# Patient Record
Sex: MALE | Race: WHITE | Employment: OTHER | URBAN - METROPOLITAN AREA
[De-identification: names, ages, dates, MRNs, and addresses within clinical notes are randomized per-mention and may not be internally consistent; named-entity substitution may affect disease eponyms.]

---

## 2018-12-07 RX ORDER — DOCUSATE SODIUM 100 MG/1
100 CAPSULE, LIQUID FILLED ORAL 2 TIMES DAILY
COMMUNITY

## 2018-12-07 RX ORDER — CELECOXIB 200 MG/1
200 CAPSULE ORAL 2 TIMES DAILY
COMMUNITY

## 2018-12-07 RX ORDER — TRAMADOL HYDROCHLORIDE 50 MG/1
50 TABLET ORAL
COMMUNITY

## 2018-12-14 ENCOUNTER — ANESTHESIA EVENT (OUTPATIENT)
Dept: SURGERY | Age: 78
End: 2018-12-14
Payer: MEDICARE

## 2018-12-16 ENCOUNTER — PREP FOR PROCEDURE (OUTPATIENT)
Dept: OPHTHALMOLOGY | Age: 78
End: 2018-12-16

## 2018-12-16 RX ORDER — CIPROFLOXACIN HYDROCHLORIDE 3.5 MG/ML
1 SOLUTION/ DROPS TOPICAL SEE ADMIN INSTRUCTIONS
Status: CANCELLED | OUTPATIENT
Start: 2018-12-17

## 2018-12-16 RX ORDER — SODIUM CHLORIDE 0.9 % (FLUSH) 0.9 %
10 SYRINGE (ML) INJECTION PRN
Status: CANCELLED | OUTPATIENT
Start: 2018-12-16

## 2018-12-16 RX ORDER — SODIUM CHLORIDE 0.9 % (FLUSH) 0.9 %
10 SYRINGE (ML) INJECTION EVERY 12 HOURS SCHEDULED
Status: CANCELLED | OUTPATIENT
Start: 2018-12-16

## 2018-12-17 ENCOUNTER — ANESTHESIA (OUTPATIENT)
Dept: SURGERY | Age: 78
End: 2018-12-17
Payer: MEDICARE

## 2018-12-17 ENCOUNTER — HOSPITAL ENCOUNTER (OUTPATIENT)
Age: 78
Setting detail: OUTPATIENT SURGERY
Discharge: HOME | End: 2018-12-17
Attending: OPHTHALMOLOGY | Admitting: OPHTHALMOLOGY
Payer: MEDICARE

## 2018-12-17 VITALS
OXYGEN SATURATION: 100 % | DIASTOLIC BLOOD PRESSURE: 86 MMHG | RESPIRATION RATE: 12 BRPM | SYSTOLIC BLOOD PRESSURE: 145 MMHG

## 2018-12-17 VITALS
OXYGEN SATURATION: 97 % | RESPIRATION RATE: 15 BRPM | DIASTOLIC BLOOD PRESSURE: 80 MMHG | SYSTOLIC BLOOD PRESSURE: 143 MMHG | BODY MASS INDEX: 28.57 KG/M2 | WEIGHT: 242 LBS | HEART RATE: 69 BPM | HEIGHT: 77 IN | TEMPERATURE: 97.2 F

## 2018-12-17 PROBLEM — Z98.41 CATARACT EXTRACTION STATUS OF RIGHT EYE: Status: ACTIVE | Noted: 2018-12-17

## 2018-12-17 LAB
GLUCOSE BLD-MCNC: 121 MG/DL (ref 70–99)
GLUCOSE BLD-MCNC: 124 MG/DL (ref 70–99)
PERFORMED ON: ABNORMAL
PERFORMED ON: ABNORMAL

## 2018-12-17 PROCEDURE — 2500000003 HC RX 250 WO HCPCS: Performed by: OPHTHALMOLOGY

## 2018-12-17 PROCEDURE — 7100000010 HC PHASE II RECOVERY - FIRST 15 MIN: Performed by: OPHTHALMOLOGY

## 2018-12-17 PROCEDURE — 2709999900 HC NON-CHARGEABLE SUPPLY: Performed by: OPHTHALMOLOGY

## 2018-12-17 PROCEDURE — 3700000001 HC ADD 15 MINUTES (ANESTHESIA): Performed by: OPHTHALMOLOGY

## 2018-12-17 PROCEDURE — 2580000003 HC RX 258: Performed by: ANESTHESIOLOGY

## 2018-12-17 PROCEDURE — 6370000000 HC RX 637 (ALT 250 FOR IP): Performed by: OPHTHALMOLOGY

## 2018-12-17 PROCEDURE — 3700000000 HC ANESTHESIA ATTENDED CARE: Performed by: OPHTHALMOLOGY

## 2018-12-17 PROCEDURE — 3600000004 HC SURGERY LEVEL 4 BASE: Performed by: OPHTHALMOLOGY

## 2018-12-17 PROCEDURE — 6360000002 HC RX W HCPCS: Performed by: NURSE ANESTHETIST, CERTIFIED REGISTERED

## 2018-12-17 PROCEDURE — V2632 POST CHMBR INTRAOCULAR LENS: HCPCS | Performed by: OPHTHALMOLOGY

## 2018-12-17 PROCEDURE — 3600000014 HC SURGERY LEVEL 4 ADDTL 15MIN: Performed by: OPHTHALMOLOGY

## 2018-12-17 DEVICE — ACRYSOF(R) IQ ASPHERIC NATURAL IOL, SINGLE-PIECE ACRYLIC FOLDABLE PCL, UV WITH BLUE LIGHTFILTER, 13.0MM LENGTH, 6.0MM ANTERIORASYMMETRIC BICONVEX OPTIC, PLANAR HAPTICS.
Type: IMPLANTABLE DEVICE | Status: FUNCTIONAL
Brand: ACRYSOF®

## 2018-12-17 RX ORDER — CIPROFLOXACIN HYDROCHLORIDE 3.5 MG/ML
1 SOLUTION/ DROPS TOPICAL
Status: COMPLETED | OUTPATIENT
Start: 2018-12-17 | End: 2018-12-17

## 2018-12-17 RX ORDER — SODIUM CHLORIDE 0.9 % (FLUSH) 0.9 %
10 SYRINGE (ML) INJECTION EVERY 12 HOURS SCHEDULED
Status: DISCONTINUED | OUTPATIENT
Start: 2018-12-17 | End: 2018-12-17 | Stop reason: SDUPTHER

## 2018-12-17 RX ORDER — LIDOCAINE HYDROCHLORIDE 10 MG/ML
INJECTION, SOLUTION EPIDURAL; INFILTRATION; INTRACAUDAL; PERINEURAL
Status: COMPLETED | OUTPATIENT
Start: 2018-12-17 | End: 2018-12-17

## 2018-12-17 RX ORDER — NEOMYCIN SULFATE, POLYMYXIN B SULFATE, AND DEXAMETHASONE 3.5; 10000; 1 MG/G; [USP'U]/G; MG/G
OINTMENT OPHTHALMIC
Status: COMPLETED | OUTPATIENT
Start: 2018-12-17 | End: 2018-12-17

## 2018-12-17 RX ORDER — CIPROFLOXACIN HYDROCHLORIDE 3.5 MG/ML
SOLUTION/ DROPS TOPICAL
Status: COMPLETED | OUTPATIENT
Start: 2018-12-17 | End: 2018-12-17

## 2018-12-17 RX ORDER — SODIUM CHLORIDE 0.9 % (FLUSH) 0.9 %
10 SYRINGE (ML) INJECTION EVERY 12 HOURS SCHEDULED
Status: DISCONTINUED | OUTPATIENT
Start: 2018-12-17 | End: 2018-12-17 | Stop reason: HOSPADM

## 2018-12-17 RX ORDER — TETRACAINE HYDROCHLORIDE 5 MG/ML
SOLUTION OPHTHALMIC
Status: COMPLETED | OUTPATIENT
Start: 2018-12-17 | End: 2018-12-17

## 2018-12-17 RX ORDER — MIDAZOLAM HYDROCHLORIDE 1 MG/ML
INJECTION INTRAMUSCULAR; INTRAVENOUS PRN
Status: DISCONTINUED | OUTPATIENT
Start: 2018-12-17 | End: 2018-12-17 | Stop reason: SDUPTHER

## 2018-12-17 RX ORDER — SODIUM CHLORIDE 0.9 % (FLUSH) 0.9 %
10 SYRINGE (ML) INJECTION PRN
Status: DISCONTINUED | OUTPATIENT
Start: 2018-12-17 | End: 2018-12-17 | Stop reason: SDUPTHER

## 2018-12-17 RX ORDER — SODIUM CHLORIDE 0.9 % (FLUSH) 0.9 %
10 SYRINGE (ML) INJECTION PRN
Status: DISCONTINUED | OUTPATIENT
Start: 2018-12-17 | End: 2018-12-17 | Stop reason: HOSPADM

## 2018-12-17 RX ORDER — BRIMONIDINE TARTRATE 2 MG/ML
SOLUTION/ DROPS OPHTHALMIC
Status: COMPLETED | OUTPATIENT
Start: 2018-12-17 | End: 2018-12-17

## 2018-12-17 RX ORDER — SODIUM CHLORIDE 9 MG/ML
INJECTION, SOLUTION INTRAVENOUS CONTINUOUS
Status: DISCONTINUED | OUTPATIENT
Start: 2018-12-17 | End: 2018-12-17 | Stop reason: HOSPADM

## 2018-12-17 RX ORDER — BALANCED SALT SOLUTION 6.4; .75; .48; .3; 3.9; 1.7 MG/ML; MG/ML; MG/ML; MG/ML; MG/ML; MG/ML
SOLUTION OPHTHALMIC
Status: COMPLETED | OUTPATIENT
Start: 2018-12-17 | End: 2018-12-17

## 2018-12-17 RX ADMIN — POVIDONE-IODINE 0.1 ML: 5 SOLUTION OPHTHALMIC at 06:48

## 2018-12-17 RX ADMIN — CIPROFLOXACIN HYDROCHLORIDE 1 DROP: 3 SOLUTION/ DROPS OPHTHALMIC at 06:47

## 2018-12-17 RX ADMIN — LIDOCAINE HYDROCHLORIDE 1 DROP: 35 GEL OPHTHALMIC at 06:53

## 2018-12-17 RX ADMIN — Medication 0.5 ML: at 06:47

## 2018-12-17 RX ADMIN — LIDOCAINE HYDROCHLORIDE 1 DROP: 35 GEL OPHTHALMIC at 06:48

## 2018-12-17 RX ADMIN — SODIUM CHLORIDE: 0.9 INJECTION, SOLUTION INTRAVENOUS at 06:55

## 2018-12-17 RX ADMIN — MIDAZOLAM 2 MG: 1 INJECTION INTRAMUSCULAR; INTRAVENOUS at 07:48

## 2018-12-17 RX ADMIN — Medication 0.5 ML: at 06:53

## 2018-12-17 RX ADMIN — Medication 0.5 ML: at 07:00

## 2018-12-17 RX ADMIN — CIPROFLOXACIN HYDROCHLORIDE 1 DROP: 3 SOLUTION/ DROPS OPHTHALMIC at 06:53

## 2018-12-17 ASSESSMENT — PAIN - FUNCTIONAL ASSESSMENT: PAIN_FUNCTIONAL_ASSESSMENT: 0-10

## 2018-12-17 ASSESSMENT — PAIN SCALES - GENERAL
PAINLEVEL_OUTOF10: 0
PAINLEVEL_OUTOF10: 0

## 2018-12-17 NOTE — ANESTHESIA PRE PROCEDURE
Department of Anesthesiology  Preprocedure Note       Name:  Morley Mcburney   Age:  69 y.o.  :  1940                                          MRN:  [id number]         Date:  2018      Surgeon: Brayan Fagan):  Dian Lopez MD    Procedure: PHACOEMULSIFICATION WITH INTRAOCULAR LENS IMPLANT (Right )    Medications prior to admission:   Prior to Admission medications    Medication Sig Start Date End Date Taking? Authorizing Provider   traMADol (ULTRAM) 50 MG tablet Take 50 mg by mouth Twice a Week. .   Yes Historical Provider, MD   metFORMIN (GLUCOPHAGE) 500 MG tablet Take 500 mg by mouth 2 times daily (with meals)   Yes Historical Provider, MD   celecoxib (CELEBREX) 200 MG capsule Take 200 mg by mouth 2 times daily   Yes Historical Provider, MD   docusate sodium (COLACE) 100 MG capsule Take 100 mg by mouth 2 times daily   Yes Historical Provider, MD   zolpidem (AMBIEN) 10 MG tablet Take 12.5 mg by mouth nightly as needed for Sleep. .   Yes Historical Provider, MD   gabapentin (NEURONTIN) 300 MG capsule Take 300 mg by mouth 6 times daily. .   Yes Historical Provider, MD   omeprazole (PRILOSEC) 40 MG capsule Take 40 mg by mouth daily. Yes Historical Provider, MD   alfuzosin (UROXATRAL) 10 MG SR tablet Take 10 mg by mouth daily. Yes Historical Provider, MD   fenofibrate (TRICOR) 48 MG tablet Take 48 mg by mouth every evening. Yes Historical Provider, MD   Psyllium (METAMUCIL PO) Take  by mouth. Yes Historical Provider, MD   Multiple Vitamins-Minerals (OCUVITE PRESERVISION PO) Take  by mouth.      Yes Historical Provider, MD       Current medications:    Current Facility-Administered Medications   Medication Dose Route Frequency Provider Last Rate Last Dose    0.9 % sodium chloride infusion   Intravenous Continuous Arcadio Baires MD        sodium chloride flush 0.9 % injection 10 mL  10 mL Intravenous 2 times per day Arcadio Baires MD        sodium chloride flush 0.9 % injection 10 mL  10 10/26/2013    MCV 91.7 10/26/2013    RDW 13.5 10/26/2013     10/26/2013       CMP:   Lab Results   Component Value Date     10/24/2013    K 4.5 10/24/2013     10/24/2013    CO2 22 10/24/2013    BUN 14 10/24/2013    CREATININE 1.1 10/24/2013    GFRAA >60 10/24/2013    AGRATIO 1.2 10/24/2013    LABGLOM >60 10/24/2013    GLUCOSE 191 10/24/2013    PROT 7.2 10/24/2013    CALCIUM 9.2 10/24/2013    BILITOT 0.30 10/24/2013    ALKPHOS 80 10/24/2013    AST 24 10/24/2013    ALT 20 10/24/2013       POC Tests: No results for input(s): POCGLU, POCNA, POCK, POCCL, POCBUN, POCHEMO, POCHCT in the last 72 hours. Coags:   Lab Results   Component Value Date    PROTIME 11.0 10/24/2013    INR 0.98 10/24/2013    APTT 32.0 10/24/2013       HCG (If Applicable): No results found for: PREGTESTUR, PREGSERUM, HCG, HCGQUANT     ABGs: No results found for: PHART, PO2ART, DWG3OOG, UWE5WIT, BEART, N4QNCPSI     Type & Screen (If Applicable):  No results found for: LABABO, [address]    Anesthesia Evaluation  Patient summary reviewed no history of anesthetic complications:   Airway: Mallampati: II  TM distance: >3 FB   Neck ROM: full  Mouth opening: > = 3 FB Dental:          Pulmonary:Negative Pulmonary ROS breath sounds clear to auscultation                             Cardiovascular:  Exercise tolerance: good (>4 METS),   (+) past MI: > 6 months, CAD:, hyperlipidemia    (-) hypertension      Rhythm: regular  Rate: normal                    Neuro/Psych:      (-) TIA, CVA and depression/anxiety            GI/Hepatic/Renal:   (+) morbid obesity     (-) GERD, hepatitis, liver disease and no renal disease       Endo/Other:    (+) Diabetes, . Abdominal:           Vascular:     - DVT and PE. Anesthesia Plan      MAC     ASA 3       Induction: intravenous. Anesthetic plan and risks discussed with patient. Plan discussed with CRNA.             DOS STAFF ADDENDUM:    Pt seen and

## 2018-12-17 NOTE — OP NOTE
[address]  [address] [facility]. [address] [address]  [phone]      12/17/2018    Patient name: Dorothy Eaton  YOB: 1940  MRN: [id number]    [place]: Allergies   Allergen Reactions    Erythromycin        Pre-operative diagnosis:  Cataract Right Eye    Post-operative diagnosis:  Same    Procedure Performed:  Phacoemulsification with insertion of a foldable posterior chamber intraocular lens implant to the right eye. Anesthesia:  Topical Anesthesia with MAC. Estimated Blood Loss: None    Specimens removed: None    Limbal Relaxing Incisions:  Axis:N/A    Arc Length: N/A    Complications:  None    Description of Procedure: In the same day surgery center, the patient was given the usual pre-operative regimen. In the operating room suite, the right eye was prepped and draped in the usual sterile fashion. A paracentesis tract was fashioned, after which 0.5 MI of 1% preservative free Lidocaine was injected into the anterior chamber followed by Viscoat for a complete chamber fill. A clear cornea temporal tunnel was created using a keratome. Under optimal magnification and visualization, a continuous curvilinear capsulorrhexis was performed. Hydro-dissection of the lens was carried out using balanced salt solution. The lens was then phacoemulsified using the divide and conquer technique with a total CDE of 7.5. Residual cortex was removed using the I/A handpiece followed by thorough posterior capsule polishing. The capsular bag was then filled with Provisc and the lens was gently delivered into the bag, achieving excellent centration. Provisc was removed using the I/A handpiece and the globe was pressurized using balanced salt solution. The paracentesis tract and the temporal wound were both checked and found to be watertight. The speculum was removed and Betadine 5% was instilled.  The patient tolerated the procedure well and was taken to the same day

## 2024-02-19 ENCOUNTER — APPOINTMENT (OUTPATIENT)
Dept: CT IMAGING | Age: 84
End: 2024-02-19
Payer: MEDICARE

## 2024-02-19 ENCOUNTER — APPOINTMENT (OUTPATIENT)
Dept: GENERAL RADIOLOGY | Age: 84
End: 2024-02-19
Payer: MEDICARE

## 2024-02-19 ENCOUNTER — HOSPITAL ENCOUNTER (EMERGENCY)
Age: 84
Discharge: ANOTHER HOSPITAL | End: 2024-02-19
Attending: EMERGENCY MEDICINE
Payer: MEDICARE

## 2024-02-19 VITALS
DIASTOLIC BLOOD PRESSURE: 59 MMHG | SYSTOLIC BLOOD PRESSURE: 88 MMHG | BODY MASS INDEX: 28.46 KG/M2 | HEART RATE: 99 BPM | RESPIRATION RATE: 15 BRPM | OXYGEN SATURATION: 98 % | WEIGHT: 233.69 LBS | TEMPERATURE: 98.2 F | HEIGHT: 76 IN

## 2024-02-19 DIAGNOSIS — S22.080A CLOSED WEDGE COMPRESSION FRACTURE OF T11 VERTEBRA, INITIAL ENCOUNTER (HCC): ICD-10-CM

## 2024-02-19 DIAGNOSIS — S42.001A CLOSED DISPLACED FRACTURE OF RIGHT CLAVICLE, UNSPECIFIED PART OF CLAVICLE, INITIAL ENCOUNTER: ICD-10-CM

## 2024-02-19 DIAGNOSIS — R79.89 ELEVATED TROPONIN: ICD-10-CM

## 2024-02-19 DIAGNOSIS — S32.019A CLOSED FRACTURE OF FIRST LUMBAR VERTEBRA, UNSPECIFIED FRACTURE MORPHOLOGY, INITIAL ENCOUNTER (HCC): ICD-10-CM

## 2024-02-19 DIAGNOSIS — S82.001A CLOSED DISPLACED FRACTURE OF RIGHT PATELLA, UNSPECIFIED FRACTURE MORPHOLOGY, INITIAL ENCOUNTER: ICD-10-CM
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
## 2024-02-19 DIAGNOSIS — S22.41XA CLOSED FRACTURE OF MULTIPLE RIBS OF RIGHT SIDE, INITIAL ENCOUNTER: ICD-10-CM

## 2024-02-19 DIAGNOSIS — J94.2 HEMOTHORAX ON RIGHT: ICD-10-CM

## 2024-02-19 DIAGNOSIS — R09.02 HYPOXIA: ICD-10-CM

## 2024-02-19 LAB
ABO + RH BLD: NORMAL
ANION GAP SERPL CALCULATED.3IONS-SCNC: 11 MMOL/L (ref 3–16)
BASOPHILS # BLD: 0 K/UL (ref 0–0.2)
BASOPHILS NFR BLD: 0.3 %
BLD GP AB SCN SERPL QL: NORMAL
BUN SERPL-MCNC: 13 MG/DL (ref 7–20)
CALCIUM SERPL-MCNC: 9 MG/DL (ref 8.3–10.6)
CHLORIDE SERPL-SCNC: 92 MMOL/L (ref 99–110)
CO2 SERPL-SCNC: 24 MMOL/L (ref 21–32)
CREAT SERPL-MCNC: 0.8 MG/DL (ref 0.8–1.3)
DEPRECATED RDW RBC AUTO: 14.3 % (ref 12.4–15.4)
EOSINOPHIL # BLD: 0.1 K/UL (ref 0–0.6)
EOSINOPHIL NFR BLD: 1 %
GFR SERPLBLD CREATININE-BSD FMLA CKD-EPI: >60 ML/MIN/{1.73_M2}
GLUCOSE SERPL-MCNC: 118 MG/DL (ref 70–99)
HCT VFR BLD AUTO: 41.9 % (ref 40.5–52.5)
HGB BLD-MCNC: 14.3 G/DL (ref 13.5–17.5)
LYMPHOCYTES # BLD: 1 K/UL (ref 1–5.1)
LYMPHOCYTES NFR BLD: 16.1 %
MCH RBC QN AUTO: 31.3 PG (ref 26–34)
MCHC RBC AUTO-ENTMCNC: 34 G/DL (ref 31–36)
MCV RBC AUTO: 92 FL (ref 80–100)
MONOCYTES # BLD: 0.6 K/UL (ref 0–1.3)
MONOCYTES NFR BLD: 9.2 %
NEUTROPHILS # BLD: 4.7 K/UL (ref 1.7–7.7)
NEUTROPHILS NFR BLD: 73.4 %
PLATELET # BLD AUTO: 177 K/UL (ref 135–450)
PMV BLD AUTO: 9.7 FL (ref 5–10.5)
POTASSIUM SERPL-SCNC: 4.6 MMOL/L (ref 3.5–5.1)
RBC # BLD AUTO: 4.56 M/UL (ref 4.2–5.9)
SODIUM SERPL-SCNC: 127 MMOL/L (ref 136–145)
TROPONIN, HIGH SENSITIVITY: 36 NG/L (ref 0–22)
WBC # BLD AUTO: 6.4 K/UL (ref 4–11)

## 2024-02-19 PROCEDURE — 72170 X-RAY EXAM OF PELVIS: CPT

## 2024-02-19 PROCEDURE — 73070 X-RAY EXAM OF ELBOW: CPT

## 2024-02-19 PROCEDURE — 86900 BLOOD TYPING SEROLOGIC ABO: CPT

## 2024-02-19 PROCEDURE — 36415 COLL VENOUS BLD VENIPUNCTURE: CPT

## 2024-02-19 PROCEDURE — 71260 CT THORAX DX C+: CPT

## 2024-02-19 PROCEDURE — 72125 CT NECK SPINE W/O DYE: CPT

## 2024-02-19 PROCEDURE — 84484 ASSAY OF TROPONIN QUANT: CPT

## 2024-02-19 PROCEDURE — 2580000003 HC RX 258: Performed by: PHYSICIAN ASSISTANT

## 2024-02-19 PROCEDURE — 73560 X-RAY EXAM OF KNEE 1 OR 2: CPT

## 2024-02-19 PROCEDURE — 73030 X-RAY EXAM OF SHOULDER: CPT

## 2024-02-19 PROCEDURE — 96375 TX/PRO/DX INJ NEW DRUG ADDON: CPT

## 2024-02-19 PROCEDURE — 99285 EMERGENCY DEPT VISIT HI MDM: CPT

## 2024-02-19 PROCEDURE — 96376 TX/PRO/DX INJ SAME DRUG ADON: CPT

## 2024-02-19 PROCEDURE — 6370000000 HC RX 637 (ALT 250 FOR IP): Performed by: EMERGENCY MEDICINE

## 2024-02-19 PROCEDURE — 70450 CT HEAD/BRAIN W/O DYE: CPT

## 2024-02-19 PROCEDURE — 73130 X-RAY EXAM OF HAND: CPT

## 2024-02-19 PROCEDURE — 86850 RBC ANTIBODY SCREEN: CPT

## 2024-02-19 PROCEDURE — 86901 BLOOD TYPING SEROLOGIC RH(D): CPT

## 2024-02-19 PROCEDURE — 85025 COMPLETE CBC W/AUTO DIFF WBC: CPT

## 2024-02-19 PROCEDURE — 93005 ELECTROCARDIOGRAM TRACING: CPT | Performed by: PHYSICIAN ASSISTANT

## 2024-02-19 PROCEDURE — 96374 THER/PROPH/DIAG INJ IV PUSH: CPT

## 2024-02-19 PROCEDURE — 6360000002 HC RX W HCPCS: Performed by: PHYSICIAN ASSISTANT

## 2024-02-19 PROCEDURE — 6360000004 HC RX CONTRAST MEDICATION: Performed by: PHYSICIAN ASSISTANT

## 2024-02-19 PROCEDURE — 80048 BASIC METABOLIC PNL TOTAL CA: CPT

## 2024-02-19 RX ORDER — ONDANSETRON 2 MG/ML
4 INJECTION INTRAMUSCULAR; INTRAVENOUS ONCE
Status: COMPLETED | OUTPATIENT
Start: 2024-02-19 | End: 2024-02-19

## 2024-02-19 RX ORDER — SODIUM CHLORIDE 9 MG/ML
INJECTION, SOLUTION INTRAVENOUS CONTINUOUS
Status: DISCONTINUED | OUTPATIENT
Start: 2024-02-19 | End: 2024-02-20 | Stop reason: HOSPADM

## 2024-02-19 RX ORDER — 0.9 % SODIUM CHLORIDE 0.9 %
500 INTRAVENOUS SOLUTION INTRAVENOUS ONCE
Status: COMPLETED | OUTPATIENT
Start: 2024-02-19 | End: 2024-02-19

## 2024-02-19 RX ORDER — MORPHINE SULFATE 2 MG/ML
2 INJECTION, SOLUTION INTRAMUSCULAR; INTRAVENOUS ONCE
Status: COMPLETED | OUTPATIENT
Start: 2024-02-19 | End: 2024-02-19

## 2024-02-19 RX ORDER — LIDOCAINE 4 G/G
1 PATCH TOPICAL DAILY
Status: DISCONTINUED | OUTPATIENT
Start: 2024-02-19 | End: 2024-02-20 | Stop reason: HOSPADM

## 2024-02-19 RX ORDER — IBUPROFEN 200 MG
TABLET ORAL ONCE
Status: DISCONTINUED | OUTPATIENT
Start: 2024-02-19 | End: 2024-02-20 | Stop reason: HOSPADM

## 2024-02-19 RX ADMIN — ONDANSETRON 4 MG: 2 INJECTION INTRAMUSCULAR; INTRAVENOUS at 18:18

## 2024-02-19 RX ADMIN — SODIUM CHLORIDE 500 ML: 9 INJECTION, SOLUTION INTRAVENOUS at 20:30

## 2024-02-19 RX ADMIN — MORPHINE SULFATE 2 MG: 2 INJECTION, SOLUTION INTRAMUSCULAR; INTRAVENOUS at 19:51

## 2024-02-19 RX ADMIN — IOPAMIDOL 75 ML: 755 INJECTION, SOLUTION INTRAVENOUS at 18:52

## 2024-02-19 RX ADMIN — MORPHINE SULFATE 2 MG: 2 INJECTION, SOLUTION INTRAMUSCULAR; INTRAVENOUS at 18:18

## 2024-02-19 RX ADMIN — SODIUM CHLORIDE: 9 INJECTION, SOLUTION INTRAVENOUS at 21:04

## 2024-02-19 ASSESSMENT — PAIN - FUNCTIONAL ASSESSMENT: PAIN_FUNCTIONAL_ASSESSMENT: PREVENTS OR INTERFERES SOME ACTIVE ACTIVITIES AND ADLS

## 2024-02-19 ASSESSMENT — PAIN SCALES - GENERAL
PAINLEVEL_OUTOF10: 10
PAINLEVEL_OUTOF10: 10

## 2024-02-19 ASSESSMENT — PAIN DESCRIPTION - ORIENTATION: ORIENTATION: RIGHT

## 2024-02-19 ASSESSMENT — PAIN DESCRIPTION - DESCRIPTORS: DESCRIPTORS: SHARP

## 2024-02-19 ASSESSMENT — LIFESTYLE VARIABLES
HOW OFTEN DO YOU HAVE A DRINK CONTAINING ALCOHOL: MONTHLY OR LESS
HOW MANY STANDARD DRINKS CONTAINING ALCOHOL DO YOU HAVE ON A TYPICAL DAY: 1 OR 2

## 2024-02-19 ASSESSMENT — PAIN DESCRIPTION - LOCATION: LOCATION: BACK;SHOULDER

## 2024-02-19 ASSESSMENT — PAIN DESCRIPTION - ONSET: ONSET: ON-GOING

## 2024-02-19 ASSESSMENT — ENCOUNTER SYMPTOMS
VOMITING: 0
SHORTNESS OF BREATH: 0
BACK PAIN: 1
NAUSEA: 0

## 2024-02-19 ASSESSMENT — PAIN DESCRIPTION - PAIN TYPE: TYPE: ACUTE PAIN

## 2024-02-19 ASSESSMENT — PAIN DESCRIPTION - FREQUENCY: FREQUENCY: CONTINUOUS

## 2024-02-19 NOTE — ED NOTES
Pt arrived to dept via [facility] from home.  Pt c/o fell backwards down stairs 5 metal steps. Left and right elbow skin tears, pinky laceration, left side of head laceration. Pain to right shoulder and right back pain. Pt is not on any blood thinners per self. No LOC reported.  Pt awake, alert and oriented x 3.  Skin warm and dry- mult skin laceration noted.  Resp easy and unlabored.  Pt placed in gown and on cardiac monitor.  Call light in reach. Side rails x 2. Call light within reach.

## 2024-02-20 LAB
EKG ATRIAL RATE: 104 BPM
EKG DIAGNOSIS: NORMAL
EKG P AXIS: 34 DEGREES
EKG P-R INTERVAL: 198 MS
EKG Q-T INTERVAL: 340 MS
EKG QRS DURATION: 114 MS
EKG QTC CALCULATION (BAZETT): 447 MS
EKG R AXIS: -35 DEGREES
EKG T AXIS: 91 DEGREES
EKG VENTRICULAR RATE: 104 BPM

## 2024-02-20 PROCEDURE — 93010 ELECTROCARDIOGRAM REPORT: CPT | Performed by: INTERNAL MEDICINE

## 2024-02-20 NOTE — ED PROVIDER NOTES
Department Physician in the absence of a cardiologist.  Please see their note for interpretation of EKG.    RADIOLOGY:   Non-plain film images such as CT, Ultrasound and MRI are read by the radiologist. Plain radiographic images are visualized and preliminarily interpreted by the ED Provider with the below findings:    Interpretation per the Radiologist below, if available at the time of this note:    CT HEAD WO CONTRAST   Final Result   No acute abnormality of the head and cervical spine.  Comminuted fracture of   the medial right clavicle; refer to chest CT report for further details.         CT CERVICAL SPINE WO CONTRAST   Final Result   No acute abnormality of the head and cervical spine.  Comminuted fracture of   the medial right clavicle; refer to chest CT report for further details.         CT LUMBAR SPINE BONY RECONSTRUCTION   Final Result      CT THORACIC SPINE BONY RECONSTRUCTION   Final Result      CT CHEST ABDOMEN PELVIS W CONTRAST Additional Contrast? None   Final Result      XR ELBOW LEFT (2 VIEWS)   Final Result   1. No displaced acute fractures or dislocations of the visualized bones of   the left elbow.   2. Small elbow joint effusion.   3. If there remains clinical concern recommend repeat radiographs in 7-10   days to evaluate for occult fractures.   1.      XR ELBOW RIGHT (2 VIEWS)   Final Result   No acute fracture or dislocation.         XR HAND RIGHT (MIN 3 VIEWS)   Final Result   No acute fracture         XR KNEE RIGHT (1-2 VIEWS)   Final Result   Acute patellar fracture with proximal retraction of the largest superior   fragment.  CT follow-up recommended.         XR PELVIS (1-2 VIEWS)   Final Result   No visualized fracture         XR SHOULDER RIGHT (MIN 2 VIEWS)   Final Result   Acute mildly displaced fracture of the right distal clavicle.           No results found.    No results found.    PROCEDURES   Unless otherwise noted below, none     Procedures    CRITICAL CARE TIME (.cctime)   I 
provider for clarification.     LARRY HAN MD   Acute [facility]        Nnamdi, Larry Benton MD  02/19/24 2031

## 2024-02-23 ENCOUNTER — APPOINTMENT (OUTPATIENT)
Dept: CT IMAGING | Age: 84
DRG: 392 | End: 2024-02-23
Payer: MEDICARE

## 2024-02-23 ENCOUNTER — HOSPITAL ENCOUNTER (INPATIENT)
Age: 84
LOS: 2 days | Discharge: NURSING, REHAB OR LONG-TERM CARE | DRG: 392 | End: 2024-02-27
Attending: HOSPITALIST | Admitting: INTERNAL MEDICINE
Payer: MEDICARE

## 2024-02-23 DIAGNOSIS — K59.00 CONSTIPATION, UNSPECIFIED CONSTIPATION TYPE: ICD-10-CM

## 2024-02-23 DIAGNOSIS — R10.84 GENERALIZED ABDOMINAL PAIN: Primary | ICD-10-CM

## 2024-02-23 DIAGNOSIS — E87.1 HYPONATREMIA: ICD-10-CM

## 2024-02-23 DIAGNOSIS — F51.01 PRIMARY INSOMNIA: ICD-10-CM

## 2024-02-23 LAB
ANION GAP SERPL CALCULATED.3IONS-SCNC: 10 MMOL/L (ref 3–16)
BACTERIA URNS QL MICRO: NORMAL /HPF
BASOPHILS # BLD: 0 K/UL (ref 0–0.2)
BASOPHILS NFR BLD: 0.2 %
BILIRUB UR QL STRIP.AUTO: NEGATIVE
BUN SERPL-MCNC: 18 MG/DL (ref 7–20)
CALCIUM SERPL-MCNC: 8.9 MG/DL (ref 8.3–10.6)
CHLORIDE SERPL-SCNC: 88 MMOL/L (ref 99–110)
CLARITY UR: CLEAR
CO2 SERPL-SCNC: 25 MMOL/L (ref 21–32)
COLOR UR: YELLOW
CREAT SERPL-MCNC: 0.7 MG/DL (ref 0.8–1.3)
DEPRECATED RDW RBC AUTO: 13.8 % (ref 12.4–15.4)
EOSINOPHIL # BLD: 0 K/UL (ref 0–0.6)
EOSINOPHIL NFR BLD: 0.2 %
EPI CELLS #/AREA URNS AUTO: 1 /HPF (ref 0–5)
GFR SERPLBLD CREATININE-BSD FMLA CKD-EPI: >60 ML/MIN/{1.73_M2}
GLUCOSE SERPL-MCNC: 141 MG/DL (ref 70–99)
GLUCOSE UR STRIP.AUTO-MCNC: NEGATIVE MG/DL
HCT VFR BLD AUTO: 34.9 % (ref 40.5–52.5)
HGB BLD-MCNC: 12.1 G/DL (ref 13.5–17.5)
HGB UR QL STRIP.AUTO: NEGATIVE
HYALINE CASTS #/AREA URNS AUTO: 0 /LPF (ref 0–8)
KETONES UR STRIP.AUTO-MCNC: ABNORMAL MG/DL
LACTATE BLDV-SCNC: 1.7 MMOL/L (ref 0.4–2)
LEUKOCYTE ESTERASE UR QL STRIP.AUTO: NEGATIVE
LYMPHOCYTES # BLD: 0.5 K/UL (ref 1–5.1)
LYMPHOCYTES NFR BLD: 5.3 %
MCH RBC QN AUTO: 31.6 PG (ref 26–34)
MCHC RBC AUTO-ENTMCNC: 34.7 G/DL (ref 31–36)
MCV RBC AUTO: 91.1 FL (ref 80–100)
MONOCYTES # BLD: 1 K/UL (ref 0–1.3)
MONOCYTES NFR BLD: 9.8 %
NEUTROPHILS # BLD: 8.6 K/UL (ref 1.7–7.7)
NEUTROPHILS NFR BLD: 84.5 %
NITRITE UR QL STRIP.AUTO: NEGATIVE
PH UR STRIP.AUTO: 5.5 [PH] (ref 5–8)
PLATELET # BLD AUTO: 234 K/UL (ref 135–450)
PMV BLD AUTO: 8.6 FL (ref 5–10.5)
POTASSIUM SERPL-SCNC: 4.3 MMOL/L (ref 3.5–5.1)
PROT UR STRIP.AUTO-MCNC: ABNORMAL MG/DL
RBC # BLD AUTO: 3.83 M/UL (ref 4.2–5.9)
RBC CLUMPS #/AREA URNS AUTO: 2 /HPF (ref 0–4)
SODIUM SERPL-SCNC: 123 MMOL/L (ref 136–145)
SP GR UR STRIP.AUTO: 1.03 (ref 1–1.03)
UA COMPLETE W REFLEX CULTURE PNL UR: ABNORMAL
UA DIPSTICK W REFLEX MICRO PNL UR: YES
URN SPEC COLLECT METH UR: ABNORMAL
UROBILINOGEN UR STRIP-ACNC: 1 E.U./DL
WBC # BLD AUTO: 10.1 K/UL (ref 4–11)
WBC #/AREA URNS AUTO: 1 /HPF (ref 0–5)

## 2024-02-23 PROCEDURE — 83605 ASSAY OF LACTIC ACID: CPT

## 2024-02-23 PROCEDURE — 81001 URINALYSIS AUTO W/SCOPE: CPT

## 2024-02-23 PROCEDURE — 6370000000 HC RX 637 (ALT 250 FOR IP): Performed by: NURSE PRACTITIONER

## 2024-02-23 PROCEDURE — 2580000003 HC RX 258: Performed by: NURSE PRACTITIONER

## 2024-02-23 PROCEDURE — 6360000004 HC RX CONTRAST MEDICATION: Performed by: NURSE PRACTITIONER

## 2024-02-23 PROCEDURE — 99285 EMERGENCY DEPT VISIT HI MDM: CPT

## 2024-02-23 PROCEDURE — 74177 CT ABD & PELVIS W/CONTRAST: CPT

## 2024-02-23 PROCEDURE — 80048 BASIC METABOLIC PNL TOTAL CA: CPT

## 2024-02-23 PROCEDURE — 6360000002 HC RX W HCPCS: Performed by: NURSE PRACTITIONER

## 2024-02-23 PROCEDURE — 96374 THER/PROPH/DIAG INJ IV PUSH: CPT

## 2024-02-23 PROCEDURE — 96361 HYDRATE IV INFUSION ADD-ON: CPT

## 2024-02-23 PROCEDURE — 85025 COMPLETE CBC W/AUTO DIFF WBC: CPT

## 2024-02-23 RX ORDER — MORPHINE SULFATE 4 MG/ML
4 INJECTION, SOLUTION INTRAMUSCULAR; INTRAVENOUS ONCE
Status: COMPLETED | OUTPATIENT
Start: 2024-02-24 | End: 2024-02-24

## 2024-02-23 RX ORDER — LACTULOSE 10 G/15ML
30 SOLUTION ORAL ONCE
Status: COMPLETED | OUTPATIENT
Start: 2024-02-23 | End: 2024-02-23

## 2024-02-23 RX ORDER — 0.9 % SODIUM CHLORIDE 0.9 %
500 INTRAVENOUS SOLUTION INTRAVENOUS ONCE
Status: COMPLETED | OUTPATIENT
Start: 2024-02-23 | End: 2024-02-24

## 2024-02-23 RX ADMIN — SODIUM CHLORIDE 500 ML: 9 INJECTION, SOLUTION INTRAVENOUS at 23:13

## 2024-02-23 RX ADMIN — HYDROMORPHONE HYDROCHLORIDE 0.5 MG: 1 INJECTION, SOLUTION INTRAMUSCULAR; INTRAVENOUS; SUBCUTANEOUS at 21:02

## 2024-02-23 RX ADMIN — IOPAMIDOL 75 ML: 755 INJECTION, SOLUTION INTRAVENOUS at 21:36

## 2024-02-23 RX ADMIN — LACTULOSE 30 G: 20 SOLUTION ORAL at 22:59

## 2024-02-23 ASSESSMENT — LIFESTYLE VARIABLES
HOW MANY STANDARD DRINKS CONTAINING ALCOHOL DO YOU HAVE ON A TYPICAL DAY: 1 OR 2
HOW OFTEN DO YOU HAVE A DRINK CONTAINING ALCOHOL: MONTHLY OR LESS

## 2024-02-23 ASSESSMENT — PAIN SCALES - GENERAL
PAINLEVEL_OUTOF10: 10
PAINLEVEL_OUTOF10: 9
PAINLEVEL_OUTOF10: 3

## 2024-02-24 PROBLEM — K59.00 CONSTIPATION: Status: ACTIVE | Noted: 2024-02-24

## 2024-02-24 PROCEDURE — 6370000000 HC RX 637 (ALT 250 FOR IP): Performed by: HOSPITALIST

## 2024-02-24 PROCEDURE — 96361 HYDRATE IV INFUSION ADD-ON: CPT

## 2024-02-24 PROCEDURE — 96375 TX/PRO/DX INJ NEW DRUG ADDON: CPT

## 2024-02-24 PROCEDURE — 6360000002 HC RX W HCPCS: Performed by: INTERNAL MEDICINE

## 2024-02-24 PROCEDURE — 6360000002 HC RX W HCPCS: Performed by: HOSPITALIST

## 2024-02-24 PROCEDURE — 2580000003 HC RX 258: Performed by: INTERNAL MEDICINE

## 2024-02-24 PROCEDURE — 94760 N-INVAS EAR/PLS OXIMETRY 1: CPT

## 2024-02-24 PROCEDURE — 96372 THER/PROPH/DIAG INJ SC/IM: CPT

## 2024-02-24 PROCEDURE — 2500000003 HC RX 250 WO HCPCS: Performed by: INTERNAL MEDICINE

## 2024-02-24 PROCEDURE — G0378 HOSPITAL OBSERVATION PER HR: HCPCS

## 2024-02-24 PROCEDURE — 6360000002 HC RX W HCPCS: Performed by: NURSE PRACTITIONER

## 2024-02-24 PROCEDURE — 96376 TX/PRO/DX INJ SAME DRUG ADON: CPT

## 2024-02-24 PROCEDURE — 2580000003 HC RX 258: Performed by: HOSPITALIST

## 2024-02-24 RX ORDER — ACETAMINOPHEN 325 MG/1
650 TABLET ORAL EVERY 6 HOURS PRN
Status: DISCONTINUED | OUTPATIENT
Start: 2024-02-24 | End: 2024-02-27 | Stop reason: HOSPADM

## 2024-02-24 RX ORDER — TAMSULOSIN HYDROCHLORIDE 0.4 MG/1
0.4 CAPSULE ORAL DAILY
Status: ON HOLD | COMMUNITY

## 2024-02-24 RX ORDER — POTASSIUM CHLORIDE 20 MEQ/1
40 TABLET, EXTENDED RELEASE ORAL PRN
Status: DISCONTINUED | OUTPATIENT
Start: 2024-02-24 | End: 2024-02-27 | Stop reason: HOSPADM

## 2024-02-24 RX ORDER — LIDOCAINE 50 MG/G
1 PATCH TOPICAL DAILY
Status: ON HOLD | COMMUNITY

## 2024-02-24 RX ORDER — ERGOCALCIFEROL 1.25 MG/1
50000 CAPSULE ORAL WEEKLY
Status: ON HOLD | COMMUNITY

## 2024-02-24 RX ORDER — SODIUM CHLORIDE 0.9 % (FLUSH) 0.9 %
5-40 SYRINGE (ML) INJECTION PRN
Status: DISCONTINUED | OUTPATIENT
Start: 2024-02-24 | End: 2024-02-27 | Stop reason: HOSPADM

## 2024-02-24 RX ORDER — ENOXAPARIN SODIUM 100 MG/ML
30 INJECTION SUBCUTANEOUS 2 TIMES DAILY
Status: DISCONTINUED | OUTPATIENT
Start: 2024-02-24 | End: 2024-02-27 | Stop reason: HOSPADM

## 2024-02-24 RX ORDER — FAMOTIDINE 20 MG/1
20 TABLET, FILM COATED ORAL 2 TIMES DAILY
Status: ON HOLD | COMMUNITY

## 2024-02-24 RX ORDER — OXYCODONE HYDROCHLORIDE 5 MG/1
5 TABLET ORAL EVERY 6 HOURS PRN
Status: DISCONTINUED | OUTPATIENT
Start: 2024-02-24 | End: 2024-02-24 | Stop reason: SDUPTHER

## 2024-02-24 RX ORDER — KETOROLAC TROMETHAMINE 15 MG/ML
15 INJECTION, SOLUTION INTRAMUSCULAR; INTRAVENOUS ONCE
Status: COMPLETED | OUTPATIENT
Start: 2024-02-24 | End: 2024-02-24

## 2024-02-24 RX ORDER — OXYCODONE HYDROCHLORIDE 5 MG/1
5 TABLET ORAL EVERY 6 HOURS PRN
Status: ON HOLD | COMMUNITY
End: 2024-02-27

## 2024-02-24 RX ORDER — SODIUM CHLORIDE 0.9 % (FLUSH) 0.9 %
5-40 SYRINGE (ML) INJECTION EVERY 12 HOURS SCHEDULED
Status: DISCONTINUED | OUTPATIENT
Start: 2024-02-24 | End: 2024-02-27 | Stop reason: HOSPADM

## 2024-02-24 RX ORDER — GABAPENTIN 300 MG/1
600 CAPSULE ORAL 3 TIMES DAILY
Status: DISCONTINUED | OUTPATIENT
Start: 2024-02-24 | End: 2024-02-27 | Stop reason: HOSPADM

## 2024-02-24 RX ORDER — ONDANSETRON 4 MG/1
4 TABLET, ORALLY DISINTEGRATING ORAL EVERY 8 HOURS PRN
Status: DISCONTINUED | OUTPATIENT
Start: 2024-02-24 | End: 2024-02-27 | Stop reason: HOSPADM

## 2024-02-24 RX ORDER — OXYCODONE HYDROCHLORIDE 5 MG/1
5 TABLET ORAL EVERY 4 HOURS PRN
Status: DISCONTINUED | OUTPATIENT
Start: 2024-02-24 | End: 2024-02-27 | Stop reason: HOSPADM

## 2024-02-24 RX ORDER — ACETAMINOPHEN 325 MG/1
975 TABLET ORAL 3 TIMES DAILY
Status: ON HOLD | COMMUNITY

## 2024-02-24 RX ORDER — ACETAMINOPHEN 650 MG/1
650 SUPPOSITORY RECTAL EVERY 6 HOURS PRN
Status: DISCONTINUED | OUTPATIENT
Start: 2024-02-24 | End: 2024-02-27 | Stop reason: HOSPADM

## 2024-02-24 RX ORDER — SODIUM CHLORIDE, SODIUM LACTATE, POTASSIUM CHLORIDE, CALCIUM CHLORIDE 600; 310; 30; 20 MG/100ML; MG/100ML; MG/100ML; MG/100ML
INJECTION, SOLUTION INTRAVENOUS CONTINUOUS
Status: DISCONTINUED | OUTPATIENT
Start: 2024-02-24 | End: 2024-02-27

## 2024-02-24 RX ORDER — LINACLOTIDE 290 UG/1
290 CAPSULE, GELATIN COATED ORAL
Status: ON HOLD | COMMUNITY

## 2024-02-24 RX ORDER — POLYETHYLENE GLYCOL 3350 17 G/17G
17 POWDER, FOR SOLUTION ORAL 2 TIMES DAILY
Status: DISCONTINUED | OUTPATIENT
Start: 2024-02-24 | End: 2024-02-27 | Stop reason: HOSPADM

## 2024-02-24 RX ORDER — ERGOCALCIFEROL 1.25 MG/1
50000 CAPSULE ORAL WEEKLY
Status: DISCONTINUED | OUTPATIENT
Start: 2024-03-01 | End: 2024-02-27 | Stop reason: HOSPADM

## 2024-02-24 RX ORDER — TAMSULOSIN HYDROCHLORIDE 0.4 MG/1
0.4 CAPSULE ORAL DAILY
Status: DISCONTINUED | OUTPATIENT
Start: 2024-02-24 | End: 2024-02-27 | Stop reason: HOSPADM

## 2024-02-24 RX ORDER — ONDANSETRON 2 MG/ML
4 INJECTION INTRAMUSCULAR; INTRAVENOUS ONCE
Status: COMPLETED | OUTPATIENT
Start: 2024-02-24 | End: 2024-02-24

## 2024-02-24 RX ORDER — METHOCARBAMOL 1000 MG/1
1000 TABLET, COATED ORAL 3 TIMES DAILY
Status: ON HOLD | COMMUNITY
End: 2024-02-26

## 2024-02-24 RX ORDER — SIMETHICONE 80 MG
80 TABLET,CHEWABLE ORAL EVERY 6 HOURS PRN
Status: ON HOLD | COMMUNITY

## 2024-02-24 RX ORDER — METHOCARBAMOL 500 MG/1
1000 TABLET, FILM COATED ORAL 3 TIMES DAILY
Status: DISCONTINUED | OUTPATIENT
Start: 2024-02-24 | End: 2024-02-27 | Stop reason: HOSPADM

## 2024-02-24 RX ORDER — POLYETHYLENE GLYCOL 3350 17 G/17G
17 POWDER, FOR SOLUTION ORAL 2 TIMES DAILY
Status: ON HOLD | COMMUNITY

## 2024-02-24 RX ORDER — SENNA AND DOCUSATE SODIUM 50; 8.6 MG/1; MG/1
1 TABLET, FILM COATED ORAL 2 TIMES DAILY
Status: DISCONTINUED | OUTPATIENT
Start: 2024-02-24 | End: 2024-02-27 | Stop reason: HOSPADM

## 2024-02-24 RX ORDER — POLYETHYLENE GLYCOL 3350 17 G/17G
17 POWDER, FOR SOLUTION ORAL DAILY PRN
Status: DISCONTINUED | OUTPATIENT
Start: 2024-02-24 | End: 2024-02-27 | Stop reason: HOSPADM

## 2024-02-24 RX ORDER — OXYCODONE HYDROCHLORIDE 10 MG/1
10 TABLET ORAL EVERY 4 HOURS PRN
Status: DISCONTINUED | OUTPATIENT
Start: 2024-02-24 | End: 2024-02-27 | Stop reason: HOSPADM

## 2024-02-24 RX ORDER — FAMOTIDINE 20 MG/1
20 TABLET, FILM COATED ORAL 2 TIMES DAILY
Status: DISCONTINUED | OUTPATIENT
Start: 2024-02-24 | End: 2024-02-27 | Stop reason: HOSPADM

## 2024-02-24 RX ORDER — DESMOPRESSIN ACETATE 0.2 MG/1
100 TABLET ORAL NIGHTLY
Status: DISCONTINUED | OUTPATIENT
Start: 2024-02-24 | End: 2024-02-27 | Stop reason: HOSPADM

## 2024-02-24 RX ORDER — SIMETHICONE 80 MG
80 TABLET,CHEWABLE ORAL EVERY 6 HOURS PRN
Status: DISCONTINUED | OUTPATIENT
Start: 2024-02-24 | End: 2024-02-27 | Stop reason: HOSPADM

## 2024-02-24 RX ORDER — DEXTRAN 70, GLYCERIN, HYPROMELLOSE 1; 2; 3 MG/ML; MG/ML; MG/ML
1 SOLUTION/ DROPS OPHTHALMIC 2 TIMES DAILY
Status: ON HOLD | COMMUNITY

## 2024-02-24 RX ORDER — DESMOPRESSIN ACETATE 0.2 MG/1
100 TABLET ORAL DAILY
Status: DISCONTINUED | OUTPATIENT
Start: 2024-02-24 | End: 2024-02-24

## 2024-02-24 RX ORDER — LANOLIN ALCOHOL/MO/W.PET/CERES
3 CREAM (GRAM) TOPICAL NIGHTLY
Status: DISCONTINUED | OUTPATIENT
Start: 2024-02-24 | End: 2024-02-27 | Stop reason: HOSPADM

## 2024-02-24 RX ORDER — MAGNESIUM SULFATE IN WATER 40 MG/ML
2000 INJECTION, SOLUTION INTRAVENOUS PRN
Status: DISCONTINUED | OUTPATIENT
Start: 2024-02-24 | End: 2024-02-27 | Stop reason: HOSPADM

## 2024-02-24 RX ORDER — LIDOCAINE 4 G/G
1 PATCH TOPICAL DAILY
Status: DISCONTINUED | OUTPATIENT
Start: 2024-02-24 | End: 2024-02-27 | Stop reason: HOSPADM

## 2024-02-24 RX ORDER — POTASSIUM CHLORIDE 7.45 MG/ML
10 INJECTION INTRAVENOUS PRN
Status: DISCONTINUED | OUTPATIENT
Start: 2024-02-24 | End: 2024-02-27 | Stop reason: HOSPADM

## 2024-02-24 RX ORDER — SENNA AND DOCUSATE SODIUM 50; 8.6 MG/1; MG/1
1 TABLET, FILM COATED ORAL 2 TIMES DAILY
Status: ON HOLD | COMMUNITY

## 2024-02-24 RX ORDER — LANOLIN ALCOHOL/MO/W.PET/CERES
3 CREAM (GRAM) TOPICAL NIGHTLY
Status: ON HOLD | COMMUNITY

## 2024-02-24 RX ORDER — ACETAMINOPHEN 325 MG/1
975 TABLET ORAL 3 TIMES DAILY
Status: DISCONTINUED | OUTPATIENT
Start: 2024-02-24 | End: 2024-02-27 | Stop reason: HOSPADM

## 2024-02-24 RX ORDER — ONDANSETRON 2 MG/ML
4 INJECTION INTRAMUSCULAR; INTRAVENOUS EVERY 6 HOURS PRN
Status: DISCONTINUED | OUTPATIENT
Start: 2024-02-24 | End: 2024-02-27 | Stop reason: HOSPADM

## 2024-02-24 RX ORDER — SODIUM CHLORIDE 9 MG/ML
INJECTION, SOLUTION INTRAVENOUS PRN
Status: DISCONTINUED | OUTPATIENT
Start: 2024-02-24 | End: 2024-02-27 | Stop reason: HOSPADM

## 2024-02-24 RX ORDER — DESMOPRESSIN ACETATE 0.1 MG/1
0.1 TABLET ORAL DAILY
Status: ON HOLD | COMMUNITY

## 2024-02-24 RX ORDER — FENOFIBRATE 160 MG/1
160 TABLET ORAL DAILY
Status: DISCONTINUED | OUTPATIENT
Start: 2024-02-24 | End: 2024-02-27 | Stop reason: HOSPADM

## 2024-02-24 RX ADMIN — ONDANSETRON 4 MG: 2 INJECTION INTRAMUSCULAR; INTRAVENOUS at 19:31

## 2024-02-24 RX ADMIN — POLYETHYLENE GLYCOL 3350 17 G: 17 POWDER, FOR SOLUTION ORAL at 08:39

## 2024-02-24 RX ADMIN — Medication 3 MG: at 20:42

## 2024-02-24 RX ADMIN — OXYCODONE HYDROCHLORIDE 10 MG: 10 TABLET ORAL at 08:39

## 2024-02-24 RX ADMIN — SODIUM CHLORIDE, POTASSIUM CHLORIDE, SODIUM LACTATE AND CALCIUM CHLORIDE: 600; 310; 30; 20 INJECTION, SOLUTION INTRAVENOUS at 10:20

## 2024-02-24 RX ADMIN — METHYLNALTREXONE BROMIDE 12 MG: 12 INJECTION, SOLUTION SUBCUTANEOUS at 18:40

## 2024-02-24 RX ADMIN — Medication 330 ML: at 14:02

## 2024-02-24 RX ADMIN — FAMOTIDINE 20 MG: 20 TABLET, FILM COATED ORAL at 20:43

## 2024-02-24 RX ADMIN — ONDANSETRON 4 MG: 2 INJECTION INTRAMUSCULAR; INTRAVENOUS at 02:39

## 2024-02-24 RX ADMIN — ENOXAPARIN SODIUM 30 MG: 100 INJECTION SUBCUTANEOUS at 20:48

## 2024-02-24 RX ADMIN — ENOXAPARIN SODIUM 30 MG: 100 INJECTION SUBCUTANEOUS at 08:40

## 2024-02-24 RX ADMIN — ACETAMINOPHEN 975 MG: 325 TABLET ORAL at 20:42

## 2024-02-24 RX ADMIN — HYDROMORPHONE HYDROCHLORIDE 0.5 MG: 1 INJECTION, SOLUTION INTRAMUSCULAR; INTRAVENOUS; SUBCUTANEOUS at 08:57

## 2024-02-24 RX ADMIN — ONDANSETRON 4 MG: 2 INJECTION INTRAMUSCULAR; INTRAVENOUS at 08:41

## 2024-02-24 RX ADMIN — MORPHINE SULFATE 4 MG: 4 INJECTION, SOLUTION INTRAMUSCULAR; INTRAVENOUS at 00:34

## 2024-02-24 RX ADMIN — ONDANSETRON 4 MG: 2 INJECTION INTRAMUSCULAR; INTRAVENOUS at 04:45

## 2024-02-24 RX ADMIN — SODIUM CHLORIDE, PRESERVATIVE FREE 10 ML: 5 INJECTION INTRAVENOUS at 08:40

## 2024-02-24 RX ADMIN — KETOROLAC TROMETHAMINE 15 MG: 15 INJECTION, SOLUTION INTRAMUSCULAR; INTRAVENOUS at 04:58

## 2024-02-24 RX ADMIN — OXYCODONE HYDROCHLORIDE 10 MG: 10 TABLET ORAL at 03:05

## 2024-02-24 RX ADMIN — GABAPENTIN 600 MG: 300 CAPSULE ORAL at 20:43

## 2024-02-24 RX ADMIN — METHOCARBAMOL 1000 MG: 500 TABLET ORAL at 20:43

## 2024-02-24 ASSESSMENT — PAIN - FUNCTIONAL ASSESSMENT: PAIN_FUNCTIONAL_ASSESSMENT: PREVENTS OR INTERFERES WITH MANY ACTIVE NOT PASSIVE ACTIVITIES

## 2024-02-24 ASSESSMENT — PAIN SCALES - GENERAL
PAINLEVEL_OUTOF10: 4
PAINLEVEL_OUTOF10: 6
PAINLEVEL_OUTOF10: 10
PAINLEVEL_OUTOF10: 10
PAINLEVEL_OUTOF10: 8
PAINLEVEL_OUTOF10: 10

## 2024-02-24 ASSESSMENT — PAIN DESCRIPTION - FREQUENCY: FREQUENCY: CONTINUOUS

## 2024-02-24 ASSESSMENT — PAIN SCALES - WONG BAKER: WONGBAKER_NUMERICALRESPONSE: 2

## 2024-02-24 ASSESSMENT — PAIN DESCRIPTION - DESCRIPTORS: DESCRIPTORS: SHARP

## 2024-02-24 ASSESSMENT — PAIN DESCRIPTION - LOCATION: LOCATION: ABDOMEN

## 2024-02-24 ASSESSMENT — PAIN DESCRIPTION - PAIN TYPE: TYPE: ACUTE PAIN

## 2024-02-24 ASSESSMENT — PAIN DESCRIPTION - ORIENTATION: ORIENTATION: LOWER

## 2024-02-24 ASSESSMENT — PAIN DESCRIPTION - ONSET: ONSET: ON-GOING
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
## 2024-02-24 NOTE — ED PROVIDER NOTES
pole of the left kidney no hydronephrosis or hydroureter.  Right renal cystic mass 3 cm unchanged from prior study.  Stomach and small bowel unremarkable.  Moderate loops of dilated colon throughout the upper abdomen with moderate air-fluid levels extending inferiorly with moderate thesis.  Pelvis and retroperitoneum unremarkable.  Postop changes with L4-S1 compression deformities L1-L3 unchanged.  Lab results: WBC 10.1 with a left shift of 8.6-> I would expect this given post trauma  H&H is very stable at 12.1 and 34.  Lactic acid 1.7.  Metabolic panel sodium 123-> mild, glucose 141 no evidence of BOSTON or electrolyte derangement otherwise.    Patient will be given fluids as well and his pain medication and lactulose.    Patient was given the lactulose, up to the bedside commode.  He states that he is in so much pain with the rib fractures and chest wall and that he cannot have a bowel movement.  He demanded to be placed back in bed and to get more medication for pain control.  He was of very difficult to assist with one-person back to bed and to reposition.    2355: Consultation to the hospitalist attending for admission.  Patient will not be able to manage it daily place and definitely cannot manage at home.  He needs better pain management and address meant of the bowel related constipation.  I ordered morphine and 2000 mL of the GoLytely solution to promote bowel movement.    CONSULTS:  IP CONSULT TO HOSPITALIST    PROCEDURES:  Procedures    FINAL IMPRESSION      1. Generalized abdominal pain    2. Constipation, unspecified constipation type    3. Hyponatremia          DISPOSITION/PLAN   [unfilled]    PATIENT REFERRED TO:  No follow-up provider specified.    DISCHARGE MEDICATIONS:  New Prescriptions    No medications on file       (Please note that portions of this note were completed with a voice recognition program.  Efforts were made to edit the dictations but occasionally words are mis-transcribed.)    Masha

## 2024-02-24 NOTE — H&P
tablets by mouth in the morning, at noon, and at bedtime   Yes Rylan Grider MD   desmopressin (DDAVP) 0.1 MG tablet Take 1 tablet by mouth daily   Yes Rylan Grider MD   ergocalciferol (ERGOCALCIFEROL) 1.25 MG (71053 UT) capsule Take 1 capsule by mouth once a week On fridays   Yes Rylan Grider MD   famotidine (PEPCID) 20 MG tablet Take 1 tablet by mouth 2 times daily   Yes Rylan Grider MD   Artificial Tear Solution (GENTEAL TEARS) 0.1-0.2-0.3 % SOLN Apply 1 drop to eye in the morning and at bedtime   Yes Rylan Grider MD   lidocaine (LIDODERM) 5 % Place 1 patch onto the skin daily Right ribs 12 hours on, 12 hours off.   Yes Rylan Grider MD   linaclotide (LINZESS) 290 MCG CAPS capsule Take 1 capsule by mouth every morning (before breakfast)   Yes Rylan Grider MD   melatonin 3 MG TABS tablet Take 1 tablet by mouth at bedtime   Yes Rylan Grider MD   Methocarbamol 1000 MG TABS Take 1,000 mg by mouth 3 times daily   Yes Rylan Grider MD   polyethylene glycol (GLYCOLAX) 17 g packet Take 1 packet by mouth 2 times daily   Yes Rylan Grider MD   oxyCODONE (ROXICODONE) 5 MG immediate release tablet Take 1 tablet by mouth every 6 hours as needed for Pain.   Yes Rylan Grider MD   sennosides-docusate sodium (SENOKOT-S) 8.6-50 MG tablet Take 1 tablet by mouth in the morning and at bedtime   Yes Rylan Grider MD   simethicone (MYLICON) 80 MG chewable tablet Take 1 tablet by mouth every 6 hours as needed for Flatulence   Yes Ryaln Grider MD   sodium chloride (OCEAN) 0.65 % nasal spray 1 spray by Nasal route 2 times daily as needed for Congestion   Yes Rylan Grider MD   tamsulosin (FLOMAX) 0.4 MG capsule Take 1 capsule by mouth daily   Yes Rylan Grider MD   metFORMIN (GLUCOPHAGE) 500 MG tablet Take 1 tablet by mouth 2 times daily (with meals)    Rylan Grider MD   gabapentin (NEURONTIN) 300 MG

## 2024-02-25 PROBLEM — R10.9 ABDOMINAL PAIN: Status: ACTIVE | Noted: 2024-02-25

## 2024-02-25 LAB
ANION GAP SERPL CALCULATED.3IONS-SCNC: 13 MMOL/L (ref 3–16)
ANISOCYTOSIS BLD QL SMEAR: ABNORMAL
BASOPHILS # BLD: 0.1 K/UL (ref 0–0.2)
BASOPHILS NFR BLD: 1 %
BUN SERPL-MCNC: 19 MG/DL (ref 7–20)
CALCIUM SERPL-MCNC: 8.6 MG/DL (ref 8.3–10.6)
CHLORIDE SERPL-SCNC: 94 MMOL/L (ref 99–110)
CO2 SERPL-SCNC: 21 MMOL/L (ref 21–32)
CREAT SERPL-MCNC: 0.6 MG/DL (ref 0.8–1.3)
DEPRECATED RDW RBC AUTO: 14.1 % (ref 12.4–15.4)
EOSINOPHIL # BLD: 0 K/UL (ref 0–0.6)
EOSINOPHIL NFR BLD: 0 %
GFR SERPLBLD CREATININE-BSD FMLA CKD-EPI: >60 ML/MIN/{1.73_M2}
GLUCOSE SERPL-MCNC: 109 MG/DL (ref 70–99)
HCT VFR BLD AUTO: 33.9 % (ref 40.5–52.5)
HGB BLD-MCNC: 11.7 G/DL (ref 13.5–17.5)
LYMPHOCYTES # BLD: 1.4 K/UL (ref 1–5.1)
LYMPHOCYTES NFR BLD: 9 %
MCH RBC QN AUTO: 32.1 PG (ref 26–34)
MCHC RBC AUTO-ENTMCNC: 34.6 G/DL (ref 31–36)
MCV RBC AUTO: 92.8 FL (ref 80–100)
MONOCYTES # BLD: 1.3 K/UL (ref 0–1.3)
MONOCYTES NFR BLD: 10 %
NEUTROPHILS # BLD: 10.2 K/UL (ref 1.7–7.7)
NEUTROPHILS NFR BLD: 78 %
NEUTS VAC BLD QL SMEAR: PRESENT
PLATELET # BLD AUTO: 234 K/UL (ref 135–450)
PLATELET BLD QL SMEAR: ADEQUATE
PMV BLD AUTO: 7.9 FL (ref 5–10.5)
POLYCHROMASIA BLD QL SMEAR: ABNORMAL
POTASSIUM SERPL-SCNC: 4.1 MMOL/L (ref 3.5–5.1)
RBC # BLD AUTO: 3.65 M/UL (ref 4.2–5.9)
SLIDE REVIEW: ABNORMAL
SODIUM SERPL-SCNC: 128 MMOL/L (ref 136–145)
TOXIC GRANULES BLD QL SMEAR: PRESENT
VARIANT LYMPHS NFR BLD MANUAL: 2 % (ref 0–6)
WBC # BLD AUTO: 13.1 K/UL (ref 4–11)

## 2024-02-25 PROCEDURE — 2580000003 HC RX 258: Performed by: HOSPITALIST

## 2024-02-25 PROCEDURE — 6370000000 HC RX 637 (ALT 250 FOR IP): Performed by: INTERNAL MEDICINE

## 2024-02-25 PROCEDURE — 94760 N-INVAS EAR/PLS OXIMETRY 1: CPT

## 2024-02-25 PROCEDURE — 2580000003 HC RX 258: Performed by: INTERNAL MEDICINE

## 2024-02-25 PROCEDURE — 96375 TX/PRO/DX INJ NEW DRUG ADDON: CPT

## 2024-02-25 PROCEDURE — 1200000000 HC SEMI PRIVATE

## 2024-02-25 PROCEDURE — G0378 HOSPITAL OBSERVATION PER HR: HCPCS

## 2024-02-25 PROCEDURE — 80048 BASIC METABOLIC PNL TOTAL CA: CPT

## 2024-02-25 PROCEDURE — 6360000002 HC RX W HCPCS: Performed by: HOSPITALIST

## 2024-02-25 PROCEDURE — 96376 TX/PRO/DX INJ SAME DRUG ADON: CPT

## 2024-02-25 PROCEDURE — 36415 COLL VENOUS BLD VENIPUNCTURE: CPT

## 2024-02-25 PROCEDURE — 83550 IRON BINDING TEST: CPT

## 2024-02-25 PROCEDURE — 6370000000 HC RX 637 (ALT 250 FOR IP): Performed by: REGISTERED NURSE

## 2024-02-25 PROCEDURE — 85025 COMPLETE CBC W/AUTO DIFF WBC: CPT

## 2024-02-25 PROCEDURE — 83540 ASSAY OF IRON: CPT

## 2024-02-25 PROCEDURE — 96361 HYDRATE IV INFUSION ADD-ON: CPT

## 2024-02-25 PROCEDURE — 6370000000 HC RX 637 (ALT 250 FOR IP): Performed by: HOSPITALIST

## 2024-02-25 RX ORDER — ZOLPIDEM TARTRATE 5 MG/1
10 TABLET ORAL NIGHTLY PRN
Status: DISCONTINUED | OUTPATIENT
Start: 2024-02-25 | End: 2024-02-27 | Stop reason: HOSPADM

## 2024-02-25 RX ORDER — PROCHLORPERAZINE EDISYLATE 5 MG/ML
5 INJECTION INTRAMUSCULAR; INTRAVENOUS EVERY 4 HOURS PRN
Status: DISCONTINUED | OUTPATIENT
Start: 2024-02-25 | End: 2024-02-27 | Stop reason: HOSPADM

## 2024-02-25 RX ADMIN — DESMOPRESSIN ACETATE 100 MCG: 0.2 TABLET ORAL at 21:43

## 2024-02-25 RX ADMIN — ONDANSETRON 4 MG: 2 INJECTION INTRAMUSCULAR; INTRAVENOUS at 03:56

## 2024-02-25 RX ADMIN — SENNOSIDES AND DOCUSATE SODIUM 1 TABLET: 8.6; 5 TABLET ORAL at 10:23

## 2024-02-25 RX ADMIN — ACETAMINOPHEN 975 MG: 325 TABLET ORAL at 21:42

## 2024-02-25 RX ADMIN — METHOCARBAMOL 1000 MG: 500 TABLET ORAL at 14:44

## 2024-02-25 RX ADMIN — SODIUM CHLORIDE, PRESERVATIVE FREE 10 ML: 5 INJECTION INTRAVENOUS at 21:45

## 2024-02-25 RX ADMIN — ENOXAPARIN SODIUM 30 MG: 100 INJECTION SUBCUTANEOUS at 10:24

## 2024-02-25 RX ADMIN — FAMOTIDINE 20 MG: 20 TABLET, FILM COATED ORAL at 10:23

## 2024-02-25 RX ADMIN — FAMOTIDINE 20 MG: 20 TABLET, FILM COATED ORAL at 21:42

## 2024-02-25 RX ADMIN — METHOCARBAMOL 1000 MG: 500 TABLET ORAL at 21:42

## 2024-02-25 RX ADMIN — ENOXAPARIN SODIUM 30 MG: 100 INJECTION SUBCUTANEOUS at 21:46

## 2024-02-25 RX ADMIN — ACETAMINOPHEN 975 MG: 325 TABLET ORAL at 10:23

## 2024-02-25 RX ADMIN — SENNOSIDES AND DOCUSATE SODIUM 1 TABLET: 8.6; 5 TABLET ORAL at 21:46

## 2024-02-25 RX ADMIN — SIMETHICONE 80 MG: 80 TABLET, CHEWABLE ORAL at 10:31

## 2024-02-25 RX ADMIN — SODIUM CHLORIDE, POTASSIUM CHLORIDE, SODIUM LACTATE AND CALCIUM CHLORIDE: 600; 310; 30; 20 INJECTION, SOLUTION INTRAVENOUS at 17:32

## 2024-02-25 RX ADMIN — POLYETHYLENE GLYCOL 3350 17 G: 17 POWDER, FOR SOLUTION ORAL at 21:45

## 2024-02-25 RX ADMIN — PROCHLORPERAZINE EDISYLATE 5 MG: 5 INJECTION INTRAMUSCULAR; INTRAVENOUS at 10:32

## 2024-02-25 RX ADMIN — ZOLPIDEM TARTRATE 10 MG: 5 TABLET ORAL at 21:42

## 2024-02-25 RX ADMIN — GABAPENTIN 600 MG: 300 CAPSULE ORAL at 14:44

## 2024-02-25 RX ADMIN — PROPYLENE GLYCOL 1 DROP: 0.06 SOLUTION/ DROPS OPHTHALMIC at 10:23

## 2024-02-25 RX ADMIN — PROCHLORPERAZINE EDISYLATE 5 MG: 5 INJECTION INTRAMUSCULAR; INTRAVENOUS at 05:27

## 2024-02-25 RX ADMIN — GABAPENTIN 600 MG: 300 CAPSULE ORAL at 10:23

## 2024-02-25 RX ADMIN — FENOFIBRATE 160 MG: 160 TABLET ORAL at 21:42

## 2024-02-25 RX ADMIN — OXYCODONE HYDROCHLORIDE 10 MG: 10 TABLET ORAL at 14:45

## 2024-02-25 RX ADMIN — ACETAMINOPHEN 975 MG: 325 TABLET ORAL at 14:44

## 2024-02-25 RX ADMIN — OXYCODONE HYDROCHLORIDE 10 MG: 10 TABLET ORAL at 21:40

## 2024-02-25 RX ADMIN — GABAPENTIN 600 MG: 300 CAPSULE ORAL at 21:45

## 2024-02-25 RX ADMIN — OXYCODONE HYDROCHLORIDE 10 MG: 10 TABLET ORAL at 04:00

## 2024-02-25 RX ADMIN — POLYETHYLENE GLYCOL 3350 17 G: 17 POWDER, FOR SOLUTION ORAL at 10:24

## 2024-02-25 RX ADMIN — TAMSULOSIN HYDROCHLORIDE 0.4 MG: 0.4 CAPSULE ORAL at 10:23

## 2024-02-25 RX ADMIN — METHOCARBAMOL 1000 MG: 500 TABLET ORAL at 10:23

## 2024-02-25 ASSESSMENT — PAIN DESCRIPTION - PAIN TYPE
TYPE: ACUTE PAIN
TYPE: ACUTE PAIN

## 2024-02-25 ASSESSMENT — PAIN SCALES - GENERAL
PAINLEVEL_OUTOF10: 7
PAINLEVEL_OUTOF10: 8
PAINLEVEL_OUTOF10: 8
PAINLEVEL_OUTOF10: 0
PAINLEVEL_OUTOF10: 7
PAINLEVEL_OUTOF10: 8
PAINLEVEL_OUTOF10: 2

## 2024-02-25 ASSESSMENT — PAIN DESCRIPTION - ONSET
ONSET: GRADUAL
ONSET: PROGRESSIVE

## 2024-02-25 ASSESSMENT — PAIN SCALES - WONG BAKER
WONGBAKER_NUMERICALRESPONSE: 0
WONGBAKER_NUMERICALRESPONSE: 2
WONGBAKER_NUMERICALRESPONSE: 8

## 2024-02-25 ASSESSMENT — PAIN DESCRIPTION - LOCATION: LOCATION: GENERALIZED

## 2024-02-25 ASSESSMENT — PAIN DESCRIPTION - DESCRIPTORS
DESCRIPTORS: ACHING
DESCRIPTORS: ACHING

## 2024-02-25 ASSESSMENT — PAIN DESCRIPTION - FREQUENCY
FREQUENCY: INTERMITTENT
FREQUENCY: INTERMITTENT

## 2024-02-25 ASSESSMENT — PAIN - FUNCTIONAL ASSESSMENT
PAIN_FUNCTIONAL_ASSESSMENT: ACTIVITIES ARE NOT PREVENTED
PAIN_FUNCTIONAL_ASSESSMENT: PREVENTS OR INTERFERES SOME ACTIVE ACTIVITIES AND ADLS

## 2024-02-25 ASSESSMENT — PAIN DESCRIPTION - ORIENTATION: ORIENTATION: RIGHT

## 2024-02-25 NOTE — CARE COORDINATION
Case Management Assessment  Initial Evaluation    Date/Time of Evaluation: 2/25/2024 8:52 AM  Assessment Completed by: SHARLA Palacios    If patient is discharged prior to next notation, then this note serves as note for discharge by case management.    Patient Name: Juan Mohamud                   YOB: 1940  Diagnosis: Hyponatremia [E87.1]  Generalized abdominal pain [R10.84]  Constipation [K59.00]  Constipation, unspecified constipation type [K59.00]                   Date / Time: 2/23/2024  8:13 PM    Patient Admission Status: Observation   Readmission Risk (Low < 19, Mod (19-27), High > 27): No data recorded  Current PCP: Janet Garcia MD  PCP verified by CM? Yes    Chart Reviewed: Yes      History Provided by: Patient  Patient Orientation: Alert and Oriented, Person, Place, Situation, Self    Patient Cognition: Alert    Hospitalization in the last 30 days (Readmission):  No    If yes, Readmission Assessment in  Navigator will be completed.    Advance Directives:      Code Status: Full Code   Patient's Primary Decision Maker is: Legal Next of Kin (Spouse)    Primary Decision Maker: Lizzie Mohamud - Spouse - [phone]    Discharge Planning:    Patient lives with: Family Members Type of Home: Skilled Nursing Facility  Primary Care Giver: Self  Patient Support Systems include: Spouse/Significant Other, Children   Current Financial resources: Medicare  Current community resources: ECF/Home Care (Was at [facility])  Current services prior to admission: Skilled Nursing Facility            Current DME:              Type of Home Care services:  Nursing Services, Safety Alert    ADLS  Prior functional level: Other (see comment) (Was at [facility])  Current functional level: Other (see comment) (Was at [facility])    PT AM-PAC:   /24  OT AM-PAC:   /24    Family can provide assistance at DC: No  Would you like Case Management to discuss the

## 2024-02-26 LAB
ALBUMIN SERPL-MCNC: 3.4 G/DL (ref 3.4–5)
ANION GAP SERPL CALCULATED.3IONS-SCNC: 13 MMOL/L (ref 3–16)
BASOPHILS # BLD: 0 K/UL (ref 0–0.2)
BASOPHILS NFR BLD: 0.4 %
BUN SERPL-MCNC: 17 MG/DL (ref 7–20)
CALCIUM SERPL-MCNC: 8.3 MG/DL (ref 8.3–10.6)
CHLORIDE SERPL-SCNC: 97 MMOL/L (ref 99–110)
CO2 SERPL-SCNC: 23 MMOL/L (ref 21–32)
CREAT SERPL-MCNC: 0.7 MG/DL (ref 0.8–1.3)
DEPRECATED RDW RBC AUTO: 14.6 % (ref 12.4–15.4)
EOSINOPHIL # BLD: 0.1 K/UL (ref 0–0.6)
EOSINOPHIL NFR BLD: 0.9 %
GFR SERPLBLD CREATININE-BSD FMLA CKD-EPI: >60 ML/MIN/{1.73_M2}
GLUCOSE SERPL-MCNC: 113 MG/DL (ref 70–99)
HCT VFR BLD AUTO: 32.2 % (ref 40.5–52.5)
HGB BLD-MCNC: 11.2 G/DL (ref 13.5–17.5)
LYMPHOCYTES # BLD: 0.8 K/UL (ref 1–5.1)
LYMPHOCYTES NFR BLD: 7.2 %
MAGNESIUM SERPL-MCNC: 2 MG/DL (ref 1.8–2.4)
MCH RBC QN AUTO: 31.9 PG (ref 26–34)
MCHC RBC AUTO-ENTMCNC: 34.8 G/DL (ref 31–36)
MCV RBC AUTO: 91.6 FL (ref 80–100)
MONOCYTES # BLD: 1.1 K/UL (ref 0–1.3)
MONOCYTES NFR BLD: 9.9 %
NEUTROPHILS # BLD: 9.2 K/UL (ref 1.7–7.7)
NEUTROPHILS NFR BLD: 81.6 %
PHOSPHATE SERPL-MCNC: 2.7 MG/DL (ref 2.5–4.9)
PLATELET # BLD AUTO: 247 K/UL (ref 135–450)
PMV BLD AUTO: 8.1 FL (ref 5–10.5)
POTASSIUM SERPL-SCNC: 3.4 MMOL/L (ref 3.5–5.1)
RBC # BLD AUTO: 3.51 M/UL (ref 4.2–5.9)
SODIUM SERPL-SCNC: 133 MMOL/L (ref 136–145)
WBC # BLD AUTO: 11.2 K/UL (ref 4–11)

## 2024-02-26 PROCEDURE — 6360000002 HC RX W HCPCS: Performed by: HOSPITALIST

## 2024-02-26 PROCEDURE — 2580000003 HC RX 258: Performed by: INTERNAL MEDICINE

## 2024-02-26 PROCEDURE — 97530 THERAPEUTIC ACTIVITIES: CPT

## 2024-02-26 PROCEDURE — 6370000000 HC RX 637 (ALT 250 FOR IP): Performed by: REGISTERED NURSE

## 2024-02-26 PROCEDURE — 80069 RENAL FUNCTION PANEL: CPT

## 2024-02-26 PROCEDURE — 85025 COMPLETE CBC W/AUTO DIFF WBC: CPT

## 2024-02-26 PROCEDURE — 2580000003 HC RX 258: Performed by: HOSPITALIST

## 2024-02-26 PROCEDURE — 1200000000 HC SEMI PRIVATE

## 2024-02-26 PROCEDURE — 6370000000 HC RX 637 (ALT 250 FOR IP): Performed by: HOSPITALIST

## 2024-02-26 PROCEDURE — 97162 PT EVAL MOD COMPLEX 30 MIN: CPT

## 2024-02-26 PROCEDURE — 97166 OT EVAL MOD COMPLEX 45 MIN: CPT

## 2024-02-26 PROCEDURE — 6370000000 HC RX 637 (ALT 250 FOR IP): Performed by: INTERNAL MEDICINE

## 2024-02-26 PROCEDURE — 83735 ASSAY OF MAGNESIUM: CPT

## 2024-02-26 PROCEDURE — 36415 COLL VENOUS BLD VENIPUNCTURE: CPT

## 2024-02-26 PROCEDURE — 97116 GAIT TRAINING THERAPY: CPT

## 2024-02-26 RX ORDER — DOCUSATE SODIUM 100 MG/1
100 CAPSULE, LIQUID FILLED ORAL 2 TIMES DAILY
Qty: 60 CAPSULE | Refills: 0 | Status: ON HOLD | OUTPATIENT
Start: 2024-02-26

## 2024-02-26 RX ORDER — METHOCARBAMOL 1000 MG/1
1000 TABLET, COATED ORAL 3 TIMES DAILY PRN
Qty: 30 TABLET | Refills: 0 | Status: ON HOLD | OUTPATIENT
Start: 2024-02-26

## 2024-02-26 RX ADMIN — OXYCODONE HYDROCHLORIDE 10 MG: 10 TABLET ORAL at 22:42

## 2024-02-26 RX ADMIN — ACETAMINOPHEN 975 MG: 325 TABLET ORAL at 09:32

## 2024-02-26 RX ADMIN — POLYETHYLENE GLYCOL 3350 17 G: 17 POWDER, FOR SOLUTION ORAL at 22:17

## 2024-02-26 RX ADMIN — PROPYLENE GLYCOL 1 DROP: 0.06 SOLUTION/ DROPS OPHTHALMIC at 09:34

## 2024-02-26 RX ADMIN — TAMSULOSIN HYDROCHLORIDE 0.4 MG: 0.4 CAPSULE ORAL at 09:32

## 2024-02-26 RX ADMIN — ACETAMINOPHEN 975 MG: 325 TABLET ORAL at 22:16

## 2024-02-26 RX ADMIN — OXYCODONE HYDROCHLORIDE 10 MG: 10 TABLET ORAL at 10:02

## 2024-02-26 RX ADMIN — FAMOTIDINE 20 MG: 20 TABLET, FILM COATED ORAL at 22:16

## 2024-02-26 RX ADMIN — GABAPENTIN 600 MG: 300 CAPSULE ORAL at 09:32

## 2024-02-26 RX ADMIN — SODIUM CHLORIDE, PRESERVATIVE FREE 10 ML: 5 INJECTION INTRAVENOUS at 22:30

## 2024-02-26 RX ADMIN — FENOFIBRATE 160 MG: 160 TABLET ORAL at 22:14

## 2024-02-26 RX ADMIN — POLYETHYLENE GLYCOL 3350 17 G: 17 POWDER, FOR SOLUTION ORAL at 09:32

## 2024-02-26 RX ADMIN — METHOCARBAMOL 1000 MG: 500 TABLET ORAL at 22:14

## 2024-02-26 RX ADMIN — Medication 3 MG: at 22:14

## 2024-02-26 RX ADMIN — METHOCARBAMOL 1000 MG: 500 TABLET ORAL at 09:33

## 2024-02-26 RX ADMIN — ENOXAPARIN SODIUM 30 MG: 100 INJECTION SUBCUTANEOUS at 09:33

## 2024-02-26 RX ADMIN — GABAPENTIN 600 MG: 300 CAPSULE ORAL at 14:40

## 2024-02-26 RX ADMIN — SENNOSIDES AND DOCUSATE SODIUM 1 TABLET: 8.6; 5 TABLET ORAL at 22:14

## 2024-02-26 RX ADMIN — GABAPENTIN 600 MG: 300 CAPSULE ORAL at 22:15

## 2024-02-26 RX ADMIN — OXYCODONE HYDROCHLORIDE 10 MG: 10 TABLET ORAL at 17:07

## 2024-02-26 RX ADMIN — PROPYLENE GLYCOL 1 DROP: 0.06 SOLUTION/ DROPS OPHTHALMIC at 22:34

## 2024-02-26 RX ADMIN — ZOLPIDEM TARTRATE 10 MG: 5 TABLET ORAL at 22:43

## 2024-02-26 RX ADMIN — SENNOSIDES AND DOCUSATE SODIUM 1 TABLET: 8.6; 5 TABLET ORAL at 09:33

## 2024-02-26 RX ADMIN — SIMETHICONE 80 MG: 80 TABLET, CHEWABLE ORAL at 09:33

## 2024-02-26 RX ADMIN — ACETAMINOPHEN 975 MG: 325 TABLET ORAL at 14:40

## 2024-02-26 RX ADMIN — SODIUM CHLORIDE, POTASSIUM CHLORIDE, SODIUM LACTATE AND CALCIUM CHLORIDE: 600; 310; 30; 20 INJECTION, SOLUTION INTRAVENOUS at 05:39

## 2024-02-26 RX ADMIN — POTASSIUM CHLORIDE 40 MEQ: 1500 TABLET, EXTENDED RELEASE ORAL at 14:40

## 2024-02-26 RX ADMIN — DESMOPRESSIN ACETATE 100 MCG: 0.2 TABLET ORAL at 22:17

## 2024-02-26 RX ADMIN — METHOCARBAMOL 1000 MG: 500 TABLET ORAL at 14:40

## 2024-02-26 RX ADMIN — ENOXAPARIN SODIUM 30 MG: 100 INJECTION SUBCUTANEOUS at 22:17

## 2024-02-26 RX ADMIN — FAMOTIDINE 20 MG: 20 TABLET, FILM COATED ORAL at 09:32

## 2024-02-26 RX ADMIN — PROCHLORPERAZINE EDISYLATE 5 MG: 5 INJECTION INTRAMUSCULAR; INTRAVENOUS at 14:40

## 2024-02-26 ASSESSMENT — PAIN DESCRIPTION - ONSET
ONSET: GRADUAL

## 2024-02-26 ASSESSMENT — PAIN DESCRIPTION - LOCATION
LOCATION: ABDOMEN
LOCATION: BACK
LOCATION: ABDOMEN
LOCATION: BACK

## 2024-02-26 ASSESSMENT — PAIN SCALES - GENERAL
PAINLEVEL_OUTOF10: 4
PAINLEVEL_OUTOF10: 6
PAINLEVEL_OUTOF10: 4
PAINLEVEL_OUTOF10: 10
PAINLEVEL_OUTOF10: 5
PAINLEVEL_OUTOF10: 7

## 2024-02-26 ASSESSMENT — PAIN - FUNCTIONAL ASSESSMENT
PAIN_FUNCTIONAL_ASSESSMENT: PREVENTS OR INTERFERES SOME ACTIVE ACTIVITIES AND ADLS

## 2024-02-26 ASSESSMENT — PAIN DESCRIPTION - ORIENTATION
ORIENTATION: RIGHT;LOWER;LEFT;MID
ORIENTATION: RIGHT
ORIENTATION: MID;RIGHT
ORIENTATION: RIGHT
ORIENTATION: RIGHT;LEFT;LOWER;MID
ORIENTATION: RIGHT;LEFT;UPPER;LOWER
ORIENTATION: RIGHT

## 2024-02-26 ASSESSMENT — PAIN DESCRIPTION - DESCRIPTORS
DESCRIPTORS: ACHING;DISCOMFORT
DESCRIPTORS: ACHING;CRAMPING
DESCRIPTORS: ACHING
DESCRIPTORS: ACHING
DESCRIPTORS: ACHING;DISCOMFORT
DESCRIPTORS: ACHING;DISCOMFORT
DESCRIPTORS: ACHING

## 2024-02-26 ASSESSMENT — PAIN DESCRIPTION - PAIN TYPE
TYPE: ACUTE PAIN

## 2024-02-26 ASSESSMENT — PAIN DESCRIPTION - FREQUENCY
FREQUENCY: INTERMITTENT
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
## 2024-02-26 NOTE — CARE COORDINATION
[facility]. Spoke with patient and spouse regarding above and that patient will be discharged to facility once precert completed.     Electronically signed by SHARLA Riley LISW, Case Management on 2/26/2024 at 2:09 PM  [place] [phone]    2:37 PM  Requested [employer] precert for snf level of care.     Electronically signed by SHARLA Riley LISW, Case Management on 2/26/2024 at 2:37 PM  [place] [phone]    2:58 PM  Patient was at [facility] prior to admit and patient/spouse requested [facility] at discharge.     Electronically signed by SHARLA Riley LISW, Case Management on 2/26/2024 at 2:58 PM  [place] [phone]    5:00 PM  Precert still pending.     Electronically signed by SHARLA Riley LISW, Case Management on 2/26/2024 at 5:00 PM  [place] [phone]

## 2024-02-26 NOTE — CARE COORDINATION
Leonora Osborn PRE-CERT REQUEST SUBMITTED VIA [employer] PORTAL    REQUESTED SETTING:  [facility]    ANTICIPATED ADMIT DATE TO SNF:  2/26/24    Leonora #:  [id number]     Juliane Robins Sr. Administrative Assist, Case Management  [phone]  Electronically signed by Juliane Robins on 2/26/2024 at 3:07 PM

## 2024-02-27 ENCOUNTER — APPOINTMENT (OUTPATIENT)
Dept: GENERAL RADIOLOGY | Age: 84
DRG: 392 | End: 2024-02-27
Payer: MEDICARE

## 2024-02-27 VITALS
DIASTOLIC BLOOD PRESSURE: 79 MMHG | BODY MASS INDEX: 28.83 KG/M2 | SYSTOLIC BLOOD PRESSURE: 116 MMHG | OXYGEN SATURATION: 92 % | WEIGHT: 236.77 LBS | HEIGHT: 76 IN | HEART RATE: 105 BPM | RESPIRATION RATE: 18 BRPM | TEMPERATURE: 98.3 F

## 2024-02-27 LAB
IRON SATN MFR SERPL: 24 % (ref 20–50)
IRON SERPL-MCNC: 70 UG/DL (ref 59–158)
TIBC SERPL-MCNC: 291 UG/DL (ref 260–445)

## 2024-02-27 PROCEDURE — 6370000000 HC RX 637 (ALT 250 FOR IP): Performed by: HOSPITALIST

## 2024-02-27 PROCEDURE — 2580000003 HC RX 258: Performed by: INTERNAL MEDICINE

## 2024-02-27 PROCEDURE — 97530 THERAPEUTIC ACTIVITIES: CPT

## 2024-02-27 PROCEDURE — 74018 RADEX ABDOMEN 1 VIEW: CPT

## 2024-02-27 PROCEDURE — 94760 N-INVAS EAR/PLS OXIMETRY 1: CPT

## 2024-02-27 PROCEDURE — 97116 GAIT TRAINING THERAPY: CPT

## 2024-02-27 PROCEDURE — 6360000002 HC RX W HCPCS: Performed by: PHYSICIAN ASSISTANT

## 2024-02-27 PROCEDURE — 2500000003 HC RX 250 WO HCPCS: Performed by: STUDENT IN AN ORGANIZED HEALTH CARE EDUCATION/TRAINING PROGRAM

## 2024-02-27 PROCEDURE — 6360000002 HC RX W HCPCS: Performed by: HOSPITALIST

## 2024-02-27 RX ORDER — GABAPENTIN 300 MG/1
600 CAPSULE ORAL 3 TIMES DAILY
Qty: 18 CAPSULE | Refills: 0 | Status: ON HOLD | OUTPATIENT
Start: 2024-02-27 | End: 2024-03-01

## 2024-02-27 RX ORDER — ZOLPIDEM TARTRATE 10 MG/1
10 TABLET ORAL NIGHTLY PRN
Qty: 3 TABLET | Refills: 0 | Status: ON HOLD | OUTPATIENT
Start: 2024-02-27 | End: 2024-03-01

## 2024-02-27 RX ORDER — GABAPENTIN 300 MG/1
600 CAPSULE ORAL 3 TIMES DAILY
Qty: 18 CAPSULE | Refills: 0
Start: 2024-02-27 | End: 2024-02-27

## 2024-02-27 RX ORDER — OXYCODONE HYDROCHLORIDE 5 MG/1
5 TABLET ORAL EVERY 8 HOURS PRN
Qty: 9 TABLET | Refills: 0 | Status: SHIPPED | OUTPATIENT
Start: 2024-02-27 | End: 2024-02-28

## 2024-02-27 RX ADMIN — OXYCODONE HYDROCHLORIDE 10 MG: 10 TABLET ORAL at 17:17

## 2024-02-27 RX ADMIN — METHOCARBAMOL 1000 MG: 500 TABLET ORAL at 14:06

## 2024-02-27 RX ADMIN — METHYLNALTREXONE BROMIDE 12 MG: 12 INJECTION, SOLUTION SUBCUTANEOUS at 09:23

## 2024-02-27 RX ADMIN — FAMOTIDINE 20 MG: 20 TABLET, FILM COATED ORAL at 09:22

## 2024-02-27 RX ADMIN — SENNOSIDES AND DOCUSATE SODIUM 1 TABLET: 8.6; 5 TABLET ORAL at 09:23

## 2024-02-27 RX ADMIN — ACETAMINOPHEN 975 MG: 325 TABLET ORAL at 12:23

## 2024-02-27 RX ADMIN — Medication 330 ML: at 14:59

## 2024-02-27 RX ADMIN — OXYCODONE HYDROCHLORIDE 10 MG: 10 TABLET ORAL at 05:33

## 2024-02-27 RX ADMIN — METHOCARBAMOL 1000 MG: 500 TABLET ORAL at 09:22

## 2024-02-27 RX ADMIN — GABAPENTIN 600 MG: 300 CAPSULE ORAL at 09:22

## 2024-02-27 RX ADMIN — GABAPENTIN 600 MG: 300 CAPSULE ORAL at 14:06

## 2024-02-27 RX ADMIN — ENOXAPARIN SODIUM 30 MG: 100 INJECTION SUBCUTANEOUS at 09:24

## 2024-02-27 RX ADMIN — TAMSULOSIN HYDROCHLORIDE 0.4 MG: 0.4 CAPSULE ORAL at 09:24

## 2024-02-27 RX ADMIN — OXYCODONE HYDROCHLORIDE 10 MG: 10 TABLET ORAL at 10:00

## 2024-02-27 RX ADMIN — POLYETHYLENE GLYCOL 3350 17 G: 17 POWDER, FOR SOLUTION ORAL at 09:23

## 2024-02-27 RX ADMIN — ACETAMINOPHEN 650 MG: 325 TABLET ORAL at 05:54

## 2024-02-27 RX ADMIN — PROPYLENE GLYCOL 1 DROP: 0.06 SOLUTION/ DROPS OPHTHALMIC at 09:24

## 2024-02-27 RX ADMIN — SODIUM CHLORIDE, POTASSIUM CHLORIDE, SODIUM LACTATE AND CALCIUM CHLORIDE: 600; 310; 30; 20 INJECTION, SOLUTION INTRAVENOUS at 03:38

## 2024-02-27 ASSESSMENT — PAIN DESCRIPTION - FREQUENCY
FREQUENCY: CONTINUOUS

## 2024-02-27 ASSESSMENT — PAIN SCALES - WONG BAKER
WONGBAKER_NUMERICALRESPONSE: 4
WONGBAKER_NUMERICALRESPONSE: 6
WONGBAKER_NUMERICALRESPONSE: 2
WONGBAKER_NUMERICALRESPONSE: 0
WONGBAKER_NUMERICALRESPONSE: 0
WONGBAKER_NUMERICALRESPONSE: 6
WONGBAKER_NUMERICALRESPONSE: 0
WONGBAKER_NUMERICALRESPONSE: 0
WONGBAKER_NUMERICALRESPONSE: 2
WONGBAKER_NUMERICALRESPONSE: 0
WONGBAKER_NUMERICALRESPONSE: 2
WONGBAKER_NUMERICALRESPONSE: 6

## 2024-02-27 ASSESSMENT — PAIN DESCRIPTION - DESCRIPTORS
DESCRIPTORS: ACHING
DESCRIPTORS: ACHING;DISCOMFORT
DESCRIPTORS: SHARP
DESCRIPTORS: ACHING;DISCOMFORT;SHARP
DESCRIPTORS: ACHING;DISCOMFORT;SHARP

## 2024-02-27 ASSESSMENT — PAIN DESCRIPTION - ORIENTATION
ORIENTATION: RIGHT
ORIENTATION: LEFT
ORIENTATION: RIGHT

## 2024-02-27 ASSESSMENT — PAIN SCALES - GENERAL
PAINLEVEL_OUTOF10: 9
PAINLEVEL_OUTOF10: 7
PAINLEVEL_OUTOF10: 3
PAINLEVEL_OUTOF10: 10
PAINLEVEL_OUTOF10: 3
PAINLEVEL_OUTOF10: 3
PAINLEVEL_OUTOF10: 8

## 2024-02-27 ASSESSMENT — PAIN DESCRIPTION - ONSET
ONSET: ON-GOING

## 2024-02-27 ASSESSMENT — PAIN DESCRIPTION - LOCATION
LOCATION: BACK;RIB CAGE
LOCATION: BACK;RIB CAGE
LOCATION: BACK
LOCATION: BACK;RIB CAGE
LOCATION: BACK

## 2024-02-27 ASSESSMENT — PAIN DESCRIPTION - PAIN TYPE
TYPE: ACUTE PAIN

## 2024-02-27 NOTE — PROGRESS NOTES
V2.0  Discharge Summary    Name:  Juan Mohamud /Age/Sex: 1940 (83 y.o. male)   Admit Date: 2024  Discharge Date: 24    MRN & CSN:  [id number] & [id number] Encounter Date and Time 24 1:50 PM EST    Attending:  Paxton Tapia MD Discharging Provider: Paxton Tapia MD       Discharge Diagnosess:     Constipation  IBS  Fall   Rib fractures  Hyponatremia      Admission HPI, hospital course, and consultants:     Admission HPI:  \"Admitted with severe abdominal pain with associated constipation.  History of constipation predominant irritable bowel syndrome and has not had Linzess since Monday.  Recent fall down 5-7 steps resulting in multiple rib fractures, clavicular fracture, head contusion, evaluated I the ED (), transferred to  and sent to sent to \[facility]\"" for rehab.  History of right patellar fracture in 2017 but not repaired due to age per patient.  He apparently had a difficulty time at \[facility]\"" and is not sure that they will allow him to return to the facility because of the way he treated the staff.   \"    Hospital course: Patient having bowel movements. Discharge to SNF  Constipation with severe abdominal pain.  Likely opioid related. Volume expansion with IV crystalloid. Gastroenterology evaluation.  Continues on sennosides-docusate 8.5-50 mg BID and polyethylene glycol (Glycolax) 17 gram daily.  Restarted on linaclotide (Linzess) 290 mg daily (patient supplied).   Fall down 6-7 steps with multiple rib fractures, clavicular fracture, head contusion, and soft tissue contusions along the right lateral chest and lower abdominal wall extending inferiorly around the pelvis.    Hyponatremia associated with SIADH.  Continues on desmopressin (DDAVP) 100 mcg daily.   Normocytic anemia:  Check iron studies.   Gastric reflux.  Continues on famotidine 20 mg daily.  Hypertriglyceridemia on fenofibrate 160 mg daily.   Chronic neuropathic pain on gabapentin 600 mg TID.   Insomnia on 
  Physical Therapy    Juan Mohamud  2/27/2024    -to room as patient needing to get OOB to get to stretcher for transport to [facility] but first wanted to get to toilet   -sit to standing with min/mod assist from bed - verbal cues to not use left UE to push up   -ambulates using straight cane on left and min assist to the bathroom........ and then to stretcher   -sit to supine on stretcher at mod assist   -plans discharge to Skilled setting when medically cleared    Electronically signed by KOLTON WILLSON, PT on 2/27/2024 at 4:08 PM    
4 Eyes Skin Assessment     NAME:  Juan Mohamud  YOB: 1940  MEDICAL RECORD NUMBER:  [id number]    The patient is being assessed for  Admission    I agree that at least one RN has performed a thorough Head to Toe Skin Assessment on the patient. ALL assessment sites listed below have been assessed.      Areas assessed by both nurses:    Head, Face, Ears, Shoulders, Back, Chest, Arms, Elbows, Hands, Sacrum. Buttock, Coccyx, Ischium, Legs. Feet and Heels, and Under Medical Devices         Does the Patient have a Wound? Yes wound(s) were present on assessment. LDA wound assessment was Initiated and completed by RN       Piero Prevention initiated by RN: Yes  Wound Care Orders initiated by RN: No    Pressure Injury (Stage 3,4, Unstageable, DTI, NWPT, and Complex wounds) if present, place Wound referral order by RN under : No    New Ostomies, if present place, Ostomy referral order under : No     Nurse 1 eSignature: Electronically signed by Alina Kwon RN on 2/24/24 at 6:02 AM EST    **SHARE this note so that the co-signing nurse can place an eSignature**    Nurse 2 eSignature: Electronically signed by SERENA UP RN on 2/25/24 at 2:20 AM EST    
Assist x2 patient up to chair. PT c/o of pain medicated as ordered.  
Assumed care of pt. Received report from Ciara. Pt had large bowel movement  after enema. Notified MD and diet advanced.   
Hospitalist Progress Note  2/25/2024 6:45 AM  Subjective:   Admit Date: 2/23/2024  PCP: Janet Garcia MD Status: Observation   Interval History: Hospital Day: 3, some indigestion this morning. Bowel movement at 0400.      History of present illness:  Admitted with severe abdominal pain with associated constipation.  History of constipation predominant irritable bowel syndrome and has not had Linzess since Monday.  Recent fall down 5-7 steps resulting in multiple rib fractures, clavicular fracture, head contusion, evaluated I the ED (2/19), transferred to  and sent to sent to [facility] for rehab.  History of right patellar fracture in 2017 but not repaired due to age per patient.  He apparently had a difficulty time at [facility] and is not sure that they will allow him to return to the facility because of the way he treated the staff.      Diet: regular  Left forearm peripheral IV (2/23, day #3)    Medications:   Lactated ringers at 100 ml/hr  enoxaparin  30 mg SubCUTAneous BID   acetaminophen  975 mg Oral TID   Propylene Glycol  1 drop Ophthalmic BID   vitamin D  50,000 Units Oral Weekly   famotidine  20 mg Oral BID   fenofibrate  160 mg Oral Daily   gabapentin  600 mg Oral TID   lidocaine  4% patch Topical Daily   linaclotide  290 mcg Oral QAM AC   melatonin  3 mg Oral Nightly   methocarbamol  1,000 mg Oral TID   polyethylene glycol  17 g Oral BID   sennosides-docusate   1 tablet Oral BID   tamsulosin  0.4 mg Oral Daily   desmopressin  100 mcg Oral Nightly     Recent Labs     02/23/24 2059   WBC 10.1   HGB 12.1*      MCV 91.1     Recent Labs     02/23/24 2059   *   K 4.3   CL 88*   CO2 25   BUN 18   CREATININE 0.7*   GLUCOSE 141*     Lactate (2/23) 1.7 mmol/L  hsTnT (2/19) 36     Urinalysis (2/23) trace ketones / trace protein / neg nitrite / neg leuk est      CT abd / pelvis w/ IV contrast (2/23) Motion and overlying artifact obscuring detail throughout the abdomen which limits 
INPATIENT CONSULTATION:    IDENTIFYING DATA/REASON FOR CONSULTATION   PATIENT:  Juan Mohamud  MRN:  [id number]  ADMIT DATE: 2/23/2024  TIME OF EVALUATION: 2/25/2024 6:20 AM  HOSPITAL STAY:   LOS: 0 days   CONSULTING PHYSICIAN:  REASON FOR CONSULTATION:    Subjective:    -Patient reports he is ding better today. He had a BM with enema yesterday.     MEDICATIONS   SCHEDULED:  sodium chloride flush, 5-40 mL, 2 times per day  enoxaparin, 30 mg, BID  acetaminophen, 975 mg, TID  Propylene Glycol, 1 drop, BID  [START ON 3/1/2024] vitamin D, 50,000 Units, Weekly  famotidine, 20 mg, BID  fenofibrate, 160 mg, Daily  gabapentin, 600 mg, TID  lidocaine, 1 patch, Daily  linaclotide, 290 mcg, QAM AC  melatonin, 3 mg, Nightly  methocarbamol, 1,000 mg, TID  polyethylene glycol, 17 g, BID  sennosides-docusate sodium, 1 tablet, BID  tamsulosin, 0.4 mg, Daily  desmopressin, 100 mcg, Nightly      FLUIDS/DRIPS:     sodium chloride      lactated ringers IV soln 100 mL/hr at 02/24/24 1020     PRNs: prochlorperazine, 5 mg, Q4H PRN  sodium chloride flush, 5-40 mL, PRN  sodium chloride, , PRN  potassium chloride, 40 mEq, PRN   Or  potassium alternative oral replacement, 40 mEq, PRN   Or  potassium chloride, 10 mEq, PRN  magnesium sulfate, 2,000 mg, PRN  ondansetron, 4 mg, Q8H PRN   Or  ondansetron, 4 mg, Q6H PRN  polyethylene glycol, 17 g, Daily PRN  acetaminophen, 650 mg, Q6H PRN   Or  acetaminophen, 650 mg, Q6H PRN  oxyCODONE, 5 mg, Q4H PRN   Or  oxyCODONE, 10 mg, Q4H PRN  simethicone, 80 mg, Q6H PRN  sodium chloride, 1 spray, BID PRN      ALLERGIES:  He [unfilled]      PHYSICAL EXAM   [unfilled]   No intake/output data recorded.  Oxygen Therapy:  @[id number])@  @DYVNXVDGQB37([id number])@  @[id number])@    Physical Exam:  Gen: Resting in bed, NAD   CV: RRR no MRG   Pul: CTAB   Abd: Good bowel sounds throughout, no scars, improvement in distension, no masses, no HSM   Ext: No edema   Neuro: No asterixis 
INPATIENT CONSULTATION:    IDENTIFYING DATA/REASON FOR CONSULTATION   PATIENT:  Juan Mohamud  MRN:  [id number]  ADMIT DATE: 2/23/2024  TIME OF EVALUATION: 2/27/2024 6:39 AM  HOSPITAL STAY:   LOS: 2 days   CONSULTING PHYSICIAN: Matthew Gage MD   REASON FOR CONSULTATION: Constipation    Subjective:    Patient seen and examined in follow-up. Patient denies any bowel movement yesterday.  He reports passing flatus.  He denies any abdominal pain.    MEDICATIONS   SCHEDULED:  sodium chloride flush, 5-40 mL, 2 times per day  enoxaparin, 30 mg, BID  acetaminophen, 975 mg, TID  Propylene Glycol, 1 drop, BID  [START ON 3/1/2024] vitamin D, 50,000 Units, Weekly  famotidine, 20 mg, BID  fenofibrate, 160 mg, Daily  gabapentin, 600 mg, TID  lidocaine, 1 patch, Daily  linaclotide, 290 mcg, QAM AC  melatonin, 3 mg, Nightly  methocarbamol, 1,000 mg, TID  polyethylene glycol, 17 g, BID  sennosides-docusate sodium, 1 tablet, BID  tamsulosin, 0.4 mg, Daily  desmopressin, 100 mcg, Nightly      FLUIDS/DRIPS:     sodium chloride      lactated ringers IV soln 100 mL/hr at 02/27/24 0538     PRNs: prochlorperazine, 5 mg, Q4H PRN  zolpidem, 10 mg, Nightly PRN  sodium chloride flush, 5-40 mL, PRN  sodium chloride, , PRN  potassium chloride, 40 mEq, PRN   Or  potassium alternative oral replacement, 40 mEq, PRN   Or  potassium chloride, 10 mEq, PRN  magnesium sulfate, 2,000 mg, PRN  ondansetron, 4 mg, Q8H PRN   Or  ondansetron, 4 mg, Q6H PRN  polyethylene glycol, 17 g, Daily PRN  acetaminophen, 650 mg, Q6H PRN   Or  acetaminophen, 650 mg, Q6H PRN  oxyCODONE, 5 mg, Q4H PRN   Or  oxyCODONE, 10 mg, Q4H PRN  simethicone, 80 mg, Q6H PRN  sodium chloride, 1 spray, BID PRN      ALLERGIES:    Allergies   Allergen Reactions    Erythromycin     Dust Mite Extract      sneeze         PHYSICAL EXAM     Vitals:    02/26/24 1910 02/26/24 2242 02/27/24 0403 02/27/24 0533   BP: 117/76      Pulse: (!) 105      Resp: 17 20  20   Temp: 98.8 °F (37.1 °C)   
INPATIENT CONSULTATION:    IDENTIFYING DATA/REASON FOR CONSULTATION   PATIENT:  Juan Mohamud  MRN:  [id number]  ADMIT DATE: 2/23/2024  TIME OF EVALUATION: 2/26/2024 6:48 AM  HOSPITAL STAY:   LOS: 1 day   CONSULTING PHYSICIAN: Paxton Tapia MD   REASON FOR CONSULTATION: Constipation    Subjective:    Patient seen and examined in follow-up. Patient is unsure if he had a bowel movement yesterday.  Per nursing documentation, the patient had 2 episodes of stool output on 2/24 but none on 2/25.  He denies any abdominal pain.    MEDICATIONS   SCHEDULED:  sodium chloride flush, 5-40 mL, 2 times per day  enoxaparin, 30 mg, BID  acetaminophen, 975 mg, TID  Propylene Glycol, 1 drop, BID  [START ON 3/1/2024] vitamin D, 50,000 Units, Weekly  famotidine, 20 mg, BID  fenofibrate, 160 mg, Daily  gabapentin, 600 mg, TID  lidocaine, 1 patch, Daily  linaclotide, 290 mcg, QAM AC  melatonin, 3 mg, Nightly  methocarbamol, 1,000 mg, TID  polyethylene glycol, 17 g, BID  sennosides-docusate sodium, 1 tablet, BID  tamsulosin, 0.4 mg, Daily  desmopressin, 100 mcg, Nightly      FLUIDS/DRIPS:     sodium chloride      lactated ringers IV soln 100 mL/hr at 02/26/24 0539     PRNs: prochlorperazine, 5 mg, Q4H PRN  zolpidem, 10 mg, Nightly PRN  sodium chloride flush, 5-40 mL, PRN  sodium chloride, , PRN  potassium chloride, 40 mEq, PRN   Or  potassium alternative oral replacement, 40 mEq, PRN   Or  potassium chloride, 10 mEq, PRN  magnesium sulfate, 2,000 mg, PRN  ondansetron, 4 mg, Q8H PRN   Or  ondansetron, 4 mg, Q6H PRN  polyethylene glycol, 17 g, Daily PRN  acetaminophen, 650 mg, Q6H PRN   Or  acetaminophen, 650 mg, Q6H PRN  oxyCODONE, 5 mg, Q4H PRN   Or  oxyCODONE, 10 mg, Q4H PRN  simethicone, 80 mg, Q6H PRN  sodium chloride, 1 spray, BID PRN      ALLERGIES:    Allergies   Allergen Reactions    Erythromycin     Dust Mite Extract      sneeze         PHYSICAL EXAM     Vitals:    02/25/24 1445 02/25/24 1515 02/25/24 1923 02/25/24 2210   BP: 
Linzess home medication given to wife, IV removed.   
Noted in charting that patient has not had his dose of Linzess the last two days. This RN called patients wife and she states she will bring medication today so we can administer.   
PT AO x4. VSS with HR tacky. Pt yelling about in pain ABD, back and right arm. Pt refusing help with bed mobility. Pt requesting ice ship. MD. Gabriel notified with. Pt also asking for some thing more to help with the pain. Pain at 15/10. MD notified. One time dose of Toradol given. Home medication requested by MD AMELIE Johnston. See eMAR. No BM at this time. Pt encouraged to rest. No needs voiced. Fall precautions in place. Bed alarm on. Call light within reach. Will continue round. Electronically signed by Alina Kwon RN on 2/24/2024 at 6:57 AM    
PT Aao x4. VSS. Pt tolerating PO fluids but nauseated throughout shift. Pt ambulating with cane. Pt refusing Laxatives last night. Education not effective. Pt c/o of generalized pain .  Prn medication, rest, and repositioning.  MD Johnston notified of ongoing nausea. See eMAR with NO. Pt resting fair. No further needs voiced Fall precautions in place. Bed alarm on. Call light within reach. Will continue round. Electronically signed by Alina Kwon RN on 2/25/2024 at 6:33 AM    
Patient resting in bed this am upon assessment, continues with generalized pain, PRN pain medication given. Patient off floor for KUB, now back in bed. Required a stedyx2 to get back to bed from stretcher. Patient up to bathroom and no bowel movement. Voices no additional needs at this time.   
Patient resting in bed. A&OX4, VSS. Pt denies pain while laying in bed at this time. AM medications administered as ordered (see eMAR). IV infusing. HTT Assessment complete. Pt denies any further needs at this time. Fall precautions in place.    
Patient taken off floor via transport stretcher, all belongings at bedside went with patient, report and packet given to transport team.   
Pharmacy Medication Reconciliation Note     List of medications patient is currently taking is complete.    Allergies:  Erythromycin and Dust mite extract    Source of information:   1. Order summary from [facility]    Notes regarding home medications:   1. Updated med list to match list provided by nursing facility    Denies any other OTC/herbal meds    Hamlet Danielle PharmD  2/24/2024  4:30 AM  
Pt alert and oriented. No s/s of distress. Refused oral medications. Call light in reach and bed in low position.  
Pt arrived to floor from ER at 0415 via stretcher to Rm 3102. Pt oriented to room, call light, policies and procedures, the menu and ordering. Call light within reach. Bed in lowest position, bed alarm on, and wheels locked. Pt verbalized understanding. No complaints, questions or concerns at this time.    Electronically signed by Alina Kwon RN on 2/24/2024 at 5:56 AM    
Pt assessment completed and charted. VSS. Pt a/ox4. Pt denies any pain at this time. Reports some nausea. No vomiting. Bed in lowest position and wheels locked. Call light within reach. Bedside table within reach. Non-skid footwear in place. Pt denies any other needs at this time.  Pt calls out appropriately.   
Pt attempted to sit in chair 2x today. Too painful, returned to bed.   
Pt is alert and oriented x 4 in bed. Vital, assessment and daily care given. Education and care plan updated. Medication given as per order. Fall precaution initiated, call light within reach, bed in low position and wheels locked. Pt state Melatonin doesn't help with his sleep and he used to take ambien at home so I perfect served NP Karen Dorsey and got the order. Addressed current pt's need. Continue to monitor.      Electronically signed by Makayla Parada RN on 2/25/2024 at 11:48 PM    
Pt refused most of oral morning meds this morning d/t pain and nausea. Pt dry heaving but no emesis produced. Pt has been NPO per 2 days (per pt)  
Report called to Carlos Martínez, all questions answered. Perfect serve sent to Dr. Gage about needing hard script signed for Oxycodone.   
Smog enema done and patient in bathroom now.   
Restriction  Other position/activity restrictions: while at  was instructed to use a sling but states he is not anymore    Subjective   General  Chart Reviewed: Yes, Orders  Patient assessed for rehabilitation services?: Yes  Additional Pertinent Hx: 83 y.o. M admitted with constipation after presenting with severe abdominal pain.  Recent fall down 5-7 steps resulting in multiple rib fractures, clavicular fracture, head contusion, evaluated in the ED (2/19), transferred to  and sent to sent to [facility] for rehab.  History of right patellar fracture in 2017 but not repaired due to age per patient.  PMH including: lumbar DDD, lumbar stenosis, lumbar radiculitis.  Family / Caregiver Present: No  Referring Practitioner: Jagjit Quiroz MD  Diagnosis: Constipation  Subjective  Subjective: Pt met b/s for OT eval/tx session with PT, agreeable to therapy. Pt with no report of pain.       Social/Functional History  Social/Functional History  Lives With: Spouse  Type of Home: Condo  Home Access: Stairs to enter with rails  Entrance Stairs - Number of Steps: 6-7 SKY  Entrance Stairs - Rails: Both  Bathroom Shower/Tub: Walk-in shower  Bathroom Toilet:  (comfort height)  Bathroom Equipment: Grab bars in shower, Grab bars around toilet  Home Equipment: Cane, Walker, rolling  Has the patient had two or more falls in the past year or any fall with injury in the past year?:  (1 recent fall Feb 19, 2024)  ADL Assistance: Independent  Homemaking Assistance: Independent  Ambulation Assistance: Independent (no AD prior to initial fall)  Transfer Assistance: Independent  Additional Comments: 24 hr assist at home. Recent  admission Feb 2024 and D/C to [facility]. Above information based on pt prior status prior to  admission.       Objective      Safety Devices  Type of Devices: All fall risk precautions in place;Call light within reach;Chair alarm in place;Left in chair  Restraints  Restraints Initially in Place: No   
Term Goal 3: ambulation at min assist using straight or quad cane for 150 feet  Patient Goals   Patient Goals : get well for getting home       Education  Patient Education  Education Given To: Patient  Education Provided: Role of Therapy;Plan of Care;Precautions  Education Method: Verbal;Demonstration  Barriers to Learning: None  Education Outcome: Continued education needed      Therapy Time   Individual Concurrent Group Co-treatment   Time In 1034         Time Out 1115         Minutes 41                 KOLTON WILLSON, PT         
moderate edema or cellulitis vs soft tissue contusions along the right lateral chest and lower abdominal wall extending inferiorly around the pelvis on the right and less prominent edema on the left.       CBC:   Recent Labs     02/25/24  0651 02/26/24  0409   WBC 13.1* 11.2*   HGB 11.7* 11.2*    247     BMP:    Recent Labs     02/25/24  0651 02/26/24  0409   * 133*   K 4.1 3.4*   CL 94* 97*   CO2 21 23   BUN 19 17   CREATININE 0.6* 0.7*   GLUCOSE 109* 113*       Hemoglobin A1C:   Lab Results   Component Value Date/Time    LABA1C 6.4 10/24/2013 09:29 AM       UA:  Lab Results   Component Value Date/Time    NITRU Negative 02/23/2024 10:49 PM    COLORU Yellow 02/23/2024 10:49 PM    PHUR 5.5 02/23/2024 10:49 PM    WBCUA 1 02/23/2024 10:49 PM    RBCUA 2 02/23/2024 10:49 PM    BACTERIA None Seen 02/23/2024 10:49 PM    CLARITYU Clear 02/23/2024 10:49 PM    SPECGRAV 1.026 02/23/2024 10:49 PM    LEUKOCYTESUR Negative 02/23/2024 10:49 PM    UROBILINOGEN 1.0 02/23/2024 10:49 PM    BILIRUBINUR Negative 02/23/2024 10:49 PM    BLOODU Negative 02/23/2024 10:49 PM    GLUCOSEU Negative 02/23/2024 10:49 PM    KETUA TRACE 02/23/2024 10:49 PM           Electronically signed by Matthew Gage MD on 2/27/2024 at 1:15 PM

## 2024-02-27 NOTE — CARE COORDINATION
Patient's precert is still pending. Plan is for [facility].     Electronically signed by SHARLA Riley, LEXIS, Case Management on 2/27/2024 at 9:06 AM  Alex [phone]    12:43 PM  Precert still pending.    Electronically signed by SHARLA Riley LISW, Case Management on 2/27/2024 at 12:43 PM  Alex [phone]

## 2024-02-27 NOTE — CARE COORDINATION
[facility] AUTHORIZATION INFORMATION      LOCATION:  [facility]    EFFECTIVE DATE: 2/27/24    PATIENT MUST TRANSFER TO FACILITY WITHIN 72 HOURS OF AUTHORIZATION    NEXT REVIEW DATE:  3/4/24    AUTH#:   [id number]      Juliane Robins Sr. Administrative Assist, Case Management  [phone]  Electronically signed by Juliane Robins on 2/27/2024 at 1:56 PM

## 2024-02-27 NOTE — PLAN OF CARE
Problem: Discharge Planning  Goal: Discharge to home or other facility with appropriate resources  2/25/2024 2315 by Makayla Parada RN  Outcome: Progressing  Flowsheets (Taken 2/25/2024 2315)  Discharge to home or other facility with appropriate resources:   Identify discharge learning needs (meds, wound care, etc)   Refer to discharge planning if patient needs post-hospital services based on physician order or complex needs related to functional status, cognitive ability or social support system   Arrange for needed discharge resources and transportation as appropriate   Identify barriers to discharge with patient and caregiver     Problem: Safety - Adult  Goal: Free from fall injury  2/25/2024 2315 by Makayla Parada RN  Outcome: Progressing  Flowsheets (Taken 2/25/2024 2315)  Free From Fall Injury: Instruct family/caregiver on patient safety     Problem: Pain  Goal: Verbalizes/displays adequate comfort level or baseline comfort level  2/25/2024 2315 by Makayla Parada RN  Outcome: Progressing  Flowsheets (Taken 2/25/2024 2315)  Verbalizes/displays adequate comfort level or baseline comfort level:   Encourage patient to monitor pain and request assistance   Administer analgesics based on type and severity of pain and evaluate response   Assess pain using appropriate pain scale   Implement non-pharmacological measures as appropriate and evaluate response   Notify Licensed Independent Practitioner if interventions unsuccessful or patient reports new pain   Consider cultural and social influences on pain and pain management     Problem: Skin/Tissue Integrity  Goal: Absence of new skin breakdown  Description: 1.  Monitor for areas of redness and/or skin breakdown  2.  Assess vascular access sites hourly  3.  Every 4-6 hours minimum:  Change oxygen saturation probe site  4.  Every 4-6 hours:  If on nasal continuous positive airway pressure, respiratory therapy assess nares and determine need for appliance change or 
  Problem: Safety - Adult  Goal: Free from fall injury  2/24/2024 1414 by Ciara Rich RN  Outcome: Progressing  2/24/2024 0555 by Alina Kwon RN  Outcome: Progressing  Flowsheets (Taken 2/24/2024 0549)  Free From Fall Injury:   Instruct family/caregiver on patient safety   Based on caregiver fall risk screen, instruct family/caregiver to ask for assistance with transferring infant if caregiver noted to have fall risk factors     Problem: Discharge Planning  Goal: Discharge to home or other facility with appropriate resources  2/24/2024 1414 by Ciara Rich RN  Outcome: Progressing  2/24/2024 0555 by Alina Kwon RN  Outcome: Progressing  Flowsheets  Taken 2/24/2024 0555  Discharge to home or other facility with appropriate resources:   Identify barriers to discharge with patient and caregiver   Arrange for needed discharge resources and transportation as appropriate   Identify discharge learning needs (meds, wound care, etc)  Taken 2/24/2024 0530  Discharge to home or other facility with appropriate resources:   Identify barriers to discharge with patient and caregiver   Arrange for needed discharge resources and transportation as appropriate   Identify discharge learning needs (meds, wound care, etc)     Problem: Pain  Goal: Verbalizes/displays adequate comfort level or baseline comfort level  2/24/2024 1414 by Ciara Rich RN  Outcome: Progressing  2/24/2024 0555 by Alina Kwon RN  Outcome: Progressing  Flowsheets (Taken 2/24/2024 0555)  Verbalizes/displays adequate comfort level or baseline comfort level:   Encourage patient to monitor pain and request assistance   Assess pain using appropriate pain scale   Administer analgesics based on type and severity of pain and evaluate response   Implement non-pharmacological measures as appropriate and evaluate response     Problem: Skin/Tissue Integrity  Goal: Absence of new skin breakdown  Description: 1.  Monitor for areas of redness and/or skin 
  Problem: Safety - Adult  Goal: Free from fall injury  2/24/2024 2250 by Alina Kwon RN  Outcome: Progressing  Flowsheets (Taken 2/24/2024 2247)  Free From Fall Injury:   Based on caregiver fall risk screen, instruct family/caregiver to ask for assistance with transferring infant if caregiver noted to have fall risk factors   Instruct family/caregiver on patient safety  2/24/2024 1414 by Ciara Rich RN  Outcome: Progressing     Problem: Discharge Planning  Goal: Discharge to home or other facility with appropriate resources  2/24/2024 2250 by Alina Kwon RN  Outcome: Progressing  Flowsheets (Taken 2/24/2024 0599)  Discharge to home or other facility with appropriate resources:   Identify barriers to discharge with patient and caregiver   Arrange for needed discharge resources and transportation as appropriate   Identify discharge learning needs (meds, wound care, etc)  2/24/2024 1414 by Ciara Rich RN  Outcome: Progressing     Problem: Pain  Goal: Verbalizes/displays adequate comfort level or baseline comfort level  2/24/2024 2250 by Alina Kwon RN  Outcome: Progressing  Flowsheets (Taken 2/24/2024 2250)  Verbalizes/displays adequate comfort level or baseline comfort level:   Encourage patient to monitor pain and request assistance   Assess pain using appropriate pain scale   Administer analgesics based on type and severity of pain and evaluate response   Implement non-pharmacological measures as appropriate and evaluate response  2/24/2024 1414 by Ciara Rich RN  Outcome: Progressing     Problem: Skin/Tissue Integrity  Goal: Absence of new skin breakdown  Description: 1.  Monitor for areas of redness and/or skin breakdown  2.  Assess vascular access sites hourly  3.  Every 4-6 hours minimum:  Change oxygen saturation probe site  4.  Every 4-6 hours:  If on nasal continuous positive airway pressure, respiratory therapy assess nares and determine need for appliance change or resting 
  Problem: Safety - Adult  Goal: Free from fall injury  2/25/2024 1014 by Chaya Hernández RN  Outcome: Progressing  2/24/2024 2250 by Alina Kwon RN  Outcome: Progressing  Flowsheets (Taken 2/24/2024 2247)  Free From Fall Injury:   Based on caregiver fall risk screen, instruct family/caregiver to ask for assistance with transferring infant if caregiver noted to have fall risk factors   Instruct family/caregiver on patient safety     Problem: Discharge Planning  Goal: Discharge to home or other facility with appropriate resources  2/25/2024 1014 by Chaya Hernández RN  Outcome: Progressing  Flowsheets (Taken 2/25/2024 0839)  Discharge to home or other facility with appropriate resources: Identify barriers to discharge with patient and caregiver  2/24/2024 2250 by Alina Kwon RN  Outcome: Progressing  Flowsheets  Taken 2/24/2024 2025  Discharge to home or other facility with appropriate resources:   Identify barriers to discharge with patient and caregiver   Arrange for needed discharge resources and transportation as appropriate   Identify discharge learning needs (meds, wound care, etc)  Taken 2/24/2024 0555  Discharge to home or other facility with appropriate resources:   Identify barriers to discharge with patient and caregiver   Arrange for needed discharge resources and transportation as appropriate   Identify discharge learning needs (meds, wound care, etc)     Problem: Pain  Goal: Verbalizes/displays adequate comfort level or baseline comfort level  2/25/2024 1014 by Chaya Hernández RN  Outcome: Progressing  2/24/2024 2250 by Alina Kwon RN  Outcome: Progressing  Flowsheets (Taken 2/24/2024 2250)  Verbalizes/displays adequate comfort level or baseline comfort level:   Encourage patient to monitor pain and request assistance   Assess pain using appropriate pain scale   Administer analgesics based on type and severity of pain and evaluate response   Implement non-pharmacological measures as 
  Problem: Safety - Adult  Goal: Free from fall injury  2/27/2024 1743 by Zoya Osorio RN  Outcome: Completed  2/27/2024 1050 by Zoya Osorio RN  Outcome: Progressing  Flowsheets (Taken 2/27/2024 1050)  Free From Fall Injury: Instruct family/caregiver on patient safety     Problem: Discharge Planning  Goal: Discharge to home or other facility with appropriate resources  2/27/2024 1743 by Zoya Osorio RN  Outcome: Completed  2/27/2024 1050 by Zoya Osorio RN  Outcome: Progressing  Flowsheets (Taken 2/27/2024 1050)  Discharge to home or other facility with appropriate resources: Identify barriers to discharge with patient and caregiver     Problem: Pain  Goal: Verbalizes/displays adequate comfort level or baseline comfort level  2/27/2024 1743 by Zoya Osorio RN  Outcome: Completed  2/27/2024 1050 by Zoya Osorio RN  Outcome: Progressing  Flowsheets (Taken 2/27/2024 1050)  Verbalizes/displays adequate comfort level or baseline comfort level: Encourage patient to monitor pain and request assistance     Problem: Skin/Tissue Integrity  Goal: Absence of new skin breakdown  Description: 1.  Monitor for areas of redness and/or skin breakdown  2.  Assess vascular access sites hourly  3.  Every 4-6 hours minimum:  Change oxygen saturation probe site  4.  Every 4-6 hours:  If on nasal continuous positive airway pressure, respiratory therapy assess nares and determine need for appliance change or resting period.  2/27/2024 1743 by Zoya Osorio RN  Outcome: Completed  2/27/2024 1050 by Zoya Osorio RN  Outcome: Progressing     Problem: ABCDS Injury Assessment  Goal: Absence of physical injury  2/27/2024 1743 by Zoya Osorio RN  Outcome: Completed  2/27/2024 1050 by Zoya Osorio RN  Outcome: Progressing  Flowsheets (Taken 2/27/2024 1050)  Absence of Physical Injury: Implement safety measures based on patient assessment     Problem: Chronic Conditions 
  Problem: Safety - Adult  Goal: Free from fall injury  Outcome: Progressing  Flowsheets (Taken 2/27/2024 0237)  Free From Fall Injury: Instruct family/caregiver on patient safety     Problem: Discharge Planning  Goal: Discharge to home or other facility with appropriate resources  Outcome: Progressing  Flowsheets (Taken 2/27/2024 0237)  Discharge to home or other facility with appropriate resources:   Identify barriers to discharge with patient and caregiver   Identify discharge learning needs (meds, wound care, etc)     Problem: Pain  Goal: Verbalizes/displays adequate comfort level or baseline comfort level  Outcome: Progressing  Flowsheets (Taken 2/27/2024 0237)  Verbalizes/displays adequate comfort level or baseline comfort level:   Encourage patient to monitor pain and request assistance   Assess pain using appropriate pain scale   Administer analgesics based on type and severity of pain and evaluate response   Implement non-pharmacological measures as appropriate and evaluate response     Problem: Skin/Tissue Integrity  Goal: Absence of new skin breakdown  Description: 1.  Monitor for areas of redness and/or skin breakdown  2.  Assess vascular access sites hourly  3.  Every 4-6 hours minimum:  Change oxygen saturation probe site  4.  Every 4-6 hours:  If on nasal continuous positive airway pressure, respiratory therapy assess nares and determine need for appliance change or resting period.  Outcome: Progressing  Note: Skin assessment complete. No new signs of skin breakdown noted. Assistance provided with repositioning while in bed.     Problem: ABCDS Injury Assessment  Goal: Absence of physical injury  Outcome: Progressing  Flowsheets (Taken 2/27/2024 0237)  Absence of Physical Injury: Implement safety measures based on patient assessment     
As per my progress note this morning, patient had worsening abdominal distention and KUB findings of colonic distention.  He got Relistor as per GI.    Smog enema administered by RN and led to large bowel movement.  As per RN patient had improvement in distention.  Medically stable for discharge.  Will communicate to SNF to to continue scheduled bowel regimen and administer smog enema if needed.      
0555)  Absence of Physical Injury: Implement safety measures based on patient assessment     
hours minimum:  Change oxygen saturation probe site  4.  Every 4-6 hours:  If on nasal continuous positive airway pressure, respiratory therapy assess nares and determine need for appliance change or resting period.  2/27/2024 1050 by Zoya Osorio RN  Outcome: Progressing  2/27/2024 0237 by Saba Sanabria, RN  Outcome: Progressing  Note: Skin assessment complete. No new signs of skin breakdown noted. Assistance provided with repositioning while in bed.     Problem: ABCDS Injury Assessment  Goal: Absence of physical injury  2/27/2024 1050 by Zoya Osorio, RN  Outcome: Progressing  Flowsheets (Taken 2/27/2024 1050)  Absence of Physical Injury: Implement safety measures based on patient assessment  2/27/2024 0237 by Saba Sanabria, RN  Outcome: Progressing  Flowsheets (Taken 2/27/2024 0237)  Absence of Physical Injury: Implement safety measures based on patient assessment     
pain   Consider cultural and social influences on pain and pain management     Problem: Skin/Tissue Integrity  Goal: Absence of new skin breakdown  Description: 1.  Monitor for areas of redness and/or skin breakdown  2.  Assess vascular access sites hourly  3.  Every 4-6 hours minimum:  Change oxygen saturation probe site  4.  Every 4-6 hours:  If on nasal continuous positive airway pressure, respiratory therapy assess nares and determine need for appliance change or resting period.  2/26/2024 1119 by Chaya Hernández, RN  Outcome: Progressing  2/25/2024 2315 by Makayla Parada, RN  Outcome: Progressing     Problem: ABCDS Injury Assessment  Goal: Absence of physical injury  2/26/2024 1119 by Chaya Hernández, RN  Outcome: Progressing  Flowsheets (Taken 2/25/2024 2316 by Makayla Parada, RN)  Absence of Physical Injury: Implement safety measures based on patient assessment  2/25/2024 2315 by Makayla Parada, RN  Outcome: Progressing  Flowsheets (Taken 2/25/2024 2315)  Absence of Physical Injury: Implement safety measures based on patient assessment

## 2024-02-27 NOTE — CARE COORDINATION
DISCHARGE SUMMARY     DATE OF DISCHARGE: 2/27/2024    DISCHARGE DESTINATION: skilled unit    FACILITY:   Discharging to Facility/ Agency   Name: [facility]  Address:  [address], [place] [address]   Phone:  [phone]  Fax:  [phone]    INSURANCE PRECERT OBTAINED: yes    FLORENTINO/PASAAJACINTO COMPLETED: n/a--Carlos to obtain from [facility]    TRANSPORTATION:     Company Name:  [facility]      Time: 7pm    Phone Number: [phone]    COMMENTS: Patient in bathroom. Informed patient's spouse present in room regarding receiving insurance precert, having a discharge order and transport time of 7pm.   She wants to make sure patient's xray is ok; she will follow up with the RN.  Informed Natacha at [facility].  Rn aware. Report number [phone]    Electronically signed by Altagracia Moody, SHARLA, LISW, Case Management on 2/27/2024 at 3:36 PM  [place] [phone]

## 2024-02-27 NOTE — CARE COORDINATION
Wound care consulted for possible DTI. Pt with bruise from fall, to sacrum R flank and R shoulder, scattered. No PI noted. Pt on isoflex orange mattress. Will need encouragement to turn due to pain from broken ribs. Pt aware or risk of PI. Currently out of bed with steady to bathroom. Bed extender on bed due to pt being 6'4\".  Skin tears being managed by bedside RN with appropriate treatment.    Electronically signed by Haydee Omalley RN CWOCN on 2/27/2024 at 2:42 PM

## 2024-02-28 ENCOUNTER — APPOINTMENT (OUTPATIENT)
Dept: GENERAL RADIOLOGY | Age: 84
DRG: 981 | End: 2024-02-28
Payer: MEDICARE

## 2024-02-28 ENCOUNTER — HOSPITAL ENCOUNTER (INPATIENT)
Age: 84
LOS: 14 days | Discharge: NURSING, REHAB OR LONG-TERM CARE | DRG: 981 | End: 2024-03-14
Attending: STUDENT IN AN ORGANIZED HEALTH CARE EDUCATION/TRAINING PROGRAM | Admitting: STUDENT IN AN ORGANIZED HEALTH CARE EDUCATION/TRAINING PROGRAM
Payer: MEDICARE

## 2024-02-28 DIAGNOSIS — Z95.810 CARDIAC DEFIBRILLATOR IN SITU: ICD-10-CM

## 2024-02-28 DIAGNOSIS — K56.609 COLONIC OBSTRUCTION (HCC): ICD-10-CM

## 2024-02-28 DIAGNOSIS — N17.9 AKI (ACUTE KIDNEY INJURY) (HCC): ICD-10-CM

## 2024-02-28 DIAGNOSIS — R79.89 ELEVATED TROPONIN: ICD-10-CM

## 2024-02-28 DIAGNOSIS — J96.01 ACUTE HYPOXEMIC RESPIRATORY FAILURE (HCC): ICD-10-CM

## 2024-02-28 DIAGNOSIS — T14.90XA TRAUMA: ICD-10-CM

## 2024-02-28 DIAGNOSIS — R00.0 WIDE-COMPLEX TACHYCARDIA: Primary | ICD-10-CM

## 2024-02-28 DIAGNOSIS — E87.1 HYPONATREMIA: ICD-10-CM

## 2024-02-28 LAB
ALBUMIN SERPL-MCNC: 3.6 G/DL (ref 3.4–5)
ALBUMIN/GLOB SERPL: 1.6 {RATIO} (ref 1.1–2.2)
ALP SERPL-CCNC: 120 U/L (ref 40–129)
ALT SERPL-CCNC: 11 U/L (ref 10–40)
ANION GAP SERPL CALCULATED.3IONS-SCNC: 17 MMOL/L (ref 3–16)
ANISOCYTOSIS BLD QL SMEAR: ABNORMAL
APTT BLD: 35.1 SEC (ref 22.7–35.9)
AST SERPL-CCNC: 21 U/L (ref 15–37)
BASE EXCESS BLDV CALC-SCNC: -0.4 MMOL/L
BASOPHILS # BLD: 0 K/UL (ref 0–0.2)
BASOPHILS NFR BLD: 0 %
BILIRUB SERPL-MCNC: 1.5 MG/DL (ref 0–1)
BUN SERPL-MCNC: 16 MG/DL (ref 7–20)
CALCIUM SERPL-MCNC: 8.6 MG/DL (ref 8.3–10.6)
CHLORIDE SERPL-SCNC: 89 MMOL/L (ref 99–110)
CO2 BLDV-SCNC: 27 MMOL/L
CO2 SERPL-SCNC: 22 MMOL/L (ref 21–32)
COHGB MFR BLDV: 2.2 %
CREAT SERPL-MCNC: 1.2 MG/DL (ref 0.8–1.3)
DEPRECATED RDW RBC AUTO: 15 % (ref 12.4–15.4)
EOSINOPHIL # BLD: 0 K/UL (ref 0–0.6)
EOSINOPHIL NFR BLD: 0 %
GFR SERPLBLD CREATININE-BSD FMLA CKD-EPI: >60 ML/MIN/{1.73_M2}
GLUCOSE SERPL-MCNC: 158 MG/DL (ref 70–99)
HCO3 BLDV-SCNC: 26 MMOL/L (ref 23–29)
HCT VFR BLD AUTO: 35.4 % (ref 40.5–52.5)
HGB BLD-MCNC: 11.9 G/DL (ref 13.5–17.5)
INR PPP: 1.19 (ref 0.84–1.16)
LACTATE BLDV-SCNC: 2.3 MMOL/L (ref 0.4–1.9)
LYMPHOCYTES # BLD: 0.5 K/UL (ref 1–5.1)
LYMPHOCYTES NFR BLD: 4 %
MAGNESIUM SERPL-MCNC: 2 MG/DL (ref 1.8–2.4)
MCH RBC QN AUTO: 31.4 PG (ref 26–34)
MCHC RBC AUTO-ENTMCNC: 33.6 G/DL (ref 31–36)
MCV RBC AUTO: 93.2 FL (ref 80–100)
METHGB MFR BLDV: 0.3 %
MONOCYTES # BLD: 1 K/UL (ref 0–1.3)
MONOCYTES NFR BLD: 7 %
NEUTROPHILS # BLD: 12.2 K/UL (ref 1.7–7.7)
NEUTROPHILS NFR BLD: 88 %
NEUTS BAND NFR BLD MANUAL: 1 % (ref 0–7)
NT-PROBNP SERPL-MCNC: 2599 PG/ML (ref 0–449)
O2 THERAPY: NORMAL
PCO2 BLDV: 45.7 MMHG (ref 40–50)
PH BLDV: 7.36 [PH] (ref 7.35–7.45)
PLATELET # BLD AUTO: 327 K/UL (ref 135–450)
PMV BLD AUTO: 8 FL (ref 5–10.5)
PO2 BLDV: <30 MMHG
POLYCHROMASIA BLD QL SMEAR: ABNORMAL
POTASSIUM SERPL-SCNC: 3.6 MMOL/L (ref 3.5–5.1)
PROCALCITONIN SERPL IA-MCNC: 0.49 NG/ML (ref 0–0.15)
PROT SERPL-MCNC: 5.9 G/DL (ref 6.4–8.2)
PROTHROMBIN TIME: 15.1 SEC (ref 11.5–14.8)
RBC # BLD AUTO: 3.79 M/UL (ref 4.2–5.9)
SAO2 % BLDV: 29 %
SARS-COV-2 RDRP RESP QL NAA+PROBE: NOT DETECTED
SLIDE REVIEW: ABNORMAL
SODIUM SERPL-SCNC: 128 MMOL/L (ref 136–145)
TROPONIN, HIGH SENSITIVITY: 88 NG/L (ref 0–22)
TSH SERPL DL<=0.005 MIU/L-ACNC: 2.96 UIU/ML (ref 0.27–4.2)
WBC # BLD AUTO: 13.7 K/UL (ref 4–11)

## 2024-02-28 PROCEDURE — 87635 SARS-COV-2 COVID-19 AMP PRB: CPT

## 2024-02-28 PROCEDURE — 83605 ASSAY OF LACTIC ACID: CPT

## 2024-02-28 PROCEDURE — 84484 ASSAY OF TROPONIN QUANT: CPT

## 2024-02-28 PROCEDURE — 99285 EMERGENCY DEPT VISIT HI MDM: CPT

## 2024-02-28 PROCEDURE — 80053 COMPREHEN METABOLIC PANEL: CPT

## 2024-02-28 PROCEDURE — 85025 COMPLETE CBC W/AUTO DIFF WBC: CPT

## 2024-02-28 PROCEDURE — 71045 X-RAY EXAM CHEST 1 VIEW: CPT

## 2024-02-28 PROCEDURE — 6360000002 HC RX W HCPCS: Performed by: STUDENT IN AN ORGANIZED HEALTH CARE EDUCATION/TRAINING PROGRAM

## 2024-02-28 PROCEDURE — 84443 ASSAY THYROID STIM HORMONE: CPT

## 2024-02-28 PROCEDURE — 85610 PROTHROMBIN TIME: CPT

## 2024-02-28 PROCEDURE — 83880 ASSAY OF NATRIURETIC PEPTIDE: CPT

## 2024-02-28 PROCEDURE — 6360000002 HC RX W HCPCS

## 2024-02-28 PROCEDURE — 93005 ELECTROCARDIOGRAM TRACING: CPT | Performed by: STUDENT IN AN ORGANIZED HEALTH CARE EDUCATION/TRAINING PROGRAM

## 2024-02-28 PROCEDURE — 2500000003 HC RX 250 WO HCPCS: Performed by: STUDENT IN AN ORGANIZED HEALTH CARE EDUCATION/TRAINING PROGRAM

## 2024-02-28 PROCEDURE — 2580000003 HC RX 258: Performed by: STUDENT IN AN ORGANIZED HEALTH CARE EDUCATION/TRAINING PROGRAM

## 2024-02-28 PROCEDURE — 96375 TX/PRO/DX INJ NEW DRUG ADDON: CPT

## 2024-02-28 PROCEDURE — 96367 TX/PROPH/DG ADDL SEQ IV INF: CPT

## 2024-02-28 PROCEDURE — 96365 THER/PROPH/DIAG IV INF INIT: CPT

## 2024-02-28 PROCEDURE — 85730 THROMBOPLASTIN TIME PARTIAL: CPT

## 2024-02-28 PROCEDURE — 84145 PROCALCITONIN (PCT): CPT

## 2024-02-28 PROCEDURE — 82803 BLOOD GASES ANY COMBINATION: CPT

## 2024-02-28 PROCEDURE — 83735 ASSAY OF MAGNESIUM: CPT

## 2024-02-28 RX ORDER — 0.9 % SODIUM CHLORIDE 0.9 %
250 INTRAVENOUS SOLUTION INTRAVENOUS ONCE
Status: COMPLETED | OUTPATIENT
Start: 2024-02-29 | End: 2024-02-29

## 2024-02-28 RX ORDER — OXYCODONE HYDROCHLORIDE 5 MG/1
5 TABLET ORAL EVERY 6 HOURS PRN
Status: ON HOLD | COMMUNITY
End: 2024-03-12

## 2024-02-28 RX ORDER — ETOMIDATE 2 MG/ML
6 INJECTION INTRAVENOUS ONCE
Status: COMPLETED | OUTPATIENT
Start: 2024-02-28 | End: 2024-02-28

## 2024-02-28 RX ORDER — ESMOLOL HYDROCHLORIDE 10 MG/ML
25-300 INJECTION, SOLUTION INTRAVENOUS CONTINUOUS
Status: DISCONTINUED | OUTPATIENT
Start: 2024-02-29 | End: 2024-02-29

## 2024-02-28 RX ORDER — MAGNESIUM SULFATE IN WATER 40 MG/ML
2000 INJECTION, SOLUTION INTRAVENOUS ONCE
Status: COMPLETED | OUTPATIENT
Start: 2024-02-28 | End: 2024-02-29

## 2024-02-28 RX ORDER — MAGNESIUM SULFATE 1 G/100ML
INJECTION INTRAVENOUS
Status: COMPLETED
Start: 2024-02-28 | End: 2024-02-28

## 2024-02-28 RX ADMIN — ETOMIDATE INJECTION 6 MG: 2 SOLUTION INTRAVENOUS at 22:29

## 2024-02-28 RX ADMIN — MAGNESIUM SULFATE HEPTAHYDRATE 1000 MG: 1 INJECTION, SOLUTION INTRAVENOUS at 23:30

## 2024-02-28 RX ADMIN — MAGNESIUM SULFATE HEPTAHYDRATE 2000 MG: 40 INJECTION, SOLUTION INTRAVENOUS at 23:30

## 2024-02-28 RX ADMIN — AMIODARONE HYDROCHLORIDE 1 MG/MIN: 50 INJECTION, SOLUTION INTRAVENOUS at 23:17

## 2024-02-28 RX ADMIN — AMIODARONE HYDROCHLORIDE 150 MG: 1.5 INJECTION, SOLUTION INTRAVENOUS at 22:53

## 2024-02-29 ENCOUNTER — APPOINTMENT (OUTPATIENT)
Dept: GENERAL RADIOLOGY | Age: 84
DRG: 981 | End: 2024-02-29
Payer: MEDICARE

## 2024-02-29 ENCOUNTER — APPOINTMENT (OUTPATIENT)
Dept: CT IMAGING | Age: 84
DRG: 981 | End: 2024-02-29
Payer: MEDICARE

## 2024-02-29 ENCOUNTER — ANESTHESIA (OUTPATIENT)
Dept: ENDOSCOPY | Age: 84
End: 2024-02-29
Payer: MEDICARE

## 2024-02-29 ENCOUNTER — ANESTHESIA EVENT (OUTPATIENT)
Dept: ENDOSCOPY | Age: 84
End: 2024-02-29
Payer: MEDICARE

## 2024-02-29 PROBLEM — E78.5 HYPERLIPIDEMIA: Status: ACTIVE | Noted: 2024-02-29

## 2024-02-29 PROBLEM — E87.1 HYPONATREMIA: Status: ACTIVE | Noted: 2024-02-29

## 2024-02-29 PROBLEM — R00.0 WIDE-COMPLEX TACHYCARDIA: Status: ACTIVE | Noted: 2024-02-29

## 2024-02-29 PROBLEM — R79.89 ELEVATED TROPONIN: Status: ACTIVE | Noted: 2024-02-29

## 2024-02-29 PROBLEM — J18.9 PNEUMONIA: Status: ACTIVE | Noted: 2024-02-29

## 2024-02-29 PROBLEM — R10.9 ABDOMINAL PAIN: Status: RESOLVED | Noted: 2024-02-25 | Resolved: 2024-02-29

## 2024-02-29 PROBLEM — T14.90XA TRAUMA: Status: ACTIVE | Noted: 2024-02-29

## 2024-02-29 PROBLEM — Z91.81 HISTORY OF FALL: Status: ACTIVE | Noted: 2024-02-29

## 2024-02-29 PROBLEM — K59.39 DILATATION OF COLON: Status: ACTIVE | Noted: 2024-02-29

## 2024-02-29 PROBLEM — K56.609 COLONIC OBSTRUCTION (HCC): Status: ACTIVE | Noted: 2024-02-29

## 2024-02-29 PROBLEM — D64.9 ANEMIA: Status: ACTIVE | Noted: 2024-02-29

## 2024-02-29 PROBLEM — I10 PRIMARY HYPERTENSION: Status: ACTIVE | Noted: 2024-02-29

## 2024-02-29 PROBLEM — I49.9 ARRHYTHMIA: Status: ACTIVE | Noted: 2024-02-29

## 2024-02-29 PROBLEM — E11.9 TYPE 2 DIABETES MELLITUS (HCC): Status: ACTIVE | Noted: 2024-02-29

## 2024-02-29 LAB
ANION GAP SERPL CALCULATED.3IONS-SCNC: 13 MMOL/L (ref 3–16)
APTT BLD: 37.3 SEC (ref 22.7–35.9)
BUN SERPL-MCNC: 29 MG/DL (ref 7–20)
C DIFF TOX A+B STL QL IA: NORMAL
CALCIUM SERPL-MCNC: 8.1 MG/DL (ref 8.3–10.6)
CHLORIDE SERPL-SCNC: 93 MMOL/L (ref 99–110)
CO2 SERPL-SCNC: 24 MMOL/L (ref 21–32)
CREAT SERPL-MCNC: 1 MG/DL (ref 0.8–1.3)
DEPRECATED RDW RBC AUTO: 14.6 % (ref 12.4–15.4)
EKG ATRIAL RATE: 128 BPM
EKG DIAGNOSIS: NORMAL
EKG P AXIS: 56 DEGREES
EKG P-R INTERVAL: 192 MS
EKG Q-T INTERVAL: 220 MS
EKG Q-T INTERVAL: 252 MS
EKG Q-T INTERVAL: 286 MS
EKG QRS DURATION: 122 MS
EKG QRS DURATION: 138 MS
EKG QRS DURATION: 164 MS
EKG QTC CALCULATION (BAZETT): 417 MS
EKG QTC CALCULATION (BAZETT): 448 MS
EKG QTC CALCULATION (BAZETT): 498 MS
EKG R AXIS: -17 DEGREES
EKG R AXIS: -51 DEGREES
EKG R AXIS: -54 DEGREES
EKG T AXIS: 112 DEGREES
EKG T AXIS: 184 DEGREES
EKG T AXIS: 252 DEGREES
EKG VENTRICULAR RATE: 128 BPM
EKG VENTRICULAR RATE: 235 BPM
EKG VENTRICULAR RATE: 249 BPM
GFR SERPLBLD CREATININE-BSD FMLA CKD-EPI: >60 ML/MIN/{1.73_M2}
GI PATHOGENS PNL STL NAA+PROBE: NORMAL
GLUCOSE BLD-MCNC: 117 MG/DL (ref 70–99)
GLUCOSE BLD-MCNC: 134 MG/DL (ref 70–99)
GLUCOSE BLD-MCNC: 148 MG/DL (ref 70–99)
GLUCOSE SERPL-MCNC: 130 MG/DL (ref 70–99)
HCT VFR BLD AUTO: 30.6 % (ref 40.5–52.5)
HGB BLD-MCNC: 10.5 G/DL (ref 13.5–17.5)
LACTATE BLDV-SCNC: 1.9 MMOL/L (ref 0.4–1.9)
MCH RBC QN AUTO: 31.5 PG (ref 26–34)
MCHC RBC AUTO-ENTMCNC: 34.3 G/DL (ref 31–36)
MCV RBC AUTO: 91.9 FL (ref 80–100)
MRSA DNA SPEC QL NAA+PROBE: NORMAL
PERFORMED ON: ABNORMAL
PLATELET # BLD AUTO: 287 K/UL (ref 135–450)
PMV BLD AUTO: 7.7 FL (ref 5–10.5)
POTASSIUM SERPL-SCNC: 3.6 MMOL/L (ref 3.5–5.1)
RBC # BLD AUTO: 3.33 M/UL (ref 4.2–5.9)
SODIUM SERPL-SCNC: 130 MMOL/L (ref 136–145)
TROPONIN, HIGH SENSITIVITY: 132 NG/L (ref 0–22)
WBC # BLD AUTO: 12.1 K/UL (ref 4–11)

## 2024-02-29 PROCEDURE — 85730 THROMBOPLASTIN TIME PARTIAL: CPT

## 2024-02-29 PROCEDURE — 3700000000 HC ANESTHESIA ATTENDED CARE: Performed by: INTERNAL MEDICINE

## 2024-02-29 PROCEDURE — C1729 CATH, DRAINAGE: HCPCS | Performed by: INTERNAL MEDICINE

## 2024-02-29 PROCEDURE — 6360000002 HC RX W HCPCS

## 2024-02-29 PROCEDURE — 2580000003 HC RX 258: Performed by: STUDENT IN AN ORGANIZED HEALTH CARE EDUCATION/TRAINING PROGRAM

## 2024-02-29 PROCEDURE — 80048 BASIC METABOLIC PNL TOTAL CA: CPT

## 2024-02-29 PROCEDURE — 2580000003 HC RX 258: Performed by: ANESTHESIOLOGY

## 2024-02-29 PROCEDURE — 74177 CT ABD & PELVIS W/CONTRAST: CPT

## 2024-02-29 PROCEDURE — 6360000002 HC RX W HCPCS: Performed by: INTERNAL MEDICINE

## 2024-02-29 PROCEDURE — 99223 1ST HOSP IP/OBS HIGH 75: CPT | Performed by: INTERNAL MEDICINE

## 2024-02-29 PROCEDURE — 0D9P80Z DRAINAGE OF RECTUM WITH DRAINAGE DEVICE, VIA NATURAL OR ARTIFICIAL OPENING ENDOSCOPIC: ICD-10-PCS | Performed by: INTERNAL MEDICINE

## 2024-02-29 PROCEDURE — 85027 COMPLETE CBC AUTOMATED: CPT

## 2024-02-29 PROCEDURE — 87040 BLOOD CULTURE FOR BACTERIA: CPT

## 2024-02-29 PROCEDURE — C8929 TTE W OR WO FOL WCON,DOPPLER: HCPCS

## 2024-02-29 PROCEDURE — 94761 N-INVAS EAR/PLS OXIMETRY MLT: CPT

## 2024-02-29 PROCEDURE — 2500000003 HC RX 250 WO HCPCS

## 2024-02-29 PROCEDURE — 6360000004 HC RX CONTRAST MEDICATION: Performed by: STUDENT IN AN ORGANIZED HEALTH CARE EDUCATION/TRAINING PROGRAM

## 2024-02-29 PROCEDURE — 3609155600 HC COLONOSCOPY WITH DECOMPRESSION: Performed by: INTERNAL MEDICINE

## 2024-02-29 PROCEDURE — 93010 ELECTROCARDIOGRAM REPORT: CPT | Performed by: INTERNAL MEDICINE

## 2024-02-29 PROCEDURE — 9990000010 HC NO CHARGE VISIT

## 2024-02-29 PROCEDURE — 6360000002 HC RX W HCPCS: Performed by: STUDENT IN AN ORGANIZED HEALTH CARE EDUCATION/TRAINING PROGRAM

## 2024-02-29 PROCEDURE — 74018 RADEX ABDOMEN 1 VIEW: CPT

## 2024-02-29 PROCEDURE — 2500000003 HC RX 250 WO HCPCS: Performed by: STUDENT IN AN ORGANIZED HEALTH CARE EDUCATION/TRAINING PROGRAM

## 2024-02-29 PROCEDURE — 84484 ASSAY OF TROPONIN QUANT: CPT

## 2024-02-29 PROCEDURE — 2580000003 HC RX 258: Performed by: INTERNAL MEDICINE

## 2024-02-29 PROCEDURE — 87324 CLOSTRIDIUM AG IA: CPT

## 2024-02-29 PROCEDURE — 71260 CT THORAX DX C+: CPT

## 2024-02-29 PROCEDURE — 6370000000 HC RX 637 (ALT 250 FOR IP): Performed by: INTERNAL MEDICINE

## 2024-02-29 PROCEDURE — 2709999900 HC NON-CHARGEABLE SUPPLY: Performed by: INTERNAL MEDICINE

## 2024-02-29 PROCEDURE — 2500000003 HC RX 250 WO HCPCS: Performed by: INTERNAL MEDICINE

## 2024-02-29 PROCEDURE — 3700000001 HC ADD 15 MINUTES (ANESTHESIA): Performed by: INTERNAL MEDICINE

## 2024-02-29 PROCEDURE — 87150 DNA/RNA AMPLIFIED PROBE: CPT

## 2024-02-29 PROCEDURE — 87641 MR-STAPH DNA AMP PROBE: CPT

## 2024-02-29 PROCEDURE — 5A2204Z RESTORATION OF CARDIAC RHYTHM, SINGLE: ICD-10-PCS | Performed by: INTERNAL MEDICINE

## 2024-02-29 PROCEDURE — 2000000000 HC ICU R&B

## 2024-02-29 PROCEDURE — 99222 1ST HOSP IP/OBS MODERATE 55: CPT | Performed by: SURGERY

## 2024-02-29 PROCEDURE — 2700000000 HC OXYGEN THERAPY PER DAY

## 2024-02-29 PROCEDURE — 36415 COLL VENOUS BLD VENIPUNCTURE: CPT

## 2024-02-29 PROCEDURE — 87449 NOS EACH ORGANISM AG IA: CPT

## 2024-02-29 PROCEDURE — 6370000000 HC RX 637 (ALT 250 FOR IP): Performed by: STUDENT IN AN ORGANIZED HEALTH CARE EDUCATION/TRAINING PROGRAM

## 2024-02-29 PROCEDURE — 87506 IADNA-DNA/RNA PROBE TQ 6-11: CPT

## 2024-02-29 PROCEDURE — 83605 ASSAY OF LACTIC ACID: CPT

## 2024-02-29 RX ORDER — ACETAMINOPHEN 325 MG/1
650 TABLET ORAL EVERY 6 HOURS PRN
Status: DISCONTINUED | OUTPATIENT
Start: 2024-02-29 | End: 2024-03-14 | Stop reason: HOSPADM

## 2024-02-29 RX ORDER — SENNA AND DOCUSATE SODIUM 50; 8.6 MG/1; MG/1
1 TABLET, FILM COATED ORAL 2 TIMES DAILY
Status: DISCONTINUED | OUTPATIENT
Start: 2024-02-29 | End: 2024-03-14 | Stop reason: HOSPADM

## 2024-02-29 RX ORDER — PROPOFOL 10 MG/ML
INJECTION, EMULSION INTRAVENOUS PRN
Status: DISCONTINUED | OUTPATIENT
Start: 2024-02-29 | End: 2024-02-29 | Stop reason: SDUPTHER

## 2024-02-29 RX ORDER — ONDANSETRON 4 MG/1
4 TABLET, ORALLY DISINTEGRATING ORAL EVERY 8 HOURS PRN
Status: DISCONTINUED | OUTPATIENT
Start: 2024-02-29 | End: 2024-03-14 | Stop reason: HOSPADM

## 2024-02-29 RX ORDER — ENOXAPARIN SODIUM 100 MG/ML
40 INJECTION SUBCUTANEOUS DAILY
Status: DISCONTINUED | OUTPATIENT
Start: 2024-03-01 | End: 2024-02-29

## 2024-02-29 RX ORDER — ENOXAPARIN SODIUM 100 MG/ML
40 INJECTION SUBCUTANEOUS DAILY
Status: DISCONTINUED | OUTPATIENT
Start: 2024-02-29 | End: 2024-02-29

## 2024-02-29 RX ORDER — POTASSIUM CHLORIDE 7.45 MG/ML
10 INJECTION INTRAVENOUS PRN
Status: DISCONTINUED | OUTPATIENT
Start: 2024-02-29 | End: 2024-03-14 | Stop reason: HOSPADM

## 2024-02-29 RX ORDER — MIDAZOLAM HYDROCHLORIDE 1 MG/ML
1 INJECTION INTRAMUSCULAR; INTRAVENOUS
Status: COMPLETED | OUTPATIENT
Start: 2024-02-29 | End: 2024-02-29

## 2024-02-29 RX ORDER — POTASSIUM CHLORIDE 7.45 MG/ML
10 INJECTION INTRAVENOUS
Status: COMPLETED | OUTPATIENT
Start: 2024-02-29 | End: 2024-02-29

## 2024-02-29 RX ORDER — SIMETHICONE 80 MG
80 TABLET,CHEWABLE ORAL EVERY 6 HOURS PRN
Status: DISCONTINUED | OUTPATIENT
Start: 2024-02-29 | End: 2024-03-14 | Stop reason: HOSPADM

## 2024-02-29 RX ORDER — PHENYLEPHRINE HCL IN 0.9% NACL 1 MG/10 ML
SYRINGE (ML) INTRAVENOUS PRN
Status: DISCONTINUED | OUTPATIENT
Start: 2024-02-29 | End: 2024-02-29 | Stop reason: SDUPTHER

## 2024-02-29 RX ORDER — HEPARIN SODIUM 1000 [USP'U]/ML
4000 INJECTION, SOLUTION INTRAVENOUS; SUBCUTANEOUS ONCE
Status: DISCONTINUED | OUTPATIENT
Start: 2024-02-29 | End: 2024-02-29

## 2024-02-29 RX ORDER — SODIUM CHLORIDE 0.9 % (FLUSH) 0.9 %
5-40 SYRINGE (ML) INJECTION PRN
Status: DISCONTINUED | OUTPATIENT
Start: 2024-02-29 | End: 2024-03-14 | Stop reason: HOSPADM

## 2024-02-29 RX ORDER — KETAMINE HYDROCHLORIDE 10 MG/ML
150 INJECTION, SOLUTION INTRAMUSCULAR; INTRAVENOUS ONCE
Status: DISCONTINUED | OUTPATIENT
Start: 2024-02-29 | End: 2024-02-29

## 2024-02-29 RX ORDER — FAMOTIDINE 20 MG/1
20 TABLET, FILM COATED ORAL 2 TIMES DAILY
Status: DISCONTINUED | OUTPATIENT
Start: 2024-02-29 | End: 2024-03-14 | Stop reason: HOSPADM

## 2024-02-29 RX ORDER — ENOXAPARIN SODIUM 100 MG/ML
30 INJECTION SUBCUTANEOUS 2 TIMES DAILY
Status: DISCONTINUED | OUTPATIENT
Start: 2024-02-29 | End: 2024-03-04

## 2024-02-29 RX ORDER — METHOCARBAMOL 500 MG/1
1000 TABLET, FILM COATED ORAL 3 TIMES DAILY PRN
Status: DISCONTINUED | OUTPATIENT
Start: 2024-02-29 | End: 2024-03-14 | Stop reason: HOSPADM

## 2024-02-29 RX ORDER — HEPARIN SODIUM 10000 [USP'U]/100ML
0-4000 INJECTION, SOLUTION INTRAVENOUS CONTINUOUS
Status: DISCONTINUED | OUTPATIENT
Start: 2024-02-29 | End: 2024-02-29

## 2024-02-29 RX ORDER — KETAMINE HYDROCHLORIDE 50 MG/ML
150 INJECTION, SOLUTION INTRAMUSCULAR; INTRAVENOUS ONCE
Status: DISCONTINUED | OUTPATIENT
Start: 2024-02-29 | End: 2024-02-29

## 2024-02-29 RX ORDER — HEPARIN SODIUM 1000 [USP'U]/ML
30 INJECTION, SOLUTION INTRAVENOUS; SUBCUTANEOUS PRN
Status: DISCONTINUED | OUTPATIENT
Start: 2024-02-29 | End: 2024-02-29

## 2024-02-29 RX ORDER — GABAPENTIN 300 MG/1
600 CAPSULE ORAL 3 TIMES DAILY
Status: DISCONTINUED | OUTPATIENT
Start: 2024-02-29 | End: 2024-03-14 | Stop reason: HOSPADM

## 2024-02-29 RX ORDER — DESMOPRESSIN ACETATE 0.2 MG/1
100 TABLET ORAL DAILY
Status: DISCONTINUED | OUTPATIENT
Start: 2024-02-29 | End: 2024-03-02

## 2024-02-29 RX ORDER — FENOFIBRATE 160 MG/1
160 TABLET ORAL DAILY
Status: DISCONTINUED | OUTPATIENT
Start: 2024-02-29 | End: 2024-03-14 | Stop reason: HOSPADM

## 2024-02-29 RX ORDER — MAGNESIUM SULFATE IN WATER 40 MG/ML
2000 INJECTION, SOLUTION INTRAVENOUS PRN
Status: DISCONTINUED | OUTPATIENT
Start: 2024-02-29 | End: 2024-03-14 | Stop reason: HOSPADM

## 2024-02-29 RX ORDER — POLYETHYLENE GLYCOL 3350 17 G/17G
17 POWDER, FOR SOLUTION ORAL DAILY PRN
Status: DISCONTINUED | OUTPATIENT
Start: 2024-02-29 | End: 2024-03-14 | Stop reason: HOSPADM

## 2024-02-29 RX ORDER — 0.9 % SODIUM CHLORIDE 0.9 %
30 INTRAVENOUS SOLUTION INTRAVENOUS ONCE
Status: DISCONTINUED | OUTPATIENT
Start: 2024-02-29 | End: 2024-02-29

## 2024-02-29 RX ORDER — ONDANSETRON 2 MG/ML
4 INJECTION INTRAMUSCULAR; INTRAVENOUS EVERY 6 HOURS PRN
Status: DISCONTINUED | OUTPATIENT
Start: 2024-02-29 | End: 2024-03-14 | Stop reason: HOSPADM

## 2024-02-29 RX ORDER — POTASSIUM CHLORIDE 20 MEQ/1
40 TABLET, EXTENDED RELEASE ORAL PRN
Status: DISCONTINUED | OUTPATIENT
Start: 2024-02-29 | End: 2024-03-14 | Stop reason: HOSPADM

## 2024-02-29 RX ORDER — SODIUM CHLORIDE 9 MG/ML
INJECTION, SOLUTION INTRAVENOUS PRN
Status: DISCONTINUED | OUTPATIENT
Start: 2024-02-29 | End: 2024-03-04

## 2024-02-29 RX ORDER — VANCOMYCIN 2 G/400ML
2000 INJECTION, SOLUTION INTRAVENOUS ONCE
Status: COMPLETED | OUTPATIENT
Start: 2024-02-29 | End: 2024-02-29

## 2024-02-29 RX ORDER — MIDAZOLAM HYDROCHLORIDE 1 MG/ML
2 INJECTION INTRAMUSCULAR; INTRAVENOUS ONCE
Status: COMPLETED | OUTPATIENT
Start: 2024-02-29 | End: 2024-02-29

## 2024-02-29 RX ORDER — SODIUM CHLORIDE 0.9 % (FLUSH) 0.9 %
5-40 SYRINGE (ML) INJECTION EVERY 12 HOURS SCHEDULED
Status: DISCONTINUED | OUTPATIENT
Start: 2024-02-29 | End: 2024-03-14 | Stop reason: HOSPADM

## 2024-02-29 RX ORDER — SODIUM CHLORIDE 0.9 % (FLUSH) 0.9 %
5-40 SYRINGE (ML) INJECTION EVERY 12 HOURS SCHEDULED
Status: DISCONTINUED | OUTPATIENT
Start: 2024-02-29 | End: 2024-03-04

## 2024-02-29 RX ORDER — SODIUM CHLORIDE 0.9 % (FLUSH) 0.9 %
5-40 SYRINGE (ML) INJECTION PRN
Status: DISCONTINUED | OUTPATIENT
Start: 2024-02-29 | End: 2024-03-04

## 2024-02-29 RX ORDER — 0.9 % SODIUM CHLORIDE 0.9 %
500 INTRAVENOUS SOLUTION INTRAVENOUS ONCE
Status: COMPLETED | OUTPATIENT
Start: 2024-02-29 | End: 2024-02-29

## 2024-02-29 RX ORDER — ZOLPIDEM TARTRATE 5 MG/1
10 TABLET ORAL NIGHTLY PRN
Status: DISCONTINUED | OUTPATIENT
Start: 2024-02-29 | End: 2024-03-14 | Stop reason: HOSPADM

## 2024-02-29 RX ORDER — LIDOCAINE HYDROCHLORIDE 20 MG/ML
INJECTION, SOLUTION EPIDURAL; INFILTRATION; INTRACAUDAL; PERINEURAL PRN
Status: DISCONTINUED | OUTPATIENT
Start: 2024-02-29 | End: 2024-02-29 | Stop reason: SDUPTHER

## 2024-02-29 RX ORDER — TAMSULOSIN HYDROCHLORIDE 0.4 MG/1
0.4 CAPSULE ORAL DAILY
Status: DISCONTINUED | OUTPATIENT
Start: 2024-02-29 | End: 2024-03-14 | Stop reason: HOSPADM

## 2024-02-29 RX ORDER — SODIUM CHLORIDE 9 MG/ML
INJECTION, SOLUTION INTRAVENOUS PRN
Status: DISCONTINUED | OUTPATIENT
Start: 2024-02-29 | End: 2024-03-14 | Stop reason: HOSPADM

## 2024-02-29 RX ORDER — ACETAMINOPHEN 650 MG/1
650 SUPPOSITORY RECTAL EVERY 6 HOURS PRN
Status: DISCONTINUED | OUTPATIENT
Start: 2024-02-29 | End: 2024-03-14 | Stop reason: HOSPADM

## 2024-02-29 RX ORDER — HEPARIN SODIUM 1000 [USP'U]/ML
60 INJECTION, SOLUTION INTRAVENOUS; SUBCUTANEOUS PRN
Status: DISCONTINUED | OUTPATIENT
Start: 2024-02-29 | End: 2024-02-29

## 2024-02-29 RX ADMIN — ENOXAPARIN SODIUM 30 MG: 100 INJECTION SUBCUTANEOUS at 21:08

## 2024-02-29 RX ADMIN — TOPICAL ANESTHETIC: 200 SPRAY DENTAL; PERIODONTAL at 05:27

## 2024-02-29 RX ADMIN — POTASSIUM CHLORIDE 10 MEQ: 7.46 INJECTION, SOLUTION INTRAVENOUS at 04:28

## 2024-02-29 RX ADMIN — Medication 100 MCG: at 13:46

## 2024-02-29 RX ADMIN — Medication 100 MCG: at 13:43

## 2024-02-29 RX ADMIN — Medication 10 ML: at 21:09

## 2024-02-29 RX ADMIN — PROPOFOL 20 MG: 10 INJECTION, EMULSION INTRAVENOUS at 13:28

## 2024-02-29 RX ADMIN — PERFLUTREN 1.5 ML: 6.52 INJECTION, SUSPENSION INTRAVENOUS at 09:34

## 2024-02-29 RX ADMIN — DOXYCYCLINE 100 MG: 100 INJECTION, POWDER, LYOPHILIZED, FOR SOLUTION INTRAVENOUS at 17:15

## 2024-02-29 RX ADMIN — Medication 100 MCG: at 13:33

## 2024-02-29 RX ADMIN — POTASSIUM CHLORIDE 10 MEQ: 7.46 INJECTION, SOLUTION INTRAVENOUS at 02:27

## 2024-02-29 RX ADMIN — PROPOFOL 120 MCG/KG/MIN: 10 INJECTION, EMULSION INTRAVENOUS at 13:29

## 2024-02-29 RX ADMIN — LIDOCAINE HYDROCHLORIDE 50 MG: 20 INJECTION, SOLUTION EPIDURAL; INFILTRATION; INTRACAUDAL; PERINEURAL at 13:28

## 2024-02-29 RX ADMIN — CEFEPIME 2000 MG: 2 INJECTION, POWDER, FOR SOLUTION INTRAVENOUS at 03:53

## 2024-02-29 RX ADMIN — AMIODARONE HYDROCHLORIDE 0.5 MG/MIN: 50 INJECTION, SOLUTION INTRAVENOUS at 21:22

## 2024-02-29 RX ADMIN — CEFEPIME 2000 MG: 2 INJECTION, POWDER, FOR SOLUTION INTRAVENOUS at 23:12

## 2024-02-29 RX ADMIN — IOPAMIDOL 75 ML: 755 INJECTION, SOLUTION INTRAVENOUS at 01:07

## 2024-02-29 RX ADMIN — MIDAZOLAM 2 MG: 1 INJECTION INTRAMUSCULAR; INTRAVENOUS at 04:59

## 2024-02-29 RX ADMIN — ESMOLOL HYDROCHLORIDE 50 MCG/KG/MIN: 10 INJECTION INTRAVENOUS at 00:03

## 2024-02-29 RX ADMIN — SODIUM CHLORIDE 500 ML: 9 INJECTION, SOLUTION INTRAVENOUS at 06:17

## 2024-02-29 RX ADMIN — SENNOSIDES AND DOCUSATE SODIUM 1 TABLET: 50; 8.6 TABLET ORAL at 21:08

## 2024-02-29 RX ADMIN — GABAPENTIN 600 MG: 300 CAPSULE ORAL at 21:08

## 2024-02-29 RX ADMIN — SODIUM CHLORIDE, PRESERVATIVE FREE 10 ML: 5 INJECTION INTRAVENOUS at 21:10

## 2024-02-29 RX ADMIN — POTASSIUM CHLORIDE 10 MEQ: 7.46 INJECTION, SOLUTION INTRAVENOUS at 01:25

## 2024-02-29 RX ADMIN — POTASSIUM CHLORIDE 10 MEQ: 7.46 INJECTION, SOLUTION INTRAVENOUS at 03:50

## 2024-02-29 RX ADMIN — VANCOMYCIN 2000 MG: 2 INJECTION, SOLUTION INTRAVENOUS at 00:33

## 2024-02-29 RX ADMIN — ZOLPIDEM TARTRATE 10 MG: 5 TABLET ORAL at 21:12

## 2024-02-29 RX ADMIN — SODIUM CHLORIDE 250 ML: 9 INJECTION, SOLUTION INTRAVENOUS at 01:18

## 2024-02-29 RX ADMIN — SODIUM CHLORIDE, PRESERVATIVE FREE 10 ML: 5 INJECTION INTRAVENOUS at 08:26

## 2024-02-29 RX ADMIN — Medication 100 MCG: at 13:40

## 2024-02-29 RX ADMIN — MIDAZOLAM 1 MG: 1 INJECTION INTRAMUSCULAR; INTRAVENOUS at 04:25

## 2024-02-29 RX ADMIN — AMIODARONE HYDROCHLORIDE 0.5 MG/MIN: 50 INJECTION, SOLUTION INTRAVENOUS at 06:01

## 2024-02-29 RX ADMIN — DOXYCYCLINE 100 MG: 100 INJECTION, POWDER, LYOPHILIZED, FOR SOLUTION INTRAVENOUS at 06:49

## 2024-02-29 RX ADMIN — CEFEPIME 2000 MG: 2 INJECTION, POWDER, FOR SOLUTION INTRAVENOUS at 14:43

## 2024-02-29 RX ADMIN — LACTULOSE: 10 SOLUTION ORAL at 06:28

## 2024-02-29 RX ADMIN — FAMOTIDINE 20 MG: 20 TABLET, FILM COATED ORAL at 21:08

## 2024-02-29 ASSESSMENT — LIFESTYLE VARIABLES: SMOKING_STATUS: 0

## 2024-02-29 ASSESSMENT — PAIN DESCRIPTION - PAIN TYPE: TYPE: CHRONIC PAIN

## 2024-02-29 ASSESSMENT — PAIN SCALES - GENERAL
PAINLEVEL_OUTOF10: 8
PAINLEVEL_OUTOF10: 0

## 2024-02-29 ASSESSMENT — PAIN - FUNCTIONAL ASSESSMENT: PAIN_FUNCTIONAL_ASSESSMENT: PREVENTS OR INTERFERES WITH ALL ACTIVE AND SOME PASSIVE ACTIVITIES

## 2024-02-29 ASSESSMENT — PAIN DESCRIPTION - ONSET: ONSET: ON-GOING

## 2024-02-29 ASSESSMENT — PAIN DESCRIPTION - ORIENTATION: ORIENTATION: RIGHT

## 2024-02-29 ASSESSMENT — PAIN DESCRIPTION - LOCATION: LOCATION: BACK;SHOULDER

## 2024-02-29 ASSESSMENT — PAIN DESCRIPTION - DESCRIPTORS: DESCRIPTORS: ACHING;SORE;TENDER

## 2024-02-29 NOTE — PROGRESS NOTES
Call returned by patient's wife Lizzie, she was updated by this writer on the current plan of care including possibility for intubation.    Will call Lizzie back with update as plan of care evolves.    Isela Wise RN

## 2024-02-29 NOTE — PROGRESS NOTES
Pharmacy Medication Reconciliation Note     List of medications Juan Mohamud is currently taking is complete.    Source of information:   1. [facility] medication list    Shane Leo, Pharmacy Intern  2/28/2024  10:45 PM

## 2024-02-29 NOTE — OP NOTE
Colonoscopy Procedure Note      Patient: Juan Mohamud  : 1940  Acct#:     Procedure: Colonoscopy with rectal tube placement    Date:  2024    Surgeon:  Eitan Pino MD    Referring Physician:  Janet Garcia MD    Preoperative Diagnosis:   83 y.o. male  who presents for colonoscopy due to colonic distention with dilated cecum needing decompressive colonoscopy with rectal tube placement.     Postoperative Diagnosis:    Dilated unprepped colon  Mild superficial mucosal erythema and erosions in the right colon  Successful placement of a decompressive rectal tube into the right colon/cecum    Consent:  The patient or their legal guardian has signed a consent, and is aware of the potential risks, benefits, alternatives, and potential complications of this procedure.  These include, but are not limited to hemorrhage, bleeding, post procedural pain, perforation, phlebitis, aspiration, hypotension, hypoxia, cardiovascular events such as arryhthmia, and possibly death.  Additionally, the possibility of missed colonic polyps and interval colon cancer was discussed in the consent.    Anesthesia:  MAC called.    Procedure:   An informed consent was obtained from the patient after explanation of indications, benefits, possible risks and complications of the procedure.  The patient was then taken to the endoscopy suite, placed in the left lateral decubitus position, and the above IV anesthesia was administered.    A digital rectal examination was performed and revealed negative without mass, lesions or tenderness.      The Olympus video colonoscope was placed in the patient's rectum under digital direction and advanced to the cecum. The cecum was identified by characteristic anatomy and ballottment. The ileocecal valve was identified.     The preparation was fair-to-poor despite no bowel preparation.  Extensive lavage and suction of air and semiliquid stool was performed throughout the  colon.    The terminal ileum was not inspected.    Examination of the mucosa on insertion demonstrated patchy erythema and mucosal erosions in the right colon, without evidence of perforation or ischemia.  A guidewire was then advanced through the scope and advanced into the right colon as the scope was withdrawn. The scope was then withdrawn into the rectum.  The rectal tube was advanced over the guidewire and secured in place.    The patient tolerated the procedure well and was taken to the PACU in good condition.  The patient's abdomen was notably less distended following colonoscopy with decompression along with rectal tube insertion.    Estimated blood loss: None    Impression:  See post-procedure diagnoses.    Recommendations:    - Return to the ICU for ongoing care  - Resume regular medications.  - Resume diet as tolerated.  - Recommend rectal tube to gravity, with flushing of 50 mL of saline every 4 hours.    Eitan Pino MD  [facility]  2/29/2024  [phone]

## 2024-02-29 NOTE — CONSULTS
GASTROENTEROLOGY INPATIENT CONSULTATION        IDENTIFYING DATA/REASON FOR CONSULTATION   PATIENT:  Juan Mohamud  MRN:  [id number]  ADMIT DATE: 2/28/2024  TIME OF EVALUATION: 2/29/2024 9:19 AM  HOSPITAL STAY:   LOS: 0 days     REASON FOR CONSULTATION:  colonic distention     HISTORY OF PRESENT ILLNESS   Juan Mohamud is a 83 y.o. male with a PMH of DM, HLD, DLD, chronic constipation who presented 2/28/2024 with abdominal pain and distention.  Patient had a recent fall down steps resulting in multiple rib fractures, clavicular fracture and head contusion.  He was evaluated at  and sent to [facility] for rehab.  He was admitted here to [facility] over the weekend with abdominal pain and constipation.  CT scan showed ileus versus enteritis, constipation, no obstruction.  e was treated with was treated with smog enema, Relistor x 2 doses.  He was able to pass s BM on Sunday and Tuesday and was discharged back to the nursing home yesterday on 2/27/2024.  He returns back to [facility] with shortness of breath.  In ED he was found to be tachycardic with rates of 250 bpm and hypotensive with BP of 70/40.  He was cardioverted and started on IV amiodarone.  CTA chest was neg for PE.  CT a/p showed progressive colonic dilatation with cecum measuring 14 cm.     Rectal tube was place.   Pt was admitted to ICU.    KUB this morning shows persistent diffuse colonic ileus without evidence of free air.    Pt having liquid brown stool output from rectal tube  No fevers overnight  Abdomen distended, tympanic to percussion    Pt has a history of chronic constipation and takes Linzess daily.      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

## 2024-02-29 NOTE — ED NOTES
2229 6 mg Etomidate given now.   2229 Pulse 250  2230 cleared and shocked at 200  2230 rhythm conversion, rate now 130's, Dr Kirk requesting Marti tapia

## 2024-02-29 NOTE — PROGRESS NOTES
Patient to ICU fron ENDO at this time. Upon transfer from stretcher to bed patient oral airway removed, patient alert and responding appropriately. A&O x4.

## 2024-02-29 NOTE — PROGRESS NOTES
4 Eyes Skin Assessment     NAME:  Juan Mohamud  YOB: 1940  MEDICAL RECORD NUMBER:  [id number]    The patient is being assessed for  Shift Handoff    I agree that at least one RN has performed a thorough Head to Toe Skin Assessment on the patient. ALL assessment sites listed below have been assessed.      Areas assessed by both nurses:    Head, Face, Ears, Shoulders, Back, Chest, Arms, Elbows, Hands, Sacrum. Buttock, Coccyx, Ischium, Legs. Feet and Heels, and Under Medical Devices         Does the Patient have a Wound? Multiple skin tears sacral DTI?       Piero Prevention initiated by RN: Yes  Wound Care Orders initiated by RN: Yes    Pressure Injury (Stage 3,4, Unstageable, DTI, NWPT, and Complex wounds) if present, place Wound referral order by RN under : No    New Ostomies, if present place, Ostomy referral order under : No     Nurse 1 eSignature: Electronically signed by Nannette Beltrán RN on 2/29/24 at 5:35 PM EST    **SHARE this note so that the co-signing nurse can place an eSignature**    Nurse 2 eSignature: Electronically signed by Faiza Wilkinson RN on 3/1/24 at 1:06 AM EST

## 2024-02-29 NOTE — PROGRESS NOTES
Patient admitted early morning by night team with complaints of palpitation and shortness of breath.  Found to be in SVT with hemodynamic decompensation requiring synchronized cardioversion-patient unfortunately went into V. tach hemodynamically stable and Amio load followed by amnio drip was started.  Patient was found to have significant abdominal distention, with history of constipation.  CT abdomen pelvis with IV contrast showed progressive colonic dilatation compared to 2/23-sigmoid colon has tended appearance, liquid stool is present throughout the colon; markedly distended cecum up to 14 cm.  Unable to place NG tube overnight for decompression but patient has not had any significant nausea or vomiting-holding off on NG tube.  GI team on board-plan for decompressive colonoscopy today.  General surgery team also on board for any surgical intervention required.  Lactic acid 2.3 at presentation and decreased to 1.9-no current concern for bowel ischemia.  Critical care team on board.    Electronically signed by Anam Cho MD on 2/29/24 at 1:15 PM EST

## 2024-02-29 NOTE — ED NOTES
Report called to JULIOCESAR Deshpande for Rm [id number]. Questions addresssed and report accepted. Transport to be arranged as soon as available.

## 2024-02-29 NOTE — H&P
Hospitalist History and Physical      PCP: Janet Garcia MD    Date of Admission: 2/28/2024     Anticipated Discharge Day/Date - 3/1/24    Anticipated Discharge Location:  []Home  []HHC  [x]SNF  []Acute Rehab  []Hospice    Assessment/Plan:      Arrhythmia    New onset arrhythmia, troponin elevation .  Unstable/hypotensive 70s-40s SVT status post synchronized cardioversion.  Converted to ST, subsequently VT.  Amnio load followed by amnio drip.  Return to arrhythmia, esmolol drip started in the ED.  Patient became hypotensive.  Cardiology in consult.  History of hypertension, hyperlipidemia.  LLL pneumonia, hyponatremia 128, lactic acidosis 2.3-1.9.  Likely component of dehydration, gentle judicious further IV fluids.  See that his BNP 2500. Procalcitonin elevated 0.49.  Has leukocytosis 13 K but no shift.  X-ray with LLL pneumonia versus atelectasis.  Continue DDAVP from outpatient orders.  COVID test negative in the ED, add on influenza labs.  Empiric IV cefepime/azithromycin and follow blood cultures.  Colonic obstruction.  CT PE/abdomen/pelvis without PE.  Small right pleural effusion, dependent atelectasis bibasilarly, right greater than left.  Progressive colonic dilation since 2/23 without focal transition point.  Likely partial obstruction at sigmoid colon, liquid stool throughout colon to the level of rectum.  Cecum markedly distended up to 14 cm.  Difficulty with NGT placement in the ED and multiple times on the floor.  Have given sedation for placement, rectal bowel regimen..  Recent trauma from fall.  See that patient was admitted 2/19 - 2/22/2024.  Fell down 6 stairs, was transferred to [facility] with right sided rib fractures 9-12, small right hemothorax, right clavicular fracture, left elbow effusion, right chronic patellar fracture, chronic T11 fracture.  He is initially admitted to [facility] but described new back pain on 2/20.  Was discharged to [facility].  Return to  shortness of breath. FINDINGS: The mediastinum is unremarkable. The cardiac silhouette is normal size. Subsegmental atelectasis in the left lung base.     Subsegmental atelectasis in the left lung base.     XR ABDOMEN (KUB) (SINGLE AP VIEW)    Result Date: 2/27/2024  EXAMINATION: ONE SUPINE XRAY VIEW(S) OF THE ABDOMEN 2/27/2024 10:04 am COMPARISON: None. HISTORY: ORDERING SYSTEM PROVIDED HISTORY: worsening abdominal distention.  eval cecal diameter TECHNOLOGIST PROVIDED HISTORY: Reason for exam:->worsening abdominal distention.  eval cecal diameter Reason for Exam: worsening abdominal distention. eval cecal diameter FINDINGS: Moderate to severe gaseous distention of the colon is again demonstrated with cecal diameter now approaching 15 cm.  There is no organomegaly or suspicious calcification.  Fixation hardware spans the lumbosacral region.     Progressive colonic distension.     Electronically signed by Roya Miller DO on 2/29/2024 at 4:33 AM

## 2024-02-29 NOTE — ED NOTES
2306 150 mg amiodarone pushed per verbal order Dr Kirk    New verbal order 2 mg Magnesium to be pulled.

## 2024-02-29 NOTE — ED NOTES
Pt cleaned of loose stool. Pt bedding found to be soaked with urine. Pt bedding changed, bed cleaned.

## 2024-02-29 NOTE — ANESTHESIA POSTPROCEDURE EVALUATION
Department of Anesthesiology  Postprocedure Note    Patient: Juan Mohamud  MRN: [id number]  YOB: 1940  Date of evaluation: 2/29/2024    Procedure Summary       Date: 02/29/24 Room / Location: [address]    Anesthesia Start: 1320 Anesthesia Stop: 1407    Procedure: COLONOSCOPY FLEXIBLE DECOMPRESSION Diagnosis:       Colon distention      (Colon distention [K63.89])    Surgeons: Eitan Pino MD Responsible Provider: Jeramy Roque MD    Anesthesia Type: MAC ASA Status: 3            Anesthesia Type: No value filed.    Caitlyn Phase I:      Caitlyn Phase II:      Anesthesia Post Evaluation    Patient location during evaluation: bedside  Patient participation: complete - patient participated  Level of consciousness: awake and alert  Pain score: 0  Nausea & Vomiting: no nausea  Cardiovascular status: hemodynamically stable  Respiratory status: acceptable  Hydration status: stable  Pain management: adequate    No notable events documented.

## 2024-02-29 NOTE — ED NOTES
Verbal order, Dr Kirk for 6 mg Etomidate. Being pulled up now from RSI kit on over ride. [id number]

## 2024-02-29 NOTE — PLAN OF CARE
Problem: Safety - Adult  Goal: Free from fall injury  Outcome: Progressing     Problem: Discharge Planning  Goal: Discharge to home or other facility with appropriate resources  Outcome: Progressing     Problem: Chronic Conditions and Co-morbidities  Goal: Patient's chronic conditions and co-morbidity symptoms are monitored and maintained or improved  Outcome: Progressing     Problem: Skin/Tissue Integrity  Goal: Absence of new skin breakdown  Description: 1.  Monitor for areas of redness and/or skin breakdown  2.  Assess vascular access sites hourly  3.  Every 4-6 hours minimum:  Change oxygen saturation probe site  4.  Every 4-6 hours:  If on nasal continuous positive airway pressure, respiratory therapy assess nares and determine need for appliance change or resting period.  Outcome: Progressing     Problem: ABCDS Injury Assessment  Goal: Absence of physical injury  Outcome: Progressing
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
## 2024-02-29 NOTE — PROGRESS NOTES
Clinical Pharmacy Note  Vancomycin Consult    Pharmacy consult received for one-time dose of vancomycin in the Emergency Department per Dr. Kirk.    Ht Readings from Last 1 Encounters:   02/24/24 1.93 m (6' 4\")        Wt Readings from Last 1 Encounters:   02/27/24 107.4 kg (236 lb 12.4 oz)         Assessment/Plan:  Vancomycin 2000 x 1 in ED.  If vancomycin is to continue on admission and pharmacy is to manage dosing, please re-consult with admission orders.    Enedina Bullard, DonnellD

## 2024-02-29 NOTE — CONSULTS
Cardiac Electrophysiology Consultation   Date: 2/29/2024  Admit Date:  2/28/2024  Reason for Consultation: VT  Consult Requesting Physician: Anam Cho MD     Chief Complaint   Patient presents with    Shortness of Breath     Sudden onset shortness of breath, from nursing home via EMS     HPI: Juan Mohamud is a 83 y.o. with hx noted below who presented to the hospital with SOB. He tells me that at the [facility], he had increased SOB while laying down where he felt that he could not take in a deep breath.     He informed the staff but nothing was done per the patient. Due to symptoms lasting hours, he called 911 from his room and was brought to the ED for further evaluation.     Pre-arrival notification from [facility] reported tachydysrhythmia. EMS did not administer any medications d/t lack of IV access. EKG that was transmitted showed WCT.    EKG showed WCT at 250 bpm with hemodynamic compromise, BP 70/40 for which he received synchronized cardioversion to sinus tachycardia 130s. He went back to VT and was given amiodarone bolus x2 and GTT. Kept having salvos of VT for which on call cardiology was consulted and recommend starting esmolol (stopped d/t hypotension).     Eventually VT storm quieted down on amiodarone.     Mag and K were empirically given. Labs showed both normal.     He also had significant abd distension concerning for illeus s/p decompression.     SOB feeling resolved when back in NSR.     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 TCL 240ms with hemodynamic compromise requiring cardioversion.   From hx, he was in VT storm for hours until he called paramedics and was found in VT. After cardioversion went back in it but not sustained. Over the last 24 hours, no recurrence on amiodarone drip.   Still has PVCs on telemetry but no sustained on NSVT.   Echo shows normal LVEF, electrolytes normal.   Ischemic workup with Cor angiogram to rule out ischemic cause.   Continue amiodarone  If he has CAD requiring stenting, then amiodarone and outpatient follow up. If no ischemia or has a small vessel that cannot be revascularized covering the inferolateral septum, then ICD for secondary prevention is indicated. Telemetry shows multifocal PVCs but VT was monomorphic speaking more of a scar. No VF. ECG shows possible old inferior infarct and cannot rule out anterior infarct age in determinant.   I spoke to him and his wife about a high likelihood of getting ICD for secondary prevention of SCD and are agreeable pending [facility] tomorrow. ICD would likely be early next week unless has pending discharge then can be done tomorrow.  BOSTON  Hyponatremia  Colonic obstruction  Per primary team    Thank you for allowing me to participate in the care of Juan Mohamud . If you have any questions/comments, please do not hesitate to contact us.    Electronically signed by Ricardo Astorga MD on 2/29/2024 at 4:00 PM  Cardiac Electrophysiology  [facility]
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
## 2024-02-29 NOTE — PROGRESS NOTES
Clinical Pharmacy Note  Subcutaneous Anticoagulant Adjustment     Enoxaparin has been adjusted to 30mg SQ BID based on [facility] policy.    Recent Labs     02/28/24 2244 02/29/24  0747   CREATININE 1.2 1.0     Recent Labs     02/28/24 2244 02/29/24  0747   HGB 11.9* 10.5*   HCT 35.4* 30.6*    287   INR 1.19*  --      Estimated Creatinine Clearance: 77 mL/min (based on SCr of 1 mg/dL).    Pharmacist Review of Appropriate Use and Automatic Dose Adjustment of Subcutaneous Anticoagulants (Adult)    The guidance below is to provide initial recommendations for dosing. If recommended dose does not align well with patient's current clinical picture, communications with the care team will occur to determine most appropriate medication and dose.    TABLE 1.  ENOXAPARIN ROUTINE PROPHYLAXIS DOSING (Medically ill, routine surgery)   Patient Weight (kg)     50.9 and below 51 - 100.9 101 - 150.9 151 - 174.9 175 or greater         Estimated CrCl  (ml/min) 30 or greater   30 mg SUBQ daily   40 mg SUBQ daily 30 mg SUBQ BID  40 mg SUBQ BID 60mg SUBQ BID      15-29 UFH 5000 units SUBQ BID   30 mg SUBQ daily 30 mg SUBQ daily 40 mg SUBQ daily   60 mg SUBQ daily      Less than 15 or Dialysis UFH 5000 units SUBQ BID   UFH 5000 units SUBQ TID UFH 7500 units SUBQ TID       TABLE 2.  ENOXAPARIN TREATMENT DOSING   (Based on 1mg/kg BID for DVT/PE/AFib)   Patient Weight (kg)     50.9 and below .9 151-189.9 190 or greater         Estimated CrCl  (ml/min) 30 or greater Recommend [facility] standardized UFH infusion, apixaban or rivaroxaban 1mg/kg SUBQ BID 1mg/kg SUBQ BID if anti-Xa levels are feasible per institution.  Alternatively,  recommend switch to BS standardized UFH infusion     Recommend switch to [facility] standardized UFH infusion.      15-29 Recommend [facility] standardized UFH infusion or apixaban 1mg/kg SUBQ daily Recommend switch to [facility] standardized UFH infusion     Less than 15 or Dialysis Recommend switch to [facility] standardized UFH  infusion.     Kaye Fuller, [facility] 2/29/2024 1:42 PM

## 2024-02-29 NOTE — PROGRESS NOTES
Clinical Pharmacy Note  Heparin Dosing Consult    Juan Mohamud is a 83 y.o. male ordered heparin per CAD/STEMI/NSTEMI/UA/AFIB nomogram by Dr. Miller.     Lab Results   Component Value Date/Time    APTT 35.1 02/28/2024 10:44 PM     Lab Results   Component Value Date/Time    HGB 11.9 02/28/2024 10:44 PM    HCT 35.4 02/28/2024 10:44 PM     02/28/2024 10:44 PM    INR 1.19 02/28/2024 10:44 PM       Ht Readings from Last 1 Encounters:   02/24/24 1.93 m (6' 4\")        Wt Readings from Last 1 Encounters:   02/27/24 107.4 kg (236 lb 12.4 oz)        Assessment/Plan:  Initial bolus: 4000 units  Initial infusion rate: 1000 units/hr  Next anti-Xa:: 1200 02/29/24    Pharmacy will continue to monitor adjust heparin based on anti-Xa results using nomogram below:     CAD/STEMI/NSTEMI/UA/AFIB Heparin Nomogram     Initial Bolus: 60 units/kg Max Bolus: 4,000 units       Initial Rate: 12 units/kg/hr Max Initial Rate: 1,000 units/hr     anti-Xa Bolus Titration   < 0.1 Heparin 60 units/kg bolus Increase drip by 4 units/kg/hr   0.1 - 0.29 Heparin 30 units/kg bolus Increase drip by 2 units/kg/hr   0.3 - 0.7 No Bolus No Change   0.71 - 0.8 No Bolus Decrease drip by 1 units/kg/hr   0.81 - 0.99 No Bolus Decrease drip by 2 units/kg/hr   > 1 Hold Heparin for 1 hour Decrease drip by 3 units/kg/hr     Obtain anti-Xa 6 hours after initial bolus and 6 hours after any dose change until two consecutive therapeutic anti-Xa levels are achieved - then daily.     Enedina Bullard, DonnellD

## 2024-02-29 NOTE — PROGRESS NOTES
Multiple attempts at NGT in ED and on critical care unit. Including attempts with increasing benzo sedation. Due to arrhythmia and other contraindications, sedation meds are limited.    Discussed with nursing the possibility of needing ETT to facilitate NGT for upper GI decompression in the setting of colonic obstruction. Request to consider conscious sedation. Have ordered KUB given his abdomen appears to be twice as distended as earlier and having significant pain, at high risk for perforation.    Called surgery for second opinion and Dr Villar is on his way in and make determination for further plan.    Electronically signed by Roya Miller DO on 2/29/2024 at 6:22 AM

## 2024-02-29 NOTE — CONSULTS
REASON FOR CONSULTATION/CC: Abdominal pain      Consult at request of Anam Cho MD for     PCP: Janet Garcia MD  Established Pulmonologist:  None    HISTORY OF PRESENT ILLNESS: Juan Mohamud is a 83 y.o. year old male with a history of  who presents with       Patient was admitted on February 19 and then on February 23.  February 8, 2019 patient was admitted after a fall, mechanical.  Patient had right rib fracture T-spine fracture hemothorax and right clavicular fracture.  He was transferred to [facility]    This note may have been  transcribed using Dragon Dictation software. Please disregard any translational errors.      Assessment:       Clavicular fracture and wrist fracture status post fall  Chronic constipation  Right pleural effusion  Right lower lobe pulmonary nodule  Diabetes mellitus  History of skin cancer    Plan:      Hospital Day 0     Abdominal distention  While the family initially denied this, the patient has chronic issues with constipation taking Linzess and other medications.  After the patient's fall he noted abdominal bloating.  It is unclear whether this started at [facility] or .  Either way, family is frustrated that this was not addressed.  Defer to GI about suspecting chronic constipation with opioid-induced constipation.  Decompression colonoscopy planned for today      Right pleural effusion with atelectasis  Likely secondary to fall and small hematoma along with compression from distended abdomen.  No intervention at this time.  Incentive spirometry  Continues to have pain with breathing.       Pulmonary nodule right lower lobe  Pulmonary nodule right lower lobe likely secondary to enlarged lymph node.  Recommend follow-up in approximately 2 months.  Discussed with family.      Hyponatremia  Mild chronic hyponatremia dating back to at least 2021 with sodium of 133.  Therefore, this is highly unlikely to be secondary to pulmonary nodule.      Arrhythmia  Improved with    ALKPHOS 120     PT/INR:   Recent Labs     02/28/24 2244   PROTIME 15.1*   INR 1.19*     APTT:   Recent Labs     02/28/24 2244 02/29/24  0747   APTT 35.1 37.3*     UA:No results for input(s): \"NITRITE\", \"COLORU\", \"PHUR\", \"LABCAST\", \"WBCUA\", \"RBCUA\", \"MUCUS\", \"TRICHOMONAS\", \"YEAST\", \"BACTERIA\", \"CLARITYU\", \"SPECGRAV\", \"LEUKOCYTESUR\", \"UROBILINOGEN\", \"BILIRUBINUR\", \"BLOODU\", \"GLUCOSEU\", \"AMORPHOUS\" in the last 72 hours.    Invalid input(s): \"KETONESU\"  No results for input(s): \"PHART\", \"RAF4NZP\", \"PO2ART\" in the last 72 hours.         Radiology Review:  Pertinent images / reports were reviewed as a part of this visit.    CT Chest w/ contrast: No results found for this or any previous visit.      CT Chest w/o contrast: No results found for this or any previous visit.      CTPA: Results for orders placed during the hospital encounter of 02/28/24    CT CHEST PULMONARY EMBOLISM W CONTRAST    Narrative  EXAMINATION:  CTA OF THE CHEST; CT OF THE ABDOMEN AND PELVIS WITH CONTRAST 2/29/2024 1:07  am; 2/29/2024 1:08 am    TECHNIQUE:  CTA of the chest was performed after the administration of intravenous  contrast.  Multiplanar reformatted images are provided for review.  MIP  images are provided for review. Automated exposure control, iterative  reconstruction, and/or weight based adjustment of the mA/kV was utilized to  reduce the radiation dose to as low as reasonably achievable.; CT of the  abdomen and pelvis was performed with the administration of intravenous  contrast. Multiplanar reformatted images are provided for review. Automated  exposure control, iterative reconstruction, and/or weight based adjustment of  the mA/kV was utilized to reduce the radiation dose to as low as reasonably  achievable.    COMPARISON:  CT imaging 02/19/2024.  Abdominal CT 02/23/2024 and abdominal radiograph  02/27/2024.    HISTORY:  ORDERING SYSTEM PROVIDED HISTORY: SOB, dysrhythmia, r/o PE  TECHNOLOGIST PROVIDED HISTORY:  Reason for  Sarecycline Counseling: Patient advised regarding possible photosensitivity and discoloration of the teeth, skin, lips, tongue and gums.  Patient instructed to avoid sunlight, if possible.  When exposed to sunlight, patients should wear protective clothing, sunglasses, and sunscreen.  The patient was instructed to call the office immediately if the following severe adverse effects occur:  hearing changes, easy bruising/bleeding, severe headache, or vision changes.  The patient verbalized understanding of the proper use and possible adverse effects of sarecycline.  All of the patient's questions and concerns were addressed.

## 2024-02-29 NOTE — CARE COORDINATION
02/29/24 1735   Readmission Assessment   Number of Days since last admission? 1-7 days   Previous Disposition SNF  ([facility])   What was the patient's/caregiver's perception as to why they think they needed to return back to the hospital? Other (Comment)  (Called 911 himself due to being SOB and no one would help him.)   Did you visit your Primary Care Physician after you left the hospital, before you returned this time? No   Why weren't you able to visit your PCP? Did not have an appointment   Did you see a specialist, such as Cardiac, Pulmonary, Orthopedic Physician, etc. after you left the hospital? No   Who advised the patient to return to the hospital? Self-referral   Does the patient report anything that got in the way of taking their medications? No   In our efforts to provide the best possible care to you and others like you, can you think of anything that we could have done to help you after you left the hospital the first time, so that you might not have needed to return so soon? Other (Comment)  (Nothing)     Marisol LAGUERRE RN  Case Management  [phone]    Electronically signed by Marisol Moody RN on 2/29/2024 at 5:42 PM

## 2024-02-29 NOTE — PROGRESS NOTES
Patient came up to ICU after 0300. Pt had several orders from fluids to tubes that needed to be clarified. Hospitalist wanted an NG for distention. Unable to get NG after multiple attempts and different drugs to help with insertion due to very strong patient coughing on any attempt. Surgery to review next care of plan on arrival. Enema was given, patient wasn't able to relieve his enema back out and a flexiseal was inserted. Heparin was held per hospitalist due to possible procedures. Azithromycin was changed due to allergy concerns.
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
## 2024-02-29 NOTE — PROGRESS NOTES
Updated patient's wife, Lizzie, on plan of care and consult placed to General Surgery, she was encouraged to come to the hospital this morning to be present to speak with physicians.  Per Lizzie, she will be on her way to the hospital shortly.  Her cell phone number if needed is [phone].    Isela Wise RN
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
## 2024-02-29 NOTE — PROGRESS NOTES
Attempted to call pt's wife, Lizzie on both numbers listed in chart   [phone] and [phone]. No contact was made.    Call made to Lito Mohamud at [phone]. No answer.   [phone]

## 2024-02-29 NOTE — CARE COORDINATION
Case Management Assessment  Initial Evaluation    Date/Time of Evaluation: 2/29/2024 5:49 PM  Assessment Completed by: Marisol Moody RN    If patient is discharged prior to next notation, then this note serves as note for discharge by case management.    Patient Name: Juan Mohamud                   YOB: 1940  Diagnosis: Arrhythmia [I49.9]  Hyponatremia [E87.1]  Wide-complex tachycardia [R00.0]  Elevated troponin [R79.89]  BOSTON (acute kidney injury) (HCC) [N17.9]  Acute hypoxemic respiratory failure (HCC) [J96.01]                   Date / Time: 2/28/2024 10:18 PM    Patient Admission Status: Inpatient   Readmission Risk (Low < 19, Mod (19-27), High > 27): Readmission Risk Score: 20.5    Current PCP: Janet Garcia MD  PCP verified by CM? Yes    Chart Reviewed: Yes      History Provided by: Patient, Spouse  Patient Orientation: Alert and Oriented    Patient Cognition: Alert    Hospitalization in the last 30 days (Readmission):  Yes    If yes, Readmission Assessment in CM Navigator will be completed.    Advance Directives:      Code Status: Full Code   Patient's Primary Decision Maker is: Legal Next of Kin    Primary Decision Maker: Lizzie Mohamud - Spouse - [phone]    Discharge Planning:    Patient lives with: Spouse/Significant Other Type of Home: Other (Comment) (TBD)  Primary Care Giver: Self  Patient Support Systems include: Spouse/Significant Other   Current Financial resources: Medicare  Current community resources: None  Current services prior to admission: Skilled Nursing Facility            Current DME:              Type of Home Care services:  None    ADLS  Prior functional level: Other (see comment) (Was at [facility] prior to admission)  Current functional level: Other (see comment) (Will need evaluated by PT/OT)    PT AM-PAC:   /24  OT AM-PAC:   /24    Family can provide assistance at DC: No  Would you like Case Management to discuss the discharge plan with

## 2024-03-01 LAB
ANION GAP SERPL CALCULATED.3IONS-SCNC: 9 MMOL/L (ref 3–16)
BUN SERPL-MCNC: 19 MG/DL (ref 7–20)
CALCIUM SERPL-MCNC: 7.6 MG/DL (ref 8.3–10.6)
CHLORIDE SERPL-SCNC: 96 MMOL/L (ref 99–110)
CO2 SERPL-SCNC: 25 MMOL/L (ref 21–32)
CREAT SERPL-MCNC: 0.5 MG/DL (ref 0.8–1.3)
DEPRECATED RDW RBC AUTO: 14.8 % (ref 12.4–15.4)
GFR SERPLBLD CREATININE-BSD FMLA CKD-EPI: >60 ML/MIN/{1.73_M2}
GLUCOSE BLD-MCNC: 122 MG/DL (ref 70–99)
GLUCOSE BLD-MCNC: 140 MG/DL (ref 70–99)
GLUCOSE SERPL-MCNC: 125 MG/DL (ref 70–99)
HCT VFR BLD AUTO: 31.1 % (ref 40.5–52.5)
HGB BLD-MCNC: 10.8 G/DL (ref 13.5–17.5)
MAGNESIUM SERPL-MCNC: 1.9 MG/DL (ref 1.8–2.4)
MCH RBC QN AUTO: 31.7 PG (ref 26–34)
MCHC RBC AUTO-ENTMCNC: 34.8 G/DL (ref 31–36)
MCV RBC AUTO: 91 FL (ref 80–100)
PERFORMED ON: ABNORMAL
PERFORMED ON: ABNORMAL
PLATELET # BLD AUTO: 277 K/UL (ref 135–450)
PMV BLD AUTO: 7.6 FL (ref 5–10.5)
POTASSIUM SERPL-SCNC: 2.7 MMOL/L (ref 3.5–5.1)
POTASSIUM SERPL-SCNC: 3 MMOL/L (ref 3.5–5.1)
POTASSIUM SERPL-SCNC: 3.1 MMOL/L (ref 3.5–5.1)
PROCALCITONIN SERPL IA-MCNC: 0.43 NG/ML (ref 0–0.15)
RBC # BLD AUTO: 3.42 M/UL (ref 4.2–5.9)
REPORT: NORMAL
SODIUM SERPL-SCNC: 130 MMOL/L (ref 136–145)
WBC # BLD AUTO: 10.1 K/UL (ref 4–11)

## 2024-03-01 PROCEDURE — 6370000000 HC RX 637 (ALT 250 FOR IP): Performed by: INTERNAL MEDICINE

## 2024-03-01 PROCEDURE — 2580000003 HC RX 258: Performed by: INTERNAL MEDICINE

## 2024-03-01 PROCEDURE — 6360000002 HC RX W HCPCS

## 2024-03-01 PROCEDURE — 6360000004 HC RX CONTRAST MEDICATION: Performed by: INTERNAL MEDICINE

## 2024-03-01 PROCEDURE — 99152 MOD SED SAME PHYS/QHP 5/>YRS: CPT

## 2024-03-01 PROCEDURE — 84132 ASSAY OF SERUM POTASSIUM: CPT

## 2024-03-01 PROCEDURE — 93454 CORONARY ARTERY ANGIO S&I: CPT | Performed by: INTERNAL MEDICINE

## 2024-03-01 PROCEDURE — 6360000002 HC RX W HCPCS: Performed by: INTERNAL MEDICINE

## 2024-03-01 PROCEDURE — 2500000003 HC RX 250 WO HCPCS

## 2024-03-01 PROCEDURE — 36415 COLL VENOUS BLD VENIPUNCTURE: CPT

## 2024-03-01 PROCEDURE — 2580000003 HC RX 258: Performed by: ANESTHESIOLOGY

## 2024-03-01 PROCEDURE — 97530 THERAPEUTIC ACTIVITIES: CPT

## 2024-03-01 PROCEDURE — 99233 SBSQ HOSP IP/OBS HIGH 50: CPT | Performed by: INTERNAL MEDICINE

## 2024-03-01 PROCEDURE — C1894 INTRO/SHEATH, NON-LASER: HCPCS | Performed by: INTERNAL MEDICINE

## 2024-03-01 PROCEDURE — APPSS15 APP SPLIT SHARED TIME 0-15 MINUTES: Performed by: PHYSICIAN ASSISTANT

## 2024-03-01 PROCEDURE — 80048 BASIC METABOLIC PNL TOTAL CA: CPT

## 2024-03-01 PROCEDURE — 2060000000 HC ICU INTERMEDIATE R&B

## 2024-03-01 PROCEDURE — 85027 COMPLETE CBC AUTOMATED: CPT

## 2024-03-01 PROCEDURE — 83735 ASSAY OF MAGNESIUM: CPT

## 2024-03-01 PROCEDURE — APPNB15 APP NON BILLABLE TIME 0-15 MINS: Performed by: PHYSICIAN ASSISTANT

## 2024-03-01 PROCEDURE — 97535 SELF CARE MNGMENT TRAINING: CPT

## 2024-03-01 PROCEDURE — 97166 OT EVAL MOD COMPLEX 45 MIN: CPT

## 2024-03-01 PROCEDURE — C1769 GUIDE WIRE: HCPCS | Performed by: INTERNAL MEDICINE

## 2024-03-01 PROCEDURE — 2700000000 HC OXYGEN THERAPY PER DAY

## 2024-03-01 PROCEDURE — 94761 N-INVAS EAR/PLS OXIMETRY MLT: CPT

## 2024-03-01 PROCEDURE — 84145 PROCALCITONIN (PCT): CPT

## 2024-03-01 PROCEDURE — B2111ZZ FLUOROSCOPY OF MULTIPLE CORONARY ARTERIES USING LOW OSMOLAR CONTRAST: ICD-10-PCS | Performed by: INTERNAL MEDICINE

## 2024-03-01 PROCEDURE — 2500000003 HC RX 250 WO HCPCS: Performed by: INTERNAL MEDICINE

## 2024-03-01 PROCEDURE — 99152 MOD SED SAME PHYS/QHP 5/>YRS: CPT | Performed by: INTERNAL MEDICINE

## 2024-03-01 PROCEDURE — C1887 CATHETER, GUIDING: HCPCS | Performed by: INTERNAL MEDICINE

## 2024-03-01 PROCEDURE — 93454 CORONARY ARTERY ANGIO S&I: CPT

## 2024-03-01 PROCEDURE — C1760 CLOSURE DEV, VASC: HCPCS | Performed by: INTERNAL MEDICINE

## 2024-03-01 PROCEDURE — 2709999900 HC NON-CHARGEABLE SUPPLY: Performed by: INTERNAL MEDICINE

## 2024-03-01 PROCEDURE — 97162 PT EVAL MOD COMPLEX 30 MIN: CPT

## 2024-03-01 RX ORDER — SODIUM CHLORIDE 0.9 % (FLUSH) 0.9 %
5-40 SYRINGE (ML) INJECTION PRN
Status: DISCONTINUED | OUTPATIENT
Start: 2024-03-01 | End: 2024-03-08 | Stop reason: SDUPTHER

## 2024-03-01 RX ORDER — ACETAMINOPHEN 325 MG/1
650 TABLET ORAL EVERY 4 HOURS PRN
Status: DISCONTINUED | OUTPATIENT
Start: 2024-03-01 | End: 2024-03-08 | Stop reason: SDUPTHER

## 2024-03-01 RX ORDER — SODIUM CHLORIDE 9 MG/ML
INJECTION, SOLUTION INTRAVENOUS PRN
Status: DISCONTINUED | OUTPATIENT
Start: 2024-03-01 | End: 2024-03-08 | Stop reason: SDUPTHER

## 2024-03-01 RX ORDER — SODIUM CHLORIDE 0.9 % (FLUSH) 0.9 %
5-40 SYRINGE (ML) INJECTION EVERY 12 HOURS SCHEDULED
Status: DISCONTINUED | OUTPATIENT
Start: 2024-03-01 | End: 2024-03-08 | Stop reason: SDUPTHER

## 2024-03-01 RX ADMIN — SENNOSIDES AND DOCUSATE SODIUM 1 TABLET: 50; 8.6 TABLET ORAL at 20:19

## 2024-03-01 RX ADMIN — POTASSIUM CHLORIDE 10 MEQ: 7.46 INJECTION, SOLUTION INTRAVENOUS at 10:48

## 2024-03-01 RX ADMIN — CEFEPIME 2000 MG: 2 INJECTION, POWDER, FOR SOLUTION INTRAVENOUS at 22:35

## 2024-03-01 RX ADMIN — POTASSIUM CHLORIDE 10 MEQ: 7.46 INJECTION, SOLUTION INTRAVENOUS at 06:13

## 2024-03-01 RX ADMIN — Medication 10 ML: at 09:37

## 2024-03-01 RX ADMIN — POTASSIUM CHLORIDE 10 MEQ: 7.46 INJECTION, SOLUTION INTRAVENOUS at 07:31

## 2024-03-01 RX ADMIN — SODIUM CHLORIDE, PRESERVATIVE FREE 10 ML: 5 INJECTION INTRAVENOUS at 09:38

## 2024-03-01 RX ADMIN — ACETAMINOPHEN 650 MG: 325 TABLET ORAL at 15:15

## 2024-03-01 RX ADMIN — AMIODARONE HYDROCHLORIDE 0.5 MG/MIN: 50 INJECTION, SOLUTION INTRAVENOUS at 13:09

## 2024-03-01 RX ADMIN — DOXYCYCLINE 100 MG: 100 INJECTION, POWDER, LYOPHILIZED, FOR SOLUTION INTRAVENOUS at 04:47

## 2024-03-01 RX ADMIN — GABAPENTIN 600 MG: 300 CAPSULE ORAL at 20:18

## 2024-03-01 RX ADMIN — Medication 10 ML: at 20:31

## 2024-03-01 RX ADMIN — ENOXAPARIN SODIUM 30 MG: 100 INJECTION SUBCUTANEOUS at 20:19

## 2024-03-01 RX ADMIN — Medication 10 ML: at 20:32

## 2024-03-01 RX ADMIN — POTASSIUM BICARBONATE 40 MEQ: 782 TABLET, EFFERVESCENT ORAL at 19:42

## 2024-03-01 RX ADMIN — POTASSIUM CHLORIDE 10 MEQ: 7.46 INJECTION, SOLUTION INTRAVENOUS at 12:35

## 2024-03-01 RX ADMIN — FAMOTIDINE 20 MG: 20 TABLET, FILM COATED ORAL at 20:19

## 2024-03-01 RX ADMIN — DOXYCYCLINE 100 MG: 100 INJECTION, POWDER, LYOPHILIZED, FOR SOLUTION INTRAVENOUS at 17:03

## 2024-03-01 RX ADMIN — SODIUM CHLORIDE, PRESERVATIVE FREE 10 ML: 5 INJECTION INTRAVENOUS at 20:32

## 2024-03-01 RX ADMIN — POTASSIUM CHLORIDE 10 MEQ: 7.46 INJECTION, SOLUTION INTRAVENOUS at 14:09

## 2024-03-01 RX ADMIN — CEFEPIME 2000 MG: 2 INJECTION, POWDER, FOR SOLUTION INTRAVENOUS at 14:31

## 2024-03-01 RX ADMIN — CEFEPIME 2000 MG: 2 INJECTION, POWDER, FOR SOLUTION INTRAVENOUS at 06:12

## 2024-03-01 RX ADMIN — IOPAMIDOL 55 ML: 755 INJECTION, SOLUTION INTRAVENOUS at 13:45

## 2024-03-01 RX ADMIN — POTASSIUM CHLORIDE 10 MEQ: 7.46 INJECTION, SOLUTION INTRAVENOUS at 09:36

## 2024-03-01 RX ADMIN — ZOLPIDEM TARTRATE 10 MG: 5 TABLET ORAL at 21:17

## 2024-03-01 ASSESSMENT — PAIN DESCRIPTION - LOCATION: LOCATION: NECK

## 2024-03-01 ASSESSMENT — PAIN SCALES - GENERAL: PAINLEVEL_OUTOF10: 3

## 2024-03-01 ASSESSMENT — PAIN DESCRIPTION - DESCRIPTORS: DESCRIPTORS: ACHING;DISCOMFORT

## 2024-03-01 NOTE — PLAN OF CARE
Problem: Safety - Adult  Goal: Free from fall injury  Outcome: Progressing     Problem: Discharge Planning  Goal: Discharge to home or other facility with appropriate resources  Outcome: Progressing     Problem: Chronic Conditions and Co-morbidities  Goal: Patient's chronic conditions and co-morbidity symptoms are monitored and maintained or improved  Outcome: Progressing     Problem: Skin/Tissue Integrity  Goal: Absence of new skin breakdown  Description: 1.  Monitor for areas of redness and/or skin breakdown  2.  Assess vascular access sites hourly  3.  Every 4-6 hours minimum:  Change oxygen saturation probe site  4.  Every 4-6 hours:  If on nasal continuous positive airway pressure, respiratory therapy assess nares and determine need for appliance change or resting period.  Outcome: Progressing     Problem: ABCDS Injury Assessment  Goal: Absence of physical injury  Outcome: Progressing     Problem: Pain  Goal: Verbalizes/displays adequate comfort level or baseline comfort level  Outcome: Progressing

## 2024-03-01 NOTE — OP NOTE
[facility]   Procedure Note    CLINICAL HISTORY:       Juan Mohamud is a 83 y.o. male with a history of sustained VT     Patient Active Problem List   Diagnosis    DDD (degenerative disc disease), lumbar    Cataract extraction status of right eye    Constipation    Arrhythmia    Elevated troponin    Primary hypertension    Hyperlipidemia    Colonic obstruction (HCC)    Trauma    History of fall    Type 2 diabetes mellitus (HCC)    Anemia    Pneumonia    Hyponatremia    Dilatation of colon    Wide-complex tachycardia     The risks, benefits, and details of the procedure were explained to the patient.  The patient verbalized understanding and wanted to proceed.  Informed written consent was obtained.    INDICATION:  Sustained VT     PROCEDURES PERFORMED:   Coronary angiogram     PROCEDURE TECHNIQUE:  The patient was approached from the right radial artery using a 5-6  Anguillan slender sheath.  Left coronary angiography was done using a Rahel L3.5 diagnostic catheter.  Right coronary angiography was done using a Rahel R4 guide catheter.       CONTRAST:  Total of 55 cc.    COMPLICATIONS:  None.  At the end of the procedure a Mynx device was used for hemostasis.      EBL: 5 cc    Moderate Sedation:  Start time: 1:25  Stop time: 1:45  2 mg versed   100 mcg fentanyl   An independent trained observer pushed medications at my direction. We monitored the patient's level of consciousness and vital signs/physiologic status throughout the procedure duration (see start and start times above).       ANGIOGRAM/CORONARY ARTERIOGRAM:       The left main coronary artery is normal .    The left anterior descending artery is normal .   D1 is normal     The left circumflex artery has mild disease .   OM1 is normal     The right coronary artery is dominant vessel with mild disease .   RPDA is normal       INTERVENTION  none    SUMMARY:   Mild nonobstructive CAD       RECOMMENDATION:  .    - medical therapy     Amandeep  MD [facility]  General, Interventional Cardiology, and Peripheral Vascular Disease   [facility]   (C): [phone]  (O): [phone]

## 2024-03-01 NOTE — PROGRESS NOTES
PATIENT HISTORY    ECHO: DATE: 02/29/24       EF: 55%  STRESS TEST PREFORMED:  No  EKG: Yes    Telemetry Monitor     Result: Normal  Pre CATH Rhythm: Normal Sinus Rhythm  HYPERTENSION: No  DYSLIPIDEMIA: Yes  FAMILY HX OF CAD: No  PRIOR MI: Yes.  Most recent date: \"MI,Old\" in history  PRIOR PCI: No  PRIOR CABG: No  CEREBROVASCULAR DX: No  PERIPHERAL ARTERIAL DISEASE: No  CHRONIC LUNG DISEASE: No  TOBACCO: Never  DIABETIC: Yes, Oral Treatment  CARDIAC ARREST: {No  DIALYSIS: No  HEART FAILURE: No  FRAILTY SCORE: 4 VULNERABLE (while not dependent on others for IADLs, symptoms limit activities)  CARDIAC CTA PREFORMED:  No  AGATSTON CORONARY CALCIUM SCORE:   Assessed: No  Prior Diagnostic Coronary Angioplasty Procedure:  No

## 2024-03-01 NOTE — PROGRESS NOTES
Report and hand off given to Nannette VERA RN.  Was able to sleep well after PRN Ambien.  Has been refusing turns while awake, even if rationale for repositioning was provided. Uses urinal appropriately. Amiodarone continues.  K 2.7, first bag of six started.  Call light within reach.    Electronically signed by Faiza Wilkinson RN on 3/1/2024 at 7:29 AM

## 2024-03-01 NOTE — PROGRESS NOTES
General and Vascular Surgery                                                           Daily Progress Note                                                             Jeremie Ramon PA-C    Pt Name: Juan Mohamud  Medical Record Number: [id number]  Date of Birth 1940   Today's Date: 3/1/2024      ASSESSMENT/PLAN  Colonic distention  -Feeling better today  -WBC count 13.7 --> 12.1 -->10.1   -Lactic acid: 2.3 -->1.9  -No acute surgical intervention at this time but will monitor closely  -GI performed a decompressive colonoscopy yesterday  EDUCATION  Patient educated about their illness/diagnosis, stated above, and all questions answered. We discussed the importance of nutrition, medications they are taking, and healthy lifestyle.  SUBJECTIVE  Juan has improved from yesterday. Pain is well controlled. He has no nausea and no vomiting.  He has passed flatus and has had a bowel movement. He is tolerating NPO. Current activity is up with assistance    OBJECTIVE  VITALS:  height is 1.93 m (6' 4\") and weight is 107.4 kg (236 lb 12.4 oz). His oral temperature is 98.1 °F (36.7 °C). His blood pressure is 114/72 and his pulse is 81. His respiration is 15 and oxygen saturation is 99%. VITALS:  /72   Pulse 81   Temp 98.1 °F (36.7 °C) (Oral)   Resp 15   Ht 1.93 m (6' 4\")   Wt 107.4 kg (236 lb 12.4 oz)   SpO2 99%   BMI 28.82 kg/m²   GENERAL: alert, no distress  LUNGS: clear to ausculation, without wheezes, rales or rhonci  HEART: normal rate and regular rhythm  ABDOMEN: soft, non-tender, non-distended  EXTREMITY: no cyanosis, clubbing or edema  I/O last 3 completed shifts:  In: 2759.4 [P.O.:240; I.V.:655.2; IV Piggyback:1864.2]  Out: 2745 [Urine:1390; Stool:1355]  I/O this shift:  In: 453.2 [I.V.:75.7; IV Piggyback:377.5]  Out: 365 [Urine:325; Stool:40]    LABS  Recent Labs     02/28/24  2244 02/29/24  0747 03/01/24  0520   WBC 13.7*   < > 10.1

## 2024-03-01 NOTE — PROGRESS NOTES
Physical Therapy  Facility/Department: [address] ICU  Physical Therapy Initial Assessment    Name: Juan Mohamud  : 1940  MRN: [id number]  Date of Service: 3/1/2024    Discharge Recommendations:  Continue to assess pending progress, Subacute/Skilled Nursing Facility   PT Equipment Recommendations  Other: Defer to next level of care.      Juan Mohamud scored a 8/24 on the AM-PAC short mobility form. Current research shows that an AM-PAC score of 17 or less is typically not associated with a discharge to the patient's home setting. Based on the patient's AM-PAC score and their current functional mobility deficits, it is recommended that the patient have 3-5 sessions per week of Physical Therapy at d/c to increase the patient's independence.  Please see assessment section for further patient specific details.    If patient discharges prior to next session this note will serve as a discharge summary.  Please see below for the latest assessment towards goals.      Assessment   Assessment: 82 y/o male admit 2024 [facility] Acute Respiratory Failure, Wide Complex Tachycardia, BOSTON and Elevated Troponin. Pt had fall  resulting in multiple fractures and has been in out of hospital/SNF settings  since. Pt most recently at [facility] setting. Prior to fall, pt lived at home with spouse and was independent with daily care  and functional mobility (without assist device). Today, pt limited by incontinent of large loose water stool. Pt required Max A x 2 to roll multiple times to change bed linens and to complete care. Pt completed Bed Mob with Max A x 2 and stood briefly to Stedy with Mod A. Pt is  NWB on R UE d/t Clavical Fx; closely monitored to ensure pt did not use UE to assist with transfer this date.  At this time, anticipate return to SNF setting upon d/c.  Will cont to monitor pt's progress.  Therapy Prognosis: Fair;Good  Decision Making: Medium Complexity  History: 82 y/o male admit

## 2024-03-01 NOTE — CARE COORDINATION
03/01/24 1210   Wound Surface Area (cm^2) 0.25 cm^2 03/01/24 1210   Wound Assessment Purple/maroon;Non-blanchable erythema 03/01/24 1210   Drainage Amount None (dry) 03/01/24 0755   Odor None 03/01/24 0755   Yolie-wound Assessment Blanchable erythema 03/01/24 1210   Margins Defined edges 03/01/24 1210   Number of days: 1              Pt awake and alert, reluctant to turn. Pt has small DTI to L buttocks. Has drain in rectum. Pt with redness to heels but blanchable. Pt 6'4\" will need bed extender. Bruising from previous falls. Skin tears mostly healed.   Pt using urinal for voiding.  Pt requiring frequent bed changes due to stool incontinence.     Response to treatment:  Well tolerated by patient.     Plan   Plan of Care:   Buttock area- triad barrier 2x daily, blue barrier wipes, no briefs.  Will continue to follow.    Specialty Bed Required : No   [] Low Air Loss   [] Pressure Redistribution  [] Fluid Immersion  [] Bariatric  [] Total Pressure Relief  [] Other:     Current Diet: Diet NPO  Dietician consult:  Yes    Discharge Plan:  Placement for patient upon discharge: skilled nursing    Patient appropriate for Outpatient Wound Care Center: No    Referrals:  []   [] Home Health Care  [] Supplies  [] Other    Patient/Caregiver Teaching:  Level of patient/caregiver understanding able to:   [] Indicates understanding       [] Needs reinforcement  [] Unsuccessful      [x] Verbal Understanding  [] Demonstrated understanding       [] No evidence of learning  [] Refused teaching         [] N/A       Electronically signed by Haydee Omalley RN, CWOCN on 3/1/2024 at 12:12 PM

## 2024-03-01 NOTE — PROGRESS NOTES
4 Eyes Skin Assessment     NAME:  Juan Mohamud  YOB: 1940  MEDICAL RECORD NUMBER:  [id number]    The patient is being assessed for  Shift Handoff    I agree that at least one RN has performed a thorough Head to Toe Skin Assessment on the patient. ALL assessment sites listed below have been assessed.      Areas assessed by both nurses:    Head, Face, Ears, Shoulders, Back, Chest, Arms, Elbows, Hands, Sacrum. Buttock, Coccyx, Ischium, Legs. Feet and Heels, and Under Medical Devices         Does the Patient have a Wound?  Multiple skin tears , sacral DTI?       Piero Prevention initiated by RN: Yes  Wound Care Orders initiated by RN: Yes    Pressure Injury (Stage 3,4, Unstageable, DTI, NWPT, and Complex wounds) if present, place Wound referral order by RN under : Yes    New Ostomies, if present place, Ostomy referral order under : No     Nurse 1 eSignature: Electronically signed by Faiza Wilkinson RN on 3/1/24 at 6:43 AM EST    **SHARE this note so that the co-signing nurse can place an eSignature**    Nurse 2 eSignature: Electronically signed by Nannette Beltrán RN on 3/1/24 at 11:08 AM EST

## 2024-03-01 NOTE — PROGRESS NOTES
INPATIENT CONSULTATION:    IDENTIFYING DATA/REASON FOR CONSULTATION   PATIENT:  Juan Mohamud  MRN:  [id number]  ADMIT DATE: 2/28/2024  TIME OF EVALUATION: 3/1/2024 6:46 AM  HOSPITAL STAY:   LOS: 1 day   CONSULTING PHYSICIAN: Anam Cho MD   REASON FOR CONSULTATION: Colonic distention    Subjective:    Patient seen and examined in follow-up. Patient reports he is doing okay.  He denies any abdominal pain.  He has passed some liquid brown stool per rectal tube and around rectal tube.    MEDICATIONS   SCHEDULED:  desmopressin, 100 mcg, Daily  famotidine, 20 mg, BID  gabapentin, 600 mg, TID  fenofibrate, 160 mg, Daily  sennosides-docusate sodium, 1 tablet, BID  tamsulosin, 0.4 mg, Daily  sodium chloride flush, 5-40 mL, 2 times per day  doxycycline (VIBRAMYCIN) IV, 100 mg, Q12H  sodium chloride flush, 5-40 mL, 2 times per day  enoxaparin, 30 mg, BID  cefepime, 2,000 mg, Q8H      FLUIDS/DRIPS:     sodium chloride      sodium chloride      amiodarone 0.5 mg/min (02/29/24 2122)     PRNs: methocarbamol, 1,000 mg, TID PRN  simethicone, 80 mg, Q6H PRN  zolpidem, 10 mg, Nightly PRN  magnesium hydroxide, 30 mL, Daily PRN  sodium chloride flush, 5-40 mL, PRN  sodium chloride, , PRN  potassium chloride, 40 mEq, PRN   Or  potassium alternative oral replacement, 40 mEq, PRN   Or  potassium chloride, 10 mEq, PRN  magnesium sulfate, 2,000 mg, PRN  ondansetron, 4 mg, Q8H PRN   Or  ondansetron, 4 mg, Q6H PRN  polyethylene glycol, 17 g, Daily PRN  acetaminophen, 650 mg, Q6H PRN   Or  acetaminophen, 650 mg, Q6H PRN  benzocaine, , PRN  sodium chloride flush, 5-40 mL, PRN  sodium chloride, , PRN      ALLERGIES:    Allergies   Allergen Reactions    Erythromycin     Dust Mite Extract      sneeze         PHYSICAL EXAM     Vitals:    03/01/24 0428 03/01/24 0500 03/01/24 0550 03/01/24 0600   BP:  111/70  (!) 110/59   Pulse: 87 96  89   Resp: 19 20  16   Temp:       TempSrc:       SpO2: 90% 98%  95%   Weight:   107.4 kg (236 lb 12.4  rectal tube placement 2/29/24.  General surgery following.  Chronic idiopathic constipation: Previously on Linzess.  Unstable SVT /sp cardioversion, on amiodarone: Will defer management to critical care and cardiology.  Pneumonia: On cefepime and Flagyl, defer management to primary team and pulmonary.  Mechanical fall with right-sided rib fractures  Hypokalemia: Replace and monitor    RECOMMENDATIONS:    -Flush rectal tube every 4 hours  -Monitor and replace electrolytes  -Okay from GI standpoint for clear liquid diet  -Continue chronic bowel regimen with Linzess    If you have any questions or need any further information, please feel free to contact anyone on our consult team.  Thank you for allowing us to participate in the care of Juan Mohamud.    Eitan Pino MD  [facility]

## 2024-03-01 NOTE — PROGRESS NOTES
Pulmonary Progress Note    Date of Admission: 2/28/2024   LOS: 1 day       CC:  Chief Complaint   Patient presents with    Shortness of Breath     Sudden onset shortness of breath, from nursing home via EMS        Subjective:  Feeling better     ROS:   No nausea  No Vomiting  No chest pain       Assessment:           Plan:     This note may have been transcribed using Dragon Dictation software. Please disregard any translational errors.       Hospital Day: 1     Abdominal distention  Decompression colonoscopy completed yesterday.  Significant air.  No masses seen.  Rectal drain left in place.  Significant output.        Right pleural effusion with atelectasis  Likely secondary to fall and small hematoma along with compression from distended abdomen.  No intervention at this time.  Incentive spirometry  Continues to have pain with breathing.   Likely does not need antibiotics.  Procalcitonin normalized, will discontinue antibiotics today.        Pulmonary nodule right lower lobe  Pulmonary nodule right lower lobe likely secondary to enlarged lymph node.  Recommend follow-up in approximately 2 months.  Discussed with family.        Hyponatremia  Mild chronic hyponatremia dating back to at least 2021 with sodium of 133.  Therefore, this is highly unlikely to be secondary to pulmonary nodule.        Arrhythmia  Improved with amiodarone.  Cardiology following.  Considering catheterization and considering AICD  Replacing potassium    Movement  PT OT consulted.  Patient refusing to move secondary to pain.  Movement was encouraged.  He was strongly encouraged to moved to help with atelectasis.    Was weaned to room air.  Will sign off at this time.  Thank for the consultation          Data:        PHYSICAL EXAM:   Blood pressure 122/70, pulse 86, temperature 98.1 °F (36.7 °C), temperature source Oral, resp. rate 20, height 1.93 m (6' 4\"), weight 107.4 kg (236 lb 12.4 oz), SpO2 100 %.'  Body mass index is 28.82 kg/m².   Gen:

## 2024-03-01 NOTE — PROGRESS NOTES
General: NAD  Eyes: EOMI  ENT: neck supple  Cardiovascular: Regular rate.  Respiratory: Clear to auscultation  Gastrointestinal: Soft, non tender  Genitourinary: no suprapubic tenderness  Musculoskeletal: No edema  Skin: warm, dry  Neuro: Alert.  Psych: Mood appropriate.         Medications:   Medications:    linaclotide  145 mcg Oral QAM AC    desmopressin  100 mcg Oral Daily    famotidine  20 mg Oral BID    gabapentin  600 mg Oral TID    fenofibrate  160 mg Oral Daily    sennosides-docusate sodium  1 tablet Oral BID    tamsulosin  0.4 mg Oral Daily    sodium chloride flush  5-40 mL IntraVENous 2 times per day    doxycycline (VIBRAMYCIN) IV  100 mg IntraVENous Q12H    sodium chloride flush  5-40 mL IntraVENous 2 times per day    enoxaparin  30 mg SubCUTAneous BID    cefepime  2,000 mg IntraVENous Q8H      Infusions:    sodium chloride      sodium chloride      amiodarone 0.5 mg/min (02/29/24 2122)     PRN Meds: methocarbamol, 1,000 mg, TID PRN  simethicone, 80 mg, Q6H PRN  zolpidem, 10 mg, Nightly PRN  magnesium hydroxide, 30 mL, Daily PRN  sodium chloride flush, 5-40 mL, PRN  sodium chloride, , PRN  potassium chloride, 40 mEq, PRN   Or  potassium alternative oral replacement, 40 mEq, PRN   Or  potassium chloride, 10 mEq, PRN  magnesium sulfate, 2,000 mg, PRN  ondansetron, 4 mg, Q8H PRN   Or  ondansetron, 4 mg, Q6H PRN  polyethylene glycol, 17 g, Daily PRN  acetaminophen, 650 mg, Q6H PRN   Or  acetaminophen, 650 mg, Q6H PRN  benzocaine, , PRN  sodium chloride flush, 5-40 mL, PRN  sodium chloride, , PRN        Labs and Imaging   XR ABDOMEN (KUB) (SINGLE AP VIEW)    Result Date: 2/29/2024  EXAMINATION: ONE SUPINE XRAY VIEW(S) OF THE ABDOMEN 2/29/2024 6:35 am COMPARISON: Abdominal CT 02/29/2024, KUB 02/27/2024. HISTORY: ORDERING SYSTEM PROVIDED HISTORY: abdominal distention TECHNOLOGIST PROVIDED HISTORY: Reason for exam:->abdominal distention Reason for Exam: abdominal distention FINDINGS: Supine views of the  is unchanged with no hydronephrosis or urinary obstruction. Mild hydrops of the gallbladder with no gallstones or wall thickening. Suggest ultrasound correlation, if indicated. Moderate right pleural effusion and associated moderate right basilar atelectasis and consolidation posteriorly which is more prominent with no pneumothorax. Mild to moderately displaced rib fractures posterolaterally on the right involving the 12 through the 9th ribs which is unchanged with no pneumothorax. Mild left basilar atelectasis or infiltrate posteriorly which is less prominent. Mild prostatic enlargement with no pelvic mass or active inflammation Postop changes lumbar spine with multiple old compression fractures along the upper lumbar region which is unchanged. Questionable moderate edema or cellulitis vs soft tissue contusions along the right lateral chest and lower abdominal wall extending inferiorly around the pelvis on the right and less prominent edema on the left.       CBC:   Recent Labs     02/28/24 2244 02/29/24  0747 03/01/24  0520   WBC 13.7* 12.1* 10.1   HGB 11.9* 10.5* 10.8*    287 277     BMP:    Recent Labs     02/28/24 2244 02/29/24  0747 03/01/24  0520   * 130* 130*   K 3.6 3.6 2.7*   CL 89* 93* 96*   CO2 22 24 25   BUN 16 29* 19   CREATININE 1.2 1.0 0.5*   GLUCOSE 158* 130* 125*     Hepatic:   Recent Labs     02/28/24 2244   AST 21   ALT 11   BILITOT 1.5*   ALKPHOS 120     Lipids: No results found for: \"CHOL\", \"HDL\", \"TRIG\"  Hemoglobin A1C:   Lab Results   Component Value Date/Time    LABA1C 6.4 10/24/2013 09:29 AM     TSH: No results found for: \"TSH\"  Troponin: No results found for: \"TROPONINT\"  Lactic Acid: No results for input(s): \"LACTA\" in the last 72 hours.  BNP:   Recent Labs     02/28/24 2244   PROBNP 2,599*     UA:  Lab Results   Component Value Date/Time    NITRU Negative 02/23/2024 10:49 PM    COLORU Yellow 02/23/2024 10:49 PM    PHUR 5.5 02/23/2024 10:49 PM    WBCUA 1 02/23/2024 10:49

## 2024-03-01 NOTE — PRE SEDATION
mouth 2 times daily    Rylan Grider MD   sennosides-docusate sodium (SENOKOT-S) 8.6-50 MG tablet Take 1 tablet by mouth in the morning and at bedtime    Rylan Grider MD   simethicone (MYLICON) 80 MG chewable tablet Take 1 tablet by mouth every 6 hours as needed for Flatulence    Rylan Grider MD   sodium chloride (OCEAN) 0.65 % nasal spray 1 spray by Nasal route 2 times daily as needed for Congestion    Rylan Grider MD   tamsulosin (FLOMAX) 0.4 MG capsule Take 1 capsule by mouth daily    Rylan Grider MD   metFORMIN (GLUCOPHAGE) 500 MG tablet Take 1 tablet by mouth 2 times daily (with meals)    Rylan Grider MD   fenofibrate (TRICOR) 145 MG tablet Take 1 tablet by mouth daily    Rylan Grider MD     Coumadin Use Last 7 Days:  no  Antiplatelet drug therapy use last 7 days: no  Other anticoagulant use last 7 days: no  Additional Medication Information:  n/a      Pre-Sedation Documentation and Exam:   I have personally completed a history, physical exam & review of systems for this patient (see notes).    Mallampati Airway Assessment:  Mallampati Class I - (soft palate, fauces, uvula & anterior/posterior tonsillar pillars are visible)    Prior History of Anesthesia Complications:   none    ASA Classification:  Class 4 - A patient with an incapacitating systemic disease that is a constant threat to life    Sedation/ Anesthesia Plan:   intravenous sedation    Medications Planned:   midazolam (Versed) intravenously    Patient is an appropriate candidate for plan of sedation: yes    Electronically signed by Amandeep Jones MD on 3/1/2024 at 1:48 PM

## 2024-03-01 NOTE — PROGRESS NOTES
INPATIENT CONSULTATION:    IDENTIFYING DATA/REASON FOR CONSULTATION   PATIENT:  Juan Mohamud  MRN:  [id number]  ADMIT DATE: 2/28/2024  TIME OF EVALUATION: 3/2/2024 7:45 AM  HOSPITAL STAY:   LOS: 2 days   CONSULTING PHYSICIAN: Chiara Gage MD   REASON FOR CONSULTATION: Colonic distention    Subjective:    Patient seen and examined in follow-up. Patient reports he is doing okay.  He denies any abdominal pain.  He has passed some liquid brown stool per rectal tube and around rectal tube.    MEDICATIONS   SCHEDULED:  linaclotide, 145 mcg, QAM AC  sodium chloride flush, 5-40 mL, 2 times per day  desmopressin, 100 mcg, Daily  famotidine, 20 mg, BID  gabapentin, 600 mg, TID  fenofibrate, 160 mg, Daily  sennosides-docusate sodium, 1 tablet, BID  tamsulosin, 0.4 mg, Daily  sodium chloride flush, 5-40 mL, 2 times per day  doxycycline (VIBRAMYCIN) IV, 100 mg, Q12H  sodium chloride flush, 5-40 mL, 2 times per day  enoxaparin, 30 mg, BID  cefepime, 2,000 mg, Q8H      FLUIDS/DRIPS:     sodium chloride      sodium chloride      sodium chloride      amiodarone 0.5 mg/min (03/02/24 5691)     PRNs: sodium chloride flush, 5-40 mL, PRN  sodium chloride, , PRN  acetaminophen, 650 mg, Q4H PRN  methocarbamol, 1,000 mg, TID PRN  simethicone, 80 mg, Q6H PRN  zolpidem, 10 mg, Nightly PRN  magnesium hydroxide, 30 mL, Daily PRN  sodium chloride flush, 5-40 mL, PRN  sodium chloride, , PRN  potassium chloride, 40 mEq, PRN   Or  potassium alternative oral replacement, 40 mEq, PRN   Or  potassium chloride, 10 mEq, PRN  magnesium sulfate, 2,000 mg, PRN  ondansetron, 4 mg, Q8H PRN   Or  ondansetron, 4 mg, Q6H PRN  polyethylene glycol, 17 g, Daily PRN  acetaminophen, 650 mg, Q6H PRN   Or  acetaminophen, 650 mg, Q6H PRN  benzocaine, , PRN  sodium chloride flush, 5-40 mL, PRN  sodium chloride, , PRN      ALLERGIES:    Allergies   Allergen Reactions    Erythromycin     Dust Mite Extract      sneeze         PHYSICAL EXAM     Vitals:     Hematocrit 31.1 (L) 40.5 - 52.5 %    MCV 90.0 80.0 - 100.0 fL    MCH 31.7 26.0 - 34.0 pg    MCHC 35.3 31.0 - 36.0 g/dL    RDW 14.4 12.4 - 15.4 %    Platelets 285 135 - 450 K/uL    MPV 7.7 5.0 - 10.5 fL    Neutrophils % 78.2 %    Lymphocytes % 6.2 %    Monocytes % 14.9 %    Eosinophils % 0.5 %    Basophils % 0.2 %    Neutrophils Absolute 7.0 1.7 - 7.7 K/uL    Lymphocytes Absolute 0.6 (L) 1.0 - 5.1 K/uL    Monocytes Absolute 1.3 0.0 - 1.3 K/uL    Eosinophils Absolute 0.0 0.0 - 0.6 K/uL    Basophils Absolute 0.0 0.0 - 0.2 K/uL   Magnesium    Collection Time: 03/02/24  4:44 AM   Result Value Ref Range    Magnesium 2.00 1.80 - 2.40 mg/dL     Other Labs    Imaging  XR ABDOMEN (KUB) (SINGLE AP VIEW)   Final Result   Decreased distention of the colon with mild distention in the region of the   cecum         XR ABDOMEN (KUB) (SINGLE AP VIEW)   Final Result   Bowel-gas pattern most consistent with a persistent diffuse colonic ileus,   without gross evidence of free air.         CT ABDOMEN PELVIS W IV CONTRAST Additional Contrast? None   Final Result   1. No evidence of pulmonary embolism.   2. Small right pleural effusion and dependent atelectasis in the lung bases,   right greater than left.   3. Progressive colonic dilatation since 02/23/2024.  No focal transition   point identified, however the sigmoid colon has a tethered appearance to the   nearby cecum in the mid abdomen for which a partial obstructing process is   suggested.  Liquid stool is present throughout the colon to the level of the   rectum.  The cecum is markedly distended measuring up to 14 cm.   4. Redemonstration of inferior right rib fractures and medial right clavicle   fracture.         CT CHEST PULMONARY EMBOLISM W CONTRAST   Final Result   1. No evidence of pulmonary embolism.   2. Small right pleural effusion and dependent atelectasis in the lung bases,   right greater than left.   3. Progressive colonic dilatation since 02/23/2024.  No focal

## 2024-03-01 NOTE — PROGRESS NOTES
Occupational Therapy  Facility/Department: [address] ICU  Occupational Therapy Initial Assessment    Name: Juan Mohamud  : 1940  MRN: [id number]  Date of Service: 3/1/2024    Discharge Recommendations:  Patient would benefit from continued therapy after discharge, 3-5 sessions per week  OT Equipment Recommendations  Other: defer to VT facility     Juan Mohamud scored a 12 on the AM-PAC ADL Inpatient form. Current research shows that an AM-PAC score of 17 or less is typically not associated with a discharge to the patient's home setting. Based on the patient's AM-PAC score and their current ADL deficits, it is recommended that the patient have 3-5 sessions per week of Occupational Therapy at d/c to increase the patient's independence.  Please see assessment section for further patient specific details.    If patient discharges prior to next session this note will serve as a discharge summary.  Please see below for the latest assessment towards goals.      Patient Diagnosis(es): The primary encounter diagnosis was Wide-complex tachycardia. Diagnoses of BOSTON (acute kidney injury) (HCC), Hyponatremia, Elevated troponin, Acute hypoxemic respiratory failure (HCC), and Colonic obstruction (HCC) were also pertinent to this visit.  Past Medical History:  has a past medical history of Diabetes mellitus (HCC), Hyperlipidemia, Indigestion, Insomnia, Macular degeneration, MI, old, and Neuropathy.  Past Surgical History:  has a past surgical history that includes knee surgery; Tonsillectomy; Colonoscopy; fracture surgery (Left); Mouth surgery; joint replacement (Right); Intracapsular cataract extraction (Right, 2018); and Colonoscopy (N/A, 2024).           Assessment   Performance deficits / Impairments: Decreased functional mobility ;Decreased ADL status;Decreased strength;Decreased balance;Decreased safe awareness;Decreased high-level IADLs;Decreased cognition;Decreased endurance  Assessment: 82  of deficits  Orientation  Overall Orientation Status: Within Functional Limits  Orientation Level:  (A little confused to timeline of recent events)                  Education Given To: Patient  Education Provided: Role of Therapy;Plan of Care;Transfer Training  Education Method: Demonstration;Verbal  Barriers to Learning: Cognition  Education Outcome: Verbalized understanding;Demonstrated understanding;Continued education needed    LUE AROM (degrees)  LUE General AROM: grossly functional  RUE AROM (degrees)  RUE General AROM: no formal assessment 2/2 fracture      AM-PAC - ADL  AM-PAC Daily Activity - Inpatient   How much help is needed for putting on and taking off regular lower body clothing?: Total  How much help is needed for bathing (which includes washing, rinsing, drying)?: Total  How much help is needed for toileting (which includes using toilet, bedpan, or urinal)?: Total  How much help is needed for putting on and taking off regular upper body clothing?: A Little  How much help is needed for taking care of personal grooming?: A Little  How much help for eating meals?: A Little  AM-[facility] Inpatient Daily Activity Raw Score: 12  AM-PAC Inpatient ADL T-Scale Score : 30.6  ADL Inpatient CMS 0-100% Score: 66.57  ADL Inpatient CMS G-Code Modifier : CL    Goals  Short Term Goals  Time Frame for Short Term Goals: Prior to DC:  Short Term Goal 1: Pt will complete ADL transfers with CGA  Short Term Goal 2: Pt will complete functional mobility with CGA  Short Term Goal 3: Pt will tolerate standing > 2 min for functional task with supervision  Short Term Goal 4: Pt will complete toileting with min A  Short Term Goal 5: Pt will complete bed mobility with mod A  Patient Goals   Patient goals : to get stronger and eventually return home       Therapy Time   Individual Concurrent Group Co-treatment   Time In 0900         Time Out 0955         Minutes 55         Timed Code Treatment Minutes: 40 Minutes     This note to serve

## 2024-03-02 LAB
ANION GAP SERPL CALCULATED.3IONS-SCNC: 7 MMOL/L (ref 3–16)
BASOPHILS # BLD: 0 K/UL (ref 0–0.2)
BASOPHILS NFR BLD: 0.2 %
BUN SERPL-MCNC: 15 MG/DL (ref 7–20)
CALCIUM SERPL-MCNC: 7.8 MG/DL (ref 8.3–10.6)
CHLORIDE SERPL-SCNC: 96 MMOL/L (ref 99–110)
CO2 SERPL-SCNC: 28 MMOL/L (ref 21–32)
CREAT SERPL-MCNC: 0.6 MG/DL (ref 0.8–1.3)
DEPRECATED RDW RBC AUTO: 14.4 % (ref 12.4–15.4)
EOSINOPHIL # BLD: 0 K/UL (ref 0–0.6)
EOSINOPHIL NFR BLD: 0.5 %
GFR SERPLBLD CREATININE-BSD FMLA CKD-EPI: >60 ML/MIN/{1.73_M2}
GLUCOSE SERPL-MCNC: 119 MG/DL (ref 70–99)
HCT VFR BLD AUTO: 31.1 % (ref 40.5–52.5)
HGB BLD-MCNC: 11 G/DL (ref 13.5–17.5)
LYMPHOCYTES # BLD: 0.6 K/UL (ref 1–5.1)
LYMPHOCYTES NFR BLD: 6.2 %
MAGNESIUM SERPL-MCNC: 2 MG/DL (ref 1.8–2.4)
MCH RBC QN AUTO: 31.7 PG (ref 26–34)
MCHC RBC AUTO-ENTMCNC: 35.3 G/DL (ref 31–36)
MCV RBC AUTO: 90 FL (ref 80–100)
MONOCYTES # BLD: 1.3 K/UL (ref 0–1.3)
MONOCYTES NFR BLD: 14.9 %
NEUTROPHILS # BLD: 7 K/UL (ref 1.7–7.7)
NEUTROPHILS NFR BLD: 78.2 %
PLATELET # BLD AUTO: 285 K/UL (ref 135–450)
PMV BLD AUTO: 7.7 FL (ref 5–10.5)
POTASSIUM SERPL-SCNC: 3.4 MMOL/L (ref 3.5–5.1)
RBC # BLD AUTO: 3.45 M/UL (ref 4.2–5.9)
SODIUM SERPL-SCNC: 131 MMOL/L (ref 136–145)
WBC # BLD AUTO: 8.9 K/UL (ref 4–11)

## 2024-03-02 PROCEDURE — 6360000002 HC RX W HCPCS: Performed by: INTERNAL MEDICINE

## 2024-03-02 PROCEDURE — 6370000000 HC RX 637 (ALT 250 FOR IP): Performed by: INTERNAL MEDICINE

## 2024-03-02 PROCEDURE — 2580000003 HC RX 258: Performed by: INTERNAL MEDICINE

## 2024-03-02 PROCEDURE — 80048 BASIC METABOLIC PNL TOTAL CA: CPT

## 2024-03-02 PROCEDURE — 36415 COLL VENOUS BLD VENIPUNCTURE: CPT

## 2024-03-02 PROCEDURE — 83735 ASSAY OF MAGNESIUM: CPT

## 2024-03-02 PROCEDURE — 2060000000 HC ICU INTERMEDIATE R&B

## 2024-03-02 PROCEDURE — 1200000000 HC SEMI PRIVATE

## 2024-03-02 PROCEDURE — 2500000003 HC RX 250 WO HCPCS: Performed by: INTERNAL MEDICINE

## 2024-03-02 PROCEDURE — 94760 N-INVAS EAR/PLS OXIMETRY 1: CPT

## 2024-03-02 PROCEDURE — 99223 1ST HOSP IP/OBS HIGH 75: CPT | Performed by: INTERNAL MEDICINE

## 2024-03-02 PROCEDURE — 6370000000 HC RX 637 (ALT 250 FOR IP): Performed by: HOSPITALIST

## 2024-03-02 PROCEDURE — 85025 COMPLETE CBC W/AUTO DIFF WBC: CPT

## 2024-03-02 PROCEDURE — 2580000003 HC RX 258: Performed by: ANESTHESIOLOGY

## 2024-03-02 RX ORDER — DESMOPRESSIN ACETATE 0.2 MG/1
100 TABLET ORAL NIGHTLY
Status: DISCONTINUED | OUTPATIENT
Start: 2024-03-02 | End: 2024-03-14 | Stop reason: HOSPADM

## 2024-03-02 RX ORDER — AMIODARONE HYDROCHLORIDE 200 MG/1
400 TABLET ORAL 2 TIMES DAILY
Status: DISCONTINUED | OUTPATIENT
Start: 2024-03-02 | End: 2024-03-08

## 2024-03-02 RX ADMIN — DOXYCYCLINE 100 MG: 100 INJECTION, POWDER, LYOPHILIZED, FOR SOLUTION INTRAVENOUS at 20:23

## 2024-03-02 RX ADMIN — CEFEPIME 2000 MG: 2 INJECTION, POWDER, FOR SOLUTION INTRAVENOUS at 23:08

## 2024-03-02 RX ADMIN — METHOCARBAMOL 1000 MG: 500 TABLET ORAL at 15:03

## 2024-03-02 RX ADMIN — Medication 10 ML: at 08:54

## 2024-03-02 RX ADMIN — FAMOTIDINE 20 MG: 20 TABLET, FILM COATED ORAL at 08:53

## 2024-03-02 RX ADMIN — SODIUM CHLORIDE, PRESERVATIVE FREE 10 ML: 5 INJECTION INTRAVENOUS at 08:55

## 2024-03-02 RX ADMIN — AMIODARONE HYDROCHLORIDE 400 MG: 200 TABLET ORAL at 20:46

## 2024-03-02 RX ADMIN — AMIODARONE HYDROCHLORIDE 400 MG: 200 TABLET ORAL at 14:36

## 2024-03-02 RX ADMIN — DESMOPRESSIN ACETATE 100 MCG: 0.2 TABLET ORAL at 23:28

## 2024-03-02 RX ADMIN — TAMSULOSIN HYDROCHLORIDE 0.4 MG: 0.4 CAPSULE ORAL at 08:53

## 2024-03-02 RX ADMIN — GABAPENTIN 600 MG: 300 CAPSULE ORAL at 16:36

## 2024-03-02 RX ADMIN — CEFEPIME 2000 MG: 2 INJECTION, POWDER, FOR SOLUTION INTRAVENOUS at 07:00

## 2024-03-02 RX ADMIN — SENNOSIDES AND DOCUSATE SODIUM 1 TABLET: 50; 8.6 TABLET ORAL at 08:53

## 2024-03-02 RX ADMIN — AMIODARONE HYDROCHLORIDE 0.5 MG/MIN: 50 INJECTION, SOLUTION INTRAVENOUS at 04:41

## 2024-03-02 RX ADMIN — SENNOSIDES AND DOCUSATE SODIUM 1 TABLET: 50; 8.6 TABLET ORAL at 20:46

## 2024-03-02 RX ADMIN — POTASSIUM BICARBONATE 40 MEQ: 782 TABLET, EFFERVESCENT ORAL at 07:04

## 2024-03-02 RX ADMIN — GABAPENTIN 600 MG: 300 CAPSULE ORAL at 08:53

## 2024-03-02 RX ADMIN — ENOXAPARIN SODIUM 30 MG: 100 INJECTION SUBCUTANEOUS at 08:54

## 2024-03-02 RX ADMIN — GABAPENTIN 600 MG: 300 CAPSULE ORAL at 20:46

## 2024-03-02 RX ADMIN — ENOXAPARIN SODIUM 30 MG: 100 INJECTION SUBCUTANEOUS at 20:46

## 2024-03-02 RX ADMIN — ZOLPIDEM TARTRATE 10 MG: 5 TABLET ORAL at 20:46

## 2024-03-02 RX ADMIN — FENOFIBRATE 160 MG: 160 TABLET ORAL at 08:53

## 2024-03-02 RX ADMIN — SIMETHICONE 80 MG: 80 TABLET, CHEWABLE ORAL at 14:35

## 2024-03-02 RX ADMIN — DOXYCYCLINE 100 MG: 100 INJECTION, POWDER, LYOPHILIZED, FOR SOLUTION INTRAVENOUS at 05:56

## 2024-03-02 RX ADMIN — ACETAMINOPHEN 650 MG: 325 TABLET ORAL at 15:04

## 2024-03-02 RX ADMIN — FAMOTIDINE 20 MG: 20 TABLET, FILM COATED ORAL at 20:46

## 2024-03-02 RX ADMIN — CEFEPIME 2000 MG: 2 INJECTION, POWDER, FOR SOLUTION INTRAVENOUS at 14:21

## 2024-03-02 ASSESSMENT — PAIN SCALES - WONG BAKER
WONGBAKER_NUMERICALRESPONSE: NO HURT
WONGBAKER_NUMERICALRESPONSE: 0
WONGBAKER_NUMERICALRESPONSE: NO HURT
WONGBAKER_NUMERICALRESPONSE: NO HURT

## 2024-03-02 ASSESSMENT — PAIN DESCRIPTION - LOCATION
LOCATION: NECK
LOCATION: ARM

## 2024-03-02 ASSESSMENT — PAIN DESCRIPTION - ONSET: ONSET: ON-GOING

## 2024-03-02 ASSESSMENT — PAIN DESCRIPTION - PAIN TYPE: TYPE: ACUTE PAIN

## 2024-03-02 ASSESSMENT — PAIN DESCRIPTION - DESCRIPTORS
DESCRIPTORS: ACHING
DESCRIPTORS: TENDER;TIGHTNESS

## 2024-03-02 ASSESSMENT — PAIN - FUNCTIONAL ASSESSMENT
PAIN_FUNCTIONAL_ASSESSMENT: PREVENTS OR INTERFERES SOME ACTIVE ACTIVITIES AND ADLS
PAIN_FUNCTIONAL_ASSESSMENT: PREVENTS OR INTERFERES SOME ACTIVE ACTIVITIES AND ADLS

## 2024-03-02 ASSESSMENT — PAIN SCALES - GENERAL: PAINLEVEL_OUTOF10: 3

## 2024-03-02 ASSESSMENT — PAIN DESCRIPTION - ORIENTATION: ORIENTATION: RIGHT

## 2024-03-02 ASSESSMENT — PAIN DESCRIPTION - FREQUENCY: FREQUENCY: CONTINUOUS

## 2024-03-02 NOTE — PROGRESS NOTES
Pt transferred from ICU. Piero order set in place and wound care already consulted on pt for his wounds. No new wounds noted and pt's heels are no longer reddened.     4 Eyes Skin Assessment     NAME:  Juan Mohaumd  YOB: 1940  MEDICAL RECORD NUMBER:  [id number]    The patient is being assessed for  Transfer to New Unit    I agree that at least one RN has performed a thorough Head to Toe Skin Assessment on the patient. ALL assessment sites listed below have been assessed.      Areas assessed by both nurses:    Head, Face, Ears, Shoulders, Back, Chest, Arms, Elbows, Hands, Sacrum. Buttock, Coccyx, Ischium, and Legs. Feet and Heels        Does the Patient have a Wound? No noted wound(s)       Piero Prevention initiated by RN: Yes  Wound Care Orders initiated by RN: Yes    Pressure Injury (Stage 3,4, Unstageable, DTI, NWPT, and Complex wounds) if present, place Wound referral order by RN under : Yes    New Ostomies, if present place, Ostomy referral order under : No     Nurse 1 eSignature: Electronically signed by VEENA LAZAR RN on 3/2/24 at 5:42 PM EST    **SHARE this note so that the co-signing nurse can place an eSignature**    Nurse 2 eSignature: Electronically signed by Joanne Perkins RN on 3/2/24 at 6:57 PM EST

## 2024-03-02 NOTE — PROGRESS NOTES
4 Eyes Skin Assessment     NAME:  Juan Mohamud  YOB: 1940  MEDICAL RECORD NUMBER:  [id number]    The patient is being assessed for  Shift Handoff    I agree that at least one RN has performed a thorough Head to Toe Skin Assessment on the patient. ALL assessment sites listed below have been assessed.      Areas assessed by both nurses:    Head, Face, Ears, Shoulders, Back, Chest, Arms, Elbows, Hands, Sacrum. Buttock, Coccyx, Ischium, Legs. Feet and Heels, and Under Medical Devices         Does the Patient have a Wound? Yes wound(s) were present on assessment. LDA wound assessment was Initiated and completed by RN       Piero Prevention initiated by RN: Yes  Wound Care Orders initiated by RN: Yes    Pressure Injury (Stage 3,4, Unstageable, DTI, NWPT, and Complex wounds) if present, place Wound referral order by RN under : Yes    New Ostomies, if present place, Ostomy referral order under : No     Nurse 1 eSignature: Electronically signed by Faiza Wilkinson RN on 3/2/24 at 6:27 AM EST    **SHARE this note so that the co-signing nurse can place an eSignature**    Nurse 2 eSignature: Electronically signed by Cal Murphy RN on 3/2/24 at 8:24 AM EST

## 2024-03-02 NOTE — PROGRESS NOTES
Cardiac Electrophysiology Progress Note     Admit Date: 2024     Reason for follow up: Sustained VT    HPI and Interval History:   Patient seen and examined. Clinical notes reviewed.   Telemetry reviewed. No new complaint today.   No major events overnight.     S/p LHC yesterday showed no obstructive disease.     Review of System:  Pertinent negatives and positives are mentioned in the HPI and interval history above. The rest are negative.     Physical Examination:  Vitals:    24 0400   BP: 120/77   Pulse: 86   Resp: 14   Temp: 97.8 °F (36.6 °C)   SpO2:         Intake/Output Summary (Last 24 hours) at 3/2/2024 0731  Last data filed at 3/2/2024 0606  Gross per 24 hour   Intake 1763.69 ml   Output 1240 ml   Net 523.69 ml     In: 1763.7 [P.O.:240; I.V.:508.3]  Out: 1340    Wt Readings from Last 3 Encounters:   24 116.7 kg (257 lb 4.4 oz)   24 107.4 kg (236 lb 12.4 oz)   24 106 kg (233 lb 11 oz)     Temp  Av °F (36.7 °C)  Min: 97.8 °F (36.6 °C)  Max: 98.2 °F (36.8 °C)  Pulse  Av.3  Min: 79  Max: 89  BP  Min: 99/69  Max: 165/93  SpO2  Av.1 %  Min: 91 %  Max: 100 %    Telemetry: Sinus rhythm with PVCs  Constitutional: Alert. Oriented to person, place, and time. No distress.   HEENT: Normocephalic atraumatic.   Cardiovascular: Normal rate, regular rhythm. Normal S1&S2.     Pulmonary/Chest: Bilateral respiratory sounds present anterior.  Abdominal: Soft. Normal bowel sounds present.   Musculoskeletal: No tenderness. No pitting edema.  Neurological: Alert and oriented.   Skin: Skin is warm and dry.   Psychiatric: No anxiety nor agitation.    Labs, diagnostic and imaging results reviewed.   Reviewed.   Recent Labs     24  0747 24  0520 24  1802 24  0444   * 130*  --  131*   K 3.6 3.0*  2.7* 3.1* 3.4*   CL 93* 96*  --  96*   CO2 24 25  --  28   BUN 29* 19  --  15   CREATININE 1.0 0.5*  --  0.6*     Recent Labs     24  0747 24  0520

## 2024-03-02 NOTE — PLAN OF CARE
Problem: Safety - Adult  Goal: Free from fall injury  3/2/2024 0739 by Faiza Wilkinson RN  Outcome: Progressing     Problem: Discharge Planning  Goal: Discharge to home or other facility with appropriate resources  3/2/2024 0739 by Faiza Wilkinson RN  Outcome: Progressing     Problem: Chronic Conditions and Co-morbidities  Goal: Patient's chronic conditions and co-morbidity symptoms are monitored and maintained or improved  3/2/2024 0739 by Faiza Wilkinson RN  Outcome: Progressing     Problem: Skin/Tissue Integrity  Goal: Absence of new skin breakdown  Description: 1.  Monitor for areas of redness and/or skin breakdown  2.  Assess vascular access sites hourly  3.  Every 4-6 hours minimum:  Change oxygen saturation probe site  4.  Every 4-6 hours:  If on nasal continuous positive airway pressure, respiratory therapy assess nares and determine need for appliance change or resting period.  3/2/2024 0739 by Faiza Wilkinson RN  Outcome: Not Progressing     Problem: ABCDS Injury Assessment  Goal: Absence of physical injury  3/2/2024 1431 by Cal Murphy RN  Outcome: Progressing     Problem: Pain  Goal: Verbalizes/displays adequate comfort level or baseline comfort level  3/2/2024 1431 by Cal Murphy RN  Outcome: Progressing     Problem: Skin/Tissue Integrity  Goal: Absence of new skin breakdown  Description: 1.  Monitor for areas of redness and/or skin breakdown  2.  Assess vascular access sites hourly  3.  Every 4-6 hours minimum:  Change oxygen saturation probe site  4.  Every 4-6 hours:  If on nasal continuous positive airway pressure, respiratory therapy assess nares and determine need for appliance change or resting period.  3/2/2024 1431 by Cal Murphy RN  Outcome: Progressing  3/2/2024 0739 by Faiza Wilkinson RN  Outcome: Not Progressing

## 2024-03-02 NOTE — PROGRESS NOTES
Hand off and report given to JULIOCESAR Gloria.  External catheter leaked, cleaned and changed, triad cream to buttocks.  Reinforced importance of repositioning.  Rectal tube flushed as ordered.  Rested well the whole night.  More cooperative with the changes done this shift as compared to yesterday.  Call light and kayce within reach.     Electronically signed by Faiza Wilkinson RN on 3/2/2024 at 7:39 AM

## 2024-03-02 NOTE — PROGRESS NOTES
During this shift, patient alert, oriented x 4,follow commands, at room air.Patient was cleaned and repositioned during this shift.Patient refusing to be repositioned to right side.Rectal tube was flushed with 200 ml.External catheter working fine, but leaking.Triad cream placed on gluteal; area.Pt's wife at bedside updated about clinical status of patient.Wound care was performed on his RUE, noted skin tear; large bruise observed in right side of body.Patient was transferred to room 5278 and report was given to JULIOCESAR Cochran.

## 2024-03-02 NOTE — PROGRESS NOTES
V2.0    [facility] [place] [facility] Progress Note      Name:  Juan Mohamud /Age/Sex: 1940  (83 y.o. male)   MRN & CSN:  [id number] & [id number] Encounter Date/Time: 3/2/2024 12:30 PM EST   Location:  [id number] PCP: Janet Garcia MD     Attending:Chiara Gage MD       Hospital Day: 4    Assessment and Recommendations   Juan Mohamud is a 83 y.o. male with pmh of  who presents with Arrhythmia    New onset arrhythmia, troponin elevation .  Unstable/hypotensive 70s-40s SVT status post synchronized cardioversion.  Converted to ST, subsequently VT.  Amnio load followed by amnio drip.  Return to arrhythmia, esmolol drip started in the ED.  Patient became hypotensive.  Cardiology in consult.         S/ cardiac cath on 3/1 : non obstructive CAD , cardiology plan for ICD placement on Monday    History of hypertension, hyperlipidemia.  LLL pneumonia, hyponatremia 128, lactic acidosis 2.3-1.9.  Likely component of dehydration, gentle judicious further IV fluids.  See that his BNP 2500. Procalcitonin elevated 0.49.  Has leukocytosis 13 K but no shift.  X-ray with LLL pneumonia versus atelectasis.  Continue DDAVP from outpatient orders.  COVID test negative in the ED, add on influenza labs.  Antibiotics discontinued  Colonic obstruction.  CT PE/abdomen/pelvis without PE.  Small right pleural effusion, dependent atelectasis bibasilarly, right greater than left.  Progressive colonic dilation since  without focal transition point.  Likely partial obstruction at sigmoid colon, liquid stool throughout colon to the level of rectum.  Cecum markedly distended up to 14 cm.  Difficulty with NGT placement in the ED and multiple times on the floor.  Have given sedation for placement, rectal bowel regimen..         S/p rectal tube placement          Diet as per GI    Recent trauma from fall.  See that patient was admitted  - 2024.  Fell down 6 stairs, was transferred to [facility]  clavicle again demonstrated.  Previously seen soft tissue swelling in this area has improved.  No evidence for sternoclavicular dislocation. ABDOMEN PELVIS: Organs: The liver, gallbladder, pancreas, spleen, adrenals and kidneys reveal no acute findings. GI/Bowel: There is no bowel dilatation or wall thickening identified. Pelvis: No acute findings. Peritoneum/Retroperitoneum: No free air or free fluid.  The aorta is normal in caliber.  The visceral branches are patent. No lymphadenopathy. Bones/Soft Tissues: No abnormality identified.  Chronic findings in the lower thoracic and lumbar spine.  Status post transpedicular fusion from L5-S1. Discectomy at L5-S1. *Unless otherwise specified, incidental findings do not require dedicated imaging follow-up.     1. No evidence of pulmonary embolism. 2. Small right pleural effusion and dependent atelectasis in the lung bases, right greater than left. 3. Progressive colonic dilatation since 02/23/2024.  No focal transition point identified, however the sigmoid colon has a tethered appearance to the nearby cecum in the mid abdomen for which a partial obstructing process is suggested.  Liquid stool is present throughout the colon to the level of the rectum.  The cecum is markedly distended measuring up to 14 cm. 4. Redemonstration of inferior right rib fractures and medial right clavicle fracture.     XR CHEST PORTABLE    Result Date: 2/28/2024  EXAMINATION: ONE XRAY VIEW OF THE CHEST 2/28/2024 9:46 pm COMPARISON: None. HISTORY: ORDERING SYSTEM PROVIDED HISTORY: Recent rib fractures, VT on arrival, shortness of breath. TECHNOLOGIST PROVIDED HISTORY: Reason for exam:->Recent rib fractures, VT on arrival, shortness of breath. Reason for Exam: Recent rib fractures, VT on arrival, shortness of breath. FINDINGS: The mediastinum is unremarkable. The cardiac silhouette is normal size. Subsegmental atelectasis in the left lung base.     Subsegmental atelectasis in the left lung base.

## 2024-03-02 NOTE — PROGRESS NOTES
4 Eyes Skin Assessment     NAME:  Juan Mohamud  YOB: 1940  MEDICAL RECORD NUMBER:  [id number]    The patient is being assessed for  Shift Handoff    I agree that at least one RN has performed a thorough Head to Toe Skin Assessment on the patient. ALL assessment sites listed below have been assessed.      Areas assessed by both nurses:    Head, Face, Ears, Shoulders, Back, Chest, Arms, Elbows, Hands, Sacrum. Buttock, Coccyx, Ischium, Legs. Feet and Heels, and Under Medical Devices         Does the Patient have a Wound? Yes wound(s) were present on assessment. LDA wound assessment was Initiated and completed by RN       Piero Prevention initiated by RN: Yes  Wound Care Orders initiated by RN: Yes    Pressure Injury (Stage 3,4, Unstageable, DTI, NWPT, and Complex wounds) if present, place Wound referral order by RN under : Yes    New Ostomies, if present place, Ostomy referral order under : No     Nurse 1 eSignature: Electronically signed by Nannette Beltrán RN on 3/1/24 at 7:01 PM EST    **SHARE this note so that the co-signing nurse can place an eSignature**    Nurse 2 eSignature: Electronically signed by Faiza Wilkinson RN on 3/2/24 at 6:27 AM EST

## 2024-03-02 NOTE — PLAN OF CARE
Problem: Skin/Tissue Integrity  Goal: Absence of new skin breakdown  Description: 1.  Monitor for areas of redness and/or skin breakdown  2.  Assess vascular access sites hourly  3.  Every 4-6 hours minimum:  Change oxygen saturation probe site  4.  Every 4-6 hours:  If on nasal continuous positive airway pressure, respiratory therapy assess nares and determine need for appliance change or resting period.  Outcome: Not Progressing.  Reinforced importance of repositioning.

## 2024-03-03 LAB
ANION GAP SERPL CALCULATED.3IONS-SCNC: 12 MMOL/L (ref 3–16)
BACTERIA BLD CULT: ABNORMAL
BACTERIA BLD CULT: ABNORMAL
BUN SERPL-MCNC: 14 MG/DL (ref 7–20)
CALCIUM SERPL-MCNC: 7.8 MG/DL (ref 8.3–10.6)
CHLORIDE SERPL-SCNC: 95 MMOL/L (ref 99–110)
CO2 SERPL-SCNC: 25 MMOL/L (ref 21–32)
CREAT SERPL-MCNC: <0.5 MG/DL (ref 0.8–1.3)
GFR SERPLBLD CREATININE-BSD FMLA CKD-EPI: >60 ML/MIN/{1.73_M2}
GLUCOSE SERPL-MCNC: 80 MG/DL (ref 70–99)
MAGNESIUM SERPL-MCNC: 1.9 MG/DL (ref 1.8–2.4)
ORGANISM: ABNORMAL
ORGANISM: ABNORMAL
POTASSIUM SERPL-SCNC: 3.5 MMOL/L (ref 3.5–5.1)
SODIUM SERPL-SCNC: 132 MMOL/L (ref 136–145)

## 2024-03-03 PROCEDURE — 2580000003 HC RX 258: Performed by: INTERNAL MEDICINE

## 2024-03-03 PROCEDURE — 97530 THERAPEUTIC ACTIVITIES: CPT

## 2024-03-03 PROCEDURE — 1200000000 HC SEMI PRIVATE

## 2024-03-03 PROCEDURE — 6360000002 HC RX W HCPCS: Performed by: INTERNAL MEDICINE

## 2024-03-03 PROCEDURE — 6360000002 HC RX W HCPCS: Performed by: HOSPITALIST

## 2024-03-03 PROCEDURE — 2060000000 HC ICU INTERMEDIATE R&B

## 2024-03-03 PROCEDURE — 6370000000 HC RX 637 (ALT 250 FOR IP): Performed by: INTERNAL MEDICINE

## 2024-03-03 PROCEDURE — 6360000002 HC RX W HCPCS: Performed by: REGISTERED NURSE

## 2024-03-03 PROCEDURE — 36415 COLL VENOUS BLD VENIPUNCTURE: CPT

## 2024-03-03 PROCEDURE — 6370000000 HC RX 637 (ALT 250 FOR IP): Performed by: REGISTERED NURSE

## 2024-03-03 PROCEDURE — 83735 ASSAY OF MAGNESIUM: CPT

## 2024-03-03 PROCEDURE — 94760 N-INVAS EAR/PLS OXIMETRY 1: CPT

## 2024-03-03 PROCEDURE — 80048 BASIC METABOLIC PNL TOTAL CA: CPT

## 2024-03-03 PROCEDURE — 6370000000 HC RX 637 (ALT 250 FOR IP): Performed by: HOSPITALIST

## 2024-03-03 PROCEDURE — 2500000003 HC RX 250 WO HCPCS: Performed by: INTERNAL MEDICINE

## 2024-03-03 RX ORDER — MORPHINE SULFATE 2 MG/ML
2 INJECTION, SOLUTION INTRAMUSCULAR; INTRAVENOUS ONCE
Status: COMPLETED | OUTPATIENT
Start: 2024-03-03 | End: 2024-03-03

## 2024-03-03 RX ORDER — MORPHINE SULFATE 2 MG/ML
2 INJECTION, SOLUTION INTRAMUSCULAR; INTRAVENOUS EVERY 4 HOURS PRN
Status: DISCONTINUED | OUTPATIENT
Start: 2024-03-03 | End: 2024-03-14 | Stop reason: HOSPADM

## 2024-03-03 RX ORDER — OXYCODONE HYDROCHLORIDE 5 MG/1
2.5 TABLET ORAL EVERY 4 HOURS PRN
Status: DISCONTINUED | OUTPATIENT
Start: 2024-03-03 | End: 2024-03-14 | Stop reason: HOSPADM

## 2024-03-03 RX ORDER — OXYCODONE HYDROCHLORIDE 5 MG/1
5 TABLET ORAL EVERY 4 HOURS PRN
Status: DISCONTINUED | OUTPATIENT
Start: 2024-03-03 | End: 2024-03-14 | Stop reason: HOSPADM

## 2024-03-03 RX ORDER — DOXYCYCLINE HYCLATE 100 MG
100 TABLET ORAL EVERY 12 HOURS SCHEDULED
Status: COMPLETED | OUTPATIENT
Start: 2024-03-03 | End: 2024-03-04

## 2024-03-03 RX ORDER — LIDOCAINE HYDROCHLORIDE 10 MG/ML
5 INJECTION, SOLUTION EPIDURAL; INFILTRATION; INTRACAUDAL; PERINEURAL ONCE
Status: DISCONTINUED | OUTPATIENT
Start: 2024-03-03 | End: 2024-03-08 | Stop reason: SDUPTHER

## 2024-03-03 RX ADMIN — METHOCARBAMOL 1000 MG: 500 TABLET ORAL at 02:44

## 2024-03-03 RX ADMIN — METHOCARBAMOL 1000 MG: 500 TABLET ORAL at 10:22

## 2024-03-03 RX ADMIN — AMIODARONE HYDROCHLORIDE 400 MG: 200 TABLET ORAL at 20:46

## 2024-03-03 RX ADMIN — CEFEPIME 2000 MG: 2 INJECTION, POWDER, FOR SOLUTION INTRAVENOUS at 23:34

## 2024-03-03 RX ADMIN — TAMSULOSIN HYDROCHLORIDE 0.4 MG: 0.4 CAPSULE ORAL at 10:22

## 2024-03-03 RX ADMIN — DESMOPRESSIN ACETATE 100 MCG: 0.2 TABLET ORAL at 20:47

## 2024-03-03 RX ADMIN — GABAPENTIN 600 MG: 300 CAPSULE ORAL at 10:22

## 2024-03-03 RX ADMIN — AMIODARONE HYDROCHLORIDE 400 MG: 200 TABLET ORAL at 10:22

## 2024-03-03 RX ADMIN — FAMOTIDINE 20 MG: 20 TABLET, FILM COATED ORAL at 10:23

## 2024-03-03 RX ADMIN — DOXYCYCLINE HYCLATE 100 MG: 100 TABLET, COATED ORAL at 20:46

## 2024-03-03 RX ADMIN — OXYCODONE 5 MG: 5 TABLET ORAL at 06:15

## 2024-03-03 RX ADMIN — SENNOSIDES AND DOCUSATE SODIUM 1 TABLET: 50; 8.6 TABLET ORAL at 20:46

## 2024-03-03 RX ADMIN — MORPHINE SULFATE 2 MG: 2 INJECTION, SOLUTION INTRAMUSCULAR; INTRAVENOUS at 03:52

## 2024-03-03 RX ADMIN — SIMETHICONE 80 MG: 80 TABLET, CHEWABLE ORAL at 18:44

## 2024-03-03 RX ADMIN — GABAPENTIN 600 MG: 300 CAPSULE ORAL at 15:27

## 2024-03-03 RX ADMIN — ACETAMINOPHEN 650 MG: 325 TABLET ORAL at 10:23

## 2024-03-03 RX ADMIN — FAMOTIDINE 20 MG: 20 TABLET, FILM COATED ORAL at 20:48

## 2024-03-03 RX ADMIN — METHOCARBAMOL 1000 MG: 500 TABLET ORAL at 20:51

## 2024-03-03 RX ADMIN — ENOXAPARIN SODIUM 30 MG: 100 INJECTION SUBCUTANEOUS at 10:23

## 2024-03-03 RX ADMIN — ZOLPIDEM TARTRATE 10 MG: 5 TABLET ORAL at 20:47

## 2024-03-03 RX ADMIN — DOXYCYCLINE 100 MG: 100 INJECTION, POWDER, LYOPHILIZED, FOR SOLUTION INTRAVENOUS at 05:25

## 2024-03-03 RX ADMIN — CEFEPIME 2000 MG: 2 INJECTION, POWDER, FOR SOLUTION INTRAVENOUS at 16:28

## 2024-03-03 RX ADMIN — MORPHINE SULFATE 2 MG: 2 INJECTION, SOLUTION INTRAMUSCULAR; INTRAVENOUS at 16:09

## 2024-03-03 RX ADMIN — Medication 2 SPRAY: at 11:04

## 2024-03-03 RX ADMIN — FENOFIBRATE 160 MG: 160 TABLET ORAL at 10:23

## 2024-03-03 RX ADMIN — GABAPENTIN 600 MG: 300 CAPSULE ORAL at 20:48

## 2024-03-03 RX ADMIN — ENOXAPARIN SODIUM 30 MG: 100 INJECTION SUBCUTANEOUS at 20:48

## 2024-03-03 RX ADMIN — MORPHINE SULFATE 2 MG: 2 INJECTION, SOLUTION INTRAMUSCULAR; INTRAVENOUS at 11:10

## 2024-03-03 RX ADMIN — CEFEPIME 2000 MG: 2 INJECTION, POWDER, FOR SOLUTION INTRAVENOUS at 08:00

## 2024-03-03 RX ADMIN — SENNOSIDES AND DOCUSATE SODIUM 1 TABLET: 50; 8.6 TABLET ORAL at 10:23

## 2024-03-03 ASSESSMENT — PAIN - FUNCTIONAL ASSESSMENT
PAIN_FUNCTIONAL_ASSESSMENT: ACTIVITIES ARE NOT PREVENTED
PAIN_FUNCTIONAL_ASSESSMENT: PREVENTS OR INTERFERES SOME ACTIVE ACTIVITIES AND ADLS
PAIN_FUNCTIONAL_ASSESSMENT: PREVENTS OR INTERFERES WITH ALL ACTIVE AND SOME PASSIVE ACTIVITIES
PAIN_FUNCTIONAL_ASSESSMENT: PREVENTS OR INTERFERES WITH MANY ACTIVE NOT PASSIVE ACTIVITIES
PAIN_FUNCTIONAL_ASSESSMENT: ACTIVITIES ARE NOT PREVENTED

## 2024-03-03 ASSESSMENT — PAIN DESCRIPTION - DESCRIPTORS
DESCRIPTORS: ACHING
DESCRIPTORS: ACHING;SORE;TENDER
DESCRIPTORS: ACHING
DESCRIPTORS: ACHING;SORE;TENDER
DESCRIPTORS: ACHING

## 2024-03-03 ASSESSMENT — PAIN SCALES - GENERAL
PAINLEVEL_OUTOF10: 8
PAINLEVEL_OUTOF10: 7
PAINLEVEL_OUTOF10: 4
PAINLEVEL_OUTOF10: 7
PAINLEVEL_OUTOF10: 7
PAINLEVEL_OUTOF10: 4
PAINLEVEL_OUTOF10: 7

## 2024-03-03 ASSESSMENT — PAIN DESCRIPTION - ORIENTATION: ORIENTATION: LEFT

## 2024-03-03 ASSESSMENT — PAIN SCALES - WONG BAKER
WONGBAKER_NUMERICALRESPONSE: HURTS A LITTLE BIT
WONGBAKER_NUMERICALRESPONSE: 2

## 2024-03-03 ASSESSMENT — PAIN DESCRIPTION - LOCATION
LOCATION: GENERALIZED
LOCATION: GENERALIZED
LOCATION: BACK
LOCATION: HAND
LOCATION: GENERALIZED

## 2024-03-03 NOTE — PROGRESS NOTES
[facility]   Daily Progress Note      Admit Date:  2/28/2024    CC: SOB    HPI:   Juna Mohamud is a 83 y.o. male with PMH HTN, HLP, DM, CADDVT  Resides at [facility]    Adm w SOB and orthopnea- brought to ED via EMS- noted tachy arrhythmias  Initial EKG with WCT 250bpm with BP 70/40. Received synchronized cardioversion to sinus tachycardia  130s  Went back into VT and given amiodarone bolus X2 and gtt  Continued with VT and started on esmolol gtt- Dc'd fpr hypotension    Found to have LLL PNA, hyponatremia and colonic obstruction- has a rectal tube.    OOB with steady , very weak.   Forgetful.   Denies SOB or CP.   ON RA    NSR , NSVT- 4-7 beats    Review of Systems:   General: Denies fever, chills  Skin: Denies skin changes, rash, itching, lesions.  HEENT: Denies headache, dizziness, vision changes, nosebleeds, sore throat, nasal drainage  RESP: Denies cough, sputum, dyspnea, wheeze, snoring  CARD: Denies palpitations,  murmur  GI:Denies nausea, vomiting, heartburn, loss of appetite, change in bowels  : Denies frequency, pain, incontinence, polyuria  VASC: Denies claudication, leg cramps, clots  MUSC/SKEL: Denies pain, stiffness, arthritis  PSYCH: Denies anxiety, depression, stress  NEURO: Denies numbness, tingling, weakness,change in mood or memory  HEME: Denies abn bruising, bleeding, anemia  ENDO: Denies intolerance to heat, cold, excessive thirst or hunger, hx thyroid disease    Objective:   BP (!) 140/95   Pulse 80   Temp 97.6 °F (36.4 °C) (Oral)   Resp 14   Ht 1.93 m (6' 4\")   Wt 108.1 kg (238 lb 5.1 oz)   SpO2 98%   BMI 29.01 kg/m²         Intake/Output Summary (Last 24 hours) at 3/3/2024 1225  Last data filed at 3/3/2024 0800  Gross per 24 hour   Intake 1234.98 ml   Output 1575 ml   Net -340.02 ml     I/O since adm:     WEIGHT:Admit Weight - Scale: 107.8 kg (237 lb 10.5 oz)         Today  Weight - Scale: 108.1 kg (238 lb 5.1 oz)   DRY WEIGHT:  Wt Readings from Last  has mild disease .              OM1 is normal      The right coronary artery is dominant vessel with mild disease .              RPDA is normal         INTERVENTION  none     SUMMARY:   Mild nonobstructive CAD      RECOMMENDATION:  .  - medical therapy     ECHO: 2/29/2024   Summary   Definity contrast administered.   Overall, left ventricular size and systolic function appears normal.   Ejection fraction is visually estimated to be 55%.   No regional wall motion abnormalities are noted.   Mild concentric left ventricular hypertrophy is present.   Normal diastolic function.   Mild to moderate, anteriorly directed mitral valve regurgitation.   The left atrium is dilated.   The aortic valve appears sclerotic but opens well.    Assessment/Plan:    1.) WCT consistent with VT  S/P defibrillation  LVEF normal  [facility]- no obstructive CAD  Ongoing NSVT/PVCs  Reloading Amio 400mg BID  Plan for DC ICD 3/4 w Dr. Murillo for secondary prevention  Added metoprolol    2.) CAD;   Non obstructive  Risk factor management: high blood pressure, high cholesterol, Diabetes, smoking, obesity, family hx  Lifestyle modification: Heart healthy diet, regular exercise, weight loss, smoking cessation, stress reduction          Electronically signed by TIP Rodriguez CNP on 3/3/2024 at 12:25 PM

## 2024-03-03 NOTE — PROGRESS NOTES
V2.0    [facility] Progress Note      Name:  Juan Mohamud /Age/Sex: 1940  (83 y.o. male)   MRN & CSN:  [id number] & [id number] Encounter Date/Time: 3/3/2024 12:30 PM EST   Location:  [id number] PCP: Janet Garcia MD     Attending:Chiara Gage MD       Hospital Day: 5    Assessment and Recommendations   Juan Mohamud is a 83 y.o. male with pmh of  who presents with Arrhythmia    New onset arrhythmia, troponin elevation .  Unstable/hypotensive 70s-40s SVT status post synchronized cardioversion.  Converted to ST, subsequently VT.  Amnio load followed by amnio drip.  Return to arrhythmia, esmolol drip started in the ED.  Patient became hypotensive.  Cardiology in consult.         S/ cardiac cath on 3/1 : non obstructive CAD , cardiology plan for ICD placement on Monday         NPO past midnight    History of hypertension, hyperlipidemia.  LLL pneumonia, hyponatremia 128, lactic acidosis 2.3-1.9.  Likely component of dehydration, gentle judicious further IV fluids.  See that his BNP 2500. Procalcitonin elevated 0.49.  Has leukocytosis 13 K but no shift.  X-ray with LLL pneumonia versus atelectasis.  Continue DDAVP from outpatient orders.  COVID test negative in the ED, add on influenza labs.  Antibiotics discontinued  Colonic obstruction.  CT PE/abdomen/pelvis without PE.  Small right pleural effusion, dependent atelectasis bibasilarly, right greater than left.  Progressive colonic dilation since  without focal transition point.  Likely partial obstruction at sigmoid colon, liquid stool throughout colon to the level of rectum.  Cecum markedly distended up to 14 cm.  Difficulty with NGT placement in the ED and multiple times on the floor.  Have given sedation for placement, rectal bowel regimen..         S/p rectal tube placement          Diet as per GI    Recent trauma from fall.  See that patient was admitted  - 2024.  Fell down 6 stairs, was transferred  results for input(s): \"AST\", \"ALT\", \"ALB\", \"BILITOT\", \"ALKPHOS\" in the last 72 hours.    Lipids: No results found for: \"CHOL\", \"HDL\", \"TRIG\"  Hemoglobin A1C:   Lab Results   Component Value Date/Time    LABA1C 6.4 10/24/2013 09:29 AM     TSH: No results found for: \"TSH\"  Troponin: No results found for: \"TROPONINT\"  Lactic Acid: No results for input(s): \"LACTA\" in the last 72 hours.  BNP:   No results for input(s): \"PROBNP\" in the last 72 hours.    UA:  Lab Results   Component Value Date/Time    NITRU Negative 02/23/2024 10:49 PM    COLORU Yellow 02/23/2024 10:49 PM    PHUR 5.5 02/23/2024 10:49 PM    WBCUA 1 02/23/2024 10:49 PM    RBCUA 2 02/23/2024 10:49 PM    BACTERIA None Seen 02/23/2024 10:49 PM    CLARITYU Clear 02/23/2024 10:49 PM    SPECGRAV 1.026 02/23/2024 10:49 PM    LEUKOCYTESUR Negative 02/23/2024 10:49 PM    UROBILINOGEN 1.0 02/23/2024 10:49 PM    BILIRUBINUR Negative 02/23/2024 10:49 PM    BLOODU Negative 02/23/2024 10:49 PM    GLUCOSEU Negative 02/23/2024 10:49 PM    KETUA TRACE 02/23/2024 10:49 PM     Urine Cultures: No results found for: \"LABURIN\"  Blood Cultures:   Lab Results   Component Value Date/Time    BC See additional report for complete BCID panel. 02/29/2024 12:27 AM    BC  02/29/2024 12:27 AM     POSITIVE for  This organism was isolated in one set.  Susceptibility testing is not routinely done as this  organism frequently represents skin contamination.  Additional testing can be ordered by calling the  Microbiology Department.       Lab Results   Component Value Date/Time    BLOODCULT2  02/29/2024 04:43 AM     No Growth to date.  Any change in status will be called.     Organism:   Lab Results   Component Value Date/Time    ORG Staphylococcus epidermidis DNA Detected 02/29/2024 12:27 AM    ORG Staphylococcus epidermidis 02/29/2024 12:27 AM         Electronically signed by Chiara Gage MD on 3/3/2024 at 11:53 AM

## 2024-03-03 NOTE — PROGRESS NOTES
Pt's wife said that pt has an appointment at the eye doctor for eye injections next week and she wants to speak with SW about the appointment.     RN provided pts wife with contact information for SW.     No further concerns.

## 2024-03-03 NOTE — PROGRESS NOTES
Pt requesting shower per Kandi NP (cardiology). Pt on telemetry. RN paged cardiology to see if pt could come off monitor when shower given. Cardiologist returned page stating it was ok.    Pt given shower. Dressing to skin tears changed (L elbow/FA X 2, R elbow, R calf). RN did not replace mepilex dressing over L arm wounds because of the fragility of pt's skin.     RN cleaned wound while pt in the shower then dressed them with non-adherent dressing, non-adherent gauze then wrapped the areas with Kerlix and secured with paper tape.     Pt given pain medication after his shower and is currently resting in bed. Wife at bedside. Pt ordered DN but VU that he NPO after MN for AICD placement. Order for consent not entered. RN printed the form and placed in pt's chart.     Dr. Gage gave verbal orders for pt to receive a PICC line. RN entered orders, printed consent form then placed in chart. RN deferred calling PICC team. Stephany GANDARA aware.

## 2024-03-03 NOTE — PROGRESS NOTES
Pt xfr'd from ICU. Report called by Faizan. Pt oriented to new environment. Call light placed w/n reach. Plan of care reviewed with pt and wife. White board updated.     Pt c/o pain in his LUE. He says his skin \"hurts to touch.\" Pt's L hand and L FA is significantly more swollen than his RUE. RN moved amiodarone to proximal A/C PIV (where pt's arm was not swollen) and educated pt regarding positioning of his arm to avoid making the pump alarm. Pt VU/DU. RN removed the L FA PIV and provided pt with a pillow and ice pack. Cardiologist entered orders per request in RN's page.     Later, pt stated that his arm felt better but still hurt. He said \"at least you can touch it now.\" Pt's arm elevated on pillow. He asked to have ice pack removed at bedtime.

## 2024-03-03 NOTE — PROGRESS NOTES
INPATIENT CONSULTATION:    IDENTIFYING DATA/REASON FOR CONSULTATION   PATIENT:  Juan Mohamud  MRN:  [id number]  ADMIT DATE: 2/28/2024  TIME OF EVALUATION: 3/3/2024 8:48 AM  HOSPITAL STAY:   LOS: 3 days   CONSULTING PHYSICIAN: Chiara Gage MD   REASON FOR CONSULTATION: Colonic distention    Subjective:    Patient seen and examined in follow-up. Patient reports he is doing okay.  He denies any abdominal pain.  He has passed some liquid brown stool per rectal tube and around rectal tube.  He complains of left hand pain where his amiodarone IV infiltrated.    MEDICATIONS   SCHEDULED:  desmopressin, 100 mcg, Nightly  linaclotide, 290 mcg, QAM AC  amiodarone, 400 mg, BID  sodium chloride flush, 5-40 mL, 2 times per day  famotidine, 20 mg, BID  gabapentin, 600 mg, TID  fenofibrate, 160 mg, Daily  sennosides-docusate sodium, 1 tablet, BID  tamsulosin, 0.4 mg, Daily  sodium chloride flush, 5-40 mL, 2 times per day  doxycycline (VIBRAMYCIN) IV, 100 mg, Q12H  sodium chloride flush, 5-40 mL, 2 times per day  enoxaparin, 30 mg, BID  cefepime, 2,000 mg, Q8H      FLUIDS/DRIPS:     sodium chloride      sodium chloride      sodium chloride       PRNs: oxyCODONE, 5 mg, Q4H PRN   Or  oxyCODONE, 2.5 mg, Q4H PRN  sodium chloride, 2 spray, PRN  sodium chloride flush, 5-40 mL, PRN  sodium chloride, , PRN  acetaminophen, 650 mg, Q4H PRN  methocarbamol, 1,000 mg, TID PRN  simethicone, 80 mg, Q6H PRN  zolpidem, 10 mg, Nightly PRN  magnesium hydroxide, 30 mL, Daily PRN  sodium chloride flush, 5-40 mL, PRN  sodium chloride, , PRN  potassium chloride, 40 mEq, PRN   Or  potassium alternative oral replacement, 40 mEq, PRN   Or  potassium chloride, 10 mEq, PRN  magnesium sulfate, 2,000 mg, PRN  ondansetron, 4 mg, Q8H PRN   Or  ondansetron, 4 mg, Q6H PRN  polyethylene glycol, 17 g, Daily PRN  acetaminophen, 650 mg, Q6H PRN   Or  acetaminophen, 650 mg, Q6H PRN  benzocaine, , PRN  sodium chloride flush, 5-40 mL, PRN  sodium chloride, ,

## 2024-03-03 NOTE — PROGRESS NOTES
Pt c/o shoulder and back pain 8/10.  Robaxin ineffective.  Notified hospitalist on call and received order for morphine x1 and oxycodone PRN.  Pt resting quietly.

## 2024-03-03 NOTE — PROGRESS NOTES
Physical Therapy  Facility/Department: [address] PROGRESSIVE CARE  Physical Therapy Treatment Note    Name: Juan Mohamud  : 1940  MRN: [id number]  Date of Service: 3/3/2024    Discharge Recommendations:  Continue to assess pending progress, Subacute/Skilled Nursing Facility   PT Equipment Recommendations  Other: Defer to next level of care.    Juan Mohamud scored a 8/24 on the AM-PAC short mobility form. Current research shows that an AM-PAC score of 17 or less is typically not associated with a discharge to the patient's home setting. Based on the patient's AM-PAC score and their current functional mobility deficits, it is recommended that the patient have 3-5 sessions per week of Physical Therapy at d/c to increase the patient's independence.  Please see assessment section for further patient specific details.    If patient discharges prior to next session this note will serve as a discharge summary.  Please see below for the latest assessment towards goals.        Assessment   Body Structures, Functions, Activity Limitations Requiring Skilled Therapeutic Intervention: Decreased functional mobility ;Decreased strength;Decreased safe awareness;Decreased endurance;Decreased balance;Increased pain  Assessment: 82 y/o male admit 2024 [facility] Acute Respiratory Failure, Wide Complex Tachycardia, [place] and Elevated Troponin. Pt had fall  resulting in multiple fractures and has been in out of hospital/SNF settings  since. Pt most recently at [facility] SN[facility] setting. Prior to fall, pt lived at home with spouse and was independent with daily care  and functional mobility (without assist device). Currently, pt cont Max assist x 2 to eob; Min/Mod assist x 2 for Transfers via Stedy; increase assist to full upright to engage/disengage seat of Stedy.   Pt is  NWB on R UE d/t Clavical Fx; closely monitored to ensure pt did not use UE to assist with transfer this date.  At this time, anticipate return  needed standing up from a chair using your arms?: A Lot  How much help is needed walking in hospital room?: Total  How much help is needed climbing 3-5 steps with a railing?: Total  AM-PAC Inpatient Mobility Raw Score : 8  AM-PAC Inpatient T-Scale Score : 28.52  Mobility Inpatient CMS 0-100% Score: 86.62  Mobility Inpatient CMS G-Code Modifier : CM         Goals  Short Term Goals  Time Frame for Short Term Goals: Upon d/c acute care setting.  Short Term Goal 1: Bed Mob Min/Mod assist x 2.  Short Term Goal 2: Transfers via Stedy CGA; NWB R UE.  Short Term Goal 3: Pt participating in approp Strength Exs.  Patient Goals   Patient Goals : Get stronger, return home.       Education  Patient Education  Education Given To: Patient  Education Provided Comments: Role of PT, POC, Need to call for assist, NWB Restrictions R UE, Bed Mob, EOB, Transfers via Stedy.  Education Method: Verbal;Demonstration  Barriers to Learning: Cognition;Hearing  Education Outcome: Continued education needed      Therapy Time   Individual Concurrent Group Co-treatment   Time In 1035         Time Out 1100         Minutes 25                 Jacy Andrews, PT

## 2024-03-04 ENCOUNTER — APPOINTMENT (OUTPATIENT)
Dept: GENERAL RADIOLOGY | Age: 84
DRG: 981 | End: 2024-03-04
Payer: MEDICARE

## 2024-03-04 ENCOUNTER — APPOINTMENT (OUTPATIENT)
Dept: CARDIAC CATH/INVASIVE PROCEDURES | Age: 84
DRG: 981 | End: 2024-03-04
Payer: MEDICARE

## 2024-03-04 LAB
ANION GAP SERPL CALCULATED.3IONS-SCNC: 11 MMOL/L (ref 3–16)
BACTERIA BLD CULT ORG #2: NORMAL
BUN SERPL-MCNC: 13 MG/DL (ref 7–20)
CALCIUM SERPL-MCNC: 7.7 MG/DL (ref 8.3–10.6)
CHLORIDE SERPL-SCNC: 95 MMOL/L (ref 99–110)
CO2 SERPL-SCNC: 24 MMOL/L (ref 21–32)
CREAT SERPL-MCNC: <0.5 MG/DL (ref 0.8–1.3)
GFR SERPLBLD CREATININE-BSD FMLA CKD-EPI: >60 ML/MIN/{1.73_M2}
GLUCOSE BLD-MCNC: 85 MG/DL (ref 70–99)
GLUCOSE SERPL-MCNC: 98 MG/DL (ref 70–99)
PERFORMED ON: NORMAL
POTASSIUM SERPL-SCNC: 3.2 MMOL/L (ref 3.5–5.1)
SODIUM SERPL-SCNC: 130 MMOL/L (ref 136–145)

## 2024-03-04 PROCEDURE — 2580000003 HC RX 258: Performed by: INTERNAL MEDICINE

## 2024-03-04 PROCEDURE — 36415 COLL VENOUS BLD VENIPUNCTURE: CPT

## 2024-03-04 PROCEDURE — 6370000000 HC RX 637 (ALT 250 FOR IP): Performed by: INTERNAL MEDICINE

## 2024-03-04 PROCEDURE — 93971 EXTREMITY STUDY: CPT

## 2024-03-04 PROCEDURE — 6360000002 HC RX W HCPCS: Performed by: INTERNAL MEDICINE

## 2024-03-04 PROCEDURE — 6370000000 HC RX 637 (ALT 250 FOR IP): Performed by: REGISTERED NURSE

## 2024-03-04 PROCEDURE — 6370000000 HC RX 637 (ALT 250 FOR IP): Performed by: HOSPITALIST

## 2024-03-04 PROCEDURE — 99211 OFF/OP EST MAY X REQ PHY/QHP: CPT | Performed by: INTERNAL MEDICINE

## 2024-03-04 PROCEDURE — 94760 N-INVAS EAR/PLS OXIMETRY 1: CPT

## 2024-03-04 PROCEDURE — 74018 RADEX ABDOMEN 1 VIEW: CPT

## 2024-03-04 PROCEDURE — 80048 BASIC METABOLIC PNL TOTAL CA: CPT

## 2024-03-04 PROCEDURE — 2060000000 HC ICU INTERMEDIATE R&B

## 2024-03-04 RX ORDER — SODIUM CHLORIDE 9 MG/ML
INJECTION, SOLUTION INTRAVENOUS PRN
Status: DISCONTINUED | OUTPATIENT
Start: 2024-03-04 | End: 2024-03-08 | Stop reason: SDUPTHER

## 2024-03-04 RX ORDER — SODIUM CHLORIDE 0.9 % (FLUSH) 0.9 %
5-40 SYRINGE (ML) INJECTION PRN
Status: DISCONTINUED | OUTPATIENT
Start: 2024-03-04 | End: 2024-03-08 | Stop reason: SDUPTHER

## 2024-03-04 RX ORDER — SODIUM CHLORIDE 9 MG/ML
INJECTION, SOLUTION INTRAVENOUS PRN
Status: DISCONTINUED | OUTPATIENT
Start: 2024-03-04 | End: 2024-03-04 | Stop reason: SDUPTHER

## 2024-03-04 RX ORDER — SODIUM CHLORIDE 0.9 % (FLUSH) 0.9 %
5-40 SYRINGE (ML) INJECTION EVERY 12 HOURS SCHEDULED
Status: DISCONTINUED | OUTPATIENT
Start: 2024-03-04 | End: 2024-03-08 | Stop reason: SDUPTHER

## 2024-03-04 RX ORDER — METRONIDAZOLE 500 MG/100ML
500 INJECTION, SOLUTION INTRAVENOUS EVERY 8 HOURS
Status: DISCONTINUED | OUTPATIENT
Start: 2024-03-04 | End: 2024-03-05

## 2024-03-04 RX ORDER — CHLORHEXIDINE GLUCONATE 4 G/100ML
SOLUTION TOPICAL ONCE
Status: DISCONTINUED | OUTPATIENT
Start: 2024-03-04 | End: 2024-03-14 | Stop reason: HOSPADM

## 2024-03-04 RX ADMIN — OXYCODONE 5 MG: 5 TABLET ORAL at 11:44

## 2024-03-04 RX ADMIN — Medication 10 ML: at 20:49

## 2024-03-04 RX ADMIN — FAMOTIDINE 20 MG: 20 TABLET, FILM COATED ORAL at 20:49

## 2024-03-04 RX ADMIN — FAMOTIDINE 20 MG: 20 TABLET, FILM COATED ORAL at 08:32

## 2024-03-04 RX ADMIN — POTASSIUM CHLORIDE 40 MEQ: 1500 TABLET, EXTENDED RELEASE ORAL at 08:33

## 2024-03-04 RX ADMIN — GABAPENTIN 600 MG: 300 CAPSULE ORAL at 08:33

## 2024-03-04 RX ADMIN — SENNOSIDES AND DOCUSATE SODIUM 1 TABLET: 50; 8.6 TABLET ORAL at 20:49

## 2024-03-04 RX ADMIN — ONDANSETRON 4 MG: 2 INJECTION INTRAMUSCULAR; INTRAVENOUS at 16:35

## 2024-03-04 RX ADMIN — ZOLPIDEM TARTRATE 10 MG: 5 TABLET ORAL at 20:49

## 2024-03-04 RX ADMIN — SIMETHICONE 80 MG: 80 TABLET, CHEWABLE ORAL at 11:44

## 2024-03-04 RX ADMIN — OXYCODONE 5 MG: 5 TABLET ORAL at 16:37

## 2024-03-04 RX ADMIN — NALOXEGOL OXALATE 25 MG: 25 TABLET, FILM COATED ORAL at 11:44

## 2024-03-04 RX ADMIN — FENOFIBRATE 160 MG: 160 TABLET ORAL at 08:33

## 2024-03-04 RX ADMIN — CEFEPIME 2000 MG: 2 INJECTION, POWDER, FOR SOLUTION INTRAVENOUS at 07:04

## 2024-03-04 RX ADMIN — AMIODARONE HYDROCHLORIDE 400 MG: 200 TABLET ORAL at 08:32

## 2024-03-04 RX ADMIN — DOXYCYCLINE HYCLATE 100 MG: 100 TABLET, COATED ORAL at 08:32

## 2024-03-04 RX ADMIN — SENNOSIDES AND DOCUSATE SODIUM 1 TABLET: 50; 8.6 TABLET ORAL at 08:32

## 2024-03-04 RX ADMIN — METRONIDAZOLE 500 MG: 500 INJECTION, SOLUTION INTRAVENOUS at 23:34

## 2024-03-04 RX ADMIN — SODIUM CHLORIDE, PRESERVATIVE FREE 10 ML: 5 INJECTION INTRAVENOUS at 20:50

## 2024-03-04 RX ADMIN — DOXYCYCLINE HYCLATE 100 MG: 100 TABLET, COATED ORAL at 20:50

## 2024-03-04 RX ADMIN — TAMSULOSIN HYDROCHLORIDE 0.4 MG: 0.4 CAPSULE ORAL at 08:36

## 2024-03-04 RX ADMIN — GABAPENTIN 600 MG: 300 CAPSULE ORAL at 20:49

## 2024-03-04 RX ADMIN — DESMOPRESSIN ACETATE 100 MCG: 0.2 TABLET ORAL at 20:49

## 2024-03-04 RX ADMIN — Medication 10 ML: at 20:50

## 2024-03-04 RX ADMIN — AMIODARONE HYDROCHLORIDE 400 MG: 200 TABLET ORAL at 20:49

## 2024-03-04 RX ADMIN — CEFEPIME 2000 MG: 2 INJECTION, POWDER, FOR SOLUTION INTRAVENOUS at 22:49

## 2024-03-04 RX ADMIN — OXYCODONE 5 MG: 5 TABLET ORAL at 03:54

## 2024-03-04 RX ADMIN — CEFEPIME 2000 MG: 2 INJECTION, POWDER, FOR SOLUTION INTRAVENOUS at 14:00

## 2024-03-04 RX ADMIN — GABAPENTIN 600 MG: 300 CAPSULE ORAL at 13:48

## 2024-03-04 RX ADMIN — METRONIDAZOLE 500 MG: 500 INJECTION, SOLUTION INTRAVENOUS at 16:44

## 2024-03-04 ASSESSMENT — PAIN DESCRIPTION - ORIENTATION
ORIENTATION: LOWER

## 2024-03-04 ASSESSMENT — PAIN DESCRIPTION - LOCATION
LOCATION: BACK

## 2024-03-04 ASSESSMENT — PAIN SCALES - GENERAL
PAINLEVEL_OUTOF10: 7

## 2024-03-04 ASSESSMENT — PAIN DESCRIPTION - FREQUENCY: FREQUENCY: INTERMITTENT

## 2024-03-04 ASSESSMENT — PAIN DESCRIPTION - DESCRIPTORS
DESCRIPTORS: ACHING
DESCRIPTORS: ACHING;SHOOTING
DESCRIPTORS: ACHING
DESCRIPTORS: DISCOMFORT

## 2024-03-04 ASSESSMENT — PAIN - FUNCTIONAL ASSESSMENT
PAIN_FUNCTIONAL_ASSESSMENT: PREVENTS OR INTERFERES WITH MANY ACTIVE NOT PASSIVE ACTIVITIES
PAIN_FUNCTIONAL_ASSESSMENT: PREVENTS OR INTERFERES SOME ACTIVE ACTIVITIES AND ADLS

## 2024-03-04 ASSESSMENT — PAIN DESCRIPTION - PAIN TYPE: TYPE: ACUTE PAIN

## 2024-03-04 NOTE — PROGRESS NOTES
Cardiac Electrophysiology Progress Note     Admit Date: 2024     Reason for follow up: sustained  bpm    HPI: 84yo M with sustained VT.    Interval History: Patient seen and examined. Clinical notes reviewed. Telemetry reviewed. No new complaint today. No major events overnight. Denies having angina, shortness of breath, dyspnea on exertion, Orthopnea, PND at the time of this visit.    Review of Systems   Constitutional:  Negative for chills, fatigue, fever and unexpected weight change.   HENT:  Negative for congestion, hearing loss, sinus pressure, sore throat and trouble swallowing.    Respiratory:  Negative for cough, shortness of breath and wheezing.    Cardiovascular:  Negative for chest pain, palpitations and leg swelling.   Gastrointestinal:  Positive for diarrhea. Negative for abdominal pain, blood in stool, constipation, nausea and vomiting.   Genitourinary:  Negative for hematuria.   Musculoskeletal:  Negative for arthralgias, back pain, gait problem and myalgias.   Skin:  Negative for color change, rash and wound.   Neurological:  Negative for dizziness, seizures, syncope, speech difficulty, weakness and light-headedness.   Hematological:  Does not bruise/bleed easily.   All other systems reviewed and are negative.        Vitals:    24 1211   BP: (!) 143/80   Pulse: 92   Resp: 16   Temp:    SpO2:         Intake/Output Summary (Last 24 hours) at 3/4/2024 1247  Last data filed at 3/3/2024 1939  Gross per 24 hour   Intake 50 ml   Output 650 ml   Net -600 ml     In: 879.9 [P.O.:480; Other:150]  Out: 1875    Wt Readings from Last 3 Encounters:   24 107.9 kg (237 lb 14 oz)   24 107.4 kg (236 lb 12.4 oz)   24 106 kg (233 lb 11 oz)     Temp  Av.3 °F (36.8 °C)  Min: 98.1 °F (36.7 °C)  Max: 98.6 °F (37 °C)  Pulse  Av  Min: 85  Max: 99  BP  Min: 118/68  Max: 143/80  SpO2  Av.6 %  Min: 95 %  Max: 98 %  Telemetry: Sinus rhythm with v-rates 70's bpm    Physical  Exam  Vitals reviewed.   Constitutional:       General: He is not in acute distress.     Appearance: Normal appearance. He is not ill-appearing.   HENT:      Head: Normocephalic and atraumatic.      Nose: Nose normal.      Mouth/Throat:      Mouth: Mucous membranes are moist.   Eyes:      Conjunctiva/sclera: Conjunctivae normal.      Pupils: Pupils are equal, round, and reactive to light.   Cardiovascular:      Rate and Rhythm: Normal rate and regular rhythm.      Heart sounds: Normal heart sounds, S1 normal and S2 normal. No murmur heard.     No friction rub. No gallop.   Pulmonary:      Effort: No respiratory distress.      Breath sounds: No wheezing, rhonchi or rales.   Abdominal:      General: Abdomen is flat. Bowel sounds are normal. There is no distension.      Palpations: Abdomen is soft.      Tenderness: There is no abdominal tenderness.   Musculoskeletal:         General: No tenderness. Normal range of motion.      Cervical back: Normal range of motion. No rigidity.      Right lower leg: No edema.      Left lower leg: No edema.   Skin:     General: Skin is warm and dry.      Coloration: Skin is not pale.      Findings: No bruising, erythema or rash.   Neurological:      General: No focal deficit present.      Mental Status: He is alert and oriented to person, place, and time.      Cranial Nerves: No cranial nerve deficit.      Motor: No weakness.      Gait: Gait normal.   Psychiatric:         Mood and Affect: Mood normal.         Behavior: Behavior normal.         Thought Content: Thought content normal.         Telemetry, labs, diagnostic and imaging results personally reviewed and interpreted.     Recent Labs     03/02/24  0444 03/03/24  1012 03/04/24  0610   * 132* 130*   K 3.4* 3.5 3.2*   CL 96* 95* 95*   CO2 28 25 24   BUN 15 14 13   CREATININE 0.6* <0.5* <0.5*     Recent Labs     03/02/24  0444   WBC 8.9   HGB 11.0*   HCT 31.1*   MCV 90.0        No results found for: \"CKTOTAL\", \"CKMB\",

## 2024-03-04 NOTE — CARE COORDINATION
Received call from the wife, requesting and inquring about an appt. On 3/11 for Dr. Jules. She expresses that is a very important appt, and wants to know if there is anyway that he can be taken to that appt or if Dr. Jules can see him here. SW sent perfect serve to Dr. Gage, to see what his thoughts were, and if it is possible to have Dr. Jules see pt while here at the hospital.     Chart review completed, nursing rounds done. Pt has rectal tube, need repeat IEUB today, getting AICD as well. Pt will need SNF, likely 1-2 days dc. A referral to [facility] was made, as pt does not want to return to [facility]. Will need precert.     The Plan for Transition of Care is related to the following treatment goals: to get better and get back to my baseline    The Patient and/or patient representative wifeNatty Samuel was provided with a choice of provider and agrees   with the discharge plan. [x] Yes [] No    Freedom of choice list was provided with basic dialogue that supports the patient's individualized plan of care/goals, treatment preferences and shares the quality data associated with the providers. [x] Yes [] No    Electronically signed by SHARLA Hills on 3/4/2024 at 2:24 PM        Noé العلي LMSW, [facility] Social Work Case Management   Phone: [phone]  Fax: [phone]

## 2024-03-04 NOTE — PROGRESS NOTES
PRE-PROCEDURE    DATE: 3/4/2024 ARRIVAL TO CATH LAB: 12:35 PM    ADMIT SOURCE: Inpatient    ID & ALLERGY BAND: On    CONSENT: Yes    NPO SINCE: Midnight    LABS/PREGNANCY TEST: Yes, Negative    IV SITE : Patent in right wrist.  with fluids infusing at kvo 12:35 PM     EKG RHYTHM: Normal Sinus Rhythm    Rectal tube in place on arrival, incontinent of large amount liquid brown stool on arrival to pre/post.  Linens changed

## 2024-03-04 NOTE — CONSULTS
Infectious Diseases Inpatient Consult Note      Reason for Consult: AICD Clearance and Colonic distension s/p Decompression on 2/29/24 h/o VT on admission       Requesting Physician:  Dr. Murillo    Primary Care Physician:  Janet Garcia MD    History Obtained From:  Epic and pt      CHIEF COMPLAINT:     Chief Complaint   Patient presents with    Shortness of Breath     Sudden onset shortness of breath, from nursing home via EMS         HISTORY OF PRESENT ILLNESS:  83 y.o. man admitted to hospital secondary to shortness of breath also noted to have abdominal distention in the hospital he was recently admitted to the hospital following a fall with rib fracture on that admission he had a dilated colon requiring edema that treatment resolved but now on this admission found to have ventricular tachycardia and required successful cardioversion he was maintained in ICU currently requiring amiodarone.  He was evaluated by cardiology and EP there is a plan for AICD consideration.  In the meantime he also underwent left heart catheterization no major coronary artery disease he also required colonic decompression by GI on 2/29/24, per report indicated dilated unprepped colon superficial mucosal erythema was noted successful decompression with rectal tube placed.  Current labs indicate creatinine 0.5 potassium is low 3.2 WBC normal hemoglobin 11, Tmax 99.9 to the hospitalizationBlood culture 1 set indicate coagulase-negative staph from 2/29/24, probably contaminant MRSA probe negative, abdominal x-ray from this morning indicate rectal decompression catheter in place there is some gaseous distention of the large bowel cecum is grossly distended up to 13.5 cm in caliber.  We are consulted for clearance for AICD placement.    Past Medical History:    Past Medical History:   Diagnosis Date    Diabetes mellitus (HCC)     diet controlled    Hyperlipidemia     Indigestion     Insomnia     Macular degeneration     MI,  images  Independent review of  Microbiology cultures and other micro tests reviewed     Risk of Complications/Morbidity: High      Illness(es)/ Infection present that pose threat to bodily function.   There is potential for severe exacerbation of infection/side effects of treatment.  Therapy requires intensive monitoring for antimicrobial agent toxicity.     Thanks for allowing me to participate in your patient's care please call me with any questions or concerns.    Dr. Zak Garrett MD  Infectious Disease  [facility]  Phone: [phone]   Fax : [phone]

## 2024-03-04 NOTE — H&P
H&P Update    I have reviewed the history and physical from Deja Chau NP and examined the patient and find no relevant changes.   I have reviewed with the patient and/or family the risks, benefits, and alternatives to the procedure.    Pre-sedation Assessment    Patient:  Juan Mohamud   :   1940  Intended Procedure: 2-ch ICD implantation      Angelika nurses notes reviewed and agreed.  Medications reviewed  Allergies:   Allergies   Allergen Reactions    Erythromycin     Dust Mite Extract      sneeze         Pre-Procedure Assessment/Plan:  ASA 2 - Patient with mild systemic disease with no functional limitations    Level of Sedation Plan:Moderate sedation    Post Procedure plan: Return to same level of care

## 2024-03-04 NOTE — PLAN OF CARE
Problem: Safety - Adult  Goal: Free from fall injury  Outcome: Progressing     Problem: Discharge Planning  Goal: Discharge to home or other facility with appropriate resources  Outcome: Progressing     Problem: Chronic Conditions and Co-morbidities  Goal: Patient's chronic conditions and co-morbidity symptoms are monitored and maintained or improved  Outcome: Progressing     Problem: Skin/Tissue Integrity  Goal: Absence of new skin breakdown  Description: 1.  Monitor for areas of redness and/or skin breakdown  2.  Assess vascular access sites hourly  3.  Every 4-6 hours minimum:  Change oxygen saturation probe site  4.  Every 4-6 hours:  If on nasal continuous positive airway pressure, respiratory therapy assess nares and determine need for appliance change or resting period.  Outcome: Progressing     Problem: ABCDS Injury Assessment  Goal: Absence of physical injury  Outcome: Progressing     Problem: Pain  Goal: Verbalizes/displays adequate comfort level or baseline comfort level  Outcome: Progressing     Problem: Neurosensory - Adult  Goal: Achieves stable or improved neurological status  Outcome: Progressing  Goal: Achieves maximal functionality and self care  Outcome: Progressing     Problem: Respiratory - Adult  Goal: Achieves optimal ventilation and oxygenation  Outcome: Progressing     Problem: Cardiovascular - Adult  Goal: Maintains optimal cardiac output and hemodynamic stability  Outcome: Progressing  Goal: Absence of cardiac dysrhythmias or at baseline  Outcome: Progressing     Problem: Skin/Tissue Integrity - Adult  Goal: Skin integrity remains intact  Outcome: Progressing  Goal: Incisions, wounds, or drain sites healing without S/S of infection  Outcome: Progressing  Goal: Oral mucous membranes remain intact  Outcome: Progressing     Problem: Musculoskeletal - Adult  Goal: Return mobility to safest level of function  Outcome: Progressing  Goal: Return ADL status to a safe level of function  Outcome:

## 2024-03-04 NOTE — PRE SEDATION
Sedation Pre-Procedure Note    Patient Name: Juan Mohamud   YOB: 1940  Room/Bed: J7R-7706/5278-01  Medical Record Number: [id number]  Date: 3/4/2024   Time: 9:36 AM       Indication:  sustained VT with v-rate 250 bpm    Consent: I have discussed with the patient and/or the patient representative the indication, alternatives, and the possible risks and/or complications of the planned procedure and the anesthesia methods. The patient and/or patient representative appear to understand and agree to proceed.    Vital Signs:   Vitals:    03/04/24 0743   BP: 131/84   Pulse: 85   Resp: 11   Temp: 98.3 °F (36.8 °C)   SpO2: 96%       Past Medical History:   has a past medical history of Diabetes mellitus (HCC), Hyperlipidemia, Indigestion, Insomnia, Macular degeneration, MI, old, and Neuropathy.    Past Surgical History:   has a past surgical history that includes knee surgery; Tonsillectomy; Colonoscopy; fracture surgery (Left); Mouth surgery; joint replacement (Right); Intracapsular cataract extraction (Right, 12/17/2018); and Colonoscopy (N/A, 2/29/2024).    Medications:   Scheduled Meds:    doxycycline hyclate  100 mg Oral 2 times per day    lidocaine 1 % injection  5 mL IntraDERmal Once    desmopressin  100 mcg Oral Nightly    linaclotide  290 mcg Oral QAM AC    amiodarone  400 mg Oral BID    sodium chloride flush  5-40 mL IntraVENous 2 times per day    famotidine  20 mg Oral BID    gabapentin  600 mg Oral TID    fenofibrate  160 mg Oral Daily    sennosides-docusate sodium  1 tablet Oral BID    tamsulosin  0.4 mg Oral Daily    sodium chloride flush  5-40 mL IntraVENous 2 times per day    sodium chloride flush  5-40 mL IntraVENous 2 times per day    enoxaparin  30 mg SubCUTAneous BID    cefepime  2,000 mg IntraVENous Q8H     Continuous Infusions:    sodium chloride      sodium chloride      sodium chloride       PRN Meds: oxyCODONE **OR** oxyCODONE, morphine, sodium chloride, sodium chloride flush,  MD Rylan   polyethylene glycol (GLYCOLAX) 17 g packet Take 1 packet by mouth 2 times daily    Rylan Grider MD   sennosides-docusate sodium (SENOKOT-S) 8.6-50 MG tablet Take 1 tablet by mouth in the morning and at bedtime    Rylan Grider MD   simethicone (MYLICON) 80 MG chewable tablet Take 1 tablet by mouth every 6 hours as needed for Flatulence    Rylan Grider MD   sodium chloride (OCEAN) 0.65 % nasal spray 1 spray by Nasal route 2 times daily as needed for Congestion    Rylan Grider MD   tamsulosin (FLOMAX) 0.4 MG capsule Take 1 capsule by mouth daily    Rylan Grider MD   metFORMIN (GLUCOPHAGE) 500 MG tablet Take 1 tablet by mouth 2 times daily (with meals)    Rylan Grider MD   fenofibrate (TRICOR) 145 MG tablet Take 1 tablet by mouth daily    Rylan Grider MD     Coumadin Use Last 7 Days:  no  Antiplatelet drug therapy use last 7 days: no  Other anticoagulant use last 7 days: no  Additional Medication Information:  n/a      Pre-Sedation Documentation and Exam:   I have personally completed a history, physical exam & review of systems for this patient (see notes).    Mallampati Airway Assessment:  Mallampati Class I - (soft palate, fauces, uvula & anterior/posterior tonsillar pillars are visible)    Prior History of Anesthesia Complications:   none    ASA Classification:  Class 2 - A normal healthy patient with mild systemic disease    Sedation/ Anesthesia Plan:   intravenous sedation    Medications Planned:   midazolam (Versed) intravenously and fentanyl intravenously    Patient is an appropriate candidate for plan of sedation: yes    Electronically signed by Kurtis Murillo MD on 3/4/2024 at 9:36 AM

## 2024-03-04 NOTE — PROGRESS NOTES
PRN  acetaminophen, 650 mg, Q4H PRN  methocarbamol, 1,000 mg, TID PRN  simethicone, 80 mg, Q6H PRN  zolpidem, 10 mg, Nightly PRN  magnesium hydroxide, 30 mL, Daily PRN  sodium chloride flush, 5-40 mL, PRN  sodium chloride, , PRN  potassium chloride, 40 mEq, PRN   Or  potassium alternative oral replacement, 40 mEq, PRN   Or  potassium chloride, 10 mEq, PRN  magnesium sulfate, 2,000 mg, PRN  ondansetron, 4 mg, Q8H PRN   Or  ondansetron, 4 mg, Q6H PRN  polyethylene glycol, 17 g, Daily PRN  acetaminophen, 650 mg, Q6H PRN   Or  acetaminophen, 650 mg, Q6H PRN  benzocaine, , PRN  sodium chloride flush, 5-40 mL, PRN  sodium chloride, , PRN        Labs and Imaging   XR ABDOMEN (KUB) (SINGLE AP VIEW)    Result Date: 2/29/2024  EXAMINATION: ONE SUPINE XRAY VIEW(S) OF THE ABDOMEN 2/29/2024 6:35 am COMPARISON: Abdominal CT 02/29/2024, KUB 02/27/2024. HISTORY: ORDERING SYSTEM PROVIDED HISTORY: abdominal distention TECHNOLOGIST PROVIDED HISTORY: Reason for exam:->abdominal distention Reason for Exam: abdominal distention FINDINGS: Supine views of the abdomen and pelvis were performed.  There remains moderate to severe gaseous distension of multiple loops of bowel, likely colon in the setting of a persistent colonic ileus, similar to prior studies. No abnormal bowel wall thickening is identified.  There is no gross evidence of free air.  No abnormal radiopaque calculi are seen.  There is retained IV contrast material within the urinary bladder.  Fusion hardware stabilizes the lumbosacral junction.     Bowel-gas pattern most consistent with a persistent diffuse colonic ileus, without gross evidence of free air.     XR ABDOMEN (KUB) (SINGLE AP VIEW)    Result Date: 2/29/2024  EXAMINATION: ONE SUPINE XRAY VIEW(S) OF THE ABDOMEN 2/29/2024 3:09 pm COMPARISON: 02/29/2024 at 6:03 a.m. HISTORY: ORDERING SYSTEM PROVIDED HISTORY: Confirm rectal decompression tube placement and cecal diameter TECHNOLOGIST PROVIDED HISTORY: Reason for  enlarged measuring 38 mm, which may indicate underlying portal hypertension. Mediastinum: No evidence of mediastinal lymphadenopathy.  The heart and pericardium demonstrate no acute abnormality.  Calcified coronary atheromatous plaque.  There is no acute abnormality of the thoracic aorta. Lungs/pleura: Small right pleural effusion and dependent atelectasis in the lung bases, right greater than left.  No septal thickening.  No pneumothorax. The central airway is patent. Soft Tissues/Bones: Mildly displaced posterior right 9th, 10th and 11th rib fractures.  Mildly displaced right T10 and T11 transverse process fractures. These findings appear unchanged. Comminuted, mildly displaced fracture of the medial right clavicle again demonstrated.  Previously seen soft tissue swelling in this area has improved.  No evidence for sternoclavicular dislocation. ABDOMEN PELVIS: Organs: The liver, gallbladder, pancreas, spleen, adrenals and kidneys reveal no acute findings. GI/Bowel: There is no bowel dilatation or wall thickening identified. Pelvis: No acute findings. Peritoneum/Retroperitoneum: No free air or free fluid.  The aorta is normal in caliber.  The visceral branches are patent. No lymphadenopathy. Bones/Soft Tissues: No abnormality identified.  Chronic findings in the lower thoracic and lumbar spine.  Status post transpedicular fusion from L5-S1. Discectomy at L5-S1. *Unless otherwise specified, incidental findings do not require dedicated imaging follow-up.     1. No evidence of pulmonary embolism. 2. Small right pleural effusion and dependent atelectasis in the lung bases, right greater than left. 3. Progressive colonic dilatation since 02/23/2024.  No focal transition point identified, however the sigmoid colon has a tethered appearance to the nearby cecum in the mid abdomen for which a partial obstructing process is suggested.  Liquid stool is present throughout the colon to the level of the rectum.  The cecum is

## 2024-03-04 NOTE — PROGRESS NOTES
Patient with chronic constipation and presumed chronic colonic dilation, with acute dilation of the cecal diameter 14 cm.  s/p decompressive colonoscopy with rectal tube placement 2/29/24.  General surgery following.  Chronic idiopathic constipation: Previously on Linzess.  VT /sp cardioversion, on amiodarone going for defibrillator today  Pneumonia: On cefepime and Flagyl,   Mechanical fall with right-sided rib fractures  Hypokalemia: Replace and monitor     RECOMMENDATIONS:    -Flush rectal tube every 4 hours  -Monitor and replace electrolytes.  Would keep K > 4 given history of VT and the colonic pseudoobstruction  -Okay from GI standpoint for clear liquid diet  -Continue chronic bowel regimen with Linzess  - Add movantik as he is getting morphine and oxycodone.  -Repeat KUB today as he feels like his abdomen is getting more distended.    -Can keep on clears after defibrillator placement given the worsening distension    Thank you for allowing me to participate in the care of your patient.  Please feel free to contact me with any concerns.  [phone]    Anders Cervantes MD

## 2024-03-05 ENCOUNTER — ANESTHESIA EVENT (OUTPATIENT)
Dept: ENDOSCOPY | Age: 84
End: 2024-03-05
Payer: MEDICARE

## 2024-03-05 ENCOUNTER — APPOINTMENT (OUTPATIENT)
Dept: GENERAL RADIOLOGY | Age: 84
DRG: 981 | End: 2024-03-05
Payer: MEDICARE

## 2024-03-05 PROBLEM — J96.01 ACUTE HYPOXEMIC RESPIRATORY FAILURE (HCC): Status: ACTIVE | Noted: 2024-03-05

## 2024-03-05 PROBLEM — N17.9 AKI (ACUTE KIDNEY INJURY) (HCC): Status: ACTIVE | Noted: 2024-03-05

## 2024-03-05 LAB
ANION GAP SERPL CALCULATED.3IONS-SCNC: 13 MMOL/L (ref 3–16)
BUN SERPL-MCNC: 13 MG/DL (ref 7–20)
CALCIUM SERPL-MCNC: 8 MG/DL (ref 8.3–10.6)
CHLORIDE SERPL-SCNC: 93 MMOL/L (ref 99–110)
CO2 SERPL-SCNC: 23 MMOL/L (ref 21–32)
CREAT SERPL-MCNC: <0.5 MG/DL (ref 0.8–1.3)
GFR SERPLBLD CREATININE-BSD FMLA CKD-EPI: >60 ML/MIN/{1.73_M2}
GLUCOSE SERPL-MCNC: 83 MG/DL (ref 70–99)
POTASSIUM SERPL-SCNC: 3.1 MMOL/L (ref 3.5–5.1)
SODIUM SERPL-SCNC: 129 MMOL/L (ref 136–145)

## 2024-03-05 PROCEDURE — 2060000000 HC ICU INTERMEDIATE R&B

## 2024-03-05 PROCEDURE — 6360000002 HC RX W HCPCS: Performed by: INTERNAL MEDICINE

## 2024-03-05 PROCEDURE — 6370000000 HC RX 637 (ALT 250 FOR IP): Performed by: INTERNAL MEDICINE

## 2024-03-05 PROCEDURE — 2580000003 HC RX 258: Performed by: INTERNAL MEDICINE

## 2024-03-05 PROCEDURE — 80048 BASIC METABOLIC PNL TOTAL CA: CPT

## 2024-03-05 PROCEDURE — 51798 US URINE CAPACITY MEASURE: CPT

## 2024-03-05 PROCEDURE — 97110 THERAPEUTIC EXERCISES: CPT

## 2024-03-05 PROCEDURE — 97535 SELF CARE MNGMENT TRAINING: CPT

## 2024-03-05 PROCEDURE — 36415 COLL VENOUS BLD VENIPUNCTURE: CPT

## 2024-03-05 PROCEDURE — 94760 N-INVAS EAR/PLS OXIMETRY 1: CPT

## 2024-03-05 PROCEDURE — 6370000000 HC RX 637 (ALT 250 FOR IP): Performed by: HOSPITALIST

## 2024-03-05 PROCEDURE — 74018 RADEX ABDOMEN 1 VIEW: CPT

## 2024-03-05 PROCEDURE — 97530 THERAPEUTIC ACTIVITIES: CPT

## 2024-03-05 PROCEDURE — 6360000002 HC RX W HCPCS: Performed by: FAMILY MEDICINE

## 2024-03-05 PROCEDURE — 6370000000 HC RX 637 (ALT 250 FOR IP): Performed by: REGISTERED NURSE

## 2024-03-05 RX ORDER — PROCHLORPERAZINE EDISYLATE 5 MG/ML
5 INJECTION INTRAMUSCULAR; INTRAVENOUS ONCE
Status: COMPLETED | OUTPATIENT
Start: 2024-03-05 | End: 2024-03-05

## 2024-03-05 RX ADMIN — PROCHLORPERAZINE EDISYLATE 5 MG: 5 INJECTION INTRAMUSCULAR; INTRAVENOUS at 05:39

## 2024-03-05 RX ADMIN — GABAPENTIN 600 MG: 300 CAPSULE ORAL at 20:27

## 2024-03-05 RX ADMIN — FAMOTIDINE 20 MG: 20 TABLET, FILM COATED ORAL at 20:30

## 2024-03-05 RX ADMIN — SIMETHICONE 80 MG: 80 TABLET, CHEWABLE ORAL at 23:04

## 2024-03-05 RX ADMIN — Medication 10 ML: at 09:47

## 2024-03-05 RX ADMIN — FAMOTIDINE 20 MG: 20 TABLET, FILM COATED ORAL at 10:04

## 2024-03-05 RX ADMIN — Medication 10 ML: at 10:06

## 2024-03-05 RX ADMIN — METRONIDAZOLE 500 MG: 500 INJECTION, SOLUTION INTRAVENOUS at 10:15

## 2024-03-05 RX ADMIN — ZOLPIDEM TARTRATE 10 MG: 5 TABLET ORAL at 22:30

## 2024-03-05 RX ADMIN — ONDANSETRON 4 MG: 2 INJECTION INTRAMUSCULAR; INTRAVENOUS at 20:31

## 2024-03-05 RX ADMIN — ONDANSETRON 4 MG: 2 INJECTION INTRAMUSCULAR; INTRAVENOUS at 02:22

## 2024-03-05 RX ADMIN — GABAPENTIN 600 MG: 300 CAPSULE ORAL at 14:23

## 2024-03-05 RX ADMIN — TAMSULOSIN HYDROCHLORIDE 0.4 MG: 0.4 CAPSULE ORAL at 10:04

## 2024-03-05 RX ADMIN — CEFEPIME 2000 MG: 2 INJECTION, POWDER, FOR SOLUTION INTRAVENOUS at 12:05

## 2024-03-05 RX ADMIN — GABAPENTIN 600 MG: 300 CAPSULE ORAL at 10:04

## 2024-03-05 RX ADMIN — Medication 10 ML: at 20:31

## 2024-03-05 RX ADMIN — POTASSIUM CHLORIDE 40 MEQ: 1500 TABLET, EXTENDED RELEASE ORAL at 10:04

## 2024-03-05 RX ADMIN — DESMOPRESSIN ACETATE 100 MCG: 0.2 TABLET ORAL at 20:30

## 2024-03-05 RX ADMIN — FENOFIBRATE 160 MG: 160 TABLET ORAL at 10:04

## 2024-03-05 RX ADMIN — OXYCODONE 5 MG: 5 TABLET ORAL at 11:07

## 2024-03-05 RX ADMIN — SIMETHICONE 80 MG: 80 TABLET, CHEWABLE ORAL at 16:53

## 2024-03-05 RX ADMIN — METRONIDAZOLE 500 MG: 500 INJECTION, SOLUTION INTRAVENOUS at 16:25

## 2024-03-05 RX ADMIN — AMIODARONE HYDROCHLORIDE 400 MG: 200 TABLET ORAL at 20:30

## 2024-03-05 RX ADMIN — ONDANSETRON 4 MG: 2 INJECTION INTRAMUSCULAR; INTRAVENOUS at 14:23

## 2024-03-05 RX ADMIN — AMIODARONE HYDROCHLORIDE 400 MG: 200 TABLET ORAL at 10:04

## 2024-03-05 ASSESSMENT — PAIN DESCRIPTION - ORIENTATION: ORIENTATION: MID

## 2024-03-05 ASSESSMENT — PAIN SCALES - GENERAL: PAINLEVEL_OUTOF10: 8

## 2024-03-05 ASSESSMENT — LIFESTYLE VARIABLES: SMOKING_STATUS: 0

## 2024-03-05 ASSESSMENT — PAIN DESCRIPTION - LOCATION: LOCATION: BACK

## 2024-03-05 ASSESSMENT — PAIN DESCRIPTION - DESCRIPTORS: DESCRIPTORS: DISCOMFORT

## 2024-03-05 NOTE — CARE COORDINATION
SW completed chart review, nursing rounds completed. Pt getting an x-ray today, has rectal tube still, and still needing a few days. Pt refused the PICC line, has AICD. And request has been made for Palliative to see.   Pt is being followed by Wound, GI, cardiology, ID, and electrophysiology.     Referral placed to [facility] as pt was there before, and would like to return. Called Rebecca at [facility] to check on referral, she informed that she does not have any male beds right now, but to check back in a couple days.   Pt still needing a few days anyway.       Noé العلي, FABIANA, [facility] Social Work Case Management   Phone: [phone]  Fax: [phone]

## 2024-03-05 NOTE — PLAN OF CARE
Problem: Safety - Adult  Goal: Free from fall injury  Outcome: Progressing     Problem: Discharge Planning  Goal: Discharge to home or other facility with appropriate resources  Outcome: Progressing     Problem: Chronic Conditions and Co-morbidities  Goal: Patient's chronic conditions and co-morbidity symptoms are monitored and maintained or improved  Outcome: Progressing     Problem: Skin/Tissue Integrity  Goal: Absence of new skin breakdown  Description: 1.  Monitor for areas of redness and/or skin breakdown  2.  Assess vascular access sites hourly  3.  Every 4-6 hours minimum:  Change oxygen saturation probe site  4.  Every 4-6 hours:  If on nasal continuous positive airway pressure, respiratory therapy assess nares and determine need for appliance change or resting period.  Outcome: Progressing     Problem: ABCDS Injury Assessment  Goal: Absence of physical injury  Outcome: Progressing     Problem: Pain  Goal: Verbalizes/displays adequate comfort level or baseline comfort level  Outcome: Progressing     Problem: Neurosensory - Adult  Goal: Achieves stable or improved neurological status  Outcome: Progressing     Problem: Neurosensory - Adult  Goal: Achieves maximal functionality and self care  Outcome: Progressing     Problem: Respiratory - Adult  Goal: Achieves optimal ventilation and oxygenation  Outcome: Progressing     Problem: Cardiovascular - Adult  Goal: Maintains optimal cardiac output and hemodynamic stability  Outcome: Progressing     Problem: Cardiovascular - Adult  Goal: Absence of cardiac dysrhythmias or at baseline  Outcome: Progressing     Problem: Skin/Tissue Integrity - Adult  Goal: Skin integrity remains intact  Outcome: Progressing

## 2024-03-05 NOTE — PROGRESS NOTES
Physical Therapy  Facility/Department: [address] PROGRESSIVE CARE  Daily Treatment Note - COTX  NAME: Juan Mohamud  : 1940  MRN: [id number]    Date of Service: 3/5/2024    Discharge Recommendations:  Subacute/Skilled Nursing Facility, Patient would benefit from continued therapy after discharge        Patient Diagnosis(es): The primary encounter diagnosis was Wide-complex tachycardia. Diagnoses of BOSTON (acute kidney injury) (HCC), Hyponatremia, Elevated troponin, Acute hypoxemic respiratory failure (HCC), and Colonic obstruction (HCC) were also pertinent to this visit.    Assessment   Assessment: Pt agreeable to activity, c/o increased RLE numbness and weakness.  He required Min-Mod A x 2 for transfers.  Not able to ambulate with cane d/t RLE weakness.  Recommend continued therapy to gradually improve LE strength, balance, standing tolerance, independence ambulating.  Anticipate need for low-moderate frequency therapy.    Juan Mohamud scored a 8/24 on the AM-PAC short mobility form. Current research shows that an AM-PAC score of 17 or less is typically not associated with a discharge to the patient's home setting. Based on the patient's AM-PAC score and their current functional mobility deficits, it is recommended that the patient have 3-5 sessions per week of Physical Therapy at d/c to increase the patient's independence.  Please see assessment section for further patient specific details.    If patient discharges prior to next session this note will serve as a discharge summary.  Please see below for the latest assessment towards goals.     Activity Tolerance: Patient limited by pain;Patient limited by endurance     Plan    Physical Therapy Plan  General Plan: 3-5 times per week  Current Treatment Recommendations: Strengthening;Therapeutic activities;Functional mobility training;Transfer training;Gait training;Safety education & training;Patient/Caregiver education & training      Goals  Short Term Goals  Time Frame for Short Term Goals: Upon d/c acute care setting.  Short Term Goal 1: Bed Mob Min/Mod assist x 2.  Short Term Goal 2: Transfers via Stedy CGA; NWB R UE.  Short Term Goal 3: Pt participating in approp Strength Exs.  Patient Goals   Patient Goals : Get stronger, return home.    Education  Patient Education  Education Given To: Patient  Education Provided Comments: Role of PT, POC, Need to call for assist, NWB Restrictions R UE, Bed Mob, EOB, Transfers via Stedy.  Education Method: Verbal;Demonstration  Education Outcome: Continued education needed    AM-PAC - Mobility    AM-PAC Basic Mobility - Inpatient   How much help is needed turning from your back to your side while in a flat bed without using bedrails?: A Lot  How much help is needed moving from lying on your back to sitting on the side of a flat bed without using bedrails?: A Lot  How much help is needed moving to and from a bed to a chair?: Total  How much help is needed standing up from a chair using your arms?: Total  How much help is needed walking in hospital room?: Total  How much help is needed climbing 3-5 steps with a railing?: Total  AM-PAC Inpatient Mobility Raw Score : 8  AM-PAC Inpatient T-Scale Score : 28.52  Mobility Inpatient CMS 0-100% Score: 86.62  Mobility Inpatient CMS G-Code Modifier : CM         Therapy Time   Individual Concurrent Group Co-treatment   Time In       0855   Time Out       0920   Minutes       25   Timed Code Treatment Minutes: 25 Minutes       Ruiz Munoz PT   Electronically signed by Ruiz Munoz, LORRI [id number] on 3/5/2024 at 9:24 AM

## 2024-03-05 NOTE — PROGRESS NOTES
Patient refused PICC line insertion. Tried to educate patient on PICC procedure and risk/benefits. Patient stated he does not want PICC line placed. Bedside RN made aware. I spoke with Bedside RN and explained if patient decided to change his mind to call DIT and PICC RN would return to place PICC line.

## 2024-03-05 NOTE — PROGRESS NOTES
Gastroenterology Progress Note    Juan Mohamud is a 83 y.o. male patient.  Principal Problem:    Arrhythmia  Active Problems:    DDD (degenerative disc disease), lumbar    Cataract extraction status of right eye    Constipation    Elevated troponin    Primary hypertension    Hyperlipidemia    Colonic obstruction (HCC)    Trauma    History of fall    Type 2 diabetes mellitus (HCC)    Anemia    Pneumonia    Hyponatremia    Dilatation of colon    Wide-complex tachycardia    Acute hypoxemic respiratory failure (HCC)    BOSTON (acute kidney injury) (HCC)  Resolved Problems:    * No resolved hospital problems. *      SUBJECTIVE: He had significant diarrhea all afternoon yesterday.  With this, his abdomen feels less distended.  No hematochezia.  Does get a rumbling discomfort in the abdomen when he gets clears.      Current Facility-Administered Medications: ceFEPIme (MAXIPIME) 2,000 mg in sodium chloride 0.9 % 100 mL IVPB (mini-bag), 2,000 mg, IntraVENous, Q12H  sodium chloride flush 0.9 % injection 5-40 mL, 5-40 mL, IntraVENous, 2 times per day  sodium chloride flush 0.9 % injection 5-40 mL, 5-40 mL, IntraVENous, PRN  0.9 % sodium chloride infusion, , IntraVENous, PRN  naloxegol (MOVANTIK) tablet 25 mg, 25 mg, Oral, QAM AC  sodium chloride flush 0.9 % injection 5-40 mL, 5-40 mL, IntraVENous, 2 times per day  sodium chloride flush 0.9 % injection 5-40 mL, 5-40 mL, IntraVENous, PRN  chlorhexidine (HIBICLENS) 4 % liquid, , Topical, Once  mupirocin (BACTROBAN) 2 % ointment, , Each Nostril, Once  metroNIDAZOLE (FLAGYL) 500 mg in 0.9% NaCl 100 mL IVPB premix, 500 mg, IntraVENous, Q8H  oxyCODONE (ROXICODONE) immediate release tablet 5 mg, 5 mg, Oral, Q4H PRN **OR** oxyCODONE (ROXICODONE) immediate release tablet 2.5 mg, 2.5 mg, Oral, Q4H PRN  morphine (PF) injection 2 mg, 2 mg, IntraVENous, Q4H PRN  lidocaine PF 1 % injection 5 mL, 5 mL, IntraDERmal, Once  desmopressin (DDAVP) tablet 100 mcg, 100 mcg, Oral,

## 2024-03-05 NOTE — PROGRESS NOTES
abdominal pain++   Genitourinary:  negative for frequency, dysuria, urinary incontinence, hematuria  Hematologic/Lymphatic:  negative for easy bruising, bleeding and lymphadenopathy  Allergic/Immunologic:  negative for recurrent infections, angioedema, anaphylaxis   Endocrine:  negative for weight changes, polyuria, polydipsia and polyphagia  Musculoskeletal:  negative for joint  pain, swelling, decreased range of motion  Integumentary: No rashes, skin lesions  Neurological:  negative for headaches, slurred speech, unilateral weakness  Psychiatric: negative for hallucinations,confusion,agitation.     PHYSICAL EXAM:      Vitals:  t MAX  99.9   /81   Pulse 100   Temp 98.1 °F (36.7 °C) (Oral)   Resp 17   Ht 1.93 m (6' 4\")   Wt 106.2 kg (234 lb 2.1 oz)   SpO2 97%   BMI 28.50 kg/m²     General Appearance: alert,in some acute distress, +  pallor, no icterus   Skin: warm and dry, no rash or erythema  Head: normocephalic and atraumatic  Eyes: pupils equal, round, and reactive to light, conjunctivae normal  ENT: tympanic membrane, external ear and ear canal normal bilaterally, nose without deformity, nasal mucosa and turbinates normal without polyps  Neck: supple and non-tender without mass, no thyromegaly  no cervical lymphadenopathy  Pulmonary/Chest: clear to auscultation bilaterally- no wheezes, rales or rhonchi, normal air movement, no respiratory distress  Cardiovascular: normal rate, regular rhythm, normal S1 and S2, no murmurs, rubs, clicks, or gallops, no carotid bruits  Abdomen: soft, ++ -tender, ++-distended, tinkling bowel sounds bowel sounds, no masses or organomegaly  Extremities: no cyanosis, clubbing or ++edema  Musculoskeletal: normal range of motion, no joint swelling, deformity or tenderness  Integumentary: No rashes, no abnormal skin lesions, no petechiae  Neurologic: reflexes normal and symmetric, no cranial nerve deficit  Psych:  Orientation, sensorium, mood normal   Lines: Rectal tube+  IntraVENous 2 times per day      Postoperative Diagnosis:   ON 2/29/24    Dilated unprepped colon  Mild superficial mucosal erythema and erosions in the right colon  Successful placement of a decompressive rectal tube into the right colon/cecum        ANGIOGRAM/CORONARY ARTERIOGRAM:        The left main coronary artery is normal .     The left anterior descending artery is normal .              D1 is normal      The left circumflex artery has mild disease .              OM1 is normal      The right coronary artery is dominant vessel with mild disease .              RPDA is normal         INTERVENTION  none     SUMMARY:   Mild nonobstructive CAD   Continuous Infusions:   sodium chloride      sodium chloride      sodium chloride         PRN Meds:  sodium chloride flush, sodium chloride, sodium chloride flush, oxyCODONE **OR** oxyCODONE, morphine, sodium chloride, sodium chloride flush, sodium chloride, acetaminophen, methocarbamol, simethicone, zolpidem, magnesium hydroxide, sodium chloride flush, sodium chloride, potassium chloride **OR** potassium alternative oral replacement **OR** potassium chloride, magnesium sulfate, ondansetron **OR** ondansetron, polyethylene glycol, acetaminophen **OR** acetaminophen, benzocaine    Imaging:   XR ABDOMEN (KUB) (SINGLE AP VIEW)   Final Result   Rectal decompression catheter remains in place with tip projecting to the   region of the splenic flexure. Despite presence of the tube there appears to   be increased gaseous distension of large bowel.         VL Extremity Venous Left   Final Result      XR ABDOMEN (KUB) (SINGLE AP VIEW)   Final Result   Decreased distention of the colon with mild distention in the region of the   cecum         XR ABDOMEN (KUB) (SINGLE AP VIEW)   Final Result   Bowel-gas pattern most consistent with a persistent diffuse colonic ileus,   without gross evidence of free air.         CT ABDOMEN PELVIS W IV CONTRAST Additional Contrast? None   Final Result

## 2024-03-05 NOTE — PROGRESS NOTES
Called [facility] because patient states \"he is not getting ICD placed tomorrow, he has changed his mind\". Educated patient and made sure he understood what that means. Patient states he wants to speak with his wife when she gets here and then will make a final decision.

## 2024-03-05 NOTE — CARE COORDINATION
[facility] Contin[facility] Nurse  Follow-up Progress Note       NAME:  Juan Mohamud  MEDICAL RECORD NUMBER:  [id number]  AGE:  83 y.o.   GENDER:  male  :  1940  TODAY'S DATE:  3/5/2024    Subjective:   Pt now on PCU, follow up for buttock DTI. Healed, believe possible bruise vs. DTI.     Objective:    /75   Pulse 100   Temp 98.4 °F (36.9 °C) (Oral)   Resp 16   Ht 1.93 m (6' 4\")   Wt 106.2 kg (234 lb 2.1 oz)   SpO2 97%   BMI 28.50 kg/m²   Piero Risk Score: Piero Scale Score: 15  Assessment:   Measurements:  Incision 18 Eye Right (Active)   Number of days: 1905     Pt on PCU on isoflex mattress with bed extender. Pt skin assessed.   Pt with red but blanchable heels. Pt educated on heel elevation and PI prevention.   Bruising still noted with scattered, dressed, skin tears. Pt still with rectal drain.   Continue with prevention measures.    R HEEL    Plan:   Plan of Care:   Continue with current treatment plan.  Will not continue to follow.    Specialty Bed Required : No   [] Low Air Loss   [] Pressure Redistribution  [] Fluid Immersion  [] Bariatric  [] Total Pressure Relief  [] Other:     Current Diet: ADULT DIET; Regular; Low Fat/Low Chol/High Fiber/2 gm Na  Diet NPO  Dietician consult:  Yes    Discharge Plan:  Placement for patient upon discharge: TBD  Patient appropriate for Outpatient Wound Care Center: No    Referrals:  []   [] Home Health Care  [] Supplies  [] Other    Patient/Caregiver Teaching:  Level of patient/caregiver understanding able to:   [] Indicates understanding       [] Needs reinforcement  [] Unsuccessful      [x] Verbal Understanding  [] Demonstrated understanding       [] No evidence of learning  [] Refused teaching         [] N/A       Electronically signed by Haydee Omalley RN, CWOCN on 3/5/2024 at 12:41 PM
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
## 2024-03-05 NOTE — PLAN OF CARE
patient/family in relaxation techniques, as appropriate  5. Assess for spiritual pain/suffering and initiate Spiritual Care, Psychosocial Clinical Specialist consults as needed  3/5/2024 0012 by Haydee Fernandez RN  Outcome: Progressing  3/4/2024 1505 by Marielos Hwang RN  Outcome: Progressing     Problem: Change in Body Image  Goal: Pt/Family communicate acceptance of loss or change in body image and feel psychological comfort and peace  Description: INTERVENTIONS:  1. Assess patient/family anxiety and grief process related to change in body image, loss of functional status, loss of sense of self, and forgiveness  2. Provide emotional and spiritual support  3. Provide information about the patient's health status with consideration of family and cultural values  4. Communicate willingness to discuss loss and facilitate grief process with patient/family as appropriate  5. Emphasize sustaining relationships within family system and community, or sanam/spiritual traditions  6. Initiate Spiritual Care, Psychosocial Clinical Specialist consult as needed  3/5/2024 0012 by Haydee Fernandez RN  Outcome: Progressing  3/4/2024 1505 by Marielos Hwang RN  Outcome: Progressing

## 2024-03-05 NOTE — PROGRESS NOTES
Occupational Therapy  Facility/Department: [address] PROGRESSIVE CARE  Occupational Therapy Re-Assessment/Daily Treatment    Name: Juan Mohamud  : 1940  MRN: [id number]  Date of Service: 3/5/2024    Discharge Recommendations:  Patient would benefit from continued therapy after discharge, 3-5 sessions per week  OT Equipment Recommendations  Other: defer to [place] facility  Juan Mohamud scored a  on the AM-PAC ADL Inpatient form. Current research shows that an AM-PAC score of 17 or less is typically not associated with a discharge to the patient's home setting. Based on the patient's AM-PAC score and their current ADL deficits, it is recommended that the patient have 3-5 sessions per week of Occupational Therapy at d/c to increase the patient's independence.  Please see assessment section for further patient specific details.    If patient discharges prior to next session this note will serve as a discharge summary.  Please see below for the latest assessment towards goals.       Patient Diagnosis(es): The primary encounter diagnosis was Wide-complex tachycardia. Diagnoses of BOSTON (acute kidney injury) (HCC), Hyponatremia, Elevated troponin, Acute hypoxemic respiratory failure (HCC), and Colonic obstruction (HCC) were also pertinent to this visit.  Past Medical History:  has a past medical history of Diabetes mellitus (HCC), Hyperlipidemia, Indigestion, Insomnia, Macular degeneration, MI, old, and Neuropathy.  Past Surgical History:  has a past surgical history that includes knee surgery; Tonsillectomy; Colonoscopy; fracture surgery (Left); Mouth surgery; joint replacement (Right); Intracapsular cataract extraction (Right, 2018); and Colonoscopy (N/A, 2024).           Assessment   Performance deficits / Impairments: Decreased functional mobility ;Decreased ADL status;Decreased strength;Decreased balance;Decreased safe awareness;Decreased high-level IADLs;Decreased cognition;Decreased

## 2024-03-06 ENCOUNTER — ANESTHESIA (OUTPATIENT)
Dept: ENDOSCOPY | Age: 84
End: 2024-03-06
Payer: MEDICARE

## 2024-03-06 LAB
ANION GAP SERPL CALCULATED.3IONS-SCNC: 10 MMOL/L (ref 3–16)
BUN SERPL-MCNC: 13 MG/DL (ref 7–20)
CALCIUM SERPL-MCNC: 7.8 MG/DL (ref 8.3–10.6)
CHLORIDE SERPL-SCNC: 96 MMOL/L (ref 99–110)
CO2 SERPL-SCNC: 26 MMOL/L (ref 21–32)
CREAT SERPL-MCNC: 0.6 MG/DL (ref 0.8–1.3)
GFR SERPLBLD CREATININE-BSD FMLA CKD-EPI: >60 ML/MIN/{1.73_M2}
GLUCOSE BLD-MCNC: 100 MG/DL (ref 70–99)
GLUCOSE SERPL-MCNC: 99 MG/DL (ref 70–99)
MAGNESIUM SERPL-MCNC: 1.8 MG/DL (ref 1.8–2.4)
PERFORMED ON: ABNORMAL
POTASSIUM SERPL-SCNC: 2.8 MMOL/L (ref 3.5–5.1)
POTASSIUM SERPL-SCNC: 3.4 MMOL/L (ref 3.5–5.1)
REASON FOR REJECTION: NORMAL
REJECTED TEST: NORMAL
SODIUM SERPL-SCNC: 132 MMOL/L (ref 136–145)

## 2024-03-06 PROCEDURE — 2500000003 HC RX 250 WO HCPCS: Performed by: NURSE ANESTHETIST, CERTIFIED REGISTERED

## 2024-03-06 PROCEDURE — 7100000000 HC PACU RECOVERY - FIRST 15 MIN: Performed by: INTERNAL MEDICINE

## 2024-03-06 PROCEDURE — 6370000000 HC RX 637 (ALT 250 FOR IP): Performed by: INTERNAL MEDICINE

## 2024-03-06 PROCEDURE — 3609155600 HC COLONOSCOPY WITH DECOMPRESSION: Performed by: INTERNAL MEDICINE

## 2024-03-06 PROCEDURE — 3700000000 HC ANESTHESIA ATTENDED CARE: Performed by: INTERNAL MEDICINE

## 2024-03-06 PROCEDURE — C1729 CATH, DRAINAGE: HCPCS | Performed by: INTERNAL MEDICINE

## 2024-03-06 PROCEDURE — 80048 BASIC METABOLIC PNL TOTAL CA: CPT

## 2024-03-06 PROCEDURE — 0D9E80Z DRAINAGE OF LARGE INTESTINE WITH DRAINAGE DEVICE, VIA NATURAL OR ARTIFICIAL OPENING ENDOSCOPIC: ICD-10-PCS | Performed by: INTERNAL MEDICINE

## 2024-03-06 PROCEDURE — 2709999900 HC NON-CHARGEABLE SUPPLY: Performed by: INTERNAL MEDICINE

## 2024-03-06 PROCEDURE — 6360000002 HC RX W HCPCS: Performed by: NURSE ANESTHETIST, CERTIFIED REGISTERED

## 2024-03-06 PROCEDURE — 2580000003 HC RX 258: Performed by: NURSE ANESTHETIST, CERTIFIED REGISTERED

## 2024-03-06 PROCEDURE — 51798 US URINE CAPACITY MEASURE: CPT

## 2024-03-06 PROCEDURE — 2060000000 HC ICU INTERMEDIATE R&B

## 2024-03-06 PROCEDURE — 02HV33Z INSERTION OF INFUSION DEVICE INTO SUPERIOR VENA CAVA, PERCUTANEOUS APPROACH: ICD-10-PCS | Performed by: INTERNAL MEDICINE

## 2024-03-06 PROCEDURE — 94760 N-INVAS EAR/PLS OXIMETRY 1: CPT

## 2024-03-06 PROCEDURE — 6360000002 HC RX W HCPCS: Performed by: HOSPITALIST

## 2024-03-06 PROCEDURE — 84132 ASSAY OF SERUM POTASSIUM: CPT

## 2024-03-06 PROCEDURE — 2580000003 HC RX 258: Performed by: HOSPITALIST

## 2024-03-06 PROCEDURE — 36415 COLL VENOUS BLD VENIPUNCTURE: CPT

## 2024-03-06 PROCEDURE — 3700000001 HC ADD 15 MINUTES (ANESTHESIA): Performed by: INTERNAL MEDICINE

## 2024-03-06 PROCEDURE — 83735 ASSAY OF MAGNESIUM: CPT

## 2024-03-06 PROCEDURE — 7100000001 HC PACU RECOVERY - ADDTL 15 MIN: Performed by: INTERNAL MEDICINE

## 2024-03-06 RX ORDER — SODIUM CHLORIDE 0.9 % (FLUSH) 0.9 %
5-40 SYRINGE (ML) INJECTION EVERY 12 HOURS SCHEDULED
Status: DISCONTINUED | OUTPATIENT
Start: 2024-03-06 | End: 2024-03-08 | Stop reason: SDUPTHER

## 2024-03-06 RX ORDER — SODIUM CHLORIDE, SODIUM LACTATE, POTASSIUM CHLORIDE, CALCIUM CHLORIDE 600; 310; 30; 20 MG/100ML; MG/100ML; MG/100ML; MG/100ML
INJECTION, SOLUTION INTRAVENOUS CONTINUOUS PRN
Status: DISCONTINUED | OUTPATIENT
Start: 2024-03-06 | End: 2024-03-06 | Stop reason: SDUPTHER

## 2024-03-06 RX ORDER — LIDOCAINE HYDROCHLORIDE 20 MG/ML
INJECTION, SOLUTION EPIDURAL; INFILTRATION; INTRACAUDAL; PERINEURAL PRN
Status: DISCONTINUED | OUTPATIENT
Start: 2024-03-06 | End: 2024-03-06 | Stop reason: SDUPTHER

## 2024-03-06 RX ORDER — SODIUM CHLORIDE 0.9 % (FLUSH) 0.9 %
5-40 SYRINGE (ML) INJECTION EVERY 12 HOURS SCHEDULED
Status: DISCONTINUED | OUTPATIENT
Start: 2024-03-06 | End: 2024-03-06

## 2024-03-06 RX ORDER — POTASSIUM CHLORIDE 29.8 MG/ML
20 INJECTION INTRAVENOUS
Status: COMPLETED | OUTPATIENT
Start: 2024-03-06 | End: 2024-03-06

## 2024-03-06 RX ORDER — PROPOFOL 10 MG/ML
INJECTION, EMULSION INTRAVENOUS PRN
Status: DISCONTINUED | OUTPATIENT
Start: 2024-03-06 | End: 2024-03-06 | Stop reason: SDUPTHER

## 2024-03-06 RX ORDER — SODIUM CHLORIDE 9 MG/ML
25 INJECTION, SOLUTION INTRAVENOUS PRN
Status: DISCONTINUED | OUTPATIENT
Start: 2024-03-06 | End: 2024-03-08 | Stop reason: SDUPTHER

## 2024-03-06 RX ORDER — SODIUM CHLORIDE 0.9 % (FLUSH) 0.9 %
5-40 SYRINGE (ML) INJECTION PRN
Status: DISCONTINUED | OUTPATIENT
Start: 2024-03-06 | End: 2024-03-06

## 2024-03-06 RX ORDER — SODIUM CHLORIDE 0.9 % (FLUSH) 0.9 %
5-40 SYRINGE (ML) INJECTION PRN
Status: DISCONTINUED | OUTPATIENT
Start: 2024-03-06 | End: 2024-03-08 | Stop reason: SDUPTHER

## 2024-03-06 RX ORDER — SODIUM CHLORIDE 9 MG/ML
INJECTION, SOLUTION INTRAVENOUS PRN
Status: DISCONTINUED | OUTPATIENT
Start: 2024-03-06 | End: 2024-03-06

## 2024-03-06 RX ORDER — LIDOCAINE HYDROCHLORIDE 10 MG/ML
5 INJECTION, SOLUTION EPIDURAL; INFILTRATION; INTRACAUDAL; PERINEURAL ONCE
Status: DISCONTINUED | OUTPATIENT
Start: 2024-03-06 | End: 2024-03-08 | Stop reason: SDUPTHER

## 2024-03-06 RX ORDER — POTASSIUM CHLORIDE 7.45 MG/ML
10 INJECTION INTRAVENOUS
Status: DISCONTINUED | OUTPATIENT
Start: 2024-03-06 | End: 2024-03-06

## 2024-03-06 RX ADMIN — ZOLPIDEM TARTRATE 10 MG: 5 TABLET ORAL at 21:40

## 2024-03-06 RX ADMIN — PROPOFOL 40 MG: 10 INJECTION, EMULSION INTRAVENOUS at 08:16

## 2024-03-06 RX ADMIN — TAMSULOSIN HYDROCHLORIDE 0.4 MG: 0.4 CAPSULE ORAL at 11:34

## 2024-03-06 RX ADMIN — POTASSIUM CHLORIDE 40 MEQ: 1500 TABLET, EXTENDED RELEASE ORAL at 22:16

## 2024-03-06 RX ADMIN — SODIUM CHLORIDE, SODIUM LACTATE, POTASSIUM CHLORIDE, AND CALCIUM CHLORIDE: .6; .31; .03; .02 INJECTION, SOLUTION INTRAVENOUS at 08:08

## 2024-03-06 RX ADMIN — SENNOSIDES AND DOCUSATE SODIUM 1 TABLET: 50; 8.6 TABLET ORAL at 20:01

## 2024-03-06 RX ADMIN — GABAPENTIN 600 MG: 300 CAPSULE ORAL at 13:59

## 2024-03-06 RX ADMIN — AMIODARONE HYDROCHLORIDE 400 MG: 200 TABLET ORAL at 20:00

## 2024-03-06 RX ADMIN — FAMOTIDINE 20 MG: 20 TABLET, FILM COATED ORAL at 20:01

## 2024-03-06 RX ADMIN — PROPOFOL 120 MCG/KG/MIN: 10 INJECTION, EMULSION INTRAVENOUS at 08:18

## 2024-03-06 RX ADMIN — POTASSIUM CHLORIDE 10 MEQ: 7.46 INJECTION, SOLUTION INTRAVENOUS at 09:43

## 2024-03-06 RX ADMIN — POTASSIUM CHLORIDE 20 MEQ: 29.8 INJECTION, SOLUTION INTRAVENOUS at 14:00

## 2024-03-06 RX ADMIN — Medication 10 ML: at 20:05

## 2024-03-06 RX ADMIN — POTASSIUM CHLORIDE 20 MEQ: 29.8 INJECTION, SOLUTION INTRAVENOUS at 12:23

## 2024-03-06 RX ADMIN — GABAPENTIN 600 MG: 300 CAPSULE ORAL at 20:01

## 2024-03-06 RX ADMIN — LIDOCAINE HYDROCHLORIDE 60 MG: 20 INJECTION, SOLUTION EPIDURAL; INFILTRATION; INTRACAUDAL; PERINEURAL at 08:16

## 2024-03-06 RX ADMIN — SENNOSIDES AND DOCUSATE SODIUM 1 TABLET: 50; 8.6 TABLET ORAL at 11:34

## 2024-03-06 RX ADMIN — FENOFIBRATE 160 MG: 160 TABLET ORAL at 11:34

## 2024-03-06 RX ADMIN — AMIODARONE HYDROCHLORIDE 400 MG: 200 TABLET ORAL at 11:34

## 2024-03-06 RX ADMIN — POTASSIUM CHLORIDE 20 MEQ: 29.8 INJECTION, SOLUTION INTRAVENOUS at 15:30

## 2024-03-06 RX ADMIN — FAMOTIDINE 20 MG: 20 TABLET, FILM COATED ORAL at 11:34

## 2024-03-06 RX ADMIN — OXYCODONE 5 MG: 5 TABLET ORAL at 17:10

## 2024-03-06 ASSESSMENT — PAIN DESCRIPTION - DESCRIPTORS: DESCRIPTORS: ACHING;DISCOMFORT

## 2024-03-06 ASSESSMENT — PAIN - FUNCTIONAL ASSESSMENT
PAIN_FUNCTIONAL_ASSESSMENT: PREVENTS OR INTERFERES WITH MANY ACTIVE NOT PASSIVE ACTIVITIES
PAIN_FUNCTIONAL_ASSESSMENT: 0-10

## 2024-03-06 ASSESSMENT — PAIN SCALES - GENERAL: PAINLEVEL_OUTOF10: 7

## 2024-03-06 ASSESSMENT — PAIN DESCRIPTION - LOCATION: LOCATION: BACK

## 2024-03-06 NOTE — PROGRESS NOTES
Pt awake. IV infusing. Abdomen soft. Portable cardiac monitor on. Report given to bedside nurse. Back to floor.

## 2024-03-06 NOTE — PROGRESS NOTES
y.o. male who presents with     C/o Left UE swelling   Started on liquid diet as per GI  S/p  cardiac cath on 3/1  S/p rectal tube placement on 2/29      Review of Systems:      Pertinent positives and negatives discussed in HPI    Objective:     Intake/Output Summary (Last 24 hours) at 3/6/2024 1143  Last data filed at 3/6/2024 0901  Gross per 24 hour   Intake 750 ml   Output 1300 ml   Net -550 ml        Vitals:   Vitals:    03/06/24 0850 03/06/24 0855 03/06/24 0900 03/06/24 0915   BP: 118/67 126/67 123/67 122/75   Pulse: 83 81 80 83   Resp: 15 15 16 17   Temp:   97.3 °F (36.3 °C) 97.6 °F (36.4 °C)   TempSrc:    Axillary   SpO2: 98% 100% 100% 96%   Weight:       Height:             Physical Exam:      General: NAD  Eyes: EOMI  ENT: neck supple  Cardiovascular: Regular rate.  Respiratory: Clear to auscultation  Gastrointestinal: Soft, non tender  Genitourinary: no suprapubic tenderness  Musculoskeletal: No edema    Skin: warm, dry  Neuro: Alert.  Psych: Mood appropriate.         Medications:   Medications:    lidocaine 1 % injection  5 mL IntraDERmal Once    sodium chloride flush  5-40 mL IntraVENous 2 times per day    potassium chloride  20 mEq IntraVENous Q1H    sodium chloride flush  5-40 mL IntraVENous 2 times per day    naloxegol  25 mg Oral QAM AC    sodium chloride flush  5-40 mL IntraVENous 2 times per day    chlorhexidine   Topical Once    mupirocin   Each Nostril Once    lidocaine 1 % injection  5 mL IntraDERmal Once    desmopressin  100 mcg Oral Nightly    [Held by provider] linaclotide  290 mcg Oral QAM AC    amiodarone  400 mg Oral BID    sodium chloride flush  5-40 mL IntraVENous 2 times per day    famotidine  20 mg Oral BID    gabapentin  600 mg Oral TID    fenofibrate  160 mg Oral Daily    sennosides-docusate sodium  1 tablet Oral BID    tamsulosin  0.4 mg Oral Daily    sodium chloride flush  5-40 mL IntraVENous 2 times per day      Infusions:    sodium chloride      sodium chloride      sodium  prostatic enlargement with no pelvic mass or active inflammation Postop changes lumbar spine with multiple old compression fractures along the upper lumbar region which is unchanged. Questionable moderate edema or cellulitis vs soft tissue contusions along the right lateral chest and lower abdominal wall extending inferiorly around the pelvis on the right and less prominent edema on the left.       CBC:   No results for input(s): \"WBC\", \"HGB\", \"PLT\" in the last 72 hours.    BMP:    Recent Labs     03/04/24  0610 03/05/24  0503 03/06/24  0614   * 129* 132*   K 3.2* 3.1* 2.8*   CL 95* 93* 96*   CO2 24 23 26   BUN 13 13 13   CREATININE <0.5* <0.5* 0.6*   GLUCOSE 98 83 99       Hepatic:   No results for input(s): \"AST\", \"ALT\", \"ALB\", \"BILITOT\", \"ALKPHOS\" in the last 72 hours.    Lipids: No results found for: \"CHOL\", \"HDL\", \"TRIG\"  Hemoglobin A1C:   Lab Results   Component Value Date/Time    LABA1C 6.4 10/24/2013 09:29 AM     TSH: No results found for: \"TSH\"  Troponin: No results found for: \"TROPONINT\"  Lactic Acid: No results for input(s): \"LACTA\" in the last 72 hours.  BNP:   No results for input(s): \"PROBNP\" in the last 72 hours.    UA:  Lab Results   Component Value Date/Time    NITRU Negative 02/23/2024 10:49 PM    COLORU Yellow 02/23/2024 10:49 PM    PHUR 5.5 02/23/2024 10:49 PM    WBCUA 1 02/23/2024 10:49 PM    RBCUA 2 02/23/2024 10:49 PM    BACTERIA None Seen 02/23/2024 10:49 PM    CLARITYU Clear 02/23/2024 10:49 PM    SPECGRAV 1.026 02/23/2024 10:49 PM    LEUKOCYTESUR Negative 02/23/2024 10:49 PM    UROBILINOGEN 1.0 02/23/2024 10:49 PM    BILIRUBINUR Negative 02/23/2024 10:49 PM    BLOODU Negative 02/23/2024 10:49 PM    GLUCOSEU Negative 02/23/2024 10:49 PM    KETUA TRACE 02/23/2024 10:49 PM     Urine Cultures: No results found for: \"LABURIN\"  Blood Cultures:   Lab Results   Component Value Date/Time    BC See additional report for complete BCID panel. 02/29/2024 12:27 AM    BC  02/29/2024 12:27 AM
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
## 2024-03-06 NOTE — PROGRESS NOTES
arrival, shortness of breath. Reason for Exam: Recent rib fractures, VT on arrival, shortness of breath. FINDINGS: The mediastinum is unremarkable. The cardiac silhouette is normal size. Subsegmental atelectasis in the left lung base.     Subsegmental atelectasis in the left lung base.     XR ABDOMEN (KUB) (SINGLE AP VIEW)    Result Date: 2/27/2024  EXAMINATION: ONE SUPINE XRAY VIEW(S) OF THE ABDOMEN 2/27/2024 10:04 am COMPARISON: None. HISTORY: ORDERING SYSTEM PROVIDED HISTORY: worsening abdominal distention.  eval cecal diameter TECHNOLOGIST PROVIDED HISTORY: Reason for exam:->worsening abdominal distention.  eval cecal diameter Reason for Exam: worsening abdominal distention. eval cecal diameter FINDINGS: Moderate to severe gaseous distention of the colon is again demonstrated with cecal diameter now approaching 15 cm.  There is no organomegaly or suspicious calcification.  Fixation hardware spans the lumbosacral region.     Progressive colonic distension.     CT ABDOMEN PELVIS W IV CONTRAST Additional Contrast? None    Result Date: 2/23/2024  EXAMINATION: CT OF THE ABDOMEN AND PELVIS WITH CONTRAST 2/23/2024 9:29 pm TECHNIQUE: CT of the abdomen and pelvis was performed with the administration of intravenous contrast. Multiplanar reformatted images are provided for review. Automated exposure control, iterative reconstruction, and/or weight based adjustment of the mA/kV was utilized to reduce the radiation dose to as low as reasonably achievable. COMPARISON: 02/19/2024 HISTORY: ORDERING SYSTEM PROVIDED HISTORY: severe abdominal pain TECHNOLOGIST PROVIDED HISTORY: Reason for exam:->severe abdominal pain Additional Contrast?->None Decision Support Exception - unselect if not a suspected or confirmed emergency medical condition->Emergency Medical Condition (MA) Reason for Exam: severe abdominal pain FINDINGS: Lower Chest: There is a moderate right pleural effusion layering posteriorly with adjacent moderate  \"ALB\", \"BILITOT\", \"ALKPHOS\" in the last 72 hours.    Lipids: No results found for: \"CHOL\", \"HDL\", \"TRIG\"  Hemoglobin A1C:   Lab Results   Component Value Date/Time    LABA1C 6.4 10/24/2013 09:29 AM     TSH: No results found for: \"TSH\"  Troponin: No results found for: \"TROPONINT\"  Lactic Acid: No results for input(s): \"LACTA\" in the last 72 hours.  BNP:   No results for input(s): \"PROBNP\" in the last 72 hours.    UA:  Lab Results   Component Value Date/Time    NITRU Negative 02/23/2024 10:49 PM    COLORU Yellow 02/23/2024 10:49 PM    PHUR 5.5 02/23/2024 10:49 PM    WBCUA 1 02/23/2024 10:49 PM    RBCUA 2 02/23/2024 10:49 PM    BACTERIA None Seen 02/23/2024 10:49 PM    CLARITYU Clear 02/23/2024 10:49 PM    SPECGRAV 1.026 02/23/2024 10:49 PM    LEUKOCYTESUR Negative 02/23/2024 10:49 PM    UROBILINOGEN 1.0 02/23/2024 10:49 PM    BILIRUBINUR Negative 02/23/2024 10:49 PM    BLOODU Negative 02/23/2024 10:49 PM    GLUCOSEU Negative 02/23/2024 10:49 PM    KETUA TRACE 02/23/2024 10:49 PM     Urine Cultures: No results found for: \"LABURIN\"  Blood Cultures:   Lab Results   Component Value Date/Time    BC See additional report for complete BCID panel. 02/29/2024 12:27 AM    BC  02/29/2024 12:27 AM     POSITIVE for  This organism was isolated in one set.  Susceptibility testing is not routinely done as this  organism frequently represents skin contamination.  Additional testing can be ordered by calling the  Microbiology Department.       Lab Results   Component Value Date/Time    BLOODCULT2 No Growth after 4 days of incubation. 02/29/2024 04:43 AM     Organism:   Lab Results   Component Value Date/Time    ORG Staphylococcus epidermidis DNA Detected 02/29/2024 12:27 AM    ORG Staphylococcus epidermidis 02/29/2024 12:27 AM         Electronically signed by Chiara Gage MD on 3/6/2024 at 11:44 AM

## 2024-03-06 NOTE — PROGRESS NOTES
RN spoke to JULIOCESAR Conde from pre-op regarding colonoscopy procedure. RN informed pre-op team that pt has not had colonoscopy prep. MD was made aware and is agreeable with performing the procedure. RN verified NPO status, pre-op made aware that pt has had a small amount of ice chips since midnight. RN inquired about consent form, pre-op verified that consent will be obtained pre-procedure by the pre-op team. Pt informed that he will be going down for a colonoscopy this morning.

## 2024-03-06 NOTE — PROGRESS NOTES
Received call from lab stating K is 2.8. Pt off floor for procedure at this time- unable to replace at this time.  Electronically signed by Melany Gonazlez RN on 3/6/24 at 7:58 AM EST    Pt returned from Colonoscopy- alert and oriented. Decompression rectal tube in place. Pt denies any discomfort other then his fractures.   VS and assessment completed.   [facility] notified of return.  Electronically signed by Melany Gonzalez RN on 3/6/24 at 9:23 AM EST    Replacement K started- Pt c/o pain at IV site. Site noted to be pink and cold above insertion site. K stopped. Pt was very irritable stating that \"this is what happened to my other arm and your not going to keep shoving needles in my arm\". RN explained to pt that he needs a working IV for K administration. Pt stated \"I don't want that stuff going in my arm\". Pt has order for PICC line which he initially refused but is now in agreement. DIT notified.  Electronically signed by Melany Gonzalez RN on 3/6/24 at 10:18 AM EST    PICC line placed in L arm. Per cath lab- picc line will interfere with ICD placement. Cath lab informed that L arm was used d/t fractures on R side. Per cath lab- procedure will now be done as an outpt procedure and Dr. Murillo will discuss with pt and wife. RN inquired if shortly before his discharge the picc could be pulled and peripheral access obtained for procedure. Cath lab nurse stated it would just be done outpt.   RN updated pt and wife. Diet reordered.  Electronically signed by Melany Gonzalez RN on 3/6/24 at 1:13 PM EST    Prior to noting in GI's note that pt was to be on clear liquids until at least tomorrow, Reg diet from yesterday was resumed. Once clear liquid diet was noted, RN explained to pt and wife the plan. Pt and wife became very irritated with RN not understanding why he had to be on clear liquids. RN attempted to explain that its possibly to rest his bowel a bit. Wife became tearful stating \"he isn't going to get strong and he is not  leaving this hospital until he can walk\". Pt rep stated that \"no one here knows what's going on and all I do is lay in this bed\". RN offered to get pt out of bed- pt refused.   Wife stated that Dr. Cervantes needed \"to get his but up here and explain all of this\".   Message to MAYRA Greene relaying pt and wife's frustration.  Electronically signed by Melany Gonzalez RN on 3/6/24 at 1:32 PM EST    Per Jc, pt should be on clear liquids but he insists- may advance. Pt made aware of this. Stated he will stay on clears.  Electronically signed by Melany Gonzalez RN on 3/6/24 at 2:14 PM EST    Pt and wife continue to express frustration about not receiving ICD. Per Dr. Murillo if Picc is removed, ICD can be placed tomorrow or Friday. RN and Dr. Gage discussed with pt. Pt in agreement with plan. Aware that he will need peripheral IV placed.  Electronically signed by Melany Gonzalez RN on 3/6/24 at 3:46 PM EST    Vascular access Pascale attempted multiple times to obtain peripheral line with no success. There was a suggestion that possibly exchanging picc line for midline would allow ICD placement. RN notified Dr. Gage of this- per Dr. Gage clarify with Dr. Murillo/Cath lab. RN unable to reach Dr. Murillo at this time. Spoke with Abeba and Soife in cath lab who were unsure of the answer. Nursing communication placed for 8am tomorrow to clarify with Dr. Murillo if this would be a solution. Charge nurse aware as well. Perfect serve to Dr. Gage.    Pt remained in bed all of shift- refused to get up to chair. Rectal tube remained in place-- min drainage noted. Pt had a lg loose BM this afternoon.   Both pt and wife continue to be very irritable and rude towards staff.  Electronically signed by Melany Gonzalez RN on 3/6/24 at 6:28 PM EST

## 2024-03-06 NOTE — OP NOTE
Endoscopy Note    Patient: Juan Mohamud  : 1940  Acct#:     Procedure: Colonoscopy with placement of colonic decompression tube    Date:  3/6/2024    Surgeon:  Anders Cervantes MD    Anesthesia:  TIVA    Indications: This is a 83 y.o. 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  The patient was then taken to the endoscopy suite, placed in the left lateral decubitus position, and the above IV anesthesia was administered.    A digital rectal examination was performed and revealed negative without mass, lesions or tenderness.      The Olympus adult video colonoscope was placed in the patient's rectum under digital direction and advanced to the cecum. The cecum was identified by characteristic anatomy and ballottment.  The prep was poor.  The appendiceal orifice and ileocecal valve were identified.  The scope was then withdrawn into the rectum while advancing a guidewire into the colon and decompressing all of the air out of the colon.      Findings:  1.  Limited views of the colon did show some mild superficial ulceration without necrosis in the right colon.  Otherwise limited colonic views were normal.  The colon was dilated and decompressed completely colonoscopically.  A guidewire was advanced while withdrawing the scope and a 14 Fr decompression tube was passed over the wire into the colon.    The patient tolerated the procedure well and was taken to Recovery in good condition.  No complications.    EBL: none  Specimens taken: none      Impression:   Colonic pseudoobstruction s/p decompression colonoscopy with placement of a 14Fr colonic decompression tube.      Recommendations:   1.  Start with a clear liquid diet after  defibrillator placement.  If abdominal exam is stable tomorrow, will consider advancing  2.  Would work to get potassium > 4 as this will promote colonic motility.    Anders Cervantes MD,   [facility]  3/6/2024

## 2024-03-06 NOTE — ANESTHESIA POSTPROCEDURE EVALUATION
Department of Anesthesiology  Postprocedure Note    Patient: Juan Mohamud  MRN: [id number]  YOB: 1940  Date of evaluation: 3/6/2024    Procedure Summary       Date: 03/06/24 Room / Location: [address]    Anesthesia Start: 0810 Anesthesia Stop: 0841    Procedure: COLONOSCOPY WITH DECOMPRESSION Diagnosis:       Dilatation of colon      (Dilatation of colon [K59.39])    Surgeons: Anders Cervantes MD Responsible Provider: Baron Moody MD    Anesthesia Type: MAC ASA Status: 4            Anesthesia Type: MAC    Caitlyn Phase I: Caitlyn Score: 10    Caitlyn Phase II:      Anesthesia Post Evaluation    Patient location during evaluation: PACU  Patient participation: complete - patient participated  Level of consciousness: awake  Airway patency: patent  Nausea & Vomiting: no nausea and no vomiting  Cardiovascular status: hemodynamically stable and blood pressure returned to baseline  Respiratory status: spontaneous ventilation, nonlabored ventilation and room air  Hydration status: stable  Comments: Uneventful MAC anesthetic. Mr. Mohamud was seen comfortably conversing with staff following his procedure. Appropriate for return to floor. Resume telemetry.  Pain management: adequate    No notable events documented.

## 2024-03-06 NOTE — ANESTHESIA PRE PROCEDURE
pacemaker (ICD declined by patient), CABG/stent, weak pulses and no hyperlipidemia      Rhythm: regular  Rate: normal                 ROS comment: Vtach 2/29/2024    EKG:  Sinus tachycardia   Left bundle branch block     Echocardiogram (2/29/2024)  Summary:  Overall, left ventricular size and systolic function appears normal. Ejection fraction is visually estimated to be 55%. No regional wall motion abnormalities are noted. Mild concentric left ventricular hypertrophy is present. Normal diastolic function. Mild to moderate, anteriorly directed mitral valve regurgitation. The left atrium is dilated. The aortic valve appears sclerotic but opens well.    PE comment: Sinus rhythm with rate 91 on monitor. Possible ICD placement later today per patient.   Neuro/Psych:      (-) seizures, TIA and CVA           GI/Hepatic/Renal:   (+) GERD:     (-) liver disease, no renal disease, bowel prep (s/p decompression 2/29/2024) and no morbid obesity      ROS comment: Suspected Tex's     XR  Diffuse gaseous distention of the colon which appears very similar to prior imaging allowing for differences in positioning and technique.  Stable rectal catheter.    Frequent diarrhea 3/4.   Endo/Other:    (+) DiabetesType II DM, blood dyscrasia (H&H: 11.0/31.1): anemia:., electrolyte abnormalities.                  ROS comment: Cefepime + flagyl on floor, infectious disease following    Recent fall with left clavicle + rib fractures Abdominal:       Abdomen: soft.      Vascular:     - DVT and PE.      Other Findings: Laying comfortably flat supine in preop. Very pleasant. Retired [place] .     Reports continued diarrhea yesterday. Passing stool and flatus without difficulty. Denies any pain or bloating at this time. Rectal tube remains in place. No prep.    Right clavicle and right rib fractures, denies any issues lying on left side.            Anesthesia Plan      MAC     ASA 4     (NPO appropriate. Mr. Mohamud denies

## 2024-03-06 NOTE — PROGRESS NOTES
Cardiac Electrophysiology Progress Note     Admit Date: 2024     Reason for follow up: sustained  bpm    HPI: 84yo M with sustained VT.     Interval History: Patient seen and examined. Clinical notes reviewed. Telemetry reviewed. No new complaint today. No major events overnight. Denies having angina, shortness of breath, dyspnea on exertion, Orthopnea, PND at the time of this visit.    Review of Systems   Constitutional:  Negative for chills, fatigue, fever and unexpected weight change.   HENT:  Negative for congestion, hearing loss, sinus pressure, sore throat and trouble swallowing.    Respiratory:  Negative for cough, shortness of breath and wheezing.    Cardiovascular:  Negative for chest pain, palpitations and leg swelling.   Gastrointestinal:  Negative for abdominal pain, blood in stool, constipation, nausea and vomiting.   Genitourinary:  Negative for hematuria.   Musculoskeletal:  Negative for arthralgias, back pain, gait problem and myalgias.   Skin:  Negative for color change, rash and wound.   Neurological:  Negative for dizziness, seizures, syncope, speech difficulty, weakness and light-headedness.   Hematological:  Does not bruise/bleed easily.   All other systems reviewed and are negative.        Vitals:    24 1219   BP: 127/78   Pulse: 81   Resp: 17   Temp: 97.6 °F (36.4 °C)   SpO2: 95%        Intake/Output Summary (Last 24 hours) at 3/6/2024 1338  Last data filed at 3/6/2024 0901  Gross per 24 hour   Intake 750 ml   Output 1300 ml   Net -550 ml     In: 750 [P.O.:420; I.V.:330]  Out: 1300    Wt Readings from Last 3 Encounters:   24 107 kg (235 lb 14.3 oz)   24 107.4 kg (236 lb 12.4 oz)   24 106 kg (233 lb 11 oz)     Temp  Av °F (36.7 °C)  Min: 97.2 °F (36.2 °C)  Max: 99 °F (37.2 °C)  Pulse  Av.5  Min: 80  Max: 107  BP  Min: 113/68  Max: 137/83  SpO2  Av %  Min: 90 %  Max: 100 %  Telemetry: Sinus rhythm with v-rates 70's bpm    Physical Exam  Vitals  if we were to proceed with ICD implantation.   -also, today, the patient was placed with a L-arm PICC line, which intrudes upon the implantation of L upper chest ICD implantation.   -primary team willing to discontinue L picc line, after which we will plan on ICD implantation on Friday, March 8, 2024 in the afternoon. NPO p MN on Thursday night.    Will follow from afar.    Thank you for allowing me to participate in the care of this patient. If you have any questions, please do not hesitate to contact me.    Kurtis Murillo MD, MS, FACC, RS  Cardiac Electrophysiology  [facility]  [address], [address]  [place] [address] [phone]

## 2024-03-06 NOTE — PROGRESS NOTES
Gastroenterology Progress Note    Juan Mohamud is a 83 y.o. male patient.  Principal Problem:    Arrhythmia  Active Problems:    DDD (degenerative disc disease), lumbar    Cataract extraction status of right eye    Constipation    Elevated troponin    Primary hypertension    Hyperlipidemia    Colonic obstruction (HCC)    Trauma    History of fall    Type 2 diabetes mellitus (HCC)    Anemia    Pneumonia    Hyponatremia    Dilatation of colon    Wide-complex tachycardia    Acute hypoxemic respiratory failure (HCC)    BOSTON (acute kidney injury) (HCC)  Resolved Problems:    * No resolved hospital problems. *      SUBJECTIVE: Tolerated colonoscopy    Current Facility-Administered Medications: sodium chloride flush 0.9 % injection 5-40 mL, 5-40 mL, IntraVENous, 2 times per day  sodium chloride flush 0.9 % injection 5-40 mL, 5-40 mL, IntraVENous, PRN  0.9 % sodium chloride infusion, , IntraVENous, PRN  sodium chloride flush 0.9 % injection 5-40 mL, 5-40 mL, IntraVENous, 2 times per day  sodium chloride flush 0.9 % injection 5-40 mL, 5-40 mL, IntraVENous, PRN  0.9 % sodium chloride infusion, , IntraVENous, PRN  naloxegol (MOVANTIK) tablet 25 mg, 25 mg, Oral, QAM AC  sodium chloride flush 0.9 % injection 5-40 mL, 5-40 mL, IntraVENous, 2 times per day  sodium chloride flush 0.9 % injection 5-40 mL, 5-40 mL, IntraVENous, PRN  chlorhexidine (HIBICLENS) 4 % liquid, , Topical, Once  mupirocin (BACTROBAN) 2 % ointment, , Each Nostril, Once  oxyCODONE (ROXICODONE) immediate release tablet 5 mg, 5 mg, Oral, Q4H PRN **OR** oxyCODONE (ROXICODONE) immediate release tablet 2.5 mg, 2.5 mg, Oral, Q4H PRN  morphine (PF) injection 2 mg, 2 mg, IntraVENous, Q4H PRN  lidocaine PF 1 % injection 5 mL, 5 mL, IntraDERmal, Once  desmopressin (DDAVP) tablet 100 mcg, 100 mcg, Oral, Nightly  [Held by provider] linaclotide (LINZESS) capsule 290 mcg (Patient Supplied), 290 mcg, Oral, QAM AC  amiodarone (CORDARONE) tablet 400 mg, 400 mg, Oral,

## 2024-03-06 NOTE — PLAN OF CARE
Problem: Safety - Adult  Goal: Free from fall injury  3/6/2024 0057 by Odalys Artis RN  Outcome: Progressing  3/5/2024 1711 by Yazmin Nj RN  Outcome: Progressing     Problem: Discharge Planning  Goal: Discharge to home or other facility with appropriate resources  3/6/2024 0057 by Odalys Artis RN  Outcome: Progressing  3/5/2024 1711 by Yazmin Nj RN  Outcome: Progressing     Problem: Chronic Conditions and Co-morbidities  Goal: Patient's chronic conditions and co-morbidity symptoms are monitored and maintained or improved  3/6/2024 0057 by Odalys Artis RN  Outcome: Progressing  3/5/2024 1711 by Yazmin Nj RN  Outcome: Progressing     Problem: Skin/Tissue Integrity  Goal: Absence of new skin breakdown  Description: 1.  Monitor for areas of redness and/or skin breakdown  2.  Assess vascular access sites hourly  3.  Every 4-6 hours minimum:  Change oxygen saturation probe site  4.  Every 4-6 hours:  If on nasal continuous positive airway pressure, respiratory therapy assess nares and determine need for appliance change or resting period.  3/6/2024 0057 by Odalys Artis RN  Outcome: Progressing  3/5/2024 1711 by Yazmin Nj RN  Outcome: Progressing     Problem: ABCDS Injury Assessment  Goal: Absence of physical injury  3/6/2024 0057 by Odalys Artis RN  Outcome: Progressing  3/5/2024 1711 by Yazmin Nj RN  Outcome: Progressing     Problem: Pain  Goal: Verbalizes/displays adequate comfort level or baseline comfort level  3/6/2024 0057 by Odalys Artis RN  Outcome: Progressing  3/5/2024 1711 by Yazmin Nj RN  Outcome: Progressing     Problem: Neurosensory - Adult  Goal: Achieves stable or improved neurological status  3/6/2024 0057 by Odalys Artis RN  Outcome: Progressing  3/5/2024 1711 by Yazmin Nj RN  Outcome: Progressing  Goal: Achieves maximal functionality and self care  3/6/2024 0057 by Odalys Artis RN  Outcome: Progressing  3/5/2024 1711 by Clem  levels  Description: INTERVENTIONS:  1. Administer medication as ordered  2. Teach and rehearse alternative coping skills  3. Provide emotional support with 1:1 interaction with staff  Outcome: Progressing     Problem: Coping  Goal: Pt/Family able to verbalize concerns and demonstrate effective coping strategies  Description: INTERVENTIONS:  1. Assist patient/family to identify coping skills, available support systems and cultural and spiritual values  2. Provide emotional support, including active listening and acknowledgement of concerns of patient and caregivers  3. Reduce environmental stimuli, as able  4. Instruct patient/family in relaxation techniques, as appropriate  5. Assess for spiritual pain/suffering and initiate Spiritual Care, Psychosocial Clinical Specialist consults as needed  Outcome: Progressing     Problem: Change in Body Image  Goal: Pt/Family communicate acceptance of loss or change in body image and feel psychological comfort and peace  Description: INTERVENTIONS:  1. Assess patient/family anxiety and grief process related to change in body image, loss of functional status, loss of sense of self, and forgiveness  2. Provide emotional and spiritual support  3. Provide information about the patient's health status with consideration of family and cultural values  4. Communicate willingness to discuss loss and facilitate grief process with patient/family as appropriate  5. Emphasize sustaining relationships within family system and community, or sanam/spiritual traditions  6. Initiate Spiritual Care, Psychosocial Clinical Specialist consult as needed  Outcome: Progressing

## 2024-03-06 NOTE — PROGRESS NOTES
Order for PICC line per Dr. Gage (nephrology) Pre procedure and timeout done with pt's RN.    Successful insertion of a double lumen PICC line into pt's left brahcial vein. No issues gaining access or advancing guidewire/introducer/PICC line. PICC tip terminates in the SVC according to [employer] 3CG tip confirmation system. PICC was seen dropping into SVC with tip tracking technology and discernable peaked p waves were noted without negative deflection. A printout will be in pt's chart.     PICC is cut at 48 cm and out externally 0 cm. All  lumens flush without resistance and draw back brisk blood return.    PICC site CDI with hemostasis maintained and a chg dressing applied to site. Pt instructed to stay in bed and keep arm flat and still for 30 minutes  to promote hemostasis.     Melany GANDARA given handoff report

## 2024-03-07 LAB
ANION GAP SERPL CALCULATED.3IONS-SCNC: 8 MMOL/L (ref 3–16)
BUN SERPL-MCNC: 11 MG/DL (ref 7–20)
CALCIUM SERPL-MCNC: 7.9 MG/DL (ref 8.3–10.6)
CHLORIDE SERPL-SCNC: 97 MMOL/L (ref 99–110)
CO2 SERPL-SCNC: 28 MMOL/L (ref 21–32)
CREAT SERPL-MCNC: <0.5 MG/DL (ref 0.8–1.3)
GFR SERPLBLD CREATININE-BSD FMLA CKD-EPI: >60 ML/MIN/{1.73_M2}
GLUCOSE SERPL-MCNC: 106 MG/DL (ref 70–99)
POTASSIUM SERPL-SCNC: 3.5 MMOL/L (ref 3.5–5.1)
SODIUM SERPL-SCNC: 133 MMOL/L (ref 136–145)

## 2024-03-07 PROCEDURE — 6370000000 HC RX 637 (ALT 250 FOR IP): Performed by: INTERNAL MEDICINE

## 2024-03-07 PROCEDURE — C1751 CATH, INF, PER/CENT/MIDLINE: HCPCS

## 2024-03-07 PROCEDURE — 05H933Z INSERTION OF INFUSION DEVICE INTO RIGHT BRACHIAL VEIN, PERCUTANEOUS APPROACH: ICD-10-PCS | Performed by: INTERNAL MEDICINE

## 2024-03-07 PROCEDURE — 80048 BASIC METABOLIC PNL TOTAL CA: CPT

## 2024-03-07 PROCEDURE — 36569 INSJ PICC 5 YR+ W/O IMAGING: CPT

## 2024-03-07 PROCEDURE — 94760 N-INVAS EAR/PLS OXIMETRY 1: CPT

## 2024-03-07 PROCEDURE — 51798 US URINE CAPACITY MEASURE: CPT

## 2024-03-07 PROCEDURE — 97530 THERAPEUTIC ACTIVITIES: CPT

## 2024-03-07 PROCEDURE — 2060000000 HC ICU INTERMEDIATE R&B

## 2024-03-07 PROCEDURE — 2580000003 HC RX 258: Performed by: HOSPITALIST

## 2024-03-07 RX ORDER — LIDOCAINE HYDROCHLORIDE 10 MG/ML
5 INJECTION, SOLUTION EPIDURAL; INFILTRATION; INTRACAUDAL; PERINEURAL ONCE
Status: DISCONTINUED | OUTPATIENT
Start: 2024-03-07 | End: 2024-03-14 | Stop reason: HOSPADM

## 2024-03-07 RX ADMIN — GABAPENTIN 600 MG: 300 CAPSULE ORAL at 20:52

## 2024-03-07 RX ADMIN — NALOXEGOL OXALATE 25 MG: 25 TABLET, FILM COATED ORAL at 06:56

## 2024-03-07 RX ADMIN — SENNOSIDES AND DOCUSATE SODIUM 1 TABLET: 50; 8.6 TABLET ORAL at 20:56

## 2024-03-07 RX ADMIN — FAMOTIDINE 20 MG: 20 TABLET, FILM COATED ORAL at 08:50

## 2024-03-07 RX ADMIN — AMIODARONE HYDROCHLORIDE 400 MG: 200 TABLET ORAL at 20:52

## 2024-03-07 RX ADMIN — FENOFIBRATE 160 MG: 160 TABLET ORAL at 08:50

## 2024-03-07 RX ADMIN — ZOLPIDEM TARTRATE 10 MG: 5 TABLET ORAL at 22:54

## 2024-03-07 RX ADMIN — AMIODARONE HYDROCHLORIDE 400 MG: 200 TABLET ORAL at 08:49

## 2024-03-07 RX ADMIN — Medication 10 ML: at 08:51

## 2024-03-07 RX ADMIN — Medication 10 ML: at 20:54

## 2024-03-07 RX ADMIN — OXYCODONE 5 MG: 5 TABLET ORAL at 23:02

## 2024-03-07 RX ADMIN — GABAPENTIN 600 MG: 300 CAPSULE ORAL at 08:50

## 2024-03-07 RX ADMIN — GABAPENTIN 600 MG: 300 CAPSULE ORAL at 15:03

## 2024-03-07 RX ADMIN — TAMSULOSIN HYDROCHLORIDE 0.4 MG: 0.4 CAPSULE ORAL at 08:50

## 2024-03-07 RX ADMIN — FAMOTIDINE 20 MG: 20 TABLET, FILM COATED ORAL at 20:52

## 2024-03-07 RX ADMIN — OXYCODONE 5 MG: 5 TABLET ORAL at 11:02

## 2024-03-07 ASSESSMENT — PAIN SCALES - GENERAL
PAINLEVEL_OUTOF10: 9
PAINLEVEL_OUTOF10: 0
PAINLEVEL_OUTOF10: 7
PAINLEVEL_OUTOF10: 4

## 2024-03-07 ASSESSMENT — PAIN DESCRIPTION - ORIENTATION
ORIENTATION: RIGHT
ORIENTATION: RIGHT

## 2024-03-07 ASSESSMENT — PAIN - FUNCTIONAL ASSESSMENT
PAIN_FUNCTIONAL_ASSESSMENT: PREVENTS OR INTERFERES WITH MANY ACTIVE NOT PASSIVE ACTIVITIES
PAIN_FUNCTIONAL_ASSESSMENT: ACTIVITIES ARE NOT PREVENTED

## 2024-03-07 ASSESSMENT — PAIN DESCRIPTION - DESCRIPTORS
DESCRIPTORS: ACHING;STABBING;DISCOMFORT
DESCRIPTORS: ACHING

## 2024-03-07 ASSESSMENT — PAIN DESCRIPTION - LOCATION
LOCATION: ABDOMEN
LOCATION: OTHER (COMMENT)

## 2024-03-07 NOTE — PROGRESS NOTES
Comprehensive Nutrition Assessment    Type and Reason for Visit:  Initial, RD Nutrition Re-Screen/LOS    Nutrition Recommendations/Plan:   Regular diet  Ensure chocolate bid, RD provided coupons for 3.00 off for home use, patient would benefit from drinking at home until strength and appetite returns  Will monitor nutritional adequacy, nutrition-related labs, weights, BMs, and clinical progress      Malnutrition Assessment:  Malnutrition Status:  At risk for malnutrition (Comment) (03/07/24 0683)    Context:  Acute Illness     Findings of the 6 clinical characteristics of malnutrition:  Energy Intake:  75% or less of estimated energy requirements for 7 or more days  Weight Loss:  No significant weight loss     Body Fat Loss:  Unable to assess     Muscle Mass Loss:  Unable to assess    Fluid Accumulation:        Strength:   (patient reported weakness)    Nutrition Assessment:    LOS assessment.  Patient admitted with arrhythmia.  GI following.  Diet has been upgraded and held multiple times this admission for medical reasons.  Patient reported procedures being rescheduled multiple times and frustrated.  Colonic pseudoobstruction s/p decompression colonoscopy with placement of a 14Fr colonic decompression tube on 3/6.  Patient feels weak from hospital stay, agreed to Ensure chocolate, will send Ensure Enlive bid.    Nutrition Related Findings:    last BM on 3/7; Na 133 on 3/7 Wound Type:  (redness heels and buttocks)       Current Nutrition Intake & Therapies:    Average Meal Intake: 26-50%  Average Supplements Intake:  (tried the Ensure Enlive chocolate and liked the flavor so far)  Diet NPO Exceptions are: Ice Chips, Sips of Water with Meds  ADULT DIET; Regular  ADULT ORAL NUTRITION SUPPLEMENT; Lunch, Dinner; Standard High Calorie/High Protein Oral Supplement    Anthropometric Measures:  Height: 193 cm (6' 4\")  Ideal Body Weight (IBW): 202 lbs (92 kg)       Current Body Weight: 107 kg (235 lb 14.3 oz),   IBW.  Weight Source: Bed Scale  Current BMI (kg/m2): 28.7                          BMI Categories: Overweight (BMI 25.0-29.9)    Estimated Daily Nutrient Needs:  Energy Requirements Based On: Kcal/kg  Weight Used for Energy Requirements: Ideal  Energy (kcal/day): 7919-4559 (22-25 kcal/91.8 kg)  Weight Used for Protein Requirements: Ideal  Protein (g/day):  (1-1.2 g/91.8 kg)  Method Used for Fluid Requirements: 1 ml/kcal  Fluid (ml/day):      Nutrition Diagnosis:   Increased nutrient needs related to increase demand for energy/nutrients as evidenced by  (extended hospital stay)    Nutrition Interventions:   Food and/or Nutrient Delivery: Continue Current Diet, Start Oral Nutrition Supplement     Coordination of Nutrition Care: Continue to monitor while inpatient  Plan of Care discussed with: patient and wife    Goals:     Goals: PO intake 75% or greater       Nutrition Monitoring and Evaluation:      Food/Nutrient Intake Outcomes: Food and Nutrient Intake, Supplement Intake  Physical Signs/Symptoms Outcomes: Nutrition Focused Physical Findings, Biochemical Data    Discharge Planning:    Too soon to determine     NATHALIE VELEZ RD, LD  Contact: Office: [phone]; [place]: [phone]

## 2024-03-07 NOTE — PLAN OF CARE
Problem: Safety - Adult  Goal: Free from fall injury  Outcome: Progressing  Flowsheets (Taken 3/7/2024 0331)  Free From Fall Injury: Instruct family/caregiver on patient safety     Problem: Discharge Planning  Goal: Discharge to home or other facility with appropriate resources  Outcome: Progressing  Flowsheets (Taken 3/6/2024 2032)  Discharge to home or other facility with appropriate resources: Identify barriers to discharge with patient and caregiver     Problem: Chronic Conditions and Co-morbidities  Goal: Patient's chronic conditions and co-morbidity symptoms are monitored and maintained or improved  Outcome: Progressing  Flowsheets (Taken 3/3/2024 1015 by Sammie Freed, RN)  Care Plan - Patient's Chronic Conditions and Co-Morbidity Symptoms are Monitored and Maintained or Improved: Monitor and assess patient's chronic conditions and comorbid symptoms for stability, deterioration, or improvement     Problem: Respiratory - Adult  Goal: Achieves optimal ventilation and oxygenation  Outcome: Progressing  Flowsheets  Taken 3/7/2024 0331  Achieves optimal ventilation and oxygenation:   Assess for changes in respiratory status   Assess for changes in mentation and behavior   Position to facilitate oxygenation and minimize respiratory effort

## 2024-03-07 NOTE — PROGRESS NOTES
Physical Therapy  Facility/Department: [address] PROGRESSIVE CARE  Physical Therapy Treatment Note    Name: Juan Mohamud  : 1940  MRN: [id number]  Date of Service: 3/7/2024    Discharge Recommendations:  Continue to assess pending progress, Subacute/Skilled Nursing Facility   PT Equipment Recommendations  Other: Defer to next level of care.  Juan Mohamud scored a 8/24 on the AM-PAC short mobility form. Current research shows that an AM-PAC score of 17 or less is typically not associated with a discharge to the patient's home setting. Based on the patient's AM-PAC score and their current functional mobility deficits, it is recommended that the patient have 3-5 sessions per week of Physical Therapy at d/c to increase the patient's independence.  Please see assessment section for further patient specific details.    If patient discharges prior to next session this note will serve as a discharge summary.  Please see below for the latest assessment towards goals.          Per chart anticipate ICD place 3/8/2024.  Spoke with nursing re current R UE restrictions due to R Clavicular Fx (NWB).  Will be of concern following ICD place and restrictions placed on L UE which will further impede functional activities.  May also wish to consider Ortho consult re R UE (pt's wife reports no follow-up with UC).      Assessment   Body Structures, Functions, Activity Limitations Requiring Skilled Therapeutic Intervention: Decreased functional mobility ;Decreased strength;Decreased safe awareness;Decreased endurance;Decreased balance;Increased pain  Assessment: 84 y/o male admit 2024 [facility] Acute Respiratory Failure, Wide Complex Tachycardia, BOSTON and Elevated Troponin. Pt had fall  resulting in multiple fractures and has been in out of hospital/SNF settings  since. Pt most recently at [facility] setting. Prior to fall, pt lived at home with spouse and was independent with daily care  and functional  Co-treatment   Time In 1255         Time Out 1325         Minutes 30                 Jacy Andrews, PT

## 2024-03-07 NOTE — PROGRESS NOTES
Per PICC team, pt PICC line can be exchanged for a Midline if the Midline is ok with Dr. Murillo for ICD placement. Midline terminates before the axilla.    PICC is to stay In pt until PICC team arrives if exchanging to a midline.     Awaiting Dr. Murillo response on Midline placement. Dr. Gage, Attending MD is aware of above. Pt also made aware of above.     Electronically signed by Marielos Leslie RN on 3/7/2024 at 9:00 AM      Per Dr. Murillo patient can not have anything in the upper Left arm due to risk of dislodging and contamination during ICD placement. Pt will have to get Midline in Right upper arm. Pt does have right clavicle fracture, Per Ortho NP pt can extend right arm out parallel to the body but can not lift right arm above the head. Dr. Gage at pt bedside and had pt extend arm out, pt did this with ease and without any pain. Pt updated on need for new line and states he is agreeable to it.     DIT called at 0923 to place midline in right arm.     Electronically signed by Marielos Leslie RN on 3/7/2024 at 9:26 AM

## 2024-03-07 NOTE — PROGRESS NOTES
Gastroenterology Progress Note    Juan Mohamud is a 83 y.o. male patient.  Principal Problem:    Arrhythmia  Active Problems:    DDD (degenerative disc disease), lumbar    Cataract extraction status of right eye    Constipation    Elevated troponin    Primary hypertension    Hyperlipidemia    Colonic obstruction (HCC)    Trauma    History of fall    Type 2 diabetes mellitus (HCC)    Anemia    Pneumonia    Hyponatremia    Dilatation of colon    Wide-complex tachycardia    Acute hypoxemic respiratory failure (HCC)    BOSTON (acute kidney injury) (HCC)  Resolved Problems:    * No resolved hospital problems. *      SUBJECTIVE: He is hungry and wants a regular diet. No abdominal pain. Moving bowels.  No fevers.      Current Facility-Administered Medications: lidocaine PF 1 % injection 5 mL, 5 mL, IntraDERmal, Once  lidocaine PF 1 % injection 5 mL, 5 mL, IntraDERmal, Once  sodium chloride flush 0.9 % injection 5-40 mL, 5-40 mL, IntraVENous, 2 times per day  sodium chloride flush 0.9 % injection 5-40 mL, 5-40 mL, IntraVENous, PRN  0.9 % sodium chloride infusion, 25 mL, IntraVENous, PRN  sodium chloride flush 0.9 % injection 5-40 mL, 5-40 mL, IntraVENous, 2 times per day  sodium chloride flush 0.9 % injection 5-40 mL, 5-40 mL, IntraVENous, PRN  0.9 % sodium chloride infusion, , IntraVENous, PRN  naloxegol (MOVANTIK) tablet 25 mg, 25 mg, Oral, QAM AC  sodium chloride flush 0.9 % injection 5-40 mL, 5-40 mL, IntraVENous, 2 times per day  sodium chloride flush 0.9 % injection 5-40 mL, 5-40 mL, IntraVENous, PRN  chlorhexidine (HIBICLENS) 4 % liquid, , Topical, Once  mupirocin (BACTROBAN) 2 % ointment, , Each Nostril, Once  oxyCODONE (ROXICODONE) immediate release tablet 5 mg, 5 mg, Oral, Q4H PRN **OR** oxyCODONE (ROXICODONE) immediate release tablet 2.5 mg, 2.5 mg, Oral, Q4H PRN  morphine (PF) injection 2 mg, 2 mg, IntraVENous, Q4H PRN  lidocaine PF 1 % injection 5 mL, 5 mL, IntraDERmal, Once  desmopressin (DDAVP) tablet

## 2024-03-07 NOTE — PROGRESS NOTES
Midline placed in RUE. This RN removed PICC in LUE. No complications. RN updated patient and patient's wife on plan for ICD placement tomorrow, 3/8. Both verbalized understanding.    RN bladder scanned patient once this shift, which showed 95 mL of retained urine. Patient denies the feeling of bladder fullness or the inability to empty bladder. Rectal de-compression tube flushed Q4 hours. 150 mL of brown, liquid output documented in Flowsheets. Patient is currently lying on his left side. Bed alarm on, call button within reach. Care ongoing.

## 2024-03-07 NOTE — PROGRESS NOTES
Infectious Diseases Inpatient Progress Note        CHIEF COMPLAINT:     SOB  V Tach  Colonic distension   Ileus  Diarrhea  Wbc ELEVATION   Cecal dilatation            HISTORY OF PRESENT ILLNESS:  83 y.o. man admitted to hospital secondary to shortness of breath also noted to have abdominal distention in the hospital he was recently admitted to the hospital following a fall with rib fracture on that admission he had a dilated colon requiring edema that treatment resolved but now on this admission found to have ventricular tachycardia and required successful cardioversion he was maintained in ICU currently requiring amiodarone.  He was evaluated by cardiology and EP there is a plan for AICD consideration.  In the meantime he also underwent left heart catheterization no major coronary artery disease he also required colonic decompression by GI on 2/29/24, per report indicated dilated unprepped colon superficial mucosal erythema was noted successful decompression with rectal tube placed.  Current labs indicate creatinine 0.5 potassium is low 3.2 WBC normal hemoglobin 11, Tmax 99.9 to the hospitalizationBlood culture 1 set indicate coagulase-negative staph from 2/29/24, probably contaminant MRSA probe negative, abdominal x-ray from this morning indicate rectal decompression catheter in place there is some gaseous distention of the large bowel cecum is grossly distended up to 13.5 cm in caliber.  We are consulted for clearance for AICD placement.      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    Neuropathy     toes partially on both feet       Past   negative for nausea, vomiting, diarrhea, constipation+ , abdominal pain++   Genitourinary:  negative for frequency, dysuria, urinary incontinence, hematuria  Hematologic/Lymphatic:  negative for easy bruising, bleeding and lymphadenopathy  Allergic/Immunologic:  negative for recurrent infections, angioedema, anaphylaxis   Endocrine:  negative for weight changes, polyuria, polydipsia and polyphagia  Musculoskeletal:  negative for joint  pain, swelling, decreased range of motion  Integumentary: No rashes, skin lesions  Neurological:  negative for headaches, slurred speech, unilateral weakness  Psychiatric: negative for hallucinations,confusion,agitation.     PHYSICAL EXAM:      Vitals:  t MAX  99.9   /71   Pulse 93   Temp 98.8 °F (37.1 °C) (Oral)   Resp 15   Ht 1.93 m (6' 4\")   Wt 107 kg (235 lb 14.3 oz)   SpO2 93%   BMI 28.71 kg/m²     General Appearance: alert,in some acute distress, +  pallor, no icterus   Skin: warm and dry, no rash or erythema  Head: normocephalic and atraumatic  Eyes: pupils equal, round, and reactive to light, conjunctivae normal  ENT: tympanic membrane, external ear and ear canal normal bilaterally, nose without deformity, nasal mucosa and turbinates normal without polyps  Neck: supple and non-tender without mass, no thyromegaly  no cervical lymphadenopathy  Pulmonary/Chest: clear to auscultation bilaterally- no wheezes, rales or rhonchi, normal air movement, no respiratory distress  Cardiovascular: normal rate, regular rhythm, normal S1 and S2, no murmurs, rubs, clicks, or gallops, no carotid bruits  Abdomen: soft, non  -tender, less distended, tinkling bowel sounds bowel sounds, no masses or organomegaly  Extremities: no cyanosis, clubbing or ++edema  Musculoskeletal: normal range of motion, no joint swelling, deformity or tenderness  Integumentary: No rashes, no abnormal skin lesions, no petechiae  Neurologic: reflexes normal and symmetric, no cranial nerve deficit  Psych:

## 2024-03-07 NOTE — PROGRESS NOTES
Arrived to place midline after chart review. Pre-procedure and timeout done with JULIOCESAR Molina. Discussed limitations of placement and allergies.      Vessels easily collapsible upon assessment. No difficulty accessing R brachial vein. Blood free flowing and non-pulsatile. Guidewire, introducer, and catheter all easily inserted.      OK to use midline.     Patient tolerated sterile procedure well. Bed left in low position with belongings and call light within reach. Educated on line care. Handoff to bedside RN.      Please call with any questions or concerns. The  will direct you to the PICC RN that is on call.      [phone]

## 2024-03-07 NOTE — PLAN OF CARE
Problem: Safety - Adult  Goal: Free from fall injury  Outcome: Progressing  Flowsheets (Taken 3/7/2024 1823)  Free From Fall Injury: Instruct family/caregiver on patient safety     Problem: Discharge Planning  Goal: Discharge to home or other facility with appropriate resources  Outcome: Progressing  Flowsheets  Taken 3/7/2024 1823  Discharge to home or other facility with appropriate resources:   Identify barriers to discharge with patient and caregiver   Identify discharge learning needs (meds, wound care, etc)   Arrange for needed discharge resources and transportation as appropriate  Taken 3/7/2024 1100  Discharge to home or other facility with appropriate resources:   Identify barriers to discharge with patient and caregiver   Arrange for needed discharge resources and transportation as appropriate   Identify discharge learning needs (meds, wound care, etc)     Problem: Chronic Conditions and Co-morbidities  Goal: Patient's chronic conditions and co-morbidity symptoms are monitored and maintained or improved  Outcome: Progressing  Flowsheets  Taken 3/7/2024 1823  Care Plan - Patient's Chronic Conditions and Co-Morbidity Symptoms are Monitored and Maintained or Improved:   Monitor and assess patient's chronic conditions and comorbid symptoms for stability, deterioration, or improvement   Collaborate with multidisciplinary team to address chronic and comorbid conditions and prevent exacerbation or deterioration  Taken 3/7/2024 1100  Care Plan - Patient's Chronic Conditions and Co-Morbidity Symptoms are Monitored and Maintained or Improved:   Monitor and assess patient's chronic conditions and comorbid symptoms for stability, deterioration, or improvement   Collaborate with multidisciplinary team to address chronic and comorbid conditions and prevent exacerbation or deterioration     Problem: Skin/Tissue Integrity  Goal: Absence of new skin breakdown  Description: 1.  Monitor for areas of redness and/or skin

## 2024-03-07 NOTE — PROGRESS NOTES
Occupational Therapy  Facility/Department: [address] PROGRESSIVE CARE  Occupational Therapy Daily Note  Name: Juan Mohamud  : 1940  MRN: [id number]  Date of Service: 3/7/2024    Discharge Recommendations:  Patient would benefit from continued therapy after discharge, 3-5 sessions per week  OT Equipment Recommendations  Other: defer to IA facility   Juan Mohamud scored a  on the AM-PAC ADL Inpatient form. Current research shows that an AM-PAC score of 17 or less is typically not associated with a discharge to the patient's home setting. Based on the patient's AM-PAC score and their current ADL deficits, it is recommended that the patient have 3-5 sessions per week of Occupational Therapy at d/c to increase the patient's independence.  Please see assessment section for further patient specific details.    If patient discharges prior to next session this note will serve as a discharge summary.  Please see below for the latest assessment towards goals.      Patient Diagnosis(es): The primary encounter diagnosis was Wide-complex tachycardia. Diagnoses of BOSTON (acute kidney injury) (HCC), Hyponatremia, Elevated troponin, Acute hypoxemic respiratory failure (HCC), and Colonic obstruction (HCC) were also pertinent to this visit.  Past Medical History:  has a past medical history of Diabetes mellitus (HCC), Hyperlipidemia, Indigestion, Insomnia, Macular degeneration, MI, old, and Neuropathy.  Past Surgical History:  has a past surgical history that includes knee surgery; Tonsillectomy; Colonoscopy; fracture surgery (Left); Mouth surgery; joint replacement (Right); Intracapsular cataract extraction (Right, 2018); Colonoscopy (N/A, 2024); and Colonoscopy (N/A, 3/6/2024).           Assessment   Performance deficits / Impairments: Decreased functional mobility ;Decreased ADL status;Decreased strength;Decreased balance;Decreased safe awareness;Decreased high-level IADLs;Decreased  toileting with min A  Short Term Goal 5: Pt will complete bed mobility with mod A  Patient Goals   Patient goals : to get stronger and eventually return home       Therapy Time     Individual Co-treatment   Time In 1255     Time Out 1325     Minutes 30       Jatinder PETERS/TIN,515

## 2024-03-07 NOTE — PROGRESS NOTES
V2.0    [facility] Progress Note      Name:  Juan Mohamud /Age/Sex: 1940  (83 y.o. male)   MRN & CSN:  [id number] & [id number] Encounter Date/Time: 3/7/2024 12:30 PM EST   Location:  [id number] PCP: Janet Garcia MD     Attending:Chiara Gage MD       Hospital Day: 9    Assessment and Recommendations   Juan Mohamud is a 83 y.o. male with pmh of  who presents with Arrhythmia    New onset arrhythmia, troponin elevation .  U       S/ cardiac cath on 3/1 : non obstructive CAD ,            3/7 : discussed with Dr Murillo , patient on schedule for ICD placement on 3/8              Discussed with charge RN to get peripheral IV line or right UE midline or PICC line         3/6 : ICD procedure delayed as patient got PICC line in left UE on 3/6        Spoke to Dr Murillo , per him , ICD can be placed tomorrow or Friday if we        Remove PICC line . Discussed with RN , and plan is to first get peripheral IV line        And then remove PICC line           Awaiting ICD placement          Replace K and Mg per protocol prn    History of hypertension, hyperlipidemia.  Colonic obstruction.  CT PE/abdomen/pelvis without PE.  Small right pleural effusion, dependent atelectasis bibasilarly, right greater than left.  Progressive colonic dilation since  without focal transition point.  Likely partial obstruction at sigmoid colon, liquid stool throughout colon to the level of rectum.  Cecum markedly distended up to 14 cm.  Difficulty with NGT placement in the ED and multiple times on the floor.  Have given sedation for placement, rectal bowel regimen..         S/p rectal tube placement          S/p  decompression colonoscopy on 3/6         Diet as per GI    Recent trauma from fall.  See that patient was admitted  - 2024.  Fell down 6 stairs, was transferred to [facility] with right sided rib fractures -, small right hemothorax, right clavicular fracture, left

## 2024-03-07 NOTE — PROGRESS NOTES
Patient's repeat potassium lab had to be redrawn. Repeat lab resulted at 3.4. 40 mEq were given PO.     Electronically signed by Sujatha Burton RN on 3/6/2024 at 10:20 PM

## 2024-03-07 NOTE — CARE COORDINATION
3/7 @ 2:14pm- sent additional referrals to [facility], [place], and [facility].    Ryan spoke with Rebecca in admissions at [facility], who informed that they do not have any male beds at this time, and do not anticipate any anytime soon.   RYAN met with pt and spouse in room, to discuss other alternative choices. Open to referrals to [facility] and [facility].    The Plan for Transition of Care is related to the following treatment goals: to get better    The Patient and/or patient representative patient/ wife Lizzie was provided with a choice of provider and agrees   with the discharge plan. [x] Yes [] No    Freedom of choice list was provided with basic dialogue that supports the patient's individualized plan of care/goals, treatment preferences and shares the quality data associated with the providers. [x] Yes [] No    Electronically signed by SHARLA Hills on 3/7/2024 at 12:03 PM      Noé العلي, FABIANA, [facility] Social Work Case Management   Phone: [phone]  Fax: [phone]

## 2024-03-08 ENCOUNTER — APPOINTMENT (OUTPATIENT)
Dept: GENERAL RADIOLOGY | Age: 84
DRG: 981 | End: 2024-03-08
Payer: MEDICARE

## 2024-03-08 DIAGNOSIS — I49.9 CARDIAC ARRHYTHMIA, UNSPECIFIED CARDIAC ARRHYTHMIA TYPE: ICD-10-CM

## 2024-03-08 DIAGNOSIS — R00.0 WIDE-COMPLEX TACHYCARDIA: ICD-10-CM

## 2024-03-08 DIAGNOSIS — Z95.810 CARDIAC DEFIBRILLATOR IN SITU: Primary | ICD-10-CM

## 2024-03-08 LAB
ANION GAP SERPL CALCULATED.3IONS-SCNC: 7 MMOL/L (ref 3–16)
BASOPHILS # BLD: 0 K/UL (ref 0–0.2)
BASOPHILS NFR BLD: 0.5 %
BUN SERPL-MCNC: 12 MG/DL (ref 7–20)
CALCIUM SERPL-MCNC: 7.8 MG/DL (ref 8.3–10.6)
CHLORIDE SERPL-SCNC: 98 MMOL/L (ref 99–110)
CO2 SERPL-SCNC: 29 MMOL/L (ref 21–32)
CREAT SERPL-MCNC: 0.6 MG/DL (ref 0.8–1.3)
DEPRECATED RDW RBC AUTO: 14.6 % (ref 12.4–15.4)
EOSINOPHIL # BLD: 0.1 K/UL (ref 0–0.6)
EOSINOPHIL NFR BLD: 1 %
GFR SERPLBLD CREATININE-BSD FMLA CKD-EPI: >60 ML/MIN/{1.73_M2}
GLUCOSE SERPL-MCNC: 113 MG/DL (ref 70–99)
HCT VFR BLD AUTO: 31.2 % (ref 40.5–52.5)
HGB BLD-MCNC: 10.6 G/DL (ref 13.5–17.5)
LYMPHOCYTES # BLD: 0.7 K/UL (ref 1–5.1)
LYMPHOCYTES NFR BLD: 8.6 %
MCH RBC QN AUTO: 31.1 PG (ref 26–34)
MCHC RBC AUTO-ENTMCNC: 34 G/DL (ref 31–36)
MCV RBC AUTO: 91.5 FL (ref 80–100)
MONOCYTES # BLD: 1.1 K/UL (ref 0–1.3)
MONOCYTES NFR BLD: 13 %
NEUTROPHILS # BLD: 6.2 K/UL (ref 1.7–7.7)
NEUTROPHILS NFR BLD: 76.9 %
PLATELET # BLD AUTO: 411 K/UL (ref 135–450)
PMV BLD AUTO: 7.9 FL (ref 5–10.5)
POTASSIUM SERPL-SCNC: 3.4 MMOL/L (ref 3.5–5.1)
RBC # BLD AUTO: 3.41 M/UL (ref 4.2–5.9)
SODIUM SERPL-SCNC: 134 MMOL/L (ref 136–145)
WBC # BLD AUTO: 8.1 K/UL (ref 4–11)

## 2024-03-08 PROCEDURE — C1722 AICD, SINGLE CHAMBER: HCPCS | Performed by: INTERNAL MEDICINE

## 2024-03-08 PROCEDURE — 6370000000 HC RX 637 (ALT 250 FOR IP): Performed by: INTERNAL MEDICINE

## 2024-03-08 PROCEDURE — 2580000003 HC RX 258: Performed by: INTERNAL MEDICINE

## 2024-03-08 PROCEDURE — 6360000002 HC RX W HCPCS: Performed by: INTERNAL MEDICINE

## 2024-03-08 PROCEDURE — 85025 COMPLETE CBC W/AUTO DIFF WBC: CPT

## 2024-03-08 PROCEDURE — 33249 INSJ/RPLCMT DEFIB W/LEAD(S): CPT

## 2024-03-08 PROCEDURE — 71046 X-RAY EXAM CHEST 2 VIEWS: CPT

## 2024-03-08 PROCEDURE — 99152 MOD SED SAME PHYS/QHP 5/>YRS: CPT

## 2024-03-08 PROCEDURE — 2500000003 HC RX 250 WO HCPCS

## 2024-03-08 PROCEDURE — 2580000003 HC RX 258

## 2024-03-08 PROCEDURE — A4216 STERILE WATER/SALINE, 10 ML: HCPCS

## 2024-03-08 PROCEDURE — 6360000002 HC RX W HCPCS

## 2024-03-08 PROCEDURE — 02HK3KZ INSERTION OF DEFIBRILLATOR LEAD INTO RIGHT VENTRICLE, PERCUTANEOUS APPROACH: ICD-10-PCS | Performed by: INTERNAL MEDICINE

## 2024-03-08 PROCEDURE — 2060000000 HC ICU INTERMEDIATE R&B

## 2024-03-08 PROCEDURE — 0JH608Z INSERTION OF DEFIBRILLATOR GENERATOR INTO CHEST SUBCUTANEOUS TISSUE AND FASCIA, OPEN APPROACH: ICD-10-PCS | Performed by: INTERNAL MEDICINE

## 2024-03-08 PROCEDURE — C1777 LEAD, AICD, ENDO SINGLE COIL: HCPCS | Performed by: INTERNAL MEDICINE

## 2024-03-08 PROCEDURE — 2580000003 HC RX 258: Performed by: HOSPITALIST

## 2024-03-08 PROCEDURE — 80048 BASIC METABOLIC PNL TOTAL CA: CPT

## 2024-03-08 PROCEDURE — 99153 MOD SED SAME PHYS/QHP EA: CPT

## 2024-03-08 PROCEDURE — 94760 N-INVAS EAR/PLS OXIMETRY 1: CPT

## 2024-03-08 PROCEDURE — C1892 INTRO/SHEATH,FIXED,PEEL-AWAY: HCPCS | Performed by: INTERNAL MEDICINE

## 2024-03-08 RX ORDER — SODIUM CHLORIDE 0.9 % (FLUSH) 0.9 %
5-40 SYRINGE (ML) INJECTION EVERY 12 HOURS SCHEDULED
Status: DISCONTINUED | OUTPATIENT
Start: 2024-03-08 | End: 2024-03-09 | Stop reason: SDUPTHER

## 2024-03-08 RX ORDER — POTASSIUM CHLORIDE 750 MG/1
40 TABLET, FILM COATED, EXTENDED RELEASE ORAL DAILY
Status: DISCONTINUED | OUTPATIENT
Start: 2024-03-08 | End: 2024-03-14 | Stop reason: HOSPADM

## 2024-03-08 RX ORDER — AMIODARONE HYDROCHLORIDE 200 MG/1
200 TABLET ORAL DAILY
Status: DISCONTINUED | OUTPATIENT
Start: 2024-03-09 | End: 2024-03-14 | Stop reason: HOSPADM

## 2024-03-08 RX ORDER — SODIUM CHLORIDE 9 MG/ML
INJECTION, SOLUTION INTRAVENOUS PRN
Status: DISCONTINUED | OUTPATIENT
Start: 2024-03-08 | End: 2024-03-09 | Stop reason: SDUPTHER

## 2024-03-08 RX ORDER — SODIUM CHLORIDE 0.9 % (FLUSH) 0.9 %
5-40 SYRINGE (ML) INJECTION PRN
Status: DISCONTINUED | OUTPATIENT
Start: 2024-03-08 | End: 2024-03-09 | Stop reason: SDUPTHER

## 2024-03-08 RX ADMIN — SENNOSIDES AND DOCUSATE SODIUM 1 TABLET: 50; 8.6 TABLET ORAL at 20:53

## 2024-03-08 RX ADMIN — GABAPENTIN 600 MG: 300 CAPSULE ORAL at 09:09

## 2024-03-08 RX ADMIN — POTASSIUM CHLORIDE 40 MEQ: 1500 TABLET, EXTENDED RELEASE ORAL at 09:09

## 2024-03-08 RX ADMIN — ONDANSETRON 4 MG: 2 INJECTION INTRAMUSCULAR; INTRAVENOUS at 12:29

## 2024-03-08 RX ADMIN — AMIODARONE HYDROCHLORIDE 400 MG: 200 TABLET ORAL at 09:08

## 2024-03-08 RX ADMIN — TAMSULOSIN HYDROCHLORIDE 0.4 MG: 0.4 CAPSULE ORAL at 09:08

## 2024-03-08 RX ADMIN — SENNOSIDES AND DOCUSATE SODIUM 1 TABLET: 50; 8.6 TABLET ORAL at 09:09

## 2024-03-08 RX ADMIN — GABAPENTIN 600 MG: 300 CAPSULE ORAL at 15:47

## 2024-03-08 RX ADMIN — Medication 10 ML: at 20:57

## 2024-03-08 RX ADMIN — FAMOTIDINE 20 MG: 20 TABLET, FILM COATED ORAL at 09:09

## 2024-03-08 RX ADMIN — FAMOTIDINE 20 MG: 20 TABLET, FILM COATED ORAL at 20:53

## 2024-03-08 RX ADMIN — DESMOPRESSIN ACETATE 100 MCG: 0.2 TABLET ORAL at 20:53

## 2024-03-08 RX ADMIN — OXYCODONE 5 MG: 5 TABLET ORAL at 15:46

## 2024-03-08 RX ADMIN — SODIUM CHLORIDE: 9 INJECTION, SOLUTION INTRAVENOUS at 13:43

## 2024-03-08 RX ADMIN — POTASSIUM CHLORIDE 40 MEQ: 1500 TABLET, EXTENDED RELEASE ORAL at 15:48

## 2024-03-08 RX ADMIN — Medication 10 ML: at 09:09

## 2024-03-08 RX ADMIN — FENOFIBRATE 160 MG: 160 TABLET ORAL at 09:09

## 2024-03-08 RX ADMIN — SODIUM CHLORIDE, PRESERVATIVE FREE 10 ML: 5 INJECTION INTRAVENOUS at 20:53

## 2024-03-08 RX ADMIN — ZOLPIDEM TARTRATE 10 MG: 5 TABLET ORAL at 20:55

## 2024-03-08 RX ADMIN — GABAPENTIN 600 MG: 300 CAPSULE ORAL at 20:53

## 2024-03-08 ASSESSMENT — PAIN SCALES - GENERAL
PAINLEVEL_OUTOF10: 8
PAINLEVEL_OUTOF10: 0

## 2024-03-08 ASSESSMENT — PAIN - FUNCTIONAL ASSESSMENT: PAIN_FUNCTIONAL_ASSESSMENT: PREVENTS OR INTERFERES SOME ACTIVE ACTIVITIES AND ADLS

## 2024-03-08 ASSESSMENT — PAIN DESCRIPTION - ORIENTATION: ORIENTATION: LEFT

## 2024-03-08 ASSESSMENT — PAIN DESCRIPTION - DESCRIPTORS: DESCRIPTORS: ACHING

## 2024-03-08 ASSESSMENT — PAIN DESCRIPTION - LOCATION: LOCATION: CHEST;OTHER (COMMENT)

## 2024-03-08 NOTE — PLAN OF CARE
RN  Outcome: Progressing     Problem: Metabolic/Fluid and Electrolytes - Adult  Goal: Electrolytes maintained within normal limits  3/7/2024 2355 by Polo Pacheco RN  Outcome: Progressing     Problem: Metabolic/Fluid and Electrolytes - Adult  Goal: Hemodynamic stability and optimal renal function maintained  3/7/2024 2355 by Polo Pacheco RN  Outcome: Progressing     Problem: Metabolic/Fluid and Electrolytes - Adult  Goal: Glucose maintained within prescribed range  3/7/2024 2355 by Polo Pacheco RN  Outcome: Progressing     Problem: Anxiety  Goal: Will report anxiety at manageable levels  Description: INTERVENTIONS:  1. Administer medication as ordered  2. Teach and rehearse alternative coping skills  3. Provide emotional support with 1:1 interaction with staff  3/7/2024 2355 by Polo Pacheco RN  Outcome: Progressing     Problem: Coping  Goal: Pt/Family able to verbalize concerns and demonstrate effective coping strategies  Description: INTERVENTIONS:  1. Assist patient/family to identify coping skills, available support systems and cultural and spiritual values  2. Provide emotional support, including active listening and acknowledgement of concerns of patient and caregivers  3. Reduce environmental stimuli, as able  4. Instruct patient/family in relaxation techniques, as appropriate  5. Assess for spiritual pain/suffering and initiate Spiritual Care, Psychosocial Clinical Specialist consults as needed  3/7/2024 2355 by Polo Pacheco RN  Outcome: Progressing     Problem: Change in Body Image  Goal: Pt/Family communicate acceptance of loss or change in body image and feel psychological comfort and peace  Description: INTERVENTIONS:  1. Assess patient/family anxiety and grief process related to change in body image, loss of functional status, loss of sense of self, and forgiveness  2. Provide emotional and spiritual support  3. Provide information about the patient's health status with consideration of family and  cultural values  4. Communicate willingness to discuss loss and facilitate grief process with patient/family as appropriate  5. Emphasize sustaining relationships within family system and community, or sanam/spiritual traditions  6. Initiate Spiritual Care, Psychosocial Clinical Specialist consult as needed  3/7/2024 2355 by Polo Pacheco, RN  Outcome: Progressing     Problem: Nutrition Deficit:  Goal: Optimize nutritional status  3/7/2024 2355 by Polo Pacheco, RN  Outcome: Progressing

## 2024-03-08 NOTE — FLOWSHEET NOTE
PRE-PROCEDURE      DATE: 3/8/2024 ARRIVAL TO CATH LAB: 1:24 PM    ADMIT SOURCE: Inpatient    ID & ALLERGY BAND: On    CONSENT: Yes    NPO SINCE: Midnight    LABS: see epic  PREGNANCY TEST:     LABS:  BUN/CREAT:  H/H:  PLT:    PULSES - see flowsheet    IV SITE : Patent in right upper arm.  with fluids infusing at kvo 1:24 PM       BLEEDING PROBLEMS: No  BLEEDING RISK:     Low Risk < 2%  Intermediate Risk >2% or < 6.5%  High Risk >6.5%        LAST DOSE (if applicable):  ASA: none  P2Y12 (Plavix, Effient, Brilinta): none  Anticoagulants (Coumadin, Xarelto, Eliquis): none  Other Blood Thinners:       OTHER MEDICATIONS TAKEN AT HOME:      MEDICATION COMPLIANCE: Yes    Chest shaved   Spironolactone Pregnancy And Lactation Text: This medication can cause feminization of the male fetus and should be avoided during pregnancy. The active metabolite is also found in breast milk. Topical Clindamycin Counseling: Patient counseled that this medication may cause skin irritation or allergic reactions.  In the event of skin irritation, the patient was advised to reduce the amount of the drug applied or use it less frequently.   The patient verbalized understanding of the proper use and possible adverse effects of clindamycin.  All of the patient's questions and concerns were addressed. Minocycline Pregnancy And Lactation Text: This medication is Pregnancy Category D and not consider safe during pregnancy. It is also excreted in breast milk. Include Pregnancy/Lactation Warning?: No Doxycycline Counseling:  Patient counseled regarding possible photosensitivity and increased risk for sunburn.  Patient instructed to avoid sunlight, if possible.  When exposed to sunlight, patients should wear protective clothing, sunglasses, and sunscreen.  The patient was instructed to call the office immediately if the following severe adverse effects occur:  hearing changes, easy bruising/bleeding, severe headache, or vision changes.  The patient verbalized understanding of the proper use and possible adverse effects of doxycycline.  All of the patient's questions and concerns were addressed. Topical Retinoid counseling:  Patient advised to apply a pea-sized amount only at bedtime and wait 30 minutes after washing their face before applying.  If too drying, patient may add a non-comedogenic moisturizer. The patient verbalized understanding of the proper use and possible adverse effects of retinoids.  All of the patient's questions and concerns were addressed. Minocycline Counseling: Patient advised regarding possible photosensitivity and discoloration of the teeth, skin, lips, tongue and gums.  Patient instructed to avoid sunlight, if possible.  When exposed to sunlight, patients should wear protective clothing, sunglasses, and sunscreen.  The patient was instructed to call the office immediately if the following severe adverse effects occur:  hearing changes, easy bruising/bleeding, severe headache, or vision changes.  The patient verbalized understanding of the proper use and possible adverse effects of minocycline.  All of the patient's questions and concerns were addressed. Benzoyl Peroxide Pregnancy And Lactation Text: This medication is Pregnancy Category C. It is unknown if benzoyl peroxide is excreted in breast milk. Isotretinoin Counseling: Patient should get monthly blood tests, not donate blood, not drive at night if vision affected, not share medication, and not undergo elective surgery for 6 months after tx completed. Side effects reviewed, pt to contact office should one occur. Aklief Pregnancy And Lactation Text: It is unknown if this medication is safe to use during pregnancy.  It is unknown if this medication is excreted in breast milk.  Breastfeeding women should use the topical cream on the smallest area of the skin for the shortest time needed while breastfeeding.  Do not apply to nipple and areola. Bactrim Pregnancy And Lactation Text: This medication is Pregnancy Category D and is known to cause fetal risk.  It is also excreted in breast milk. Topical Retinoid Pregnancy And Lactation Text: This medication is Pregnancy Category C. It is unknown if this medication is excreted in breast milk. Doxycycline Pregnancy And Lactation Text: This medication is Pregnancy Category D and not consider safe during pregnancy. It is also excreted in breast milk but is considered safe for shorter treatment courses. Azelaic Acid Pregnancy And Lactation Text: This medication is considered safe during pregnancy and breast feeding. Sarecycline Counseling: Patient advised regarding possible photosensitivity and discoloration of the teeth, skin, lips, tongue and gums.  Patient instructed to avoid sunlight, if possible.  When exposed to sunlight, patients should wear protective clothing, sunglasses, and sunscreen.  The patient was instructed to call the office immediately if the following severe adverse effects occur:  hearing changes, easy bruising/bleeding, severe headache, or vision changes.  The patient verbalized understanding of the proper use and possible adverse effects of sarecycline.  All of the patient's questions and concerns were addressed. Azithromycin Counseling:  I discussed with the patient the risks of azithromycin including but not limited to GI upset, allergic reaction, drug rash, diarrhea, and yeast infections. Isotretinoin Pregnancy And Lactation Text: This medication is Pregnancy Category X and is considered extremely dangerous during pregnancy. It is unknown if it is excreted in breast milk. Azelaic Acid Counseling: Patient counseled that medicine may cause skin irritation and to avoid applying near the eyes.  In the event of skin irritation, the patient was advised to reduce the amount of the drug applied or use it less frequently.   The patient verbalized understanding of the proper use and possible adverse effects of azelaic acid.  All of the patient's questions and concerns were addressed. Winlevi Pregnancy And Lactation Text: This medication is considered safe during pregnancy and breastfeeding. Birth Control Pills Counseling: Birth Control Pill Counseling: I discussed with the patient the potential side effects of OCPs including but not limited to increased risk of stroke, heart attack, thrombophlebitis, deep venous thrombosis, hepatic adenomas, breast changes, GI upset, headaches, and depression.  The patient verbalized understanding of the proper use and possible adverse effects of OCPs. All of the patient's questions and concerns were addressed. Tetracycline Counseling: Patient counseled regarding possible photosensitivity and increased risk for sunburn.  Patient instructed to avoid sunlight, if possible.  When exposed to sunlight, patients should wear protective clothing, sunglasses, and sunscreen.  The patient was instructed to call the office immediately if the following severe adverse effects occur:  hearing changes, easy bruising/bleeding, severe headache, or vision changes.  The patient verbalized understanding of the proper use and possible adverse effects of tetracycline.  All of the patient's questions and concerns were addressed. Patient understands to avoid pregnancy while on therapy due to potential birth defects. Topical Clindamycin Pregnancy And Lactation Text: This medication is Pregnancy Category B and is considered safe during pregnancy. It is unknown if it is excreted in breast milk. Detail Level: Zone Tazorac Counseling:  Patient advised that medication is irritating and drying.  Patient may need to apply sparingly and wash off after an hour before eventually leaving it on overnight.  The patient verbalized understanding of the proper use and possible adverse effects of tazorac.  All of the patient's questions and concerns were addressed. Azithromycin Pregnancy And Lactation Text: This medication is considered safe during pregnancy and is also secreted in breast milk. Erythromycin Counseling:  I discussed with the patient the risks of erythromycin including but not limited to GI upset, allergic reaction, drug rash, diarrhea, increase in liver enzymes, and yeast infections. Benzoyl Peroxide Counseling: Patient counseled that medicine may cause skin irritation and bleach clothing.  In the event of skin irritation, the patient was advised to reduce the amount of the drug applied or use it less frequently.   The patient verbalized understanding of the proper use and possible adverse effects of benzoyl peroxide.  All of the patient's questions and concerns were addressed. Topical Sulfur Applications Counseling: Topical Sulfur Counseling: Patient counseled that this medication may cause skin irritation or allergic reactions.  In the event of skin irritation, the patient was advised to reduce the amount of the drug applied or use it less frequently.   The patient verbalized understanding of the proper use and possible adverse effects of topical sulfur application.  All of the patient's questions and concerns were addressed. High Dose Vitamin A Counseling: Side effects reviewed, pt to contact office should one occur. Birth Control Pills Pregnancy And Lactation Text: This medication should be avoided if pregnant and for the first 30 days post-partum. Dapsone Pregnancy And Lactation Text: This medication is Pregnancy Category C and is not considered safe during pregnancy or breast feeding. Erythromycin Pregnancy And Lactation Text: This medication is Pregnancy Category B and is considered safe during pregnancy. It is also excreted in breast milk. Winlevi Counseling:  I discussed with the patient the risks of topical clascoterone including but not limited to erythema, scaling, itching, and stinging. Patient voiced their understanding. Spironolactone Counseling: Patient advised regarding risks of diarrhea, abdominal pain, hyperkalemia, birth defects (for female patients), liver toxicity and renal toxicity. The patient may need blood work to monitor liver and kidney function and potassium levels while on therapy. The patient verbalized understanding of the proper use and possible adverse effects of spironolactone.  All of the patient's questions and concerns were addressed. Bactrim Counseling:  I discussed with the patient the risks of sulfa antibiotics including but not limited to GI upset, allergic reaction, drug rash, diarrhea, dizziness, photosensitivity, and yeast infections.  Rarely, more serious reactions can occur including but not limited to aplastic anemia, agranulocytosis, methemoglobinemia, blood dyscrasias, liver or kidney failure, lung infiltrates or desquamative/blistering drug rashes. Aklief counseling:  Patient advised to apply a pea-sized amount only at bedtime and wait 30 minutes after washing their face before applying.  If too drying, patient may add a non-comedogenic moisturizer.  The most commonly reported side effects including irritation, redness, scaling, dryness, stinging, burning, itching, and increased risk of sunburn.  The patient verbalized understanding of the proper use and possible adverse effects of retinoids.  All of the patient's questions and concerns were addressed. Topical Sulfur Applications Pregnancy And Lactation Text: This medication is Pregnancy Category C and has an unknown safety profile during pregnancy. It is unknown if this topical medication is excreted in breast milk. Dapsone Counseling: I discussed with the patient the risks of dapsone including but not limited to hemolytic anemia, agranulocytosis, rashes, methemoglobinemia, kidney failure, peripheral neuropathy, headaches, GI upset, and liver toxicity.  Patients who start dapsone require monitoring including baseline LFTs and weekly CBCs for the first month, then every month thereafter.  The patient verbalized understanding of the proper use and possible adverse effects of dapsone.  All of the patient's questions and concerns were addressed. High Dose Vitamin A Pregnancy And Lactation Text: High dose vitamin A therapy is contraindicated during pregnancy and breast feeding. Tazorac Pregnancy And Lactation Text: This medication is not safe during pregnancy. It is unknown if this medication is excreted in breast milk.

## 2024-03-08 NOTE — PROGRESS NOTES
Gastroenterology Progress Note    Juan Mohamud is a 83 y.o. male patient.  Principal Problem:    Arrhythmia  Active Problems:    DDD (degenerative disc disease), lumbar    Cataract extraction status of right eye    Constipation    Elevated troponin    Primary hypertension    Hyperlipidemia    Colonic obstruction (HCC)    Trauma    History of fall    Type 2 diabetes mellitus (HCC)    Anemia    Pneumonia    Hyponatremia    Dilatation of colon    Wide-complex tachycardia    Acute hypoxemic respiratory failure (HCC)    BOSTON (acute kidney injury) (HCC)  Resolved Problems:    * No resolved hospital problems. *      SUBJECTIVE: He feels his abdomen is feeling better.  No nausea or vomiting.  No abdominal pain. Moving bowels but diarrhea is improving. No fevers.      Current Facility-Administered Medications: lidocaine PF 1 % injection 5 mL, 5 mL, IntraDERmal, Once  lidocaine PF 1 % injection 5 mL, 5 mL, IntraDERmal, Once  sodium chloride flush 0.9 % injection 5-40 mL, 5-40 mL, IntraVENous, 2 times per day  sodium chloride flush 0.9 % injection 5-40 mL, 5-40 mL, IntraVENous, PRN  0.9 % sodium chloride infusion, 25 mL, IntraVENous, PRN  sodium chloride flush 0.9 % injection 5-40 mL, 5-40 mL, IntraVENous, 2 times per day  sodium chloride flush 0.9 % injection 5-40 mL, 5-40 mL, IntraVENous, PRN  0.9 % sodium chloride infusion, , IntraVENous, PRN  naloxegol (MOVANTIK) tablet 25 mg, 25 mg, Oral, QAM AC  sodium chloride flush 0.9 % injection 5-40 mL, 5-40 mL, IntraVENous, 2 times per day  sodium chloride flush 0.9 % injection 5-40 mL, 5-40 mL, IntraVENous, PRN  chlorhexidine (HIBICLENS) 4 % liquid, , Topical, Once  mupirocin (BACTROBAN) 2 % ointment, , Each Nostril, Once  oxyCODONE (ROXICODONE) immediate release tablet 5 mg, 5 mg, Oral, Q4H PRN **OR** oxyCODONE (ROXICODONE) immediate release tablet 2.5 mg, 2.5 mg, Oral, Q4H PRN  morphine (PF) injection 2 mg, 2 mg, IntraVENous, Q4H PRN  lidocaine PF 1 % injection 5 mL, 5  back to [facility] with shortness of breath.  In ED he was found to be tachycardic with rates of 250 bpm and hypotensive with BP of 70/40.  He was cardioverted and started on IV amiodarone.  CTA chest was neg for PE.  CT a/p showed progressive colonic dilatation with cecum measuring 14 cm. Pt has a history of chronic constipation and takes Linzess daily.  Had decompressive colonoscopy 2/29/24 that showed a dilated colon and mild mucosal erythema and erosions in the right colon.    Acute colonic pseudoobstruction: Patient with chronic constipation and presumed chronic colonic dilation, with acute dilation of the cecal diameter 14 cm.  s/p decompressive colonoscopy with rectal tube placement 2/29/24.  General surgery following.  Chronic idiopathic constipation: Previously on Linzess.  VT /sp cardioversion, on amiodarone going for defibrillator today  Pneumonia: On cefepime and Flagyl,   Mechanical fall with right-sided rib fractures  Hypokalemia: Replace and monitor     RECOMMENDATIONS:    -Flush rectal tube every 4 hours  -Monitor and replace electrolytes.  Would keep K > 4 given history of VT and the colonic pseudoobstruction. Medicine is working on getting the K up.  This is likely one reason for the persistence of the pseudoobstruction  -Linzess is on hold because of diarrhea. Continue movantik  -Out of bed and walking or to chair as much as possible.  - If continues to do well, will remove rectal tube tomorrow.      Thank you for allowing me to participate in the care of your patient.  Please feel free to contact me with any concerns.  [phone]    Anders Cervantes MD

## 2024-03-08 NOTE — PLAN OF CARE
Problem: Safety - Adult  Goal: Free from fall injury  3/8/2024 1039 by Tracy Connors RN  Outcome: Progressing  3/7/2024 2355 by Polo Pacheco RN  Outcome: Progressing     Problem: Discharge Planning  Goal: Discharge to home or other facility with appropriate resources  3/8/2024 1039 by Tracy Connors RN  Outcome: Progressing  Flowsheets (Taken 3/8/2024 1006)  Discharge to home or other facility with appropriate resources:   Identify barriers to discharge with patient and caregiver   Arrange for needed discharge resources and transportation as appropriate   Identify discharge learning needs (meds, wound care, etc)  3/7/2024 2355 by Polo Pacheco RN  Outcome: Progressing     Problem: Chronic Conditions and Co-morbidities  Goal: Patient's chronic conditions and co-morbidity symptoms are monitored and maintained or improved  3/8/2024 1039 by Tracy Connors RN  Outcome: Progressing  Flowsheets (Taken 3/8/2024 1006)  Care Plan - Patient's Chronic Conditions and Co-Morbidity Symptoms are Monitored and Maintained or Improved:   Monitor and assess patient's chronic conditions and comorbid symptoms for stability, deterioration, or improvement   Collaborate with multidisciplinary team to address chronic and comorbid conditions and prevent exacerbation or deterioration  3/7/2024 2355 by Polo Pacheco RN  Outcome: Progressing     Problem: Skin/Tissue Integrity  Goal: Absence of new skin breakdown  Description: 1.  Monitor for areas of redness and/or skin breakdown  2.  Assess vascular access sites hourly  3.  Every 4-6 hours minimum:  Change oxygen saturation probe site  4.  Every 4-6 hours:  If on nasal continuous positive airway pressure, respiratory therapy assess nares and determine need for appliance change or resting period.  3/8/2024 1039 by Tracy Connors RN  Outcome: Progressing  3/7/2024 2355 by Polo Pacheco RN  Outcome: Progressing     Problem: ABCDS Injury Assessment  Goal: Absence of physical injury  3/8/2024 1039  change in body image and feel psychological comfort and peace:   Assess patient/family anxiety and grief process related to change in body image, loss of functional status, loss of sense of self, and forgiveness   Provide emotional and spiritual support  3/7/2024 2355 by Polo Pacheco, RN  Outcome: Progressing     Problem: Nutrition Deficit:  Goal: Optimize nutritional status  3/8/2024 1039 by Tracy Connors, RN  Outcome: Progressing  3/7/2024 2355 by Polo Pacheco, RN  Outcome: Progressing

## 2024-03-08 NOTE — H&P
H&P Update    I have reviewed the history and physical from Dr. Gage and examined the patient and find no relevant changes.   I have reviewed with the patient and/or family the risks, benefits, and alternatives to the procedure.    Pre-sedation Assessment    Patient:  Juan Mohamud   :   1940  Intended Procedure: 2-ch ICD implantation for secondary prevention of sustained VT      Umpire nurses notes reviewed and agreed.  Medications reviewed  Allergies:   Allergies   Allergen Reactions    Erythromycin     Dust Mite Extract      sneeze         Pre-Procedure Assessment/Plan:  ASA 2 - Patient with mild systemic disease with no functional limitations    Level of Sedation Plan:Moderate sedation    Post Procedure plan: Return to same level of care

## 2024-03-08 NOTE — PRE SEDATION
Sedation Pre-Procedure Note    Patient Name: Juan Mohamud   YOB: 1940  Room/Bed: A7S-0815/5278-01  Medical Record Number: [id number]  Date: 3/8/2024   Time: 11:18 AM       Indication:  sustained  bpm    Consent: I have discussed with the patient and/or the patient representative the indication, alternatives, and the possible risks and/or complications of the planned procedure and the anesthesia methods. The patient and/or patient representative appear to understand and agree to proceed.    Vital Signs:   Vitals:    03/08/24 0801   BP:    Pulse: 76   Resp: 14   Temp:    SpO2: 95%       Past Medical History:   has a past medical history of Diabetes mellitus (HCC), Hyperlipidemia, Indigestion, Insomnia, Macular degeneration, MI, old, and Neuropathy.    Past Surgical History:   has a past surgical history that includes knee surgery; Tonsillectomy; Colonoscopy; fracture surgery (Left); Mouth surgery; joint replacement (Right); Intracapsular cataract extraction (Right, 12/17/2018); Colonoscopy (N/A, 2/29/2024); and Colonoscopy (N/A, 3/6/2024).    Medications:   Scheduled Meds:    lidocaine 1 % injection  5 mL IntraDERmal Once    lidocaine 1 % injection  5 mL IntraDERmal Once    sodium chloride flush  5-40 mL IntraVENous 2 times per day    sodium chloride flush  5-40 mL IntraVENous 2 times per day    naloxegol  25 mg Oral QAM AC    sodium chloride flush  5-40 mL IntraVENous 2 times per day    chlorhexidine   Topical Once    mupirocin   Each Nostril Once    lidocaine 1 % injection  5 mL IntraDERmal Once    desmopressin  100 mcg Oral Nightly    [Held by provider] linaclotide  290 mcg Oral QAM AC    amiodarone  400 mg Oral BID    sodium chloride flush  5-40 mL IntraVENous 2 times per day    famotidine  20 mg Oral BID    gabapentin  600 mg Oral TID    fenofibrate  160 mg Oral Daily    sennosides-docusate sodium  1 tablet Oral BID    tamsulosin  0.4 mg Oral Daily    sodium chloride flush  5-40 mL

## 2024-03-08 NOTE — PROGRESS NOTES
reformatted images are provided for review.  MIP images are provided for review. Automated exposure control, iterative reconstruction, and/or weight based adjustment of the mA/kV was utilized to reduce the radiation dose to as low as reasonably achievable.; CT of the abdomen and pelvis was performed with the administration of intravenous contrast. Multiplanar reformatted images are provided for review. Automated exposure control, iterative reconstruction, and/or weight based adjustment of the mA/kV was utilized to reduce the radiation dose to as low as reasonably achievable. COMPARISON: CT imaging 02/19/2024.  Abdominal CT 02/23/2024 and abdominal radiograph 02/27/2024. HISTORY: ORDERING SYSTEM PROVIDED HISTORY: SOB, dysrhythmia, r/o PE TECHNOLOGIST PROVIDED HISTORY: Reason for exam:->SOB, dysrhythmia, r/o PE Additional Contrast?->1; ORDERING SYSTEM PROVIDED HISTORY: abd pain, rectal pain and distension TECHNOLOGIST PROVIDED HISTORY: Reason for exam:->abd pain, rectal pain and distension Additional Contrast?->None Decision Support Exception - unselect if not a suspected or confirmed emergency medical condition->Emergency Medical Condition (MA) FINDINGS: CHEST CT: Pulmonary Arteries: Pulmonary arteries are adequately opacified for evaluation.  No evidence of intraluminal filling defect to suggest pulmonary embolism.  Main pulmonary artery is is enlarged measuring 38 mm, which may indicate underlying portal hypertension. Mediastinum: No evidence of mediastinal lymphadenopathy.  The heart and pericardium demonstrate no acute abnormality.  Calcified coronary atheromatous plaque.  There is no acute abnormality of the thoracic aorta. Lungs/pleura: Small right pleural effusion and dependent atelectasis in the lung bases, right greater than left.  No septal thickening.  No pneumothorax. The central airway is patent. Soft Tissues/Bones: Mildly displaced posterior right 9th, 10th and 11th rib fractures.  Mildly displaced  \"TRIG\"  Hemoglobin A1C:   Lab Results   Component Value Date/Time    LABA1C 6.4 10/24/2013 09:29 AM     TSH: No results found for: \"TSH\"  Troponin: No results found for: \"TROPONINT\"  Lactic Acid: No results for input(s): \"LACTA\" in the last 72 hours.  BNP:   No results for input(s): \"PROBNP\" in the last 72 hours.    UA:  Lab Results   Component Value Date/Time    NITRU Negative 02/23/2024 10:49 PM    COLORU Yellow 02/23/2024 10:49 PM    PHUR 5.5 02/23/2024 10:49 PM    WBCUA 1 02/23/2024 10:49 PM    RBCUA 2 02/23/2024 10:49 PM    BACTERIA None Seen 02/23/2024 10:49 PM    CLARITYU Clear 02/23/2024 10:49 PM    SPECGRAV 1.026 02/23/2024 10:49 PM    LEUKOCYTESUR Negative 02/23/2024 10:49 PM    UROBILINOGEN 1.0 02/23/2024 10:49 PM    BILIRUBINUR Negative 02/23/2024 10:49 PM    BLOODU Negative 02/23/2024 10:49 PM    GLUCOSEU Negative 02/23/2024 10:49 PM    KETUA TRACE 02/23/2024 10:49 PM     Urine Cultures: No results found for: \"LABURIN\"  Blood Cultures:   Lab Results   Component Value Date/Time    BC See additional report for complete BCID panel. 02/29/2024 12:27 AM    BC  02/29/2024 12:27 AM     POSITIVE for  This organism was isolated in one set.  Susceptibility testing is not routinely done as this  organism frequently represents skin contamination.  Additional testing can be ordered by calling the  Microbiology Department.       Lab Results   Component Value Date/Time    BLOODCULT2 No Growth after 4 days of incubation. 02/29/2024 04:43 AM     Organism:   Lab Results   Component Value Date/Time    ORG Staphylococcus epidermidis DNA Detected 02/29/2024 12:27 AM    ORG Staphylococcus epidermidis 02/29/2024 12:27 AM         Electronically signed by Chiara Gage MD on 3/8/2024 at 12:43 PM

## 2024-03-08 NOTE — PROCEDURES
of sustained VT.      Details of procedure:     The patient was brought to the electrophysiology laboratory in stable condition. The patient was in a fasting and non-sedated state. The risks, benefits and alternatives of the procedure were discussed with the patient. The risks including, but not limited to, the risks of vascular injury, bleeding, infection, device malfunction, lead dislodgement, radiation exposure, injury to cardiac and surrounding structures (including pneumothorax), stroke, myocardial infarction and death were discussed in detail. The patient was also counseled at length about the risks of clint Covid-19 in the petros-operative and post-operative states including the recovery window of their procedure. The patient was made aware that clint Covid-19 after a surgical procedure may worsen their prognosis for recovering from the virus and lend to a higher morbidity and or mortality risk. In a very rare chance of catastrophic complication that cannot be managed at our hospital, there may be the chance of having to transfer the patient to another hospital system for further care. The patient was given the option of postponing their procedure. The patient was also presented reasonable alternatives to the proposed care, treatment, and services. The discussion I have had with the patient encompassed risks, benefits, and side effects related to the alternatives and the risks related to not receiving the proposed care, treatment and services. The patient opted to proceed with the device implantation. Written informed consent was signed and placed in the chart.  Prophylactic antibiotic using Ancef 1mg IV was given.     The patient was prepped and draped in sterile fashion. A timeout protocol was completed to identify the patient and the procedure being performed. An independent trained observer assumed the sole responsibility of administering IV sedation medication - Versed, Fentanyl - at my  with RV lead implantation for secondary prevention of sustained VT.     Plan:     The patient will be observed and receive the usual post-implant care, including chest x-ray, and antibiotic therapy and interrogation of the device. If there are no complications, the patient may, from EP perspective, be discharged home today with a Carelink device and with a 1-week wound check with our clinic nurse and a 3-month follow-up with the EP Nurse Practitioner. The amiodarone will be decreased from 400mg po BID to now 200mg daily for VT suppression, even with having an ICD.    Thank you for allowing us to participate in the care of your patient. If you have any questions, please do not hesitate to contact me.    Kurtis Murillo MD, MS, FACC, RS  Cardiac Electrophysiology  [address], [address]  [place] [address]  [phone]

## 2024-03-08 NOTE — CARE COORDINATION
SW placed copy of [facility] approved SNF list in pt room for wife to review when she comes in. Called and informed wife that list will be there, and to let SW know of any additional referrals for SNF placement.     [facility] - [facility]  [facility], and can accept at another SNF, but family wants close by to home.     Following for needs.     Noé العلي, FABIANA, [facility] Social Work Case Management   Phone: [phone]  Fax: [phone]

## 2024-03-09 ENCOUNTER — APPOINTMENT (OUTPATIENT)
Dept: GENERAL RADIOLOGY | Age: 84
DRG: 981 | End: 2024-03-09
Payer: MEDICARE

## 2024-03-09 DIAGNOSIS — R00.0 WIDE-COMPLEX TACHYCARDIA: ICD-10-CM

## 2024-03-09 DIAGNOSIS — I49.9 CARDIAC ARRHYTHMIA, UNSPECIFIED CARDIAC ARRHYTHMIA TYPE: ICD-10-CM

## 2024-03-09 DIAGNOSIS — Z95.810 CARDIAC DEFIBRILLATOR IN SITU: Primary | ICD-10-CM

## 2024-03-09 LAB
ANION GAP SERPL CALCULATED.3IONS-SCNC: 4 MMOL/L (ref 3–16)
BUN SERPL-MCNC: 10 MG/DL (ref 7–20)
CALCIUM SERPL-MCNC: 8.1 MG/DL (ref 8.3–10.6)
CHLORIDE SERPL-SCNC: 97 MMOL/L (ref 99–110)
CO2 SERPL-SCNC: 31 MMOL/L (ref 21–32)
CREAT SERPL-MCNC: 0.6 MG/DL (ref 0.8–1.3)
GFR SERPLBLD CREATININE-BSD FMLA CKD-EPI: >60 ML/MIN/{1.73_M2}
GLUCOSE SERPL-MCNC: 112 MG/DL (ref 70–99)
MAGNESIUM SERPL-MCNC: 1.9 MG/DL (ref 1.8–2.4)
POTASSIUM SERPL-SCNC: 4.3 MMOL/L (ref 3.5–5.1)
SODIUM SERPL-SCNC: 132 MMOL/L (ref 136–145)

## 2024-03-09 PROCEDURE — 2580000003 HC RX 258: Performed by: INTERNAL MEDICINE

## 2024-03-09 PROCEDURE — 1200000000 HC SEMI PRIVATE

## 2024-03-09 PROCEDURE — 6370000000 HC RX 637 (ALT 250 FOR IP): Performed by: INTERNAL MEDICINE

## 2024-03-09 PROCEDURE — 71046 X-RAY EXAM CHEST 2 VIEWS: CPT

## 2024-03-09 PROCEDURE — 6370000000 HC RX 637 (ALT 250 FOR IP): Performed by: HOSPITALIST

## 2024-03-09 PROCEDURE — 80048 BASIC METABOLIC PNL TOTAL CA: CPT

## 2024-03-09 PROCEDURE — 94760 N-INVAS EAR/PLS OXIMETRY 1: CPT

## 2024-03-09 PROCEDURE — 36415 COLL VENOUS BLD VENIPUNCTURE: CPT

## 2024-03-09 PROCEDURE — 83735 ASSAY OF MAGNESIUM: CPT

## 2024-03-09 PROCEDURE — 6360000002 HC RX W HCPCS: Performed by: INTERNAL MEDICINE

## 2024-03-09 RX ORDER — SODIUM CHLORIDE 9 MG/ML
INJECTION, SOLUTION INTRAVENOUS PRN
Status: DISCONTINUED | OUTPATIENT
Start: 2024-03-09 | End: 2024-03-09

## 2024-03-09 RX ORDER — SODIUM CHLORIDE 0.9 % (FLUSH) 0.9 %
5-40 SYRINGE (ML) INJECTION PRN
Status: DISCONTINUED | OUTPATIENT
Start: 2024-03-09 | End: 2024-03-09

## 2024-03-09 RX ORDER — SODIUM CHLORIDE 0.9 % (FLUSH) 0.9 %
5-40 SYRINGE (ML) INJECTION EVERY 12 HOURS SCHEDULED
Status: DISCONTINUED | OUTPATIENT
Start: 2024-03-09 | End: 2024-03-09

## 2024-03-09 RX ORDER — CHLORHEXIDINE GLUCONATE 4 G/100ML
SOLUTION TOPICAL ONCE
Status: DISCONTINUED | OUTPATIENT
Start: 2024-03-09 | End: 2024-03-09

## 2024-03-09 RX ADMIN — OXYCODONE 5 MG: 5 TABLET ORAL at 15:42

## 2024-03-09 RX ADMIN — SENNOSIDES AND DOCUSATE SODIUM 1 TABLET: 50; 8.6 TABLET ORAL at 08:34

## 2024-03-09 RX ADMIN — METHOCARBAMOL 1000 MG: 500 TABLET ORAL at 08:34

## 2024-03-09 RX ADMIN — FAMOTIDINE 20 MG: 20 TABLET, FILM COATED ORAL at 20:48

## 2024-03-09 RX ADMIN — GABAPENTIN 600 MG: 300 CAPSULE ORAL at 13:30

## 2024-03-09 RX ADMIN — ZOLPIDEM TARTRATE 10 MG: 5 TABLET ORAL at 21:56

## 2024-03-09 RX ADMIN — AMIODARONE HYDROCHLORIDE 200 MG: 200 TABLET ORAL at 08:33

## 2024-03-09 RX ADMIN — GABAPENTIN 600 MG: 300 CAPSULE ORAL at 20:48

## 2024-03-09 RX ADMIN — FAMOTIDINE 20 MG: 20 TABLET, FILM COATED ORAL at 08:34

## 2024-03-09 RX ADMIN — FENOFIBRATE 160 MG: 160 TABLET ORAL at 08:34

## 2024-03-09 RX ADMIN — POTASSIUM CHLORIDE 40 MEQ: 750 TABLET, EXTENDED RELEASE ORAL at 08:33

## 2024-03-09 RX ADMIN — DESMOPRESSIN ACETATE 100 MCG: 0.2 TABLET ORAL at 20:48

## 2024-03-09 RX ADMIN — NALOXEGOL OXALATE 25 MG: 25 TABLET, FILM COATED ORAL at 06:32

## 2024-03-09 RX ADMIN — SODIUM CHLORIDE, PRESERVATIVE FREE 10 ML: 5 INJECTION INTRAVENOUS at 20:48

## 2024-03-09 RX ADMIN — MORPHINE SULFATE 2 MG: 2 INJECTION, SOLUTION INTRAMUSCULAR; INTRAVENOUS at 08:36

## 2024-03-09 RX ADMIN — METHOCARBAMOL 1000 MG: 500 TABLET ORAL at 15:42

## 2024-03-09 RX ADMIN — GABAPENTIN 600 MG: 300 CAPSULE ORAL at 08:33

## 2024-03-09 RX ADMIN — TAMSULOSIN HYDROCHLORIDE 0.4 MG: 0.4 CAPSULE ORAL at 08:34

## 2024-03-09 RX ADMIN — SENNOSIDES AND DOCUSATE SODIUM 1 TABLET: 50; 8.6 TABLET ORAL at 20:48

## 2024-03-09 RX ADMIN — Medication 10 ML: at 08:36

## 2024-03-09 RX ADMIN — ACETAMINOPHEN 650 MG: 325 TABLET ORAL at 15:41

## 2024-03-09 ASSESSMENT — PAIN SCALES - WONG BAKER
WONGBAKER_NUMERICALRESPONSE: HURTS A LITTLE BIT
WONGBAKER_NUMERICALRESPONSE: 2
WONGBAKER_NUMERICALRESPONSE: HURTS A LITTLE BIT
WONGBAKER_NUMERICALRESPONSE: 2

## 2024-03-09 ASSESSMENT — PAIN SCALES - GENERAL
PAINLEVEL_OUTOF10: 5
PAINLEVEL_OUTOF10: 6
PAINLEVEL_OUTOF10: 5
PAINLEVEL_OUTOF10: 7

## 2024-03-09 ASSESSMENT — PAIN DESCRIPTION - DESCRIPTORS
DESCRIPTORS: ACHING
DESCRIPTORS: ACHING

## 2024-03-09 ASSESSMENT — PAIN - FUNCTIONAL ASSESSMENT
PAIN_FUNCTIONAL_ASSESSMENT: PREVENTS OR INTERFERES WITH ALL ACTIVE AND SOME PASSIVE ACTIVITIES
PAIN_FUNCTIONAL_ASSESSMENT: PREVENTS OR INTERFERES WITH ALL ACTIVE AND SOME PASSIVE ACTIVITIES

## 2024-03-09 ASSESSMENT — PAIN DESCRIPTION - ORIENTATION
ORIENTATION: RIGHT
ORIENTATION: RIGHT;LOWER

## 2024-03-09 ASSESSMENT — PAIN DESCRIPTION - FREQUENCY: FREQUENCY: INTERMITTENT

## 2024-03-09 ASSESSMENT — PAIN DESCRIPTION - ONSET: ONSET: ON-GOING

## 2024-03-09 ASSESSMENT — PAIN DESCRIPTION - LOCATION
LOCATION: RIB CAGE
LOCATION: GENERALIZED

## 2024-03-09 ASSESSMENT — PAIN DESCRIPTION - PAIN TYPE: TYPE: ACUTE PAIN

## 2024-03-09 NOTE — CARE COORDINATION
SW attempted to speak with spouse in regards to dc planning purposes. Spouse was left with SNF list, and [facility] SNF list as well.   The only place she was interested in was [facility], which does not have any beds, and not anytime soon, which was expressed to spouse.   Spouse has not been giving many choices for referrals as she keeps stating \" well he is not going anywhere for awhile\" SW has tried to remind spouse, that even though he may not be dc ready now, his insurance requires a precert, and likely to take time to get back as well, and so starting the process early is good, so nothing delays the discharge planning process. Spouse informed she left the list at home, and SW just reminded her that SW is here this weekend and on Monday.     Will try again later.     Noé العلي LMSW, [facility] Social Work Case Management   Phone: [phone]  Fax: [phone]

## 2024-03-09 NOTE — PROGRESS NOTES
and Flagyl,   Mechanical fall with right-sided rib fractures  Hypokalemia: K is above 4 now.     RECOMMENDATIONS:    Will remove rectal tube today  -Linzess is on hold because of diarrhea. Continue movantik  -Out of bed and walking or to chair as much as possible.    Thank you for allowing me to participate in the care of your patient.  Please feel free to contact me with any concerns.  [phone]    Anders Cervantes MD

## 2024-03-09 NOTE — PLAN OF CARE
Problem: Safety - Adult  Goal: Free from fall injury  3/8/2024 2126 by Haydee Fernandez RN  Outcome: Progressing  3/8/2024 1039 by Tracy Connors RN  Outcome: Progressing     Problem: Discharge Planning  Goal: Discharge to home or other facility with appropriate resources  3/8/2024 2126 by Haydee Fernandez RN  Outcome: Progressing  3/8/2024 1039 by Tracy Connors RN  Outcome: Progressing  Flowsheets (Taken 3/8/2024 1006)  Discharge to home or other facility with appropriate resources:   Identify barriers to discharge with patient and caregiver   Arrange for needed discharge resources and transportation as appropriate   Identify discharge learning needs (meds, wound care, etc)     Problem: Chronic Conditions and Co-morbidities  Goal: Patient's chronic conditions and co-morbidity symptoms are monitored and maintained or improved  3/8/2024 2126 by Haydee Fernandez RN  Outcome: Progressing  3/8/2024 1039 by Tracy Connors RN  Outcome: Progressing  Flowsheets (Taken 3/8/2024 1006)  Care Plan - Patient's Chronic Conditions and Co-Morbidity Symptoms are Monitored and Maintained or Improved:   Monitor and assess patient's chronic conditions and comorbid symptoms for stability, deterioration, or improvement   Collaborate with multidisciplinary team to address chronic and comorbid conditions and prevent exacerbation or deterioration     Problem: Skin/Tissue Integrity  Goal: Absence of new skin breakdown  Description: 1.  Monitor for areas of redness and/or skin breakdown  2.  Assess vascular access sites hourly  3.  Every 4-6 hours minimum:  Change oxygen saturation probe site  4.  Every 4-6 hours:  If on nasal continuous positive airway pressure, respiratory therapy assess nares and determine need for appliance change or resting period.  3/8/2024 2126 by Haydee Fernandez RN  Outcome: Progressing  3/8/2024 1039 by Tracy Connors RN  Outcome: Progressing     Problem: ABCDS Injury Assessment  Goal: Absence of physical injury  3/8/2024 2126  1006)  Patient/family communicate acceptance of loss or change in body image and feel psychological comfort and peace:   Assess patient/family anxiety and grief process related to change in body image, loss of functional status, loss of sense of self, and forgiveness   Provide emotional and spiritual support     Problem: Nutrition Deficit:  Goal: Optimize nutritional status  3/8/2024 2126 by Haydee Fernandez, RN  Outcome: Progressing  3/8/2024 1039 by Tracy Connors, RN  Outcome: Progressing

## 2024-03-09 NOTE — PROGRESS NOTES
CHEST; CT OF THE ABDOMEN AND PELVIS WITH CONTRAST 2/29/2024 1:07 am; 2/29/2024 1:08 am TECHNIQUE: CTA of the chest was performed after the administration of intravenous contrast.  Multiplanar reformatted images are provided for review.  MIP images are provided for review. Automated exposure control, iterative reconstruction, and/or weight based adjustment of the mA/kV was utilized to reduce the radiation dose to as low as reasonably achievable.; CT of the abdomen and pelvis was performed with the administration of intravenous contrast. Multiplanar reformatted images are provided for review. Automated exposure control, iterative reconstruction, and/or weight based adjustment of the mA/kV was utilized to reduce the radiation dose to as low as reasonably achievable. COMPARISON: CT imaging 02/19/2024.  Abdominal CT 02/23/2024 and abdominal radiograph 02/27/2024. HISTORY: ORDERING SYSTEM PROVIDED HISTORY: SOB, dysrhythmia, r/o PE TECHNOLOGIST PROVIDED HISTORY: Reason for exam:->SOB, dysrhythmia, r/o PE Additional Contrast?->1; ORDERING SYSTEM PROVIDED HISTORY: abd pain, rectal pain and distension TECHNOLOGIST PROVIDED HISTORY: Reason for exam:->abd pain, rectal pain and distension Additional Contrast?->None Decision Support Exception - unselect if not a suspected or confirmed emergency medical condition->Emergency Medical Condition (MA) FINDINGS: CHEST CT: Pulmonary Arteries: Pulmonary arteries are adequately opacified for evaluation.  No evidence of intraluminal filling defect to suggest pulmonary embolism.  Main pulmonary artery is is enlarged measuring 38 mm, which may indicate underlying portal hypertension. Mediastinum: No evidence of mediastinal lymphadenopathy.  The heart and pericardium demonstrate no acute abnormality.  Calcified coronary atheromatous plaque.  There is no acute abnormality of the thoracic aorta. Lungs/pleura: Small right pleural effusion and dependent atelectasis in the lung bases, right greater    Lab Results   Component Value Date/Time    LABA1C 6.4 10/24/2013 09:29 AM     TSH: No results found for: \"TSH\"  Troponin: No results found for: \"TROPONINT\"  Lactic Acid: No results for input(s): \"LACTA\" in the last 72 hours.  BNP:   No results for input(s): \"PROBNP\" in the last 72 hours.    UA:  Lab Results   Component Value Date/Time    NITRU Negative 02/23/2024 10:49 PM    COLORU Yellow 02/23/2024 10:49 PM    PHUR 5.5 02/23/2024 10:49 PM    WBCUA 1 02/23/2024 10:49 PM    RBCUA 2 02/23/2024 10:49 PM    BACTERIA None Seen 02/23/2024 10:49 PM    CLARITYU Clear 02/23/2024 10:49 PM    SPECGRAV 1.026 02/23/2024 10:49 PM    LEUKOCYTESUR Negative 02/23/2024 10:49 PM    UROBILINOGEN 1.0 02/23/2024 10:49 PM    BILIRUBINUR Negative 02/23/2024 10:49 PM    BLOODU Negative 02/23/2024 10:49 PM    GLUCOSEU Negative 02/23/2024 10:49 PM    KETUA TRACE 02/23/2024 10:49 PM     Urine Cultures: No results found for: \"LABURIN\"  Blood Cultures:   Lab Results   Component Value Date/Time    BC See additional report for complete BCID panel. 02/29/2024 12:27 AM    BC  02/29/2024 12:27 AM     POSITIVE for  This organism was isolated in one set.  Susceptibility testing is not routinely done as this  organism frequently represents skin contamination.  Additional testing can be ordered by calling the  Microbiology Department.       Lab Results   Component Value Date/Time    BLOODCULT2 No Growth after 4 days of incubation. 02/29/2024 04:43 AM     Organism:   Lab Results   Component Value Date/Time    ORG Staphylococcus epidermidis DNA Detected 02/29/2024 12:27 AM    ORG Staphylococcus epidermidis 02/29/2024 12:27 AM         Electronically signed by Chiara Gage MD on 3/9/2024 at 11:22 AM

## 2024-03-09 NOTE — PROGRESS NOTES
Pt is NWB to CHANEL and RAOE. Pt concerned about being able to safely get up to the chair. Pt has PT/OT consults in place and has been getting up with a Stedy X 2. They have not seen him since he had his ICD placed on 3/8 and he now has LUE NWB status. RN discussed with pt getting up to the chair and using the gait belt to assist with moving when he cannot use his arms to push. RN also offered using the maxi lift.     Pt deferring getting OOB until he can work with therapy first because he \"is afraid of getting up.\"

## 2024-03-09 NOTE — PLAN OF CARE
Problem: Discharge Planning  Goal: Discharge to home or other facility with appropriate resources  Outcome: Not Progressing     Problem: Neurosensory - Adult  Goal: Achieves maximal functionality and self care  Outcome: Not Progressing     Problem: Musculoskeletal - Adult  Goal: Return mobility to safest level of function  Outcome: Not Progressing  Goal: Return ADL status to a safe level of function  Outcome: Not Progressing     Problem: Gastrointestinal - Adult  Goal: Minimal or absence of nausea and vomiting  Outcome: Not Progressing  Note: Pt denies nausea now.

## 2024-03-09 NOTE — PROGRESS NOTES
Pt with BUE and R calf skin tears. Drsg dated 3/6. Old drsgs noted to have yellow drainage.     RN removed dressings and cleaned wounds with saline spray. RN applied new silicone transfer, nonadherent gauze, and roll gauze/paper tape to hold in place. RN entered wound care orders for skin tears per protocol.    Pt tolerated dressing change well.

## 2024-03-09 NOTE — PROGRESS NOTES
Dr. Murillo called and requested that RN place an order for an PA/Lateral CXR for ICD lead placement verification.    RN entered orders and confirmed that transportation request is in Epic. When Dr. Murillo on the phone, RN inquired about need to complete 12 lead EKG that was ordered yesterday (3/8). Dr. Murillo stated it was \"not necessary\" to get the EKG. RN discontinued order for 12 lead EKG.     Pt updated on plan of care. RN medicated pt with prn robaxin and morphine after incontinence care and before pt to leave to go to radiology.

## 2024-03-10 LAB
ANION GAP SERPL CALCULATED.3IONS-SCNC: 6 MMOL/L (ref 3–16)
ANION GAP SERPL CALCULATED.3IONS-SCNC: 8 MMOL/L (ref 3–16)
BASOPHILS # BLD: 0.1 K/UL (ref 0–0.2)
BASOPHILS NFR BLD: 0.6 %
BUN SERPL-MCNC: 10 MG/DL (ref 7–20)
BUN SERPL-MCNC: 9 MG/DL (ref 7–20)
CALCIUM SERPL-MCNC: 7.9 MG/DL (ref 8.3–10.6)
CALCIUM SERPL-MCNC: 8 MG/DL (ref 8.3–10.6)
CHLORIDE SERPL-SCNC: 97 MMOL/L (ref 99–110)
CHLORIDE SERPL-SCNC: 98 MMOL/L (ref 99–110)
CO2 SERPL-SCNC: 28 MMOL/L (ref 21–32)
CO2 SERPL-SCNC: 30 MMOL/L (ref 21–32)
CREAT SERPL-MCNC: 0.5 MG/DL (ref 0.8–1.3)
CREAT SERPL-MCNC: 0.7 MG/DL (ref 0.8–1.3)
DEPRECATED RDW RBC AUTO: 15 % (ref 12.4–15.4)
EOSINOPHIL # BLD: 0.1 K/UL (ref 0–0.6)
EOSINOPHIL NFR BLD: 1 %
GFR SERPLBLD CREATININE-BSD FMLA CKD-EPI: >60 ML/MIN/{1.73_M2}
GFR SERPLBLD CREATININE-BSD FMLA CKD-EPI: >60 ML/MIN/{1.73_M2}
GLUCOSE SERPL-MCNC: 105 MG/DL (ref 70–99)
GLUCOSE SERPL-MCNC: 156 MG/DL (ref 70–99)
HCT VFR BLD AUTO: 33.9 % (ref 40.5–52.5)
HGB BLD-MCNC: 11.5 G/DL (ref 13.5–17.5)
LYMPHOCYTES # BLD: 0.9 K/UL (ref 1–5.1)
LYMPHOCYTES NFR BLD: 10.3 %
MAGNESIUM SERPL-MCNC: 1.8 MG/DL (ref 1.8–2.4)
MCH RBC QN AUTO: 31.2 PG (ref 26–34)
MCHC RBC AUTO-ENTMCNC: 34 G/DL (ref 31–36)
MCV RBC AUTO: 91.6 FL (ref 80–100)
MONOCYTES # BLD: 1.1 K/UL (ref 0–1.3)
MONOCYTES NFR BLD: 13.1 %
NEUTROPHILS # BLD: 6.6 K/UL (ref 1.7–7.7)
NEUTROPHILS NFR BLD: 75 %
PLATELET # BLD AUTO: 386 K/UL (ref 135–450)
PMV BLD AUTO: 7.8 FL (ref 5–10.5)
POTASSIUM SERPL-SCNC: 4.3 MMOL/L (ref 3.5–5.1)
POTASSIUM SERPL-SCNC: 4.4 MMOL/L (ref 3.5–5.1)
RBC # BLD AUTO: 3.7 M/UL (ref 4.2–5.9)
SODIUM SERPL-SCNC: 131 MMOL/L (ref 136–145)
SODIUM SERPL-SCNC: 136 MMOL/L (ref 136–145)
WBC # BLD AUTO: 8.8 K/UL (ref 4–11)

## 2024-03-10 PROCEDURE — 6370000000 HC RX 637 (ALT 250 FOR IP): Performed by: INTERNAL MEDICINE

## 2024-03-10 PROCEDURE — 94760 N-INVAS EAR/PLS OXIMETRY 1: CPT

## 2024-03-10 PROCEDURE — 1200000000 HC SEMI PRIVATE

## 2024-03-10 PROCEDURE — 6370000000 HC RX 637 (ALT 250 FOR IP): Performed by: HOSPITALIST

## 2024-03-10 PROCEDURE — 85025 COMPLETE CBC W/AUTO DIFF WBC: CPT

## 2024-03-10 PROCEDURE — 6360000002 HC RX W HCPCS: Performed by: INTERNAL MEDICINE

## 2024-03-10 PROCEDURE — 2580000003 HC RX 258: Performed by: INTERNAL MEDICINE

## 2024-03-10 PROCEDURE — 80048 BASIC METABOLIC PNL TOTAL CA: CPT

## 2024-03-10 PROCEDURE — 36415 COLL VENOUS BLD VENIPUNCTURE: CPT

## 2024-03-10 PROCEDURE — 83735 ASSAY OF MAGNESIUM: CPT

## 2024-03-10 RX ADMIN — GABAPENTIN 600 MG: 300 CAPSULE ORAL at 21:39

## 2024-03-10 RX ADMIN — TAMSULOSIN HYDROCHLORIDE 0.4 MG: 0.4 CAPSULE ORAL at 08:51

## 2024-03-10 RX ADMIN — FAMOTIDINE 20 MG: 20 TABLET, FILM COATED ORAL at 08:50

## 2024-03-10 RX ADMIN — GABAPENTIN 600 MG: 300 CAPSULE ORAL at 12:31

## 2024-03-10 RX ADMIN — POTASSIUM CHLORIDE 40 MEQ: 750 TABLET, EXTENDED RELEASE ORAL at 08:50

## 2024-03-10 RX ADMIN — NALOXEGOL OXALATE 25 MG: 25 TABLET, FILM COATED ORAL at 06:12

## 2024-03-10 RX ADMIN — GABAPENTIN 600 MG: 300 CAPSULE ORAL at 08:50

## 2024-03-10 RX ADMIN — ACETAMINOPHEN 650 MG: 325 TABLET ORAL at 08:50

## 2024-03-10 RX ADMIN — FAMOTIDINE 20 MG: 20 TABLET, FILM COATED ORAL at 21:39

## 2024-03-10 RX ADMIN — ZOLPIDEM TARTRATE 10 MG: 5 TABLET ORAL at 21:39

## 2024-03-10 RX ADMIN — SODIUM CHLORIDE, PRESERVATIVE FREE 10 ML: 5 INJECTION INTRAVENOUS at 21:39

## 2024-03-10 RX ADMIN — SENNOSIDES AND DOCUSATE SODIUM 1 TABLET: 50; 8.6 TABLET ORAL at 08:50

## 2024-03-10 RX ADMIN — AMIODARONE HYDROCHLORIDE 200 MG: 200 TABLET ORAL at 08:51

## 2024-03-10 RX ADMIN — OXYCODONE 5 MG: 5 TABLET ORAL at 12:31

## 2024-03-10 RX ADMIN — DESMOPRESSIN ACETATE 100 MCG: 0.2 TABLET ORAL at 21:41

## 2024-03-10 RX ADMIN — SODIUM CHLORIDE, PRESERVATIVE FREE 10 ML: 5 INJECTION INTRAVENOUS at 08:51

## 2024-03-10 RX ADMIN — ACETAMINOPHEN 650 MG: 325 TABLET ORAL at 17:47

## 2024-03-10 RX ADMIN — SENNOSIDES AND DOCUSATE SODIUM 1 TABLET: 50; 8.6 TABLET ORAL at 21:39

## 2024-03-10 RX ADMIN — MORPHINE SULFATE 2 MG: 2 INJECTION, SOLUTION INTRAMUSCULAR; INTRAVENOUS at 13:54

## 2024-03-10 RX ADMIN — METHOCARBAMOL 1000 MG: 500 TABLET ORAL at 08:50

## 2024-03-10 RX ADMIN — METHOCARBAMOL 1000 MG: 500 TABLET ORAL at 17:47

## 2024-03-10 RX ADMIN — FENOFIBRATE 160 MG: 160 TABLET ORAL at 08:51

## 2024-03-10 RX ADMIN — ONDANSETRON 4 MG: 4 TABLET, ORALLY DISINTEGRATING ORAL at 08:44

## 2024-03-10 ASSESSMENT — PAIN DESCRIPTION - LOCATION
LOCATION: RIB CAGE
LOCATION: BACK
LOCATION: RIB CAGE
LOCATION: GENERALIZED

## 2024-03-10 ASSESSMENT — PAIN SCALES - GENERAL
PAINLEVEL_OUTOF10: 6
PAINLEVEL_OUTOF10: 7
PAINLEVEL_OUTOF10: 6
PAINLEVEL_OUTOF10: 5
PAINLEVEL_OUTOF10: 7
PAINLEVEL_OUTOF10: 6
PAINLEVEL_OUTOF10: 9
PAINLEVEL_OUTOF10: 5
PAINLEVEL_OUTOF10: 7

## 2024-03-10 ASSESSMENT — PAIN - FUNCTIONAL ASSESSMENT
PAIN_FUNCTIONAL_ASSESSMENT: PREVENTS OR INTERFERES WITH ALL ACTIVE AND SOME PASSIVE ACTIVITIES
PAIN_FUNCTIONAL_ASSESSMENT: PREVENTS OR INTERFERES WITH MANY ACTIVE NOT PASSIVE ACTIVITIES
PAIN_FUNCTIONAL_ASSESSMENT: PREVENTS OR INTERFERES WITH MANY ACTIVE NOT PASSIVE ACTIVITIES
PAIN_FUNCTIONAL_ASSESSMENT: PREVENTS OR INTERFERES WITH ALL ACTIVE AND SOME PASSIVE ACTIVITIES

## 2024-03-10 ASSESSMENT — PAIN DESCRIPTION - DESCRIPTORS
DESCRIPTORS: SORE

## 2024-03-10 ASSESSMENT — PAIN DESCRIPTION - ORIENTATION
ORIENTATION: RIGHT
ORIENTATION: RIGHT
ORIENTATION: MID
ORIENTATION: RIGHT

## 2024-03-10 NOTE — PLAN OF CARE
rhythm   Assess for signs of decreased cardiac output   Administer antiarrhythmia medication and electrolyte replacement as ordered     Problem: Skin/Tissue Integrity - Adult  Goal: Incisions, wounds, or drain sites healing without S/S of infection  3/9/2024 2119 by Haydee Fernandez RN  Outcome: Progressing  3/9/2024 1805 by Sammie Freed RN  Outcome: Progressing  Flowsheets (Taken 3/9/2024 0821)  Incisions, Wounds, or Drain Sites Healing Without Sign and Symptoms of Infection:   ADMISSION and DAILY: Assess and document risk factors for pressure ulcer development   TWICE DAILY: Assess and document skin integrity   TWICE DAILY: Assess and document dressing/incision, wound bed, drain sites and surrounding tissue   Implement wound care per orders   Initiate pressure ulcer prevention bundle as indicated  Goal: Oral mucous membranes remain intact  3/9/2024 2119 by Haydee Fernandez RN  Outcome: Progressing  3/9/2024 1805 by Sammie Freed RN  Outcome: Progressing  Flowsheets (Taken 3/9/2024 0821)  Oral Mucous Membranes Remain Intact: Assess oral mucosa and hygiene practices     Problem: Musculoskeletal - Adult  Goal: Return mobility to safest level of function  3/9/2024 2119 by Haydee Fernandez RN  Outcome: Progressing  3/9/2024 1805 by Sammie Freed RN  Outcome: Not Progressing  Flowsheets (Taken 3/9/2024 0821)  Return Mobility to Safest Level of Function:   Assess patient stability and activity tolerance for standing, transferring and ambulating with or without assistive devices   Assist with transfers and ambulation using safe patient handling equipment as needed   Obtain physical therapy/occupational therapy consults as needed   Instruct patient/family in ordered activity level  Goal: Return ADL status to a safe level of function  3/9/2024 2119 by Haydee Fernandez RN  Outcome: Progressing  3/9/2024 1805 by Sammie Freed RN  Outcome: Not Progressing  Flowsheets (Taken 3/9/2024 0821)  Return ADL Status to a Safe Level of  level  Goal: Return ADL status to a safe level of function  3/9/2024 2119 by Haydee Fernandez, RN  Outcome: Progressing  3/9/2024 1805 by Sammie Freed RN  Outcome: Not Progressing  Flowsheets (Taken 3/9/2024 0821)  Return ADL Status to a Safe Level of Function:   Administer medication as ordered   Assess activities of daily living deficits and provide assistive devices as needed   Obtain physical therapy/occupational therapy consults as needed   Assist and instruct patient to increase activity and self care as tolerated     Problem: Gastrointestinal - Adult  Goal: Minimal or absence of nausea and vomiting  3/9/2024 2119 by Haydee Fernandez, RN  Outcome: Progressing  3/9/2024 1805 by Sammie Freed, RN  Outcome: Not Progressing  Flowsheets (Taken 3/9/2024 0821)  Minimal or absence of nausea and vomiting: Administer ordered antiemetic medications as needed  Note: Pt denies nausea

## 2024-03-10 NOTE — PROGRESS NOTES
Gastroenterology Progress Note    Juan Mohamud is a 83 y.o. male patient.  Principal Problem:    Arrhythmia  Active Problems:    DDD (degenerative disc disease), lumbar    Cataract extraction status of right eye    Constipation    Elevated troponin    Primary hypertension    Hyperlipidemia    Colonic obstruction (HCC)    Trauma    History of fall    Type 2 diabetes mellitus (HCC)    Anemia    Pneumonia    Hyponatremia    Dilatation of colon    Wide-complex tachycardia    Acute hypoxemic respiratory failure (HCC)    BOSTON (acute kidney injury) (HCC)  Resolved Problems:    * No resolved hospital problems. *      SUBJECTIVE: Denies abdominal pain or bloating.  No nausea or vomiting.  Moving bowels.  No fevers.  Passing gas.  Rectal tube is out.  Tolerating regular food.      Current Facility-Administered Medications: amiodarone (CORDARONE) tablet 200 mg, 200 mg, Oral, Daily  potassium chloride (KLOR-CON) extended release tablet 40 mEq, 40 mEq, Oral, Daily  lidocaine PF 1 % injection 5 mL, 5 mL, IntraDERmal, Once  naloxegol (MOVANTIK) tablet 25 mg, 25 mg, Oral, QAM AC  chlorhexidine (HIBICLENS) 4 % liquid, , Topical, Once  mupirocin (BACTROBAN) 2 % ointment, , Each Nostril, Once  oxyCODONE (ROXICODONE) immediate release tablet 5 mg, 5 mg, Oral, Q4H PRN **OR** oxyCODONE (ROXICODONE) immediate release tablet 2.5 mg, 2.5 mg, Oral, Q4H PRN  morphine (PF) injection 2 mg, 2 mg, IntraVENous, Q4H PRN  desmopressin (DDAVP) tablet 100 mcg, 100 mcg, Oral, Nightly  [Held by provider] linaclotide (LINZESS) capsule 290 mcg (Patient Supplied), 290 mcg, Oral, QAM AC  sodium chloride (OCEAN, BABY AYR) 0.65 % nasal spray 2 spray, 2 spray, Each Nostril, PRN  famotidine (PEPCID) tablet 20 mg, 20 mg, Oral, BID  gabapentin (NEURONTIN) capsule 600 mg, 600 mg, Oral, TID  fenofibrate (TRIGLIDE) tablet 160 mg, 160 mg, Oral, Daily  methocarbamol (ROBAXIN) tablet 1,000 mg, 1,000 mg, Oral, TID PRN  sennosides-docusate sodium (SENOKOT-S)  for defibrillator today  Pneumonia: On cefepime and Flagyl,   Mechanical fall with right-sided rib fractures  Hypokalemia: K is above 4 now.     RECOMMENDATIONS:    -Linzess is on hold because of diarrhea. Continue movantik.  I told him he can resume his linzess at home as needed.  -Out of bed and walking or to chair as much as possible.  - No further inpatient recs.  Will sign off. Please call with questions.    Thank you for allowing me to participate in the care of your patient.  Please feel free to contact me with any concerns.  [phone]    Anders Cervantes MD

## 2024-03-10 NOTE — PROGRESS NOTES
V2.0    [facility] [place] Progress Note      Name:  Juan Mohamud /Age/Sex: 1940  (83 y.o. male)   MRN & CSN:  [id number] & [id number] Encounter Date/Time: 3/10/2024 12:30 PM EST   Location:  [id number] PCP: Janet Garcia MD     Attending:Chiara Gage MD       Hospital Day: 12    Assessment and Recommendations   Juan Mohamud is a 83 y.o. male with pmh of  who presents with Arrhythmia    New onset arrhythmia, troponin elevation .  U       S/ cardiac cath on 3/1 : non obstructive CAD ,         3/9 : s/p ICD placement on 3/8       3/8 : NPO , for ICD placement on 3/8 , discussed with Dr Murillo . Rectal tube management per GI        3/7 : discussed with Dr Murillo , patient on schedule for ICD placement on 3/8              Discussed with charge RN to get peripheral IV line or right UE midline or PICC line         3/6 : ICD procedure delayed as patient got PICC line in left UE on 3/6        Spoke to Dr Murillo , per him , ICD can be placed tomorrow or Friday if we        Remove PICC line . Discussed with RN , and plan is to first get peripheral IV line        And then remove PICC line                 Replace K and Mg per protocol prn    History of hypertension, hyperlipidemia.  Colonic obstruction.  CT PE/abdomen/pelvis without PE.  Small right pleural effusion, dependent atelectasis bibasilarly, right greater than left.  Progressive colonic dilation since  without focal transition point.  Likely partial obstruction at sigmoid colon, liquid stool throughout colon to the level of rectum.  Cecum markedly distended up to 14 cm.  Difficulty with NGT placement in the ED and multiple times on the floor.  Have given sedation for placement, rectal bowel regimen..        3/10 : GI signed off , patient on regular diet and tolerating well         Rectal tube removed on 3/9          Diet as per GI    Recent trauma from fall.  See that patient was admitted  - 2024.  Fell down 6  THE CHEST; CT OF THE ABDOMEN AND PELVIS WITH CONTRAST 2/29/2024 1:07 am; 2/29/2024 1:08 am TECHNIQUE: CTA of the chest was performed after the administration of intravenous contrast.  Multiplanar reformatted images are provided for review.  MIP images are provided for review. Automated exposure control, iterative reconstruction, and/or weight based adjustment of the mA/kV was utilized to reduce the radiation dose to as low as reasonably achievable.; CT of the abdomen and pelvis was performed with the administration of intravenous contrast. Multiplanar reformatted images are provided for review. Automated exposure control, iterative reconstruction, and/or weight based adjustment of the mA/kV was utilized to reduce the radiation dose to as low as reasonably achievable. COMPARISON: CT imaging 02/19/2024.  Abdominal CT 02/23/2024 and abdominal radiograph 02/27/2024. HISTORY: ORDERING SYSTEM PROVIDED HISTORY: SOB, dysrhythmia, r/o PE TECHNOLOGIST PROVIDED HISTORY: Reason for exam:->SOB, dysrhythmia, r/o PE Additional Contrast?->1; ORDERING SYSTEM PROVIDED HISTORY: abd pain, rectal pain and distension TECHNOLOGIST PROVIDED HISTORY: Reason for exam:->abd pain, rectal pain and distension Additional Contrast?->None Decision Support Exception - unselect if not a suspected or confirmed emergency medical condition->Emergency Medical Condition (MA) FINDINGS: CHEST CT: Pulmonary Arteries: Pulmonary arteries are adequately opacified for evaluation.  No evidence of intraluminal filling defect to suggest pulmonary embolism.  Main pulmonary artery is is enlarged measuring 38 mm, which may indicate underlying portal hypertension. Mediastinum: No evidence of mediastinal lymphadenopathy.  The heart and pericardium demonstrate no acute abnormality.  Calcified coronary atheromatous plaque.  There is no acute abnormality of the thoracic aorta. Lungs/pleura: Small right pleural effusion and dependent atelectasis in the lung bases, right

## 2024-03-10 NOTE — PROGRESS NOTES
RN cleaned skin tears to BUE with wound wash. RN changed drsg. RN used ABD pads instead of nonadherent gauze to cover wounds since wounds noted to have drained on to bed sheets overnight.

## 2024-03-11 ENCOUNTER — APPOINTMENT (OUTPATIENT)
Dept: GENERAL RADIOLOGY | Age: 84
DRG: 981 | End: 2024-03-11
Payer: MEDICARE

## 2024-03-11 ENCOUNTER — APPOINTMENT (OUTPATIENT)
Dept: CARDIAC CATH/INVASIVE PROCEDURES | Age: 84
DRG: 981 | End: 2024-03-11
Payer: MEDICARE

## 2024-03-11 DIAGNOSIS — I49.9 CARDIAC ARRHYTHMIA, UNSPECIFIED CARDIAC ARRHYTHMIA TYPE: ICD-10-CM

## 2024-03-11 DIAGNOSIS — R00.0 WIDE-COMPLEX TACHYCARDIA: ICD-10-CM

## 2024-03-11 DIAGNOSIS — Z95.810 CARDIAC DEFIBRILLATOR IN SITU: Primary | ICD-10-CM

## 2024-03-11 LAB
ANION GAP SERPL CALCULATED.3IONS-SCNC: 8 MMOL/L (ref 3–16)
BUN SERPL-MCNC: 9 MG/DL (ref 7–20)
CALCIUM SERPL-MCNC: 7.9 MG/DL (ref 8.3–10.6)
CHLORIDE SERPL-SCNC: 95 MMOL/L (ref 99–110)
CO2 SERPL-SCNC: 27 MMOL/L (ref 21–32)
CREAT SERPL-MCNC: 0.5 MG/DL (ref 0.8–1.3)
GFR SERPLBLD CREATININE-BSD FMLA CKD-EPI: >60 ML/MIN/{1.73_M2}
GLUCOSE SERPL-MCNC: 111 MG/DL (ref 70–99)
POTASSIUM SERPL-SCNC: 4.5 MMOL/L (ref 3.5–5.1)
SODIUM SERPL-SCNC: 130 MMOL/L (ref 136–145)

## 2024-03-11 PROCEDURE — 0WHC0GZ INSERTION OF DEFIBRILLATOR LEAD INTO MEDIASTINUM, OPEN APPROACH: ICD-10-PCS | Performed by: INTERNAL MEDICINE

## 2024-03-11 PROCEDURE — 6370000000 HC RX 637 (ALT 250 FOR IP): Performed by: INTERNAL MEDICINE

## 2024-03-11 PROCEDURE — 1200000000 HC SEMI PRIVATE

## 2024-03-11 PROCEDURE — 6360000002 HC RX W HCPCS: Performed by: INTERNAL MEDICINE

## 2024-03-11 PROCEDURE — 2580000003 HC RX 258: Performed by: INTERNAL MEDICINE

## 2024-03-11 PROCEDURE — 02WA3MZ REVISION OF CARDIAC LEAD IN HEART, PERCUTANEOUS APPROACH: ICD-10-PCS | Performed by: INTERNAL MEDICINE

## 2024-03-11 PROCEDURE — 36415 COLL VENOUS BLD VENIPUNCTURE: CPT

## 2024-03-11 PROCEDURE — 6360000002 HC RX W HCPCS

## 2024-03-11 PROCEDURE — C1889 IMPLANT/INSERT DEVICE, NOC: HCPCS | Performed by: INTERNAL MEDICINE

## 2024-03-11 PROCEDURE — 71046 X-RAY EXAM CHEST 2 VIEWS: CPT

## 2024-03-11 PROCEDURE — 0WWC0GZ REVISION OF DEFIBRILLATOR LEAD IN MEDIASTINUM, OPEN APPROACH: ICD-10-PCS | Performed by: INTERNAL MEDICINE

## 2024-03-11 PROCEDURE — 3E0102A INTRODUCTION OF ANTI-INFECTIVE ENVELOPE INTO SUBCUTANEOUS TISSUE, OPEN APPROACH: ICD-10-PCS | Performed by: INTERNAL MEDICINE

## 2024-03-11 PROCEDURE — 93005 ELECTROCARDIOGRAM TRACING: CPT | Performed by: INTERNAL MEDICINE

## 2024-03-11 PROCEDURE — 99152 MOD SED SAME PHYS/QHP 5/>YRS: CPT

## 2024-03-11 PROCEDURE — 33215 REPOSITION PACING-DEFIB LEAD: CPT

## 2024-03-11 PROCEDURE — 2580000003 HC RX 258

## 2024-03-11 PROCEDURE — 80048 BASIC METABOLIC PNL TOTAL CA: CPT

## 2024-03-11 PROCEDURE — 0JH608Z INSERTION OF DEFIBRILLATOR GENERATOR INTO CHEST SUBCUTANEOUS TISSUE AND FASCIA, OPEN APPROACH: ICD-10-PCS | Performed by: INTERNAL MEDICINE

## 2024-03-11 PROCEDURE — 2500000003 HC RX 250 WO HCPCS

## 2024-03-11 PROCEDURE — 6370000000 HC RX 637 (ALT 250 FOR IP): Performed by: HOSPITALIST

## 2024-03-11 PROCEDURE — 93308 TTE F-UP OR LMTD: CPT

## 2024-03-11 PROCEDURE — 4A0234Z MEASUREMENT OF CARDIAC ELECTRICAL ACTIVITY, PERCUTANEOUS APPROACH: ICD-10-PCS | Performed by: INTERNAL MEDICINE

## 2024-03-11 PROCEDURE — 9990000010 HC NO CHARGE VISIT

## 2024-03-11 PROCEDURE — 94760 N-INVAS EAR/PLS OXIMETRY 1: CPT

## 2024-03-11 PROCEDURE — 99153 MOD SED SAME PHYS/QHP EA: CPT

## 2024-03-11 RX ORDER — SODIUM CHLORIDE 0.9 % (FLUSH) 0.9 %
5-40 SYRINGE (ML) INJECTION PRN
Status: DISCONTINUED | OUTPATIENT
Start: 2024-03-11 | End: 2024-03-14 | Stop reason: HOSPADM

## 2024-03-11 RX ORDER — SODIUM CHLORIDE 9 MG/ML
INJECTION, SOLUTION INTRAVENOUS PRN
Status: DISCONTINUED | OUTPATIENT
Start: 2024-03-11 | End: 2024-03-14 | Stop reason: HOSPADM

## 2024-03-11 RX ORDER — SODIUM CHLORIDE 0.9 % (FLUSH) 0.9 %
5-40 SYRINGE (ML) INJECTION EVERY 12 HOURS SCHEDULED
Status: DISCONTINUED | OUTPATIENT
Start: 2024-03-11 | End: 2024-03-14 | Stop reason: HOSPADM

## 2024-03-11 RX ADMIN — ZOLPIDEM TARTRATE 10 MG: 5 TABLET ORAL at 21:42

## 2024-03-11 RX ADMIN — GABAPENTIN 600 MG: 300 CAPSULE ORAL at 21:39

## 2024-03-11 RX ADMIN — SODIUM CHLORIDE, PRESERVATIVE FREE 10 ML: 5 INJECTION INTRAVENOUS at 11:38

## 2024-03-11 RX ADMIN — FENOFIBRATE 160 MG: 160 TABLET ORAL at 08:45

## 2024-03-11 RX ADMIN — Medication 10 ML: at 21:44

## 2024-03-11 RX ADMIN — ACETAMINOPHEN 650 MG: 325 TABLET ORAL at 08:46

## 2024-03-11 RX ADMIN — MORPHINE SULFATE 2 MG: 2 INJECTION, SOLUTION INTRAMUSCULAR; INTRAVENOUS at 14:59

## 2024-03-11 RX ADMIN — SENNOSIDES AND DOCUSATE SODIUM 1 TABLET: 50; 8.6 TABLET ORAL at 08:45

## 2024-03-11 RX ADMIN — METHOCARBAMOL 1000 MG: 500 TABLET ORAL at 14:59

## 2024-03-11 RX ADMIN — SODIUM CHLORIDE, PRESERVATIVE FREE 10 ML: 5 INJECTION INTRAVENOUS at 21:45

## 2024-03-11 RX ADMIN — POTASSIUM CHLORIDE 40 MEQ: 750 TABLET, EXTENDED RELEASE ORAL at 08:45

## 2024-03-11 RX ADMIN — AMIODARONE HYDROCHLORIDE 200 MG: 200 TABLET ORAL at 08:45

## 2024-03-11 RX ADMIN — GABAPENTIN 600 MG: 300 CAPSULE ORAL at 14:59

## 2024-03-11 RX ADMIN — FAMOTIDINE 20 MG: 20 TABLET, FILM COATED ORAL at 21:40

## 2024-03-11 RX ADMIN — SENNOSIDES AND DOCUSATE SODIUM 1 TABLET: 50; 8.6 TABLET ORAL at 21:40

## 2024-03-11 RX ADMIN — FAMOTIDINE 20 MG: 20 TABLET, FILM COATED ORAL at 08:45

## 2024-03-11 RX ADMIN — GABAPENTIN 600 MG: 300 CAPSULE ORAL at 08:45

## 2024-03-11 RX ADMIN — ONDANSETRON 4 MG: 2 INJECTION INTRAMUSCULAR; INTRAVENOUS at 06:03

## 2024-03-11 RX ADMIN — METHOCARBAMOL 1000 MG: 500 TABLET ORAL at 08:45

## 2024-03-11 RX ADMIN — ACETAMINOPHEN 650 MG: 325 TABLET ORAL at 17:23

## 2024-03-11 RX ADMIN — OXYCODONE 5 MG: 5 TABLET ORAL at 11:34

## 2024-03-11 RX ADMIN — DESMOPRESSIN ACETATE 100 MCG: 0.2 TABLET ORAL at 21:44

## 2024-03-11 ASSESSMENT — PAIN DESCRIPTION - FREQUENCY: FREQUENCY: INTERMITTENT

## 2024-03-11 ASSESSMENT — PAIN - FUNCTIONAL ASSESSMENT
PAIN_FUNCTIONAL_ASSESSMENT: PREVENTS OR INTERFERES WITH ALL ACTIVE AND SOME PASSIVE ACTIVITIES
PAIN_FUNCTIONAL_ASSESSMENT: PREVENTS OR INTERFERES WITH ALL ACTIVE AND SOME PASSIVE ACTIVITIES
PAIN_FUNCTIONAL_ASSESSMENT: PREVENTS OR INTERFERES SOME ACTIVE ACTIVITIES AND ADLS
PAIN_FUNCTIONAL_ASSESSMENT: PREVENTS OR INTERFERES WITH MANY ACTIVE NOT PASSIVE ACTIVITIES
PAIN_FUNCTIONAL_ASSESSMENT: PREVENTS OR INTERFERES SOME ACTIVE ACTIVITIES AND ADLS

## 2024-03-11 ASSESSMENT — PAIN SCALES - WONG BAKER
WONGBAKER_NUMERICALRESPONSE: HURTS A LITTLE BIT
WONGBAKER_NUMERICALRESPONSE: 0
WONGBAKER_NUMERICALRESPONSE: NO HURT
WONGBAKER_NUMERICALRESPONSE: 2
WONGBAKER_NUMERICALRESPONSE: 2
WONGBAKER_NUMERICALRESPONSE: NO HURT
WONGBAKER_NUMERICALRESPONSE: 0
WONGBAKER_NUMERICALRESPONSE: NO HURT
WONGBAKER_NUMERICALRESPONSE: HURTS A LITTLE BIT
WONGBAKER_NUMERICALRESPONSE: NO HURT

## 2024-03-11 ASSESSMENT — PAIN DESCRIPTION - PAIN TYPE: TYPE: ACUTE PAIN

## 2024-03-11 ASSESSMENT — PAIN DESCRIPTION - DESCRIPTORS
DESCRIPTORS: SORE
DESCRIPTORS: ACHING
DESCRIPTORS: SORE
DESCRIPTORS: SORE

## 2024-03-11 ASSESSMENT — PAIN DESCRIPTION - ORIENTATION
ORIENTATION: RIGHT
ORIENTATION: LEFT
ORIENTATION: RIGHT
ORIENTATION: RIGHT

## 2024-03-11 ASSESSMENT — PAIN DESCRIPTION - ONSET: ONSET: ON-GOING

## 2024-03-11 ASSESSMENT — PAIN DESCRIPTION - LOCATION
LOCATION: HIP
LOCATION: RIB CAGE
LOCATION: RIB CAGE
LOCATION: BACK

## 2024-03-11 ASSESSMENT — PAIN SCALES - GENERAL
PAINLEVEL_OUTOF10: 1
PAINLEVEL_OUTOF10: 7
PAINLEVEL_OUTOF10: 8
PAINLEVEL_OUTOF10: 5
PAINLEVEL_OUTOF10: 8
PAINLEVEL_OUTOF10: 5
PAINLEVEL_OUTOF10: 9

## 2024-03-11 NOTE — PROGRESS NOTES
RN called [facility] on pt's behalf to schedule a call back with his physician. Pt sees him OP and is aware the physician does not have privileges at [facility]. Pt is wanting to talk with Dr. Jules about his missed eye injections. RN left a message with his office and provided contact number for the pt so they can discuss plan once pt is discharged from hospital.

## 2024-03-11 NOTE — PRE SEDATION
Sedation Pre-Procedure Note    Patient Name: Juan Mohamud   YOB: 1940  Room/Bed: Y3G-6114/5278-01  Medical Record Number: [id number]  Date: 3/11/2024   Time: 1:06 PM       Indication:  RV lead micro-dislodgement    Consent: I have discussed with the patient and/or the patient representative the indication, alternatives, and the possible risks and/or complications of the planned procedure and the anesthesia methods. The patient and/or patient representative appear to understand and agree to proceed.    Vital Signs:   Vitals:    03/11/24 1204   BP:    Pulse:    Resp: 17   Temp:    SpO2:        Past Medical History:   has a past medical history of Diabetes mellitus (HCC), Hyperlipidemia, Indigestion, Insomnia, Macular degeneration, MI, old, and Neuropathy.    Past Surgical History:   has a past surgical history that includes knee surgery; Tonsillectomy; Colonoscopy; fracture surgery (Left); Mouth surgery; joint replacement (Right); Intracapsular cataract extraction (Right, 12/17/2018); Colonoscopy (N/A, 2/29/2024); and Colonoscopy (N/A, 3/6/2024).    Medications:   Scheduled Meds:    amiodarone  200 mg Oral Daily    potassium chloride  40 mEq Oral Daily    lidocaine 1 % injection  5 mL IntraDERmal Once    naloxegol  25 mg Oral QAM AC    chlorhexidine   Topical Once    mupirocin   Each Nostril Once    desmopressin  100 mcg Oral Nightly    [Held by provider] linaclotide  290 mcg Oral QAM AC    famotidine  20 mg Oral BID    gabapentin  600 mg Oral TID    fenofibrate  160 mg Oral Daily    sennosides-docusate sodium  1 tablet Oral BID    tamsulosin  0.4 mg Oral Daily    sodium chloride flush  5-40 mL IntraVENous 2 times per day     Continuous Infusions:    sodium chloride 75 mL/hr at 03/08/24 1343     PRN Meds: oxyCODONE **OR** oxyCODONE, morphine, sodium chloride, methocarbamol, simethicone, zolpidem, magnesium hydroxide, sodium chloride flush, sodium chloride, potassium chloride **OR** potassium

## 2024-03-11 NOTE — PROCEDURES
[facility]     Electrophysiology Procedure Note       Date of Procedure: 3/11/2024  Patient's Name: Juan Mohamud  YOB: 1940   Medical Record Number: [id number]  Procedure Performed by: Kurtis Murillo MD    National Coverage Determination Criteria Code: 2    2 - Documented sustained ventricular tachydysrhythmia, either spontaneous or by an EP study, not associated with an acute MI and not due to a reversible cause    Procedures performed:    Revising RV lead due to micro-dislodgement  Electronic analysis of lead and device.  Anesthesia: Local and Monitored Anesthesia Care  Level of sedation plan: Moderate sedation (conscious sedation) with intravenous Midazolam 2 mg and Fentanyl 100 mcg   Sedation start time: 1332  Sedation stop time: 1415  Mallampati airway assessment class: I  ASA class: 1      Indication of the procedure:    Juan Mohamud is a 83 y.o. male with the following:  Indication for ICD: secondary  Type of cardiomyopathy: n/a  Prior MI: no, most recent intervention: n/a  LV dysfunction with EF: 45%  NYHA class: II  QRS width (wide > 120 ms): 122 ms LBBB  Beta blocker therapy: No: n/a  ACE/ARB therapy: No: n/a  On maximally tolerated guideline directed medications for cardiomyopathy for at least 90 days: No: n/a  Life expectancy is over one year: Yes  History of atrial fibrillation (if yes, type): no  Pacing indication (if yes, class I or II indication): no  Reason Pacing Indicated (select all that apply): n/a  Primary Tachycardia Indication present: yes - sustained  bpm  Shared Decision Making: Yes - Shared Decision Making Tool was used.    This is a 83 y.o. male without cardiomyopathy, LVEF 45% (new found), NYHA II, QRS 122ms LBBB with sustained  bpm who has not been on optimized medical therapy for at least the last 3 months who was originally implanted with a [employer] RV-lead only ICD on 3/8/2024 but RV lead has micro-dislodged and is needing  today, from EP perspective, after recovery. Follow-up will be a 1-week wound check with our nurse in the [facility] and a 3-month follow-up with the EP Nurse Practitioner. Will start cardiac anti-remodeling medications (Coreg, Entresto, Jardiance) for the patient starting today.    Thank you for allowing us to participate in the care of your patient. If you have any questions, please do not hesitate to contact me.    Kurtis Murillo MD, MS, FACC, RS  Cardiac Electrophysiology  [facility]  [address], [address]  [place], [place] [address] [phone]

## 2024-03-11 NOTE — CARE COORDINATION
Notification received from [facility] of patient's appeal to discharge.     Messaged MD via [facility] to confirm if anticipated discharge for today, due to no current discharge order.  Confirmed no discharge plan for today.     Attempted to meet with patient and wife at bedside.   Met with patient at bedside. Confirmed patient's wife had initiated appeal for discharge. Patient aware that there is no current discharge order, and states the physician notified of anticipate discharge in a \"couple days\".   Notified patient that I will call wife to follow up regarding no discharge for today.   Patient reports his goal is to return home when he is able to walk.   RN at bedside and requested to speak to patient with Cardiologist on the telephone. Patient requested I call his wife.      Called to wife #[phone], HIPAA Compliant voicemail left requesting return phone call for patient.   Will notify wife that no anticipated discharge for today, and they will need to call and notify [facility] to remove appeal.     SHARLA Brenner, LSW, Social Work/Case Management   [phone]  Electronically signed by SHARLA Brenner on 3/11/2024 at 10:54 AM    Called to [facility], spoke to Rebecca in admissions, confirmed they will review patient and confirm if they can accept due to new available bed for SNF.   Electronically signed by SHARLA Brenner on 3/11/2024 at 12:15 PM

## 2024-03-11 NOTE — PROGRESS NOTES
Pt requesting second opinion from EP physicians. Dr Astorga plans to review pt's case and round at ~ 1300. Pt is to remain NPO and receive a stat echocardiogram. Pt is resting in bed with eyes closed. RN called and updated his wife.

## 2024-03-11 NOTE — PROGRESS NOTES
Physical Therapy  Pt off floor for procedure (Cath lab).  Hold PT Rx today.  Will follow-up tomorrow as approp.  Jacy Andrews, PT

## 2024-03-11 NOTE — PROGRESS NOTES
Pt expressing frustration about receiving continuous lab sticks when he has a midline. RN educated pt on proper use of midline and the protocols regarding lab draws. Pt verbalized understanding. RN sent PS to Dr Gage who stated ok to use midline for labs. Orders placed. Pt and bedside RN updated.    Electronically signed by Agnieszka Fields RN on 3/11/2024 at 11:04 AM

## 2024-03-11 NOTE — PROGRESS NOTES
Occupational Therapy Attempt  Juan CAMERON Cade  3/11/2024  [id number]    Pt attempted, but is off the unit for imaging, will follow up as therapy schedule allows.     Electronically signed by JEANNIE Lindquist/L [id number] on 3/11/24 at 2:11 PM EDT

## 2024-03-11 NOTE — PROGRESS NOTES
[facility] notified RN d/t 8 beats of V-TACH. Pt. Does not c/o chest pain, HR <100bpm. Message MD labs order.

## 2024-03-11 NOTE — CARE COORDINATION
Met with patient and wife at bedside. Discussed discharge planning.     Provided new IMM, and reviewed the process to appeal discharges.   Patient's wife notified that [facility] cannot accept. Patient and wife considering options.   Family agreeable to referral to [facility], and [facility]     Referrals sent in [facility].     SHARLA Brenner, LSW, Social Work/Case Management   [phone]  Electronically signed by SHARLA Brenner on 3/11/2024 at 4:28 PM

## 2024-03-11 NOTE — PROGRESS NOTES
RN cleaned skin tears to BUE and R calf with wound wash. RN verified with WOCN RN that silicone mesh covering the pt's skin tears can be changed every 5 days and ABD/roll gauze is changed daily. RN updated wound care order.     Pt with flunctuating intake. RN asked pt if he would be agreeable to trying supplements with his trays. Pt said yes.   RN placed orders for wound healing supplements.   Pt scheduled to get one with DN tray. Pt and family VU.       Pt with orders for sling placement.   RN placed sling on LUE.   Pt tolerating for now.

## 2024-03-11 NOTE — H&P
H&P Update    I have reviewed the history and physical from Dr. Gage and examined the patient and find no relevant changes.   I have reviewed with the patient and/or family the risks, benefits, and alternatives to the procedure.    Pre-sedation Assessment    Patient:  Juan Mohamud   :   1940  Intended Procedure: RV lead revision      [facility] nurses notes reviewed and agreed.  Medications reviewed  Allergies:   Allergies   Allergen Reactions    Erythromycin     Dust Mite Extract      sneeze         Pre-Procedure Assessment/Plan:  ASA 2 - Patient with mild systemic disease with no functional limitations    Level of Sedation Plan:Moderate sedation    Post Procedure plan: Return to same level of care

## 2024-03-11 NOTE — PROGRESS NOTES
Pt concerned about missing his eye injection appointment. Dr. Gage came to bedside to talk with family. He encouraged pt to reach out to the doctor's office to determine a plan with pt's established ophthalmologist. Pt and wife expressing frustration with care coordination. Pt's father went blind from macular degeneration and it is important to him that he get these injections or at least have a plan in place. Pt states he gets the injections every 8 weeks and he is concerned about it affecting his vision. Pt sees Dr. Homero Mclaughlin at [facility].       Pt and his wife wanting to see if pt would be eligible for ARU. RN encouraged them to follow up with SW and PT to establish if pt would be a candidate. Pt's goal is to be out of rehab by the time his granddaughter graduates in May. Pt states he did not receive rehab at the 2 facilities he was at previously and feels the last SNF is responsible for his current GI issues. RN encouraged pt and his wife to discuss options with care team tomorrow.

## 2024-03-11 NOTE — PROGRESS NOTES
Pt has LUE sling in place d/t recent limb dislodgement and needing repair. Was repaired this date. This RN received a call from PCA who was at bedside and stating the patient is asking to take off his LUE sling. RN educated patient that he just had a lead revision today and it is not advisable to remove the sling so that patient does not raise limb and create more errors in his ICD. Pt then stated \"its coming off whether you take it off or I take it off\". This nurse educated patient again on safety and protocols. When this RN entered patients room, the sling is no longer in place.    Electronically signed by Agnieszka Fields RN on 3/11/2024 at 7:35 PM

## 2024-03-11 NOTE — PROGRESS NOTES
Pt discussed plan with Dr. Murillo over the phone. Pt is agreeable to having lead revision done by Dr. Murillo. He declined second opinion. Pt is agreeable to doing the echocardiogram and left with transport. He is going directly from echo to cardiac cath lab.     Pt has been NPO since MN except for sips of water with his AM meds. RN unable to complete CHG bath or consent with pt prior to him leaving for Echo. Blank consent printed and placed on front of chart.  RN updated Sujatha in cardiac cath lab. She VU.

## 2024-03-12 DIAGNOSIS — Z95.810 CARDIAC DEFIBRILLATOR IN SITU: Primary | ICD-10-CM

## 2024-03-12 DIAGNOSIS — R00.0 WIDE-COMPLEX TACHYCARDIA: ICD-10-CM

## 2024-03-12 LAB
ANION GAP SERPL CALCULATED.3IONS-SCNC: 4 MMOL/L (ref 3–16)
BUN SERPL-MCNC: 10 MG/DL (ref 7–20)
CALCIUM SERPL-MCNC: 8.1 MG/DL (ref 8.3–10.6)
CHLORIDE SERPL-SCNC: 95 MMOL/L (ref 99–110)
CO2 SERPL-SCNC: 29 MMOL/L (ref 21–32)
CREAT SERPL-MCNC: 0.7 MG/DL (ref 0.8–1.3)
EKG ATRIAL RATE: 82 BPM
EKG DIAGNOSIS: NORMAL
EKG P AXIS: 36 DEGREES
EKG P-R INTERVAL: 202 MS
EKG Q-T INTERVAL: 392 MS
EKG QRS DURATION: 132 MS
EKG QTC CALCULATION (BAZETT): 457 MS
EKG R AXIS: -34 DEGREES
EKG T AXIS: 65 DEGREES
EKG VENTRICULAR RATE: 82 BPM
GFR SERPLBLD CREATININE-BSD FMLA CKD-EPI: >60 ML/MIN/{1.73_M2}
GLUCOSE SERPL-MCNC: 118 MG/DL (ref 70–99)
POTASSIUM SERPL-SCNC: 5.2 MMOL/L (ref 3.5–5.1)
SODIUM SERPL-SCNC: 128 MMOL/L (ref 136–145)

## 2024-03-12 PROCEDURE — 6370000000 HC RX 637 (ALT 250 FOR IP): Performed by: INTERNAL MEDICINE

## 2024-03-12 PROCEDURE — 97530 THERAPEUTIC ACTIVITIES: CPT

## 2024-03-12 PROCEDURE — 1200000000 HC SEMI PRIVATE

## 2024-03-12 PROCEDURE — 2580000003 HC RX 258: Performed by: INTERNAL MEDICINE

## 2024-03-12 PROCEDURE — 97535 SELF CARE MNGMENT TRAINING: CPT

## 2024-03-12 PROCEDURE — 80048 BASIC METABOLIC PNL TOTAL CA: CPT

## 2024-03-12 PROCEDURE — 6370000000 HC RX 637 (ALT 250 FOR IP): Performed by: HOSPITALIST

## 2024-03-12 PROCEDURE — 6360000002 HC RX W HCPCS: Performed by: INTERNAL MEDICINE

## 2024-03-12 PROCEDURE — 94760 N-INVAS EAR/PLS OXIMETRY 1: CPT

## 2024-03-12 RX ORDER — OXYCODONE HYDROCHLORIDE 5 MG/1
5 TABLET ORAL EVERY 6 HOURS PRN
Qty: 12 TABLET | Refills: 0 | Status: SHIPPED | OUTPATIENT
Start: 2024-03-12 | End: 2024-03-15

## 2024-03-12 RX ORDER — SODIUM CHLORIDE 9 MG/ML
INJECTION, SOLUTION INTRAVENOUS PRN
Status: DISCONTINUED | OUTPATIENT
Start: 2024-03-12 | End: 2024-03-12 | Stop reason: ALTCHOICE

## 2024-03-12 RX ORDER — SODIUM CHLORIDE 0.9 % (FLUSH) 0.9 %
5-40 SYRINGE (ML) INJECTION PRN
Status: DISCONTINUED | OUTPATIENT
Start: 2024-03-12 | End: 2024-03-12 | Stop reason: ALTCHOICE

## 2024-03-12 RX ORDER — CHLORHEXIDINE GLUCONATE 4 G/100ML
SOLUTION TOPICAL ONCE
Status: DISCONTINUED | OUTPATIENT
Start: 2024-03-12 | End: 2024-03-12 | Stop reason: ALTCHOICE

## 2024-03-12 RX ORDER — AMIODARONE HYDROCHLORIDE 200 MG/1
200 TABLET ORAL DAILY
Qty: 30 TABLET | Refills: 1 | Status: SHIPPED | OUTPATIENT
Start: 2024-03-13 | End: 2024-04-12

## 2024-03-12 RX ORDER — SODIUM CHLORIDE 0.9 % (FLUSH) 0.9 %
5-40 SYRINGE (ML) INJECTION EVERY 12 HOURS SCHEDULED
Status: DISCONTINUED | OUTPATIENT
Start: 2024-03-12 | End: 2024-03-12 | Stop reason: ALTCHOICE

## 2024-03-12 RX ORDER — LINACLOTIDE 290 UG/1
290 CAPSULE, GELATIN COATED ORAL
Qty: 30 CAPSULE | Refills: 0 | Status: SHIPPED | OUTPATIENT
Start: 2024-03-19

## 2024-03-12 RX ADMIN — GABAPENTIN 600 MG: 300 CAPSULE ORAL at 13:57

## 2024-03-12 RX ADMIN — AMIODARONE HYDROCHLORIDE 200 MG: 200 TABLET ORAL at 08:29

## 2024-03-12 RX ADMIN — DESMOPRESSIN ACETATE 100 MCG: 0.2 TABLET ORAL at 20:07

## 2024-03-12 RX ADMIN — Medication 10 ML: at 08:32

## 2024-03-12 RX ADMIN — OXYCODONE 5 MG: 5 TABLET ORAL at 20:04

## 2024-03-12 RX ADMIN — NALOXEGOL OXALATE 25 MG: 25 TABLET, FILM COATED ORAL at 06:49

## 2024-03-12 RX ADMIN — GABAPENTIN 600 MG: 300 CAPSULE ORAL at 08:29

## 2024-03-12 RX ADMIN — GABAPENTIN 600 MG: 300 CAPSULE ORAL at 20:05

## 2024-03-12 RX ADMIN — METHOCARBAMOL 1000 MG: 500 TABLET ORAL at 20:05

## 2024-03-12 RX ADMIN — OXYCODONE 5 MG: 5 TABLET ORAL at 10:23

## 2024-03-12 RX ADMIN — SODIUM CHLORIDE, PRESERVATIVE FREE 10 ML: 5 INJECTION INTRAVENOUS at 20:05

## 2024-03-12 RX ADMIN — SENNOSIDES AND DOCUSATE SODIUM 1 TABLET: 50; 8.6 TABLET ORAL at 20:05

## 2024-03-12 RX ADMIN — ZOLPIDEM TARTRATE 10 MG: 5 TABLET ORAL at 21:00

## 2024-03-12 RX ADMIN — FAMOTIDINE 20 MG: 20 TABLET, FILM COATED ORAL at 08:30

## 2024-03-12 RX ADMIN — FENOFIBRATE 160 MG: 160 TABLET ORAL at 08:30

## 2024-03-12 RX ADMIN — FAMOTIDINE 20 MG: 20 TABLET, FILM COATED ORAL at 20:05

## 2024-03-12 RX ADMIN — ONDANSETRON 4 MG: 2 INJECTION INTRAMUSCULAR; INTRAVENOUS at 07:26

## 2024-03-12 RX ADMIN — SODIUM CHLORIDE, PRESERVATIVE FREE 10 ML: 5 INJECTION INTRAVENOUS at 08:31

## 2024-03-12 RX ADMIN — SIMETHICONE 80 MG: 80 TABLET, CHEWABLE ORAL at 08:29

## 2024-03-12 RX ADMIN — TAMSULOSIN HYDROCHLORIDE 0.4 MG: 0.4 CAPSULE ORAL at 08:29

## 2024-03-12 RX ADMIN — SIMETHICONE 80 MG: 80 TABLET, CHEWABLE ORAL at 17:13

## 2024-03-12 RX ADMIN — Medication 10 ML: at 20:05

## 2024-03-12 RX ADMIN — Medication 7.5 MG: at 12:19

## 2024-03-12 ASSESSMENT — PAIN SCALES - WONG BAKER
WONGBAKER_NUMERICALRESPONSE: 0
WONGBAKER_NUMERICALRESPONSE: NO HURT
WONGBAKER_NUMERICALRESPONSE: NO HURT
WONGBAKER_NUMERICALRESPONSE: 0
WONGBAKER_NUMERICALRESPONSE: NO HURT
WONGBAKER_NUMERICALRESPONSE: 0
WONGBAKER_NUMERICALRESPONSE: NO HURT
WONGBAKER_NUMERICALRESPONSE: 0
WONGBAKER_NUMERICALRESPONSE: NO HURT
WONGBAKER_NUMERICALRESPONSE: 0
WONGBAKER_NUMERICALRESPONSE: 0
WONGBAKER_NUMERICALRESPONSE: NO HURT
WONGBAKER_NUMERICALRESPONSE: NO HURT
WONGBAKER_NUMERICALRESPONSE: 0
WONGBAKER_NUMERICALRESPONSE: NO HURT
WONGBAKER_NUMERICALRESPONSE: 0
WONGBAKER_NUMERICALRESPONSE: NO HURT
WONGBAKER_NUMERICALRESPONSE: 0
WONGBAKER_NUMERICALRESPONSE: NO HURT
WONGBAKER_NUMERICALRESPONSE: NO HURT
WONGBAKER_NUMERICALRESPONSE: 0

## 2024-03-12 ASSESSMENT — PAIN DESCRIPTION - DESCRIPTORS
DESCRIPTORS: ACHING
DESCRIPTORS: DULL;BURNING

## 2024-03-12 ASSESSMENT — PAIN - FUNCTIONAL ASSESSMENT
PAIN_FUNCTIONAL_ASSESSMENT: PREVENTS OR INTERFERES WITH ALL ACTIVE AND SOME PASSIVE ACTIVITIES
PAIN_FUNCTIONAL_ASSESSMENT: PREVENTS OR INTERFERES SOME ACTIVE ACTIVITIES AND ADLS

## 2024-03-12 ASSESSMENT — PAIN SCALES - GENERAL
PAINLEVEL_OUTOF10: 7
PAINLEVEL_OUTOF10: 0
PAINLEVEL_OUTOF10: 5
PAINLEVEL_OUTOF10: 0
PAINLEVEL_OUTOF10: 8
PAINLEVEL_OUTOF10: 5
PAINLEVEL_OUTOF10: 0
PAINLEVEL_OUTOF10: 5

## 2024-03-12 ASSESSMENT — PAIN DESCRIPTION - LOCATION
LOCATION: BUTTOCKS
LOCATION: SHOULDER;RIB CAGE

## 2024-03-12 ASSESSMENT — PAIN DESCRIPTION - FREQUENCY: FREQUENCY: INTERMITTENT

## 2024-03-12 ASSESSMENT — PAIN DESCRIPTION - ONSET: ONSET: ON-GOING

## 2024-03-12 ASSESSMENT — PAIN DESCRIPTION - PAIN TYPE: TYPE: ACUTE PAIN

## 2024-03-12 ASSESSMENT — PAIN DESCRIPTION - ORIENTATION: ORIENTATION: RIGHT

## 2024-03-12 NOTE — PROGRESS NOTES
V2.0    [facility] Progress Note      Name:  Juan Mohamud /Age/Sex: 1940  (83 y.o. male)   MRN & CSN:  [id number] & [id number] Encounter Date/Time: 3/12/2024 12:30 PM EST   Location:  [id number] PCP: Janet Garcia MD     Attending:Chiara Gage MD       Hospital Day: 14    Assessment and Recommendations   Juan Mohamud is a 83 y.o. male with pmh of  who presents with Arrhythmia    New onset arrhythmia, troponin elevation .  U       S/ cardiac cath on 3/1 : non obstructive CAD ,           S/p ICD placement                Replace K and Mg per protocol prn    History of hypertension, hyperlipidemia.  Colonic obstruction.  CT PE/abdomen/pelvis without PE.  Small right pleural effusion, dependent atelectasis bibasilarly, right greater than left.  Progressive colonic dilation since  without focal transition point.  Likely partial obstruction at sigmoid colon, liquid stool throughout colon to the level of rectum.  Cecum markedly distended up to 14 cm.  Difficulty with NGT placement in the ED and multiple times on the floor.  Have given sedation for placement, rectal bowel regimen..        3/10 : GI signed off , patient on regular diet and tolerating well         Rectal tube removed on 3/9          Diet as per GI    Recent trauma from fall.  See that patient was admitted  - 2024.  Fell down 6 stairs, was transferred to [facility] with right sided rib fractures 9-12, small right hemothorax, right clavicular fracture, left elbow effusion, right chronic patellar fracture, chronic T11 fracture.  He is initially admitted to [facility] but described new back pain on .  Was discharged to [facility].  Return to [facility] ED  with complaint of constipation.  Patient endorses BM  at home.  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  Metformin held.  Refer to low-dose corrective scale insulin and diabetic diet  Anemia.  Hemoglobin 11.9 appears to be

## 2024-03-12 NOTE — PROGRESS NOTES
CLINICAL PHARMACY NOTE: MEDS TO BEDS    Retail pharmacy has been notified that the patient is either going to a SNF, ARU, or other inpatient facility.     All prescriptions sent to the retail pharmacy for this patient will be kept on profile unless told otherwise.    03/12/24 11:53 AM

## 2024-03-12 NOTE — PROGRESS NOTES
Patient A/O x4. VSS on room air. Patient c/o multiple areas of pain d/t recent fall/fracture. Pain controlled with PRN Oxycodone. Dressing to left chest intact from pacemaker placement. Patient has 20 lb weight restriction to left arm x4 weeks d/t pacemaker placement. Multiple skin tears to BUE and right calf changed this shift. Triad cream to buttocks. Purewick in place draining clear yellow urine. Large incontinent stool this shift. Patient assisted to chair this shift by PT/OT with Stedy and additionaly help from nursing staff. Sat in chair for much of day. Assisted back to chair with Maxi-move. Patient tolerated well. Wife at bedside for much of shift. Wife and patient requesting ARU. Consult placed per Dr. Gage. DESTIN midline flushing with positive blood return. Patient tolerating PO. Bed alarm on with call light within reach. Care ongoing.

## 2024-03-12 NOTE — PLAN OF CARE
Musculoskeletal - Adult  Goal: Return mobility to safest level of function  Outcome: Progressing     Problem: Musculoskeletal - Adult  Goal: Return ADL status to a safe level of function  Outcome: Progressing     Problem: Gastrointestinal - Adult  Goal: Minimal or absence of nausea and vomiting  Outcome: Progressing     Problem: Gastrointestinal - Adult  Goal: Maintains or returns to baseline bowel function  Outcome: Progressing     Problem: Gastrointestinal - Adult  Goal: Maintains adequate nutritional intake  Outcome: Progressing     Problem: Genitourinary - Adult  Goal: Absence of urinary retention  Outcome: Progressing     Problem: Metabolic/Fluid and Electrolytes - Adult  Goal: Electrolytes maintained within normal limits  Outcome: Progressing     Problem: Metabolic/Fluid and Electrolytes - Adult  Goal: Hemodynamic stability and optimal renal function maintained  Outcome: Progressing     Problem: Anxiety  Goal: Will report anxiety at manageable levels  Description: INTERVENTIONS:  1. Administer medication as ordered  2. Teach and rehearse alternative coping skills  3. Provide emotional support with 1:1 interaction with staff  Outcome: Progressing     Problem: Coping  Goal: Pt/Family able to verbalize concerns and demonstrate effective coping strategies  Description: INTERVENTIONS:  1. Assist patient/family to identify coping skills, available support systems and cultural and spiritual values  2. Provide emotional support, including active listening and acknowledgement of concerns of patient and caregivers  3. Reduce environmental stimuli, as able  4. Instruct patient/family in relaxation techniques, as appropriate  5. Assess for spiritual pain/suffering and initiate Spiritual Care, Psychosocial Clinical Specialist consults as needed  Outcome: Progressing     Problem: Change in Body Image  Goal: Pt/Family communicate acceptance of loss or change in body image and feel psychological comfort and  peace  Description: INTERVENTIONS:  1. Assess patient/family anxiety and grief process related to change in body image, loss of functional status, loss of sense of self, and forgiveness  2. Provide emotional and spiritual support  3. Provide information about the patient's health status with consideration of family and cultural values  4. Communicate willingness to discuss loss and facilitate grief process with patient/family as appropriate  5. Emphasize sustaining relationships within family system and community, or sanam/spiritual traditions  6. Initiate Spiritual Care, Psychosocial Clinical Specialist consult as needed  Outcome: Progressing     Problem: Nutrition Deficit:  Goal: Optimize nutritional status  Outcome: Progressing

## 2024-03-12 NOTE — CARE COORDINATION
Discharge Planning:   [facility]: Out of network  [facility]: : Out of network  [facility]: Out of network    Family inquired about rehab at [facility]. Discussed the process, notified it would be based on therapy recommendations and insurance approval. Notified that we would need MD consultation for referral. Family aware.     Pending PT/OT evaluation today and will follow up with family.   Met with patient and wife at bedside. Discussed placement. Patient's wife agreeable to referral with [facility].   SHARLA Brenner, LSW, Social Work/Case Management   [phone]  Electronically signed by SHARLA Brenner on 3/12/2024 at 10:01 AM    Call received from patient's wife stating they are seeking assistance with discharge.   Margarita GANDARA notified me that family is seeking [facility].   PT/OT recommending 3-5 sessions of therapy per week.   Messaged MD via [employer] and requested Physical Medicine and Rehabilitation consult if appropriate. Confirmed it will be placed.     Notified Kaya with [facility] of anticipated referral.     The Plan for Transition of Care is related to the following treatment goals: ARU /SNF    The Patient and wife was provided with a choice of provider and agrees   with the discharge plan. [x] Yes [] No    Freedom of choice list was provided with basic dialogue that supports the patient's individualized plan of care/goals, treatment preferences and shares the quality data associated with the providers. [x] Yes [] No  Electronically signed by SHARLA Brnener on 3/12/2024 at 12:06 PM    Hitesh Celaya: Spoke to [facility], confirmed they received referral. Await confirmation if they can accept.   Electronically signed by SHARLA Brenner on 3/12/2024 at 4:41 PM    Voicemail received from Kaya at [facility] stating MD denied patient.     PENDING REFERRAL: Cheri Celaya, await call from [facility] in admissions.   Electronically  signed by SHARLA Brenner on 3/12/2024 at 4:52 PM
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
## 2024-03-12 NOTE — PROGRESS NOTES
V2.0    [facility] Progress Note      Name:  Juan Mohamud /Age/Sex: 1940  (83 y.o. male)   MRN & CSN:  [id number] & [id number] Encounter Date/Time: 3/12/2024 12:30 PM EST   Location:  [id number]-0645/5278-01 PCP: Janet Garcia MD     Attending:Chiara Gage MD       Hospital Day: 14    Assessment and Recommendations   Juan Mohamud is a 83 y.o. male with pmh of  who presents with Arrhythmia    New onset arrhythmia, troponin elevation .  U       S/ cardiac cath on 3/1 : non obstructive CAD ,           S/p ICD placement           S/p device interrogation on 3/12, cardiology cleared for DC             Replace K and Mg per protocol prn    History of hypertension, hyperlipidemia.  Colonic obstruction.  CT PE/abdomen/pelvis without PE.  Small right pleural effusion, dependent atelectasis bibasilarly, right greater than left.  Progressive colonic dilation since  without focal transition point.  Likely partial obstruction at sigmoid colon, liquid stool throughout colon to the level of rectum.  Cecum markedly distended up to 14 cm.  Difficulty with NGT placement in the ED and multiple times on the floor.  Have given sedation for placement, rectal bowel regimen..        3/10 : GI signed off , patient on regular diet and tolerating well         Rectal tube removed on 3/9          Diet as per GI    Recent trauma from fall.  See that patient was admitted  - 2024.  Fell down 6 stairs, was transferred to [facility] with right sided rib fractures 9-12, small right hemothorax, right clavicular fracture, left elbow effusion, right chronic patellar fracture, chronic T11 fracture.  He is initially admitted to [place] but described new back pain on .  Was discharged to [facility].  Return to [place] ED  with complaint of constipation.  Patient endorses BM  at home.  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  Metformin held.  Refer to low-dose corrective scale      XR ABDOMEN (KUB) (SINGLE AP VIEW)    Result Date: 2/27/2024  EXAMINATION: ONE SUPINE XRAY VIEW(S) OF THE ABDOMEN 2/27/2024 10:04 am COMPARISON: None. HISTORY: ORDERING SYSTEM PROVIDED HISTORY: worsening abdominal distention.  eval cecal diameter TECHNOLOGIST PROVIDED HISTORY: Reason for exam:->worsening abdominal distention.  eval cecal diameter Reason for Exam: worsening abdominal distention. eval cecal diameter FINDINGS: Moderate to severe gaseous distention of the colon is again demonstrated with cecal diameter now approaching 15 cm.  There is no organomegaly or suspicious calcification.  Fixation hardware spans the lumbosacral region.     Progressive colonic distension.     CT ABDOMEN PELVIS W IV CONTRAST Additional Contrast? None    Result Date: 2/23/2024  EXAMINATION: CT OF THE ABDOMEN AND PELVIS WITH CONTRAST 2/23/2024 9:29 pm TECHNIQUE: CT of the abdomen and pelvis was performed with the administration of intravenous contrast. Multiplanar reformatted images are provided for review. Automated exposure control, iterative reconstruction, and/or weight based adjustment of the mA/kV was utilized to reduce the radiation dose to as low as reasonably achievable. COMPARISON: 02/19/2024 HISTORY: ORDERING SYSTEM PROVIDED HISTORY: severe abdominal pain TECHNOLOGIST PROVIDED HISTORY: Reason for exam:->severe abdominal pain Additional Contrast?->None Decision Support Exception - unselect if not a suspected or confirmed emergency medical condition->Emergency Medical Condition (MA) Reason for Exam: severe abdominal pain FINDINGS: Lower Chest: There is a moderate right pleural effusion layering posteriorly with adjacent moderate atelectasis and consolidation posteriorly which is more prominent.  There is no pneumothorax.  There is a mildly displaced fracture of the 12th rib laterally.  There is a moderately displaced fracture of the 11th rib posteriorly and medially.  There are mildly displaced fractures of the 9th and

## 2024-03-12 NOTE — PROGRESS NOTES
Occupational Therapy  Facility/Department: [address] PROGRESSIVE CARE  Occupational Therapy Daily Treatment    Name: Juan Mohamud  : 1940  MRN: [id number]  Date of Service: 3/12/2024    Discharge Recommendations:  Patient would benefit from continued therapy after discharge, 3-5 sessions per week  OT Equipment Recommendations  Other: defer to [place] facility  Juan Mohamud scored a 10/24 on the AM-PAC ADL Inpatient form. Current research shows that an AM-PAC score of 17 or less is typically not associated with a discharge to the patient's home setting. Based on the patient's AM-PAC score and their current ADL deficits, it is recommended that the patient have 3-5 sessions per week of Occupational Therapy at d/c to increase the patient's independence.  Please see assessment section for further patient specific details.    If patient discharges prior to next session this note will serve as a discharge summary.  Please see below for the latest assessment towards goals.       Patient Diagnosis(es): The primary encounter diagnosis was Wide-complex tachycardia. Diagnoses of BOSTON (acute kidney injury) (HCC), Hyponatremia, Elevated troponin, Acute hypoxemic respiratory failure (HCC), and Colonic obstruction (HCC) were also pertinent to this visit.  Past Medical History:  has a past medical history of Diabetes mellitus (HCC), Hyperlipidemia, Indigestion, Insomnia, Macular degeneration, MI, old, and Neuropathy.  Past Surgical History:  has a past surgical history that includes knee surgery; Tonsillectomy; Colonoscopy; fracture surgery (Left); Mouth surgery; joint replacement (Right); Intracapsular cataract extraction (Right, 2018); Colonoscopy (N/A, 2024); and Colonoscopy (N/A, 3/6/2024).           Assessment   Performance deficits / Impairments: Decreased functional mobility ;Decreased ADL status;Decreased strength;Decreased balance;Decreased safe awareness;Decreased high-level IADLs;Decreased

## 2024-03-12 NOTE — PROGRESS NOTES
Physical Therapy  Facility/Department: [address] PROGRESSIVE CARE  Physical Therapy treatment session- COTX with OT    Name: Juan Mohamud  : 1940  MRN: [id number]  Date of Service: 3/12/2024    Discharge Recommendations:  Patient would benefit from continued therapy after discharge (3-5x/wk)   PT Equipment Recommendations  Equipment Needed: No  Other: Defer to next level of care.    Juan Mohamud scored a 8/24 on the AM-PAC short mobility form. Current research shows that an AM-PAC score of 17 or less is typically not associated with a discharge to the patient's home setting. Based on the patient's AM-PAC score and their current functional mobility deficits, it is recommended that the patient have 3-5 sessions per week of Physical Therapy at d/c to increase the patient's independence.  Please see assessment section for further patient specific details.    If patient discharges prior to next session this note will serve as a discharge summary.  Please see below for the latest assessment towards goals.        Patient Diagnosis(es): The primary encounter diagnosis was Wide-complex tachycardia. Diagnoses of BOSTON (acute kidney injury) (HCC), Hyponatremia, Elevated troponin, Acute hypoxemic respiratory failure (HCC), and Colonic obstruction (HCC) were also pertinent to this visit.  Past Medical History:  has a past medical history of Diabetes mellitus (HCC), Hyperlipidemia, Indigestion, Insomnia, Macular degeneration, MI, old, and Neuropathy.  Past Surgical History:  has a past surgical history that includes knee surgery; Tonsillectomy; Colonoscopy; fracture surgery (Left); Mouth surgery; joint replacement (Right); Intracapsular cataract extraction (Right, 2018); Colonoscopy (N/A, 2024); and Colonoscopy (N/A, 3/6/2024).    Assessment   Body Structures, Functions, Activity Limitations Requiring Skilled Therapeutic Intervention: Decreased functional mobility ;Decreased strength;Decreased safe  restrictions  Short Term Goal 3: Pt participating in approp Strength Exs.  Patient Goals   Patient Goals : Get stronger, return home.       Education  Patient Education  Education Given To: Patient  Education Provided: Role of Therapy;Plan of Care;Precautions;Equipment;Transfer Training  Education Provided Comments: new restrictions LUE after ICD revision  Education Method: Verbal;Demonstration  Barriers to Learning: Cognition;Hearing  Education Outcome: Continued education needed;Verbalized understanding      Therapy Time   Individual Concurrent Group Co-treatment   Time In 0925         Time Out 1020         Minutes 55         Timed Code Treatment Minutes: 55 Minutes       Electronically signed by Abeba Gutierrez PT [id number] on 3/12/2024 at 10:26 AM

## 2024-03-12 NOTE — CARE COORDINATION
03/12/24 0739   Documentation for Discharge Appeal   Discharge Appealed by Family   Date notifed by [facility] of appeal request: 03/11/24  ([id number])   Time notified by [facility] of appeal request: 0923   Detailed Notice of Discharge given to: Other (Comment)  (Patient does not have a DC order)   Date records sent to [facility] 03/12/24  (Patient does not have a DC order.  Family member who requested appeal has been requested to rescind the appeal.  A letter was sent to [facility] with this information.  An IMM will be issued when a discharge order is written.)   Time records sent to [facility] 0765

## 2024-03-12 NOTE — CONSULTS
(36.4 °C) Oral 78 12 98 % --   03/12/24 0348 124/78 97.4 °F (36.3 °C) Oral 74 10 94 % 108.7 kg (239 lb 10.2 oz)   03/12/24 0013 127/80 97.9 °F (36.6 °C) Oral 73 16 96 % --   03/11/24 1902 130/72 97.9 °F (36.6 °C) Oral 83 15 94 % --   03/11/24 1530 134/80 97.4 °F (36.3 °C) Oral 76 14 100 % --   03/11/24 1459 -- -- -- -- 15 -- --   03/11/24 1441 118/76 98 °F (36.7 °C) Oral 77 17 98 % --     Const: Alert. WDWN. No distress  Eyes: Conjunctiva noninjected, no icterus noted; pupils equal, round, and reactive to light.   HENT: Atraumatic, normocephalic; Oral mucosa moist  Neck: Trachea midline, neck supple. No thyromegaly noted.  CV: Regular rate and rhythm, no murmur rub or gallop noted  Resp: Lungs diminished at the bases, no rales wheezes or rhonchi, no retractions. Respirations unlabored.   GI: Soft, nontender, nondistended. Normal bowel sounds. No palpable masses.   Skin: Left chest ICD site dressed.. No rashes or breakdown noted on exposed areas.   Ext: + peripheral edema in all extremities.  No varicosities.  MSK: Decreased spine and bilateral shoulder ROM. No joint erythema, warmth noted.    Neuro: Alert, oriented, appropriate. No cranial nerve deficits appreciated except [place]. Sensation intact to light touch but decreased in bilateral feet. Motor examination reveals strength 4+/5 bilateral , did not test proximal BUE due to precautions/restrictions, 3/5 bilateral hip flexion, 4+/5 bilateral knee ext, ankle DF and PF.  Psych: Stable mood, normal judgement, normal affect     Lab Results   Component Value Date    WBC 8.8 03/10/2024    HGB 11.5 (L) 03/10/2024    HCT 33.9 (L) 03/10/2024    MCV 91.6 03/10/2024     03/10/2024     Lab Results   Component Value Date    INR 1.19 (H) 02/28/2024    INR 0.98 10/24/2013    PROTIME 15.1 (H) 02/28/2024    PROTIME 11.0 10/24/2013     Lab Results   Component Value Date    CREATININE 0.7 (L) 03/12/2024    BUN 10 03/12/2024     (L) 03/12/2024    K 5.2 (H) 03/12/2024  discussed at length with patient and spouse at the bedside. Advised that SNF is likely best option until his upper extremity restrictions are lifted. Provided education on ARU vs SNF level of care. Patient spouse understandably have concerns about returning to SNF based on recent experiences.  Wife inquires about patient remaining inpatient for prolonged period. I explained that once he is medically stable, he cannot remain in hospital for nursing care alone.     Thank you for this consult. Please contact me with any questions or concerns.     Amy Muñoz MD 3/12/2024, 12:38 PM

## 2024-03-13 ENCOUNTER — APPOINTMENT (OUTPATIENT)
Dept: GENERAL RADIOLOGY | Age: 84
DRG: 981 | End: 2024-03-13
Payer: MEDICARE

## 2024-03-13 LAB
ANION GAP SERPL CALCULATED.3IONS-SCNC: 4 MMOL/L (ref 3–16)
BUN SERPL-MCNC: 12 MG/DL (ref 7–20)
CALCIUM SERPL-MCNC: 8.3 MG/DL (ref 8.3–10.6)
CHLORIDE SERPL-SCNC: 97 MMOL/L (ref 99–110)
CO2 SERPL-SCNC: 31 MMOL/L (ref 21–32)
CREAT SERPL-MCNC: 0.8 MG/DL (ref 0.8–1.3)
GFR SERPLBLD CREATININE-BSD FMLA CKD-EPI: >60 ML/MIN/{1.73_M2}
GLUCOSE SERPL-MCNC: 134 MG/DL (ref 70–99)
POTASSIUM SERPL-SCNC: 4.8 MMOL/L (ref 3.5–5.1)
SODIUM SERPL-SCNC: 132 MMOL/L (ref 136–145)

## 2024-03-13 PROCEDURE — 6370000000 HC RX 637 (ALT 250 FOR IP): Performed by: HOSPITALIST

## 2024-03-13 PROCEDURE — 2580000003 HC RX 258: Performed by: INTERNAL MEDICINE

## 2024-03-13 PROCEDURE — 6370000000 HC RX 637 (ALT 250 FOR IP): Performed by: INTERNAL MEDICINE

## 2024-03-13 PROCEDURE — 73030 X-RAY EXAM OF SHOULDER: CPT

## 2024-03-13 PROCEDURE — 1200000000 HC SEMI PRIVATE

## 2024-03-13 PROCEDURE — 97530 THERAPEUTIC ACTIVITIES: CPT

## 2024-03-13 PROCEDURE — 97110 THERAPEUTIC EXERCISES: CPT

## 2024-03-13 PROCEDURE — 80048 BASIC METABOLIC PNL TOTAL CA: CPT

## 2024-03-13 PROCEDURE — 97535 SELF CARE MNGMENT TRAINING: CPT

## 2024-03-13 PROCEDURE — 94760 N-INVAS EAR/PLS OXIMETRY 1: CPT

## 2024-03-13 RX ADMIN — POTASSIUM CHLORIDE 40 MEQ: 750 TABLET, EXTENDED RELEASE ORAL at 09:10

## 2024-03-13 RX ADMIN — ZOLPIDEM TARTRATE 10 MG: 5 TABLET ORAL at 20:49

## 2024-03-13 RX ADMIN — TAMSULOSIN HYDROCHLORIDE 0.4 MG: 0.4 CAPSULE ORAL at 09:11

## 2024-03-13 RX ADMIN — METHOCARBAMOL 1000 MG: 500 TABLET ORAL at 17:47

## 2024-03-13 RX ADMIN — SENNOSIDES AND DOCUSATE SODIUM 1 TABLET: 50; 8.6 TABLET ORAL at 20:49

## 2024-03-13 RX ADMIN — FAMOTIDINE 20 MG: 20 TABLET, FILM COATED ORAL at 20:49

## 2024-03-13 RX ADMIN — SENNOSIDES AND DOCUSATE SODIUM 1 TABLET: 50; 8.6 TABLET ORAL at 09:13

## 2024-03-13 RX ADMIN — Medication 10 ML: at 09:15

## 2024-03-13 RX ADMIN — OXYCODONE 5 MG: 5 TABLET ORAL at 04:36

## 2024-03-13 RX ADMIN — Medication 10 ML: at 20:50

## 2024-03-13 RX ADMIN — GABAPENTIN 600 MG: 300 CAPSULE ORAL at 14:29

## 2024-03-13 RX ADMIN — FENOFIBRATE 160 MG: 160 TABLET ORAL at 09:12

## 2024-03-13 RX ADMIN — SODIUM CHLORIDE, PRESERVATIVE FREE 10 ML: 5 INJECTION INTRAVENOUS at 20:51

## 2024-03-13 RX ADMIN — OXYCODONE 5 MG: 5 TABLET ORAL at 20:50

## 2024-03-13 RX ADMIN — SODIUM CHLORIDE, PRESERVATIVE FREE 10 ML: 5 INJECTION INTRAVENOUS at 09:16

## 2024-03-13 RX ADMIN — GABAPENTIN 600 MG: 300 CAPSULE ORAL at 20:49

## 2024-03-13 RX ADMIN — AMIODARONE HYDROCHLORIDE 200 MG: 200 TABLET ORAL at 09:17

## 2024-03-13 RX ADMIN — OXYCODONE 5 MG: 5 TABLET ORAL at 10:11

## 2024-03-13 RX ADMIN — GABAPENTIN 600 MG: 300 CAPSULE ORAL at 09:11

## 2024-03-13 RX ADMIN — NALOXEGOL OXALATE 25 MG: 25 TABLET, FILM COATED ORAL at 06:09

## 2024-03-13 RX ADMIN — DESMOPRESSIN ACETATE 100 MCG: 0.2 TABLET ORAL at 20:50

## 2024-03-13 RX ADMIN — FAMOTIDINE 20 MG: 20 TABLET, FILM COATED ORAL at 09:13

## 2024-03-13 ASSESSMENT — PAIN SCALES - GENERAL
PAINLEVEL_OUTOF10: 0
PAINLEVEL_OUTOF10: 8
PAINLEVEL_OUTOF10: 7
PAINLEVEL_OUTOF10: 4

## 2024-03-13 ASSESSMENT — PAIN DESCRIPTION - ORIENTATION: ORIENTATION: RIGHT

## 2024-03-13 ASSESSMENT — PAIN DESCRIPTION - DESCRIPTORS: DESCRIPTORS: THROBBING;PRESSURE

## 2024-03-13 ASSESSMENT — PAIN DESCRIPTION - LOCATION: LOCATION: ARM;RIB CAGE
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
## 2024-03-13 NOTE — CARE COORDINATION
03/13/24 1339   Documentation for Discharge Appeal   Discharge Appealed by Family   Date notifed by [facility] of appeal request: 03/13/24   Time notified by [facility] of appeal request: 1145   Detailed Notice of Discharge given to: Family  (Wife at bedside)   Date Notice of Discharge given: 03/13/24   Time Notice of Discharge given: 1254   Date records sent to [facility] 03/13/24   Time records sent to [facility] 6823

## 2024-03-13 NOTE — PROGRESS NOTES
Occupational Therapy  Facility/Department: [address] PROGRESSIVE CARE  Occupational Therapy Daily Treatment    Name: Juan Mohamud  : 1940  MRN: [id number]  Date of Service: 3/13/2024    Discharge Recommendations:  Patient would benefit from continued therapy after discharge, 3-5 sessions per week  OT Equipment Recommendations  Other: defer to [place] facility  Juan Mohamud scored a  on the AM-PAC ADL Inpatient form. Current research shows that an AM-PAC score of 17 or less is typically not associated with a discharge to the patient's home setting. Based on the patient's AM-PAC score and their current ADL deficits, it is recommended that the patient have 3-5 sessions per week of Occupational Therapy at d/c to increase the patient's independence.  Please see assessment section for further patient specific details.    If patient discharges prior to next session this note will serve as a discharge summary.  Please see below for the latest assessment towards goals.      Patient Diagnosis(es): The primary encounter diagnosis was Wide-complex tachycardia. Diagnoses of BOSTON (acute kidney injury) (HCC), Hyponatremia, Elevated troponin, Acute hypoxemic respiratory failure (HCC), Colonic obstruction (HCC), Cardiac defibrillator in situ, and Trauma were also pertinent to this visit.  Past Medical History:  has a past medical history of Diabetes mellitus (HCC), Hyperlipidemia, Indigestion, Insomnia, Macular degeneration, MI, old, and Neuropathy.  Past Surgical History:  has a past surgical history that includes knee surgery; Tonsillectomy; Colonoscopy; fracture surgery (Left); Mouth surgery; joint replacement (Right); Intracapsular cataract extraction (Right, 2018); Colonoscopy (N/A, 2024); and Colonoscopy (N/A, 3/6/2024).       Assessment   Performance deficits / Impairments: Decreased functional mobility ;Decreased ADL status;Decreased strength;Decreased balance;Decreased safe awareness;Decreased

## 2024-03-13 NOTE — PROGRESS NOTES
2/29/2024  EXAMINATION: ONE SUPINE XRAY VIEW(S) OF THE ABDOMEN 2/29/2024 6:35 am COMPARISON: Abdominal CT 02/29/2024, KUB 02/27/2024. HISTORY: ORDERING SYSTEM PROVIDED HISTORY: abdominal distention TECHNOLOGIST PROVIDED HISTORY: Reason for exam:->abdominal distention Reason for Exam: abdominal distention FINDINGS: Supine views of the abdomen and pelvis were performed.  There remains moderate to severe gaseous distension of multiple loops of bowel, likely colon in the setting of a persistent colonic ileus, similar to prior studies. No abnormal bowel wall thickening is identified.  There is no gross evidence of free air.  No abnormal radiopaque calculi are seen.  There is retained IV contrast material within the urinary bladder.  Fusion hardware stabilizes the lumbosacral junction.     Bowel-gas pattern most consistent with a persistent diffuse colonic ileus, without gross evidence of free air.     XR ABDOMEN (KUB) (SINGLE AP VIEW)    Result Date: 2/29/2024  EXAMINATION: ONE SUPINE XRAY VIEW(S) OF THE ABDOMEN 2/29/2024 3:09 pm COMPARISON: 02/29/2024 at 6:03 a.m. HISTORY: ORDERING SYSTEM PROVIDED HISTORY: Confirm rectal decompression tube placement and cecal diameter TECHNOLOGIST PROVIDED HISTORY: Reason for exam:->Confirm rectal decompression tube placement and cecal diameter Reason for Exam: Confirm rectal decompression tube placement and cecal diameter FINDINGS: Rectal decompression catheter in place.  There is decreased distention of the colon.  There is some mild distension in the region of the cecum.     Decreased distention of the colon with mild distention in the region of the cecum     Colonoscopy    Result Date: 2/29/2024  No dictation     CT CHEST PULMONARY EMBOLISM W CONTRAST    Result Date: 2/29/2024  EXAMINATION: CTA OF THE CHEST; CT OF THE ABDOMEN AND PELVIS WITH CONTRAST 2/29/2024 1:07 am; 2/29/2024 1:08 am TECHNIQUE: CTA of the chest was performed after the administration of intravenous contrast.   inferiorly around the right     Motion and overlying artifact obscuring detail throughout the abdomen which limits the study. Questionable diffuse mild colonic ileus vs enteritis which is more prominent with no obvious colonic obstruction. Mild constipation along the rectosigmoid region. Tiny left renal stone and bilateral renal cysts which is unchanged with no hydronephrosis or urinary obstruction. Mild hydrops of the gallbladder with no gallstones or wall thickening. Suggest ultrasound correlation, if indicated. Moderate right pleural effusion and associated moderate right basilar atelectasis and consolidation posteriorly which is more prominent with no pneumothorax. Mild to moderately displaced rib fractures posterolaterally on the right involving the 12 through the 9th ribs which is unchanged with no pneumothorax. Mild left basilar atelectasis or infiltrate posteriorly which is less prominent. Mild prostatic enlargement with no pelvic mass or active inflammation Postop changes lumbar spine with multiple old compression fractures along the upper lumbar region which is unchanged. Questionable moderate edema or cellulitis vs soft tissue contusions along the right lateral chest and lower abdominal wall extending inferiorly around the pelvis on the right and less prominent edema on the left.       CBC:   No results for input(s): \"WBC\", \"HGB\", \"PLT\" in the last 72 hours.    BMP:    Recent Labs     03/11/24  0614 03/12/24  0430 03/13/24  0957   * 128* 132*   K 4.5 5.2* 4.8   CL 95* 95* 97*   CO2 27 29 31   BUN 9 10 12   CREATININE 0.5* 0.7* 0.8   GLUCOSE 111* 118* 134*       Hepatic:   No results for input(s): \"AST\", \"ALT\", \"ALB\", \"BILITOT\", \"ALKPHOS\" in the last 72 hours.    Lipids: No results found for: \"CHOL\", \"HDL\", \"TRIG\"  Hemoglobin A1C:   Lab Results   Component Value Date/Time    LABA1C 6.4 10/24/2013 09:29 AM     TSH: No results found for: \"TSH\"  Troponin: No results found for: \"TROPONINT\"  Lactic Acid:

## 2024-03-13 NOTE — CARE COORDINATION
Discharge order acknowledged.   [facility]:   Voicemail left for Diandra in admissions requesting return phone call to confirm if they can accept patient.     Will follow up with family.     SHARLA Brenner, LSW, Social Work/Case Management   [phone]  Electronically signed by SHARLA Brenner on 3/13/2024 at 10:29 AM    Met with patient and wife at bedside.   Notified that [facility] can accept patient for discharge today.   Wife expressed concerns with patient discharging to facility today due to lack of physical mobility, and requested follow up regarding patient's broken ribs. Family requested PT/OT evaluation today. Notified family I would contact therapy and request follow up for today.   Wife states she plans to initiate discharge appeal.     Spoke to Kurtis GANDARA who confirmed she messaged MD regarding follow up about patient's ribs.   Confirmed PT to work with patient this afternoon.     Electronically signed by SHARLA Brenner on 3/13/2024 at 12:15 PM

## 2024-03-13 NOTE — CARE COORDINATION
[facility] AUTHORIZATION INFORMATION      [facility]     EFFECTIVE DATE:  3/14/2024    PATIENT MUST TRANSFER TO FACILITY WITHIN 72 HOURS OF AUTHORIZATION    NEXT REVIEW DATE: 03/20/2024     AUTH#:  [id number]     SHARLA Brenner, LSW, Social Work/Case Management   [phone]  Electronically signed by SHARLA Brenner on 3/13/2024 at 3:51 PM

## 2024-03-13 NOTE — PROGRESS NOTES
superior age indeterminate fracture without comminution or displacement. no brace needed and activity as tolerated. 2/20 ortho consult for R clavicle fracture; Chronic right radial styloid avulsion fracture and Chronic right patella fracture. Rec treat nonop with NWB on RUE and sling worn at all times. However pt adamantly refuses wearing sling.  Rectal Tube. Pt is not able to use LUE for >20lbs, not allowed for fxl transfers, and not allowed to lift over his head.  ICD placed per EP on 3/8/2024 and then subsequent return to OR 3/11 for revision of RV Lead due to Micro Dislodgement.  Per EP  on 3/12 \"He cannot lift/push/pull more than 20 pounds for 4 weeks post lead revision. Lead revision was done because he put too much weight on left arm too soon following initial device implantation. So he should not use his left arm for transfers.\"     Subjective   General  Chart Reviewed: Yes  Patient assessed for rehabilitation services?: Yes  Additional Pertinent Hx: 82 y/o male admit 2/28/2024 wth Acute respiratory failure, Wide complex tachycardia, [place] and Elevated troponin. CT Abd/Pelvis :  showed progressive Colonic Dilatation with Cecum measuring 14 cm. 2/29 S/P dDecompressive Colonoscopy with Rectal tube placement. 3/8/2024 EP : ICD placement.  3/11/2024 EP : Revision of RV Lead due ot Micro Dislodgement. Pt with history of fall down 7 steps on 2/19 and treated at . Known injuries: R clavicle fx, R post 9-12 ribs, L elbow joint effusion, R patella fx, T11 anterior superior fx, R chronic radial styloid avulsion and R chronic patella fx; 2/20 neurosurgery consult: T of the spine demonstrates a T11 anterior superior age indeterminate fracture without comminution or displacement. No brace needed and activity as tolerated. 2/20 ortho consult for R clavicle fracture; Chronic right radial styloid avulsion fracture and Chronic right patella fracture. Rec treat nonop with NWB on RUE and sling worn at all times.  Response To

## 2024-03-14 VITALS
BODY MASS INDEX: 27.28 KG/M2 | SYSTOLIC BLOOD PRESSURE: 118 MMHG | TEMPERATURE: 98 F | RESPIRATION RATE: 17 BRPM | HEIGHT: 76 IN | DIASTOLIC BLOOD PRESSURE: 68 MMHG | WEIGHT: 223.99 LBS | HEART RATE: 86 BPM | OXYGEN SATURATION: 97 %

## 2024-03-14 PROCEDURE — 6370000000 HC RX 637 (ALT 250 FOR IP): Performed by: INTERNAL MEDICINE

## 2024-03-14 PROCEDURE — 2580000003 HC RX 258: Performed by: INTERNAL MEDICINE

## 2024-03-14 RX ORDER — LISINOPRIL 2.5 MG/1
2.5 TABLET ORAL DAILY
Qty: 30 TABLET | Refills: 3 | Status: SHIPPED | OUTPATIENT
Start: 2024-03-14

## 2024-03-14 RX ORDER — LISINOPRIL 5 MG/1
2.5 TABLET ORAL DAILY
Status: DISCONTINUED | OUTPATIENT
Start: 2024-03-14 | End: 2024-03-14 | Stop reason: HOSPADM

## 2024-03-14 RX ORDER — CARVEDILOL 3.12 MG/1
3.12 TABLET ORAL 2 TIMES DAILY WITH MEALS
Qty: 60 TABLET | Refills: 3 | Status: SHIPPED | OUTPATIENT
Start: 2024-03-14

## 2024-03-14 RX ORDER — CARVEDILOL 3.12 MG/1
3.12 TABLET ORAL 2 TIMES DAILY WITH MEALS
Status: DISCONTINUED | OUTPATIENT
Start: 2024-03-14 | End: 2024-03-14 | Stop reason: HOSPADM

## 2024-03-14 RX ADMIN — OXYCODONE 5 MG: 5 TABLET ORAL at 11:24

## 2024-03-14 RX ADMIN — LISINOPRIL 2.5 MG: 5 TABLET ORAL at 17:06

## 2024-03-14 RX ADMIN — EMPAGLIFLOZIN 10 MG: 10 TABLET, FILM COATED ORAL at 17:06

## 2024-03-14 RX ADMIN — FENOFIBRATE 160 MG: 160 TABLET ORAL at 08:48

## 2024-03-14 RX ADMIN — GABAPENTIN 600 MG: 300 CAPSULE ORAL at 08:48

## 2024-03-14 RX ADMIN — SENNOSIDES AND DOCUSATE SODIUM 1 TABLET: 50; 8.6 TABLET ORAL at 08:48

## 2024-03-14 RX ADMIN — GABAPENTIN 600 MG: 300 CAPSULE ORAL at 14:36

## 2024-03-14 RX ADMIN — SODIUM CHLORIDE, PRESERVATIVE FREE 10 ML: 5 INJECTION INTRAVENOUS at 09:12

## 2024-03-14 RX ADMIN — METHOCARBAMOL 1000 MG: 500 TABLET ORAL at 09:01

## 2024-03-14 RX ADMIN — FAMOTIDINE 20 MG: 20 TABLET, FILM COATED ORAL at 08:49

## 2024-03-14 RX ADMIN — Medication 10 ML: at 09:02

## 2024-03-14 RX ADMIN — NALOXEGOL OXALATE 25 MG: 25 TABLET, FILM COATED ORAL at 05:14

## 2024-03-14 RX ADMIN — AMIODARONE HYDROCHLORIDE 200 MG: 200 TABLET ORAL at 08:54

## 2024-03-14 RX ADMIN — CARVEDILOL 3.12 MG: 3.12 TABLET, FILM COATED ORAL at 17:06

## 2024-03-14 RX ADMIN — SIMETHICONE 80 MG: 80 TABLET, CHEWABLE ORAL at 17:05

## 2024-03-14 RX ADMIN — TAMSULOSIN HYDROCHLORIDE 0.4 MG: 0.4 CAPSULE ORAL at 08:49

## 2024-03-14 ASSESSMENT — PAIN SCALES - GENERAL
PAINLEVEL_OUTOF10: 7
PAINLEVEL_OUTOF10: 7
PAINLEVEL_OUTOF10: 3

## 2024-03-14 ASSESSMENT — PAIN DESCRIPTION - ORIENTATION
ORIENTATION: LOWER;RIGHT
ORIENTATION: RIGHT

## 2024-03-14 ASSESSMENT — PAIN DESCRIPTION - LOCATION
LOCATION: BACK;RIB CAGE
LOCATION: BACK;SHOULDER

## 2024-03-14 NOTE — PLAN OF CARE
Problem: Safety - Adult  Goal: Free from fall injury  Outcome: Progressing     Problem: Discharge Planning  Goal: Discharge to home or other facility with appropriate resources  Outcome: Progressing     Problem: Chronic Conditions and Co-morbidities  Goal: Patient's chronic conditions and co-morbidity symptoms are monitored and maintained or improved  Outcome: Progressing     Problem: Skin/Tissue Integrity  Goal: Absence of new skin breakdown  Description: 1.  Monitor for areas of redness and/or skin breakdown  2.  Assess vascular access sites hourly  3.  Every 4-6 hours minimum:  Change oxygen saturation probe site  4.  Every 4-6 hours:  If on nasal continuous positive airway pressure, respiratory therapy assess nares and determine need for appliance change or resting period.  Outcome: Progressing     Problem: ABCDS Injury Assessment  Goal: Absence of physical injury  Outcome: Progressing     Problem: Cardiovascular - Adult  Goal: Maintains optimal cardiac output and hemodynamic stability  Outcome: Progressing     Problem: Skin/Tissue Integrity - Adult  Goal: Incisions, wounds, or drain sites healing without S/S of infection  Outcome: Progressing

## 2024-03-14 NOTE — CONSULTS
NEUROLOGY CONSULT NOTE  Reason for Consult: Chiara Baker MD asked me to see Juan Mohamud in consultation for evaluation of:  RLE numbness, especially thigh area    Chief complaint: \"My thigh has been numb for weeks.\"      HISTORY OF PRESENT ILLNESS:  HPI was gathered from the patient at the bedside as well as EMR review.    Juan Mohamud is a 83 y.o. male with a PMH that includes DM, HLD, indigestion, insomnia, macular degeneration, old MI, prior lumbar fixation, and neuropathy.    Patient presented to the ED 15 days ago via EMS from a rehab facility with complaints of SOB for several hours. EKG showed a wide complex tachycardia. He was taken to the cath lab on 3/1/24 and underwent ICD placement on 3/8/24.  Prior to rehab he had been admitted at [facility] from 2/19/24 to 2/22/24 after a fall that he described as mechanical.  He sustained multiple rib fractures, a small right hemothorax, and R clavicular fracture.  HE also has a chronic right patellar fracture and chronic T11 fracture. He states that the right leg numbness in on the lateral aspect of his thigh.  It is not tingling, just numb.  It is constant.  He believes that it started just after his admission here 15 days ago. He has never had this before.    The area of numbness coincides with the L4/L5 dermatome.  CT of the lumbar spine showed marginal spurring at that level as well as evidence of the prior fixation.    There was no family at the bedside at the time of evaluation.    MEDICAL HISTORY:  Past Medical History:   Diagnosis Date    Diabetes mellitus (HCC)     diet controlled    Hyperlipidemia     Indigestion     Insomnia     Macular degeneration     MI, 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    Neuropathy     toes partially on both feet     Past Surgical History:   Procedure Laterality Date    COLONOSCOPY      COLONOSCOPY N/A 2/29/2024    COLONOSCOPY FLEXIBLE DECOMPRESSION performed by Eitan Pino MD at [facility] ENDOSCOPY    COLONOSCOPY N/A 3/6/2024

## 2024-03-14 NOTE — CARE COORDINATION
CASE MANAGEMENT DISCHARGE SUMMARY:    DISCHARGE DATE: 3/14/2024    Discharging to Facility/ Agency   Name: [facility]  Address:  [address], [place] [address]   Phone:  [phone]  Fax:  [phone]     TRANSPORTATION: [facility]             TIME: 6:00pm    PREFERRED PHARMACY: at facility    INSURANCE PRECERT OBTAINED: via [facility] COMPLETED: N/A; came from SNF level of care    COMMENTS: Patient, patient's wife (Lizzie), bedside RN, and facility aware of discharge plan and timeline.    Electronically signed by DEVIN PANG RN on 3/14/2024 at 3:25 PM

## 2024-03-14 NOTE — PROGRESS NOTES
\"ALB\", \"BILITOT\", \"ALKPHOS\" in the last 72 hours.    Lipids: No results found for: \"CHOL\", \"HDL\", \"TRIG\"  Hemoglobin A1C:   Lab Results   Component Value Date/Time    LABA1C 6.4 10/24/2013 09:29 AM     TSH: No results found for: \"TSH\"  Troponin: No results found for: \"TROPONINT\"  Lactic Acid: No results for input(s): \"LACTA\" in the last 72 hours.  BNP:   No results for input(s): \"PROBNP\" in the last 72 hours.    UA:  Lab Results   Component Value Date/Time    NITRU Negative 02/23/2024 10:49 PM    COLORU Yellow 02/23/2024 10:49 PM    PHUR 5.5 02/23/2024 10:49 PM    WBCUA 1 02/23/2024 10:49 PM    RBCUA 2 02/23/2024 10:49 PM    BACTERIA None Seen 02/23/2024 10:49 PM    CLARITYU Clear 02/23/2024 10:49 PM    SPECGRAV 1.026 02/23/2024 10:49 PM    LEUKOCYTESUR Negative 02/23/2024 10:49 PM    UROBILINOGEN 1.0 02/23/2024 10:49 PM    BILIRUBINUR Negative 02/23/2024 10:49 PM    BLOODU Negative 02/23/2024 10:49 PM    GLUCOSEU Negative 02/23/2024 10:49 PM    KETUA TRACE 02/23/2024 10:49 PM     Urine Cultures: No results found for: \"LABURIN\"  Blood Cultures:   Lab Results   Component Value Date/Time    BC See additional report for complete BCID panel. 02/29/2024 12:27 AM    BC  02/29/2024 12:27 AM     POSITIVE for  This organism was isolated in one set.  Susceptibility testing is not routinely done as this  organism frequently represents skin contamination.  Additional testing can be ordered by calling the  Microbiology Department.       Lab Results   Component Value Date/Time    BLOODCULT2 No Growth after 4 days of incubation. 02/29/2024 04:43 AM     Organism:   Lab Results   Component Value Date/Time    ORG Staphylococcus epidermidis DNA Detected 02/29/2024 12:27 AM    ORG Staphylococcus epidermidis 02/29/2024 12:27 AM         Electronically signed by Chiara Gage MD on 3/14/2024 at 11:07 AM

## 2024-03-14 NOTE — CARE COORDINATION
03/14/24 1510   Documentation for Discharge Appeal   Date Notified of Outcome 03/14/24   Time Notified of Outcome 1500   Outcome of appeal Agree with planned discharge     Electronically signed by DEVIN PANG RN on 3/14/2024 at 3:10 PM

## 2024-03-14 NOTE — PROGRESS NOTES
Pt discharged to [facility] per MD order. This RN called and gave receiving nurse report. All questions and concerns were answered. Pt has no further needs at this time.

## 2024-03-14 NOTE — PROGRESS NOTES
Department of Physical Medicine & Rehabilitation  Progress Note    Patient Identification:  Juan Mohamud  [id number]  : 1940  Admit date: 2024    Chief Complaint: Arrhythmia    Subjective:   No acute events overnight.   Patient seen this afternoon sitting up in recliner. He reports symptoms are stable. He continues to be frustrated by lack of functional progress and limited mobility. Wife at the bedside report they have appealed discharge and are awaiting decision.   Labs reviewed.     ROS: No f/c, n/v, cp     Objective:  Patient Vitals for the past 24 hrs:   BP Temp Temp src Pulse Resp SpO2 Weight   24 1120 108/67 98.5 °F (36.9 °C) Oral 88 17 97 % --   24 0854 113/71 -- -- 75 -- -- --   24 0806 92/64 98 °F (36.7 °C) Oral 75 16 98 % --   24 0507 118/81 97.7 °F (36.5 °C) Oral 78 16 97 % 101.6 kg (223 lb 15.8 oz)   24 2348 124/75 97.7 °F (36.5 °C) Axillary 80 12 96 % --   24 1915 100/61 97.9 °F (36.6 °C) Oral 82 13 95 % --   24 1703 -- -- -- -- -- 92 % --   24 1529 104/67 97.6 °F (36.4 °C) Oral 85 15 93 % --   24 1215 106/63 98.3 °F (36.8 °C) Oral 89 18 98 % --     Const: Alert. No distress, pleasant.   HEENT: Normocephalic, atraumatic. Normal sclera/conjunctiva. MMM.   CV: Regular rate and rhythm.  Resp: No respiratory distress.   Abd: nondistended  Ext: + edema  Neuro: Alert, oriented, appropriately interactive.   Psych: Cooperative, appropriate mood and affect    Laboratory data: Available via EMR.   Last 24 hour lab  No results found for this or any previous visit (from the past 24 hour(s)).    Therapy progress:  Physical therapy:  Bed Mobility:     Sit>supine:     Supine>sit:     Transfers:     Sit>stand:     Stand>sit:     Bed<>chair     Stand Pivot:     Lateral transfer:     Car transfer:     Ambulation:     Stairs:     Curb:     Wheelchair:       Occupational therapy:   Feeding     Grooming/Oral Hygiene     UE Bathing     LE Bathing      UE Dressing     LE Dressing     Putting On/Taking Off Footwear     Toileting       Speech therapy:    ADULT ORAL NUTRITION SUPPLEMENT; Breakfast, Lunch, Dinner; Standard High Calorie/High Protein Oral Supplement  ADULT DIET; Regular; No Added Salt (3-4 gm)        Body mass index is 27.26 kg/m².    Assessment:  Vtach -s/p cardioversion, amio gtt, ICD (3/8) c/c lead dislodgement requiring revision (3/11 with Dr. Murillo)  Recent fall with multiple traumatic injuries  Right clavicular fracture -NWB RUE  Multiple right side rib fracture  Exacerbation prior right patellar fracture  Cardiomyopathy  HFrEF (45%)  CAD  DM2 (diet controlled)  Neuropathy  Macular degeneration  Anemia  BPH  Acute on chronic constipation with colonic distension     Impairments: NWB RUE, ICD precautions LUE, generalized weakness, decreased endurance     Recommendations:     Updated therapy notes reviewed.   Standing tolerance with stedy and therapists is limited. Goals to work toward pre-ambulatory exercises.     Anticipate very gradual recovery. Will be difficulty for him to regain independence before upper extremity restrictions are lifted.     Continue to recommend SNF when medically ready for discharge. I discussed with patient and spouse. Provided education on rehab process, anticipated recovery time line, realistic expectations/goals.        Thank you for this consult. Please contact me with any questions or concerns.       Amy Muñoz MD 3/14/2024, 12:00 PM

## 2024-03-14 NOTE — CARE COORDINATION
Discharge Planning:    This RN CM checked on status of patient discharge appeal at [email or web address]. Outcome notifications pending at this time. CM to continue to follow.    Electronically signed by DEVIN PANG RN on 3/14/2024 at 9:34 AM

## 2024-03-15 ENCOUNTER — NURSE ONLY (OUTPATIENT)
Dept: CARDIOLOGY CLINIC | Age: 84
End: 2024-03-15

## 2024-03-15 DIAGNOSIS — Z95.810 CARDIAC DEFIBRILLATOR IN SITU: Primary | ICD-10-CM

## 2024-03-15 DIAGNOSIS — R00.0 WIDE-COMPLEX TACHYCARDIA: ICD-10-CM

## 2024-03-15 NOTE — CARE COORDINATION
Stephanie from [facility] called with concern about weight bearing status.  Asked PT, Jacy, to call her back with any questions as this patient was not mine but did receive the call from Stephanie to confirm his WB status.    Jacy spoke with Stephanie and asked me to send them the PT note from 3-.  Sent.  KIET Lemon     Case Management   [phone]    3/15/2024  2:32 PM

## 2024-03-15 NOTE — PROGRESS NOTES
See cardiology tab    Dressing removed from left chest device site. SS CDI. Pt informed to call office if any redness, swelling, fever, chills, or drainage from site. Pt voiced an understanding.

## 2024-03-21 ENCOUNTER — OFFICE VISIT (OUTPATIENT)
Dept: ORTHOPEDIC SURGERY | Age: 84
End: 2024-03-21
Payer: MEDICARE

## 2024-03-21 VITALS — WEIGHT: 223 LBS | HEIGHT: 76 IN | RESPIRATION RATE: 16 BRPM | BODY MASS INDEX: 27.16 KG/M2

## 2024-03-21 DIAGNOSIS — S42.031D CLOSED DISPLACED FRACTURE OF ACROMIAL END OF RIGHT CLAVICLE WITH ROUTINE HEALING, SUBSEQUENT ENCOUNTER: Primary | ICD-10-CM

## 2024-03-21 PROCEDURE — 99203 OFFICE O/P NEW LOW 30 MIN: CPT | Performed by: ORTHOPAEDIC SURGERY

## 2024-03-21 PROCEDURE — 1123F ACP DISCUSS/DSCN MKR DOCD: CPT | Performed by: ORTHOPAEDIC SURGERY

## 2024-03-22 NOTE — PROGRESS NOTES
[facility]  Office Visit    Chief Complaint: Right shoulder injury    HPI:  Juna Mohamud is a 83 y.o. who is here in follow-up of right shoulder injury.  This occurred on February 19, 2024 following a fall.  He was initially seen at [facility] for this issue.  He was found to have minimally displaced right medial and distal clavicle fractures.  He comes in today in follow-up.  He is staying at [place].  He has recent cardiac history and had a defibrillator implanted a few weeks ago.  He is in a wheelchair as he is not being allowed to use a cane or walker due to his right upper extremity injury.  He is right-hand dominant.  He denies pain in the right shoulder today.      Past Medical History:   Diagnosis Date    Diabetes mellitus (HCC)     diet controlled    Hyperlipidemia     Indigestion     Insomnia     Macular degeneration     MI, 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    Neuropathy     toes partially on both feet        ROS:  Constitutional: denies fever, chills, weight loss  MSK: denies pain in other joints, muscle aches  Neurological: denies numbness, tingling, weakness    Exam:  Resp 16   Ht 1.93 m (6' 4\")   Wt 101.2 kg (223 lb)   BMI 27.14 kg/m²      Appearance: sitting in exam room chair, appears to be in no acute distress, awake and alert  Resp: unlabored breathing on room air  Skin: warm, dry and intact with out erythema or significant increased temperature  RUE: Sensation intact light touch in the axillary, radial, ulnar, median nerve distributions.  He demonstrates active shoulder forward flexion to 100 degrees with minimal pain.  He is nontender over the distal clavicle and has minimal tenderness over the medial clavicle.  Brisk capillary fill distally.    Imaging:  Recent right shoulder radiographs and CT of the chest were reviewed today.  There is a medial clavicle fracture with mild displacement.  There is no dislocation of the sternoclavicular joint.  He has a minimally displaced

## 2024-03-27 ENCOUNTER — HOSPITAL ENCOUNTER (INPATIENT)
Age: 84
LOS: 5 days | Discharge: NURSING, REHAB OR LONG-TERM CARE | DRG: 872 | End: 2024-04-01
Attending: FAMILY MEDICINE | Admitting: FAMILY MEDICINE
Payer: MEDICARE

## 2024-03-27 DIAGNOSIS — N39.0 URINARY TRACT INFECTION WITH HEMATURIA, SITE UNSPECIFIED: ICD-10-CM

## 2024-03-27 DIAGNOSIS — N17.9 ACUTE KIDNEY INJURY (HCC): ICD-10-CM

## 2024-03-27 DIAGNOSIS — R31.9 URINARY TRACT INFECTION WITH HEMATURIA, SITE UNSPECIFIED: ICD-10-CM

## 2024-03-27 DIAGNOSIS — R29.898 WEAKNESS OF BOTH LOWER EXTREMITIES: Primary | ICD-10-CM

## 2024-03-27 PROBLEM — A41.9 SEPSIS (HCC): Status: ACTIVE | Noted: 2024-03-27

## 2024-03-27 LAB
ANION GAP SERPL CALCULATED.3IONS-SCNC: 16 MMOL/L (ref 3–16)
BACTERIA URNS QL MICRO: ABNORMAL /HPF
BASOPHILS # BLD: 0.1 K/UL (ref 0–0.2)
BASOPHILS NFR BLD: 0.7 %
BILIRUB UR QL STRIP.AUTO: NEGATIVE
BUN SERPL-MCNC: 25 MG/DL (ref 7–20)
CALCIUM SERPL-MCNC: 8.7 MG/DL (ref 8.3–10.6)
CHLORIDE SERPL-SCNC: 95 MMOL/L (ref 99–110)
CLARITY UR: ABNORMAL
CO2 SERPL-SCNC: 21 MMOL/L (ref 21–32)
COLOR UR: ABNORMAL
CREAT SERPL-MCNC: 1.4 MG/DL (ref 0.8–1.3)
DEPRECATED RDW RBC AUTO: 15.6 % (ref 12.4–15.4)
EOSINOPHIL # BLD: 0 K/UL (ref 0–0.6)
EOSINOPHIL NFR BLD: 0.1 %
EPI CELLS #/AREA URNS AUTO: 0 /HPF (ref 0–5)
GFR SERPLBLD CREATININE-BSD FMLA CKD-EPI: 50 ML/MIN/{1.73_M2}
GLUCOSE BLD-MCNC: 103 MG/DL (ref 70–99)
GLUCOSE SERPL-MCNC: 147 MG/DL (ref 70–99)
GLUCOSE UR STRIP.AUTO-MCNC: >=1000 MG/DL
HCT VFR BLD AUTO: 36.8 % (ref 40.5–52.5)
HGB BLD-MCNC: 12.2 G/DL (ref 13.5–17.5)
HGB UR QL STRIP.AUTO: ABNORMAL
HYALINE CASTS #/AREA URNS AUTO: 9 /LPF (ref 0–8)
KETONES UR STRIP.AUTO-MCNC: ABNORMAL MG/DL
LEUKOCYTE ESTERASE UR QL STRIP.AUTO: ABNORMAL
LYMPHOCYTES # BLD: 0.6 K/UL (ref 1–5.1)
LYMPHOCYTES NFR BLD: 4 %
MCH RBC QN AUTO: 30.7 PG (ref 26–34)
MCHC RBC AUTO-ENTMCNC: 33.2 G/DL (ref 31–36)
MCV RBC AUTO: 92.5 FL (ref 80–100)
MONOCYTES # BLD: 1.5 K/UL (ref 0–1.3)
MONOCYTES NFR BLD: 10.6 %
NEUTROPHILS # BLD: 12.1 K/UL (ref 1.7–7.7)
NEUTROPHILS NFR BLD: 84.6 %
NITRITE UR QL STRIP.AUTO: NEGATIVE
PERFORMED ON: ABNORMAL
PH UR STRIP.AUTO: 5 [PH] (ref 5–8)
PLATELET # BLD AUTO: 237 K/UL (ref 135–450)
PMV BLD AUTO: 7.8 FL (ref 5–10.5)
POTASSIUM SERPL-SCNC: 4.4 MMOL/L (ref 3.5–5.1)
PROT UR STRIP.AUTO-MCNC: 100 MG/DL
RBC # BLD AUTO: 3.97 M/UL (ref 4.2–5.9)
RBC CLUMPS #/AREA URNS AUTO: 2 /HPF (ref 0–4)
SODIUM SERPL-SCNC: 132 MMOL/L (ref 136–145)
SP GR UR STRIP.AUTO: 1.02 (ref 1–1.03)
UA COMPLETE W REFLEX CULTURE PNL UR: YES
UA DIPSTICK W REFLEX MICRO PNL UR: YES
URN SPEC COLLECT METH UR: ABNORMAL
UROBILINOGEN UR STRIP-ACNC: 1 E.U./DL
WBC # BLD AUTO: 14.3 K/UL (ref 4–11)
WBC #/AREA URNS AUTO: 145 /HPF (ref 0–5)

## 2024-03-27 PROCEDURE — 2580000003 HC RX 258: Performed by: PHYSICIAN ASSISTANT

## 2024-03-27 PROCEDURE — 2580000003 HC RX 258: Performed by: FAMILY MEDICINE

## 2024-03-27 PROCEDURE — 1200000000 HC SEMI PRIVATE

## 2024-03-27 PROCEDURE — 99285 EMERGENCY DEPT VISIT HI MDM: CPT

## 2024-03-27 PROCEDURE — 6370000000 HC RX 637 (ALT 250 FOR IP): Performed by: FAMILY MEDICINE

## 2024-03-27 PROCEDURE — 96374 THER/PROPH/DIAG INJ IV PUSH: CPT

## 2024-03-27 PROCEDURE — 6370000000 HC RX 637 (ALT 250 FOR IP): Performed by: NURSE PRACTITIONER

## 2024-03-27 PROCEDURE — 96361 HYDRATE IV INFUSION ADD-ON: CPT

## 2024-03-27 PROCEDURE — 87086 URINE CULTURE/COLONY COUNT: CPT

## 2024-03-27 PROCEDURE — 87186 SC STD MICRODIL/AGAR DIL: CPT

## 2024-03-27 PROCEDURE — 6360000002 HC RX W HCPCS: Performed by: PHYSICIAN ASSISTANT

## 2024-03-27 PROCEDURE — 9990000010 HC NO CHARGE VISIT

## 2024-03-27 PROCEDURE — 85025 COMPLETE CBC W/AUTO DIFF WBC: CPT

## 2024-03-27 PROCEDURE — 87077 CULTURE AEROBIC IDENTIFY: CPT

## 2024-03-27 PROCEDURE — 80048 BASIC METABOLIC PNL TOTAL CA: CPT

## 2024-03-27 PROCEDURE — 6360000002 HC RX W HCPCS: Performed by: FAMILY MEDICINE

## 2024-03-27 PROCEDURE — 81001 URINALYSIS AUTO W/SCOPE: CPT

## 2024-03-27 RX ORDER — 0.9 % SODIUM CHLORIDE 0.9 %
500 INTRAVENOUS SOLUTION INTRAVENOUS ONCE
Status: COMPLETED | OUTPATIENT
Start: 2024-03-27 | End: 2024-03-27

## 2024-03-27 RX ORDER — SODIUM CHLORIDE 9 MG/ML
INJECTION, SOLUTION INTRAVENOUS CONTINUOUS
Status: ACTIVE | OUTPATIENT
Start: 2024-03-27 | End: 2024-03-29

## 2024-03-27 RX ORDER — INSULIN LISPRO 100 [IU]/ML
0-4 INJECTION, SOLUTION INTRAVENOUS; SUBCUTANEOUS NIGHTLY
Status: DISCONTINUED | OUTPATIENT
Start: 2024-03-27 | End: 2024-04-01 | Stop reason: HOSPADM

## 2024-03-27 RX ORDER — AMIODARONE HYDROCHLORIDE 200 MG/1
200 TABLET ORAL DAILY
Status: DISCONTINUED | OUTPATIENT
Start: 2024-03-28 | End: 2024-04-01 | Stop reason: HOSPADM

## 2024-03-27 RX ORDER — PANTOPRAZOLE SODIUM 40 MG/1
40 TABLET, DELAYED RELEASE ORAL
Status: DISCONTINUED | OUTPATIENT
Start: 2024-03-28 | End: 2024-04-01 | Stop reason: HOSPADM

## 2024-03-27 RX ORDER — SODIUM CHLORIDE 9 MG/ML
INJECTION, SOLUTION INTRAVENOUS PRN
Status: DISCONTINUED | OUTPATIENT
Start: 2024-03-27 | End: 2024-04-01 | Stop reason: HOSPADM

## 2024-03-27 RX ORDER — TAMSULOSIN HYDROCHLORIDE 0.4 MG/1
0.4 CAPSULE ORAL DAILY
Status: DISCONTINUED | OUTPATIENT
Start: 2024-03-28 | End: 2024-04-01 | Stop reason: HOSPADM

## 2024-03-27 RX ORDER — GABAPENTIN 300 MG/1
600 CAPSULE ORAL 3 TIMES DAILY
Status: DISCONTINUED | OUTPATIENT
Start: 2024-03-27 | End: 2024-04-01 | Stop reason: HOSPADM

## 2024-03-27 RX ORDER — ACETAMINOPHEN 650 MG/1
650 SUPPOSITORY RECTAL EVERY 6 HOURS PRN
Status: DISCONTINUED | OUTPATIENT
Start: 2024-03-27 | End: 2024-04-01 | Stop reason: HOSPADM

## 2024-03-27 RX ORDER — DESMOPRESSIN ACETATE 0.2 MG/1
100 TABLET ORAL DAILY
Status: DISCONTINUED | OUTPATIENT
Start: 2024-03-28 | End: 2024-04-01 | Stop reason: HOSPADM

## 2024-03-27 RX ORDER — POTASSIUM CHLORIDE 20 MEQ/1
40 TABLET, EXTENDED RELEASE ORAL PRN
Status: DISCONTINUED | OUTPATIENT
Start: 2024-03-27 | End: 2024-04-01 | Stop reason: HOSPADM

## 2024-03-27 RX ORDER — SODIUM CHLORIDE 0.9 % (FLUSH) 0.9 %
5-40 SYRINGE (ML) INJECTION EVERY 12 HOURS SCHEDULED
Status: DISCONTINUED | OUTPATIENT
Start: 2024-03-27 | End: 2024-04-01 | Stop reason: HOSPADM

## 2024-03-27 RX ORDER — SIMETHICONE 80 MG
80 TABLET,CHEWABLE ORAL EVERY 6 HOURS PRN
Status: DISCONTINUED | OUTPATIENT
Start: 2024-03-27 | End: 2024-04-01 | Stop reason: HOSPADM

## 2024-03-27 RX ORDER — MAGNESIUM SULFATE IN WATER 40 MG/ML
2000 INJECTION, SOLUTION INTRAVENOUS PRN
Status: DISCONTINUED | OUTPATIENT
Start: 2024-03-27 | End: 2024-04-01 | Stop reason: HOSPADM

## 2024-03-27 RX ORDER — ZOLPIDEM TARTRATE 12.5 MG/1
12.5 TABLET, FILM COATED, EXTENDED RELEASE ORAL NIGHTLY
COMMUNITY

## 2024-03-27 RX ORDER — ENOXAPARIN SODIUM 100 MG/ML
40 INJECTION SUBCUTANEOUS NIGHTLY
Status: DISCONTINUED | OUTPATIENT
Start: 2024-03-27 | End: 2024-04-01 | Stop reason: HOSPADM

## 2024-03-27 RX ORDER — OMEPRAZOLE 40 MG/1
40 CAPSULE, DELAYED RELEASE ORAL DAILY PRN
COMMUNITY

## 2024-03-27 RX ORDER — ZOLPIDEM TARTRATE 5 MG/1
10 TABLET ORAL NIGHTLY PRN
Status: DISCONTINUED | OUTPATIENT
Start: 2024-03-27 | End: 2024-04-01 | Stop reason: HOSPADM

## 2024-03-27 RX ORDER — POTASSIUM CHLORIDE 7.45 MG/ML
10 INJECTION INTRAVENOUS PRN
Status: DISCONTINUED | OUTPATIENT
Start: 2024-03-27 | End: 2024-04-01 | Stop reason: HOSPADM

## 2024-03-27 RX ORDER — INSULIN LISPRO 100 [IU]/ML
0-4 INJECTION, SOLUTION INTRAVENOUS; SUBCUTANEOUS
Status: DISCONTINUED | OUTPATIENT
Start: 2024-03-27 | End: 2024-04-01 | Stop reason: HOSPADM

## 2024-03-27 RX ORDER — ONDANSETRON 4 MG/1
4 TABLET, ORALLY DISINTEGRATING ORAL EVERY 8 HOURS PRN
Status: DISCONTINUED | OUTPATIENT
Start: 2024-03-27 | End: 2024-04-01 | Stop reason: HOSPADM

## 2024-03-27 RX ORDER — CELECOXIB 200 MG/1
200 CAPSULE ORAL DAILY
COMMUNITY

## 2024-03-27 RX ORDER — SODIUM CHLORIDE 0.9 % (FLUSH) 0.9 %
5-40 SYRINGE (ML) INJECTION PRN
Status: DISCONTINUED | OUTPATIENT
Start: 2024-03-27 | End: 2024-04-01 | Stop reason: HOSPADM

## 2024-03-27 RX ORDER — FENOFIBRATE 54 MG/1
54 TABLET ORAL DAILY
Status: DISCONTINUED | OUTPATIENT
Start: 2024-03-28 | End: 2024-04-01 | Stop reason: HOSPADM

## 2024-03-27 RX ORDER — ACETAMINOPHEN 325 MG/1
650 TABLET ORAL EVERY 6 HOURS PRN
Status: DISCONTINUED | OUTPATIENT
Start: 2024-03-27 | End: 2024-04-01 | Stop reason: HOSPADM

## 2024-03-27 RX ORDER — POLYETHYLENE GLYCOL 3350 17 G/17G
17 POWDER, FOR SOLUTION ORAL DAILY PRN
Status: DISCONTINUED | OUTPATIENT
Start: 2024-03-27 | End: 2024-03-31

## 2024-03-27 RX ORDER — ALFUZOSIN HYDROCHLORIDE 10 MG/1
10 TABLET, EXTENDED RELEASE ORAL 2 TIMES DAILY
COMMUNITY

## 2024-03-27 RX ORDER — LISINOPRIL 5 MG/1
2.5 TABLET ORAL DAILY
Status: DISCONTINUED | OUTPATIENT
Start: 2024-03-28 | End: 2024-04-01 | Stop reason: HOSPADM

## 2024-03-27 RX ORDER — ONDANSETRON 2 MG/ML
4 INJECTION INTRAMUSCULAR; INTRAVENOUS EVERY 6 HOURS PRN
Status: DISCONTINUED | OUTPATIENT
Start: 2024-03-27 | End: 2024-04-01 | Stop reason: HOSPADM

## 2024-03-27 RX ORDER — LIFITEGRAST 50 MG/ML
1 SOLUTION/ DROPS OPHTHALMIC NIGHTLY
COMMUNITY

## 2024-03-27 RX ADMIN — WATER 1000 MG: 1 INJECTION INTRAMUSCULAR; INTRAVENOUS; SUBCUTANEOUS at 14:24

## 2024-03-27 RX ADMIN — SODIUM CHLORIDE: 9 INJECTION, SOLUTION INTRAVENOUS at 20:40

## 2024-03-27 RX ADMIN — SIMETHICONE 80 MG: 80 TABLET, CHEWABLE ORAL at 21:24

## 2024-03-27 RX ADMIN — SODIUM CHLORIDE 500 ML: 9 INJECTION, SOLUTION INTRAVENOUS at 14:25

## 2024-03-27 RX ADMIN — GABAPENTIN 600 MG: 300 CAPSULE ORAL at 20:46

## 2024-03-27 RX ADMIN — SODIUM CHLORIDE, PRESERVATIVE FREE 10 ML: 5 INJECTION INTRAVENOUS at 20:39

## 2024-03-27 RX ADMIN — ENOXAPARIN SODIUM 40 MG: 100 INJECTION SUBCUTANEOUS at 20:46

## 2024-03-27 RX ADMIN — ZOLPIDEM TARTRATE 10 MG: 5 TABLET ORAL at 23:01

## 2024-03-27 ASSESSMENT — PAIN - FUNCTIONAL ASSESSMENT: PAIN_FUNCTIONAL_ASSESSMENT: NONE - DENIES PAIN

## 2024-03-27 ASSESSMENT — LIFESTYLE VARIABLES
HOW OFTEN DO YOU HAVE A DRINK CONTAINING ALCOHOL: NEVER
HOW MANY STANDARD DRINKS CONTAINING ALCOHOL DO YOU HAVE ON A TYPICAL DAY: PATIENT DOES NOT DRINK

## 2024-03-27 NOTE — ED PROVIDER NOTES
**ADVANCED PRACTICE PROVIDER, I HAVE EVALUATED THIS PATIENT**        [facility] EMERGENCY DEPARTMENT  EMERGENCY DEPARTMENT ENCOUNTER      Pt Name: Juan Mohamud  MRN:[id number]  Birthdate 1940  Date of evaluation: 3/27/2024  Provider: Simone Martin PA-C  Note Started: 3:56 PM EDT 3/27/24        Chief Complaint:    Chief Complaint   Patient presents with    Fall    Extremity Weakness     Pt arrives via ems for eval of bilat leg weakness and knees buckling onset last night, sts was fine the Monday and yesterday morning.  Sts he couldn't get out of chair last night but this mroning was fine until he tried to get up from breakfast and then was unsteady using the bathroom and sat down to ground and then called ems.  Small skin tear to bilat elbows, denies other injury.         Nursing Notes, Past Medical Hx, Past Surgical Hx, Social Hx, Allergies, and Family Hx were all reviewed and agreed with or any disagreements were addressed in the HPI.    HPI: (Location, Duration, Timing, Severity, Quality, Assoc Sx, Context, Modifying factors)    History From: Patient  Limitations to history : None    Social Determinants Significantly Affecting Health : None    Chief Complaint of lower extremity weakness.  Patient said he had 2 falls.  First fall occurred last night the legs just gave out on him and he fell.  911 was called and EMS came and helped him get into bed.  Said he got up this morning was able to get up.  Brushes teeth went to the kitchen and had toast and banana for breakfast set down and when he tried to get up again went to the restroom as as he was using the restroom his legs got wobbly again and he fell back.  Denies loss of conscious.  He is not on any blood thinners.  Denies neck or back pain.  No chest pain.  Just legs feeling very weak.  He was recently released from nursing home for rehab..  He denies fever, no cough, or no headaches.  No other complaints.    This is a  83 y.o. male

## 2024-03-27 NOTE — ED NOTES
Handoff report given to JULIOCESAR Viera to assume care. No further questions at this time. I will place transport to room 3105. If any questions arise, please don't hesitate to call [phone].

## 2024-03-27 NOTE — DISCHARGE INSTR - COC
recorded.    Safety Concerns:     History of Falls (last 30 days) and At Risk for Falls    Impairments/Disabilities:      None    Nutrition Therapy:  Current Nutrition Therapy:   - Oral Diet:  Carb Control 3 carbs/meal (1500kcals/day)    Routes of Feeding: Oral  Liquids: Thin Liquids  Daily Fluid Restriction: no  Last Modified Barium Swallow with Video (Video Swallowing Test): not done    Treatments at the Time of Hospital Discharge:   Respiratory Treatments: ***  Oxygen Therapy:  is not on home oxygen therapy.  Ventilator:    - No ventilator support    Rehab Therapies: Physical Therapy and Occupational Therapy  Weight Bearing Status/Restrictions: No weight bearing restrictions  Other Medical Equipment (for information only, NOT a DME order):  {EQUIPMENT:[id number]}  Other Treatments: ***    Patient's personal belongings (please select all that are sent with patient):  None    RN SIGNATURE:  Electronically signed by Nile Coe RN on 4/1/24 at 5:18 PM EDT    CASE MANAGEMENT/SOCIAL WORK SECTION    Inpatient Status Date: 3/27/24    Readmission Risk Assessment Score:  Readmission Risk              Risk of Unplanned Readmission:  0         Discharging to Facility/ Agency   Name: [facility]  Address:  [address] [place] [address] [place] [address]   Phone:  [phone]  Fax:  [phone]    / signature: Electronically signed by LEXIS Webster on 4/1/24 at 1:44 PM EDT    PHYSICIAN SECTION    Prognosis: Good    Condition at Discharge: Stable    Rehab Potential (if transferring to Rehab): Good    Recommended Labs or Other Treatments After Discharge:       PCP follow up In 1-2 weeks    Cardiology follow up in 3-4 weeks    Physician Certification: I certify the above information and transfer of Juan Mohamud  is necessary for the continuing treatment of the diagnosis listed and that he requires Skilled Nursing Facility for less than 30 days.     Update Admission H&P: No change in

## 2024-03-27 NOTE — ED TRIAGE NOTES
Pt arrives via ems for eval of bilat leg weakness and knees buckling onset last night, sts was fine the Monday and yesterday morning.  Sts he couldn't get out of chair last night but this mrdelmy was fine until he tried to get up from breakfast and then was unsteady using the bathroom and sat down to ground and then called ems.  Small skin tear to bilat elbows, denies other injury.

## 2024-03-27 NOTE — CARE COORDINATION
03/27/24 1406   Readmission Assessment   Number of Days since last admission? 8-30 days   Previous Disposition SNF   Who is being Interviewed Patient;Caregiver   What was the patient's/caregiver's perception as to why they think they needed to return back to the hospital? Other (Comment)  (fell, stated that he woke up and couldn't walk all of a sudden.)   Did you visit your Primary Care Physician after you left the hospital, before you returned this time? No   Why weren't you able to visit your PCP? Did not have an appointment   Did you see a specialist, such as Cardiac, Pulmonary, Orthopedic Physician, etc. after you left the hospital? Yes   Who advised the patient to return to the hospital? Caregiver;Self-referral   Does the patient report anything that got in the way of taking their medications? No   In our efforts to provide the best possible care to you and others like you, can you think of anything that we could have done to help you after you left the hospital the first time, so that you might not have needed to return so soon? Other (Comment)  (unsure as it seems like it is something new going on.)       RYAN met with patient and wife at bedside today.     SW spoke to Maggie at [facility] as well who advised that he was a successful discharge home with home care from [facility].     Patient noted that after discharge from [facility] he had minor difficulty walking.     Respectfully submitted,    SAV [place]   [phone]    Electronically signed by SHARLA Serrano, KIET on 3/27/2024 at 2:07 PM

## 2024-03-27 NOTE — CARE COORDINATION
RYAN spoke to Deja Wood from [facility]. She is their hospital liaison and can be reached at [phone]. She is aware that patient is in the ED being worked up for a possible UTI. She will continue to follow along and would like an update when we have one.     Respectfully submitted,    Melissa MAGDALENO, SAV  [facility]   [phone]    Electronically signed by SHARLA Serrano, KIET on 3/27/2024 at 2:03 PM

## 2024-03-27 NOTE — H&P
Hospital Medicine History & Physical      Date of Admission: 3/27/2024    Date of Service:  Pt seen/examined on 3/27/2024    [x]Admitted to Inpatient with expected LOS greater than two midnights due to medical therapy.  []Placed in Observation status.    Chief Admission Complaint: Weakness/legs giving out    Presenting Admission History:      83 y.o. male with past medical history significant for hypertension, hyperlipidemia, BPH, type 2 diabetes non-insulin-dependent who presents with new onset with weakness and shakiness after recent discharge from rehab.  He denies fevers, chills, nausea, vomiting, chest pain, palpitations, shortness of breath, diaphoresis, abdominal pain, dysuria, polyuria, hematuria, diarrhea, constipation, melena, hematochezia.    ED evaluation significant for the following:  Low BP 79/54.  Now at 110/60 5:04 100 cc bolus of normal saline  Sodium 132  Creatinine 1.4 with a baseline of 0.8  WBC of 14.3  Urinalysis shows moderate leuk esterase with negative nitrite    Patient was given dose of Rocephin and normal saline bolus of 500 cc in the emergency department.    I discussed the case with ED provider SUSANA Miramontes    Assessment/Plan:      Current Principal Problem:  Sepsis (HCC)    #1 weakness  2.  Hyponatremia  3.  UTI  Differential includes presyncope, orthostasis, weakness, UTI, dehydration hyponatremia  I suspect that there is a combination of underlying weakness, hyponatremia, UTI, and dehydration  No prodrome to suggest syncope, presyncope, or cardiac origin  Will obtain EKG as 1 does not appear to have been done in the emergency department  Continue ceftriaxone started in emergency department  IV fluids at 100 cc/h  PT/OT consulted    4.  Diabetes  Diabetic diet  Sign scale insulin  Accu-Cheks  Hold metformin for now given dehydration and BOSTON  Hold Jardiance given dehydration and BOSTON    #5 BOSTON  IV fluids as above  Recheck BMP in a.m.          Physical Exam Performed:      BP

## 2024-03-27 NOTE — CARE COORDINATION
SW aware of returned patient. He was recently discharged to [facility] on 3/14. A therapy consult is coming as he fell last night. SW will find out therapy schedule and how soon they can visit bedside.     Respectfully submitted,    Melissa MAGDALENO, SAV  [facility]   [phone]    Electronically signed by SHARLA Serrano, KIET on 3/27/2024 at 10:52 AM

## 2024-03-27 NOTE — CARE COORDINATION
Case Management Assessment  Initial Evaluation    Date/Time of Evaluation: 3/27/2024 2:12 PM  Assessment Completed by: SHARLA Serrano, KIET    If patient is discharged prior to next notation, then this note serves as note for discharge by case management.    Patient Name: Juan Holbrook                   YOB: 1940  Diagnosis: No admission diagnoses are documented for this encounter.                   Date / Time: 3/27/2024 10:15 AM    Patient Admission Status: Emergency   Readmission Risk (Low < 19, Mod (19-27), High > 27): Readmission Risk Score: 19.5    Current PCP: Janet Garcia MD  PCP verified by CM? Yes    Chart Reviewed: Yes      History Provided by:    Patient Orientation: Alert and Oriented    Patient Cognition: Alert    Hospitalization in the last 30 days (Readmission):  Yes    If yes, Readmission Assessment in  Navigator will be completed.    Advance Directives:      Code Status: Prior   Patient's Primary Decision Maker is: Legal Next of Kin    Primary Decision Maker: Lizzie Holbrook - Spouse - [phone]    Secondary Decision Maker: Lito Holbrook - Child - [phone]    Secondary Decision Maker: Shane holbrook - Child    Discharge Planning:    Patient lives with: Spouse/Significant Other Type of Home: House  Primary Care Giver: Self  Patient Support Systems include: Children, Family Members, Spouse/Significant Other   Current Financial resources: Medicare  Current community resources: ECF/Home Care ([facility]  Current services prior to admission: Durable Medical Equipment, Home Care            Current DME: Cane, Other (Comment), Walker (grab bars)            Type of Home Care services:  OT, PT, Nursing Services    ADLS  Prior functional level: Mobility  Current functional level: Other (see comment) (TBD by therapies when stable.)    PT AM-PAC:   /24  OT AM-PAC:   /24    Family can provide assistance at DC: Yes  Would you like Case Management to

## 2024-03-27 NOTE — ED NOTES
Straight cath performed per sterile protocol. Urine specimen obtained and sent to lab. Call light within reach. No other needs at this time.

## 2024-03-27 NOTE — ED NOTES
Pt asked if he could stand up in order to fix his under shorts. Pt was not steady at all on his feet. Pt sat down on bed and was helped onto his bed.

## 2024-03-27 NOTE — ACP (ADVANCE CARE PLANNING)
Advance Care Planning     Advance Care Planning Activator (Inpatient)  Conversation Note      Date of ACP Conversation: 3/27/2024     Conversation Conducted with: Patient with Decision Making Capacity    ACP Activator: SHARLA Serrano, W    Health Care Decision Maker:     Current Designated Health Care Decision Maker:     Primary Decision Maker: Lizzie Mohamud - Spouse - [phone]    Care Preferences    Ventilation:  \"If you were in your present state of health and suddenly became very ill and were unable to breathe on your own, what would your preference be about the use of a ventilator (breathing machine) if it were available to you?\"      Would the patient desire the use of ventilator (breathing machine)?: yes    \"If your health worsens and it becomes clear that your chance of recovery is unlikely, what would your preference be about the use of a ventilator (breathing machine) if it were available to you?\"     Would the patient desire the use of ventilator (breathing machine)?: No      Resuscitation  \"CPR works best to restart the heart when there is a sudden event, like a heart attack, in someone who is otherwise healthy. Unfortunately, CPR does not typically restart the heart for people who have serious health conditions or who are very sick.\"    \"In the event your heart stopped as a result of an underlying serious health condition, would you want attempts to be made to restart your heart (answer \"yes\" for attempt to resuscitate) or would you prefer a natural death (answer \"no\" for do not attempt to resuscitate)?\" yes       [] Yes   [] No   Educated Patient / Decision Maker regarding differences between Advance Directives and portable DNR orders.    Length of ACP Conversation in minutes:  2    Conversation Outcomes:  ACP discussion completed    Follow-up plan:    [] Schedule follow-up conversation to continue planning  [] Referred individual to Provider for additional questions/concerns   []

## 2024-03-28 LAB
ANION GAP SERPL CALCULATED.3IONS-SCNC: 12 MMOL/L (ref 3–16)
BASOPHILS # BLD: 0.1 K/UL (ref 0–0.2)
BASOPHILS NFR BLD: 0.5 %
BUN SERPL-MCNC: 25 MG/DL (ref 7–20)
CALCIUM SERPL-MCNC: 8.1 MG/DL (ref 8.3–10.6)
CHLORIDE SERPL-SCNC: 96 MMOL/L (ref 99–110)
CO2 SERPL-SCNC: 24 MMOL/L (ref 21–32)
CREAT SERPL-MCNC: 1 MG/DL (ref 0.8–1.3)
DEPRECATED RDW RBC AUTO: 15.2 % (ref 12.4–15.4)
EOSINOPHIL # BLD: 0 K/UL (ref 0–0.6)
EOSINOPHIL NFR BLD: 0.2 %
GFR SERPLBLD CREATININE-BSD FMLA CKD-EPI: 75 ML/MIN/{1.73_M2}
GLUCOSE BLD-MCNC: 102 MG/DL (ref 70–99)
GLUCOSE BLD-MCNC: 125 MG/DL (ref 70–99)
GLUCOSE BLD-MCNC: 163 MG/DL (ref 70–99)
GLUCOSE BLD-MCNC: 164 MG/DL (ref 70–99)
GLUCOSE SERPL-MCNC: 96 MG/DL (ref 70–99)
HCT VFR BLD AUTO: 31.3 % (ref 40.5–52.5)
HGB BLD-MCNC: 10.8 G/DL (ref 13.5–17.5)
LYMPHOCYTES # BLD: 0.8 K/UL (ref 1–5.1)
LYMPHOCYTES NFR BLD: 5.4 %
MAGNESIUM SERPL-MCNC: 1.5 MG/DL (ref 1.8–2.4)
MCH RBC QN AUTO: 31 PG (ref 26–34)
MCHC RBC AUTO-ENTMCNC: 34.7 G/DL (ref 31–36)
MCV RBC AUTO: 89.4 FL (ref 80–100)
MONOCYTES # BLD: 1.5 K/UL (ref 0–1.3)
MONOCYTES NFR BLD: 10.4 %
NEUTROPHILS # BLD: 11.9 K/UL (ref 1.7–7.7)
NEUTROPHILS NFR BLD: 83.5 %
PERFORMED ON: ABNORMAL
PHOSPHATE SERPL-MCNC: 3.3 MG/DL (ref 2.5–4.9)
PLATELET # BLD AUTO: 195 K/UL (ref 135–450)
PMV BLD AUTO: 7.8 FL (ref 5–10.5)
POTASSIUM SERPL-SCNC: 3.4 MMOL/L (ref 3.5–5.1)
RBC # BLD AUTO: 3.5 M/UL (ref 4.2–5.9)
REPORT: NORMAL
RESP PATH DNA+RNA PNL NPH NAA+NON-PROBE: NORMAL
SODIUM SERPL-SCNC: 132 MMOL/L (ref 136–145)
WBC # BLD AUTO: 14.2 K/UL (ref 4–11)

## 2024-03-28 PROCEDURE — 94760 N-INVAS EAR/PLS OXIMETRY 1: CPT

## 2024-03-28 PROCEDURE — 0202U NFCT DS 22 TRGT SARS-COV-2: CPT

## 2024-03-28 PROCEDURE — 97530 THERAPEUTIC ACTIVITIES: CPT

## 2024-03-28 PROCEDURE — 83735 ASSAY OF MAGNESIUM: CPT

## 2024-03-28 PROCEDURE — 84100 ASSAY OF PHOSPHORUS: CPT

## 2024-03-28 PROCEDURE — 6370000000 HC RX 637 (ALT 250 FOR IP): Performed by: NURSE PRACTITIONER

## 2024-03-28 PROCEDURE — 6360000002 HC RX W HCPCS: Performed by: HOSPITALIST

## 2024-03-28 PROCEDURE — 6360000002 HC RX W HCPCS: Performed by: FAMILY MEDICINE

## 2024-03-28 PROCEDURE — 36415 COLL VENOUS BLD VENIPUNCTURE: CPT

## 2024-03-28 PROCEDURE — 6370000000 HC RX 637 (ALT 250 FOR IP): Performed by: FAMILY MEDICINE

## 2024-03-28 PROCEDURE — 80048 BASIC METABOLIC PNL TOTAL CA: CPT

## 2024-03-28 PROCEDURE — 97166 OT EVAL MOD COMPLEX 45 MIN: CPT

## 2024-03-28 PROCEDURE — 85025 COMPLETE CBC W/AUTO DIFF WBC: CPT

## 2024-03-28 PROCEDURE — 97162 PT EVAL MOD COMPLEX 30 MIN: CPT

## 2024-03-28 PROCEDURE — 2580000003 HC RX 258: Performed by: FAMILY MEDICINE

## 2024-03-28 PROCEDURE — 1200000000 HC SEMI PRIVATE

## 2024-03-28 PROCEDURE — 87040 BLOOD CULTURE FOR BACTERIA: CPT

## 2024-03-28 RX ORDER — DEXTROSE MONOHYDRATE 100 MG/ML
INJECTION, SOLUTION INTRAVENOUS CONTINUOUS PRN
Status: DISCONTINUED | OUTPATIENT
Start: 2024-03-28 | End: 2024-04-01 | Stop reason: HOSPADM

## 2024-03-28 RX ORDER — MAGNESIUM SULFATE 1 G/100ML
1000 INJECTION INTRAVENOUS ONCE
Status: COMPLETED | OUTPATIENT
Start: 2024-03-28 | End: 2024-03-28

## 2024-03-28 RX ORDER — GLUCAGON 1 MG/ML
1 KIT INJECTION PRN
Status: DISCONTINUED | OUTPATIENT
Start: 2024-03-28 | End: 2024-04-01 | Stop reason: HOSPADM

## 2024-03-28 RX ADMIN — ZOLPIDEM TARTRATE 10 MG: 5 TABLET ORAL at 21:43

## 2024-03-28 RX ADMIN — GABAPENTIN 600 MG: 300 CAPSULE ORAL at 15:00

## 2024-03-28 RX ADMIN — AMIODARONE HYDROCHLORIDE 200 MG: 200 TABLET ORAL at 10:05

## 2024-03-28 RX ADMIN — DESMOPRESSIN ACETATE 100 MCG: 0.2 TABLET ORAL at 10:05

## 2024-03-28 RX ADMIN — PANTOPRAZOLE SODIUM 40 MG: 40 TABLET, DELAYED RELEASE ORAL at 06:47

## 2024-03-28 RX ADMIN — MAGNESIUM SULFATE HEPTAHYDRATE 1000 MG: 1 INJECTION, SOLUTION INTRAVENOUS at 10:03

## 2024-03-28 RX ADMIN — GABAPENTIN 600 MG: 300 CAPSULE ORAL at 10:05

## 2024-03-28 RX ADMIN — POTASSIUM CHLORIDE 40 MEQ: 1500 TABLET, EXTENDED RELEASE ORAL at 10:05

## 2024-03-28 RX ADMIN — SODIUM CHLORIDE: 9 INJECTION, SOLUTION INTRAVENOUS at 18:15

## 2024-03-28 RX ADMIN — ACETAMINOPHEN 325MG 650 MG: 325 TABLET ORAL at 21:49

## 2024-03-28 RX ADMIN — GABAPENTIN 600 MG: 300 CAPSULE ORAL at 21:43

## 2024-03-28 RX ADMIN — TAMSULOSIN HYDROCHLORIDE 0.4 MG: 0.4 CAPSULE ORAL at 10:06

## 2024-03-28 RX ADMIN — ENOXAPARIN SODIUM 40 MG: 100 INJECTION SUBCUTANEOUS at 21:43

## 2024-03-28 RX ADMIN — WATER 1000 MG: 1 INJECTION INTRAMUSCULAR; INTRAVENOUS; SUBCUTANEOUS at 15:00

## 2024-03-28 RX ADMIN — FENOFIBRATE 54 MG: 54 TABLET ORAL at 10:06

## 2024-03-28 ASSESSMENT — PAIN SCALES - WONG BAKER
WONGBAKER_NUMERICALRESPONSE: NO HURT
WONGBAKER_NUMERICALRESPONSE: NO HURT

## 2024-03-28 ASSESSMENT — PAIN DESCRIPTION - ORIENTATION: ORIENTATION: LEFT

## 2024-03-28 ASSESSMENT — PAIN SCALES - GENERAL
PAINLEVEL_OUTOF10: 0
PAINLEVEL_OUTOF10: 3
PAINLEVEL_OUTOF10: 0

## 2024-03-28 ASSESSMENT — PAIN DESCRIPTION - FREQUENCY: FREQUENCY: INTERMITTENT

## 2024-03-28 ASSESSMENT — PAIN DESCRIPTION - LOCATION: LOCATION: SHOULDER

## 2024-03-28 ASSESSMENT — PAIN DESCRIPTION - DESCRIPTORS: DESCRIPTORS: THROBBING

## 2024-03-28 ASSESSMENT — PAIN DESCRIPTION - PAIN TYPE: TYPE: CHRONIC PAIN

## 2024-03-28 ASSESSMENT — PAIN DESCRIPTION - ONSET: ONSET: PROGRESSIVE

## 2024-03-28 ASSESSMENT — PAIN - FUNCTIONAL ASSESSMENT: PAIN_FUNCTIONAL_ASSESSMENT: PREVENTS OR INTERFERES SOME ACTIVE ACTIVITIES AND ADLS

## 2024-03-29 LAB
ANION GAP SERPL CALCULATED.3IONS-SCNC: 9 MMOL/L (ref 3–16)
BACTERIA UR CULT: ABNORMAL
BUN SERPL-MCNC: 14 MG/DL (ref 7–20)
CALCIUM SERPL-MCNC: 8.5 MG/DL (ref 8.3–10.6)
CHLORIDE SERPL-SCNC: 97 MMOL/L (ref 99–110)
CO2 SERPL-SCNC: 25 MMOL/L (ref 21–32)
CREAT SERPL-MCNC: 0.7 MG/DL (ref 0.8–1.3)
GFR SERPLBLD CREATININE-BSD FMLA CKD-EPI: >90 ML/MIN/{1.73_M2}
GLUCOSE BLD-MCNC: 108 MG/DL (ref 70–99)
GLUCOSE BLD-MCNC: 110 MG/DL (ref 70–99)
GLUCOSE BLD-MCNC: 119 MG/DL (ref 70–99)
GLUCOSE BLD-MCNC: 130 MG/DL (ref 70–99)
GLUCOSE SERPL-MCNC: 126 MG/DL (ref 70–99)
ORGANISM: ABNORMAL
PERFORMED ON: ABNORMAL
POTASSIUM SERPL-SCNC: 3.7 MMOL/L (ref 3.5–5.1)
SODIUM SERPL-SCNC: 131 MMOL/L (ref 136–145)

## 2024-03-29 PROCEDURE — 36415 COLL VENOUS BLD VENIPUNCTURE: CPT

## 2024-03-29 PROCEDURE — 97530 THERAPEUTIC ACTIVITIES: CPT

## 2024-03-29 PROCEDURE — 6360000002 HC RX W HCPCS: Performed by: FAMILY MEDICINE

## 2024-03-29 PROCEDURE — 97116 GAIT TRAINING THERAPY: CPT

## 2024-03-29 PROCEDURE — 6370000000 HC RX 637 (ALT 250 FOR IP): Performed by: NURSE PRACTITIONER

## 2024-03-29 PROCEDURE — 6360000002 HC RX W HCPCS: Performed by: NURSE PRACTITIONER

## 2024-03-29 PROCEDURE — 1200000000 HC SEMI PRIVATE

## 2024-03-29 PROCEDURE — 97110 THERAPEUTIC EXERCISES: CPT

## 2024-03-29 PROCEDURE — 6370000000 HC RX 637 (ALT 250 FOR IP): Performed by: FAMILY MEDICINE

## 2024-03-29 PROCEDURE — 2580000003 HC RX 258: Performed by: FAMILY MEDICINE

## 2024-03-29 PROCEDURE — 80048 BASIC METABOLIC PNL TOTAL CA: CPT

## 2024-03-29 RX ORDER — MORPHINE SULFATE 2 MG/ML
2 INJECTION, SOLUTION INTRAMUSCULAR; INTRAVENOUS
Status: DISCONTINUED | OUTPATIENT
Start: 2024-03-29 | End: 2024-04-01 | Stop reason: HOSPADM

## 2024-03-29 RX ORDER — NALOXONE HYDROCHLORIDE 0.4 MG/ML
0.4 INJECTION, SOLUTION INTRAMUSCULAR; INTRAVENOUS; SUBCUTANEOUS PRN
Status: DISCONTINUED | OUTPATIENT
Start: 2024-03-29 | End: 2024-04-01 | Stop reason: HOSPADM

## 2024-03-29 RX ADMIN — SODIUM CHLORIDE, PRESERVATIVE FREE 10 ML: 5 INJECTION INTRAVENOUS at 20:55

## 2024-03-29 RX ADMIN — ZOLPIDEM TARTRATE 10 MG: 5 TABLET ORAL at 20:50

## 2024-03-29 RX ADMIN — ENOXAPARIN SODIUM 40 MG: 100 INJECTION SUBCUTANEOUS at 20:48

## 2024-03-29 RX ADMIN — SODIUM CHLORIDE: 9 INJECTION, SOLUTION INTRAVENOUS at 12:10

## 2024-03-29 RX ADMIN — MORPHINE SULFATE 2 MG: 2 INJECTION, SOLUTION INTRAMUSCULAR; INTRAVENOUS at 01:37

## 2024-03-29 RX ADMIN — AMIODARONE HYDROCHLORIDE 200 MG: 200 TABLET ORAL at 09:30

## 2024-03-29 RX ADMIN — LISINOPRIL 2.5 MG: 5 TABLET ORAL at 09:29

## 2024-03-29 RX ADMIN — DESMOPRESSIN ACETATE 100 MCG: 0.2 TABLET ORAL at 20:47

## 2024-03-29 RX ADMIN — GABAPENTIN 600 MG: 300 CAPSULE ORAL at 14:36

## 2024-03-29 RX ADMIN — PANTOPRAZOLE SODIUM 40 MG: 40 TABLET, DELAYED RELEASE ORAL at 05:16

## 2024-03-29 RX ADMIN — GABAPENTIN 600 MG: 300 CAPSULE ORAL at 09:30

## 2024-03-29 RX ADMIN — FENOFIBRATE 54 MG: 54 TABLET ORAL at 12:08

## 2024-03-29 RX ADMIN — TAMSULOSIN HYDROCHLORIDE 0.4 MG: 0.4 CAPSULE ORAL at 09:30

## 2024-03-29 RX ADMIN — WATER 1000 MG: 1 INJECTION INTRAMUSCULAR; INTRAVENOUS; SUBCUTANEOUS at 14:36

## 2024-03-29 RX ADMIN — MORPHINE SULFATE 2 MG: 2 INJECTION, SOLUTION INTRAMUSCULAR; INTRAVENOUS at 05:16

## 2024-03-29 RX ADMIN — GABAPENTIN 600 MG: 300 CAPSULE ORAL at 20:47

## 2024-03-29 ASSESSMENT — PAIN DESCRIPTION - DESCRIPTORS
DESCRIPTORS: THROBBING
DESCRIPTORS: THROBBING

## 2024-03-29 ASSESSMENT — PAIN DESCRIPTION - FREQUENCY
FREQUENCY: CONTINUOUS
FREQUENCY: INTERMITTENT

## 2024-03-29 ASSESSMENT — PAIN SCALES - GENERAL
PAINLEVEL_OUTOF10: 0
PAINLEVEL_OUTOF10: 9
PAINLEVEL_OUTOF10: 9
PAINLEVEL_OUTOF10: 0

## 2024-03-29 ASSESSMENT — PAIN DESCRIPTION - ONSET
ONSET: ON-GOING
ONSET: SUDDEN

## 2024-03-29 ASSESSMENT — PAIN DESCRIPTION - PAIN TYPE
TYPE: ACUTE PAIN
TYPE: ACUTE PAIN

## 2024-03-29 ASSESSMENT — PAIN SCALES - WONG BAKER
WONGBAKER_NUMERICALRESPONSE: NO HURT
WONGBAKER_NUMERICALRESPONSE: NO HURT

## 2024-03-29 ASSESSMENT — PAIN DESCRIPTION - ORIENTATION
ORIENTATION: RIGHT
ORIENTATION: RIGHT

## 2024-03-29 ASSESSMENT — PAIN DESCRIPTION - LOCATION
LOCATION: FLANK
LOCATION: FLANK

## 2024-03-30 PROBLEM — R79.89 ELEVATED TROPONIN: Status: RESOLVED | Noted: 2024-02-29 | Resolved: 2024-03-30

## 2024-03-30 LAB
ANION GAP SERPL CALCULATED.3IONS-SCNC: 10 MMOL/L (ref 3–16)
BASOPHILS # BLD: 0 K/UL (ref 0–0.2)
BASOPHILS NFR BLD: 0.3 %
BUN SERPL-MCNC: 12 MG/DL (ref 7–20)
CALCIUM SERPL-MCNC: 8 MG/DL (ref 8.3–10.6)
CHLORIDE SERPL-SCNC: 97 MMOL/L (ref 99–110)
CO2 SERPL-SCNC: 24 MMOL/L (ref 21–32)
CREAT SERPL-MCNC: 0.6 MG/DL (ref 0.8–1.3)
DEPRECATED RDW RBC AUTO: 15.4 % (ref 12.4–15.4)
EOSINOPHIL # BLD: 0.1 K/UL (ref 0–0.6)
EOSINOPHIL NFR BLD: 0.8 %
GFR SERPLBLD CREATININE-BSD FMLA CKD-EPI: >90 ML/MIN/{1.73_M2}
GLUCOSE BLD-MCNC: 125 MG/DL (ref 70–99)
GLUCOSE BLD-MCNC: 151 MG/DL (ref 70–99)
GLUCOSE BLD-MCNC: 155 MG/DL (ref 70–99)
GLUCOSE BLD-MCNC: 96 MG/DL (ref 70–99)
GLUCOSE SERPL-MCNC: 98 MG/DL (ref 70–99)
HCT VFR BLD AUTO: 33.4 % (ref 40.5–52.5)
HGB BLD-MCNC: 11.3 G/DL (ref 13.5–17.5)
LACTATE BLDV-SCNC: 0.8 MMOL/L (ref 0.4–1.9)
LYMPHOCYTES # BLD: 0.6 K/UL (ref 1–5.1)
LYMPHOCYTES NFR BLD: 7 %
MAGNESIUM SERPL-MCNC: 1.5 MG/DL (ref 1.8–2.4)
MCH RBC QN AUTO: 30.7 PG (ref 26–34)
MCHC RBC AUTO-ENTMCNC: 33.7 G/DL (ref 31–36)
MCV RBC AUTO: 91.1 FL (ref 80–100)
MONOCYTES # BLD: 1.1 K/UL (ref 0–1.3)
MONOCYTES NFR BLD: 14 %
NEUTROPHILS # BLD: 6.2 K/UL (ref 1.7–7.7)
NEUTROPHILS NFR BLD: 77.9 %
PERFORMED ON: ABNORMAL
PERFORMED ON: NORMAL
PHOSPHATE SERPL-MCNC: 2.9 MG/DL (ref 2.5–4.9)
PLATELET # BLD AUTO: 213 K/UL (ref 135–450)
PMV BLD AUTO: 8.7 FL (ref 5–10.5)
POTASSIUM SERPL-SCNC: 3.5 MMOL/L (ref 3.5–5.1)
RBC # BLD AUTO: 3.67 M/UL (ref 4.2–5.9)
SODIUM SERPL-SCNC: 131 MMOL/L (ref 136–145)
WBC # BLD AUTO: 8 K/UL (ref 4–11)

## 2024-03-30 PROCEDURE — 80048 BASIC METABOLIC PNL TOTAL CA: CPT

## 2024-03-30 PROCEDURE — 94760 N-INVAS EAR/PLS OXIMETRY 1: CPT

## 2024-03-30 PROCEDURE — 97116 GAIT TRAINING THERAPY: CPT

## 2024-03-30 PROCEDURE — 1200000000 HC SEMI PRIVATE

## 2024-03-30 PROCEDURE — 2580000003 HC RX 258: Performed by: FAMILY MEDICINE

## 2024-03-30 PROCEDURE — 84100 ASSAY OF PHOSPHORUS: CPT

## 2024-03-30 PROCEDURE — 6360000002 HC RX W HCPCS: Performed by: FAMILY MEDICINE

## 2024-03-30 PROCEDURE — 83735 ASSAY OF MAGNESIUM: CPT

## 2024-03-30 PROCEDURE — 85025 COMPLETE CBC W/AUTO DIFF WBC: CPT

## 2024-03-30 PROCEDURE — 36415 COLL VENOUS BLD VENIPUNCTURE: CPT

## 2024-03-30 PROCEDURE — 83605 ASSAY OF LACTIC ACID: CPT

## 2024-03-30 PROCEDURE — 87040 BLOOD CULTURE FOR BACTERIA: CPT

## 2024-03-30 PROCEDURE — 97530 THERAPEUTIC ACTIVITIES: CPT

## 2024-03-30 PROCEDURE — 6370000000 HC RX 637 (ALT 250 FOR IP): Performed by: NURSE PRACTITIONER

## 2024-03-30 PROCEDURE — 6370000000 HC RX 637 (ALT 250 FOR IP): Performed by: FAMILY MEDICINE

## 2024-03-30 RX ADMIN — ZOLPIDEM TARTRATE 10 MG: 5 TABLET ORAL at 21:15

## 2024-03-30 RX ADMIN — ENOXAPARIN SODIUM 40 MG: 100 INJECTION SUBCUTANEOUS at 21:15

## 2024-03-30 RX ADMIN — WATER 1000 MG: 1 INJECTION INTRAMUSCULAR; INTRAVENOUS; SUBCUTANEOUS at 13:13

## 2024-03-30 RX ADMIN — FENOFIBRATE 54 MG: 54 TABLET ORAL at 08:59

## 2024-03-30 RX ADMIN — GABAPENTIN 600 MG: 300 CAPSULE ORAL at 21:14

## 2024-03-30 RX ADMIN — GABAPENTIN 600 MG: 300 CAPSULE ORAL at 08:50

## 2024-03-30 RX ADMIN — POTASSIUM CHLORIDE 40 MEQ: 1500 TABLET, EXTENDED RELEASE ORAL at 08:53

## 2024-03-30 RX ADMIN — PANTOPRAZOLE SODIUM 40 MG: 40 TABLET, DELAYED RELEASE ORAL at 06:08

## 2024-03-30 RX ADMIN — GABAPENTIN 600 MG: 300 CAPSULE ORAL at 13:13

## 2024-03-30 RX ADMIN — AMIODARONE HYDROCHLORIDE 200 MG: 200 TABLET ORAL at 08:50

## 2024-03-30 RX ADMIN — TAMSULOSIN HYDROCHLORIDE 0.4 MG: 0.4 CAPSULE ORAL at 08:50

## 2024-03-30 RX ADMIN — SODIUM CHLORIDE, PRESERVATIVE FREE 10 ML: 5 INJECTION INTRAVENOUS at 21:15

## 2024-03-30 RX ADMIN — DESMOPRESSIN ACETATE 100 MCG: 0.2 TABLET ORAL at 21:14

## 2024-03-30 RX ADMIN — MAGNESIUM SULFATE HEPTAHYDRATE 2000 MG: 40 INJECTION, SOLUTION INTRAVENOUS at 08:53

## 2024-03-30 RX ADMIN — POLYETHYLENE GLYCOL 3350 17 G: 17 POWDER, FOR SOLUTION ORAL at 13:23

## 2024-03-30 ASSESSMENT — PAIN SCALES - GENERAL: PAINLEVEL_OUTOF10: 0

## 2024-03-31 LAB
GLUCOSE BLD-MCNC: 105 MG/DL (ref 70–99)
GLUCOSE BLD-MCNC: 119 MG/DL (ref 70–99)
GLUCOSE BLD-MCNC: 123 MG/DL (ref 70–99)
GLUCOSE BLD-MCNC: 154 MG/DL (ref 70–99)
PERFORMED ON: ABNORMAL

## 2024-03-31 PROCEDURE — 6360000002 HC RX W HCPCS: Performed by: FAMILY MEDICINE

## 2024-03-31 PROCEDURE — 2580000003 HC RX 258: Performed by: FAMILY MEDICINE

## 2024-03-31 PROCEDURE — 6370000000 HC RX 637 (ALT 250 FOR IP): Performed by: HOSPITALIST

## 2024-03-31 PROCEDURE — 2580000003 HC RX 258: Performed by: HOSPITALIST

## 2024-03-31 PROCEDURE — 1200000000 HC SEMI PRIVATE

## 2024-03-31 PROCEDURE — 6360000002 HC RX W HCPCS: Performed by: HOSPITALIST

## 2024-03-31 PROCEDURE — 6370000000 HC RX 637 (ALT 250 FOR IP): Performed by: NURSE PRACTITIONER

## 2024-03-31 PROCEDURE — 6370000000 HC RX 637 (ALT 250 FOR IP): Performed by: FAMILY MEDICINE

## 2024-03-31 PROCEDURE — 94760 N-INVAS EAR/PLS OXIMETRY 1: CPT

## 2024-03-31 RX ORDER — SENNA AND DOCUSATE SODIUM 50; 8.6 MG/1; MG/1
2 TABLET, FILM COATED ORAL 2 TIMES DAILY
Status: DISCONTINUED | OUTPATIENT
Start: 2024-03-31 | End: 2024-04-01 | Stop reason: HOSPADM

## 2024-03-31 RX ORDER — LACTULOSE 10 G/15ML
20 SOLUTION ORAL 2 TIMES DAILY
Status: DISCONTINUED | OUTPATIENT
Start: 2024-03-31 | End: 2024-04-01 | Stop reason: HOSPADM

## 2024-03-31 RX ORDER — POLYETHYLENE GLYCOL 3350 17 G/17G
17 POWDER, FOR SOLUTION ORAL 2 TIMES DAILY
Status: DISCONTINUED | OUTPATIENT
Start: 2024-03-31 | End: 2024-04-01 | Stop reason: HOSPADM

## 2024-03-31 RX ADMIN — POLYETHYLENE GLYCOL 3350 17 G: 17 POWDER, FOR SOLUTION ORAL at 11:54

## 2024-03-31 RX ADMIN — DESMOPRESSIN ACETATE 100 MCG: 0.2 TABLET ORAL at 21:36

## 2024-03-31 RX ADMIN — FENOFIBRATE 54 MG: 54 TABLET ORAL at 10:37

## 2024-03-31 RX ADMIN — ONDANSETRON 4 MG: 2 INJECTION INTRAMUSCULAR; INTRAVENOUS at 18:15

## 2024-03-31 RX ADMIN — AMIODARONE HYDROCHLORIDE 200 MG: 200 TABLET ORAL at 09:16

## 2024-03-31 RX ADMIN — ENOXAPARIN SODIUM 40 MG: 100 INJECTION SUBCUTANEOUS at 21:36

## 2024-03-31 RX ADMIN — SENNOSIDES AND DOCUSATE SODIUM 2 TABLET: 8.6; 5 TABLET ORAL at 10:37

## 2024-03-31 RX ADMIN — GABAPENTIN 600 MG: 300 CAPSULE ORAL at 21:36

## 2024-03-31 RX ADMIN — PANTOPRAZOLE SODIUM 40 MG: 40 TABLET, DELAYED RELEASE ORAL at 05:38

## 2024-03-31 RX ADMIN — GABAPENTIN 600 MG: 300 CAPSULE ORAL at 09:16

## 2024-03-31 RX ADMIN — LISINOPRIL 2.5 MG: 5 TABLET ORAL at 09:16

## 2024-03-31 RX ADMIN — TAMSULOSIN HYDROCHLORIDE 0.4 MG: 0.4 CAPSULE ORAL at 09:16

## 2024-03-31 RX ADMIN — SODIUM CHLORIDE, PRESERVATIVE FREE 10 ML: 5 INJECTION INTRAVENOUS at 21:37

## 2024-03-31 RX ADMIN — SODIUM CHLORIDE, PRESERVATIVE FREE 10 ML: 5 INJECTION INTRAVENOUS at 09:18

## 2024-03-31 RX ADMIN — WATER 1000 MG: 1 INJECTION INTRAMUSCULAR; INTRAVENOUS; SUBCUTANEOUS at 14:38

## 2024-03-31 RX ADMIN — LACTULOSE 20 G: 10 SOLUTION ORAL at 10:37

## 2024-03-31 RX ADMIN — GABAPENTIN 600 MG: 300 CAPSULE ORAL at 14:38

## 2024-03-31 RX ADMIN — ZOLPIDEM TARTRATE 10 MG: 5 TABLET ORAL at 21:35

## 2024-04-01 VITALS
HEART RATE: 100 BPM | WEIGHT: 219.58 LBS | OXYGEN SATURATION: 94 % | BODY MASS INDEX: 25.93 KG/M2 | TEMPERATURE: 98.4 F | RESPIRATION RATE: 17 BRPM | SYSTOLIC BLOOD PRESSURE: 99 MMHG | DIASTOLIC BLOOD PRESSURE: 66 MMHG | HEIGHT: 77 IN

## 2024-04-01 PROBLEM — N39.0 UTI (URINARY TRACT INFECTION): Status: ACTIVE | Noted: 2024-04-01

## 2024-04-01 PROBLEM — I49.3 PVC'S (PREMATURE VENTRICULAR CONTRACTIONS): Status: ACTIVE | Noted: 2024-04-01

## 2024-04-01 LAB
ANION GAP SERPL CALCULATED.3IONS-SCNC: 8 MMOL/L (ref 3–16)
BACTERIA BLD CULT ORG #2: NORMAL
BACTERIA BLD CULT: NORMAL
BUN SERPL-MCNC: 10 MG/DL (ref 7–20)
CALCIUM SERPL-MCNC: 8.1 MG/DL (ref 8.3–10.6)
CHLORIDE SERPL-SCNC: 96 MMOL/L (ref 99–110)
CO2 SERPL-SCNC: 26 MMOL/L (ref 21–32)
CREAT SERPL-MCNC: 0.7 MG/DL (ref 0.8–1.3)
EKG ATRIAL RATE: 80 BPM
EKG DIAGNOSIS: NORMAL
EKG P AXIS: 40 DEGREES
EKG P-R INTERVAL: 194 MS
EKG Q-T INTERVAL: 420 MS
EKG QRS DURATION: 132 MS
EKG QTC CALCULATION (BAZETT): 484 MS
EKG R AXIS: -44 DEGREES
EKG T AXIS: 42 DEGREES
EKG VENTRICULAR RATE: 80 BPM
GFR SERPLBLD CREATININE-BSD FMLA CKD-EPI: >90 ML/MIN/{1.73_M2}
GLUCOSE BLD-MCNC: 123 MG/DL (ref 70–99)
GLUCOSE BLD-MCNC: 127 MG/DL (ref 70–99)
GLUCOSE BLD-MCNC: 95 MG/DL (ref 70–99)
GLUCOSE SERPL-MCNC: 98 MG/DL (ref 70–99)
MAGNESIUM SERPL-MCNC: 1.4 MG/DL (ref 1.8–2.4)
PERFORMED ON: ABNORMAL
PERFORMED ON: ABNORMAL
PERFORMED ON: NORMAL
POTASSIUM SERPL-SCNC: 3.5 MMOL/L (ref 3.5–5.1)
SODIUM SERPL-SCNC: 130 MMOL/L (ref 136–145)
TROPONIN, HIGH SENSITIVITY: 55 NG/L (ref 0–22)
TSH SERPL DL<=0.005 MIU/L-ACNC: 2.73 UIU/ML (ref 0.27–4.2)

## 2024-04-01 PROCEDURE — 93010 ELECTROCARDIOGRAM REPORT: CPT | Performed by: INTERNAL MEDICINE

## 2024-04-01 PROCEDURE — 6370000000 HC RX 637 (ALT 250 FOR IP): Performed by: HOSPITALIST

## 2024-04-01 PROCEDURE — 80048 BASIC METABOLIC PNL TOTAL CA: CPT

## 2024-04-01 PROCEDURE — 97535 SELF CARE MNGMENT TRAINING: CPT

## 2024-04-01 PROCEDURE — 84484 ASSAY OF TROPONIN QUANT: CPT

## 2024-04-01 PROCEDURE — 84443 ASSAY THYROID STIM HORMONE: CPT

## 2024-04-01 PROCEDURE — 97530 THERAPEUTIC ACTIVITIES: CPT

## 2024-04-01 PROCEDURE — 97116 GAIT TRAINING THERAPY: CPT

## 2024-04-01 PROCEDURE — 36415 COLL VENOUS BLD VENIPUNCTURE: CPT

## 2024-04-01 PROCEDURE — 93005 ELECTROCARDIOGRAM TRACING: CPT | Performed by: INTERNAL MEDICINE

## 2024-04-01 PROCEDURE — 2580000003 HC RX 258: Performed by: FAMILY MEDICINE

## 2024-04-01 PROCEDURE — 6370000000 HC RX 637 (ALT 250 FOR IP): Performed by: FAMILY MEDICINE

## 2024-04-01 PROCEDURE — 99222 1ST HOSP IP/OBS MODERATE 55: CPT | Performed by: INTERNAL MEDICINE

## 2024-04-01 PROCEDURE — 83735 ASSAY OF MAGNESIUM: CPT

## 2024-04-01 PROCEDURE — 6360000002 HC RX W HCPCS: Performed by: HOSPITALIST

## 2024-04-01 RX ORDER — CIPROFLOXACIN 500 MG/1
250 TABLET, FILM COATED ORAL EVERY 12 HOURS SCHEDULED
Status: DISCONTINUED | OUTPATIENT
Start: 2024-04-01 | End: 2024-04-01 | Stop reason: HOSPADM

## 2024-04-01 RX ORDER — MAGNESIUM SULFATE IN WATER 40 MG/ML
2000 INJECTION, SOLUTION INTRAVENOUS ONCE
Status: COMPLETED | OUTPATIENT
Start: 2024-04-01 | End: 2024-04-01

## 2024-04-01 RX ORDER — CIPROFLOXACIN 250 MG/1
250 TABLET, FILM COATED ORAL 2 TIMES DAILY
Qty: 6 TABLET | Refills: 0 | Status: SHIPPED | OUTPATIENT
Start: 2024-04-01 | End: 2024-04-04

## 2024-04-01 RX ADMIN — CIPROFLOXACIN 250 MG: 500 TABLET, FILM COATED ORAL at 12:14

## 2024-04-01 RX ADMIN — SODIUM CHLORIDE, PRESERVATIVE FREE 10 ML: 5 INJECTION INTRAVENOUS at 08:43

## 2024-04-01 RX ADMIN — GABAPENTIN 600 MG: 300 CAPSULE ORAL at 08:42

## 2024-04-01 RX ADMIN — MAGNESIUM SULFATE HEPTAHYDRATE 2000 MG: 40 INJECTION, SOLUTION INTRAVENOUS at 08:55

## 2024-04-01 RX ADMIN — LISINOPRIL 2.5 MG: 5 TABLET ORAL at 08:42

## 2024-04-01 RX ADMIN — AMIODARONE HYDROCHLORIDE 200 MG: 200 TABLET ORAL at 08:42

## 2024-04-01 RX ADMIN — GABAPENTIN 600 MG: 300 CAPSULE ORAL at 12:15

## 2024-04-01 RX ADMIN — PANTOPRAZOLE SODIUM 40 MG: 40 TABLET, DELAYED RELEASE ORAL at 05:34

## 2024-04-01 RX ADMIN — FENOFIBRATE 54 MG: 54 TABLET ORAL at 08:50

## 2024-04-01 RX ADMIN — TAMSULOSIN HYDROCHLORIDE 0.4 MG: 0.4 CAPSULE ORAL at 08:42

## 2024-04-01 NOTE — DISCHARGE SUMMARY
Consults:     IP CONSULT TO PHYSICAL MEDICINE REHAB  IP CONSULT TO CARDIOLOGY    Disposition:   SNF    Condition at Discharge: stable    Discharge Instructions/Follow-up:      PCP follow up in 1-2 weeks    Cardiology follow up in 2-3 weeks    Code Status:  Full Code     Activity: activity as tolerated    Diet:   Low salt diet    Discharge Medications:     Current Discharge Medication List             Details   ciprofloxacin (CIPRO) 250 MG tablet Take 1 tablet by mouth 2 times daily for 3 days  Qty: 6 tablet, Refills: 0                Details   alfuzosin (UROXATRAL) 10 MG extended release tablet Take 1 tablet by mouth 2 times daily      celecoxib (CELEBREX) 200 MG capsule Take 1 capsule by mouth daily      Lifitegrast (XIIDRA) 5 % SOLN Place 1 drop into both eyes nightly      omeprazole (PRILOSEC) 40 MG delayed release capsule Take 1 capsule by mouth daily as needed (Heartburn, Indigestion)      zolpidem (AMBIEN CR) 12.5 MG extended release tablet Take 1 tablet by mouth nightly. Max Daily Amount: 12.5 mg      empagliflozin (JARDIANCE) 10 MG tablet Take 1 tablet by mouth daily  Qty: 30 tablet, Refills: 3      lisinopril (PRINIVIL;ZESTRIL) 2.5 MG tablet Take 1 tablet by mouth daily  Qty: 30 tablet, Refills: 3      carvedilol (COREG) 3.125 MG tablet Take 1 tablet by mouth 2 times daily (with meals)  Qty: 60 tablet, Refills: 3      amiodarone (CORDARONE) 200 MG tablet Take 1 tablet by mouth daily  Qty: 30 tablet, Refills: 1      linaclotide (LINZESS) 290 MCG CAPS capsule Take 1 capsule by mouth every morning (before breakfast)  Qty: 30 capsule, Refills: 0      gabapentin (NEURONTIN) 300 MG capsule Take 2 capsules by mouth 3 times daily for 3 days.  Qty: 18 capsule, Refills: 0      Methocarbamol 1000 MG TABS Take 1,000 mg by mouth 3 times daily as needed (muscle spasms)  Qty: 30 tablet, Refills: 0      desmopressin (DDAVP) 0.1 MG tablet Take 1 tablet by mouth nightly      simethicone (MYLICON) 80 MG chewable tablet

## 2024-04-01 NOTE — CARE COORDINATION
Spoke with patient and spouse present in room regarding therapy recommendation for snf placement; provided snf list. Also informed them that patient is for discharge once discharge plan is in place.   They prefer [facility] as he has been there before. Spoke with Porsha at [facility]. She will review referral. Patient will need precert.     Electronically signed by SHARLA Riley LISW, Case Management on 4/1/2024 at 12:50 PM  [place] [phone]    1:41 PM  Received call from Porsha at [facility]--they can accept.    Electronically signed by SHARLA Riley LISW, Case Management on 4/1/2024 at 1:42 PM  [place] [phone]    2:32 PM  Therapy notes now available. Requested that Juliane Robins Sr.  for Case Management start precert.    Electronically signed by SHARLA Riley LISW, Case Management on 4/1/2024 at 2:34 PM  [place] [phone]    2:38 PM  Received call from Juliane in Case Management--[facility] already submitted for precert. Juliane will update the precert with the therapy notes.    Electronically signed by SHARLA Riley LISW, Case Management on 4/1/2024 at 2:38 PM  [place] [phone]

## 2024-04-01 NOTE — CONSULTS
Brief Consult Note  Physical Medicine and Rehabilitation    Patient: Juan Mohamud  [id number]  Date: 4/1/2024      Chief Complaint: weakness    History of Present Illness/Hospital Course:  Patient is an 82 yo M with pmh CAD, DM2 (diet controlled), neuropathy, macular degeneration, and multiple recent admissions who presented 3/27/2024 with weakness. Of note he has had multiple recent admissions: He suffered fall (2/19/2024) resulting in right clavicular fracture, multiple right-sided rib fractures, and exacerbation of right patellar fracture, readmission (2/23-2/27/2024) for colonic distension due to ileus vs enteritis, and readmission again (2/28-3/14) for Vtach requiring cardioversion, ICD placement (3/8), and revision (3/11 with Dr. Murillo) due to micro dislodgment of RV lead. He was discharged to SNF on 3/14 and subsequently discharged home. On this admission, he was found to have hyponatremia, UTI, and BOSTON.      Prior Level of Function:  Prior to patient's fall on 2/19/24 he was independent with his own care and mobility without an AD.   Pt fell down steps 2/19 and came to [facility] ED. Pt transferred to  for trauma care. Pt discharged from  2/22 to [facility]. Pt re-admited to [facility] 2/23-2/27  and discharged to [facility]. Pt at [facility] 1 day before returning to hospital.   Subsequently discharged [place] and then home    Current Level of Function:  CGA for mobility  Up to mod assist for full ADL    Pertinent Social History:  Support: lives with Spouse  Home set-up: two level condo with 7 SKY with bilateral railings; he can stay on the first floor with bedroom and bathroom.    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

## 2024-04-01 NOTE — PROGRESS NOTES
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 Per EP on 3/12 \"He cannot lift/push/pull more than 20 pounds for 4 weeks post lead revision. Lead revision was done because he put too much weight on left arm too soon following initial device implantation. So he should not use his left arm for transfers.\"     -current situation:    \"Pt arrives via ems for eval of bilat leg weakness and knees buckling onset last night, sts was fine the Monday and yesterday morning.  Sts he couldn't get out of chair last night but this mroning was fine until he tried to get up from breakfast and then was unsteady using the bathroom and sat down to ground and then called ems.\"    -blood pressures soft   -does not appear safe to attempt up to standing at this time to assess mobility  -anticipate admission     Electronically signed by KOLTON WILLSON, PT on 3/27/2024 at 3:42 PM    
    Physical Therapy    Facility/Department: [address] [facility]ED[facility]    Physical Therapy Treatment note      Name: Juan Mohamud    : 1940    MRN: [id number]    Date of Service: 2024    Assessment /  Discharge Recommendations:    -engaging well for PTOT session  -able to ambulate short distance in room   -tolerated standing at sink for several minutes to brush teeth and wash face -   - requires hands on assist to maintain safe balance  -remains with need for continued PT OT and nursing care in a skilled setting     Juan Mohamud scored a 16/24 on the AM-PAC short mobility form.   Current research shows that an AM-PAC score of 17 or less is typically not associated with a discharge to the patient's home setting.   Based on the patient's AM-PAC score and their current functional mobility deficits, it is recommended that the patient have 3-5 sessions per week of Physical Therapy at d/c to increase the patient's independence.      Patient Diagnosis(es): The primary encounter diagnosis was Weakness of both lower extremities. Diagnoses of Urinary tract infection with hematuria, site unspecified and Acute kidney injury (HCC) were also pertinent to this visit.  Past Medical History:  has a past medical history of Diabetes mellitus (HCC), Hyperlipidemia, Indigestion, Insomnia, Macular degeneration, MI, old, and Neuropathy.  Past Surgical History:  has a past surgical history that includes knee surgery; Tonsillectomy; Colonoscopy; fracture surgery (Left); Mouth surgery; joint replacement (Right); Intracapsular cataract extraction (Right, 2018); Colonoscopy (N/A, 2024); and Colonoscopy (N/A, 3/6/2024).      Body Structures, Functions, Activity Limitations Requiring Skilled Therapeutic Intervention: Decreased functional mobility ;Decreased ADL status;Decreased endurance;Decreased balance;Decreased sensation  Requires PT Follow-Up: Yes  Activity Tolerance  Activity Tolerance: Patient limited by 
    Physical Therapy    Facility/Department: [address] [facility]THOPED[facility]    Physical Therapy Treatment note         Name: Juan Mohamud    : 1940    MRN: [id number]    Date of Service: 3/29/2024    Assessment / Discharge Recommendations:    -able to mobilize up to walker but is \"back on heels\" and not able to self correct to gain safe static balance on the walker    -no complaints of lightheadedness when up to standing     -anticipate will need continued PT OT and nursing care in a skilled setting with a slow pace     Juan Mohamud scored a 13/24 on the AM-PAC short mobility form.   Current research shows that an AM-PAC score of 17 or less is typically not associated with a discharge to the patient's home setting.   Based on the patient's AM-PAC score and their current functional mobility deficits, it is recommended that the patient have 3-5 sessions per week of Physical Therapy at d/c to increase the patient's independence.         ____________________________________________________________________________________________________________________    -extra care taken to assure he is following the left UE restrictions:    Per EP on 3/12 \"He cannot lift/push/pull more than 20 pounds for 4 weeks post lead revision. Lead revision was done because he put too much weight on left arm too soon following initial device implantation. So he should not use his left arm for transfers.    ____________________________________________________________________________________________________________________      Patient Diagnosis(es): The primary encounter diagnosis was Weakness of both lower extremities. Diagnoses of Urinary tract infection with hematuria, site unspecified and Acute kidney injury (HCC) were also pertinent to this visit.  Past Medical History:  has a past medical history of Diabetes mellitus (HCC), Hyperlipidemia, Indigestion, Insomnia, Macular degeneration, MI, old, and Neuropathy.  Past Surgical 
    Physical Therapy    Facility/Department: [address] [facility]THOPED[facility]    Physical Therapy Treatment note        Name: Juan Mohamud    : 1940    MRN: [id number]    Date of Service: 3/30/2024    Assessment / Discharge Recommendations:    -engaging well for PT session  -noting improvement with his transfers and gaining a safe static stance on the walker   -anticipate will need continued PTOT and nursing care in a skilled setting prior to home as will need to reach Modified Independent level for all mobility and basic ADLs to manage safely at home     Juan Mohamud scored a 16/24 on the AM-PAC short mobility form. Current research shows that an AM-PAC score of 17 or less is typically not associated with a discharge to the patient's home setting. Based on the patient's AM-PAC score and their current functional mobility deficits, it is recommended that the patient have 3-5 sessions per week of Physical Therapy at d/c to increase the patient's independence.          -recommend once home he have bedside commode kept adjacent to his bed to help reduce nightly difficulty of urinary urgency        ______________________________________________________________________________________________________________________  -extra care taken to assure he is following the left UE restrictions:     Per EP on 3/12 \"He cannot lift/push/pull more than 20 pounds for 4 weeks post lead revision. Lead revision was done because he put too much weight on left arm too soon following initial device implantation. So he should not use his left arm for transfers.    ______________________________________________________________________________________________________________________        Patient Diagnosis(es): The primary encounter diagnosis was Weakness of both lower extremities. Diagnoses of Urinary tract infection with hematuria, site unspecified and Acute kidney injury (HCC) were also pertinent to this visit.  Past Medical 
    V2.0    [facility] [place] Progress Note      Name:  Juan Mohamud /Age/Sex: 1940  (83 y.o. male)   MRN & CSN:  [id number] & [id number] Encounter Date/Time: 3/30/2024 11:26 AM EDT   Location:  [id number]/3105-01 PCP: Janet Garcia MD     Attending:Noe Spencer MD       Hospital Day: 4    Assessment and Recommendations     Patient is a 83-year-old male past medical history of hypertension, hyperlipidemia, BPH, diabetes, who presented to the ED with weakness    #.  Generalized weakness and physical deconditioning  #.  Acute  urinary tract infection  #.  Hyponatremia likely hypovolemic  #.  Diabetes mellitus, type II with hyperglycemia  #.  Acute kidney injury    Plan:  Urine cultures reviewed. Continue ceftriaxone   blood cultures pending.    comprehensive  viral panel negative.   Continue IV fluid  Continue home medications for chronic medical problems  Consult PT OT    DVT Prophylaxis: Lovenox.   Code Status: Total Support.   Barrier to DC: pending clinical improvement.           Subjective:     Patient was seen and examined today.  Continues to complain of weakness.  No acute events overnight.  Patient remains afebrile stable vital signs.    Review of Systems:      Pertinent positives and negatives discussed in HPI    Objective:     Intake/Output Summary (Last 24 hours) at 3/30/2024 1134  Last data filed at 3/30/2024 0800  Gross per 24 hour   Intake 870 ml   Output 375 ml   Net 495 ml        Vitals:   Vitals:    24 1913 24 0346 24 0700 24 0927   BP: 96/63  98/65    Pulse: 98  96    Resp: 16  17    Temp: 99.8 °F (37.7 °C)  97.9 °F (36.6 °C)    TempSrc: Oral  Oral    SpO2: 94%  98% 99%   Weight:  94.2 kg (207 lb 10.8 oz)     Height:             Physical Exam:      General: awake, alert, in NAD  Eyes: EOMI  ENT: neck supple  Cardiovascular: Regular rate.  Respiratory: Clear to auscultation  Gastrointestinal: Soft, non tender, BS+  Genitourinary: no suprapubic 
    V2.0    [facility] [place] Progress Note      Name:  Juan Mohamud /Age/Sex: 1940  (83 y.o. male)   MRN & CSN:  [id number] & [id number] Encounter Date/Time: 3/31/2024 11:26 AM EDT   Location:  [id number]/3105-01 PCP: Janet Garcia MD     Attending:Noe Spencer MD       Hospital Day: 5    Assessment and Recommendations     Patient is a 83-year-old male past medical history of hypertension, hyperlipidemia, BPH, diabetes, who presented to the ED with weakness    #.  Generalized weakness and physical deconditioning  #.  Acute  urinary tract infection  #.  Hyponatremia likely hypovolemic  #.  Diabetes mellitus, type II with hyperglycemia  #.  Acute kidney injury    Plan:  Urine cultures reviewed. Continue ceftriaxone   blood cultures pending.    comprehensive  viral panel negative.   Continue IV fluid  Continue home medications for chronic medical problems  Consult PT OT    DVT Prophylaxis: Lovenox.   Code Status: Total Support.   Barrier to DC: PT/OT. ? Rehab placement pending.           Subjective:     Patient was seen and examined today.  Continues to complain of weakness.  No acute events overnight.  Patient remains afebrile stable vital signs.    Review of Systems:      Pertinent positives and negatives discussed in HPI    Objective:     Intake/Output Summary (Last 24 hours) at 3/31/2024 1249  Last data filed at 3/31/2024 0933  Gross per 24 hour   Intake 1440 ml   Output 1250 ml   Net 190 ml        Vitals:   Vitals:    24 0256 24 0413 24 0759 24 0806   BP:  127/80 120/81    Pulse:  85 83    Resp:  16 16    Temp:  97.6 °F (36.4 °C) 97.4 °F (36.3 °C)    TempSrc:  Oral Oral    SpO2:  93% 95% 95%   Weight: 101.4 kg (223 lb 8.7 oz)      Height:             Physical Exam:      General: awake, alert, in NAD  Eyes: EOMI  ENT: neck supple  Cardiovascular: Regular rate.  Respiratory: Clear to auscultation  Gastrointestinal: Soft, non tender, BS+  Genitourinary: no suprapubic 
    V2.0    [facility] [place] Progress Note      Name:  Juan Mohamud /Age/Sex: 1940  (83 y.o. male)   MRN & CSN:  [id number] & [id number] Encounter Date/Time: 3/29/2024 11:26 AM EDT   Location:  [id number]-[id number]/3105-01 PCP: Janet Garcia MD     Attending:Noe Spencer MD       Hospital Day: 3    Assessment and Recommendations     Patient is a 83-year-old male past medical history of hypertension, hyperlipidemia, BPH, diabetes, who presented to the ED with weakness    #.  Generalized weakness and physical deconditioning  #.  Acute  urinary tract infection  #.  Hyponatremia likely hypovolemic  #.  Diabetes mellitus, type II with hyperglycemia  #.  Acute kidney injury    Plan:  Urine cultures reviewed. Continue ceftriaxone   blood cultures pending.    comprehensive  viral panel negative.   Continue IV fluid  Continue home medications for chronic medical problems  Consult PT OT    DVT Prophylaxis: Lovenox.   Code Status: Total Support.   Barrier to DC: Urine culture, blood cultures pending, antibiotics, pending clinical improvement          Subjective:     Patient was seen and examined today.  Continues to complain of weakness.  No acute events overnight.  Patient remains afebrile stable vital signs.    Review of Systems:      Pertinent positives and negatives discussed in HPI    Objective:     Intake/Output Summary (Last 24 hours) at 3/29/2024 1131  Last data filed at 3/29/2024 0626  Gross per 24 hour   Intake 3143.21 ml   Output 300 ml   Net 2843.21 ml        Vitals:   Vitals:    24 1530 24 1931 24 2345 24 0455   BP: (!) 97/58 113/69 109/67 121/74   Pulse: 89 83 82 75   Resp: 15 15 18 16   Temp: 98.4 °F (36.9 °C) 98.9 °F (37.2 °C) 98.6 °F (37 °C) 98 °F (36.7 °C)   TempSrc: Oral  Oral Oral   SpO2: 96% 96% 94% 97%   Weight:    94.6 kg (208 lb 8.9 oz)   Height:             Physical Exam:      General: awake, alert, in NAD  Eyes: EOMI  ENT: neck supple  Cardiovascular: Regular 
    V2.0    [facility] [place] Progress Note      Name:  Juan Mohamud /Age/Sex: 1940  (83 y.o. male)   MRN & CSN:  [id number] & [id number] Encounter Date/Time: 2024 11:26 AM EDT   Location:  [id number]-6453/3105-01 PCP: Janet Garcia MD     Attending:Chiara Gage MD       Hospital Day: 6    Assessment and Recommendations     Patient is a 83-year-old male past medical history of hypertension, hyperlipidemia, BPH, diabetes, who presented to the ED with weakness    #.  Generalized weakness and physical deconditioning  #.  Acute  urinary tract infection  #.  Hyponatremia likely hypovolemic  #.  Diabetes mellitus, type II with hyperglycemia  #.  Acute kidney injury    Plan:  Clinically stable for dc  Urine cultures reviewed. Continue ceftriaxone   blood cultures pending.    comprehensive  viral panel negative.   Continue IV fluid  Continue home medications for chronic medical problems  Consult PT OT    DVT Prophylaxis: Lovenox.   Code Status: Total Support.   Barrier to DC: PT/OT. ? Rehab placement pending.     Awaiting ARU eval      Subjective:     Patient was seen and examined today.  Continues to complain of weakness.  No acute events overnight.  Patient remains afebrile stable vital signs.    Review of Systems:      Pertinent positives and negatives discussed in HPI    Objective:     Intake/Output Summary (Last 24 hours) at 2024 1157  Last data filed at 2024 0824  Gross per 24 hour   Intake 480 ml   Output 300 ml   Net 180 ml        Vitals:   Vitals:    24 0424 24 0535 24 0722 24 1122   BP: 130/75  120/65 102/72   Pulse: 83  92 82   Resp: 18  18 18   Temp: 98 °F (36.7 °C)  97.4 °F (36.3 °C) 97.7 °F (36.5 °C)   TempSrc: Oral  Oral Oral   SpO2: 95%  94% 94%   Weight:  99.6 kg (219 lb 9.3 oz)     Height:             Physical Exam:      General: awake, alert, in NAD  Eyes: EOMI  ENT: neck supple  Cardiovascular: Regular rate.  Respiratory: Clear to 
    V2.0    [facility] [place] Progress Note      Name:  Juan Mohamud /Age/Sex: 1940  (83 y.o. male)   MRN & CSN:  [id number] & [id number] Encounter Date/Time: 3/28/2024 11:26 AM EDT   Location:  [id number]/3105-01 PCP: Janet Garcia MD     Attending:Noe Spencer MD       Hospital Day: 2    Assessment and Recommendations     Patient is a 83-year-old male past medical history of hypertension, hyperlipidemia, BPH, diabetes, who presented to the ED with weakness    #.  Generalized weakness and physical deconditioning  #.  Suspected urinary tract infection  #.  Hyponatremia likely hypovolemic  #.  Diabetes mellitus, type II with hyperglycemia  #.  Acute kidney injury    Plan:  Urine cultures pending  Obtain blood cultures  Obtain comprehensive  viral panel  Continue IV fluid  Continue ceftriaxone  Continue home medications for chronic medical problems  Consult PT OT    DVT Prophylaxis: Lovenox.   Code Status: Total Support.   Barrier to DC: Urine culture, blood cultures pending, antibiotics, pending clinical improvement          Subjective:     Patient was seen and examined today.  Continues to complain of weakness.  No acute events overnight.  Patient remains afebrile stable vital signs.    Review of Systems:      Pertinent positives and negatives discussed in HPI    Objective:     Intake/Output Summary (Last 24 hours) at 3/28/2024 1126  Last data filed at 3/28/2024 0807  Gross per 24 hour   Intake 420 ml   Output 200 ml   Net 220 ml      Vitals:   Vitals:    24 0020 24 0410 24 0732 24 1019   BP: (!) 107/59 121/69 (!) 89/58 108/66   Pulse: 95 100 90    Resp: 20 20 16    Temp: 97.9 °F (36.6 °C) 98.6 °F (37 °C) 97.4 °F (36.3 °C)    TempSrc: Axillary Oral Oral    SpO2: 91% 94% 93%    Weight:       Height:             Physical Exam:      General: awake, alert, in NAD  Eyes: EOMI  ENT: neck supple  Cardiovascular: Regular rate.  Respiratory: Clear to auscultation  Gastrointestinal: Soft, 
4 Eyes Skin Assessment     NAME:  Juan Mohamud  YOB: 1940  MEDICAL RECORD NUMBER:  [id number]    The patient is being assessed for  Admission    I agree that at least one RN has performed a thorough Head to Toe Skin Assessment on the patient. ALL assessment sites listed below have been assessed.      Areas assessed by both nurses:    Head, Face, Ears, Shoulders, Back, Chest, Arms, Elbows, Hands, Sacrum. Buttock, Coccyx, Ischium, and Legs. Feet and Heels        Does the Patient have a Wound? Yes wound(s) were present on assessment. LDA wound assessment was Initiated and completed by RN       Piero Prevention initiated by RN: Yes  Wound Care Orders initiated by RN: No    Pressure Injury (Stage 3,4, Unstageable, DTI, NWPT, and Complex wounds) if present, place Wound referral order by RN under : No    New Ostomies, if present place, Ostomy referral order under : No     Nurse 1 eSignature: Electronically signed by Barbara Fowler RN on 3/28/24 at 7:03 AM EDT    **SHARE this note so that the co-signing nurse can place an eSignature**    Nurse 2 eSignature: {Esignature:[id number]}    
ARU consult noted. Chart reviewed. Noted therapy recommendation for skilled level of care. Will review with Dr. Muñoz. Full consult to follow.   
Bedside introduction complete and whiteboard updated. Bed locked in lowest position and call light within reach. Morning medications given with no complications. VSS.  Patient in bed awake and has no concerns/needs at this time.         Patient given potassium per order and magnesium IVPB d/t labs.     Patient on tele.      Electronically signed by Venita Matthews RN on 3/30/2024 at 10:36 AM    
Call report to [facility] to floor nurse Dylan. Updated nurse at  [facility] that patient will be leaving this facility at 530 pm. Questions and concerns addressed at this time.   
Medication Reconciliation    List of medications patient is currently taking is complete.     Source of information: 1. Conversation with patient and wife at bedside                                      2. EPIC records         Notes regarding home medications:   1. Patient reports taking morning medications EXCEPT for amiodarone, carvedilol, and Jardiance.   2. Amiodarone, carvedilol, and Jardiance were all prescribed following patient's previous admission and sent to [place] [facility]. The prescriptions were filed when patient went to facility. Patient didn't know how to fill medications when he left the facility. Informed patient how to fill prescriptions here or have them transferred to another pharmacy.      Donny Robles, [facility]   3/27/2024  4:39 PM    
Occupational Therapy  Facility/Department: [address] ORTHOPEDICS  Occupational Therapy Treatment Note    Name: Juan Mohamud  : 1940  MRN: [id number]  Date of Service: 3/29/2024    Discharge Recommendations:  3-5 sessions per week        Juan Mohamud scored a 16/24 on the AM-PAC ADL Inpatient form. Current research shows that an AM-PAC score of 17 or less is typically not associated with a discharge to the patient's home setting. Based on the patient's AM-PAC score and their current ADL deficits, it is recommended that the patient have 3-5 sessions per week of Occupational Therapy at d/c to increase the patient's independence.  Please see assessment section for further patient specific details.    If patient discharges prior to next session this note will serve as a discharge summary.  Please see below for the latest assessment towards goals.     Patient Diagnosis(es): The primary encounter diagnosis was Weakness of both lower extremities. Diagnoses of Urinary tract infection with hematuria, site unspecified and Acute kidney injury (HCC) were also pertinent to this visit.  Past Medical History:  has a past medical history of Diabetes mellitus (HCC), Hyperlipidemia, Indigestion, Insomnia, Macular degeneration, MI, old, and Neuropathy.  Past Surgical History:  has a past surgical history that includes knee surgery; Tonsillectomy; Colonoscopy; fracture surgery (Left); Mouth surgery; joint replacement (Right); Intracapsular cataract extraction (Right, 2018); Colonoscopy (N/A, 2024); and Colonoscopy (N/A, 3/6/2024).    Treatment Diagnosis: impaired balance, strength, endurance, ADLs, mobility and txs    Assessment   Performance deficits / Impairments: Decreased functional mobility ;Decreased endurance;Decreased ADL status;Decreased balance;Decreased strength;Decreased high-level IADLs  Assessment: 83 y.o. M admitted with sepsis after presenting with new onset with weakness and shakiness 
Occupational Therapy  Facility/Department: [address] ORTHOPEDICS  Occupational Daily Treatment Note      Name: Juan Mohamud  : 1940  MRN: [id number]  Date of Service: 2024    Discharge Recommendations:  3-5 sessions per week          Patient Diagnosis(es): The primary encounter diagnosis was Weakness of both lower extremities. Diagnoses of Urinary tract infection with hematuria, site unspecified and Acute kidney injury (HCC) were also pertinent to this visit.  Past Medical History:  has a past medical history of Diabetes mellitus (HCC), Hyperlipidemia, Indigestion, Insomnia, Macular degeneration, MI, old, and Neuropathy.  Past Surgical History:  has a past surgical history that includes knee surgery; Tonsillectomy; Colonoscopy; fracture surgery (Left); Mouth surgery; joint replacement (Right); Intracapsular cataract extraction (Right, 2018); Colonoscopy (N/A, 2024); and Colonoscopy (N/A, 3/6/2024).    Treatment Diagnosis: impaired balance, strength, endurance, ADLs, mobility and txs    Juan Mohamud scored a 16/24 on the AM-PAC ADL Inpatient form. Current research shows that an AM-PAC score of 17 or less is typically not associated with a discharge to the patient's home setting. Based on the patient's AM-PAC score and their current ADL deficits, it is recommended that the patient have 3-5 sessions per week of Occupational Therapy at d/c to increase the patient's independence.  Please see assessment section for further patient specific details.    If patient discharges prior to next session this note will serve as a discharge summary.  Please see below for the latest assessment towards goals.      Assessment   Assessment: Consulted with OT on pt AM PAC score of 16. Pt performd bed mobility including supine>sit with stand by A and extended time to complete with verbal cues. Pt performed sit>stand and stand>sit transfers with CGA for balance from EOB>RW>Recliner>RW>Recliner. During fxl 
PT AAO x4. IVF infusing. VSS with feet elevated for good B/P. Pt c/o of new onset right flank pain. JON Copeland notified. See MAR with NO. MD DARYN Paulino sent message requesting imaging. NNO at this time. Pt tolerating PO fluids. Pt resting fair. No further needs voice at this time. Fall precautions in place. Bed alarm on. Call light within reach. Will continue care. Electronically signed by Alina Kwon RN on 3/29/2024 at 6:29 AM    
Patient admitted to room 3105 from ED.  Oriented to room, call light, and floor policies.  Plan of care reviewed with patient. Pt is resting in bed and no c/o pain at this time, he did c/o weakness of both legs; no s/s of distress noted. Assessment completed; VSS. Tele in place reading sinus tachy rhythm with a rate of 102. Pt rates a high fall risk.  Safety precautions in place; call light and bedside table within reach.  Pt encouraged to call for needs. Plan of care ongoing.    
Patient currently in chair. Patient did work with PT/OT this afternoon and did well. Patient took medications whole, no problem with sips of water. Patient denies any n/v and has tolerated PO intake well. Patient denies pain. Patient voiding, no issues. Patient IV flushed and infusing Abx intermittently. Patient able to make needs known, no needs mentioned at this time. Call light and bedside table within reach. Will continue to monitor and reassess.    
Patient given prn miralax(see MAR).  
Patient had large soft bm.   Electronically signed by KALANI MOLINA RN on 3/31/2024 at 1:40 PM]  
Patient resting in bed quietly this morning, A&O X4. BP 89/58, will hold Lisinopril. PIV flushed without issue, dressing clean, dry, and intact. Normal saline locked at this time. Patient denies having pain at this time. All other AM medications taken without complaint. Patient tolerating PO intake and appetite adequate. No other needs verbalized at this time. Standard safety precautions in place and call light within reach. Electronically signed by Floridalma Vital RN on 3/28/2024 at 11:38 AM      
Patient sitting up in chair. Patient  alert and oriented x 4. Patient respirations regular and unlabored on room air. Telemetry reading normal sinus rhythm . Patient denies the need for pain medication at the present time.  Electronically signed by Shanta Fuentes RN on 3/29/2024 at 5:52 PM    
Patient to floor from ED, A/Ox4. Mepilex placed to sacrum, gown and tele applied.   
Patient up in chair and aaox4. Patient able to use walker to ambulate to chair from bed.  Patient is a upset/tearful  this morning saying he is just getting more weak. This RN provided emotional support to patient. Patient denies any pain this am. Head to toe assessment completed with change documented.   pPerfect serve sent to elke MCLEAN due to patient not having B, since 3/26. PRN glycolax administered. Lactulose and senna added.  Fall precautions in place.  Electronically signed by KALANI MOLINA RN on 3/31/2024 at 10:25 AM    
Perfect serve sent to Johanna MCLEAN after [facility] notified this RN that patient is having consistent runs of trigem pvcs. Patient denies any symptoms other than feeling increased weakness..     Cardio consult placed.  Electronically signed by KALANI MOLINA RN on 3/31/2024 at 1:42 PM    
Perfect serve sent to Johanna MCLEAN to make him aware that patients urine has gotten darker today. Patient urine appears almost brown. Patient is now off ivf but has been drinking fluids.  Electronically signed by KALANI MOLINA RN on 3/30/2024 at 1:28 PM    
Perfects serve sent to Johanna MCLEAN to notify him that patient had 12 beats of PAT with heart rate In 130s. Patient back in NS with heart rate in 80s.    Phosphorus ordered. Mag and potassium replacement imitated per protocol.    Electronically signed by KALANI MOLINA RN on 3/30/2024 at 8:41 AM    
Pt is alert and oriented x 4 in bed. Vital, assessment and daily care given. Education and care plan updated. Medication given as per order. Fall precaution initiated, call light within reach, bed in low position and wheels locked. Addressed current pt's need. Continue to monitor.    Electronically signed by Makayla Parada RN on 3/31/2024 at 10:38 PM    
Pt is alert and oriented x4, resting quietly in bed. Nightly medications and intake tolerated well. No complaints of nausea, vomiting, or pain at this time. No other needs made known at this time. Fall precautions in place. Call light within reach. Will continue to monitor.    Electronically signed by Sunny Fu RN on 3/30/2024 at 8:08 AM    
Pt is alert and oriented x4, resting quietly in bed. Nightly medications and intake tolerated well. No complaints of nausea, vomiting, or pain at this time. No other needs made known at this time. Fall precautions in place. Call light within reach. Will continue to monitor.    Electronically signed by Sunny Fu RN on 3/31/2024 at 6:30 AM    
Tried to call report 2 times to the [facility] and call keeps going to a voicemail where a message cannot left. Will attempt one more time before patient leaves at 530 pm.   
bars in shower, Grab bars around toilet  Home Equipment: Walker, rolling, Cane  Has the patient had two or more falls in the past year or any fall with injury in the past year?:  (1 recent fall Feb 19, 2024)  ADL Assistance: Independent  Ambulation Assistance: Independent (RW)  Transfer Assistance: Independent  Active : Yes  Mode of Transportation: Car  Occupation: Retired  Additional Comments: 24 hr assist at home. Recent hospitalizations Feb 2024 and D/C to multiple SNF. Most recent discharge from SNF Monday, March 25, 2024.  Above information obtained per chart review (therapy note 2-26-24) and confirmed with pt this date. Noted prior to recent hospitalizations/SNF admissions, pt was ambulating without device.  Vision/Hearing  Vision  Vision: Impaired  Vision Exceptions: Wears glasses at all times  Hearing  Hearing: Exceptions to WFL  Hearing Exceptions: Hard of hearing/hearing concerns;Bilateral hearing aid    Cognition   Orientation  Overall Orientation Status: Within Functional Limits  Cognition  Overall Cognitive Status: WFL     Objective     Observation/Palpation  Observation: Bruising dorsal aspect bilateral hands.  Gross Assessment  Tone: Normal  Sensation:  (self reports LE distal neuropathy)     Strength Other  Other: grossly functional as observed mobilizing from bed in stedy     Bed mobility  Supine to Sit: Minimal assistance  Sit to Supine: Unable to assess  Transfers  Sit to Stand: Minimal Assistance (in sal stedy)  Stand to Sit: Minimal Assistance  Ambulation  Comments: deferred to next session     Balance  Comments: midline in sitting at the EOB and in static stance in the stedy with close cga for safety       AM-PAC Basic Mobility - Inpatient   How much help is needed turning from your back to your side while in a flat bed without using bedrails?: A Little  How much help is needed moving from lying on your back to sitting on the side of a flat bed without using bedrails?: A Little  How much 
(RW)  Transfer Assistance: Independent  Active : Yes  Mode of Transportation: Car  Occupation: Retired  Additional Comments: 24 hr assist at home. Recent hospitalizations Feb 2024 and D/C to multiple SNF. Most recent discharge from SNF Monday, March 25, 2024.  Above information obtained per chart review (therapy note 2-26-24) and confirmed with pt this date. Noted prior to recent hospitalizations/SNF admissions, pt was ambulating without device.       Objective      Observation/Palpation  Observation: Bruising dorsal aspect bilateral hands.  Safety Devices  Type of Devices: All fall risk precautions in place;Call light within reach;Chair alarm in place;Left in chair;Nurse notified;Heels elevated for pressure relief (waffle cushion placed in recliner. RN, Floridalma, informed.)  Restraints  Restraints Initially in Place: No       Toilet Transfers  Toilet Transfers Comments: Therapist placed BSC over commode for safety and ease with toileting tx in future PRN.    AROM: Within functional limits (grossly WFL)  Strength: Generally decreased, functional (strength limited LUE d/t recent procedure with associated restrictions. RUE grossly WFL.)  Coordination: Within functional limits  Tone: Normal  Sensation: Impaired (bilateral foot numbness (peripheral neuropathy))    ADL  Feeding: Independent  Feeding Skilled Clinical Factors: Pt seated in recliner with breakfast at end of session, feeding IND.  Grooming Skilled Clinical Factors: Pt declining grooming tasks at this time, requesting to complete after eating breakfast.  LE Dressing: Dependent/Total  LE Dressing Skilled Clinical Factors: Dep to don footies bilaterally  Toileting Skilled Clinical Factors: Declined need to void  Functional Mobility: Dependent/Total  Functional Mobility Skilled Clinical Factors: Dep tx from bed > recliner via STEDY for safety d/t low BP. Will cont to assess functional mobility as pt able to tolerate.  Additional Comments: Anticipate pt to

## 2024-04-01 NOTE — CARE COORDINATION
Leonora Osborn PRE-CERT REQUEST SUBMITTED VIA [facility] PORTAL    REQUESTED SETTING:  [place]    ANTICIPATED ADMIT DATE TO SNF:  4/1/24    Leonora #:  [id number]         Note:  Submitted by Cheri Robins Sr. Administrative Assist, Case Management  [phone]  Electronically signed by Juliane Robins on 4/1/2024 at 2:41 PM
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
## 2024-04-01 NOTE — PLAN OF CARE
Problem: Discharge Planning  Goal: Discharge to home or other facility with appropriate resources     Problem: ABCDS Injury Assessment  Goal: Absence of physical injury     Problem: Pain  Goal: Verbalizes/displays adequate comfort level or baseline comfort level     
  Problem: Discharge Planning  Goal: Discharge to home or other facility with appropriate resources  3/28/2024 1552 by Floridalma Vital RN  Outcome: Progressing  Flowsheets (Taken 3/28/2024 1552)  Discharge to home or other facility with appropriate resources:   Identify barriers to discharge with patient and caregiver   Arrange for needed discharge resources and transportation as appropriate  3/28/2024 0453 by Barbara Fowler RN  Outcome: Progressing     Problem: Skin/Tissue Integrity  Goal: Absence of new skin breakdown  Description: 1.  Monitor for areas of redness and/or skin breakdown  2.  Assess vascular access sites hourly  3.  Every 4-6 hours minimum:  Change oxygen saturation probe site  4.  Every 4-6 hours:  If on nasal continuous positive airway pressure, respiratory therapy assess nares and determine need for appliance change or resting period.  3/28/2024 1552 by Floridalma Vital RN  Outcome: Progressing  Note:   Will monitor skin and mucous members.  Will turn patient every 2 hours, monitor for friction and sheering, and change dressings as needed.  Will preform skin assessment every shift.      3/28/2024 0453 by Barbara Fowler RN  Outcome: Progressing     Problem: Safety - Adult  Goal: Free from fall injury  3/28/2024 1552 by Floridalma Vital RN  Outcome: Progressing  Flowsheets (Taken 3/28/2024 1552)  Free From Fall Injury: Instruct family/caregiver on patient safety  3/28/2024 0453 by Barbara Fowler RN  Outcome: Progressing     Problem: ABCDS Injury Assessment  Goal: Absence of physical injury  3/28/2024 1552 by Floridalma Vital RN  Outcome: Progressing  Flowsheets (Taken 3/28/2024 1552)  Absence of Physical Injury: Implement safety measures based on patient assessment  3/28/2024 0453 by Barbara Fowler RN  Outcome: Progressing     
  Problem: Discharge Planning  Goal: Discharge to home or other facility with appropriate resources  3/28/2024 2207 by Alina Kwon RN  Outcome: Progressing  Flowsheets (Taken 3/28/2024 2207)  Discharge to home or other facility with appropriate resources:   Identify barriers to discharge with patient and caregiver   Arrange for needed discharge resources and transportation as appropriate   Identify discharge learning needs (meds, wound care, etc)  3/28/2024 1552 by Floridalma Vital RN  Outcome: Progressing  Flowsheets (Taken 3/28/2024 1552)  Discharge to home or other facility with appropriate resources:   Identify barriers to discharge with patient and caregiver   Arrange for needed discharge resources and transportation as appropriate     Problem: Skin/Tissue Integrity  Goal: Absence of new skin breakdown  Description: 1.  Monitor for areas of redness and/or skin breakdown  2.  Assess vascular access sites hourly  3.  Every 4-6 hours minimum:  Change oxygen saturation probe site  4.  Every 4-6 hours:  If on nasal continuous positive airway pressure, respiratory therapy assess nares and determine need for appliance change or resting period.  3/28/2024 2207 by Alina Kwon, RN  Outcome: Progressing  Note: Piero score assessed. Patient able to turn self. Repositioned patient Q2H and assessed skin. Educated patient on importance of repositioning to prevent skin issues.     3/28/2024 1552 by Floridalma Vital RN  Outcome: Progressing  Note:   Will monitor skin and mucous members.  Will turn patient every 2 hours, monitor for friction and sheering, and change dressings as needed.  Will preform skin assessment every shift.         Problem: Safety - Adult  Goal: Free from fall injury  3/28/2024 2207 by Alina Kwon, RN  Outcome: Progressing  Flowsheets  Taken 3/28/2024 2207  Free From Fall Injury:   Instruct family/caregiver on patient safety   Based on caregiver fall risk screen, instruct family/caregiver to ask 
  Problem: Discharge Planning  Goal: Discharge to home or other facility with appropriate resources  3/30/2024 1008 by Venita Matthews RN  Outcome: Progressing  3/30/2024 0808 by Sunny Fu RN  Outcome: Progressing  Flowsheets (Taken 3/30/2024 0808)  Discharge to home or other facility with appropriate resources:   Identify barriers to discharge with patient and caregiver   Arrange for needed discharge resources and transportation as appropriate     Problem: Skin/Tissue Integrity  Goal: Absence of new skin breakdown  Description: 1.  Monitor for areas of redness and/or skin breakdown  2.  Assess vascular access sites hourly  3.  Every 4-6 hours minimum:  Change oxygen saturation probe site  4.  Every 4-6 hours:  If on nasal continuous positive airway pressure, respiratory therapy assess nares and determine need for appliance change or resting period.  3/30/2024 1008 by Venita Matthews RN  Outcome: Progressing  3/30/2024 0808 by Sunny Fu RN  Outcome: Progressing     Problem: ABCDS Injury Assessment  Goal: Absence of physical injury  3/30/2024 1008 by Venita Matthews RN  Outcome: Progressing  3/30/2024 0808 by Sunny Fu RN  Outcome: Progressing  Flowsheets (Taken 3/30/2024 0808)  Absence of Physical Injury: Implement safety measures based on patient assessment     Problem: Pain  Goal: Verbalizes/displays adequate comfort level or baseline comfort level  3/30/2024 1008 by Venita Matthews RN  Outcome: Progressing  3/30/2024 0808 by Sunny Fu RN  Outcome: Progressing  Flowsheets (Taken 3/30/2024 0808)  Verbalizes/displays adequate comfort level or baseline comfort level:   Assess pain using appropriate pain scale   Encourage patient to monitor pain and request assistance   Administer analgesics based on type and severity of pain and evaluate response   Implement non-pharmacological measures as appropriate and evaluate response     
  Problem: Discharge Planning  Goal: Discharge to home or other facility with appropriate resources  3/31/2024 0950 by Rafal Matamoros RN  Outcome: Progressing  Flowsheets (Taken 3/31/2024 0631 by Sunny Fu RN)  Discharge to home or other facility with appropriate resources:   Identify barriers to discharge with patient and caregiver   Arrange for needed discharge resources and transportation as appropriate  3/31/2024 0631 by Sunny Fu RN  Outcome: Progressing  Flowsheets (Taken 3/31/2024 0631)  Discharge to home or other facility with appropriate resources:   Identify barriers to discharge with patient and caregiver   Arrange for needed discharge resources and transportation as appropriate     Problem: Skin/Tissue Integrity  Goal: Absence of new skin breakdown  Description: 1.  Monitor for areas of redness and/or skin breakdown  2.  Assess vascular access sites hourly  3.  Every 4-6 hours minimum:  Change oxygen saturation probe site  4.  Every 4-6 hours:  If on nasal continuous positive airway pressure, respiratory therapy assess nares and determine need for appliance change or resting period.  3/31/2024 0950 by Rafal Matamoros RN  Outcome: Progressing  3/31/2024 0631 by Sunny Fu RN  Outcome: Progressing     Problem: Safety - Adult  Goal: Free from fall injury  3/31/2024 0950 by Rafal Matamoros RN  Outcome: Progressing  Flowsheets  Taken 3/31/2024 0949 by Rafal Matamoros RN  Free From Fall Injury:   Instruct family/caregiver on patient safety   Based on caregiver fall risk screen, instruct family/caregiver to ask for assistance with transferring infant if caregiver noted to have fall risk factors  Taken 3/31/2024 0631 by Sunny Fu RN  Free From Fall Injury: Instruct family/caregiver on patient safety  3/31/2024 0631 by Sunny Fu RN  Outcome: Progressing  Flowsheets (Taken 3/31/2024 0631)  Free From Fall Injury: Instruct family/caregiver on patient safety     Problem: ABCDS Injury 
  Problem: Discharge Planning  Goal: Discharge to home or other facility with appropriate resources  3/31/2024 2203 by Makayla Parada RN  Outcome: Progressing  Flowsheets  Taken 3/31/2024 2203 by Makayla Parada RN  Discharge to home or other facility with appropriate resources:   Identify barriers to discharge with patient and caregiver   Identify discharge learning needs (meds, wound care, etc)   Refer to discharge planning if patient needs post-hospital services based on physician order or complex needs related to functional status, cognitive ability or social support system   Arrange for needed discharge resources and transportation as appropriate  Taken 3/31/2024 0952 by Rafal Matamoros RN  Discharge to home or other facility with appropriate resources: Identify barriers to discharge with patient and caregiver     Problem: Skin/Tissue Integrity  Goal: Absence of new skin breakdown  Description: 1.  Monitor for areas of redness and/or skin breakdown  2.  Assess vascular access sites hourly  3.  Every 4-6 hours minimum:  Change oxygen saturation probe site  4.  Every 4-6 hours:  If on nasal continuous positive airway pressure, respiratory therapy assess nares and determine need for appliance change or resting period.  3/31/2024 2203 by Makayla Parada RN  Outcome: Progressing     Problem: Safety - Adult  Goal: Free from fall injury  3/31/2024 2203 by Makayla Parada RN  Outcome: Progressing     Problem: ABCDS Injury Assessment  Goal: Absence of physical injury  3/31/2024 2203 by Makayla Parada RN  Outcome: Progressing  Flowsheets (Taken 3/31/2024 2203)  Absence of Physical Injury: Implement safety measures based on patient assessment     Problem: Pain  Goal: Verbalizes/displays adequate comfort level or baseline comfort level  3/31/2024 2203 by Makayla Parada RN  Outcome: Progressing  Flowsheets (Taken 3/31/2024 2203)  Verbalizes/displays adequate comfort level or baseline comfort level:   Consider cultural and 
  Problem: Discharge Planning  Goal: Discharge to home or other facility with appropriate resources  Outcome: Progressing     Problem: Skin/Tissue Integrity  Goal: Absence of new skin breakdown  Description: 1.  Monitor for areas of redness and/or skin breakdown  2.  Assess vascular access sites hourly  3.  Every 4-6 hours minimum:  Change oxygen saturation probe site  4.  Every 4-6 hours:  If on nasal continuous positive airway pressure, respiratory therapy assess nares and determine need for appliance change or resting period.  Outcome: Progressing     Problem: Safety - Adult  Goal: Free from fall injury  Outcome: Progressing     Problem: ABCDS Injury Assessment  Goal: Absence of physical injury  Outcome: Progressing     
  Problem: Discharge Planning  Goal: Discharge to home or other facility with appropriate resources  Outcome: Progressing  Flowsheets (Taken 3/30/2024 0808)  Discharge to home or other facility with appropriate resources:   Identify barriers to discharge with patient and caregiver   Arrange for needed discharge resources and transportation as appropriate     Problem: Skin/Tissue Integrity  Goal: Absence of new skin breakdown  Description: 1.  Monitor for areas of redness and/or skin breakdown  2.  Assess vascular access sites hourly  3.  Every 4-6 hours minimum:  Change oxygen saturation probe site  4.  Every 4-6 hours:  If on nasal continuous positive airway pressure, respiratory therapy assess nares and determine need for appliance change or resting period.  Outcome: Progressing     Problem: Safety - Adult  Goal: Free from fall injury  Outcome: Progressing  Flowsheets (Taken 3/30/2024 0808)  Free From Fall Injury: Instruct family/caregiver on patient safety     Problem: ABCDS Injury Assessment  Goal: Absence of physical injury  Outcome: Progressing  Flowsheets (Taken 3/30/2024 0808)  Absence of Physical Injury: Implement safety measures based on patient assessment     Problem: Pain  Goal: Verbalizes/displays adequate comfort level or baseline comfort level  Outcome: Progressing  Flowsheets (Taken 3/30/2024 0808)  Verbalizes/displays adequate comfort level or baseline comfort level:   Assess pain using appropriate pain scale   Encourage patient to monitor pain and request assistance   Administer analgesics based on type and severity of pain and evaluate response   Implement non-pharmacological measures as appropriate and evaluate response     
  Problem: Discharge Planning  Goal: Discharge to home or other facility with appropriate resources  Outcome: Progressing  Flowsheets (Taken 3/31/2024 0631)  Discharge to home or other facility with appropriate resources:   Identify barriers to discharge with patient and caregiver   Arrange for needed discharge resources and transportation as appropriate     Problem: Skin/Tissue Integrity  Goal: Absence of new skin breakdown  Description: 1.  Monitor for areas of redness and/or skin breakdown  2.  Assess vascular access sites hourly  3.  Every 4-6 hours minimum:  Change oxygen saturation probe site  4.  Every 4-6 hours:  If on nasal continuous positive airway pressure, respiratory therapy assess nares and determine need for appliance change or resting period.  Outcome: Progressing     Problem: Safety - Adult  Goal: Free from fall injury  Outcome: Progressing  Flowsheets (Taken 3/31/2024 0631)  Free From Fall Injury: Instruct family/caregiver on patient safety     Problem: ABCDS Injury Assessment  Goal: Absence of physical injury  Outcome: Progressing  Flowsheets (Taken 3/31/2024 0631)  Absence of Physical Injury: Implement safety measures based on patient assessment     Problem: Pain  Goal: Verbalizes/displays adequate comfort level or baseline comfort level  Outcome: Progressing  Flowsheets (Taken 3/31/2024 0631)  Verbalizes/displays adequate comfort level or baseline comfort level:   Encourage patient to monitor pain and request assistance   Assess pain using appropriate pain scale   Administer analgesics based on type and severity of pain and evaluate response   Implement non-pharmacological measures as appropriate and evaluate response     
by Keshari, Makayla, RN  Outcome: Progressing     Problem: Safety - Adult  Goal: Free from fall injury  4/1/2024 0759 by Nile Coe RN  Outcome: Progressing  Flowsheets (Taken 3/31/2024 2203 by Makayla Parada RN)  Free From Fall Injury: Instruct family/caregiver on patient safety  3/31/2024 2203 by Makayla Parada RN  Outcome: Progressing  Flowsheets (Taken 3/31/2024 2203)  Free From Fall Injury: Instruct family/caregiver on patient safety     Problem: ABCDS Injury Assessment  Goal: Absence of physical injury  4/1/2024 0759 by Nile Coe RN  Outcome: Progressing  Flowsheets (Taken 3/31/2024 2203 by Makayla Parada RN)  Absence of Physical Injury: Implement safety measures based on patient assessment  3/31/2024 2203 by Makayla Parada RN  Outcome: Progressing  Flowsheets (Taken 3/31/2024 2203)  Absence of Physical Injury: Implement safety measures based on patient assessment     Problem: Pain  Goal: Verbalizes/displays adequate comfort level or baseline comfort level  4/1/2024 0759 by Nile Coe RN  Outcome: Progressing  Flowsheets (Taken 3/31/2024 2203 by Makayla Parada RN)  Verbalizes/displays adequate comfort level or baseline comfort level:   Consider cultural and social influences on pain and pain management   Administer analgesics based on type and severity of pain and evaluate response   Encourage patient to monitor pain and request assistance   Assess pain using appropriate pain scale   Implement non-pharmacological measures as appropriate and evaluate response   Notify Licensed Independent Practitioner if interventions unsuccessful or patient reports new pain  3/31/2024 2203 by Makayla Parada RN  Outcome: Progressing  Flowsheets (Taken 3/31/2024 2203)  Verbalizes/displays adequate comfort level or baseline comfort level:   Consider cultural and social influences on pain and pain management   Administer analgesics based on type and severity of pain and evaluate response   Encourage patient to

## 2024-04-01 NOTE — CARE COORDINATION
Received notice from Porsha at [facility]--they have precert and can accept patient today after 5pm.   Spoke with patient and spouse present in room regarding above and transport. Explained that insurance may/may not approve stretcher transport but per therapy stretcher is the safest transport due to patient's weakness, shakiness, neuropathy. They are in agreement with stretcher transport.     Electronically signed by Altagracia Moody, MSW, LISW, Case Management on 4/1/2024 at 4:11 PM  [place] [phone]

## 2024-04-01 NOTE — CARE COORDINATION
DISCHARGE SUMMARY     DATE OF DISCHARGE: 4/1/2024    DISCHARGE DESTINATION: skilled unit    FACILITY:   Discharging to Facility/ Agency   Name: [facility]  Address:  [address], [place] [address]   Phone:  [phone]  Fax:  [phone]    INSURANCE PRECERT OBTAINED: yes    HENS/PASAAR COMPLETED: yes    TRANSPORTATION:     Company Name:  [employer]  Phone: [phone]    Time: 530pm    COMMENTS: Informed patient and spouse of transport time.   Left message for Porsha at [facility] regarding transport time.   RN aware. Report number [phone].    Electronically signed by SHARLA Riley, LISW, Case Management on 4/1/2024 at 4:24 PM  [place] [phone]

## 2024-04-02 ENCOUNTER — TELEPHONE (OUTPATIENT)
Dept: CARDIOLOGY CLINIC | Age: 84
End: 2024-04-02

## 2024-04-02 DIAGNOSIS — Z95.810 CARDIAC DEFIBRILLATOR IN SITU: Primary | ICD-10-CM

## 2024-04-02 DIAGNOSIS — I49.3 PVC (PREMATURE VENTRICULAR CONTRACTION): ICD-10-CM

## 2024-04-02 NOTE — TELEPHONE ENCOUNTER
Per Dr. Contreras (saw pt in hospital) patient needs echo and 1 month follow up. Please call patient to schedule echo.     JON Weiss - patient was already scheduled for 3 month s/p ICD follow up, then admitted again and now needs a 1 month follow up. Dr. Astorga has nothing available, can I schedule with you? Thanks.

## 2024-04-03 LAB
BACTERIA BLD CULT ORG #2: NORMAL
BACTERIA BLD CULT: NORMAL

## 2024-04-03 NOTE — TELEPHONE ENCOUNTER
Per JON Weiss - she has Friday morning office for EP patients. Please schedule on an available date/time for patient's 1 month follow up. Thank you.

## 2024-04-04 NOTE — TELEPHONE ENCOUNTER
Attempted call. Spoke with Lizzie (spouse) who stated that Juan is currently at rehab nursing facility, [facility], and she is not sure when he will be going home. She is hoping he will be home by next week. Lizzie states she will return call when she knows more.

## 2024-05-01 PROBLEM — N39.0 UTI (URINARY TRACT INFECTION): Status: RESOLVED | Noted: 2024-04-01 | Resolved: 2024-05-01

## 2024-07-23 ENCOUNTER — HOSPITAL ENCOUNTER (INPATIENT)
Age: 84
LOS: 2 days | Discharge: HOME | DRG: 291 | End: 2024-07-25
Attending: STUDENT IN AN ORGANIZED HEALTH CARE EDUCATION/TRAINING PROGRAM
Payer: MEDICARE

## 2024-07-23 ENCOUNTER — APPOINTMENT (OUTPATIENT)
Dept: CT IMAGING | Age: 84
DRG: 291 | End: 2024-07-23
Payer: MEDICARE

## 2024-07-23 ENCOUNTER — APPOINTMENT (OUTPATIENT)
Dept: GENERAL RADIOLOGY | Age: 84
DRG: 291 | End: 2024-07-23
Payer: MEDICARE

## 2024-07-23 DIAGNOSIS — I50.9 ACUTE CONGESTIVE HEART FAILURE, UNSPECIFIED HEART FAILURE TYPE (HCC): Primary | ICD-10-CM

## 2024-07-23 LAB
ALBUMIN SERPL-MCNC: 3.8 G/DL (ref 3.4–5)
ALBUMIN/GLOB SERPL: 1.3 {RATIO} (ref 1.1–2.2)
ALP SERPL-CCNC: 214 U/L (ref 40–129)
ALT SERPL-CCNC: 14 U/L (ref 10–40)
ANION GAP SERPL CALCULATED.3IONS-SCNC: 11 MMOL/L (ref 3–16)
AST SERPL-CCNC: 25 U/L (ref 15–37)
BASE EXCESS BLDV CALC-SCNC: 4.4 MMOL/L
BASOPHILS # BLD: 0.1 K/UL (ref 0–0.2)
BASOPHILS NFR BLD: 1 %
BILIRUB SERPL-MCNC: 0.6 MG/DL (ref 0–1)
BUN SERPL-MCNC: 20 MG/DL (ref 7–20)
CALCIUM SERPL-MCNC: 8.8 MG/DL (ref 8.3–10.6)
CHLORIDE SERPL-SCNC: 98 MMOL/L (ref 99–110)
CHOLEST SERPL-MCNC: 126 MG/DL (ref 0–199)
CO2 BLDV-SCNC: 33 MMOL/L
CO2 SERPL-SCNC: 26 MMOL/L (ref 21–32)
COHGB MFR BLDV: 1.3 %
CREAT SERPL-MCNC: 0.9 MG/DL (ref 0.8–1.3)
D-DIMER QUANTITATIVE: 2.01 UG/ML FEU (ref 0–0.6)
DEPRECATED RDW RBC AUTO: 16.1 % (ref 12.4–15.4)
EKG ATRIAL RATE: 74 BPM
EKG DIAGNOSIS: NORMAL
EKG P AXIS: 43 DEGREES
EKG P-R INTERVAL: 202 MS
EKG Q-T INTERVAL: 446 MS
EKG QRS DURATION: 154 MS
EKG QTC CALCULATION (BAZETT): 495 MS
EKG R AXIS: -29 DEGREES
EKG T AXIS: 101 DEGREES
EKG VENTRICULAR RATE: 74 BPM
EOSINOPHIL # BLD: 0.1 K/UL (ref 0–0.6)
EOSINOPHIL NFR BLD: 1.5 %
EST. AVERAGE GLUCOSE BLD GHB EST-MCNC: 96.8 MG/DL
GFR SERPLBLD CREATININE-BSD FMLA CKD-EPI: 85 ML/MIN/{1.73_M2}
GLUCOSE BLD-MCNC: 112 MG/DL (ref 70–99)
GLUCOSE BLD-MCNC: 98 MG/DL (ref 70–99)
GLUCOSE SERPL-MCNC: 110 MG/DL (ref 70–99)
HBA1C MFR BLD: 5 %
HCO3 BLDV-SCNC: 31 MMOL/L (ref 23–29)
HCT VFR BLD AUTO: 40.2 % (ref 40.5–52.5)
HDLC SERPL-MCNC: 61 MG/DL (ref 40–60)
HGB BLD-MCNC: 13.5 G/DL (ref 13.5–17.5)
LDLC SERPL CALC-MCNC: 54 MG/DL
LYMPHOCYTES # BLD: 0.8 K/UL (ref 1–5.1)
LYMPHOCYTES NFR BLD: 16 %
MCH RBC QN AUTO: 29.7 PG (ref 26–34)
MCHC RBC AUTO-ENTMCNC: 33.5 G/DL (ref 31–36)
MCV RBC AUTO: 88.5 FL (ref 80–100)
METHGB MFR BLDV: 0.2 %
MONOCYTES # BLD: 0.5 K/UL (ref 0–1.3)
MONOCYTES NFR BLD: 9.8 %
NEUTROPHILS # BLD: 3.8 K/UL (ref 1.7–7.7)
NEUTROPHILS NFR BLD: 71.7 %
NT-PROBNP SERPL-MCNC: 2190 PG/ML (ref 0–449)
O2 THERAPY: ABNORMAL
PCO2 BLDV: 54.4 MMHG (ref 40–50)
PERFORMED ON: ABNORMAL
PERFORMED ON: NORMAL
PH BLDV: 7.37 [PH] (ref 7.35–7.45)
PLATELET # BLD AUTO: 141 K/UL (ref 135–450)
PMV BLD AUTO: 9.9 FL (ref 5–10.5)
PO2 BLDV: <30 MMHG
POTASSIUM SERPL-SCNC: 4.3 MMOL/L (ref 3.5–5.1)
PROT SERPL-MCNC: 6.8 G/DL (ref 6.4–8.2)
RBC # BLD AUTO: 4.55 M/UL (ref 4.2–5.9)
SAO2 % BLDV: 28 %
SODIUM SERPL-SCNC: 135 MMOL/L (ref 136–145)
T4 FREE SERPL-MCNC: 1.8 NG/DL (ref 0.9–1.8)
TRIGL SERPL-MCNC: 54 MG/DL (ref 0–150)
TROPONIN, HIGH SENSITIVITY: 38 NG/L (ref 0–22)
TROPONIN, HIGH SENSITIVITY: 38 NG/L (ref 0–22)
TSH SERPL DL<=0.005 MIU/L-ACNC: 5.54 UIU/ML (ref 0.27–4.2)
VLDLC SERPL CALC-MCNC: 11 MG/DL
WBC # BLD AUTO: 5.3 K/UL (ref 4–11)

## 2024-07-23 PROCEDURE — 85025 COMPLETE CBC W/AUTO DIFF WBC: CPT

## 2024-07-23 PROCEDURE — 71045 X-RAY EXAM CHEST 1 VIEW: CPT

## 2024-07-23 PROCEDURE — 6370000000 HC RX 637 (ALT 250 FOR IP): Performed by: REGISTERED NURSE

## 2024-07-23 PROCEDURE — 85379 FIBRIN DEGRADATION QUANT: CPT

## 2024-07-23 PROCEDURE — 80061 LIPID PANEL: CPT

## 2024-07-23 PROCEDURE — 80053 COMPREHEN METABOLIC PANEL: CPT

## 2024-07-23 PROCEDURE — 84443 ASSAY THYROID STIM HORMONE: CPT

## 2024-07-23 PROCEDURE — 99285 EMERGENCY DEPT VISIT HI MDM: CPT

## 2024-07-23 PROCEDURE — 96374 THER/PROPH/DIAG INJ IV PUSH: CPT

## 2024-07-23 PROCEDURE — G0378 HOSPITAL OBSERVATION PER HR: HCPCS

## 2024-07-23 PROCEDURE — 6360000004 HC RX CONTRAST MEDICATION: Performed by: STUDENT IN AN ORGANIZED HEALTH CARE EDUCATION/TRAINING PROGRAM

## 2024-07-23 PROCEDURE — 2580000003 HC RX 258: Performed by: STUDENT IN AN ORGANIZED HEALTH CARE EDUCATION/TRAINING PROGRAM

## 2024-07-23 PROCEDURE — 83036 HEMOGLOBIN GLYCOSYLATED A1C: CPT

## 2024-07-23 PROCEDURE — 6370000000 HC RX 637 (ALT 250 FOR IP): Performed by: STUDENT IN AN ORGANIZED HEALTH CARE EDUCATION/TRAINING PROGRAM

## 2024-07-23 PROCEDURE — 96376 TX/PRO/DX INJ SAME DRUG ADON: CPT

## 2024-07-23 PROCEDURE — 82803 BLOOD GASES ANY COMBINATION: CPT

## 2024-07-23 PROCEDURE — 84439 ASSAY OF FREE THYROXINE: CPT

## 2024-07-23 PROCEDURE — 71260 CT THORAX DX C+: CPT

## 2024-07-23 PROCEDURE — 36415 COLL VENOUS BLD VENIPUNCTURE: CPT

## 2024-07-23 PROCEDURE — 93010 ELECTROCARDIOGRAM REPORT: CPT | Performed by: INTERNAL MEDICINE

## 2024-07-23 PROCEDURE — 93005 ELECTROCARDIOGRAM TRACING: CPT | Performed by: STUDENT IN AN ORGANIZED HEALTH CARE EDUCATION/TRAINING PROGRAM

## 2024-07-23 PROCEDURE — 6360000002 HC RX W HCPCS: Performed by: STUDENT IN AN ORGANIZED HEALTH CARE EDUCATION/TRAINING PROGRAM

## 2024-07-23 PROCEDURE — 6360000002 HC RX W HCPCS

## 2024-07-23 PROCEDURE — 1200000000 HC SEMI PRIVATE

## 2024-07-23 PROCEDURE — 84484 ASSAY OF TROPONIN QUANT: CPT

## 2024-07-23 PROCEDURE — 83880 ASSAY OF NATRIURETIC PEPTIDE: CPT

## 2024-07-23 RX ORDER — PANTOPRAZOLE SODIUM 40 MG/1
40 TABLET, DELAYED RELEASE ORAL
Status: DISCONTINUED | OUTPATIENT
Start: 2024-07-24 | End: 2024-07-25 | Stop reason: HOSPADM

## 2024-07-23 RX ORDER — ENOXAPARIN SODIUM 100 MG/ML
40 INJECTION SUBCUTANEOUS EVERY 24 HOURS
Status: DISCONTINUED | OUTPATIENT
Start: 2024-07-23 | End: 2024-07-25 | Stop reason: HOSPADM

## 2024-07-23 RX ORDER — AMIODARONE HYDROCHLORIDE 200 MG/1
400 TABLET ORAL DAILY
Status: DISCONTINUED | OUTPATIENT
Start: 2024-07-24 | End: 2024-07-24

## 2024-07-23 RX ORDER — POLYETHYLENE GLYCOL 3350 17 G/17G
17 POWDER, FOR SOLUTION ORAL DAILY PRN
Status: DISCONTINUED | OUTPATIENT
Start: 2024-07-23 | End: 2024-07-25 | Stop reason: HOSPADM

## 2024-07-23 RX ORDER — ZOLPIDEM TARTRATE 5 MG/1
5 TABLET ORAL NIGHTLY PRN
Status: DISCONTINUED | OUTPATIENT
Start: 2024-07-23 | End: 2024-07-23

## 2024-07-23 RX ORDER — INSULIN LISPRO 100 [IU]/ML
0-4 INJECTION, SOLUTION INTRAVENOUS; SUBCUTANEOUS
Status: DISCONTINUED | OUTPATIENT
Start: 2024-07-23 | End: 2024-07-25 | Stop reason: HOSPADM

## 2024-07-23 RX ORDER — ATORVASTATIN CALCIUM 40 MG/1
40 TABLET, FILM COATED ORAL NIGHTLY
Status: DISCONTINUED | OUTPATIENT
Start: 2024-07-23 | End: 2024-07-24

## 2024-07-23 RX ORDER — LISINOPRIL 5 MG/1
5 TABLET ORAL DAILY
Status: DISCONTINUED | OUTPATIENT
Start: 2024-07-23 | End: 2024-07-24

## 2024-07-23 RX ORDER — ONDANSETRON 2 MG/ML
4 INJECTION INTRAMUSCULAR; INTRAVENOUS EVERY 6 HOURS PRN
Status: DISCONTINUED | OUTPATIENT
Start: 2024-07-23 | End: 2024-07-25 | Stop reason: HOSPADM

## 2024-07-23 RX ORDER — ACETAMINOPHEN 325 MG/1
650 TABLET ORAL EVERY 6 HOURS PRN
Status: DISCONTINUED | OUTPATIENT
Start: 2024-07-23 | End: 2024-07-25 | Stop reason: HOSPADM

## 2024-07-23 RX ORDER — POTASSIUM CHLORIDE 20 MEQ/1
40 TABLET, EXTENDED RELEASE ORAL PRN
Status: DISCONTINUED | OUTPATIENT
Start: 2024-07-23 | End: 2024-07-25 | Stop reason: HOSPADM

## 2024-07-23 RX ORDER — DEXTROSE MONOHYDRATE 100 MG/ML
INJECTION, SOLUTION INTRAVENOUS CONTINUOUS PRN
Status: DISCONTINUED | OUTPATIENT
Start: 2024-07-23 | End: 2024-07-25 | Stop reason: HOSPADM

## 2024-07-23 RX ORDER — POTASSIUM CHLORIDE 750 MG/1
10 CAPSULE, EXTENDED RELEASE ORAL DAILY
Status: ON HOLD | COMMUNITY
End: 2024-07-25 | Stop reason: HOSPADM

## 2024-07-23 RX ORDER — METOPROLOL SUCCINATE 25 MG/1
25 TABLET, EXTENDED RELEASE ORAL DAILY
COMMUNITY

## 2024-07-23 RX ORDER — GABAPENTIN 300 MG/1
600 CAPSULE ORAL 3 TIMES DAILY
Status: DISCONTINUED | OUTPATIENT
Start: 2024-07-23 | End: 2024-07-24

## 2024-07-23 RX ORDER — FUROSEMIDE 20 MG/1
20 TABLET ORAL DAILY
Status: ON HOLD | COMMUNITY
End: 2024-07-25 | Stop reason: HOSPADM

## 2024-07-23 RX ORDER — SODIUM CHLORIDE 9 MG/ML
INJECTION, SOLUTION INTRAVENOUS PRN
Status: DISCONTINUED | OUTPATIENT
Start: 2024-07-23 | End: 2024-07-25 | Stop reason: HOSPADM

## 2024-07-23 RX ORDER — SODIUM CHLORIDE 0.9 % (FLUSH) 0.9 %
5-40 SYRINGE (ML) INJECTION EVERY 12 HOURS SCHEDULED
Status: DISCONTINUED | OUTPATIENT
Start: 2024-07-23 | End: 2024-07-25 | Stop reason: HOSPADM

## 2024-07-23 RX ORDER — MAGNESIUM SULFATE IN WATER 40 MG/ML
2000 INJECTION, SOLUTION INTRAVENOUS PRN
Status: DISCONTINUED | OUTPATIENT
Start: 2024-07-23 | End: 2024-07-25 | Stop reason: HOSPADM

## 2024-07-23 RX ORDER — AMIODARONE HYDROCHLORIDE 200 MG/1
400 TABLET ORAL DAILY
Status: ON HOLD | COMMUNITY
End: 2024-07-25

## 2024-07-23 RX ORDER — POTASSIUM CHLORIDE 7.45 MG/ML
10 INJECTION INTRAVENOUS PRN
Status: DISCONTINUED | OUTPATIENT
Start: 2024-07-23 | End: 2024-07-25 | Stop reason: HOSPADM

## 2024-07-23 RX ORDER — ONDANSETRON 4 MG/1
4 TABLET, ORALLY DISINTEGRATING ORAL EVERY 8 HOURS PRN
Status: DISCONTINUED | OUTPATIENT
Start: 2024-07-23 | End: 2024-07-25 | Stop reason: HOSPADM

## 2024-07-23 RX ORDER — FUROSEMIDE 10 MG/ML
40 INJECTION INTRAMUSCULAR; INTRAVENOUS ONCE
Status: COMPLETED | OUTPATIENT
Start: 2024-07-23 | End: 2024-07-23

## 2024-07-23 RX ORDER — ACETAMINOPHEN 650 MG/1
650 SUPPOSITORY RECTAL EVERY 6 HOURS PRN
Status: DISCONTINUED | OUTPATIENT
Start: 2024-07-23 | End: 2024-07-25 | Stop reason: HOSPADM

## 2024-07-23 RX ORDER — GLUCAGON 1 MG/ML
1 KIT INJECTION PRN
Status: DISCONTINUED | OUTPATIENT
Start: 2024-07-23 | End: 2024-07-25 | Stop reason: HOSPADM

## 2024-07-23 RX ORDER — ZOLPIDEM TARTRATE 5 MG/1
10 TABLET ORAL NIGHTLY PRN
Status: DISCONTINUED | OUTPATIENT
Start: 2024-07-23 | End: 2024-07-25 | Stop reason: HOSPADM

## 2024-07-23 RX ORDER — AMIODARONE HYDROCHLORIDE 200 MG/1
200 TABLET ORAL DAILY
Status: DISCONTINUED | OUTPATIENT
Start: 2024-07-23 | End: 2024-07-23 | Stop reason: DRUGHIGH

## 2024-07-23 RX ORDER — SODIUM CHLORIDE 0.9 % (FLUSH) 0.9 %
5-40 SYRINGE (ML) INJECTION PRN
Status: DISCONTINUED | OUTPATIENT
Start: 2024-07-23 | End: 2024-07-25 | Stop reason: HOSPADM

## 2024-07-23 RX ORDER — FUROSEMIDE 10 MG/ML
40 INJECTION INTRAMUSCULAR; INTRAVENOUS 2 TIMES DAILY
Status: DISCONTINUED | OUTPATIENT
Start: 2024-07-23 | End: 2024-07-25

## 2024-07-23 RX ORDER — ASPIRIN 81 MG/1
81 TABLET, CHEWABLE ORAL DAILY
Status: DISCONTINUED | OUTPATIENT
Start: 2024-07-23 | End: 2024-07-24

## 2024-07-23 RX ORDER — INSULIN LISPRO 100 [IU]/ML
0-4 INJECTION, SOLUTION INTRAVENOUS; SUBCUTANEOUS NIGHTLY
Status: DISCONTINUED | OUTPATIENT
Start: 2024-07-23 | End: 2024-07-25 | Stop reason: HOSPADM

## 2024-07-23 RX ADMIN — ZOLPIDEM TARTRATE 5 MG: 5 TABLET ORAL at 21:36

## 2024-07-23 RX ADMIN — LISINOPRIL 5 MG: 5 TABLET ORAL at 18:42

## 2024-07-23 RX ADMIN — ZOLPIDEM TARTRATE 10 MG: 5 TABLET, COATED ORAL at 22:28

## 2024-07-23 RX ADMIN — ATORVASTATIN CALCIUM 40 MG: 40 TABLET, FILM COATED ORAL at 20:28

## 2024-07-23 RX ADMIN — GABAPENTIN 600 MG: 300 CAPSULE ORAL at 20:27

## 2024-07-23 RX ADMIN — SODIUM CHLORIDE, PRESERVATIVE FREE 10 ML: 5 INJECTION INTRAVENOUS at 20:29

## 2024-07-23 RX ADMIN — FUROSEMIDE 40 MG: 10 INJECTION, SOLUTION INTRAMUSCULAR; INTRAVENOUS at 16:13

## 2024-07-23 RX ADMIN — IOPAMIDOL 75 ML: 755 INJECTION, SOLUTION INTRAVENOUS at 17:29

## 2024-07-23 RX ADMIN — FUROSEMIDE 40 MG: 10 INJECTION, SOLUTION INTRAMUSCULAR; INTRAVENOUS at 18:42

## 2024-07-23 ASSESSMENT — PAIN SCALES - GENERAL: PAINLEVEL_OUTOF10: 4

## 2024-07-23 ASSESSMENT — PAIN DESCRIPTION - LOCATION: LOCATION: CHEST

## 2024-07-23 ASSESSMENT — PAIN - FUNCTIONAL ASSESSMENT: PAIN_FUNCTIONAL_ASSESSMENT: 0-10

## 2024-07-23 NOTE — PROGRESS NOTES
Pt arrived to room 4121. Pt A&Ox4, VSS, Pt denies any pain at this time. Pt oriented to room. Bed extender provided to pt, due to his height. Pt denies any needs at this time. All safety measures in place.     Electronically signed by Khloe Reeves RN on 7/23/2024 at 6:47 PM

## 2024-07-23 NOTE — ED PROVIDER NOTES
[facility] 4W MED SURG  EMERGENCY DEPARTMENT ENCOUNTER        Pt Name: Juan Mohamud  MRN: [id number]  Birthdate 1940  Date of evaluation: 7/23/2024  Provider: TIP Montes - CNP  PCP: Janet Garcia MD  Note Started: 3:05 PM EDT 7/23/24      KRISHNA. I have evaluated this patient.        CHIEF COMPLAINT       Chief Complaint   Patient presents with    Shortness of Breath     Pt presents from primary care for :fluid in the lungs\" and a Left Bundle Branch Block on EKG        HISTORY OF PRESENT ILLNESS: 1 or more Elements     History From: Pt    Limitations to history : None    Social Determinants Significantly Affecting Health : Patient has significant healthcare illiteracy    Chief Complaint:above    Juan Mohamud is a 83 y.o. male who  has a past medical history of Diabetes mellitus (HCC), Hyperlipidemia, Indigestion, Insomnia, Macular degeneration, MI, old, and Neuropathy. presents to ed from pcp office for abn EKG and xr  Pt reports JANE and occ chest pain and sob ongoing x 10 days. Worsening  NO allev or aggr factors  Has AICd and on amio for wide complex tachy. Cards at [facility]    Denies any sig wt gain  The patient  reports that he has never smoked. He has never used smokeless tobacco. He reports current alcohol use. He reports that he does not use drugs.     Nursing Notes were all reviewed and agreed with or any disagreements were addressed in the HPI.    REVIEW OF SYSTEMS :      Review of Systems    Positives and Pertinent negatives as per HPI.     SURGICAL HISTORY     Past Surgical History:   Procedure Laterality Date    COLONOSCOPY      COLONOSCOPY N/A 2/29/2024    COLONOSCOPY FLEXIBLE DECOMPRESSION performed by Eitan Pino MD at [facility] ENDOSCOPY    COLONOSCOPY N/A 3/6/2024    COLONOSCOPY WITH DECOMPRESSION performed by Anders Cervantes MD at [facility] ENDOSCOPY    FRACTURE SURGERY Left     arm    INTRACAPSULAR CATARACT EXTRACTION Right 12/17/2018    PHACOEMULSIFICATION WITH  is interpreted by my attending.  No acute STEMI on independent review    Disposition Considerations (tests considered but not done, Admit vs D/C, Shared Decision Making, Pt Expectation of Test or Tx.): above     The patient tolerated their visit well.  The patient and / or the family were informed of the results of any tests, a time was given to answer questions, a plan was proposed and they agreed with plan.    I am the Primary Clinician of Record.  FINAL IMPRESSION      1. Acute congestive heart failure, unspecified heart failure type (HCC)          DISPOSITION/PLAN     DISPOSITION Admitted 07/23/2024 04:31:04 PM      PATIENT REFERRED TO:  No follow-up provider specified.    DISCHARGE MEDICATIONS:  Current Discharge Medication List          DISCONTINUED MEDICATIONS:  Current Discharge Medication List                 (Please note that portions of this note were completed with a voice recognition program.  Efforts were made to edit the dictations but occasionally words are mis-transcribed.)    TIP Montes CNP (electronically signed)       Gerardo Rios APRN - CNP  07/23/24 1923

## 2024-07-23 NOTE — H&P
V2.0  History and Physical      Name:  Juan Mohamud /Age/Sex: 1940  (83 y.o. male)   MRN & CSN:  [id number] & [id number] Encounter Date/Time: 2024 4:32 PM EDT   Location:  [address] [place] [address] PCP: Janet Garcia MD       Hospital Day: 1    Assessment and Plan:   Juan Mohamud is a 83-year-old male with past medical history of lumbar degenerative disc disease hypertension hyperlipidemia BPH history of wide-complex tachycardia has with cardiac defibrillator in place on amiodarone peripheral neuropathy irritable bowel syndrome non-insulin-dependent diabetes mellitus type 2 chronic insomnia and chronic GERD presented to the hospital because of shortness of breath     Hospital Problems             Last Modified POA    * (Principal) Heart failure (HCC) 2024 Yes       New onset acute CHF exacerbation.  Echocardiogram ordered Lasix 40 mg IV twice daily strict I's and O's telemetry in place PT OT incentive spirometry if needed.  Cardiology consult.  Started on lisinopril and guideline directed medical therapy heart failure education on board Daily labs monitor electrolyte  Peripheral neuropathy on Neurontin  History of wide-complex tachycardia with cardiac defibrillator in place currently on amiodarone  Irritable bowel syndrome on linaclotide at home  BPH on alfuzosin at home may resume tomorrow if deemed appropriate  Non-insulin-dependent diabetes mellitus type 2 start the patient on sliding scale insulin monitor for hypoglycemia       Medical Decision Making:  The following items were considered in medical decision making:  Discussion of patient care with other providers  Reviewed clinical lab tests if any  Reviewed radiology tests if any  Reviewed other diagnostic tests/interventions  Independent review of radiologic images if any  Microbiology cultures and other micro tests if any    Estimated time spent for medical decision-making encompassing complexity of the case, history taking,

## 2024-07-24 ENCOUNTER — APPOINTMENT (OUTPATIENT)
Age: 84
DRG: 291 | End: 2024-07-24
Attending: FAMILY MEDICINE
Payer: MEDICARE

## 2024-07-24 LAB
ANION GAP SERPL CALCULATED.3IONS-SCNC: 10 MMOL/L (ref 3–16)
BUN SERPL-MCNC: 17 MG/DL (ref 7–20)
CALCIUM SERPL-MCNC: 8.5 MG/DL (ref 8.3–10.6)
CHLORIDE SERPL-SCNC: 96 MMOL/L (ref 99–110)
CHOLEST SERPL-MCNC: 114 MG/DL (ref 0–199)
CO2 SERPL-SCNC: 28 MMOL/L (ref 21–32)
CREAT SERPL-MCNC: 0.9 MG/DL (ref 0.8–1.3)
DEPRECATED RDW RBC AUTO: 16.3 % (ref 12.4–15.4)
ECHO BSA: 2.28 M2
ECHO LV FRACTIONAL SHORTENING: 22 % (ref 28–44)
ECHO LV INTERNAL DIMENSION DIASTOLE INDEX: 2.18 CM/M2
ECHO LV INTERNAL DIMENSION DIASTOLIC: 5 CM (ref 4.2–5.9)
ECHO LV INTERNAL DIMENSION SYSTOLIC INDEX: 1.7 CM/M2
ECHO LV INTERNAL DIMENSION SYSTOLIC: 3.9 CM
ECHO LV IVSD: 1.4 CM (ref 0.6–1)
ECHO LV MASS 2D: 291.4 G (ref 88–224)
ECHO LV MASS INDEX 2D: 127.3 G/M2 (ref 49–115)
ECHO LV POSTERIOR WALL DIASTOLIC: 1.4 CM (ref 0.6–1)
ECHO LV RELATIVE WALL THICKNESS RATIO: 0.56
ECHO RV BASAL DIMENSION: 4.7 CM
ECHO RV MID DIMENSION: 3.2 CM
ECHO RV TAPSE: 2.5 CM (ref 1.7–?)
ECHO TV REGURGITANT MAX VELOCITY: 2.91 M/S
ECHO TV REGURGITANT PEAK GRADIENT: 34 MMHG
GFR SERPLBLD CREATININE-BSD FMLA CKD-EPI: 85 ML/MIN/{1.73_M2}
GLUCOSE BLD-MCNC: 121 MG/DL (ref 70–99)
GLUCOSE BLD-MCNC: 136 MG/DL (ref 70–99)
GLUCOSE BLD-MCNC: 90 MG/DL (ref 70–99)
GLUCOSE BLD-MCNC: 93 MG/DL (ref 70–99)
GLUCOSE SERPL-MCNC: 81 MG/DL (ref 70–99)
HCT VFR BLD AUTO: 38.6 % (ref 40.5–52.5)
HDLC SERPL-MCNC: 57 MG/DL (ref 40–60)
HGB BLD-MCNC: 12.7 G/DL (ref 13.5–17.5)
LDLC SERPL CALC-MCNC: 47 MG/DL
MAGNESIUM SERPL-MCNC: 2 MG/DL (ref 1.8–2.4)
MCH RBC QN AUTO: 29.1 PG (ref 26–34)
MCHC RBC AUTO-ENTMCNC: 33 G/DL (ref 31–36)
MCV RBC AUTO: 88.1 FL (ref 80–100)
PERFORMED ON: ABNORMAL
PERFORMED ON: ABNORMAL
PERFORMED ON: NORMAL
PERFORMED ON: NORMAL
PHOSPHATE SERPL-MCNC: 4.3 MG/DL (ref 2.5–4.9)
PLATELET # BLD AUTO: 143 K/UL (ref 135–450)
PMV BLD AUTO: 9.9 FL (ref 5–10.5)
POTASSIUM SERPL-SCNC: 3.7 MMOL/L (ref 3.5–5.1)
RBC # BLD AUTO: 4.38 M/UL (ref 4.2–5.9)
SODIUM SERPL-SCNC: 134 MMOL/L (ref 136–145)
TRIGL SERPL-MCNC: 48 MG/DL (ref 0–150)
TROPONIN, HIGH SENSITIVITY: 37 NG/L (ref 0–22)
TROPONIN, HIGH SENSITIVITY: 38 NG/L (ref 0–22)
TROPONIN, HIGH SENSITIVITY: 39 NG/L (ref 0–22)
TROPONIN, HIGH SENSITIVITY: 39 NG/L (ref 0–22)
VLDLC SERPL CALC-MCNC: 10 MG/DL
WBC # BLD AUTO: 5.8 K/UL (ref 4–11)

## 2024-07-24 PROCEDURE — 93325 DOPPLER ECHO COLOR FLOW MAPG: CPT | Performed by: INTERNAL MEDICINE

## 2024-07-24 PROCEDURE — 2580000003 HC RX 258: Performed by: STUDENT IN AN ORGANIZED HEALTH CARE EDUCATION/TRAINING PROGRAM

## 2024-07-24 PROCEDURE — 96376 TX/PRO/DX INJ SAME DRUG ADON: CPT

## 2024-07-24 PROCEDURE — 93308 TTE F-UP OR LMTD: CPT

## 2024-07-24 PROCEDURE — G0378 HOSPITAL OBSERVATION PER HR: HCPCS

## 2024-07-24 PROCEDURE — 6370000000 HC RX 637 (ALT 250 FOR IP): Performed by: REGISTERED NURSE

## 2024-07-24 PROCEDURE — 80061 LIPID PANEL: CPT

## 2024-07-24 PROCEDURE — 36415 COLL VENOUS BLD VENIPUNCTURE: CPT

## 2024-07-24 PROCEDURE — 93308 TTE F-UP OR LMTD: CPT | Performed by: INTERNAL MEDICINE

## 2024-07-24 PROCEDURE — 1200000000 HC SEMI PRIVATE

## 2024-07-24 PROCEDURE — 85027 COMPLETE CBC AUTOMATED: CPT

## 2024-07-24 PROCEDURE — 99223 1ST HOSP IP/OBS HIGH 75: CPT | Performed by: INTERNAL MEDICINE

## 2024-07-24 PROCEDURE — 84484 ASSAY OF TROPONIN QUANT: CPT

## 2024-07-24 PROCEDURE — 93321 DOPPLER ECHO F-UP/LMTD STD: CPT | Performed by: INTERNAL MEDICINE

## 2024-07-24 PROCEDURE — 83735 ASSAY OF MAGNESIUM: CPT

## 2024-07-24 PROCEDURE — 6360000002 HC RX W HCPCS: Performed by: STUDENT IN AN ORGANIZED HEALTH CARE EDUCATION/TRAINING PROGRAM

## 2024-07-24 PROCEDURE — 6370000000 HC RX 637 (ALT 250 FOR IP): Performed by: INTERNAL MEDICINE

## 2024-07-24 PROCEDURE — 94760 N-INVAS EAR/PLS OXIMETRY 1: CPT

## 2024-07-24 PROCEDURE — 6370000000 HC RX 637 (ALT 250 FOR IP): Performed by: STUDENT IN AN ORGANIZED HEALTH CARE EDUCATION/TRAINING PROGRAM

## 2024-07-24 PROCEDURE — 80048 BASIC METABOLIC PNL TOTAL CA: CPT

## 2024-07-24 PROCEDURE — 84100 ASSAY OF PHOSPHORUS: CPT

## 2024-07-24 RX ORDER — SPIRONOLACTONE 25 MG/1
25 TABLET ORAL DAILY
Status: DISCONTINUED | OUTPATIENT
Start: 2024-07-24 | End: 2024-07-25 | Stop reason: HOSPADM

## 2024-07-24 RX ORDER — AMIODARONE HYDROCHLORIDE 200 MG/1
200 TABLET ORAL DAILY
Status: DISCONTINUED | OUTPATIENT
Start: 2024-07-25 | End: 2024-07-25 | Stop reason: HOSPADM

## 2024-07-24 RX ORDER — LOSARTAN POTASSIUM 50 MG/1
50 TABLET ORAL DAILY
Status: DISCONTINUED | OUTPATIENT
Start: 2024-07-24 | End: 2024-07-25 | Stop reason: HOSPADM

## 2024-07-24 RX ORDER — GABAPENTIN 100 MG/1
200 CAPSULE ORAL 3 TIMES DAILY
Status: DISCONTINUED | OUTPATIENT
Start: 2024-07-24 | End: 2024-07-25 | Stop reason: HOSPADM

## 2024-07-24 RX ORDER — ATORVASTATIN CALCIUM 10 MG/1
10 TABLET, FILM COATED ORAL NIGHTLY
Status: DISCONTINUED | OUTPATIENT
Start: 2024-07-24 | End: 2024-07-25 | Stop reason: HOSPADM

## 2024-07-24 RX ADMIN — AMIODARONE HYDROCHLORIDE 400 MG: 200 TABLET ORAL at 08:22

## 2024-07-24 RX ADMIN — GABAPENTIN 200 MG: 100 CAPSULE ORAL at 14:23

## 2024-07-24 RX ADMIN — SODIUM CHLORIDE, PRESERVATIVE FREE 10 ML: 5 INJECTION INTRAVENOUS at 08:24

## 2024-07-24 RX ADMIN — ATORVASTATIN CALCIUM 10 MG: 10 TABLET, FILM COATED ORAL at 20:24

## 2024-07-24 RX ADMIN — GABAPENTIN 600 MG: 300 CAPSULE ORAL at 08:23

## 2024-07-24 RX ADMIN — FUROSEMIDE 40 MG: 10 INJECTION, SOLUTION INTRAMUSCULAR; INTRAVENOUS at 08:23

## 2024-07-24 RX ADMIN — SODIUM CHLORIDE, PRESERVATIVE FREE 10 ML: 5 INJECTION INTRAVENOUS at 20:24

## 2024-07-24 RX ADMIN — LISINOPRIL 5 MG: 5 TABLET ORAL at 08:22

## 2024-07-24 RX ADMIN — PANTOPRAZOLE SODIUM 40 MG: 40 TABLET, DELAYED RELEASE ORAL at 06:06

## 2024-07-24 RX ADMIN — GABAPENTIN 200 MG: 100 CAPSULE ORAL at 20:24

## 2024-07-24 RX ADMIN — ZOLPIDEM TARTRATE 10 MG: 5 TABLET, COATED ORAL at 21:26

## 2024-07-24 RX ADMIN — LOSARTAN POTASSIUM 50 MG: 50 TABLET, FILM COATED ORAL at 14:23

## 2024-07-24 NOTE — PROGRESS NOTES
V2.0    [facility] [place]      Name:  Juan Mohamud /Age/Sex: 1940  (83 y.o. male)   MRN & CSN:  [id number] & [id number] Encounter Date/Time: 2024 6:07 PM EDT   Location:  [id number] PCP: Janet Garcia MD     Attending:Stephanie Garcia MD       Hospital Day: 2    Assessment and Recommendations   Juan Mohamud is a 83 y.o. male with pmh of wide complex tachycardia with defibrillator who presents with Acute congestive heart failure (HCC)      Plan:     Acute CHF:  new diagnosis  - moderate pleural effusion noted  - received morning IV lasix but held afternoon doses of lasix and spironolactone due to lower blood pressures    H/o wide complex tachycardia:  - defibrillator in place  - cont amiodarone    IBS:  cont linzess  DM2:  SSI, holding oral medications    Diet ADULT DIET; Regular; No Added Salt (3-4 gm); 1500 ml   DVT Prophylaxis [] Lovenox, []  Heparin, [] SCDs, [] Ambulation,  [] Eliquis, [] Xarelto  [] Coumadin   Code Status Full Code             Personally reviewed Lab Studies and Imaging           Subjective:     Chief Complaint: SOB    SOB improving, comfortable on RA currently      Review of Systems:      Pertinent positives and negatives discussed in HPI    Objective:     Intake/Output Summary (Last 24 hours) at 2024 1807  Last data filed at 2024 1244  Gross per 24 hour   Intake 300 ml   Output 1000 ml   Net -700 ml      Vitals:   Vitals:    24 0956 24 1112 24 1321 24 1518   BP:  104/65 104/65 (!) 90/57   Pulse:  62  63   Resp:  16  16   Temp:    97.9 °F (36.6 °C)   TempSrc:    Oral   SpO2:  92%  93%   Weight:   95.7 kg (211 lb)    Height: 1.956 m (6' 5.01\")  1.956 m (6' 5\")          Physical Exam:      General: NAD  Eyes: EOMI  ENT: neck supple  Cardiovascular: Regular rate.  Respiratory: Clear to auscultation  Gastrointestinal: Soft, non tender  Genitourinary: no suprapubic tenderness  Musculoskeletal: No edema  Skin: warm,

## 2024-07-24 NOTE — PROGRESS NOTES
Comprehensive Nutrition Assessment    Type and Reason for Visit:  Wound, Patient Education    Nutrition Recommendations/Plan:   Continue current diet with 1500 mL fluid restriction.     Malnutrition Assessment:  Malnutrition Status:  Mild malnutrition (07/24/24 1018)    Context:  Chronic Illness       Nutrition Assessment:    RD consult for diet education, assessment for wounds. Pt. admitted with heart failure. Currently receiving a MIKAELA diet 1500 mL fluid restriction. Diet acceptance is somewhat poor, Pt. endorsing poor appetite since adjusting his cardiac medications in Feb. Weight loss noted, 13% over 6 months. Weight loss appears favorable. He does attempt 3 meals each day but typically does not finish the food. He has tried several nutrition supplements but does not like them. He is not interested in supplements at this time. Encouraged protein at each meal for wound healing. Will monitor diet acceptance and nutritional adequacy while inpatient.    Nutrition Related Findings:    Na 134. LBM PTA. Skin w/ area to sacrum, Rt. arm, and Rt, shin. Wound Type: Stage I, Skin Tears, Venous Stasis       Current Nutrition Intake & Therapies:    Average Meal Intake: Unable to assess  Average Supplements Intake: None Ordered  ADULT DIET; Regular; No Added Salt (3-4 gm); 1500 ml    Anthropometric Measures:  Height: 195.6 cm (6' 5.01\")  Ideal Body Weight (IBW): 208 lbs (95 kg)       Current Body Weight: 95.8 kg (211 lb 3.2 oz), 101.5 % IBW.    Current BMI (kg/m2): 25                          BMI Categories: Overweight (BMI 25.0-29.9)    Estimated Daily Nutrient Needs:  Energy Requirements Based On: Kcal/kg  Weight Used for Energy Requirements: Current  Energy (kcal/day): 6025-5830 kcal (20-25 kcal/kg)  Weight Used for Protein Requirements: Current  Protein (g/day): 119-133 g (1.2-1.4 g/kg)  Method Used for Fluid Requirements: 1 ml/kcal  Fluid (ml/day): 240    Nutrition Diagnosis:   Increased nutrient needs related to increase

## 2024-07-24 NOTE — CONSULTS
Nutrition Note    Heart Failure Diet Education:    Per hospital protocol or consult, patient being seen for Heart Failure diet guidelines.    Learners: [x]Patient [x]Family []Caregiver [] Other: ______________  Methods: []Verbal Education  []Handouts  []Teachback     Patient's Lifestyle Questions:   Is HF a new Diagnosis? [x]Yes []No    Has patient had prior education? []Yes [x]No    Who does Grocery shopping and cooking? Pt's wife    Frequency of eating out? 1-2 x/day    Typical Eating Habits: 3 meals, not a snacker. Does not always finish meals. Breakfast is very light, typically toast and coffee.      Instructed the Patient on:   [x] High/Low Sodium foods    [x] Moderation/portion control  [x] Eating out  [x] Fluid Restriction    [x] Label reading  [] Carb Counting   [] Other:      Educational Materials Provided:   [x] Nutrition Care Manual (NCM) Heart Failure Nutrition Therapy   [] NCM Sodium (Salt) Content of Foods  [] NCM Sodium Free Flavoring Tips    [] Heart Healthy Eating Label Reading Tips   [] Healthy Eating when Eating Out  [] Controlling Your Fluids   [] Fast Food Guide (from [place] [facility] [place])  [] NCM Carbohydrate Counting for People with Diabetes   [] Managing Your Diabetes Plate Method     -Diet Recommendations: 2000 mg Sodium/day, 64 oz Fluids/day         Monitoring/Evaluation:   -Barriers: Pt. Eats out frequently. This is new for him and his wife so there will be a transitional period of using up high sodium foods to make room for lower sodium options.   -Evaluation of education: Indicates understanding.  -Expected compliance: good.      Additional Comments: Pt. Already weighs himself daily    Total time involved in patient education: 30 minutes        Electronically signed by Alina Morrell RD on 7/24/2024 at 12:06 PM     Contact: [phone]

## 2024-07-24 NOTE — PROGRESS NOTES
4 Eyes Skin Assessment     NAME:  Juan Mohamud  YOB: 1940  MEDICAL RECORD NUMBER:  [id number]    The patient is being assessed for  Admission    I agree that at least one RN has performed a thorough Head to Toe Skin Assessment on the patient. ALL assessment sites listed below have been assessed.      Areas assessed by both nurses:    Head, Face, Ears, Shoulders, Back, Chest, Arms, Elbows, Hands, Sacrum. Buttock, Coccyx, Ischium, and Legs. Feet and Heels        Does the Patient have a Wound? Yes wound(s) were present on assessment. LDA wound assessment was Initiated and completed by RN       Piero Prevention initiated by RN: No  Wound Care Orders initiated by RN: Yes    Pressure Injury (Stage 3,4, Unstageable, DTI, NWPT, and Complex wounds) if present, place Wound referral order by RN under : Yes    New Ostomies, if present place, Ostomy referral order under : No     Nurse 1 eSignature: Electronically signed by Amairani Fair RN on 7/23/24 at 9:58 PM EDT      Nurse 2 eSignature: Electronically signed by Mimi Huitron RN on 7/24/24 at 2:55 AM EDT

## 2024-07-24 NOTE — DISCHARGE INSTRUCTIONS
Extra Heart Failure Education/ Tools/ Resources:     https://[employer].com/publication/?v=628955   --- this is American Heart Association interactive Healthier Living with Heart Failure guidebook.  Please click hyperlink or copy / paste link into search bar. The QR Code is also available below. Use your mouse to scroll through the pages.  Lots of information about weight monitoring, diet tips, activity, meds, etc    Heart Failure Tools and Resources QR Code is below. It includes multiple resources to include symptom tracker, med tracker, further HF info, and access to a HF Support Network online Community    HF Monument Davonte  -- this is a free smart phone davonte available for iPhone and Android download.  Use your phone to track sodium / fluid intake, zone tool symptom tracking, weights, medications, etc. Click on this hyperlink  HF [employer]   for QR code for easy download or the link is also found in the below HF Tools and Resources.      DASH (Dietary Approach to Stop Hypertension) diet --  https://www.nhlbi.nih.gov/education/dash-eating-plan -- this diet is a flexible eating plan that promotes heart healthy eating style.  Click on hyperlink or copy / paste link into search bar.  Lots of low sodium recipes and tips.    https://www.[employer]/recipes  -- more free recipes

## 2024-07-24 NOTE — CONSULTS
[facility]  Cardiology Consult Note        CC:      CHF exacerbation             HPI:   This is a 83 y.o. 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  The patient had reported dyspnea on exertion and occasional chest pain with shortness of breath for the last 10 days with worsening.  The patient claims that he noticed that going up the stairs he was markedly short of breath which was unusual for him    Earlier this year he had presented with shortness of breath and in the ER found to have wide-complex tachycardia.  He was shocked.  I think this was thought to be due to ventricular tachycardia.  He subsequently underwent ICD placement and has been on amiodarone 400 mg daily.      His home medications include Celebrex, omeprazole, Jardiance, furosemide 20 potassium lisinopril 2.5, carvedilol 3.125, amiodarone 200 mg daily, gabapentin 303 times a day fenofibrate.      In the ER his blood pressure was 115/75 pulse was 72 oxygen saturation 92%.  He had rales in his lungs with no wheezing or rhonchi and both legs have edema.  Hemoglobin was 13, BNP 2190, creatinine 0.9.  Chest x-ray showed cardiomegaly with pulmonary edema and small bilateral pleural effusions.  He was diagnosed with acute CHF and was given furosemide.    Past Medical History:   Diagnosis Date    Diabetes mellitus (HCC)     diet controlled    Hyperlipidemia     Indigestion     Insomnia     Macular degeneration     MI, 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    Neuropathy     toes partially on both feet      Past Surgical History:   Procedure Laterality Date    COLONOSCOPY      COLONOSCOPY N/A 2/29/2024    COLONOSCOPY FLEXIBLE DECOMPRESSION performed by Eitan Pino MD at [facility] ENDOSCOPY    COLONOSCOPY N/A 3/6/2024    COLONOSCOPY WITH DECOMPRESSION performed by Anders Cervantes MD at [facility] ENDOSCOPY    FRACTURE SURGERY Left

## 2024-07-24 NOTE — ACP (ADVANCE CARE PLANNING)
Advance Care Planning     Advance Care Planning Activator (Inpatient)  Conversation Note      Date of ACP Conversation: 7/24/2024     Conversation Conducted with: Patient with Decision Making Capacity    ACP Activator: Pati Tovar RN    Health Care Decision Maker:     Current Designated Health Care Decision Maker:     Primary Decision Maker: Lizzie Holbrook - Spouse - [phone]    Secondary Decision Maker: Lito Holbrook - Child - [phone]    Secondary Decision Maker: Shane holbrook - Child    Care Preferences    Ventilation:  \"If you were in your present state of health and suddenly became very ill and were unable to breathe on your own, what would your preference be about the use of a ventilator (breathing machine) if it were available to you?\"      Would the patient desire the use of ventilator (breathing machine)?: yes    \"If your health worsens and it becomes clear that your chance of recovery is unlikely, what would your preference be about the use of a ventilator (breathing machine) if it were available to you?\"     Would the patient desire the use of ventilator (breathing machine)?: No      Resuscitation  \"CPR works best to restart the heart when there is a sudden event, like a heart attack, in someone who is otherwise healthy. Unfortunately, CPR does not typically restart the heart for people who have serious health conditions or who are very sick.\"    \"In the event your heart stopped as a result of an underlying serious health condition, would you want attempts to be made to restart your heart (answer \"yes\" for attempt to resuscitate) or would you prefer a natural death (answer \"no\" for do not attempt to resuscitate)?\" yes       [] Yes   [x] No   Educated Patient / Decision Maker regarding differences between Advance Directives and portable DNR orders.    Length of ACP Conversation in minutes:  3    Conversation Outcomes:  ACP discussion completed    Follow-up plan:    [] Schedule

## 2024-07-24 NOTE — PROGRESS NOTES
Bp 104/65 HR 62, Per md simpson to hold spirolactone.  Electronically signed by Khloe Reeves RN on 7/24/2024 at 11:23 AM    1630- Bp 90/57, per md simpson to hold lasix.  Electronically signed by Khloe Reeves RN on 7/24/2024 at 4:31 PM

## 2024-07-24 NOTE — CARE COORDINATION
Case Management Assessment  Initial Evaluation    Date/Time of Evaluation: 7/24/2024 3:08 PM  Assessment Completed by: Pati Tovar RN    If patient is discharged prior to next notation, then this note serves as note for discharge by case management.    Patient Name: Juan Holbrook                     YOB: 1940  Diagnosis: Heart failure (HCC) [I50.9]  Acute congestive heart failure, unspecified heart failure type (HCC) [I50.9]                     Date / Time: 7/23/2024  2:45 PM    Patient Admission Status: Inpatient   Readmission Risk (Low < 19, Mod (19-27), High > 27): Readmission Risk Score: 15.2    Current PCP: Janet Garcia MD  PCP verified by CM? (P) Yes    Chart Reviewed: Yes      History Provided by: Patient  Patient Orientation: Alert and Oriented    Patient Cognition: Alert    Hospitalization in the last 30 days (Readmission):  No    If yes, Readmission Assessment in CM Navigator will be completed.    Advance Directives:      Code Status: Full Code   Patient's Primary Decision Maker is: Legal Next of Kin    Primary Decision Maker: Lizzie Holbrook - Spouse - [phone]    Secondary Decision Maker: Lito Holbrook Child - [phone]    Secondary Decision Maker: Shane holbrook - Child    Discharge Planning:    Patient lives with: (P) Spouse/Significant Other Type of Home: (P) House (7 down to enter)  Primary Care Giver: Self  Patient Support Systems include: Spouse/Significant Other   Current Financial resources: (P) Medicare  Current community resources: (P) None  Current services prior to admission: (P) Durable Medical Equipment            Current DME: (P) Cane            Type of Home Care services:  (P) None    ADLS  Prior functional level: (P) Assistance with the following:, Cooking, Shopping  Current functional level: (P) Assistance with the following:, Cooking, Shopping    PT AM-PAC:   /24  OT AM-PAC:   /24    Family can provide assistance at DC: (P)

## 2024-07-24 NOTE — PROGRESS NOTES
Patient refused Enoxaparin shot and asprin even after giving him education on the importance of those medicine. Dressing done on RLE. Maintained on Acewrap BLE. Troponin result and refusal of his blood thinners relayed to Karen Slade      Electronically signed by Amairani Fair RN on 7/23/2024 at 10:22 PM    none

## 2024-07-24 NOTE — PLAN OF CARE
Problem: Discharge Planning  Goal: Discharge to home or other facility with appropriate resources  7/24/2024 1925 by Amairani Fair RN  Outcome: Progressing     Problem: Pain  Goal: Verbalizes/displays adequate comfort level or baseline comfort level  7/24/2024 1925 by Amairani Fair RN  Outcome: Progressing     Problem: Safety - Adult  Goal: Free from fall injury  7/24/2024 1925 by Amairani Fair RN  Outcome: Progressing     Problem: Chronic Conditions and Co-morbidities  Goal: Patient's chronic conditions and co-morbidity symptoms are monitored and maintained or improved  7/24/2024 1925 by Amairani Fair RN  Outcome: Progressing     Problem: Skin/Tissue Integrity  Goal: Absence of new skin breakdown  Description: 1.  Monitor for areas of redness and/or skin breakdown  2.  Assess vascular access sites hourly  3.  Every 4-6 hours minimum:  Change oxygen saturation probe site  4.  Every 4-6 hours:  If on nasal continuous positive airway pressure, respiratory therapy assess nares and determine need for appliance change or resting period.  Outcome: Progressing

## 2024-07-25 VITALS
HEIGHT: 77 IN | HEART RATE: 66 BPM | RESPIRATION RATE: 18 BRPM | TEMPERATURE: 97.5 F | OXYGEN SATURATION: 94 % | WEIGHT: 205.4 LBS | SYSTOLIC BLOOD PRESSURE: 121 MMHG | DIASTOLIC BLOOD PRESSURE: 75 MMHG | BODY MASS INDEX: 24.25 KG/M2

## 2024-07-25 LAB
ANION GAP SERPL CALCULATED.3IONS-SCNC: 9 MMOL/L (ref 3–16)
BUN SERPL-MCNC: 18 MG/DL (ref 7–20)
CALCIUM SERPL-MCNC: 8.5 MG/DL (ref 8.3–10.6)
CHLORIDE SERPL-SCNC: 95 MMOL/L (ref 99–110)
CO2 SERPL-SCNC: 31 MMOL/L (ref 21–32)
CREAT SERPL-MCNC: 0.8 MG/DL (ref 0.8–1.3)
DEPRECATED RDW RBC AUTO: 16.2 % (ref 12.4–15.4)
GFR SERPLBLD CREATININE-BSD FMLA CKD-EPI: 88 ML/MIN/{1.73_M2}
GLUCOSE BLD-MCNC: 91 MG/DL (ref 70–99)
GLUCOSE SERPL-MCNC: 87 MG/DL (ref 70–99)
HCT VFR BLD AUTO: 39.5 % (ref 40.5–52.5)
HGB BLD-MCNC: 13.2 G/DL (ref 13.5–17.5)
MAGNESIUM SERPL-MCNC: 2 MG/DL (ref 1.8–2.4)
MCH RBC QN AUTO: 29.3 PG (ref 26–34)
MCHC RBC AUTO-ENTMCNC: 33.4 G/DL (ref 31–36)
MCV RBC AUTO: 87.6 FL (ref 80–100)
PERFORMED ON: NORMAL
PHOSPHATE SERPL-MCNC: 3.9 MG/DL (ref 2.5–4.9)
PLATELET # BLD AUTO: 159 K/UL (ref 135–450)
PMV BLD AUTO: 9.9 FL (ref 5–10.5)
POTASSIUM SERPL-SCNC: 3.7 MMOL/L (ref 3.5–5.1)
RBC # BLD AUTO: 4.51 M/UL (ref 4.2–5.9)
SODIUM SERPL-SCNC: 135 MMOL/L (ref 136–145)
TROPONIN, HIGH SENSITIVITY: 36 NG/L (ref 0–22)
TROPONIN, HIGH SENSITIVITY: 39 NG/L (ref 0–22)
WBC # BLD AUTO: 6.2 K/UL (ref 4–11)

## 2024-07-25 PROCEDURE — 94760 N-INVAS EAR/PLS OXIMETRY 1: CPT

## 2024-07-25 PROCEDURE — 85027 COMPLETE CBC AUTOMATED: CPT

## 2024-07-25 PROCEDURE — 99233 SBSQ HOSP IP/OBS HIGH 50: CPT | Performed by: INTERNAL MEDICINE

## 2024-07-25 PROCEDURE — 83735 ASSAY OF MAGNESIUM: CPT

## 2024-07-25 PROCEDURE — 6370000000 HC RX 637 (ALT 250 FOR IP): Performed by: STUDENT IN AN ORGANIZED HEALTH CARE EDUCATION/TRAINING PROGRAM

## 2024-07-25 PROCEDURE — 36415 COLL VENOUS BLD VENIPUNCTURE: CPT

## 2024-07-25 PROCEDURE — G0378 HOSPITAL OBSERVATION PER HR: HCPCS

## 2024-07-25 PROCEDURE — 84100 ASSAY OF PHOSPHORUS: CPT

## 2024-07-25 PROCEDURE — 6370000000 HC RX 637 (ALT 250 FOR IP): Performed by: INTERNAL MEDICINE

## 2024-07-25 PROCEDURE — 80048 BASIC METABOLIC PNL TOTAL CA: CPT

## 2024-07-25 PROCEDURE — 84484 ASSAY OF TROPONIN QUANT: CPT

## 2024-07-25 RX ORDER — LOSARTAN POTASSIUM 50 MG/1
50 TABLET ORAL DAILY
Qty: 30 TABLET | Refills: 3 | Status: SHIPPED | OUTPATIENT
Start: 2024-07-25

## 2024-07-25 RX ORDER — ATORVASTATIN CALCIUM 10 MG/1
10 TABLET, FILM COATED ORAL NIGHTLY
Qty: 30 TABLET | Refills: 3 | Status: SHIPPED | OUTPATIENT
Start: 2024-07-25

## 2024-07-25 RX ORDER — METOPROLOL SUCCINATE 25 MG/1
25 TABLET, EXTENDED RELEASE ORAL DAILY
Status: DISCONTINUED | OUTPATIENT
Start: 2024-07-25 | End: 2024-07-25 | Stop reason: HOSPADM

## 2024-07-25 RX ORDER — GABAPENTIN 100 MG/1
200 CAPSULE ORAL 3 TIMES DAILY
Qty: 360 CAPSULE | Refills: 0 | Status: SHIPPED | OUTPATIENT
Start: 2024-07-25 | End: 2024-09-23

## 2024-07-25 RX ORDER — TORSEMIDE 20 MG/1
20 TABLET ORAL DAILY
Qty: 30 TABLET | Refills: 3 | Status: SHIPPED | OUTPATIENT
Start: 2024-07-25

## 2024-07-25 RX ORDER — TORSEMIDE 20 MG/1
20 TABLET ORAL DAILY
Status: DISCONTINUED | OUTPATIENT
Start: 2024-07-25 | End: 2024-07-25 | Stop reason: HOSPADM

## 2024-07-25 RX ORDER — SPIRONOLACTONE 25 MG/1
25 TABLET ORAL DAILY
Qty: 30 TABLET | Refills: 3 | Status: SHIPPED | OUTPATIENT
Start: 2024-07-25

## 2024-07-25 RX ORDER — AMIODARONE HYDROCHLORIDE 200 MG/1
200 TABLET ORAL DAILY
Qty: 30 TABLET | Refills: 3 | Status: SHIPPED | OUTPATIENT
Start: 2024-07-25

## 2024-07-25 RX ORDER — SIMETHICONE 80 MG
80 TABLET,CHEWABLE ORAL EVERY 6 HOURS PRN
Qty: 180 TABLET | Refills: 3 | Status: SHIPPED | OUTPATIENT
Start: 2024-07-25

## 2024-07-25 RX ADMIN — SPIRONOLACTONE 25 MG: 25 TABLET ORAL at 10:38

## 2024-07-25 RX ADMIN — AMIODARONE HYDROCHLORIDE 200 MG: 200 TABLET ORAL at 10:38

## 2024-07-25 RX ADMIN — TORSEMIDE 20 MG: 20 TABLET ORAL at 10:38

## 2024-07-25 RX ADMIN — GABAPENTIN 200 MG: 100 CAPSULE ORAL at 10:38

## 2024-07-25 RX ADMIN — METOPROLOL SUCCINATE 25 MG: 25 TABLET, EXTENDED RELEASE ORAL at 10:38

## 2024-07-25 RX ADMIN — PANTOPRAZOLE SODIUM 40 MG: 40 TABLET, DELAYED RELEASE ORAL at 05:42

## 2024-07-25 RX ADMIN — LOSARTAN POTASSIUM 50 MG: 50 TABLET, FILM COATED ORAL at 10:38

## 2024-07-25 RX ADMIN — EMPAGLIFLOZIN 10 MG: 10 TABLET, FILM COATED ORAL at 10:38

## 2024-07-25 NOTE — PLAN OF CARE
Problem: Discharge Planning  Goal: Discharge to home or other facility with appropriate resources  Outcome: Completed     Problem: Pain  Goal: Verbalizes/displays adequate comfort level or baseline comfort level  Outcome: Completed     Problem: Safety - Adult  Goal: Free from fall injury  Outcome: Completed     Problem: Chronic Conditions and Co-morbidities  Goal: Patient's chronic conditions and co-morbidity symptoms are monitored and maintained or improved  Outcome: Completed     Problem: Nutrition Deficit:  Goal: Optimize nutritional status  Outcome: Completed     Problem: Skin/Tissue Integrity  Goal: Absence of new skin breakdown  Description: 1.  Monitor for areas of redness and/or skin breakdown  2.  Assess vascular access sites hourly  3.  Every 4-6 hours minimum:  Change oxygen saturation probe site  4.  Every 4-6 hours:  If on nasal continuous positive airway pressure, respiratory therapy assess nares and determine need for appliance change or resting period.  Outcome: Completed

## 2024-07-25 NOTE — CONSULTS
[facility]  HEART FAILURE PROGRAM      NAME:  Juan Mhoamud  MEDICAL RECORD NUMBER:  [id number]  AGE: 83 y.o.   GENDER: male  : 1940  ADMIT DATE: 2024  TODAY'S DATE:  2024    Subjective:     VISIT TYPE: evaluation     ADMITTING PHYSICIAN:  No admitting provider for patient encounter.    Code Status: Full Code    PAST MEDICAL HISTORY        Diagnosis Date    Diabetes mellitus (HCC)     diet controlled    Hyperlipidemia     Indigestion     Insomnia     Macular degeneration     MI, 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    Neuropathy     toes partially on both feet       SOCIAL HISTORY    Social History     Tobacco Use    Smoking status: Never    Smokeless tobacco: Never   Substance Use Topics    Alcohol use: Yes     Comment: very seldom    Drug use: No         MEDICATIONS  Scheduled Meds:   torsemide  20 mg Oral Daily    empagliflozin  10 mg Oral Daily    metoprolol succinate  25 mg Oral Daily    gabapentin  200 mg Oral TID    atorvastatin  10 mg Oral Nightly    spironolactone  25 mg Oral Daily    losartan  50 mg Oral Daily    amiodarone  200 mg Oral Daily    pantoprazole  40 mg Oral QAM AC    sodium chloride flush  5-40 mL IntraVENous 2 times per day    enoxaparin  40 mg SubCUTAneous Q24H    insulin lispro  0-4 Units SubCUTAneous TID WC    insulin lispro  0-4 Units SubCUTAneous Nightly         Objective:     ADMISSION DIAGNOSIS:   Heart failure (HCC) [I50.9]  Acute congestive heart failure, unspecified heart failure type (HCC) [I50.9]     /75   Pulse 66   Temp 97.5 °F (36.4 °C) (Oral)   Resp 18   Ht 1.956 m (6' 5\")   Wt 93.2 kg (205 lb 6.4 oz)   SpO2 94%   BMI 24.36 kg/m²     ADMIT:  Weight - Scale: 98.4 kg (217 lb)    TODAY: Weight - Scale: 93.2 kg (205 lb 6.4 oz)    Wt Readings from Last 5 Encounters:   24 93.2 kg (205 lb 6.4 oz)   24 99.6 kg (219 lb 9.3 oz)   24 101.2 kg (223 lb)   24 101.6 kg (223 lb 15.8 oz)   24 107.4 kg (236 lb 12.4 oz)

## 2024-07-25 NOTE — DISCHARGE SUMMARY
V2.0  Discharge Summary    Name:  Juan Mohamud /Age/Sex: 1940 (83 y.o. male)   Admit Date: 2024  Discharge Date: 24    MRN & CSN:  [id number] & [id number] Encounter Date and Time 24 10:32 AM EDT    Attending:  Peyton Li MD Discharging Provider: Esperanza Posey APRN - CNP       Hospital Course:     Brief HPI: Juan Mohamud is a 83 y.o. male who presented with pmh of wide complex tachycardia with defibrillator who presents with Acute congestive heart failure. He presented with shortness of breath with elevated proBNP suggestive of acute CHF.     Brief Problem Based Course:     Acute congestive heart failure   Presented to ED with complaints of shortness of breath  : CXR: Cardiomegaly with pulmonary edema and small bilateral pleural effusions   : Pro-BNP 2190  : Echocardiogram: LVEF 50-55%  Received IV lasix with good diuresis 98.4 kg on admission, today 93.2 kg  Cardiology consulted with recommendations below   Patient is on high doses Celebrex and Gabapentin which can contribute to fluid retention. Gabapentin dose decreased to 200 mg BID from 600 mg TID.   Amiodarone decreased to 200 mg daily; home dose was too high.  Cardiology discharged him on losartan, spironolactone, Jardiance, Toprol-XL, torsemide, amiodarone 200 daily. Atorvastatin 10 mg daily.  Switched lisinopril to Losartan due to cough  Follow up with Cardiology in 1-2 weeks. Instructed to continue daily weights and fluid restriction     History of wide-complex tachycardia with cardiac defibrillator in place  Amiodarone dose decreased to 200 mg daily per Cardiology     Peripheral neuropathy  Gabapentin dose decreased to 200 mg TID concerning to fluid retention. Instructed to follow up with PCP in 1 week    BPH  Continue home alfuzosin     The patient expressed appropriate understanding of, and agreement with the discharge recommendations, medications, and plan.     Consults this admission:  BETSY  Date/Time    CHOL 114 07/24/2024 05:04 AM    HDL 57 07/24/2024 05:04 AM    TRIG 48 07/24/2024 05:04 AM     Hemoglobin A1C:   Lab Results   Component Value Date/Time    LABA1C 5.0 07/23/2024 07:13 PM     TSH:   Lab Results   Component Value Date/Time    TSH 2.73 04/01/2024 05:01 AM    TSH 2.96 02/28/2024 10:44 PM     Troponin: No results found for: \"TROPONINT\"  Lactic Acid: No results for input(s): \"LACTA\" in the last 72 hours.  BNP:   Recent Labs     07/23/24  1505   PROBNP 2,190*     UA:  Lab Results   Component Value Date/Time    NITRU Negative 03/27/2024 12:46 PM    COLORU DARK YELLOW 03/27/2024 12:46 PM    PHUR 5.0 03/27/2024 12:46 PM    WBCUA 145 03/27/2024 12:46 PM    RBCUA 2 03/27/2024 12:46 PM    BACTERIA 4+ 03/27/2024 12:46 PM    CLARITYU CLOUDY 03/27/2024 12:46 PM    LEUKOCYTESUR MODERATE 03/27/2024 12:46 PM    UROBILINOGEN 1.0 03/27/2024 12:46 PM    BILIRUBINUR Negative 03/27/2024 12:46 PM    BLOODU MODERATE 03/27/2024 12:46 PM    GLUCOSEU >=1000 03/27/2024 12:46 PM    KETUA TRACE 03/27/2024 12:46 PM     Urine Cultures:   Lab Results   Component Value Date/Time    LABURIN >100,000 CFU/ml 03/27/2024 12:46 PM     Blood Cultures:   Lab Results   Component Value Date/Time    BC No Growth after 4 days of incubation. 03/30/2024 08:38 AM     Lab Results   Component Value Date/Time    BLOODCULT2 No Growth after 4 days of incubation. 03/30/2024 08:38 AM     Organism:   Lab Results   Component Value Date/Time    ORG Klebsiella pneumoniae 03/27/2024 12:46 PM       Time Spent Discharging patient 40 minutes    Electronically signed by TIP English CNP on 7/25/2024 at 10:32 AM

## 2024-07-25 NOTE — PROGRESS NOTES
Discharge instructions given. Verbalizes understanding. IV removed with catheter intact. Wheeled to front lobby in stable condition.

## 2024-07-25 NOTE — PROGRESS NOTES
[facility]  Cardiology Progress Note        CC:      CHF exacerbation             HPI:   This is a 83 y.o. 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  The patient had reported dyspnea on exertion and occasional chest pain with shortness of breath for the last 10 days with worsening.  The patient claims that he noticed that going up the stairs he was markedly short of breath which was unusual for him    Earlier this year he had presented with shortness of breath and in the ER found to have wide-complex tachycardia.  He was shocked.  I think this was thought to be due to ventricular tachycardia.  He subsequently underwent ICD placement and has been on amiodarone 400 mg daily.      His home medications include Celebrex, omeprazole, Jardiance, furosemide 20 potassium lisinopril 2.5, carvedilol 3.125, amiodarone 200 mg daily, gabapentin 303 times a day fenofibrate.      In the ER his blood pressure was 115/75 pulse was 72 oxygen saturation 92%.  He had rales in his lungs with no wheezing or rhonchi and both legs have edema.  Hemoglobin was 13, BNP 2190, creatinine 0.9.  Chest x-ray showed cardiomegaly with pulmonary edema and small bilateral pleural effusions.  He was diagnosed with acute CHF and was given furosemide.    Interval history  Laying flat.  No breathing issues  Did not sleep well last night because he was woken up many times  Wants to go home  Has had a good diuresis with weight loss      Past Medical History:   Diagnosis Date    Diabetes mellitus (HCC)     diet controlled    Hyperlipidemia     Indigestion     Insomnia     Macular degeneration     MI, 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    Neuropathy     toes partially on both feet      Past Surgical History:   Procedure Laterality Date    COLONOSCOPY      COLONOSCOPY N/A 2/29/2024    COLONOSCOPY FLEXIBLE DECOMPRESSION  mg, 40 mg, SubCUTAneous, Q24H  potassium chloride (KLOR-CON M) extended release tablet 40 mEq, 40 mEq, Oral, PRN **OR** potassium bicarb-citric acid (EFFER-K) effervescent tablet 40 mEq, 40 mEq, Oral, PRN **OR** potassium chloride 10 mEq/100 mL IVPB (Peripheral Line), 10 mEq, IntraVENous, PRN  magnesium sulfate 2000 mg in 50 mL IVPB premix, 2,000 mg, IntraVENous, PRN  perflutren lipid microspheres (DEFINITY) injection 1.5 mL, 1.5 mL, IntraVENous, ONCE PRN  glucose chewable tablet 16 g, 4 tablet, Oral, PRN  dextrose bolus 10% 125 mL, 125 mL, IntraVENous, PRN **OR** dextrose bolus 10% 250 mL, 250 mL, IntraVENous, PRN  glucagon injection 1 mg, 1 mg, SubCUTAneous, PRN  dextrose 10 % infusion, , IntraVENous, Continuous PRN  insulin lispro (HUMALOG,ADMELOG) injection vial 0-4 Units, 0-4 Units, SubCUTAneous, TID WC  insulin lispro (HUMALOG,ADMELOG) injection vial 0-4 Units, 0-4 Units, SubCUTAneous, Nightly  zolpidem (AMBIEN) tablet 10 mg, 10 mg, Oral, Nightly PRN      Labs:   Recent Labs     07/24/24  0504 07/25/24  0455   WBC 5.8 6.2   HGB 12.7* 13.2*   HCT 38.6* 39.5*    159     Recent Labs     07/24/24  0504 07/25/24  0455   * 135*   K 3.7 3.7   CO2 28 31   BUN 17 18   CREATININE 0.9 0.8   GLUCOSE 81 87     No results for input(s): \"BNP\" in the last 72 hours.  No results for input(s): \"PROTIME\", \"INR\" in the last 72 hours.  No results for input(s): \"APTT\" in the last 72 hours.  No results for input(s): \"CKTOTAL\", \"CKMB\", \"CKMBINDEX\", \"TROPONINI\" in the last 72 hours.  Lab Results   Component Value Date/Time    HDL 57 07/24/2024 05:04 AM    TRIG 48 07/24/2024 05:04 AM     Recent Labs     07/23/24  1505   AST 25   ALT 14         EKG:   Sinus rhythm with left bundle branch block    ECHO: 3/2024  EF 45-50 %.  Pacemaker wire in the right ventricle    Corornary angiogram  & Intervention: 3/2024  Normal coronary arteries      ASSESSMENT AND PLAN:      Congestive heart failure  83-year-old patient with normal

## 2024-07-25 NOTE — CARE COORDINATION
DISCHARGE SUMMARY     DATE OF DISCHARGE: 7/25/24    DISCHARGE DESTINATION: Home with wife    HOME CARE: No    HEMODIALYSIS: No    TRANSPORTATION: Private Car    NEW DME ORDERED: no    COMMENTS: Discharge to home with wife. AVS reviewed/no discharge needs noted.Electronically signed by Pati Tovar RN on 7/25/2024 at 10:53 AM

## 2024-07-26 NOTE — PROGRESS NOTES
Physician Progress Note      PATIENT:               VINNY JOYCE  CSN #:                  [id number]  :                       1940  ADMIT DATE:       2024 2:45 PM  DISCH DATE:        2024 11:46 AM  RESPONDING  PROVIDER #:        NATHALIE BARBOSA          QUERY TEXT:    Pt admitted with acute CHF. If possible, please document in progress notes and   discharge summary further specificity regarding the type of CHF:    The medical record reflects the following:  Risk Factors: HTN, diabetes, IBS  Clinical Indicators:  Pro-BNP 2190. Per discharge summary \"Acute congestive   heart failure. Echocardiogram: LVEF 50-55. Received IV lasix with good   diuresis\".  Treatment: Echo, Cardiology consult, IV lasix, I&O's, daily weights, serial   labs, supportive care.    Thank you,  Altagracia Winchester RN BSN  Options provided:  -- Acute Systolic CHF/HFrEF  -- Acute Diastolic CHF/HFpEF  -- Acute Systolic and Diastolic CHF  -- Other - I will add my own diagnosis  -- Disagree - Not applicable / Not valid  -- Disagree - Clinically unable to determine / Unknown  -- Refer to Clinical Documentation Reviewer    PROVIDER RESPONSE TEXT:    This patient is in acute systolic and diastolic CHF.    Query created by: Altagracia Winchester on 2024 12:13 PM      Electronically signed by:  NATHALIE BARBOSA 2024 5:51 AM

## 2024-07-29 ENCOUNTER — FOLLOWUP TELEPHONE ENCOUNTER (OUTPATIENT)
Dept: ADMINISTRATIVE | Age: 84
End: 2024-07-29

## 2024-07-29 NOTE — PROGRESS NOTES
Late entry. 7/28/24  Spoke with pt while away from care path. He denies sob, swelling. He continues to weight himself each am, today weighing 201lbs. He is managing fine with his diet/fluid restrictions. He is planning on attending his MD appointments, 8/1, and 8/5  ( PCP, Cardiology )  He had no trouble getting his meds, and denies questions.
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
## 2024-08-05 ENCOUNTER — NURSE ONLY (OUTPATIENT)
Dept: CARDIOLOGY CLINIC | Age: 84
End: 2024-08-05

## 2024-08-05 ENCOUNTER — OFFICE VISIT (OUTPATIENT)
Dept: CARDIOLOGY CLINIC | Age: 84
End: 2024-08-05
Payer: MEDICARE

## 2024-08-05 VITALS
SYSTOLIC BLOOD PRESSURE: 90 MMHG | OXYGEN SATURATION: 93 % | DIASTOLIC BLOOD PRESSURE: 58 MMHG | WEIGHT: 201 LBS | HEART RATE: 65 BPM | BODY MASS INDEX: 23.84 KG/M2

## 2024-08-05 DIAGNOSIS — Z09 HOSPITAL DISCHARGE FOLLOW-UP: ICD-10-CM

## 2024-08-05 DIAGNOSIS — Z95.810 CARDIAC DEFIBRILLATOR IN SITU: Primary | ICD-10-CM

## 2024-08-05 DIAGNOSIS — R00.0 WIDE-COMPLEX TACHYCARDIA: ICD-10-CM

## 2024-08-05 DIAGNOSIS — I42.9 CARDIOMYOPATHY, UNSPECIFIED TYPE (HCC): ICD-10-CM

## 2024-08-05 PROCEDURE — 99214 OFFICE O/P EST MOD 30 MIN: CPT | Performed by: INTERNAL MEDICINE

## 2024-08-05 PROCEDURE — 1111F DSCHRG MED/CURRENT MED MERGE: CPT | Performed by: INTERNAL MEDICINE

## 2024-08-05 PROCEDURE — 93000 ELECTROCARDIOGRAM COMPLETE: CPT | Performed by: INTERNAL MEDICINE

## 2024-08-05 PROCEDURE — 1123F ACP DISCUSS/DSCN MKR DOCD: CPT | Performed by: INTERNAL MEDICINE

## 2024-08-05 PROCEDURE — 3078F DIAST BP <80 MM HG: CPT | Performed by: INTERNAL MEDICINE

## 2024-08-05 PROCEDURE — 3074F SYST BP LT 130 MM HG: CPT | Performed by: INTERNAL MEDICINE

## 2024-08-05 RX ORDER — TORSEMIDE 20 MG/1
20 TABLET ORAL SEE ADMIN INSTRUCTIONS
Qty: 30 TABLET | Refills: 3 | Status: SHIPPED | OUTPATIENT
Start: 2024-08-05

## 2024-08-19 DIAGNOSIS — I42.9 CARDIOMYOPATHY, UNSPECIFIED TYPE (HCC): ICD-10-CM

## 2024-08-19 LAB
ALBUMIN SERPL-MCNC: 3.6 G/DL (ref 3.4–5)
ANION GAP SERPL CALCULATED.3IONS-SCNC: 14 MMOL/L (ref 3–16)
BUN SERPL-MCNC: 17 MG/DL (ref 7–20)
CALCIUM SERPL-MCNC: 8.2 MG/DL (ref 8.3–10.6)
CHLORIDE SERPL-SCNC: 98 MMOL/L (ref 99–110)
CO2 SERPL-SCNC: 26 MMOL/L (ref 21–32)
CREAT SERPL-MCNC: 1.1 MG/DL (ref 0.8–1.3)
GFR SERPLBLD CREATININE-BSD FMLA CKD-EPI: 67 ML/MIN/{1.73_M2}
GLUCOSE SERPL-MCNC: 170 MG/DL (ref 70–99)
PHOSPHATE SERPL-MCNC: 3.7 MG/DL (ref 2.5–4.9)
POTASSIUM SERPL-SCNC: 4.6 MMOL/L (ref 3.5–5.1)
SODIUM SERPL-SCNC: 138 MMOL/L (ref 136–145)

## 2024-10-23 ENCOUNTER — TELEPHONE (OUTPATIENT)
Dept: CARDIOLOGY CLINIC | Age: 84
End: 2024-10-23

## 2024-10-23 PROCEDURE — 93296 REM INTERROG EVL PM/IDS: CPT | Performed by: INTERNAL MEDICINE

## 2024-10-23 PROCEDURE — 93295 DEV INTERROG REMOTE 1/2/MLT: CPT | Performed by: INTERNAL MEDICINE

## 2024-10-23 NOTE — TELEPHONE ENCOUNTER
CARDIAC CLEARANCE     What type of procedure are you having? Right repeat carpel tunnel release, right cubital tunnel release, right cubital tunnel releases submuscular transposition ulner nerve tendon longthoning for cubital tunnel    Which physician is performing your procedure? Dr. Hamlet Koch    When is your procedure scheduled? 10/24/2024    Where are you having this procedure?[place]    Are you taking Blood Thinners? No   If so what? (Name/dose/frequesncy)    Does the surgeon want you to stop your blood thinner?  If so for how long? No    Are you having any new or worsening cardiac symptoms?     Phone Number and Contact Name for Physicians office: Karen ROJAS [phone]    Fax number to send information: [phone]    Cardiac History:  Last office visit with Cardiologist: 08/05/2024    HARMAN GAMEZ M     Requesting last office visit note

## 2024-10-24 NOTE — TELEPHONE ENCOUNTER
This states the surgery is today. Is that correct? I looked for it under media but do not see the original request.

## 2024-10-25 NOTE — TELEPHONE ENCOUNTER
Called Shanta ROAJS at [phone] to see if pt has had his surgery. She's out of the office until Monday 10/28/2024. I wasn't able to leave a voice message no voicemail available.

## 2024-11-03 ENCOUNTER — HOSPITAL ENCOUNTER (INPATIENT)
Age: 84
LOS: 1 days | Discharge: ANOTHER HOSPITAL | DRG: 388 | End: 2024-11-07
Attending: EMERGENCY MEDICINE | Admitting: INTERNAL MEDICINE
Payer: MEDICARE

## 2024-11-03 ENCOUNTER — APPOINTMENT (OUTPATIENT)
Dept: CT IMAGING | Age: 84
DRG: 388 | End: 2024-11-03
Payer: MEDICARE

## 2024-11-03 DIAGNOSIS — R53.1 GENERAL WEAKNESS: Primary | ICD-10-CM

## 2024-11-03 DIAGNOSIS — R81 GLUCOSURIA: ICD-10-CM

## 2024-11-03 DIAGNOSIS — Z71.89 GOALS OF CARE, COUNSELING/DISCUSSION: ICD-10-CM

## 2024-11-03 DIAGNOSIS — E87.1 HYPONATREMIA: ICD-10-CM

## 2024-11-03 DIAGNOSIS — E87.8 HYPOCHLOREMIA: ICD-10-CM
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
## 2024-11-03 LAB
ANION GAP SERPL CALCULATED.3IONS-SCNC: 11 MMOL/L (ref 3–16)
BACTERIA URNS QL MICRO: ABNORMAL /HPF
BASOPHILS # BLD: 0 K/UL (ref 0–0.2)
BASOPHILS NFR BLD: 0.6 %
BILIRUB UR QL STRIP.AUTO: NEGATIVE
BUN SERPL-MCNC: 21 MG/DL (ref 7–20)
CALCIUM SERPL-MCNC: 8.6 MG/DL (ref 8.3–10.6)
CHLORIDE SERPL-SCNC: 96 MMOL/L (ref 99–110)
CLARITY UR: CLEAR
CO2 SERPL-SCNC: 23 MMOL/L (ref 21–32)
COLOR UR: ABNORMAL
CREAT SERPL-MCNC: 1.1 MG/DL (ref 0.8–1.3)
DEPRECATED RDW RBC AUTO: 16.4 % (ref 12.4–15.4)
EOSINOPHIL # BLD: 0 K/UL (ref 0–0.6)
EOSINOPHIL NFR BLD: 0.2 %
EPI CELLS #/AREA URNS AUTO: 2 /HPF (ref 0–5)
GFR SERPLBLD CREATININE-BSD FMLA CKD-EPI: 67 ML/MIN/{1.73_M2}
GLUCOSE BLD-MCNC: 98 MG/DL (ref 70–99)
GLUCOSE SERPL-MCNC: 109 MG/DL (ref 70–99)
GLUCOSE UR STRIP.AUTO-MCNC: >=1000 MG/DL
HCT VFR BLD AUTO: 36.4 % (ref 40.5–52.5)
HGB BLD-MCNC: 12.1 G/DL (ref 13.5–17.5)
HGB UR QL STRIP.AUTO: ABNORMAL
HYALINE CASTS #/AREA URNS AUTO: 6 /LPF (ref 0–8)
KETONES UR STRIP.AUTO-MCNC: ABNORMAL MG/DL
LEUKOCYTE ESTERASE UR QL STRIP.AUTO: NEGATIVE
LYMPHOCYTES # BLD: 0.6 K/UL (ref 1–5.1)
LYMPHOCYTES NFR BLD: 6.7 %
MCH RBC QN AUTO: 29.6 PG (ref 26–34)
MCHC RBC AUTO-ENTMCNC: 33.2 G/DL (ref 31–36)
MCV RBC AUTO: 88.9 FL (ref 80–100)
MONOCYTES # BLD: 1.2 K/UL (ref 0–1.3)
MONOCYTES NFR BLD: 13.9 %
MUCUS: PRESENT
NEUTROPHILS # BLD: 6.7 K/UL (ref 1.7–7.7)
NEUTROPHILS NFR BLD: 78.6 %
NITRITE UR QL STRIP.AUTO: NEGATIVE
NT-PROBNP SERPL-MCNC: 4975 PG/ML (ref 0–449)
PERFORMED ON: NORMAL
PH UR STRIP.AUTO: 5.5 [PH] (ref 5–8)
PLATELET # BLD AUTO: 140 K/UL (ref 135–450)
PMV BLD AUTO: 9 FL (ref 5–10.5)
POTASSIUM SERPL-SCNC: 4.1 MMOL/L (ref 3.5–5.1)
PROT UR STRIP.AUTO-MCNC: ABNORMAL MG/DL
RBC # BLD AUTO: 4.1 M/UL (ref 4.2–5.9)
RBC CLUMPS #/AREA URNS AUTO: 2 /HPF (ref 0–4)
SARS-COV-2 RDRP RESP QL NAA+PROBE: NOT DETECTED
SODIUM SERPL-SCNC: 130 MMOL/L (ref 136–145)
SP GR UR STRIP.AUTO: 1.02 (ref 1–1.03)
TROPONIN, HIGH SENSITIVITY: 40 NG/L (ref 0–22)
UA COMPLETE W REFLEX CULTURE PNL UR: ABNORMAL
UA DIPSTICK W REFLEX MICRO PNL UR: YES
URN SPEC COLLECT METH UR: ABNORMAL
UROBILINOGEN UR STRIP-ACNC: 1 E.U./DL
WBC # BLD AUTO: 8.6 K/UL (ref 4–11)
WBC #/AREA URNS AUTO: 0 /HPF (ref 0–5)

## 2024-11-03 PROCEDURE — 80053 COMPREHEN METABOLIC PANEL: CPT

## 2024-11-03 PROCEDURE — 96374 THER/PROPH/DIAG INJ IV PUSH: CPT

## 2024-11-03 PROCEDURE — 83735 ASSAY OF MAGNESIUM: CPT

## 2024-11-03 PROCEDURE — 96361 HYDRATE IV INFUSION ADD-ON: CPT

## 2024-11-03 PROCEDURE — 85025 COMPLETE CBC W/AUTO DIFF WBC: CPT

## 2024-11-03 PROCEDURE — 96375 TX/PRO/DX INJ NEW DRUG ADDON: CPT

## 2024-11-03 PROCEDURE — 2580000003 HC RX 258: Performed by: INTERNAL MEDICINE

## 2024-11-03 PROCEDURE — 36415 COLL VENOUS BLD VENIPUNCTURE: CPT

## 2024-11-03 PROCEDURE — 96376 TX/PRO/DX INJ SAME DRUG ADON: CPT

## 2024-11-03 PROCEDURE — 2580000003 HC RX 258: Performed by: EMERGENCY MEDICINE

## 2024-11-03 PROCEDURE — 81001 URINALYSIS AUTO W/SCOPE: CPT

## 2024-11-03 PROCEDURE — 6370000000 HC RX 637 (ALT 250 FOR IP): Performed by: INTERNAL MEDICINE

## 2024-11-03 PROCEDURE — 99285 EMERGENCY DEPT VISIT HI MDM: CPT

## 2024-11-03 PROCEDURE — 83880 ASSAY OF NATRIURETIC PEPTIDE: CPT

## 2024-11-03 PROCEDURE — 74177 CT ABD & PELVIS W/CONTRAST: CPT

## 2024-11-03 PROCEDURE — 87635 SARS-COV-2 COVID-19 AMP PRB: CPT

## 2024-11-03 PROCEDURE — G0378 HOSPITAL OBSERVATION PER HR: HCPCS

## 2024-11-03 PROCEDURE — 6360000002 HC RX W HCPCS: Performed by: REGISTERED NURSE

## 2024-11-03 PROCEDURE — 6360000004 HC RX CONTRAST MEDICATION: Performed by: INTERNAL MEDICINE

## 2024-11-03 PROCEDURE — 6360000002 HC RX W HCPCS: Performed by: INTERNAL MEDICINE

## 2024-11-03 PROCEDURE — 84484 ASSAY OF TROPONIN QUANT: CPT

## 2024-11-03 RX ORDER — ENOXAPARIN SODIUM 100 MG/ML
40 INJECTION SUBCUTANEOUS DAILY
Status: DISCONTINUED | OUTPATIENT
Start: 2024-11-04 | End: 2024-11-07 | Stop reason: HOSPADM

## 2024-11-03 RX ORDER — FUROSEMIDE 10 MG/ML
60 INJECTION INTRAMUSCULAR; INTRAVENOUS ONCE
Status: COMPLETED | OUTPATIENT
Start: 2024-11-03 | End: 2024-11-03

## 2024-11-03 RX ORDER — ATORVASTATIN CALCIUM 10 MG/1
10 TABLET, FILM COATED ORAL NIGHTLY
Status: DISCONTINUED | OUTPATIENT
Start: 2024-11-03 | End: 2024-11-07 | Stop reason: HOSPADM

## 2024-11-03 RX ORDER — PANTOPRAZOLE SODIUM 40 MG/1
40 TABLET, DELAYED RELEASE ORAL
Status: DISCONTINUED | OUTPATIENT
Start: 2024-11-04 | End: 2024-11-07 | Stop reason: HOSPADM

## 2024-11-03 RX ORDER — MAGNESIUM SULFATE IN WATER 40 MG/ML
2000 INJECTION, SOLUTION INTRAVENOUS PRN
Status: DISCONTINUED | OUTPATIENT
Start: 2024-11-03 | End: 2024-11-07 | Stop reason: HOSPADM

## 2024-11-03 RX ORDER — SODIUM CHLORIDE 0.9 % (FLUSH) 0.9 %
5-40 SYRINGE (ML) INJECTION EVERY 12 HOURS SCHEDULED
Status: DISCONTINUED | OUTPATIENT
Start: 2024-11-03 | End: 2024-11-07 | Stop reason: HOSPADM

## 2024-11-03 RX ORDER — AMIODARONE HYDROCHLORIDE 200 MG/1
200 TABLET ORAL DAILY
Status: DISCONTINUED | OUTPATIENT
Start: 2024-11-04 | End: 2024-11-07 | Stop reason: HOSPADM

## 2024-11-03 RX ORDER — POTASSIUM CHLORIDE 1500 MG/1
40 TABLET, EXTENDED RELEASE ORAL PRN
Status: DISCONTINUED | OUTPATIENT
Start: 2024-11-03 | End: 2024-11-07 | Stop reason: HOSPADM

## 2024-11-03 RX ORDER — INSULIN LISPRO 100 [IU]/ML
0-4 INJECTION, SOLUTION INTRAVENOUS; SUBCUTANEOUS
Status: DISCONTINUED | OUTPATIENT
Start: 2024-11-03 | End: 2024-11-03

## 2024-11-03 RX ORDER — POLYETHYLENE GLYCOL 3350 17 G/17G
17 POWDER, FOR SOLUTION ORAL DAILY PRN
Status: DISCONTINUED | OUTPATIENT
Start: 2024-11-03 | End: 2024-11-07 | Stop reason: HOSPADM

## 2024-11-03 RX ORDER — IOPAMIDOL 755 MG/ML
75 INJECTION, SOLUTION INTRAVASCULAR
Status: COMPLETED | OUTPATIENT
Start: 2024-11-03 | End: 2024-11-03

## 2024-11-03 RX ORDER — SODIUM CHLORIDE 0.9 % (FLUSH) 0.9 %
5-40 SYRINGE (ML) INJECTION PRN
Status: DISCONTINUED | OUTPATIENT
Start: 2024-11-03 | End: 2024-11-07 | Stop reason: HOSPADM

## 2024-11-03 RX ORDER — GLUCAGON 1 MG/ML
1 KIT INJECTION PRN
Status: DISCONTINUED | OUTPATIENT
Start: 2024-11-03 | End: 2024-11-07 | Stop reason: HOSPADM

## 2024-11-03 RX ORDER — 0.9 % SODIUM CHLORIDE 0.9 %
500 INTRAVENOUS SOLUTION INTRAVENOUS ONCE
Status: DISCONTINUED | OUTPATIENT
Start: 2024-11-03 | End: 2024-11-03 | Stop reason: HOSPADM

## 2024-11-03 RX ORDER — POTASSIUM CHLORIDE 7.45 MG/ML
10 INJECTION INTRAVENOUS PRN
Status: DISCONTINUED | OUTPATIENT
Start: 2024-11-03 | End: 2024-11-07 | Stop reason: HOSPADM

## 2024-11-03 RX ORDER — ACETAMINOPHEN 325 MG/1
650 TABLET ORAL EVERY 6 HOURS PRN
Status: DISCONTINUED | OUTPATIENT
Start: 2024-11-03 | End: 2024-11-07 | Stop reason: HOSPADM

## 2024-11-03 RX ORDER — SODIUM CHLORIDE 9 MG/ML
INJECTION, SOLUTION INTRAVENOUS PRN
Status: DISCONTINUED | OUTPATIENT
Start: 2024-11-03 | End: 2024-11-07 | Stop reason: HOSPADM

## 2024-11-03 RX ORDER — DESMOPRESSIN ACETATE 0.2 MG/1
100 TABLET ORAL NIGHTLY
Status: DISCONTINUED | OUTPATIENT
Start: 2024-11-03 | End: 2024-11-03

## 2024-11-03 RX ORDER — ZOLPIDEM TARTRATE 5 MG/1
5 TABLET ORAL NIGHTLY PRN
Status: DISCONTINUED | OUTPATIENT
Start: 2024-11-03 | End: 2024-11-04

## 2024-11-03 RX ORDER — TAMSULOSIN HYDROCHLORIDE 0.4 MG/1
0.4 CAPSULE ORAL DAILY
Status: DISCONTINUED | OUTPATIENT
Start: 2024-11-04 | End: 2024-11-07 | Stop reason: HOSPADM

## 2024-11-03 RX ORDER — DEXTROSE MONOHYDRATE 100 MG/ML
INJECTION, SOLUTION INTRAVENOUS CONTINUOUS PRN
Status: DISCONTINUED | OUTPATIENT
Start: 2024-11-03 | End: 2024-11-07 | Stop reason: HOSPADM

## 2024-11-03 RX ORDER — METOPROLOL SUCCINATE 25 MG/1
25 TABLET, EXTENDED RELEASE ORAL DAILY
Status: DISCONTINUED | OUTPATIENT
Start: 2024-11-04 | End: 2024-11-07 | Stop reason: HOSPADM

## 2024-11-03 RX ORDER — MAGNESIUM HYDROXIDE/ALUMINUM HYDROXICE/SIMETHICONE 120; 1200; 1200 MG/30ML; MG/30ML; MG/30ML
30 SUSPENSION ORAL EVERY 6 HOURS PRN
Status: DISCONTINUED | OUTPATIENT
Start: 2024-11-03 | End: 2024-11-07 | Stop reason: HOSPADM

## 2024-11-03 RX ORDER — INSULIN LISPRO 100 [IU]/ML
0-4 INJECTION, SOLUTION INTRAVENOUS; SUBCUTANEOUS EVERY 4 HOURS
Status: DISCONTINUED | OUTPATIENT
Start: 2024-11-04 | End: 2024-11-07 | Stop reason: HOSPADM

## 2024-11-03 RX ORDER — ONDANSETRON 4 MG/1
4 TABLET, ORALLY DISINTEGRATING ORAL EVERY 8 HOURS PRN
Status: DISCONTINUED | OUTPATIENT
Start: 2024-11-03 | End: 2024-11-07 | Stop reason: HOSPADM

## 2024-11-03 RX ORDER — ONDANSETRON 2 MG/ML
4 INJECTION INTRAMUSCULAR; INTRAVENOUS EVERY 6 HOURS PRN
Status: DISCONTINUED | OUTPATIENT
Start: 2024-11-03 | End: 2024-11-07 | Stop reason: HOSPADM

## 2024-11-03 RX ORDER — DESMOPRESSIN ACETATE 0.2 MG/1
200 TABLET ORAL NIGHTLY
Status: DISCONTINUED | OUTPATIENT
Start: 2024-11-03 | End: 2024-11-07 | Stop reason: HOSPADM

## 2024-11-03 RX ORDER — ACETAMINOPHEN 650 MG/1
650 SUPPOSITORY RECTAL EVERY 6 HOURS PRN
Status: DISCONTINUED | OUTPATIENT
Start: 2024-11-03 | End: 2024-11-07 | Stop reason: HOSPADM

## 2024-11-03 RX ORDER — SODIUM CHLORIDE 9 MG/ML
INJECTION, SOLUTION INTRAVENOUS CONTINUOUS
Status: ACTIVE | OUTPATIENT
Start: 2024-11-03 | End: 2024-11-04

## 2024-11-03 RX ORDER — METHOCARBAMOL 1000 MG/1
1000 TABLET, FILM COATED ORAL 3 TIMES DAILY PRN
Status: DISCONTINUED | OUTPATIENT
Start: 2024-11-03 | End: 2024-11-03

## 2024-11-03 RX ADMIN — HYDROMORPHONE HYDROCHLORIDE 0.5 MG: 1 INJECTION, SOLUTION INTRAMUSCULAR; INTRAVENOUS; SUBCUTANEOUS at 21:06

## 2024-11-03 RX ADMIN — ATORVASTATIN CALCIUM 10 MG: 10 TABLET, FILM COATED ORAL at 23:11

## 2024-11-03 RX ADMIN — ONDANSETRON 4 MG: 2 INJECTION INTRAMUSCULAR; INTRAVENOUS at 23:32

## 2024-11-03 RX ADMIN — SODIUM CHLORIDE, PRESERVATIVE FREE 10 ML: 5 INJECTION INTRAVENOUS at 21:07

## 2024-11-03 RX ADMIN — SODIUM CHLORIDE 500 ML: 9 INJECTION, SOLUTION INTRAVENOUS at 19:36

## 2024-11-03 RX ADMIN — DESMOPRESSIN ACETATE 200 MCG: 0.2 TABLET ORAL at 23:30

## 2024-11-03 RX ADMIN — IOPAMIDOL 75 ML: 755 INJECTION, SOLUTION INTRAVENOUS at 21:38

## 2024-11-03 RX ADMIN — ZOLPIDEM TARTRATE 5 MG: 5 TABLET ORAL at 23:48

## 2024-11-03 RX ADMIN — FUROSEMIDE 60 MG: 10 INJECTION, SOLUTION INTRAMUSCULAR; INTRAVENOUS at 23:34

## 2024-11-03 RX ADMIN — HYDROMORPHONE HYDROCHLORIDE 1 MG: 1 INJECTION, SOLUTION INTRAMUSCULAR; INTRAVENOUS; SUBCUTANEOUS at 23:28

## 2024-11-03 RX ADMIN — SODIUM CHLORIDE: 9 INJECTION, SOLUTION INTRAVENOUS at 23:10

## 2024-11-03 RX ADMIN — ALUMINUM HYDROXIDE, MAGNESIUM HYDROXIDE, AND SIMETHICONE 30 ML: 1200; 120; 1200 SUSPENSION ORAL at 23:12

## 2024-11-03 ASSESSMENT — PAIN DESCRIPTION - DESCRIPTORS
DESCRIPTORS: ACHING
DESCRIPTORS: ACHING;SHARP
DESCRIPTORS: ACHING

## 2024-11-03 ASSESSMENT — PAIN DESCRIPTION - ORIENTATION
ORIENTATION: MID
ORIENTATION: MID
ORIENTATION: LEFT;LOWER

## 2024-11-03 ASSESSMENT — PAIN DESCRIPTION - FREQUENCY
FREQUENCY: CONTINUOUS
FREQUENCY: CONTINUOUS

## 2024-11-03 ASSESSMENT — PAIN - FUNCTIONAL ASSESSMENT
PAIN_FUNCTIONAL_ASSESSMENT: PREVENTS OR INTERFERES WITH ALL ACTIVE AND SOME PASSIVE ACTIVITIES
PAIN_FUNCTIONAL_ASSESSMENT: 0-10

## 2024-11-03 ASSESSMENT — PAIN DESCRIPTION - ONSET: ONSET: ON-GOING

## 2024-11-03 ASSESSMENT — PAIN SCALES - GENERAL
PAINLEVEL_OUTOF10: 7
PAINLEVEL_OUTOF10: 7
PAINLEVEL_OUTOF10: 4
PAINLEVEL_OUTOF10: 9

## 2024-11-03 ASSESSMENT — PAIN DESCRIPTION - LOCATION
LOCATION: BACK

## 2024-11-03 ASSESSMENT — PAIN DESCRIPTION - PAIN TYPE: TYPE: ACUTE PAIN

## 2024-11-03 NOTE — ED PROVIDER NOTES
[facility] EMERGENCY DEPARTMENT  EMERGENCY DEPARTMENT ENCOUNTER        Pt Name: Juan Mohamud  MRN: [id number]  Birthdate 1940  Date of evaluation: 11/3/2024  Provider: Tricia Potter MD  PCP: Janet Garcia MD  Note Started: 3:58 PM EST 11/3/24    CHIEF COMPLAINT     I stood up and my knees gave out  HISTORY OF PRESENT ILLNESS: 1 or more Elements     Chief Complaint   Patient presents with    Fall     Pt to ED with c/c of fall and skin tears. Pt stated he lives at home with wife. Per pt last fall 0300 this morning and hit his head, denies blood thinners. Pt unsure of how he fell, does not recall being dizzy or LOC. Pt had carpal tunnel surgery two weeks ago on R. Elbow and has since then decreased appetite. Pt has lower left back pain 4/10.      History from : Patient and Family wife at bedside  Limitations to history : None    Juan Mohamud is a 84 y.o. male who presents to the emergency department secondary to concern for knees giving out.  He did not fall this afternoon only overnight. The patient states the last time this happened to him (weak knees) he had a UTI.  At that time he did not have any symptoms of UTI.  He denies any fever, chills, nausea, vomiting.  He does report that he has felt \"off\" ever since he had carpal tunnel surgery a couple of weeks ago at which time he was put under general anesthesia.  He states that he has not been around anybody sick.  He is not eating and drinking well however this has been ongoing for years, no new changes to how he eats though his wife feels maybe it is worse since his surgery.  He tells me that he fell overnight which is when they called EMS to help him get up.  He did hit his head at that time.  He did not lose consciousness, he is not on blood thinners, he is at his mental baseline now per both him and his wife.  He denies any knee pain.  He states that when he tried to get up this afternoon he could not get

## 2024-11-03 NOTE — ED TRIAGE NOTES
Pt to ED with c/c of fall and skin tears. Pt stated he lives at home with wife. Per pt last fall 0300 this morning and hit his head, denies blood thinners. Pt unsure of how he fell, does not recall being dizzy or LOC. Pt had carpal tunnel surgery two weeks ago on R. Elbow and has since then decreased appetite. Pt has lower left back pain 4/10.

## 2024-11-04 LAB
ALBUMIN SERPL-MCNC: 2.9 G/DL (ref 3.4–5)
ALBUMIN/GLOB SERPL: 1.1 {RATIO} (ref 1.1–2.2)
ALP SERPL-CCNC: 247 U/L (ref 40–129)
ALT SERPL-CCNC: 35 U/L (ref 10–40)
ANION GAP SERPL CALCULATED.3IONS-SCNC: 10 MMOL/L (ref 3–16)
ANION GAP SERPL CALCULATED.3IONS-SCNC: 10 MMOL/L (ref 3–16)
ANION GAP SERPL CALCULATED.3IONS-SCNC: 15 MMOL/L (ref 3–16)
AST SERPL-CCNC: 59 U/L (ref 15–37)
BILIRUB SERPL-MCNC: 1.2 MG/DL (ref 0–1)
BUN SERPL-MCNC: 19 MG/DL (ref 7–20)
BUN SERPL-MCNC: 19 MG/DL (ref 7–20)
BUN SERPL-MCNC: 20 MG/DL (ref 7–20)
CALCIUM SERPL-MCNC: 8 MG/DL (ref 8.3–10.6)
CALCIUM SERPL-MCNC: 8 MG/DL (ref 8.3–10.6)
CALCIUM SERPL-MCNC: 8.2 MG/DL (ref 8.3–10.6)
CHLORIDE SERPL-SCNC: 94 MMOL/L (ref 99–110)
CHLORIDE SERPL-SCNC: 94 MMOL/L (ref 99–110)
CHLORIDE SERPL-SCNC: 97 MMOL/L (ref 99–110)
CO2 SERPL-SCNC: 16 MMOL/L (ref 21–32)
CO2 SERPL-SCNC: 22 MMOL/L (ref 21–32)
CO2 SERPL-SCNC: 25 MMOL/L (ref 21–32)
CREAT SERPL-MCNC: 1 MG/DL (ref 0.8–1.3)
CREAT SERPL-MCNC: 1 MG/DL (ref 0.8–1.3)
CREAT SERPL-MCNC: 1.1 MG/DL (ref 0.8–1.3)
EST. AVERAGE GLUCOSE BLD GHB EST-MCNC: 99.7 MG/DL
GFR SERPLBLD CREATININE-BSD FMLA CKD-EPI: 67 ML/MIN/{1.73_M2}
GFR SERPLBLD CREATININE-BSD FMLA CKD-EPI: 75 ML/MIN/{1.73_M2}
GFR SERPLBLD CREATININE-BSD FMLA CKD-EPI: 75 ML/MIN/{1.73_M2}
GLUCOSE BLD-MCNC: 104 MG/DL (ref 70–99)
GLUCOSE BLD-MCNC: 73 MG/DL (ref 70–99)
GLUCOSE BLD-MCNC: 76 MG/DL (ref 70–99)
GLUCOSE BLD-MCNC: 79 MG/DL (ref 70–99)
GLUCOSE BLD-MCNC: 85 MG/DL (ref 70–99)
GLUCOSE BLD-MCNC: 92 MG/DL (ref 70–99)
GLUCOSE SERPL-MCNC: 100 MG/DL (ref 70–99)
GLUCOSE SERPL-MCNC: 73 MG/DL (ref 70–99)
GLUCOSE SERPL-MCNC: 88 MG/DL (ref 70–99)
HBA1C MFR BLD: 5.1 %
MAGNESIUM SERPL-MCNC: 1.67 MG/DL (ref 1.8–2.4)
MAGNESIUM SERPL-MCNC: 1.75 MG/DL (ref 1.8–2.4)
MAGNESIUM SERPL-MCNC: 1.87 MG/DL (ref 1.8–2.4)
PERFORMED ON: ABNORMAL
PERFORMED ON: NORMAL
PHOSPHATE SERPL-MCNC: 4.1 MG/DL (ref 2.5–4.9)
POTASSIUM SERPL-SCNC: 3.1 MMOL/L (ref 3.5–5.1)
POTASSIUM SERPL-SCNC: 3.5 MMOL/L (ref 3.5–5.1)
POTASSIUM SERPL-SCNC: 3.7 MMOL/L (ref 3.5–5.1)
PROT SERPL-MCNC: 5.5 G/DL (ref 6.4–8.2)
SODIUM SERPL-SCNC: 125 MMOL/L (ref 136–145)
SODIUM SERPL-SCNC: 126 MMOL/L (ref 136–145)
SODIUM SERPL-SCNC: 132 MMOL/L (ref 136–145)
TSH SERPL DL<=0.005 MIU/L-ACNC: 6.59 UIU/ML (ref 0.27–4.2)

## 2024-11-04 PROCEDURE — 6370000000 HC RX 637 (ALT 250 FOR IP): Performed by: INTERNAL MEDICINE

## 2024-11-04 PROCEDURE — 6370000000 HC RX 637 (ALT 250 FOR IP): Performed by: NURSE PRACTITIONER

## 2024-11-04 PROCEDURE — 80048 BASIC METABOLIC PNL TOTAL CA: CPT

## 2024-11-04 PROCEDURE — 84443 ASSAY THYROID STIM HORMONE: CPT

## 2024-11-04 PROCEDURE — 36415 COLL VENOUS BLD VENIPUNCTURE: CPT

## 2024-11-04 PROCEDURE — 83036 HEMOGLOBIN GLYCOSYLATED A1C: CPT

## 2024-11-04 PROCEDURE — 83735 ASSAY OF MAGNESIUM: CPT

## 2024-11-04 PROCEDURE — 96375 TX/PRO/DX INJ NEW DRUG ADDON: CPT

## 2024-11-04 PROCEDURE — 2580000003 HC RX 258: Performed by: INTERNAL MEDICINE

## 2024-11-04 PROCEDURE — 6360000002 HC RX W HCPCS: Performed by: REGISTERED NURSE

## 2024-11-04 PROCEDURE — 9990000010 HC NO CHARGE VISIT

## 2024-11-04 PROCEDURE — 6360000002 HC RX W HCPCS: Performed by: INTERNAL MEDICINE

## 2024-11-04 PROCEDURE — 84100 ASSAY OF PHOSPHORUS: CPT

## 2024-11-04 PROCEDURE — 96376 TX/PRO/DX INJ SAME DRUG ADON: CPT

## 2024-11-04 PROCEDURE — G0378 HOSPITAL OBSERVATION PER HR: HCPCS

## 2024-11-04 RX ORDER — GABAPENTIN 300 MG/1
600 CAPSULE ORAL 3 TIMES DAILY
Status: ON HOLD | COMMUNITY

## 2024-11-04 RX ORDER — SIMETHICONE 80 MG
80 TABLET,CHEWABLE ORAL EVERY 6 HOURS PRN
Status: DISCONTINUED | OUTPATIENT
Start: 2024-11-04 | End: 2024-11-07 | Stop reason: HOSPADM

## 2024-11-04 RX ORDER — LACTULOSE 10 G/15ML
60 SOLUTION ORAL ONCE
Status: COMPLETED | OUTPATIENT
Start: 2024-11-04 | End: 2024-11-04

## 2024-11-04 RX ORDER — BUPROPION HYDROCHLORIDE 300 MG/1
300 TABLET ORAL DAILY
Status: ON HOLD | COMMUNITY
Start: 2024-10-17

## 2024-11-04 RX ORDER — BUPROPION HYDROCHLORIDE 150 MG/1
300 TABLET ORAL DAILY
Status: DISCONTINUED | OUTPATIENT
Start: 2024-11-04 | End: 2024-11-07 | Stop reason: HOSPADM

## 2024-11-04 RX ORDER — OXYCODONE HYDROCHLORIDE 5 MG/1
5 TABLET ORAL EVERY 6 HOURS PRN
Status: DISCONTINUED | OUTPATIENT
Start: 2024-11-04 | End: 2024-11-07 | Stop reason: HOSPADM

## 2024-11-04 RX ORDER — GABAPENTIN 300 MG/1
600 CAPSULE ORAL 3 TIMES DAILY
Status: DISCONTINUED | OUTPATIENT
Start: 2024-11-04 | End: 2024-11-07 | Stop reason: HOSPADM

## 2024-11-04 RX ORDER — MECLIZINE HCL 12.5 MG 12.5 MG/1
12.5 TABLET ORAL 3 TIMES DAILY PRN
Status: ON HOLD | COMMUNITY
Start: 2024-10-29

## 2024-11-04 RX ORDER — PROCHLORPERAZINE EDISYLATE 5 MG/ML
10 INJECTION INTRAMUSCULAR; INTRAVENOUS EVERY 6 HOURS PRN
Status: DISCONTINUED | OUTPATIENT
Start: 2024-11-04 | End: 2024-11-07 | Stop reason: HOSPADM

## 2024-11-04 RX ORDER — ZOLPIDEM TARTRATE 5 MG/1
10 TABLET ORAL NIGHTLY PRN
Status: DISCONTINUED | OUTPATIENT
Start: 2024-11-04 | End: 2024-11-07 | Stop reason: HOSPADM

## 2024-11-04 RX ADMIN — HYDROMORPHONE HYDROCHLORIDE 0.5 MG: 1 INJECTION, SOLUTION INTRAMUSCULAR; INTRAVENOUS; SUBCUTANEOUS at 04:22

## 2024-11-04 RX ADMIN — LACTULOSE 60 G: 10 SOLUTION ORAL at 18:19

## 2024-11-04 RX ADMIN — SODIUM CHLORIDE, PRESERVATIVE FREE 10 ML: 5 INJECTION INTRAVENOUS at 09:26

## 2024-11-04 RX ADMIN — PROCHLORPERAZINE EDISYLATE 10 MG: 5 INJECTION INTRAMUSCULAR; INTRAVENOUS at 16:30

## 2024-11-04 RX ADMIN — SODIUM CHLORIDE, PRESERVATIVE FREE 10 ML: 5 INJECTION INTRAVENOUS at 21:36

## 2024-11-04 RX ADMIN — ATORVASTATIN CALCIUM 10 MG: 10 TABLET, FILM COATED ORAL at 21:30

## 2024-11-04 RX ADMIN — EMPAGLIFLOZIN 10 MG: 10 TABLET, FILM COATED ORAL at 09:19

## 2024-11-04 RX ADMIN — GABAPENTIN 600 MG: 300 CAPSULE ORAL at 21:30

## 2024-11-04 RX ADMIN — DESMOPRESSIN ACETATE 200 MCG: 0.2 TABLET ORAL at 21:30

## 2024-11-04 RX ADMIN — PROCHLORPERAZINE EDISYLATE 10 MG: 5 INJECTION INTRAMUSCULAR; INTRAVENOUS at 04:19

## 2024-11-04 RX ADMIN — ALUMINUM HYDROXIDE, MAGNESIUM HYDROXIDE, AND SIMETHICONE 30 ML: 1200; 120; 1200 SUSPENSION ORAL at 21:30

## 2024-11-04 RX ADMIN — POTASSIUM CHLORIDE 40 MEQ: 1500 TABLET, EXTENDED RELEASE ORAL at 09:20

## 2024-11-04 RX ADMIN — HYDROMORPHONE HYDROCHLORIDE 1 MG: 1 INJECTION, SOLUTION INTRAMUSCULAR; INTRAVENOUS; SUBCUTANEOUS at 11:57

## 2024-11-04 RX ADMIN — HYDROMORPHONE HYDROCHLORIDE 1 MG: 1 INJECTION, SOLUTION INTRAMUSCULAR; INTRAVENOUS; SUBCUTANEOUS at 18:20

## 2024-11-04 RX ADMIN — GABAPENTIN 600 MG: 300 CAPSULE ORAL at 16:28

## 2024-11-04 RX ADMIN — BUPROPION HYDROCHLORIDE 300 MG: 150 TABLET, EXTENDED RELEASE ORAL at 16:28

## 2024-11-04 RX ADMIN — OXYCODONE 5 MG: 5 TABLET ORAL at 15:22

## 2024-11-04 RX ADMIN — SIMETHICONE 80 MG: 80 TABLET, CHEWABLE ORAL at 18:16

## 2024-11-04 RX ADMIN — TAMSULOSIN HYDROCHLORIDE 0.4 MG: 0.4 CAPSULE ORAL at 09:19

## 2024-11-04 RX ADMIN — OXYCODONE 5 MG: 5 TABLET ORAL at 21:28

## 2024-11-04 RX ADMIN — ZOLPIDEM TARTRATE 10 MG: 5 TABLET ORAL at 21:41

## 2024-11-04 RX ADMIN — PANTOPRAZOLE SODIUM 40 MG: 40 TABLET, DELAYED RELEASE ORAL at 06:51

## 2024-11-04 RX ADMIN — ONDANSETRON 4 MG: 2 INJECTION INTRAMUSCULAR; INTRAVENOUS at 21:24

## 2024-11-04 ASSESSMENT — PAIN DESCRIPTION - ONSET
ONSET: GRADUAL
ONSET: GRADUAL
ONSET: ON-GOING

## 2024-11-04 ASSESSMENT — PAIN SCALES - GENERAL
PAINLEVEL_OUTOF10: 0
PAINLEVEL_OUTOF10: 6
PAINLEVEL_OUTOF10: 8
PAINLEVEL_OUTOF10: 0
PAINLEVEL_OUTOF10: 8
PAINLEVEL_OUTOF10: 0
PAINLEVEL_OUTOF10: 0
PAINLEVEL_OUTOF10: 5
PAINLEVEL_OUTOF10: 0
PAINLEVEL_OUTOF10: 8
PAINLEVEL_OUTOF10: 0
PAINLEVEL_OUTOF10: 8

## 2024-11-04 ASSESSMENT — PAIN DESCRIPTION - FREQUENCY
FREQUENCY: CONTINUOUS

## 2024-11-04 ASSESSMENT — PAIN - FUNCTIONAL ASSESSMENT

## 2024-11-04 ASSESSMENT — PAIN DESCRIPTION - LOCATION
LOCATION: ABDOMEN
LOCATION: ABDOMEN;BACK
LOCATION: ABDOMEN
LOCATION: ABDOMEN;BACK
LOCATION: ABDOMEN;BACK

## 2024-11-04 ASSESSMENT — PAIN DESCRIPTION - ORIENTATION
ORIENTATION: MID;RIGHT;LEFT
ORIENTATION: MID;RIGHT;LEFT;ANTERIOR;POSTERIOR
ORIENTATION: LEFT
ORIENTATION: MID
ORIENTATION: LEFT

## 2024-11-04 ASSESSMENT — PAIN DESCRIPTION - PAIN TYPE
TYPE: ACUTE PAIN

## 2024-11-04 ASSESSMENT — PAIN DESCRIPTION - DESCRIPTORS
DESCRIPTORS: ACHING
DESCRIPTORS: OTHER (COMMENT);ACHING
DESCRIPTORS: ACHING
DESCRIPTORS: ACHING

## 2024-11-04 NOTE — PROGRESS NOTES
Patient admitted to room 3121 from ER via stretcher.  Oriented to room, call light, and floor policies.  Plan of care reviewed with patient.   Assessment completed; VSS. Pt rates a high fall risk; bed alarm on.  Safety precautions in place; call light and bedside table within reach.  Pt encouraged to call for needs.  Pt VU.  Will continue to monitor.

## 2024-11-04 NOTE — PROGRESS NOTES
Hospitalist   Progress Note    Patient Name: Juan Mohamud  PCP: Janet Garcia MD  Date of Admission: 11/3/2024    Chief Complaint on Admission: Non syncopal Fall at home, initial encounter secondary to generalized weakness   Chief diagnosis after evaluation: T11 vertebral body fx which extends to T10-11 disc space     Brief Synopsis: Patient is a 84 y.o. man who has a past medical history of Diabetes mellitus (HCC), Hyperlipidemia, Indigestion, Insomnia, Macular degeneration, MI, old, and Neuropathy. who was admitted on 11/3/2024 for evaluation and treatment after having a non syncopal fall at home He was found to have a T11 vertebral body fx which extends to T10-11 disc space  , as well as a possible Ileus    Pt Seen/Examined and Chart Reviewed.     Subjective: Pt c/o abdominal distension and some pain, he is asking for GI to see him    Objective:  Allergies  Erythromycin and Dust mite extract    Medications    Scheduled Meds:   buPROPion  300 mg Oral Daily    gabapentin  600 mg Oral TID    sodium chloride flush  5-40 mL IntraVENous 2 times per day    enoxaparin  40 mg SubCUTAneous Daily    tamsulosin  0.4 mg Oral Daily    amiodarone  200 mg Oral Daily    atorvastatin  10 mg Oral Nightly    empagliflozin  10 mg Oral Daily    linaclotide  290 mcg Oral QAM AC    metoprolol succinate  25 mg Oral Daily    pantoprazole  40 mg Oral QAM AC    DESMOpressin  200 mcg Oral Nightly    insulin lispro  0-4 Units SubCUTAneous Q4H     Infusions:   sodium chloride      dextrose       PRN Meds:  prochlorperazine, polyvinyl alcohol-povidone, oxyCODONE, simethicone, sodium chloride flush, sodium chloride, potassium chloride **OR** potassium alternative oral replacement **OR** potassium chloride, magnesium sulfate, ondansetron **OR** ondansetron, polyethylene glycol, acetaminophen **OR** acetaminophen, aluminum & magnesium hydroxide-simethicone, glucose, dextrose bolus **OR** dextrose bolus, glucagon (rDNA),

## 2024-11-04 NOTE — PROGRESS NOTES
Spiritual Health History and Assessment/Progress Note  [facility]    Initial Encounter,  ,  ,      Name: Juan Mohamud MRN: [id number]    Age: 84 y.o.     Sex: male   Language: English   Restoration: Yazidi   Fall at home, initial encounter     Date: 11/4/2024            Total Time Calculated: 7 min              Spiritual Assessment began in [facility]            Encounter Overview/Reason: Initial Encounter  Service Provided For: Patient and family together    Violette, Belief, Meaning:   Patient unable to assess at this time    Community:  Patient feels well-supported. Support system includes: Spouse/Partner    Assessment and Plan of Care:   Patient Interventions include: Facilitated expression of thoughts and feelings and Other: Pt and spouse shared concerns/frustrations.  spoke with nurse, and shared that concern was communicated with nursing.   spoke with Charge nurse, patient advocate number was left with pts wife.     Patient/family Plan of Care: Other: Spiritual care will follow , to offer emotional/spiritual support  Electronically signed by PAULY Rodriguez on 11/4/2024 at 4:26 PM

## 2024-11-04 NOTE — PROGRESS NOTES
Patient resting in bed, A&O X4. VSS. IV flushed, dressing CDI, Normal adalid locked at this time no complaint of pain this AM. All other AM medications taken whole without complaint (see eMAR).  Patient NPO with sips with med's and ice chips. No other needs verbalized at this time. Standard safety precautions in place and call light within reach.

## 2024-11-04 NOTE — PROGRESS NOTES
Perfect serve sent to provider \"with patient being in NPO would you like to start continuous IV, patient stated hes \"feeling dry\". also patient is very concerned about the distention due to past history, and is requesting a GI doctor to see him now, would you like to place any consult orders?\". Awaiting response. Electronically signed by Tania Norris RN on 11/4/2024 at 5:57 PM    MD to place orders for GI consult. Electronically signed by Tania Norris RN on 11/4/2024 at 6:59 PM

## 2024-11-04 NOTE — PROGRESS NOTES
Pharmacy Medication Reconciliation Note     List of medications patient is currently taking is complete.    Source of information:   1. Rx disp history  2. MD office visit  3. Interview with patient and his  wife     Notes regarding home medications:   1. Added Meclizine 12.5mg tid prn  2. Added Gabapentin 600mg tid  3. Added Wellbutrin  mg daily  4 informed wife dose of Amiodarone is now 200mg daily not 200mg bid. Reduced in July.  5. Added Preservision bid  Denies taking any other OTC or herbal medications    Juan Lan RP, LT[facility], located within [facility] 11/4/2024 1:50 PM

## 2024-11-04 NOTE — PLAN OF CARE
Problem: Chronic Conditions and Co-morbidities  Goal: Patient's chronic conditions and co-morbidity symptoms are monitored and maintained or improved  11/4/2024 1437 by Tania Norris RN  Outcome: Progressing  Flowsheets (Taken 11/3/2024 2043 by Princess Mandujano RN)  Care Plan - Patient's Chronic Conditions and Co-Morbidity Symptoms are Monitored and Maintained or Improved: Monitor and assess patient's chronic conditions and comorbid symptoms for stability, deterioration, or improvement  11/4/2024 0443 by Pricness Mandujano RN  Outcome: Progressing  Flowsheets (Taken 11/3/2024 2043)  Care Plan - Patient's Chronic Conditions and Co-Morbidity Symptoms are Monitored and Maintained or Improved: Monitor and assess patient's chronic conditions and comorbid symptoms for stability, deterioration, or improvement     Problem: Discharge Planning  Goal: Discharge to home or other facility with appropriate resources  11/4/2024 1437 by Tania Norris RN  Outcome: Progressing  Flowsheets (Taken 11/3/2024 2327 by Princess Mandujano RN)  Discharge to home or other facility with appropriate resources:   Identify barriers to discharge with patient and caregiver   Arrange for needed discharge resources and transportation as appropriate  11/4/2024 0443 by Princess Mandujano RN  Outcome: Progressing  Flowsheets  Taken 11/3/2024 2327  Discharge to home or other facility with appropriate resources:   Identify barriers to discharge with patient and caregiver   Arrange for needed discharge resources and transportation as appropriate  Taken 11/3/2024 2043  Discharge to home or other facility with appropriate resources:   Identify barriers to discharge with patient and caregiver   Arrange for needed discharge resources and transportation as appropriate     Problem: Pain  Goal: Verbalizes/displays adequate comfort level or baseline comfort level  11/4/2024 1437 by Tania Norris RN  Outcome:  Progressing  Flowsheets (Taken 11/3/2024 2328 by Princess Mandujano RN)  Verbalizes/displays adequate comfort level or baseline comfort level:   Encourage patient to monitor pain and request assistance   Assess pain using appropriate pain scale   Administer analgesics based on type and severity of pain and evaluate response   Implement non-pharmacological measures as appropriate and evaluate response  11/4/2024 0443 by Princess Mandujano RN  Outcome: Progressing  Flowsheets (Taken 11/3/2024 2328)  Verbalizes/displays adequate comfort level or baseline comfort level:   Encourage patient to monitor pain and request assistance   Assess pain using appropriate pain scale   Administer analgesics based on type and severity of pain and evaluate response   Implement non-pharmacological measures as appropriate and evaluate response     Problem: Safety - Adult  Goal: Free from fall injury  11/4/2024 1437 by Tania Norris RN  Outcome: Progressing  Flowsheets (Taken 11/4/2024 1437)  Free From Fall Injury: Instruct family/caregiver on patient safety  11/4/2024 0443 by Princess Mandujano RN  Outcome: Progressing     Problem: ABCDS Injury Assessment  Goal: Absence of physical injury  11/4/2024 1437 by Tania Norris RN  Outcome: Progressing  Flowsheets (Taken 11/4/2024 1437)  Absence of Physical Injury: Implement safety measures based on patient assessment  11/4/2024 0443 by Princess Mandujano RN  Outcome: Progressing     Problem: Skin/Tissue Integrity  Goal: Absence of new skin breakdown  Description: 1.  Monitor for areas of redness and/or skin breakdown  2.  Assess vascular access sites hourly  3.  Every 4-6 hours minimum:  Change oxygen saturation probe site  4.  Every 4-6 hours:  If on nasal continuous positive airway pressure, respiratory therapy assess nares and determine need for appliance change or resting period.  11/4/2024 1437 by Tania Norris RN  Outcome: Progressing  11/4/2024

## 2024-11-04 NOTE — PROGRESS NOTES
Occupational Therapy  Juan CAMERON Cade  [id number]  [id number]    OT orders received and pt's chart reviewed. Per Dr. Mckeon: \"T11 vertebral body fx which extends to T10-11 disc space. No clear posterior element involvement, but needs MRI to rule out 3 column injury. Needs to stay on bedrest with HOB <30 degrees. MRI and bedrest ordered.\"     Will hold therapy evaluation until appropriate to initiate.     Diya Vargas, OTR/L [id number]

## 2024-11-04 NOTE — CONSULTS
Comprehensive Nutrition Assessment    Type and Reason for Visit:  Positive nutrition screen    Nutrition Recommendations/Plan:   Monitor for start of PO diet and continue to assess weight trends while inpatient.   Consider Ensure compact BID to support nutrition status.      Malnutrition Assessment:  Malnutrition Status:  At risk for malnutrition (11/04/24 1115)    Context:  Chronic Illness     Findings of the 6 clinical characteristics of malnutrition:  Energy Intake:  Unable to assess  Weight Loss:  Mild weight loss (11% over 9 months)       Nutrition Assessment:    MST=5. Pt. admitted for fall at home. Hx. of CHF and DM2 noted. Pt. received diet education for HF from this writer in July of this year. Pt. has a Hx. of insidious weight loss, BMI 25 and adequate for age. Stage 1 noted to sacrum with skin tear to elbow from fall. Neuro consulted, recommending MRI per notes. NPO with sips of clears at this time. Will monitor for progression of diet and assess appetite changes when diet starts. Suggest Ensure compact BID for wounds with PO diet.    Nutrition Related Findings:    Na 126, K 3.1, Mg 1.67, Ca 8.0. LBM 11/3. Wound Type: Skin Tears, Stage I       Current Nutrition Intake & Therapies:    Average Meal Intake: NPO  Average Supplements Intake: NPO  Diet NPO Exceptions are: Sips of Clear Liquids, Ice Chips, Popsicles    Anthropometric Measures:  Height: 195.6 cm (6' 5.01\")  Ideal Body Weight (IBW): 208 lbs (95 kg)       Current Body Weight: 94.8 kg (208 lb 15.9 oz), 100.5 % IBW.    Current BMI (kg/m2): 24.8                             BMI Categories: Normal Weight (BMI 22.0 to 24.9) age over 65    Estimated Daily Nutrient Needs:  Energy Requirements Based On: Kcal/kg  Weight Used for Energy Requirements: Current  Energy (kcal/day): 4781-5652 kcal (20-25 kcal/kg)  Weight Used for Protein Requirements: Current  Protein (g/day):  g (1-1.2g/kg)  Method Used for Fluid Requirements: Defer to provider  Fluid

## 2024-11-04 NOTE — PLAN OF CARE
Problem: Chronic Conditions and Co-morbidities  Goal: Patient's chronic conditions and co-morbidity symptoms are monitored and maintained or improved  Outcome: Progressing     Problem: Discharge Planning  Goal: Discharge to home or other facility with appropriate resources  Outcome: Progressing  Flowsheets (Taken 11/3/2024 3670)  Discharge to home or other facility with appropriate resources:   Identify barriers to discharge with patient and caregiver   Arrange for needed discharge resources and transportation as appropriate     Problem: Pain  Goal: Verbalizes/displays adequate comfort level or baseline comfort level  Outcome: Progressing  Flowsheets (Taken 11/3/2024 8756)  Verbalizes/displays adequate comfort level or baseline comfort level:   Encourage patient to monitor pain and request assistance   Assess pain using appropriate pain scale   Administer analgesics based on type and severity of pain and evaluate response   Implement non-pharmacological measures as appropriate and evaluate response     Problem: Safety - Adult  Goal: Free from fall injury  Outcome: Progressing     Problem: ABCDS Injury Assessment  Goal: Absence of physical injury  Outcome: Progressing     Problem: Skin/Tissue Integrity  Goal: Absence of new skin breakdown  Description: 1.  Monitor for areas of redness and/or skin breakdown  2.  Assess vascular access sites hourly  3.  Every 4-6 hours minimum:  Change oxygen saturation probe site  4.  Every 4-6 hours:  If on nasal continuous positive airway pressure, respiratory therapy assess nares and determine need for appliance change or resting period.  Outcome: Progressing

## 2024-11-04 NOTE — CONSULTS
[place] neurosurgery note    Consult received.    T11 vertebral body fx which extends to T10-11 disc space.  No clear posterior element involvement, but needs MRI to rule out 3 column injury.  Needs to stay on bedrest with HOB <30 degrees.  MRI and bedrest ordered.     DARYN Mckeon MD

## 2024-11-04 NOTE — PROGRESS NOTES
Physical Therapy    24    Name: Juan Mohamud  : 1940   MRN: [id number]    PT orders received and pt's chart reviewed. Per Dr. Mckeon: \"T11 vertebral body fx which extends to T10-11 disc space. No clear posterior element involvement, but needs MRI to rule out 3 column injury. Needs to stay on bedrest with HOB <30 degrees. MRI and bedrest ordered.\"      Will hold therapy evaluation until appropriate to initiate.    Electronically signed by Sangita Gan PT on 2024 at 9:23 AM

## 2024-11-04 NOTE — PROGRESS NOTES
4 Eyes Skin Assessment     NAME:  Juan Mohamud  YOB: 1940  MEDICAL RECORD NUMBER:  [id number]    The patient is being assessed for  Admission    I agree that at least one RN has performed a thorough Head to Toe Skin Assessment on the patient. ALL assessment sites listed below have been assessed.      Areas assessed by both nurses:    Head, Face, Ears, Shoulders, Back, Chest, Arms, Elbows, Hands, Sacrum. Buttock, Coccyx, Ischium, Legs. Feet and Heels, and Under Medical Devices         Does the Patient have a Wound? Yes wound(s) were present on assessment. LDA wound assessment was Initiated and completed by RN       Piero Prevention initiated by RN: Yes  Wound Care Orders initiated by RN: Yes    Pressure Injury (Stage 3,4, Unstageable, DTI, NWPT, and Complex wounds) if present, place Wound referral order by RN under : No    New Ostomies, if present place, Ostomy referral order under : No     Nurse 1 eSignature: Electronically signed by Princess Mandujano RN on 11/4/24 at 1:56 AM EST    **SHARE this note so that the co-signing nurse can place an eSignature**    Nurse 2 eSignature: Electronically signed by Paula Arias RN on 11/4/24 at 3:19 AM EST

## 2024-11-04 NOTE — PROGRESS NOTES
SSKIN Bundle  Surface Skin Assessment/care Keep them moving Incontinence care Nutrition       NAME:  Juan Mohamud  YOB: 1940  MEDICAL RECORD NUMBER:  [id number]    The patient is being assessed for  Shift assessment    The following interventions have been put into place:    Surface:  Isoflex gel    Skin:   Piero Risk assessment, Sacral foam border for prevention applied, Liquid barrier film to bilat heels, Elevate heels/heel, and Heel Boots applied Bilateral    Keep Them Moving:  Turning every 2 hours , Visual cue (SSKIN on whiteboard), and Turn light activated    Incontinence:  Cleanse at the time of soiling; check every 2 hours with turning, No briefs except when in a chair, and PureWick     Nutrition:  Other patient NPO    Electronically signed by Tania Norris RN on 11/4/2024 at 6:59 PM

## 2024-11-04 NOTE — PROGRESS NOTES
Notified hospitalist Karen Dorsey NP via perfect serve of pt's CT results.  Also notified of pt's c/o gas pain, nausea and abd distention, and back pain.  New orders placed for Neurosurgery consult and to place an NG tube to low wall suction, pain meds, 1 time Lasix.  Explained to pt the need for an NG tube and he is adamantly refusing.  He states he has a hx of \"massive\" nosebleeds and has almost required a blood transfusion in the past and he does not want anything down his nose.  Notified Karen Dorsey NP of pt's refusal and she states she will come talk to pt in a bit.  Meds given for pain and nausea (see MAR).  Will continue to monitor.

## 2024-11-04 NOTE — PROGRESS NOTES
Perfect serve sent to provider \"patient deteriation index is alerting at 50. patient potassium was 3.1 and oral replacement given, sodium was 126, and mag 1.67. Patient complains of abdominal pain and abdomen is very distended, but refusing NG placement due to previous procedure he had in the past to stop nose bleeds, patient did recieved IV pain medication, would you like to place any oral pain medication orders? rin has been experiencing softer BPs in the low 100s to 90s. also patient takes an OTC eye drops for dryness, can i have orders for eye drops? \". Awaiting response. Electronically signed by Tania Norris RN on 11/4/2024 at 12:21 PM    MD to place orders for oral pain medication and eye drops. Electronically signed by Tania Norris RN on 11/4/2024 at 12:58 PM

## 2024-11-05 ENCOUNTER — ANESTHESIA (OUTPATIENT)
Dept: ENDOSCOPY | Age: 84
End: 2024-11-05
Payer: MEDICARE

## 2024-11-05 ENCOUNTER — ANESTHESIA EVENT (OUTPATIENT)
Dept: ENDOSCOPY | Age: 84
End: 2024-11-05
Payer: MEDICARE

## 2024-11-05 LAB
GLUCOSE BLD-MCNC: 100 MG/DL (ref 70–99)
GLUCOSE BLD-MCNC: 102 MG/DL (ref 70–99)
GLUCOSE BLD-MCNC: 103 MG/DL (ref 70–99)
GLUCOSE BLD-MCNC: 104 MG/DL (ref 70–99)
GLUCOSE BLD-MCNC: 83 MG/DL (ref 70–99)
GLUCOSE BLD-MCNC: 84 MG/DL (ref 70–99)
GLUCOSE BLD-MCNC: 98 MG/DL (ref 70–99)
GLUCOSE BLD-MCNC: 99 MG/DL (ref 70–99)
PERFORMED ON: ABNORMAL
PERFORMED ON: NORMAL

## 2024-11-05 PROCEDURE — 6360000002 HC RX W HCPCS

## 2024-11-05 PROCEDURE — 3609155600 HC COLONOSCOPY WITH DECOMPRESSION: Performed by: INTERNAL MEDICINE

## 2024-11-05 PROCEDURE — 2580000003 HC RX 258: Performed by: INTERNAL MEDICINE

## 2024-11-05 PROCEDURE — 6370000000 HC RX 637 (ALT 250 FOR IP): Performed by: NURSE PRACTITIONER

## 2024-11-05 PROCEDURE — 6370000000 HC RX 637 (ALT 250 FOR IP): Performed by: INTERNAL MEDICINE

## 2024-11-05 PROCEDURE — 2500000003 HC RX 250 WO HCPCS

## 2024-11-05 PROCEDURE — 3700000001 HC ADD 15 MINUTES (ANESTHESIA): Performed by: INTERNAL MEDICINE

## 2024-11-05 PROCEDURE — 6360000002 HC RX W HCPCS: Performed by: REGISTERED NURSE

## 2024-11-05 PROCEDURE — 9990000010 HC NO CHARGE VISIT

## 2024-11-05 PROCEDURE — 2709999900 HC NON-CHARGEABLE SUPPLY: Performed by: INTERNAL MEDICINE

## 2024-11-05 PROCEDURE — 7100000001 HC PACU RECOVERY - ADDTL 15 MIN: Performed by: INTERNAL MEDICINE

## 2024-11-05 PROCEDURE — 7100000000 HC PACU RECOVERY - FIRST 15 MIN: Performed by: INTERNAL MEDICINE

## 2024-11-05 PROCEDURE — 96376 TX/PRO/DX INJ SAME DRUG ADON: CPT

## 2024-11-05 PROCEDURE — G0378 HOSPITAL OBSERVATION PER HR: HCPCS

## 2024-11-05 PROCEDURE — C1729 CATH, DRAINAGE: HCPCS | Performed by: INTERNAL MEDICINE

## 2024-11-05 PROCEDURE — 0D9E80Z DRAINAGE OF LARGE INTESTINE WITH DRAINAGE DEVICE, VIA NATURAL OR ARTIFICIAL OPENING ENDOSCOPIC: ICD-10-PCS | Performed by: INTERNAL MEDICINE

## 2024-11-05 PROCEDURE — 3700000000 HC ANESTHESIA ATTENDED CARE: Performed by: INTERNAL MEDICINE

## 2024-11-05 PROCEDURE — 96372 THER/PROPH/DIAG INJ SC/IM: CPT

## 2024-11-05 PROCEDURE — 6360000002 HC RX W HCPCS: Performed by: INTERNAL MEDICINE

## 2024-11-05 RX ORDER — LIDOCAINE HYDROCHLORIDE 20 MG/ML
INJECTION, SOLUTION EPIDURAL; INFILTRATION; INTRACAUDAL; PERINEURAL
Status: DISCONTINUED | OUTPATIENT
Start: 2024-11-05 | End: 2024-11-05 | Stop reason: SDUPTHER

## 2024-11-05 RX ORDER — ONDANSETRON 2 MG/ML
4 INJECTION INTRAMUSCULAR; INTRAVENOUS
Status: DISCONTINUED | OUTPATIENT
Start: 2024-11-05 | End: 2024-11-05 | Stop reason: HOSPADM

## 2024-11-05 RX ORDER — SODIUM CHLORIDE 0.9 % (FLUSH) 0.9 %
5-40 SYRINGE (ML) INJECTION EVERY 12 HOURS SCHEDULED
Status: DISCONTINUED | OUTPATIENT
Start: 2024-11-05 | End: 2024-11-05 | Stop reason: HOSPADM

## 2024-11-05 RX ORDER — FENTANYL CITRATE 0.05 MG/ML
50 INJECTION, SOLUTION INTRAMUSCULAR; INTRAVENOUS EVERY 5 MIN PRN
Status: DISCONTINUED | OUTPATIENT
Start: 2024-11-05 | End: 2024-11-05 | Stop reason: HOSPADM

## 2024-11-05 RX ORDER — FENTANYL CITRATE 0.05 MG/ML
25 INJECTION, SOLUTION INTRAMUSCULAR; INTRAVENOUS EVERY 5 MIN PRN
Status: DISCONTINUED | OUTPATIENT
Start: 2024-11-05 | End: 2024-11-05 | Stop reason: HOSPADM

## 2024-11-05 RX ORDER — NALOXONE HYDROCHLORIDE 0.4 MG/ML
INJECTION, SOLUTION INTRAMUSCULAR; INTRAVENOUS; SUBCUTANEOUS PRN
Status: DISCONTINUED | OUTPATIENT
Start: 2024-11-05 | End: 2024-11-05 | Stop reason: HOSPADM

## 2024-11-05 RX ORDER — SODIUM CHLORIDE 9 MG/ML
INJECTION, SOLUTION INTRAVENOUS PRN
Status: DISCONTINUED | OUTPATIENT
Start: 2024-11-05 | End: 2024-11-05 | Stop reason: HOSPADM

## 2024-11-05 RX ORDER — SODIUM CHLORIDE 0.9 % (FLUSH) 0.9 %
5-40 SYRINGE (ML) INJECTION PRN
Status: DISCONTINUED | OUTPATIENT
Start: 2024-11-05 | End: 2024-11-05 | Stop reason: HOSPADM

## 2024-11-05 RX ORDER — PROPOFOL 10 MG/ML
INJECTION, EMULSION INTRAVENOUS
Status: DISCONTINUED | OUTPATIENT
Start: 2024-11-05 | End: 2024-11-05 | Stop reason: SDUPTHER

## 2024-11-05 RX ORDER — MEPERIDINE HYDROCHLORIDE 25 MG/ML
12.5 INJECTION INTRAMUSCULAR; INTRAVENOUS; SUBCUTANEOUS EVERY 5 MIN PRN
Status: DISCONTINUED | OUTPATIENT
Start: 2024-11-05 | End: 2024-11-05 | Stop reason: HOSPADM

## 2024-11-05 RX ADMIN — PROPOFOL 40 MG: 10 INJECTION, EMULSION INTRAVENOUS at 14:27

## 2024-11-05 RX ADMIN — HYDROMORPHONE HYDROCHLORIDE 0.5 MG: 1 INJECTION, SOLUTION INTRAMUSCULAR; INTRAVENOUS; SUBCUTANEOUS at 09:19

## 2024-11-05 RX ADMIN — BUPROPION HYDROCHLORIDE 300 MG: 150 TABLET, EXTENDED RELEASE ORAL at 09:17

## 2024-11-05 RX ADMIN — SODIUM CHLORIDE, PRESERVATIVE FREE 10 ML: 5 INJECTION INTRAVENOUS at 21:11

## 2024-11-05 RX ADMIN — PROCHLORPERAZINE EDISYLATE 10 MG: 5 INJECTION INTRAMUSCULAR; INTRAVENOUS at 21:11

## 2024-11-05 RX ADMIN — EMPAGLIFLOZIN 10 MG: 10 TABLET, FILM COATED ORAL at 09:17

## 2024-11-05 RX ADMIN — HYDROMORPHONE HYDROCHLORIDE 1 MG: 1 INJECTION, SOLUTION INTRAMUSCULAR; INTRAVENOUS; SUBCUTANEOUS at 01:44

## 2024-11-05 RX ADMIN — ENOXAPARIN SODIUM 40 MG: 100 INJECTION SUBCUTANEOUS at 09:24

## 2024-11-05 RX ADMIN — AMIODARONE HYDROCHLORIDE 200 MG: 200 TABLET ORAL at 09:17

## 2024-11-05 RX ADMIN — GABAPENTIN 600 MG: 300 CAPSULE ORAL at 09:17

## 2024-11-05 RX ADMIN — LIDOCAINE HYDROCHLORIDE 100 MG: 20 INJECTION, SOLUTION EPIDURAL; INFILTRATION; INTRACAUDAL; PERINEURAL at 14:27

## 2024-11-05 RX ADMIN — TAMSULOSIN HYDROCHLORIDE 0.4 MG: 0.4 CAPSULE ORAL at 09:17

## 2024-11-05 RX ADMIN — PROPOFOL 20 MG: 10 INJECTION, EMULSION INTRAVENOUS at 14:41

## 2024-11-05 RX ADMIN — GABAPENTIN 600 MG: 300 CAPSULE ORAL at 21:11

## 2024-11-05 RX ADMIN — PROCHLORPERAZINE EDISYLATE 10 MG: 5 INJECTION INTRAMUSCULAR; INTRAVENOUS at 01:41

## 2024-11-05 RX ADMIN — ONDANSETRON 4 MG: 4 TABLET, ORALLY DISINTEGRATING ORAL at 10:15

## 2024-11-05 RX ADMIN — OXYCODONE 5 MG: 5 TABLET ORAL at 05:11

## 2024-11-05 RX ADMIN — ALUMINUM HYDROXIDE, MAGNESIUM HYDROXIDE, AND SIMETHICONE 30 ML: 1200; 120; 1200 SUSPENSION ORAL at 16:06

## 2024-11-05 RX ADMIN — ZOLPIDEM TARTRATE 10 MG: 5 TABLET ORAL at 21:11

## 2024-11-05 RX ADMIN — HYDROMORPHONE HYDROCHLORIDE 1 MG: 1 INJECTION, SOLUTION INTRAMUSCULAR; INTRAVENOUS; SUBCUTANEOUS at 05:47

## 2024-11-05 RX ADMIN — DESMOPRESSIN ACETATE 200 MCG: 0.2 TABLET ORAL at 21:11

## 2024-11-05 RX ADMIN — GABAPENTIN 600 MG: 300 CAPSULE ORAL at 16:06

## 2024-11-05 RX ADMIN — PROPOFOL 20 MG: 10 INJECTION, EMULSION INTRAVENOUS at 14:33

## 2024-11-05 RX ADMIN — SIMETHICONE 80 MG: 80 TABLET, CHEWABLE ORAL at 09:17

## 2024-11-05 RX ADMIN — SODIUM CHLORIDE, PRESERVATIVE FREE 10 ML: 5 INJECTION INTRAVENOUS at 09:24

## 2024-11-05 RX ADMIN — ATORVASTATIN CALCIUM 10 MG: 10 TABLET, FILM COATED ORAL at 21:11

## 2024-11-05 RX ADMIN — PANTOPRAZOLE SODIUM 40 MG: 40 TABLET, DELAYED RELEASE ORAL at 05:11

## 2024-11-05 RX ADMIN — METOPROLOL SUCCINATE 25 MG: 25 TABLET, EXTENDED RELEASE ORAL at 09:17

## 2024-11-05 ASSESSMENT — PAIN DESCRIPTION - PAIN TYPE
TYPE: ACUTE PAIN

## 2024-11-05 ASSESSMENT — PAIN DESCRIPTION - ORIENTATION
ORIENTATION: PROXIMAL;RIGHT;LEFT
ORIENTATION: MID;LOWER;LEFT;RIGHT
ORIENTATION: MID;LOWER;POSTERIOR
ORIENTATION: RIGHT;LEFT;ANTERIOR;POSTERIOR;LOWER

## 2024-11-05 ASSESSMENT — PAIN SCALES - GENERAL
PAINLEVEL_OUTOF10: 7
PAINLEVEL_OUTOF10: 7
PAINLEVEL_OUTOF10: 5
PAINLEVEL_OUTOF10: 0
PAINLEVEL_OUTOF10: 5
PAINLEVEL_OUTOF10: 2
PAINLEVEL_OUTOF10: 8
PAINLEVEL_OUTOF10: 6

## 2024-11-05 ASSESSMENT — PAIN DESCRIPTION - FREQUENCY
FREQUENCY: CONTINUOUS

## 2024-11-05 ASSESSMENT — PAIN DESCRIPTION - ONSET
ONSET: ON-GOING

## 2024-11-05 ASSESSMENT — PAIN DESCRIPTION - LOCATION
LOCATION: ABDOMEN
LOCATION: ABDOMEN;BACK

## 2024-11-05 ASSESSMENT — PAIN - FUNCTIONAL ASSESSMENT
PAIN_FUNCTIONAL_ASSESSMENT: PREVENTS OR INTERFERES SOME ACTIVE ACTIVITIES AND ADLS
PAIN_FUNCTIONAL_ASSESSMENT: 0-10
PAIN_FUNCTIONAL_ASSESSMENT: PREVENTS OR INTERFERES SOME ACTIVE ACTIVITIES AND ADLS
PAIN_FUNCTIONAL_ASSESSMENT: PREVENTS OR INTERFERES SOME ACTIVE ACTIVITIES AND ADLS
PAIN_FUNCTIONAL_ASSESSMENT: ADULT NONVERBAL PAIN SCALE (NPVS)
PAIN_FUNCTIONAL_ASSESSMENT: PREVENTS OR INTERFERES SOME ACTIVE ACTIVITIES AND ADLS

## 2024-11-05 ASSESSMENT — PAIN DESCRIPTION - DESCRIPTORS
DESCRIPTORS: ACHING
DESCRIPTORS: PRESSURE;ACHING
DESCRIPTORS: ACHING
DESCRIPTORS: STABBING
DESCRIPTORS: STABBING
DESCRIPTORS: ACHING
DESCRIPTORS: STABBING

## 2024-11-05 ASSESSMENT — LIFESTYLE VARIABLES: SMOKING_STATUS: 0

## 2024-11-05 NOTE — PROGRESS NOTES
Contacted GI @ [phone] to leave a message for Dr. Kay. Notified MD the per Neurosurgery, as long as patient is kept in spine precautions: Bedrest with HOB < 30 degrees, colonoscopy can be completed, although would prefer to wait until after MRI.  Unable to place rectal tube as directed, this RN met with resistance in the rectum and was unable to advance the rectal tube.  Patient continues with abdominal pain and discomfort with distention. Awaiting response/additional orders.     Patient currently in Endo having colonoscopy performed. Electronically signed by Toma Block RN on 11/5/2024 at 2:00 PM

## 2024-11-05 NOTE — PROGRESS NOTES
Patient in bed, A&O X4. VSS. PIV flushed, dressing CDI, NS locked and capped at this time. Patient reports pain of a 6/10 in his abdominal area and lumbar back. Pain medication administered as ordered. Simethicone administered PRN. All other AM medications taken whole one at a time without issue. Patient taking medications slowly as he is c/o dry mouth and nausea currently (see eMAR).  Patient tolerating small sips of water with a reduced appetite d/t abdominal distention and discomfort. Patient educated that a GI consult has been placed to determine plan of care for abdominal distention.  Patient also educated that we are awaiting s/o from Cardiology to have the MRI completed and that neurosurgery is aware. Unit manager has spoken with the patient and family regarding concerns for care.  Patient advocate on board and patient wife has contact number for any furture./additional concerns. No other needs verbalized at this time. Standard safety precautions in place and call light within reach.

## 2024-11-05 NOTE — PLAN OF CARE
Problem: Chronic Conditions and Co-morbidities  Goal: Patient's chronic conditions and co-morbidity symptoms are monitored and maintained or improved  Outcome: Progressing  Flowsheets (Taken 11/5/2024 0527)  Care Plan - Patient's Chronic Conditions and Co-Morbidity Symptoms are Monitored and Maintained or Improved: Monitor and assess patient's chronic conditions and comorbid symptoms for stability, deterioration, or improvement     Problem: Discharge Planning  Goal: Discharge to home or other facility with appropriate resources  Outcome: Progressing  Flowsheets (Taken 11/5/2024 0527)  Discharge to home or other facility with appropriate resources:   Identify barriers to discharge with patient and caregiver   Arrange for needed discharge resources and transportation as appropriate   Identify discharge learning needs (meds, wound care, etc)     Problem: Pain  Goal: Verbalizes/displays adequate comfort level or baseline comfort level  Outcome: Progressing  Flowsheets (Taken 11/5/2024 0527)  Verbalizes/displays adequate comfort level or baseline comfort level:   Encourage patient to monitor pain and request assistance   Administer analgesics based on type and severity of pain and evaluate response   Assess pain using appropriate pain scale   Implement non-pharmacological measures as appropriate and evaluate response     Problem: Safety - Adult  Goal: Free from fall injury  Outcome: Progressing  Flowsheets (Taken 11/5/2024 0527)  Free From Fall Injury: Instruct family/caregiver on patient safety     Problem: ABCDS Injury Assessment  Goal: Absence of physical injury  Outcome: Progressing  Flowsheets (Taken 11/5/2024 0527)  Absence of Physical Injury: Implement safety measures based on patient assessment     Problem: Skin/Tissue Integrity  Goal: Absence of new skin breakdown  Description: 1.  Monitor for areas of redness and/or skin breakdown  2.  Assess vascular access sites hourly  3.  Every 4-6 hours minimum:  Change

## 2024-11-05 NOTE — PROGRESS NOTES
Contacted GI MD @ [phone] regarding dietary restrictions per patient request. Patient remains NPO at this time following a decompression of the colon with rectal decompression tube placement. Awaiting call back/additional orders.

## 2024-11-05 NOTE — PROGRESS NOTES
Received a call from MRI, clearance from cardiology has been received and they have the patient on the schedule for MRI 11/6/2024 @ 1200. Will update patient and family with plan of care.

## 2024-11-05 NOTE — PROGRESS NOTES
Occupational Therapy  Juan Mohamud  [id number]  Endo Pool/NONE    Pt's chart reviewed. Per chart, pt currently off floor at this time for colonoscopy. Bedrest orders remain, MRI planned for tomorrow 11-6-24 @1200. Will cont to hold therapy evaluation pending MRI results and Neurosurgery recommendations.     Diya Vargas, OTR/L [id number]

## 2024-11-05 NOTE — CONSULTS
Consultation Note    Patient Name: Juan Mohamud  : 1940  Age: 84 y.o.     Admitting Physician: Jeremie Mccurdy MD   Date of Admission: 11/3/2024  3:52 PM   Primary Care Physician: Janet Garcia MD        Juan Mohamud is being seen at the request of Jeremie Mccurdy MD for abdominal distention, possible ileus..    History of Present Illness:  84-year-old male specimen with history of type 2 diabetes, neuropathy, dyslipidemia, status post AICD placement, systolic congestive heart failure presented with fall at home due to weakness.  Patient did not experience any loss of consciousness.  He is also complaining of abdominal distention without significant abdominal pain. A Ct scan of the abdomen and pelvis was performed showing :1.  Acute fracture thinning through the T10 vertebral body, T10-11 intervertebral disc space and T11 vertebral body. 2.  Diffuse distension of small and large bowel compatible with ileus. 3.  Bilateral pleural effusions. Patient states that his abdomen is quite distended and uncomfortable.  It feels very tight.  Patient is not ambulatory at this point.  Further evaluation in progress regarding the fracture of T10/T11.    GI History:    Patient had similar presentation with abdominal distention requiring colonic decompression.    Colonic decompression with Dr Cervantes on 3/6/2024  Impression:   Colonic pseudoobstruction s/p decompression colonoscopy with placement of a 14Fr colonic decompression tube.      Limited views of the colon did show some mild superficial ulceration without necrosis in the right colon.  Otherwise limited colonic views were normal.  The colon was dilated and decompressed completely colonoscopically.  A guidewire was advanced while withdrawing the scope and a 14 Fr decompression tube was passed over the wire into the colon.     Colonic decompression with Dr Pino on 2024    Postoperative Diagnosis:    Dilated unprepped colon  Mild  lispro (HUMALOG,ADMELOG) injection vial 0-4 Units, 0-4 Units, SubCUTAneous, Q4H     Social History:   Social History       Tobacco History       Smoking Status  Never      Smokeless Tobacco Use  Never              Alcohol History       Alcohol Use Status  Yes Comment  very seldom              Drug Use       Drug Use Status  No              Sexual Activity       Sexually Active  Not Asked                     Family History:  Family History   Problem Relation Age of Onset    Emphysema Mother         Allergies:  Allergies   Allergen Reactions    Erythromycin     Dust Mite Extract      sneeze        ROS:   General: No fever or weight change  Hematologic: No unexpected submucosal bleeding or bruising  HEENT: No sore throat or facial pain  Respiratory: No cough or dyspnea  Cardiovascular: No angina or dependent edema  Gastrointestinal: See HPI  Musculoskeletal: No usual joint pain or stiffness  Skin: No skin eruptions or changing lesions  Neurologic: No focal weakness or numbness  Psychiatric: No anxiety or sleep disturbance    Physical Exam:  Vital Signs:   Vitals:    11/05/24 0722   BP: 107/69   Pulse: 84   Resp: 17   Temp: 98 °F (36.7 °C)   SpO2: 96%       General: Well-nourished, well-developed  HEENT: Sclera anicteric, mucosal membranes moist  Cardiovascular: Regular rate and rhythm.  No murmurs.  Respiratory: Respirations nonlabored, no crepitus  GI: Abdomen significantly distended, no peritoneal signs.  No significant guarding.  Denies significant pain., soft, and nontender.  Normal active bowel sounds.  No masses palpable.   Rectal: Deferred  Musculoskeletal: No pitting edema of the lower legs.  Neurological: Gross memory appears intact.  Patient is alert and oriented      Recent Imaging:   CT ABDOMEN PELVIS W IV CONTRAST Additional Contrast? None  Narrative: EXAMINATION:  CT OF THE ABDOMEN AND PELVIS WITH CONTRAST 11/3/2024 9:38 pm    TECHNIQUE:  CT of the abdomen and pelvis was performed with the administration

## 2024-11-05 NOTE — CARE COORDINATION
11/05/24 1257   Service Assessment   Patient Orientation Alert and Oriented;Person;Place;Situation   Cognition Alert   History Provided By Patient   Primary Caregiver Self   Accompanied By/Relationship no one   Support Systems Spouse/Significant Other;Children   Patient's Healthcare Decision Maker is: Legal Next of Kin   PCP Verified by CM Yes   Last Visit to PCP Within last 3 months   Prior Functional Level Independent in ADLs/IADLs   Current Functional Level Assistance with the following:;Mobility;Bathing   Can patient return to prior living arrangement Unknown at present   Ability to make needs known: Good   Family able to assist with home care needs: Yes   Would you like for me to discuss the discharge plan with any other family members/significant others, and if so, who? No   Financial Resources Medicare  (BCBS Medicare)   Community Resources None   CM/SW Referral Other (see comment)  (dc needs)   Discharge Planning   Type of Residence Other (Comment)  (condo)   Living Arrangements Spouse/Significant Other   Current Services Prior To Admission Durable Medical Equipment   Current DME Prior to Arrival Walker;Other (Comment)  ([employer]-grab bars in shower and by toilet- walk-in shower)   Potential Assistance Needed Home Care   DME Ordered? No   Potential Assistance Purchasing Medications No   Type of Home Care Services None   Patient expects to be discharged to: House   Services At/After Discharge   [place] Resource Information Provided? No   Mode of Transport at Discharge Other (see comment)  (unknown)     Case Management Assessment  Initial Evaluation    Date/Time of Evaluation: 11/5/2024 12:59 PM  Assessment Completed by: Rani Mejia    If patient is discharged prior to next notation, then this note serves as note for discharge by case management.    Patient Name: Juan Mohamud                   YOB: 1940  Diagnosis: Hypochloremia [E87.8]  Glucosuria [R81]  Hyponatremia [E87.1]  General

## 2024-11-05 NOTE — CONSULTS
MRI form completed by cardiology and faxed to MRI. Please call for questions or concernc.  LAVELL Barraza MSN, RN [facility]  [phone]

## 2024-11-05 NOTE — ANESTHESIA PRE PROCEDURE
01:19 PM    CO2 25 11/04/2024 01:19 PM    BUN 19 11/04/2024 01:19 PM    CREATININE 1.0 11/04/2024 01:19 PM    GFRAA >60 10/24/2013 02:59 AM    AGRATIO 1.1 11/03/2024 11:15 PM    LABGLOM 75 11/04/2024 01:19 PM    LABGLOM >90 04/01/2024 05:01 AM    GLUCOSE 73 11/04/2024 01:19 PM    CALCIUM 8.0 11/04/2024 01:19 PM    BILITOT 1.2 11/03/2024 11:15 PM    ALKPHOS 247 11/03/2024 11:15 PM    AST 59 11/03/2024 11:15 PM    ALT 35 11/03/2024 11:15 PM     BMP    Lab Results   Component Value Date/Time     11/04/2024 01:19 PM    K 3.7 11/04/2024 01:19 PM    K 3.1 11/04/2024 03:50 AM    CL 97 11/04/2024 01:19 PM    CO2 25 11/04/2024 01:19 PM    BUN 19 11/04/2024 01:19 PM    CREATININE 1.0 11/04/2024 01:19 PM    CALCIUM 8.0 11/04/2024 01:19 PM    GFRAA >60 10/24/2013 02:59 AM    LABGLOM 75 11/04/2024 01:19 PM    LABGLOM >90 04/01/2024 05:01 AM    GLUCOSE 73 11/04/2024 01:19 PM     POCGlucose  Recent Labs     11/03/24  2315 11/04/24  0350 11/04/24  1319   GLUCOSE 88 100* 73      Coags    Lab Results   Component Value Date/Time    PROTIME 15.1 02/28/2024 10:44 PM    INR 1.19 02/28/2024 10:44 PM    APTT 37.3 02/29/2024 07:47 AM     HCG (If Applicable) No results found for: \"PREGTESTUR\", \"PREGSERUM\", \"HCG\", \"HCGQUANT\"   ABGs No results found for: \"PHART\", \"PO2ART\", \"LZR1BGS\", \"QYN3JZB\", \"BEART\", \"Z8WKAIAM\"   Type & Screen (If Applicable)  No results found for: \"LABABO\"                         BMI: Wt Readings from Last 3 Encounters:       NPO Status:   Date of last liquid consumption: 11/04/24   Time of last liquid consumption: 2300   Date of last solid food consumption: 11/04/24      Time of last solid consumption: 2300       Anesthesia Evaluation     no history of anesthetic complications:   Airway: Mallampati: II       Comment: Increased neck circumference  Mouth opening: > = 3 FB   Dental:      Comment: Crowded, denied loose teeth    Pulmonary:   (+) pneumonia (admitted with LLL pneumonia):               (-) COPD, asthma,

## 2024-11-05 NOTE — PROGRESS NOTES
Pt was out of his room for colonic decompression when I rounded this afternoon    Jeremie Mccurdy MD

## 2024-11-05 NOTE — OP NOTE
Incomplete Colonoscopy Note    Patient:   Juan Mohamud    :    1940    Facility:   [facility] [Inpatient]  Referring/PCP: Janet Garcia MD  Procedure:   Colonoscopy   Date:     2024  Endoscopist:  Andrew Kay MD, MD    Preoperative Diagnosis: Colonic ileus, significant abdominal distention.  Presents for colonic decompression..    Postoperative Diagnosis:  1.  Significant distention of the colon.  2.  Mucosal discoloration in the right colon.  3.  Limited examination due to stool throughout the colon  4.  Colon decompressed and placement of a decompression tube.  Significant improvement on abdominal exam.    Anesthesia: MAC    Estimated blood loss: None    Complications:  None    Specimen: no    Instrument:     Description of Procedure:  Informed consent was obtained from the patient after explanation of the procedure including indications, description of the procedure,  benefits and possible risks and complications of the procedure, and alternatives. Questions were answered.  The patient's history was reviewed and a directed physical examination was performed prior to the procedure.    Patient was monitored throughout the procedure with pulse oximetry and periodic assessment of vital signs. Patient was sedated as noted above. With the patient initially in the left lateral decubitus position, a digital rectal examination was performed and revealed negative without mass, lesions or tenderness.  The Olympus video colonoscope was placed in the patient's rectum and advanced without difficulty  to the ascending colon.   The prep was poor.  Examination of the mucosa was performed during both introduction and withdrawal of the colonoscope. Retroflexed view of the rectum was performed.        Findings:     The scope was introduced into the rectum and advanced to the proximal transverse colon and distal ascending colon.  Stools were present in multiple areas of

## 2024-11-05 NOTE — PLAN OF CARE
Problem: Chronic Conditions and Co-morbidities  Goal: Patient's chronic conditions and co-morbidity symptoms are monitored and maintained or improved  11/5/2024 1359 by Toma Block RN  Outcome: Progressing  Flowsheets (Taken 11/5/2024 1359)  Care Plan - Patient's Chronic Conditions and Co-Morbidity Symptoms are Monitored and Maintained or Improved:   Monitor and assess patient's chronic conditions and comorbid symptoms for stability, deterioration, or improvement   Collaborate with multidisciplinary team to address chronic and comorbid conditions and prevent exacerbation or deterioration   Update acute care plan with appropriate goals if chronic or comorbid symptoms are exacerbated and prevent overall improvement and discharge  11/5/2024 0527 by Saba Sanabria, RN  Outcome: Progressing  Flowsheets (Taken 11/5/2024 0527)  Care Plan - Patient's Chronic Conditions and Co-Morbidity Symptoms are Monitored and Maintained or Improved: Monitor and assess patient's chronic conditions and comorbid symptoms for stability, deterioration, or improvement     Problem: Discharge Planning  Goal: Discharge to home or other facility with appropriate resources  11/5/2024 1359 by Toma Block RN  Outcome: Progressing  Flowsheets (Taken 11/5/2024 1359)  Discharge to home or other facility with appropriate resources:   Identify barriers to discharge with patient and caregiver   Identify discharge learning needs (meds, wound care, etc)   Refer to discharge planning if patient needs post-hospital services based on physician order or complex needs related to functional status, cognitive ability or social support system   Arrange for needed discharge resources and transportation as appropriate  11/5/2024 0527 by Saba Sanabria, RN  Outcome: Progressing  Flowsheets (Taken 11/5/2024 0527)  Discharge to home or other facility with appropriate resources:   Identify barriers to discharge with patient and caregiver   Arrange for

## 2024-11-06 ENCOUNTER — APPOINTMENT (OUTPATIENT)
Dept: GENERAL RADIOLOGY | Age: 84
DRG: 388 | End: 2024-11-06
Payer: MEDICARE

## 2024-11-06 ENCOUNTER — APPOINTMENT (OUTPATIENT)
Dept: MRI IMAGING | Age: 84
DRG: 388 | End: 2024-11-06
Payer: MEDICARE

## 2024-11-06 VITALS
RESPIRATION RATE: 18 BRPM | BODY MASS INDEX: 25.72 KG/M2 | OXYGEN SATURATION: 95 % | TEMPERATURE: 97.4 F | HEIGHT: 77 IN | DIASTOLIC BLOOD PRESSURE: 55 MMHG | WEIGHT: 217.81 LBS | SYSTOLIC BLOOD PRESSURE: 105 MMHG | HEART RATE: 80 BPM

## 2024-11-06 PROBLEM — R14.0 ABDOMINAL DISTENSION: Status: ACTIVE | Noted: 2024-11-06

## 2024-11-06 PROBLEM — S06.4XAA EPIDURAL HEMATOMA: Status: ACTIVE | Noted: 2024-11-06

## 2024-11-06 LAB
APTT BLD: 42.6 SEC (ref 22.1–36.4)
GLUCOSE BLD-MCNC: 102 MG/DL (ref 70–99)
GLUCOSE BLD-MCNC: 106 MG/DL (ref 70–99)
GLUCOSE BLD-MCNC: 90 MG/DL (ref 70–99)
GLUCOSE BLD-MCNC: 97 MG/DL (ref 70–99)
GLUCOSE BLD-MCNC: 98 MG/DL (ref 70–99)
GLUCOSE BLD-MCNC: 98 MG/DL (ref 70–99)
INR PPP: 1.22 (ref 0.85–1.15)
PERFORMED ON: ABNORMAL
PERFORMED ON: ABNORMAL
PERFORMED ON: NORMAL
PROTHROMBIN TIME: 15.6 SEC (ref 11.9–14.9)

## 2024-11-06 PROCEDURE — G0378 HOSPITAL OBSERVATION PER HR: HCPCS

## 2024-11-06 PROCEDURE — P9047 ALBUMIN (HUMAN), 25%, 50ML: HCPCS | Performed by: INTERNAL MEDICINE

## 2024-11-06 PROCEDURE — 85610 PROTHROMBIN TIME: CPT

## 2024-11-06 PROCEDURE — 6370000000 HC RX 637 (ALT 250 FOR IP): Performed by: INTERNAL MEDICINE

## 2024-11-06 PROCEDURE — 9990000010 HC NO CHARGE VISIT

## 2024-11-06 PROCEDURE — 1200000000 HC SEMI PRIVATE

## 2024-11-06 PROCEDURE — 71046 X-RAY EXAM CHEST 2 VIEWS: CPT

## 2024-11-06 PROCEDURE — 6360000002 HC RX W HCPCS: Performed by: INTERNAL MEDICINE

## 2024-11-06 PROCEDURE — 96372 THER/PROPH/DIAG INJ SC/IM: CPT

## 2024-11-06 PROCEDURE — 2580000003 HC RX 258: Performed by: INTERNAL MEDICINE

## 2024-11-06 PROCEDURE — 85730 THROMBOPLASTIN TIME PARTIAL: CPT

## 2024-11-06 PROCEDURE — 2700000000 HC OXYGEN THERAPY PER DAY

## 2024-11-06 PROCEDURE — 72146 MRI CHEST SPINE W/O DYE: CPT

## 2024-11-06 PROCEDURE — 94761 N-INVAS EAR/PLS OXIMETRY MLT: CPT

## 2024-11-06 PROCEDURE — 36415 COLL VENOUS BLD VENIPUNCTURE: CPT

## 2024-11-06 RX ORDER — ALBUMIN (HUMAN) 12.5 G/50ML
25 SOLUTION INTRAVENOUS ONCE
Status: COMPLETED | OUTPATIENT
Start: 2024-11-06 | End: 2024-11-06

## 2024-11-06 RX ORDER — MIDODRINE HYDROCHLORIDE 5 MG/1
5 TABLET ORAL
Status: DISCONTINUED | OUTPATIENT
Start: 2024-11-06 | End: 2024-11-07 | Stop reason: HOSPADM

## 2024-11-06 RX ADMIN — DESMOPRESSIN ACETATE 200 MCG: 0.2 TABLET ORAL at 21:35

## 2024-11-06 RX ADMIN — PANTOPRAZOLE SODIUM 40 MG: 40 TABLET, DELAYED RELEASE ORAL at 06:23

## 2024-11-06 RX ADMIN — SODIUM CHLORIDE, PRESERVATIVE FREE 10 ML: 5 INJECTION INTRAVENOUS at 21:34

## 2024-11-06 RX ADMIN — ALBUMIN (HUMAN) 25 G: 0.25 INJECTION, SOLUTION INTRAVENOUS at 12:22

## 2024-11-06 RX ADMIN — GABAPENTIN 600 MG: 300 CAPSULE ORAL at 21:35

## 2024-11-06 RX ADMIN — MIDODRINE HYDROCHLORIDE 5 MG: 5 TABLET ORAL at 17:27

## 2024-11-06 RX ADMIN — BUPROPION HYDROCHLORIDE 300 MG: 150 TABLET, EXTENDED RELEASE ORAL at 09:36

## 2024-11-06 RX ADMIN — SODIUM CHLORIDE, PRESERVATIVE FREE 10 ML: 5 INJECTION INTRAVENOUS at 09:49

## 2024-11-06 RX ADMIN — GABAPENTIN 600 MG: 300 CAPSULE ORAL at 14:56

## 2024-11-06 RX ADMIN — ATORVASTATIN CALCIUM 10 MG: 10 TABLET, FILM COATED ORAL at 21:35

## 2024-11-06 RX ADMIN — ENOXAPARIN SODIUM 40 MG: 100 INJECTION SUBCUTANEOUS at 09:35

## 2024-11-06 RX ADMIN — EMPAGLIFLOZIN 10 MG: 10 TABLET, FILM COATED ORAL at 09:36

## 2024-11-06 RX ADMIN — GABAPENTIN 600 MG: 300 CAPSULE ORAL at 09:36

## 2024-11-06 RX ADMIN — MIDODRINE HYDROCHLORIDE 5 MG: 5 TABLET ORAL at 12:15

## 2024-11-06 RX ADMIN — TAMSULOSIN HYDROCHLORIDE 0.4 MG: 0.4 CAPSULE ORAL at 09:36

## 2024-11-06 ASSESSMENT — PAIN SCALES - GENERAL: PAINLEVEL_OUTOF10: 0

## 2024-11-06 NOTE — PROGRESS NOTES
Patient off unit to xray at this time.     Electronically signed by Eri Leo RN on 11/6/2024 at 1:09 PM

## 2024-11-06 NOTE — PROGRESS NOTES
Physical Therapy      Juan Mohamud  11/6/2024    -chart reviewed  -plans for MRI Thoracic spine at 12:00 today   -will await results and Neurosurgical recommendations     Electronically signed by KOLTON WILLSON PT on 11/6/2024 at 9:40 AM

## 2024-11-06 NOTE — PROGRESS NOTES
Patient wife brought in card for cochlear implant needed for MRI. Card placed in safe for patient room. MRI rescheduled for tomorrow.    Electronically signed by Eri Leo RN on 11/6/2024 at 4:30 PM

## 2024-11-06 NOTE — PROGRESS NOTES
Patient arrived back to unit from MRI.    Electronically signed by Eri Leo RN on 11/6/2024 at 2:43 PM

## 2024-11-06 NOTE — PROGRESS NOTES
Hospitalist   Progress Note    Patient Name: Juan Mohamud  PCP: Janet Garcia MD  Date of Admission: 11/3/2024    Chief Complaint on Admission: Non syncopal Fall at home, initial encounter secondary to generalized weakness   Chief diagnosis after evaluation: T11 vertebral body fx which extends to T10-11 disc space     Brief Synopsis: Patient is a 84 y.o. man who has a past medical history of Diabetes mellitus (HCC), Hyperlipidemia, Indigestion, Insomnia, Macular degeneration, MI, old, and Neuropathy. who was admitted on 11/3/2024 for evaluation and treatment after having a non syncopal fall at home He was found to have a T11 vertebral body fx which extends to T10-11 disc space  , as well as a possible Ileus    Pt Seen/Examined and Chart Reviewed.     Subjective: Pt has no new complaints today. He feels as though his abdominal distension has worsened today    Objective:  Allergies  Erythromycin and Dust mite extract    Medications    Scheduled Meds:   midodrine  5 mg Oral TID WC    buPROPion  300 mg Oral Daily    gabapentin  600 mg Oral TID    sodium chloride flush  5-40 mL IntraVENous 2 times per day    enoxaparin  40 mg SubCUTAneous Daily    tamsulosin  0.4 mg Oral Daily    amiodarone  200 mg Oral Daily    atorvastatin  10 mg Oral Nightly    empagliflozin  10 mg Oral Daily    linaclotide  290 mcg Oral QAM AC    metoprolol succinate  25 mg Oral Daily    pantoprazole  40 mg Oral QAM AC    DESMOpressin  200 mcg Oral Nightly    insulin lispro  0-4 Units SubCUTAneous Q4H     Infusions:   sodium chloride      dextrose       PRN Meds:  prochlorperazine, polyvinyl alcohol-povidone, oxyCODONE, simethicone, zolpidem, sodium chloride flush, sodium chloride, potassium chloride **OR** potassium alternative oral replacement **OR** potassium chloride, magnesium sulfate, ondansetron **OR** ondansetron, polyethylene glycol, acetaminophen **OR** acetaminophen, aluminum & magnesium hydroxide-simethicone, glucose,  dextrose bolus **OR** dextrose bolus, glucagon (rDNA), dextrose    Physical    VITALS:  BP (!) 100/59   Pulse 86   Temp 97.3 °F (36.3 °C) (Oral)   Resp 12   Ht 1.956 m (6' 5.01\")   Wt 98.8 kg (217 lb 13 oz)   SpO2 94%   BMI 25.82 kg/m²   CONSTITUTIONAL:  WD/WN 84 y.o. year-old male who is awake, alert, cooperative, no apparent distress, and appears stated age  EYES:  Lids and lashes normal, PERRL, EOMI, sclera clear, conjunctiva normal  ENT:  NC/AT, MMM    NECK:  Supple, symmetrical, trachea midline, no adenopathy  HEMATOLOGIC/LYMPHATICS:  no cervical, supraclavicular or axillary lymphadenopathy  LUNGS:  clear to auscultation bilaterally, No increased work of breathing, good air exchange, no crackles or wheezing  CARDIOVASCULAR:  Regular rate and rhythm, normal S1 and S2, no S3 or S4, and no significant murmurs, rubs or gallops noted. Normal apical impulse.   ABDOMEN:  + Distended (improved), no masses palpated, no organomegally  EXTREMITIES.  extremities atraumatic, no cyanosis or edema and Homans sign is negative, no sign of DVT.   MENTAL STATUS: Awake, alert, oriented to name, place and time.    NEUROLOGIC:  Cranial nerves II-XII are grossly intact.        Data    CBC with Differential:    Lab Results   Component Value Date/Time    WBC 8.6 11/03/2024 04:50 PM    HGB 12.1 11/03/2024 04:50 PM    HCT 36.4 11/03/2024 04:50 PM     11/03/2024 04:50 PM    MCV 88.9 11/03/2024 04:50 PM    RDW 16.4 11/03/2024 04:50 PM    BANDSPCT 1 02/28/2024 10:44 PM    LYMPHOPCT 6.7 11/03/2024 04:50 PM    MONOPCT 13.9 11/03/2024 04:50 PM    EOSPCT 0.2 11/03/2024 04:50 PM    BASOPCT 0.6 11/03/2024 04:50 PM    MONOSABS 1.2 11/03/2024 04:50 PM    LYMPHSABS 0.6 11/03/2024 04:50 PM    EOSABS 0.0 11/03/2024 04:50 PM    BASOSABS 0.0 11/03/2024 04:50 PM     BMP:    Lab Results   Component Value Date/Time     11/04/2024 01:19 PM    K 3.7 11/04/2024 01:19 PM    K 3.1 11/04/2024 03:50 AM    CL 97 11/04/2024 01:19 PM    CO2 25

## 2024-11-06 NOTE — PLAN OF CARE
Problem: Chronic Conditions and Co-morbidities  Goal: Patient's chronic conditions and co-morbidity symptoms are monitored and maintained or improved  Outcome: Progressing  Flowsheets (Taken 11/6/2024 0152 by Floridalma Vital, RN)  Care Plan - Patient's Chronic Conditions and Co-Morbidity Symptoms are Monitored and Maintained or Improved:   Monitor and assess patient's chronic conditions and comorbid symptoms for stability, deterioration, or improvement   Collaborate with multidisciplinary team to address chronic and comorbid conditions and prevent exacerbation or deterioration   Update acute care plan with appropriate goals if chronic or comorbid symptoms are exacerbated and prevent overall improvement and discharge     Problem: Discharge Planning  Goal: Discharge to home or other facility with appropriate resources  Outcome: Progressing  Flowsheets (Taken 11/6/2024 1629)  Discharge to home or other facility with appropriate resources:   Identify barriers to discharge with patient and caregiver   Arrange for needed discharge resources and transportation as appropriate   Identify discharge learning needs (meds, wound care, etc)     Problem: Pain  Goal: Verbalizes/displays adequate comfort level or baseline comfort level  Outcome: Progressing  Flowsheets (Taken 11/6/2024 1629)  Verbalizes/displays adequate comfort level or baseline comfort level:   Encourage patient to monitor pain and request assistance   Assess pain using appropriate pain scale   Administer analgesics based on type and severity of pain and evaluate response   Implement non-pharmacological measures as appropriate and evaluate response     Problem: Safety - Adult  Goal: Free from fall injury  Outcome: Progressing  Flowsheets (Taken 11/6/2024 0152 by Floridalma Vital, RN)  Free From Fall Injury: Instruct family/caregiver on patient safety     Problem: ABCDS Injury Assessment  Goal: Absence of physical injury  Outcome: Progressing  Flowsheets (Taken

## 2024-11-06 NOTE — PROGRESS NOTES
INPATIENT PROGRESS NOTE        IDENTIFYING DATA/REASON FOR CONSULTATION   PATIENT:  Juan Mohamud  MRN:  [id number]  ADMIT DATE: 11/3/2024  TIME OF EVALUATION: 11/6/2024 12:14 PM  HOSPITAL STAY:   LOS: 0 days   CONSULTING PHYSICIAN: Jeremie Mccurdy MD   REASON FOR CONSULTATION: colonic distention    Subjective:    Patient seen in follow up for colonic ileus.  S/p colonoscopy with decompression 11/5.  He denies abdominal pain, nausea, vomiting. He is having very little liquid stool output from rectal tube. Pt denies flatus.      MEDICATIONS   SCHEDULED:  albumin human 25%, 25 g, Once  midodrine, 5 mg, TID WC  buPROPion, 300 mg, Daily  gabapentin, 600 mg, TID  sodium chloride flush, 5-40 mL, 2 times per day  enoxaparin, 40 mg, Daily  tamsulosin, 0.4 mg, Daily  amiodarone, 200 mg, Daily  atorvastatin, 10 mg, Nightly  empagliflozin, 10 mg, Daily  linaclotide, 290 mcg, QAM AC  metoprolol succinate, 25 mg, Daily  pantoprazole, 40 mg, QAM AC  DESMOpressin, 200 mcg, Nightly  insulin lispro, 0-4 Units, Q4H      FLUIDS/DRIPS:     sodium chloride      dextrose       PRNs: prochlorperazine, 10 mg, Q6H PRN  polyvinyl alcohol-povidone, 2 drop, PRN  oxyCODONE, 5 mg, Q6H PRN  simethicone, 80 mg, Q6H PRN  zolpidem, 10 mg, Nightly PRN  sodium chloride flush, 5-40 mL, PRN  sodium chloride, , PRN  potassium chloride, 40 mEq, PRN   Or  potassium alternative oral replacement, 40 mEq, PRN   Or  potassium chloride, 10 mEq, PRN  magnesium sulfate, 2,000 mg, PRN  ondansetron, 4 mg, Q8H PRN   Or  ondansetron, 4 mg, Q6H PRN  polyethylene glycol, 17 g, Daily PRN  acetaminophen, 650 mg, Q6H PRN   Or  acetaminophen, 650 mg, Q6H PRN  aluminum & magnesium hydroxide-simethicone, 30 mL, Q6H PRN  glucose, 4 tablet, PRN  dextrose bolus, 125 mL, PRN   Or  dextrose bolus, 250 mL, PRN  glucagon (rDNA), 1 mg, PRN  dextrose, , Continuous PRN      ALLERGIES:    Allergies   Allergen Reactions    Erythromycin     Dust Mite Extract      sneeze

## 2024-11-06 NOTE — PROGRESS NOTES
Patient resting in bed quietly this evening, A&Ox4. Remains on 3L NC. Vital signs stable. PIV flushed without issue, dressing CDI, normal saline locked. Reports no pain at this time. All nightly medication taken whole without complaint. Patient reporting nausea, PRN antiemetic administered per orders (see eMAR). Denies SOB, lightheaded/dizziness. Dressing to BUE remains CDI. Rectal tube remains in place. SSKIN bundle implemented. No additional needs verbalized at this time. Standard safety precautions in place and call light within reach. Will continue to monitor and assess. Electronically signed by Floridalma Vital RN on 11/5/2024 at 10:53 PM

## 2024-11-06 NOTE — PROGRESS NOTES
Patient resting in bed this morning with no complaints of pain at this time. Scheduled morning medications given. See eMAR for documentation of medication administration. Patient tolerated medications whole w/ water w/o complication. IV to the R flushed w/o complication and is saline locked at this time. Alcohol cap in place.     Daily amiodarone and metoprolol held for morning BP of 101/63. MD Mccurdy aware.    Head to toe assessment completed and charted. See flowsheets for documentation.     Patient wife, Lizzie, called tearful this morning worried about patient. Patient wife called at this time and updated that patient is now oriented and able to answer questions asked.     Patient updated with plan of care for the shift, denies questions. Patient denies further physical/emotional needs at this time. Call light, telephone, and bed side table are within reach. Fall precautions in place. Will continue to monitor and assess.     Electronically signed by Eri Leo RN on 11/6/2024 at 9:52 AM

## 2024-11-06 NOTE — PROGRESS NOTES
Message sent to MD Mccurdy at this time for patient BP of 84/48 manually:    Patient admitted for a fall at home, I held BP medications this morning. Patient manual BP taken and found to be 84/48 at this time. Patient is not currently symptomatic but is very lethargic. Any new orders? Thanks     Awaiting new orders.    Electronically signed by Eri Leo RN on 11/6/2024 at 11:38 AM

## 2024-11-06 NOTE — PLAN OF CARE
Problem: Chronic Conditions and Co-morbidities  Goal: Patient's chronic conditions and co-morbidity symptoms are monitored and maintained or improved  11/6/2024 0152 by Floridalma Vital RN  Outcome: Progressing  Flowsheets (Taken 11/6/2024 0152)  Care Plan - Patient's Chronic Conditions and Co-Morbidity Symptoms are Monitored and Maintained or Improved:   Monitor and assess patient's chronic conditions and comorbid symptoms for stability, deterioration, or improvement   Collaborate with multidisciplinary team to address chronic and comorbid conditions and prevent exacerbation or deterioration   Update acute care plan with appropriate goals if chronic or comorbid symptoms are exacerbated and prevent overall improvement and discharge  11/5/2024 1359 by Toma Block RN  Outcome: Progressing  Flowsheets (Taken 11/5/2024 1359)  Care Plan - Patient's Chronic Conditions and Co-Morbidity Symptoms are Monitored and Maintained or Improved:   Monitor and assess patient's chronic conditions and comorbid symptoms for stability, deterioration, or improvement   Collaborate with multidisciplinary team to address chronic and comorbid conditions and prevent exacerbation or deterioration   Update acute care plan with appropriate goals if chronic or comorbid symptoms are exacerbated and prevent overall improvement and discharge     Problem: Discharge Planning  Goal: Discharge to home or other facility with appropriate resources  11/6/2024 0152 by Floridalma Vital RN  Outcome: Progressing  Flowsheets (Taken 11/6/2024 0152)  Discharge to home or other facility with appropriate resources:   Identify barriers to discharge with patient and caregiver   Identify discharge learning needs (meds, wound care, etc)   Arrange for needed discharge resources and transportation as appropriate  11/5/2024 1359 by Toma Block RN  Outcome: Progressing  Flowsheets (Taken 11/5/2024 1359)  Discharge to home or other facility with appropriate  resources:   Identify barriers to discharge with patient and caregiver   Identify discharge learning needs (meds, wound care, etc)   Refer to discharge planning if patient needs post-hospital services based on physician order or complex needs related to functional status, cognitive ability or social support system   Arrange for needed discharge resources and transportation as appropriate     Problem: Pain  Goal: Verbalizes/displays adequate comfort level or baseline comfort level  11/6/2024 0152 by Floridalma Vital RN  Outcome: Progressing  Flowsheets (Taken 11/6/2024 0152)  Verbalizes/displays adequate comfort level or baseline comfort level:   Encourage patient to monitor pain and request assistance   Administer analgesics based on type and severity of pain and evaluate response   Assess pain using appropriate pain scale   Implement non-pharmacological measures as appropriate and evaluate response  11/5/2024 1359 by Toma Block RN  Outcome: Progressing  Flowsheets (Taken 11/5/2024 1359)  Verbalizes/displays adequate comfort level or baseline comfort level:   Encourage patient to monitor pain and request assistance   Administer analgesics based on type and severity of pain and evaluate response   Consider cultural and social influences on pain and pain management   Assess pain using appropriate pain scale   Implement non-pharmacological measures as appropriate and evaluate response     Problem: Safety - Adult  Goal: Free from fall injury  11/6/2024 0152 by Floridalma Vital RN  Outcome: Progressing  Flowsheets (Taken 11/6/2024 0152)  Free From Fall Injury: Instruct family/caregiver on patient safety  11/5/2024 1359 by Toma Block RN  Outcome: Progressing  Flowsheets (Taken 11/5/2024 1359)  Free From Fall Injury: Instruct family/caregiver on patient safety     Problem: ABCDS Injury Assessment  Goal: Absence of physical injury  11/6/2024 0152 by Floridalma Vital, RN  Outcome: Progressing  Flowsheets (Taken

## 2024-11-07 ENCOUNTER — HOSPITAL ENCOUNTER (INPATIENT)
Age: 84
LOS: 34 days | Discharge: HOSPICE | DRG: 447 | End: 2024-12-11
Attending: INTERNAL MEDICINE | Admitting: INTERNAL MEDICINE
Payer: MEDICARE

## 2024-11-07 ENCOUNTER — APPOINTMENT (OUTPATIENT)
Dept: CT IMAGING | Age: 84
DRG: 447 | End: 2024-11-07
Attending: INTERNAL MEDICINE
Payer: MEDICARE

## 2024-11-07 ENCOUNTER — APPOINTMENT (OUTPATIENT)
Dept: GENERAL RADIOLOGY | Age: 84
DRG: 447 | End: 2024-11-07
Attending: INTERNAL MEDICINE
Payer: MEDICARE

## 2024-11-07 DIAGNOSIS — R06.02 SHORTNESS OF BREATH: ICD-10-CM

## 2024-11-07 DIAGNOSIS — I95.9 HYPOTENSION, UNSPECIFIED HYPOTENSION TYPE: ICD-10-CM

## 2024-11-07 DIAGNOSIS — I47.10 SVT (SUPRAVENTRICULAR TACHYCARDIA) (HCC): Primary | ICD-10-CM

## 2024-11-07 PROBLEM — I50.32 CHRONIC HEART FAILURE WITH PRESERVED EJECTION FRACTION (HFPEF) (HCC): Status: ACTIVE | Noted: 2024-11-07

## 2024-11-07 PROBLEM — S24.101A SPINAL CORD INJURY AT T1-T6 LEVEL (HCC): Status: ACTIVE | Noted: 2024-11-07

## 2024-11-07 PROBLEM — S22.082A: Status: ACTIVE | Noted: 2024-11-07

## 2024-11-07 PROBLEM — Z01.810 PREOPERATIVE CARDIOVASCULAR EXAMINATION: Status: ACTIVE | Noted: 2024-11-07

## 2024-11-07 LAB
ABO + RH BLD: NORMAL
ANION GAP SERPL CALCULATED.3IONS-SCNC: 8 MMOL/L (ref 3–16)
ANION GAP SERPL CALCULATED.3IONS-SCNC: 9 MMOL/L (ref 3–16)
APTT BLD: 41.7 SEC (ref 22.1–36.4)
BASOPHILS # BLD: 0 K/UL (ref 0–0.2)
BASOPHILS NFR BLD: 0.2 %
BLD GP AB SCN SERPL QL: NORMAL
BUN SERPL-MCNC: 35 MG/DL (ref 7–20)
BUN SERPL-MCNC: 36 MG/DL (ref 7–20)
CA-I BLD-SCNC: 1.05 MMOL/L (ref 1.12–1.32)
CALCIUM SERPL-MCNC: 7.8 MG/DL (ref 8.3–10.6)
CALCIUM SERPL-MCNC: 8 MG/DL (ref 8.3–10.6)
CHLORIDE SERPL-SCNC: 97 MMOL/L (ref 99–110)
CHLORIDE SERPL-SCNC: 98 MMOL/L (ref 99–110)
CO2 SERPL-SCNC: 24 MMOL/L (ref 21–32)
CO2 SERPL-SCNC: 26 MMOL/L (ref 21–32)
CREAT SERPL-MCNC: 1 MG/DL (ref 0.8–1.3)
CREAT SERPL-MCNC: 1 MG/DL (ref 0.8–1.3)
DEPRECATED RDW RBC AUTO: 16.6 % (ref 12.4–15.4)
EOSINOPHIL # BLD: 0.1 K/UL (ref 0–0.6)
EOSINOPHIL NFR BLD: 0.8 %
GFR SERPLBLD CREATININE-BSD FMLA CKD-EPI: 75 ML/MIN/{1.73_M2}
GFR SERPLBLD CREATININE-BSD FMLA CKD-EPI: 75 ML/MIN/{1.73_M2}
GLUCOSE BLD-MCNC: 68 MG/DL (ref 70–99)
GLUCOSE BLD-MCNC: 72 MG/DL (ref 70–99)
GLUCOSE BLD-MCNC: 77 MG/DL (ref 70–99)
GLUCOSE BLD-MCNC: 81 MG/DL (ref 70–99)
GLUCOSE BLD-MCNC: 87 MG/DL (ref 70–99)
GLUCOSE BLD-MCNC: 87 MG/DL (ref 70–99)
GLUCOSE BLD-MCNC: 89 MG/DL (ref 70–99)
GLUCOSE BLD-MCNC: 90 MG/DL (ref 70–99)
GLUCOSE BLD-MCNC: 97 MG/DL (ref 70–99)
GLUCOSE SERPL-MCNC: 102 MG/DL (ref 70–99)
GLUCOSE SERPL-MCNC: 89 MG/DL (ref 70–99)
HCT VFR BLD AUTO: 35.8 % (ref 40.5–52.5)
HGB BLD-MCNC: 11.6 G/DL (ref 13.5–17.5)
INR PPP: 1.19 (ref 0.85–1.15)
LYMPHOCYTES # BLD: 0.8 K/UL (ref 1–5.1)
LYMPHOCYTES NFR BLD: 6.8 %
MAGNESIUM SERPL-MCNC: 2.41 MG/DL (ref 1.8–2.4)
MAGNESIUM SERPL-MCNC: 2.42 MG/DL (ref 1.8–2.4)
MCH RBC QN AUTO: 28.9 PG (ref 26–34)
MCHC RBC AUTO-ENTMCNC: 32.4 G/DL (ref 31–36)
MCV RBC AUTO: 89.2 FL (ref 80–100)
MONOCYTES # BLD: 1.1 K/UL (ref 0–1.3)
MONOCYTES NFR BLD: 9.4 %
NEUTROPHILS # BLD: 9.8 K/UL (ref 1.7–7.7)
NEUTROPHILS NFR BLD: 82.8 %
OSMOLALITY SERPL: 287 MOSM/KG (ref 278–305)
OSMOLALITY UR: 690 MOSM/KG (ref 390–1070)
PERFORMED ON: ABNORMAL
PERFORMED ON: NORMAL
PH VENOUS: 7.37 (ref 7.35–7.45)
PLATELET # BLD AUTO: 151 K/UL (ref 135–450)
PMV BLD AUTO: 8.3 FL (ref 5–10.5)
POTASSIUM SERPL-SCNC: 4 MMOL/L (ref 3.5–5.1)
POTASSIUM SERPL-SCNC: 4.2 MMOL/L (ref 3.5–5.1)
PROTHROMBIN TIME: 15.3 SEC (ref 11.9–14.9)
RBC # BLD AUTO: 4.01 M/UL (ref 4.2–5.9)
SODIUM SERPL-SCNC: 131 MMOL/L (ref 136–145)
SODIUM SERPL-SCNC: 131 MMOL/L (ref 136–145)
SODIUM UR-SCNC: <20 MMOL/L
WBC # BLD AUTO: 11.9 K/UL (ref 4–11)

## 2024-11-07 PROCEDURE — 85610 PROTHROMBIN TIME: CPT

## 2024-11-07 PROCEDURE — 80048 BASIC METABOLIC PNL TOTAL CA: CPT

## 2024-11-07 PROCEDURE — 6370000000 HC RX 637 (ALT 250 FOR IP)

## 2024-11-07 PROCEDURE — 83935 ASSAY OF URINE OSMOLALITY: CPT

## 2024-11-07 PROCEDURE — 74018 RADEX ABDOMEN 1 VIEW: CPT

## 2024-11-07 PROCEDURE — 84300 ASSAY OF URINE SODIUM: CPT

## 2024-11-07 PROCEDURE — APPNB180 APP NON BILLABLE TIME > 60 MINS

## 2024-11-07 PROCEDURE — 86900 BLOOD TYPING SEROLOGIC ABO: CPT

## 2024-11-07 PROCEDURE — 6360000002 HC RX W HCPCS: Performed by: INTERNAL MEDICINE

## 2024-11-07 PROCEDURE — 99222 1ST HOSP IP/OBS MODERATE 55: CPT | Performed by: INTERNAL MEDICINE

## 2024-11-07 PROCEDURE — 93005 ELECTROCARDIOGRAM TRACING: CPT | Performed by: INTERNAL MEDICINE

## 2024-11-07 PROCEDURE — 85730 THROMBOPLASTIN TIME PARTIAL: CPT

## 2024-11-07 PROCEDURE — 86901 BLOOD TYPING SEROLOGIC RH(D): CPT

## 2024-11-07 PROCEDURE — 83930 ASSAY OF BLOOD OSMOLALITY: CPT

## 2024-11-07 PROCEDURE — 83735 ASSAY OF MAGNESIUM: CPT

## 2024-11-07 PROCEDURE — 2580000003 HC RX 258

## 2024-11-07 PROCEDURE — 6370000000 HC RX 637 (ALT 250 FOR IP): Performed by: SURGERY

## 2024-11-07 PROCEDURE — 72131 CT LUMBAR SPINE W/O DYE: CPT

## 2024-11-07 PROCEDURE — 86850 RBC ANTIBODY SCREEN: CPT

## 2024-11-07 PROCEDURE — 6370000000 HC RX 637 (ALT 250 FOR IP): Performed by: NURSE PRACTITIONER

## 2024-11-07 PROCEDURE — 36415 COLL VENOUS BLD VENIPUNCTURE: CPT

## 2024-11-07 PROCEDURE — 6360000002 HC RX W HCPCS: Performed by: NURSE PRACTITIONER

## 2024-11-07 PROCEDURE — 85025 COMPLETE CBC W/AUTO DIFF WBC: CPT

## 2024-11-07 PROCEDURE — 72128 CT CHEST SPINE W/O DYE: CPT

## 2024-11-07 PROCEDURE — 99291 CRITICAL CARE FIRST HOUR: CPT | Performed by: STUDENT IN AN ORGANIZED HEALTH CARE EDUCATION/TRAINING PROGRAM

## 2024-11-07 PROCEDURE — 82330 ASSAY OF CALCIUM: CPT

## 2024-11-07 PROCEDURE — 2000000000 HC ICU R&B

## 2024-11-07 RX ORDER — BUPROPION HYDROCHLORIDE 150 MG/1
300 TABLET ORAL DAILY
Status: DISCONTINUED | OUTPATIENT
Start: 2024-11-07 | End: 2024-12-05

## 2024-11-07 RX ORDER — DESMOPRESSIN ACETATE 0.1 MG/1
200 TABLET ORAL NIGHTLY
Status: DISCONTINUED | OUTPATIENT
Start: 2024-11-07 | End: 2024-12-11

## 2024-11-07 RX ORDER — METOPROLOL SUCCINATE 25 MG/1
25 TABLET, EXTENDED RELEASE ORAL DAILY
Status: DISCONTINUED | OUTPATIENT
Start: 2024-11-07 | End: 2024-11-14

## 2024-11-07 RX ORDER — INSULIN LISPRO 100 [IU]/ML
0-4 INJECTION, SOLUTION INTRAVENOUS; SUBCUTANEOUS EVERY 4 HOURS
Status: DISCONTINUED | OUTPATIENT
Start: 2024-11-07 | End: 2024-11-17

## 2024-11-07 RX ORDER — SODIUM CHLORIDE 9 MG/ML
INJECTION, SOLUTION INTRAVENOUS PRN
Status: DISCONTINUED | OUTPATIENT
Start: 2024-11-07 | End: 2024-12-04

## 2024-11-07 RX ORDER — MECOBALAMIN 5000 MCG
5 TABLET,DISINTEGRATING ORAL NIGHTLY
Status: DISCONTINUED | OUTPATIENT
Start: 2024-11-08 | End: 2024-11-12

## 2024-11-07 RX ORDER — HYDROMORPHONE HYDROCHLORIDE 1 MG/ML
0.25 INJECTION, SOLUTION INTRAMUSCULAR; INTRAVENOUS; SUBCUTANEOUS
Status: DISCONTINUED | OUTPATIENT
Start: 2024-11-07 | End: 2024-11-15 | Stop reason: ALTCHOICE

## 2024-11-07 RX ORDER — DEXTROSE MONOHYDRATE 100 MG/ML
INJECTION, SOLUTION INTRAVENOUS CONTINUOUS PRN
Status: DISCONTINUED | OUTPATIENT
Start: 2024-11-07 | End: 2024-12-11 | Stop reason: HOSPADM

## 2024-11-07 RX ORDER — ONDANSETRON 2 MG/ML
4 INJECTION INTRAMUSCULAR; INTRAVENOUS EVERY 6 HOURS PRN
Status: DISCONTINUED | OUTPATIENT
Start: 2024-11-07 | End: 2024-12-11 | Stop reason: HOSPADM

## 2024-11-07 RX ORDER — ZOLPIDEM TARTRATE 5 MG/1
5 TABLET ORAL NIGHTLY
Status: DISCONTINUED | OUTPATIENT
Start: 2024-11-07 | End: 2024-11-08 | Stop reason: SDUPTHER

## 2024-11-07 RX ORDER — ATORVASTATIN CALCIUM 10 MG/1
10 TABLET, FILM COATED ORAL NIGHTLY
Status: DISCONTINUED | OUTPATIENT
Start: 2024-11-07 | End: 2024-12-11

## 2024-11-07 RX ORDER — SODIUM CHLORIDE 9 MG/ML
INJECTION, SOLUTION INTRAVENOUS CONTINUOUS
Status: ACTIVE | OUTPATIENT
Start: 2024-11-07 | End: 2024-11-07

## 2024-11-07 RX ORDER — SODIUM CHLORIDE 0.9 % (FLUSH) 0.9 %
5-40 SYRINGE (ML) INJECTION PRN
Status: DISCONTINUED | OUTPATIENT
Start: 2024-11-07 | End: 2024-12-11 | Stop reason: HOSPADM

## 2024-11-07 RX ORDER — ONDANSETRON 4 MG/1
4 TABLET, ORALLY DISINTEGRATING ORAL EVERY 8 HOURS PRN
Status: DISCONTINUED | OUTPATIENT
Start: 2024-11-07 | End: 2024-12-11 | Stop reason: HOSPADM

## 2024-11-07 RX ORDER — MIDODRINE HYDROCHLORIDE 5 MG/1
5 TABLET ORAL
Status: DISCONTINUED | OUTPATIENT
Start: 2024-11-07 | End: 2024-11-13

## 2024-11-07 RX ORDER — AMIODARONE HYDROCHLORIDE 200 MG/1
200 TABLET ORAL DAILY
Status: DISCONTINUED | OUTPATIENT
Start: 2024-11-07 | End: 2024-12-11

## 2024-11-07 RX ORDER — DESMOPRESSIN ACETATE 0.1 MG/1
200 TABLET ORAL NIGHTLY
Status: DISCONTINUED | OUTPATIENT
Start: 2024-11-07 | End: 2024-11-07

## 2024-11-07 RX ORDER — GLUCAGON 1 MG/ML
1 KIT INJECTION PRN
Status: DISCONTINUED | OUTPATIENT
Start: 2024-11-07 | End: 2024-12-11 | Stop reason: HOSPADM

## 2024-11-07 RX ORDER — ACETAMINOPHEN 650 MG/1
650 SUPPOSITORY RECTAL EVERY 6 HOURS PRN
Status: DISCONTINUED | OUTPATIENT
Start: 2024-11-07 | End: 2024-11-19

## 2024-11-07 RX ORDER — TAMSULOSIN HYDROCHLORIDE 0.4 MG/1
0.4 CAPSULE ORAL DAILY
Status: DISCONTINUED | OUTPATIENT
Start: 2024-11-07 | End: 2024-12-11

## 2024-11-07 RX ORDER — GABAPENTIN 300 MG/1
600 CAPSULE ORAL 3 TIMES DAILY
Status: DISCONTINUED | OUTPATIENT
Start: 2024-11-07 | End: 2024-12-11

## 2024-11-07 RX ORDER — OXYCODONE HYDROCHLORIDE 5 MG/1
5 TABLET ORAL EVERY 4 HOURS PRN
Status: DISCONTINUED | OUTPATIENT
Start: 2024-11-07 | End: 2024-12-08

## 2024-11-07 RX ORDER — METHOCARBAMOL 100 MG/ML
1000 INJECTION, SOLUTION INTRAMUSCULAR; INTRAVENOUS EVERY 8 HOURS
Status: COMPLETED | OUTPATIENT
Start: 2024-11-07 | End: 2024-11-08

## 2024-11-07 RX ORDER — SODIUM CHLORIDE 0.9 % (FLUSH) 0.9 %
5-40 SYRINGE (ML) INJECTION EVERY 12 HOURS SCHEDULED
Status: DISCONTINUED | OUTPATIENT
Start: 2024-11-07 | End: 2024-11-07

## 2024-11-07 RX ORDER — POLYETHYLENE GLYCOL 3350 17 G/17G
17 POWDER, FOR SOLUTION ORAL DAILY
Status: DISCONTINUED | OUTPATIENT
Start: 2024-11-07 | End: 2024-11-07

## 2024-11-07 RX ORDER — PANTOPRAZOLE SODIUM 40 MG/1
40 TABLET, DELAYED RELEASE ORAL
Status: DISCONTINUED | OUTPATIENT
Start: 2024-11-07 | End: 2024-11-10

## 2024-11-07 RX ORDER — POLYETHYLENE GLYCOL 3350 17 G/17G
17 POWDER, FOR SOLUTION ORAL 2 TIMES DAILY
Status: DISCONTINUED | OUTPATIENT
Start: 2024-11-07 | End: 2024-11-20

## 2024-11-07 RX ORDER — ACETAMINOPHEN 325 MG/1
650 TABLET ORAL EVERY 6 HOURS PRN
Status: DISCONTINUED | OUTPATIENT
Start: 2024-11-07 | End: 2024-11-19

## 2024-11-07 RX ORDER — SENNA AND DOCUSATE SODIUM 50; 8.6 MG/1; MG/1
2 TABLET, FILM COATED ORAL 2 TIMES DAILY
Status: DISCONTINUED | OUTPATIENT
Start: 2024-11-07 | End: 2024-11-22

## 2024-11-07 RX ORDER — POLYETHYLENE GLYCOL 3350 17 G/17G
17 POWDER, FOR SOLUTION ORAL DAILY PRN
Status: DISCONTINUED | OUTPATIENT
Start: 2024-11-07 | End: 2024-11-07

## 2024-11-07 RX ORDER — LIDOCAINE 4 G/G
1 PATCH TOPICAL DAILY PRN
Status: DISCONTINUED | OUTPATIENT
Start: 2024-11-07 | End: 2024-12-11 | Stop reason: HOSPADM

## 2024-11-07 RX ORDER — ACETAMINOPHEN 500 MG
1000 TABLET ORAL 2 TIMES DAILY
Status: DISCONTINUED | OUTPATIENT
Start: 2024-11-07 | End: 2024-11-19

## 2024-11-07 RX ADMIN — METHOCARBAMOL 1000 MG: 100 INJECTION INTRAMUSCULAR; INTRAVENOUS at 18:09

## 2024-11-07 RX ADMIN — SODIUM CHLORIDE, PRESERVATIVE FREE 10 ML: 5 INJECTION INTRAVENOUS at 08:26

## 2024-11-07 RX ADMIN — ATORVASTATIN CALCIUM 10 MG: 10 TABLET, FILM COATED ORAL at 20:20

## 2024-11-07 RX ADMIN — ACETAMINOPHEN 650 MG: 500 TABLET, FILM COATED ORAL at 08:26

## 2024-11-07 RX ADMIN — METHOCARBAMOL 1000 MG: 100 INJECTION INTRAMUSCULAR; INTRAVENOUS at 12:32

## 2024-11-07 RX ADMIN — LINACLOTIDE 290 MCG: 145 CAPSULE, GELATIN COATED ORAL at 07:19

## 2024-11-07 RX ADMIN — HYDROMORPHONE HYDROCHLORIDE 0.25 MG: 1 INJECTION, SOLUTION INTRAMUSCULAR; INTRAVENOUS; SUBCUTANEOUS at 09:18

## 2024-11-07 RX ADMIN — ACETAMINOPHEN 1000 MG: 500 TABLET, FILM COATED ORAL at 12:31

## 2024-11-07 RX ADMIN — AMIODARONE HYDROCHLORIDE 200 MG: 200 TABLET ORAL at 10:51

## 2024-11-07 RX ADMIN — POLYETHYLENE GLYCOL 3350 17 G: 17 POWDER, FOR SOLUTION ORAL at 08:26

## 2024-11-07 RX ADMIN — ACETAMINOPHEN 1000 MG: 500 TABLET, FILM COATED ORAL at 20:19

## 2024-11-07 RX ADMIN — SENNOSIDES AND DOCUSATE SODIUM 2 TABLET: 50; 8.6 TABLET ORAL at 10:49

## 2024-11-07 RX ADMIN — GABAPENTIN 600 MG: 300 CAPSULE ORAL at 21:10

## 2024-11-07 RX ADMIN — TAMSULOSIN HYDROCHLORIDE 0.4 MG: 0.4 CAPSULE ORAL at 10:49

## 2024-11-07 RX ADMIN — GABAPENTIN 600 MG: 300 CAPSULE ORAL at 08:26

## 2024-11-07 RX ADMIN — METHYLNALTREXONE BROMIDE 12 MG: 12 INJECTION, SOLUTION SUBCUTANEOUS at 19:31

## 2024-11-07 RX ADMIN — BUPROPION HYDROCHLORIDE 300 MG: 150 TABLET, EXTENDED RELEASE ORAL at 08:26

## 2024-11-07 RX ADMIN — PANTOPRAZOLE SODIUM 40 MG: 40 TABLET, DELAYED RELEASE ORAL at 06:45

## 2024-11-07 RX ADMIN — OXYCODONE 5 MG: 5 TABLET ORAL at 10:49

## 2024-11-07 RX ADMIN — GABAPENTIN 600 MG: 300 CAPSULE ORAL at 16:25

## 2024-11-07 ASSESSMENT — PAIN SCALES - GENERAL
PAINLEVEL_OUTOF10: 5
PAINLEVEL_OUTOF10: 3
PAINLEVEL_OUTOF10: 6
PAINLEVEL_OUTOF10: 2
PAINLEVEL_OUTOF10: 3
PAINLEVEL_OUTOF10: 2
PAINLEVEL_OUTOF10: 0
PAINLEVEL_OUTOF10: 2
PAINLEVEL_OUTOF10: 3
PAINLEVEL_OUTOF10: 2
PAINLEVEL_OUTOF10: 3
PAINLEVEL_OUTOF10: 1
PAINLEVEL_OUTOF10: 2
PAINLEVEL_OUTOF10: 2
PAINLEVEL_OUTOF10: 0
PAINLEVEL_OUTOF10: 0
PAINLEVEL_OUTOF10: 2
PAINLEVEL_OUTOF10: 3
PAINLEVEL_OUTOF10: 7
PAINLEVEL_OUTOF10: 2
PAINLEVEL_OUTOF10: 4
PAINLEVEL_OUTOF10: 1
PAINLEVEL_OUTOF10: 2
PAINLEVEL_OUTOF10: 1
PAINLEVEL_OUTOF10: 3

## 2024-11-07 ASSESSMENT — PAIN DESCRIPTION - ORIENTATION
ORIENTATION: MID;POSTERIOR
ORIENTATION: MID;LOWER
ORIENTATION: MID;LOWER

## 2024-11-07 ASSESSMENT — PAIN DESCRIPTION - PAIN TYPE
TYPE: CHRONIC PAIN
TYPE: ACUTE PAIN;CHRONIC PAIN

## 2024-11-07 ASSESSMENT — PAIN DESCRIPTION - FREQUENCY
FREQUENCY: CONTINUOUS
FREQUENCY: CONTINUOUS

## 2024-11-07 ASSESSMENT — PAIN DESCRIPTION - DESCRIPTORS
DESCRIPTORS: ACHING
DESCRIPTORS: ACHING
DESCRIPTORS: ACHING;SHARP

## 2024-11-07 ASSESSMENT — PAIN - FUNCTIONAL ASSESSMENT
PAIN_FUNCTIONAL_ASSESSMENT: PREVENTS OR INTERFERES SOME ACTIVE ACTIVITIES AND ADLS

## 2024-11-07 ASSESSMENT — PAIN DESCRIPTION - LOCATION
LOCATION: BACK

## 2024-11-07 ASSESSMENT — PAIN DESCRIPTION - ONSET
ONSET: ON-GOING
ONSET: ON-GOING

## 2024-11-07 NOTE — CONSULTS
on  11/06/2024.    LABS:  All results below personally reviewed. Pertinent positives & negatives are addressed in Impression & Recommendations below.     LABS   Metabolic Panel Recent Labs     11/04/24  1319 11/07/24  0134 11/07/24  0342   * 131* 131*   K 3.7 4.0 4.2   CL 97* 97* 98*   CO2 25 26 24   BUN 19 36* 35*   CREATININE 1.0 1.0 1.0   GLUCOSE 73 102* 89   CALCIUM 8.0* 8.0* 7.8*   MG 1.75* 2.42* 2.41*      CBC / Coags Recent Labs     11/06/24  1927 11/07/24 0134   WBC  --  11.9*   RBC  --  4.01*   HGB  --  11.6*   HCT  --  35.8*   PLT  --  151   INR 1.22*  --       Other Lab Results   Component Value Date/Time    LABA1C 5.1 11/04/2024 03:50 AM    TRIG 48 07/24/2024 05:04 AM    TSH 6.59 11/04/2024 03:50 AM    TSH 2.96 02/28/2024 10:44 PM    COVID19 Not Detected 11/03/2024 04:50 PM     Lab Results   Component Value Date/Time    LACTSEPSIS 0.8 03/30/2024 08:38 AM    LACTA 1.7 02/23/2024 08:59 PM          CURRENT SCHEDULED MEDICATIONS   Inpatient Medications     linaclotide, 290 mcg, Oral, QAM AC    insulin lispro, 0-4 Units, SubCUTAneous, Q4H    gabapentin, 600 mg, Oral, TID    buPROPion, 300 mg, Oral, Daily    atorvastatin, 10 mg, Oral, Nightly    [Held by provider] amiodarone, 200 mg, Oral, Daily    [Held by provider] metoprolol succinate, 25 mg, Oral, Daily    pantoprazole, 40 mg, Oral, QAM AC    [Held by provider] tamsulosin, 0.4 mg, Oral, Daily    [Held by provider] midodrine, 5 mg, Oral, TID WC    [Held by provider] zolpidem, 5 mg, Oral, Nightly    polyethylene glycol, 17 g, Oral, BID    [Held by provider] DESMOpressin, 200 mcg, Oral, Nightly    acetaminophen, 1,000 mg, Oral, BID    sennosides-docusate sodium, 2 tablet, Oral, BID    methocarbamol (ROBAXIN) IVPB, 1,000 mg, IntraVENous, Q8H   Infusions    sodium chloride      sodium chloride 75 mL/hr at 11/07/24 0700      Antibiotics   Recent Abx Admin        No antibiotic orders with administrations found.

## 2024-11-07 NOTE — H&P
ICU HISTORY AND PHYSICAL       Admit Date: 11/7/24                            Hospital Day 1  ICU Day: 1      CC: mechanical fall    History obtained from:  chart review    SUBJECTIVE   HPI:    Mr. Juan Mohamud is a 84 y.o. male with a medical hx significant for type II DM, degenerative lumbar disease, arrhythmia s/p AICD, diastolic CHF, and macular degeneration otherwise as listed in the MHx table below, who presented from home to the [facility] ED on 11/3/24 after a fall.    Per chart review, patient fell after feeling like his \"knees were giving out.\" No reported symptoms prior to this fall, patient landed on his left side and hit the back of his head but did not lose consciousness at this time.     Upon arrival to the [facility] ED, patient was alert, oriented and reported with GCS of 15 and NIH of 0. While in the ED patient had increasing generalized weakness and difficulty getting up from a sitting position. Decision was made to admit patient as they did not feel safe discharging him home given his inability to stand unassisted.     Fall was thought to be mechanical and secondary to generalized weakness due to poor nutrition. CT abd pelv performed due to complaints of abdominal pain, distension, and back pain upon arrival to the floor. Imaging revealed acute T11 vertebral fracture extending into the T10-T11 disc space as well as diffuse distention of small and large bowel compatible with ileus and bilateral pleural effusions.     GI consulted for ileus, and patient underwent colonic decompression    MRI thoracic spine then demonstrated large epidural spinal hematoma with cord compression  and additional T6 subacute compression fracture along with bilateral pleural effusions.    Patient was transferred to [facility] on 11/7 for neurosurgical care.     ED Course:  On arrival to the ED, patient was afebrile, /65, HR 79, RR 20 and saturating well on RA  Labs were significant for:  Na 130  Pro BNP

## 2024-11-07 NOTE — PROGRESS NOTES
Hospital Medicine Progress Note      Date of Admission: 11/7/2024  Hospital Day: 1    Chief Admission Complaint:  fall, back pain     Subjective:  Patient is without complaints. No adverse interval events.     Presenting Admission History:       Mr. Juan Mohamud is a 84 y.o. male with a medical hx significant for type II DM, degenerative lumbar disease, arrhythmia s/p AICD, diastolic CHF, and macular degeneration otherwise as listed in the MHx table below, who presented from home to the [facility] ED on 11/3/24 after a fall.     Per chart review, patient fell after feeling like his \"knees were giving out.\" No reported symptoms prior to this fall, patient landed on his left side and hit the back of his head but did not lose consciousness at this time.      Upon arrival to the [facility] ED, patient was alert, oriented and reported with GCS of 15 and NIH of 0. While in the ED patient had increasing generalized weakness and difficulty getting up from a sitting position. Decision was made to admit patient as they did not feel safe discharging him home given his inability to stand unassisted.      Fall was thought to be mechanical and secondary to generalized weakness due to poor nutrition. CT abd pelv performed due to complaints of abdominal pain, distension, and back pain upon arrival to the floor. Imaging revealed acute T11 vertebral fracture extending into the T10-T11 disc space as well as diffuse distention of small and large bowel compatible with ileus and bilateral pleural effusions.     GI consulted for ileus, and patient underwent colonic decompression     MRI thoracic spine then demonstrated large epidural spinal hematoma with cord compression  and additional T6 subacute compression fracture along with bilateral pleural effusions.     Patient was transferred to [facility] on 11/7 for neurosurgical care.     Assessment/Plan:      Current Principal Problem:  Closed unstable burst fracture of eleventh

## 2024-11-07 NOTE — PROGRESS NOTES
ICU CONSULT       PCP:  Janet Garcia MD          Admit Date:  11/7/2024                            Hospital Day:1  ICU Day: 1      CC: Fall  Reason for consult: ileus  History obtained from:  the patient    SUBJECTIVE   Interval history:      S: Feels unchanged since Sunday (11/03) when he had LE weakness after a mechanical fall the night prior.    O:  UO 400mL dark urine. Stool output: 125mL. SBP , Afebrile. O2 sat 95-98% on 3L/min.   Labs: Na 131 stable  Imaging: KUB shows dilated small bowel   A/P: Pending NSGY consideration for intervention. If not planned, will need early ambulation and enteral nutrition.         HPI:   Mr. Juan Mohamud is a 84 y.o. male with a medical hx significant for type II DM, degenerative lumbar disease, arrhythmia s/p AICD, diastolic CHF, and macular degeneration otherwise as listed in the MHx table below, who presented from home to the [facility] ED on 11/3/24 after a fall.     Per chart review, patient fell after feeling like his \"knees were giving out.\" No reported symptoms prior to this fall, patient landed on his left side and hit the back of his head but did not lose consciousness at this time.      Upon arrival to the [facility] ED, patient was alert, oriented and reported with GCS of 15 and NIH of 0. While in the ED patient had increasing generalized weakness and difficulty getting up from a sitting position. Decision was made to admit patient as they did not feel safe discharging him home given his inability to stand unassisted.      Fall was thought to be mechanical and secondary to generalized weakness due to poor nutrition. CT abd pelv performed due to complaints of abdominal pain, distension, and back pain upon arrival to the floor. Imaging revealed acute T11 vertebral fracture extending into the T10-T11 disc space as well as diffuse distention of small and large bowel compatible with ileus and bilateral pleural effusions.     GI consulted for  ileus, and patient underwent colonic decompression     MRI thoracic spine then demonstrated large epidural spinal hematoma with cord compression  and additional T6 subacute compression fracture along with bilateral pleural effusions.     Patient was transferred to [facility] on 11/7 for neurosurgical care.      ED Course:  On arrival to the ED, patient was afebrile, /65, HR 79, RR 20 and saturating well on RA  Labs were significant for:  Na 130  Pro BNP 4,975  Troponin 40 (baseline)  UA: 1,000 glucose, trace ketones  Imaging:  CT abd pelv:  1.  Acute fracture thinning through the T10 vertebral body, T10-11  intervertebral disc space and T11 vertebral body.  2.  Diffuse distension of small and large bowel compatible with ileus.   3.  Bilateral pleural effusions.  MRI thoracic spine:  IMPRESSION:  1. Unstable fracture through the T11 vertebral body again noted extending to  the T10-T11 disc space with 8 mm distraction.  Possible nondisplaced T11  spinous process fracture.  2. Long segment dorsal epidural collection throughout the thoracic spinal  canal extending from T3 through T11-T12 suspicious for hematoma in the  setting of trauma, with long segment narrowing of the thecal sac, most severe  at T11 with associated spinal cord compression.  No definite cord signal abnormality.  3. Linear signal abnormality within the T6 vertebral body without associated  height loss could represent acute to subacute compression fracture.  4. Bilateral pleural effusions.    Past Medical History:   Diagnosis Date    Diabetes mellitus (HCC)     diet controlled    Hyperlipidemia     Indigestion     Insomnia     Macular degeneration     MI, 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    Neuropathy     toes partially on both feet       Past Surgical History:   Procedure Laterality Date    COLONOSCOPY      COLONOSCOPY N/A 2/29/2024    COLONOSCOPY FLEXIBLE DECOMPRESSION performed by Eitan Pino MD at [facility] ENDOSCOPY    COLONOSCOPY N/A 3/6/2024    COLONOSCOPY WITH

## 2024-11-07 NOTE — TRANSFER CENTER NOTE
[facility] [place] [place] Hospitalist Transfer accept note  Transfer center PS received  Case reviewed with ER physician  Reason for Transfer:  Juan Mohamud 84 y.o. male- mechanical fall at home complicated by T11 # - was on lovenox 40 mg SQ daily since 11/3/24 for DVT PX and now with epidural hematoma and cord compression at T 11 and accepted to [facility] for NS evaluation. Patient has been accepted for transfer to [facility].     Also noticed is T6 subacute compression # and BL pleural effusions    Once patient arrive please page ON CALL HOSPITALIST so patient can be seen.   If unable to reach physician on PerfectServe please call hospitalist phone (#[phone])     PCP: Janet Garcia MD     Thanks  ANA MONTGOMERY MD  Hospitalist

## 2024-11-07 NOTE — CARE COORDINATION
11/07/24 1222   Readmission Assessment   Number of Days since last admission? 1-7 days  (transfer from [facility])   Previous Disposition Other (comment)  (transfer from [facility])   Who is being Interviewed Patient  (transfer from [facility])   What was the patient's/caregiver's perception as to why they think they needed to return back to the hospital? Other (Comment)  (transfer from [facility])   Did you visit your Primary Care Physician after you left the hospital, before you returned this time? No   Why weren't you able to visit your PCP? Did not have an appointment  (transfer from [facility])   Did you see a specialist, such as Cardiac, Pulmonary, Orthopedic Physician, etc. after you left the hospital? No  (transfer from [facility])   Who advised the patient to return to the hospital? Other (Comment)  (transfer from [facility])   Does the patient report anything that got in the way of taking their medications? No  (transfer from [facility])   In our efforts to provide the best possible care to you and others like you, can you think of anything that we could have done to help you after you left the hospital the first time, so that you might not have needed to return so soon? Other (Comment)  (transfer from [facility])       Case Management Assessment  Initial Evaluation    Date/Time of Evaluation: 11/7/2024 12:23 PM  Assessment Completed by: HERMILO STYLES    If patient is discharged prior to next notation, then this note serves as note for discharge by case management.    Patient Name: Juan Mohamud                   YOB: 1940  Diagnosis: Epidural hematoma [S06.4XAA]                   Date / Time: 11/7/2024 12:52 AM    Patient Admission Status: Inpatient   Readmission Risk (Low < 19, Mod (19-27), High > 27): Readmission Risk Score: 22.6    Current PCP: Janet Garcia MD  PCP verified by CM? Yes    Chart Reviewed: Yes      History Provided by: Patient, Significant

## 2024-11-07 NOTE — PROGRESS NOTES
Stat MRI completed for patient at 1730 this afternoon from verbal orders given by MD Mckeon from Neurosurgery. This RN accompanied patient to MRI. Patient tolerated procedure well.     This RN called at 1920 by MD Mckeon d/t critical MRI results that showed patient having large epidural hematoma and progressing back fx. MD requested urgent transfer to [facility] for closer monitoring of patient. Patient vitals taken and blood pressure noted to be 108/62. Patient notified of plan of care at this time. MD also notified this RN that patient MUST REMAIN IN SPINAL PRECAUTIONS AT THIS TIME WITH HOB < 30 DEGREES.     Wife called and updated that patient will be moved tonight to [facility], wife seemed to be in distress but updated that patient needs closer monitoring of epidural hematoma. Lizzie, patient wife, expressed understanding.     Awaiting call to transfer patient to new facility at this time.     Electronically signed by Eri Leo RN on 11/6/2024 at 7:55 PM

## 2024-11-07 NOTE — DISCHARGE SUMMARY
Discharge Summary    Name:  Juan Mohamud /Age/Sex: 1940  (84 y.o. male)   MRN & CSN:  [id number] & [id number] Admission Date/Time: 11/3/2024  3:52 PM   Attending:  Jeremie Mccurdy MD Discharging Physician: Masha Copeland, APRN - CNP     Hospital Course:   Juan Mohamud is a 84 y.o.  male  who presents with Fall at home, initial encounter      On evening of November 3 Mr. Mohamud was admitted through the emergency department after a fall at home not cardiac or neurological in nature.  He has a known history of DM, DDD LS, neuropathy, dyslipidemia, arrhythmia and status post AICD, systolic heart failure, macular degeneration.    Patient is chronically on 3 L nasal cannula.    During hospital course: T11 acute compression fx was identified. Patient had monitoring his TSH, troponin, EKG.  Had PT and OT.  Had a CT abdomen and pelvis.  Receive low-dose sliding scale insulin coverage.  Diuretics were held  d/t noted hyponatremia.  Patient also experienced significant colonic distention, the CT abdomen and pelvis was done.  GI performed: Decompression and placement of a decompression tube, for an identified colonic ileus.    This evening after receiving notification from neurosurgery -> pt being transferred to [facility] this evening w/ the neurosurgical recommendation d/t large epidural spinal hematoma w/ cord compression noted on MRI thoracic spine.  Additional T6 subacute compression fracture identified along with bilateral pleural effusions.    I consulted directly with [facility] neuro acute care NP.  And then I subsequently spoke directly with [facility] attending MD Dr. Cotter.  We discussed the patient's history, presentation current diagnostic study results.  He is excepting patient.    Plan of care was discussed face-to-face with the patient.  I discussed the MRI results and the recommendations of neurosurgery to include transfer to [facility] with close monitoring.  Patient was

## 2024-11-07 NOTE — PROGRESS NOTES
Called out to access center - hospital transport 4 minutes away from [facility] at this time.    Electronically signed by Sunny Fu RN on 11/6/2024 at 11:37 PM

## 2024-11-07 NOTE — CONSULTS
[facility] - INITIAL CONSULTATION        CHIEF COMPLAINT  Preop, spinal fracture and fall, preop      HISTORY OF PRESENTING ILLNESS  Juan Mohamud is a 84 y.o. patient with diabetes, hyperlipidemia, history of MI, HFpEF, history of VT s/p ICD who presented to the hospital after a fall at home followed by generalized weakness and difficulty standing.  Initial workup revealed T11 vertebral fracture as well as large epidural spinal hematoma with cord compression and additional T6 subacute compression fracture.  Patient transferred to the [facility] for neurosurgical evaluation.      PAST MEDICAL HISTORY   has a past medical history of Diabetes mellitus (HCC), Hyperlipidemia, Indigestion, Insomnia, Macular degeneration, MI, old, and Neuropathy.    SURGICAL HISTORY   has a past surgical history that includes knee surgery; Tonsillectomy; Colonoscopy; fracture surgery (Left); Mouth surgery; joint replacement (Right); Intracapsular cataract extraction (Right, 12/17/2018); Colonoscopy (N/A, 2/29/2024); Colonoscopy (N/A, 3/6/2024); and Colonoscopy (N/A, 11/5/2024).     SOCIAL HISTORY   reports that he has never smoked. He has never used smokeless tobacco. He reports current alcohol use. He reports that he does not use drugs.     FAMILY HISTORY  No family history of premature coronary artery disease, aortic disease, or valve disease.    HOME CARDIAC MEDICATIONS  Torsemide 20 daily  Amiodarone 200 once daily  Spironolactone 25 daily  Losartan 50 daily  Lipitor 10 daily  Toprol-XL 25 daily  Jardiance 10 daily      ALLERGIES  Erythromycin and Dust mite extract     REVIEW OF SYSTEMS  14 point ROS done and negative other than HPI      PHYSICAL EXAMINATION    Vitals:    11/07/24 1300   BP: 95/62   Pulse: 77   Resp:    Temp:    SpO2:     Weight - Scale: 89.5 kg (197 lb 5 oz)       General appearance - alert, cooperative, no distress, appears stated age  Neck - Supple, symmetrical, trachea midline, no adenopathy,  bedtime, Toprol-XL 25 daily  Reasonable to hold Aldactone, losartan and torsemide for now given soft blood pressure and low OptiVol on device interrogation, can resume as hemodynamics allow and blood pressure improves    All questions and concerns were addressed to the patient/family. Alternatives to my treatment as well as risks and benefits of proposed treatment were discussed.     Tobacco use was discussed with the patient and educated on the negative effects.    Thank you for allowing to us to participate in the care or Juan Mohamud. Please call with questions.       John Paul Armando MD, FACC, [facility]  Interventional Cardiology  [facility]  11/7/2024  [phone]      Inadvertent computerized transcription errors may be present

## 2024-11-07 NOTE — PROGRESS NOTES
Report called to receiving RN, Diya, at [facility], awaiting transport.

## 2024-11-07 NOTE — PROGRESS NOTES
Transport picked up pt at 0010, report given to EMS paperwork also given, pt AO x 4, on 3L O2, not in any signs of distress, rectal tube in place, transported via stretcher.

## 2024-11-07 NOTE — PROGRESS NOTES
4 Eyes Skin Assessment     NAME:  Juan Mohamud  YOB: 1940  MEDICAL RECORD NUMBER:  [id number]    The patient is being assessed for  Admission    I agree that at least one RN has performed a thorough Head to Toe Skin Assessment on the patient. ALL assessment sites listed below have been assessed.      Areas assessed by both nurses:    Head, Face, Ears, Shoulders, Back, Chest, Arms, Elbows, Hands, Sacrum. Buttock, Coccyx, Ischium, Legs. Feet and Heels, and Under Medical Devices         Does the Patient have a Wound? Yes wound(s) were present on assessment. LDA wound assessment was Initiated and completed by RN  Pressure or moisture associated redness to sacrum, nonblanchable at center, edges blanchable.   Abrasions to dorsal surface of L toes, plantar R toes.   Skin tear on posterior aspects of bilateral upper extremities around the elbow.   Skin tears on bilateral upper extremities.   Abrasion to R pretibial area, scabbed over.   Blanchable redness on both heels.   Laceration to posterior head, covered with adherent dressing from [facility].        Piero Prevention initiated by RN: Yes  Wound Care Orders initiated by RN: Yes    Pressure Injury (Stage 3,4, Unstageable, DTI, NWPT, and Complex wounds) if present, place Wound referral order by RN under : No    New Ostomies, if present place, Ostomy referral order under : No     Nurse 1 eSignature: Electronically signed by Diya Olivas RN on 11/7/24 at 3:20 AM EST    **SHARE this note so that the co-signing nurse can place an eSignature**    Nurse 2 eSignature: Electronically signed by Camacho Gundreson RN on 11/19/24 at 5:35 PM EST

## 2024-11-07 NOTE — PROGRESS NOTES
Called out to access center - ETA for hospital transport is 23:15.    Electronically signed by Sunny Fu RN on 11/6/2024 at 9:49 PM

## 2024-11-07 NOTE — CONSULTS
Consultation Note    Patient Name: Juan Mohamud  : 1940  Age: 84 y.o.     Admitting Physician: Paxton Tapia MD   Date of Admission: 2024 12:52 AM   Primary Care Physician: Janet Garcia MD        Juan Mohamud is being seen at the request of Paxton Tapia MD for ileus post colonic decompression  and rectal tube, persistent abd distension and discomfort.    History of Present Illness:  84-year-old male with medical history of type 2 diabetes with neuropathy, chronic constipation on linzess, chronic congestive heart failure, ventricular tachycardia status post AICD placement, dyslipidemia, BPH who was transferred from OSH on 2024 following presentation after a fall at home on 2024.     Patient also complained about abdominal pain and distention. CT abdomen and pelvis done in OSH was notable for acute fracture thinning through the T10 vertebral body, T10-11 intervertebral disc space and T11 vertebral body, diffuse distension of small and large bowel compatible with ileus and bilateral pleural effusions. Labs notable for , AST/ALT 59/35, total bilirubin 1.2, albumin 2.9.     NG tube decompression was ordered but patient refused due to prior severe nose bleed following NG tube placement. Patient was paced on dilaudid at OSH for acute fracture. Patient was evaluated by GI and underwent colonic decompression and placement of decompression tube with Dr Kay on 2024.  There was reported significant improvement on abdominal exam per colonic decompression report.     Patient reports that he experiences abdominal distension requiring colonic decompression each time he is in the hospital for a few days. Denies any of above symptoms at home. Has required 3 decompressions in the past including most recent on . He denies any abdominal pain, nausea or vomiting. He has a decompressive rectal tube in place with minimal output today.     Subsequent imaging in    General: Well-nourished, well-developed  HEENT: Sclera anicteric, mucosal membranes moist  Cardiovascular: Regular rate and rhythm.  No murmurs.  Respiratory: Respirations nonlabored, no crepitus  GI: Abdomen moderately distended, soft, and nontender.  Hypoactive bowel sounds.  No masses palpable.   Rectal: Deferred  Musculoskeletal: No pitting edema of the lower legs.  Neurological: Gross memory appears intact.  Patient is alert and oriented      Recent Imaging:   XR ABDOMEN (KUB) (SINGLE AP VIEW)  Narrative: Abdomen radiograph single view.    Indication: Ileus. Abdominal distention.    Comparison study: CT abdomen and pelvis from 11/3/2024.    Findings: Cardiac conduction device. Peritoneal dialysis catheter projecting over upper right pelvis. Dilated small bowel, out of proportion in caliber colon. Degenerative change spine. Postoperative change spine. Consolidation lung bases. Right pleural   effusion.  Impression: Impression:    1. Dilated small bowel which could relate to small bowel ileus or small bowel obstruction.    Electronically signed by Jeremie Montilla MD       Labs:   Recent Labs     11/04/24  1319 11/06/24  1927 11/07/24  0134 11/07/24  0342 11/07/24  0858   WBC  --   --  11.9*  --   --    HGB  --   --  11.6*  --   --    PLT  --   --  151  --   --    BUN 19  --  36* 35*  --    CREATININE 1.0  --  1.0 1.0  --    *  --  131* 131*  --    K 3.7  --  4.0 4.2  --    INR  --  1.22*  --   --  1.19*        Assessment:    Hospital Problems             Last Modified POA    * (Principal) Closed unstable burst fracture of eleventh thoracic vertebra (HCC) 11/7/2024 Yes    Epidural hematoma 11/7/2024 Yes       84-year-old male with medical history of type 2 diabetes with neuropathy, chronic systolic congestive heart failure status post AICD placement, dyslipidemia, chronic constipation on linzess who was transferred from [facility]  on 11/06/2024 following presentation on 11/3/2024 after a fall at home.

## 2024-11-07 NOTE — PROGRESS NOTES
I saw and assessed the patient. Agree with separate KRISHNA note. T11 distraction fracture, unstable. Also with a multilevel dorsal thoracic epidural hematoma. He doesn't seem clearly symptomatic from this - has good strength and reflexes in legs. Unclear if this is secondary to the fracture or may have preceded the fall.    His fracture needs surgical stabilization. Ongoing medical issues (ileus, abdominal distension) and extensive cardiac risk do slightly complicate that.     Tentative play for surgery tomorrow vs early next week..     Nik Posey MD, PhD  Endovascular Neurosurgery  [facility]  [phone] (office direct line)  [phone] ([facility])
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
## 2024-11-07 NOTE — PROGRESS NOTES
Spiritual Health History and Assessment/Progress Note  [facility]    (P) Follow-up,  ,  ,      Name: Juan Mohamud MRN: [id number]    Age: 84 y.o.     Sex: male   Language: English   Shinto: Hindu   Closed unstable burst fracture of eleventh thoracic vertebra (HCC)     Date: 11/7/2024            Total Time Calculated: (P) 10 min              Spiritual Assessment began in [facility] ICU            Encounter Overview/Reason: (P) Follow-up  Service Provided For: (P) Patient    Violette, Belief, Meaning:   Patient Other: Unknown  Family/Friends No family/friends present    Community:  Patient feels well-supported. Support system includes: Spouse/Partner    Assessment and Plan of Care:   Patient Interventions include: Facilitated expression of thoughts and feelings and Other: Present with pt for pt to share    Patient Plan of Care: Other: Spiritual care will follow.    Electronically signed by PAULY Rodriguez on 11/7/2024 at 9:04 AM

## 2024-11-08 ENCOUNTER — APPOINTMENT (OUTPATIENT)
Dept: GENERAL RADIOLOGY | Age: 84
DRG: 447 | End: 2024-11-08
Attending: INTERNAL MEDICINE
Payer: MEDICARE

## 2024-11-08 ENCOUNTER — APPOINTMENT (OUTPATIENT)
Dept: ENDOSCOPY | Age: 84
DRG: 447 | End: 2024-11-08
Attending: INTERNAL MEDICINE
Payer: MEDICARE

## 2024-11-08 ENCOUNTER — ANESTHESIA (OUTPATIENT)
Dept: ENDOSCOPY | Age: 84
End: 2024-11-08
Payer: MEDICARE

## 2024-11-08 ENCOUNTER — ANESTHESIA EVENT (OUTPATIENT)
Dept: ENDOSCOPY | Age: 84
End: 2024-11-08
Payer: MEDICARE

## 2024-11-08 LAB
ANION GAP SERPL CALCULATED.3IONS-SCNC: 9 MMOL/L (ref 3–16)
BASOPHILS # BLD: 0 K/UL (ref 0–0.2)
BASOPHILS NFR BLD: 0.5 %
BUN SERPL-MCNC: 27 MG/DL (ref 7–20)
CALCIUM SERPL-MCNC: 7.9 MG/DL (ref 8.3–10.6)
CHLORIDE SERPL-SCNC: 98 MMOL/L (ref 99–110)
CO2 SERPL-SCNC: 24 MMOL/L (ref 21–32)
CREAT SERPL-MCNC: 0.7 MG/DL (ref 0.8–1.3)
DEPRECATED RDW RBC AUTO: 16.4 % (ref 12.4–15.4)
EKG ATRIAL RATE: 78 BPM
EKG DIAGNOSIS: NORMAL
EKG P AXIS: 39 DEGREES
EKG P-R INTERVAL: 200 MS
EKG Q-T INTERVAL: 412 MS
EKG QRS DURATION: 156 MS
EKG QTC CALCULATION (BAZETT): 469 MS
EKG R AXIS: -15 DEGREES
EKG T AXIS: 58 DEGREES
EKG VENTRICULAR RATE: 78 BPM
EOSINOPHIL # BLD: 0.1 K/UL (ref 0–0.6)
EOSINOPHIL NFR BLD: 1.6 %
GFR SERPLBLD CREATININE-BSD FMLA CKD-EPI: >90 ML/MIN/{1.73_M2}
GLUCOSE BLD-MCNC: 70 MG/DL (ref 70–99)
GLUCOSE BLD-MCNC: 72 MG/DL (ref 70–99)
GLUCOSE BLD-MCNC: 76 MG/DL (ref 70–99)
GLUCOSE BLD-MCNC: 88 MG/DL (ref 70–99)
GLUCOSE SERPL-MCNC: 79 MG/DL (ref 70–99)
HCT VFR BLD AUTO: 37.2 % (ref 40.5–52.5)
HGB BLD-MCNC: 12.2 G/DL (ref 13.5–17.5)
LYMPHOCYTES # BLD: 0.5 K/UL (ref 1–5.1)
LYMPHOCYTES NFR BLD: 5.6 %
MAGNESIUM SERPL-MCNC: 2.11 MG/DL (ref 1.8–2.4)
MCH RBC QN AUTO: 29.5 PG (ref 26–34)
MCHC RBC AUTO-ENTMCNC: 32.8 G/DL (ref 31–36)
MCV RBC AUTO: 90.1 FL (ref 80–100)
MONOCYTES # BLD: 0.8 K/UL (ref 0–1.3)
MONOCYTES NFR BLD: 8.5 %
NEUTROPHILS # BLD: 7.7 K/UL (ref 1.7–7.7)
NEUTROPHILS NFR BLD: 83.8 %
PERFORMED ON: NORMAL
PLATELET # BLD AUTO: 150 K/UL (ref 135–450)
PMV BLD AUTO: 8.1 FL (ref 5–10.5)
POTASSIUM SERPL-SCNC: 3.8 MMOL/L (ref 3.5–5.1)
RBC # BLD AUTO: 4.12 M/UL (ref 4.2–5.9)
SODIUM SERPL-SCNC: 131 MMOL/L (ref 136–145)
WBC # BLD AUTO: 9.2 K/UL (ref 4–11)

## 2024-11-08 PROCEDURE — 6370000000 HC RX 637 (ALT 250 FOR IP): Performed by: NURSE PRACTITIONER

## 2024-11-08 PROCEDURE — 80048 BASIC METABOLIC PNL TOTAL CA: CPT

## 2024-11-08 PROCEDURE — 6360000002 HC RX W HCPCS: Performed by: NURSE PRACTITIONER

## 2024-11-08 PROCEDURE — 3609155600 HC COLONOSCOPY WITH DECOMPRESSION: Performed by: INTERNAL MEDICINE

## 2024-11-08 PROCEDURE — 6370000000 HC RX 637 (ALT 250 FOR IP)

## 2024-11-08 PROCEDURE — 2709999900 HC NON-CHARGEABLE SUPPLY: Performed by: INTERNAL MEDICINE

## 2024-11-08 PROCEDURE — 2580000003 HC RX 258

## 2024-11-08 PROCEDURE — 3700000000 HC ANESTHESIA ATTENDED CARE: Performed by: INTERNAL MEDICINE

## 2024-11-08 PROCEDURE — 85025 COMPLETE CBC W/AUTO DIFF WBC: CPT

## 2024-11-08 PROCEDURE — 74018 RADEX ABDOMEN 1 VIEW: CPT

## 2024-11-08 PROCEDURE — 2060000000 HC ICU INTERMEDIATE R&B

## 2024-11-08 PROCEDURE — 99232 SBSQ HOSP IP/OBS MODERATE 35: CPT | Performed by: STUDENT IN AN ORGANIZED HEALTH CARE EDUCATION/TRAINING PROGRAM

## 2024-11-08 PROCEDURE — 6360000002 HC RX W HCPCS

## 2024-11-08 PROCEDURE — 93010 ELECTROCARDIOGRAM REPORT: CPT | Performed by: INTERNAL MEDICINE

## 2024-11-08 PROCEDURE — 6360000002 HC RX W HCPCS: Performed by: INTERNAL MEDICINE

## 2024-11-08 PROCEDURE — 0D9M80Z DRAINAGE OF DESCENDING COLON WITH DRAINAGE DEVICE, VIA NATURAL OR ARTIFICIAL OPENING ENDOSCOPIC: ICD-10-PCS | Performed by: INTERNAL MEDICINE

## 2024-11-08 PROCEDURE — 3700000001 HC ADD 15 MINUTES (ANESTHESIA): Performed by: INTERNAL MEDICINE

## 2024-11-08 PROCEDURE — 6360000002 HC RX W HCPCS: Performed by: NURSE ANESTHETIST, CERTIFIED REGISTERED

## 2024-11-08 PROCEDURE — 36415 COLL VENOUS BLD VENIPUNCTURE: CPT

## 2024-11-08 PROCEDURE — 83735 ASSAY OF MAGNESIUM: CPT

## 2024-11-08 PROCEDURE — APPNB30 APP NON BILLABLE TIME 0-30 MINS: Performed by: NURSE PRACTITIONER

## 2024-11-08 RX ORDER — LIDOCAINE HYDROCHLORIDE 20 MG/ML
INJECTION, SOLUTION INTRAVENOUS
Status: DISCONTINUED | OUTPATIENT
Start: 2024-11-08 | End: 2024-11-08 | Stop reason: SDUPTHER

## 2024-11-08 RX ORDER — ZOLPIDEM TARTRATE 5 MG/1
10 TABLET ORAL NIGHTLY PRN
Status: DISCONTINUED | OUTPATIENT
Start: 2024-11-08 | End: 2024-11-15 | Stop reason: SDUPTHER

## 2024-11-08 RX ORDER — PROPOFOL 10 MG/ML
INJECTION, EMULSION INTRAVENOUS
Status: DISCONTINUED | OUTPATIENT
Start: 2024-11-08 | End: 2024-11-08 | Stop reason: SDUPTHER

## 2024-11-08 RX ORDER — OXYMETAZOLINE HYDROCHLORIDE 0.05 G/100ML
2 SPRAY NASAL ONCE
Status: COMPLETED | OUTPATIENT
Start: 2024-11-08 | End: 2024-11-08

## 2024-11-08 RX ORDER — SODIUM CHLORIDE 9 MG/ML
INJECTION, SOLUTION INTRAVENOUS CONTINUOUS
Status: ACTIVE | OUTPATIENT
Start: 2024-11-08 | End: 2024-11-09

## 2024-11-08 RX ORDER — METHOCARBAMOL 500 MG/1
1000 TABLET, FILM COATED ORAL 4 TIMES DAILY PRN
Status: DISCONTINUED | OUTPATIENT
Start: 2024-11-08 | End: 2024-11-12

## 2024-11-08 RX ORDER — METOCLOPRAMIDE HYDROCHLORIDE 5 MG/ML
10 INJECTION INTRAMUSCULAR; INTRAVENOUS EVERY 6 HOURS
Status: DISCONTINUED | OUTPATIENT
Start: 2024-11-08 | End: 2024-11-11

## 2024-11-08 RX ORDER — CASTOR OIL AND BALSAM, PERU 788; 87 MG/G; MG/G
OINTMENT TOPICAL 2 TIMES DAILY
Status: DISCONTINUED | OUTPATIENT
Start: 2024-11-08 | End: 2024-12-11 | Stop reason: HOSPADM

## 2024-11-08 RX ORDER — LIDOCAINE HYDROCHLORIDE 20 MG/ML
JELLY TOPICAL ONCE
Status: COMPLETED | OUTPATIENT
Start: 2024-11-08 | End: 2024-11-08

## 2024-11-08 RX ADMIN — ZOLPIDEM TARTRATE 10 MG: 5 TABLET, COATED ORAL at 22:00

## 2024-11-08 RX ADMIN — HYDROMORPHONE HYDROCHLORIDE 0.25 MG: 1 INJECTION, SOLUTION INTRAMUSCULAR; INTRAVENOUS; SUBCUTANEOUS at 01:09

## 2024-11-08 RX ADMIN — HYDROMORPHONE HYDROCHLORIDE 0.25 MG: 1 INJECTION, SOLUTION INTRAMUSCULAR; INTRAVENOUS; SUBCUTANEOUS at 06:02

## 2024-11-08 RX ADMIN — LIDOCAINE HYDROCHLORIDE 50 MG: 20 INJECTION, SOLUTION INTRAVENOUS at 14:54

## 2024-11-08 RX ADMIN — PROPOFOL 30 MG: 10 INJECTION, EMULSION INTRAVENOUS at 14:54

## 2024-11-08 RX ADMIN — ONDANSETRON 4 MG: 2 INJECTION INTRAMUSCULAR; INTRAVENOUS at 11:58

## 2024-11-08 RX ADMIN — METOCLOPRAMIDE HYDROCHLORIDE 10 MG: 5 INJECTION, SOLUTION INTRAMUSCULAR; INTRAVENOUS at 11:58

## 2024-11-08 RX ADMIN — SODIUM CHLORIDE: 9 INJECTION, SOLUTION INTRAVENOUS at 08:22

## 2024-11-08 RX ADMIN — PROPOFOL 30 MG: 10 INJECTION, EMULSION INTRAVENOUS at 15:04

## 2024-11-08 RX ADMIN — PROPOFOL 30 MG: 10 INJECTION, EMULSION INTRAVENOUS at 15:07

## 2024-11-08 RX ADMIN — PANTOPRAZOLE SODIUM 40 MG: 40 TABLET, DELAYED RELEASE ORAL at 06:03

## 2024-11-08 RX ADMIN — LIDOCAINE HYDROCHLORIDE: 20 JELLY TOPICAL at 17:28

## 2024-11-08 RX ADMIN — OXYCODONE 5 MG: 5 TABLET ORAL at 02:51

## 2024-11-08 RX ADMIN — METHOCARBAMOL 1000 MG: 100 INJECTION INTRAMUSCULAR; INTRAVENOUS at 01:08

## 2024-11-08 RX ADMIN — PROPOFOL 30 MG: 10 INJECTION, EMULSION INTRAVENOUS at 15:00

## 2024-11-08 RX ADMIN — METOCLOPRAMIDE HYDROCHLORIDE 10 MG: 5 INJECTION, SOLUTION INTRAMUSCULAR; INTRAVENOUS at 22:01

## 2024-11-08 RX ADMIN — Medication: at 19:51

## 2024-11-08 RX ADMIN — Medication 2 SPRAY: at 17:29

## 2024-11-08 RX ADMIN — SODIUM CHLORIDE, PRESERVATIVE FREE 10 ML: 5 INJECTION INTRAVENOUS at 09:10

## 2024-11-08 RX ADMIN — METOCLOPRAMIDE HYDROCHLORIDE 10 MG: 5 INJECTION, SOLUTION INTRAMUSCULAR; INTRAVENOUS at 17:31

## 2024-11-08 RX ADMIN — ACETAMINOPHEN 650 MG: 500 TABLET, FILM COATED ORAL at 18:41

## 2024-11-08 RX ADMIN — SODIUM CHLORIDE: 9 INJECTION, SOLUTION INTRAVENOUS at 18:07

## 2024-11-08 ASSESSMENT — PAIN DESCRIPTION - DESCRIPTORS
DESCRIPTORS: DISCOMFORT;PRESSURE
DESCRIPTORS: DISCOMFORT
DESCRIPTORS: DISCOMFORT;PRESSURE
DESCRIPTORS: ACHING;DISCOMFORT
DESCRIPTORS: DISCOMFORT;ACHING
DESCRIPTORS: ACHING
DESCRIPTORS: ACHING;SHARP
DESCRIPTORS: DISCOMFORT;ACHING

## 2024-11-08 ASSESSMENT — PAIN SCALES - GENERAL
PAINLEVEL_OUTOF10: 8
PAINLEVEL_OUTOF10: 4
PAINLEVEL_OUTOF10: 4
PAINLEVEL_OUTOF10: 3
PAINLEVEL_OUTOF10: 4
PAINLEVEL_OUTOF10: 5
PAINLEVEL_OUTOF10: 4
PAINLEVEL_OUTOF10: 3
PAINLEVEL_OUTOF10: 4
PAINLEVEL_OUTOF10: 3
PAINLEVEL_OUTOF10: 3
PAINLEVEL_OUTOF10: 4
PAINLEVEL_OUTOF10: 3
PAINLEVEL_OUTOF10: 0
PAINLEVEL_OUTOF10: 4
PAINLEVEL_OUTOF10: 3
PAINLEVEL_OUTOF10: 6
PAINLEVEL_OUTOF10: 7
PAINLEVEL_OUTOF10: 4
PAINLEVEL_OUTOF10: 2
PAINLEVEL_OUTOF10: 4
PAINLEVEL_OUTOF10: 4
PAINLEVEL_OUTOF10: 0
PAINLEVEL_OUTOF10: 4
PAINLEVEL_OUTOF10: 7
PAINLEVEL_OUTOF10: 4
PAINLEVEL_OUTOF10: 4
PAINLEVEL_OUTOF10: 8
PAINLEVEL_OUTOF10: 4
PAINLEVEL_OUTOF10: 0
PAINLEVEL_OUTOF10: 4
PAINLEVEL_OUTOF10: 5
PAINLEVEL_OUTOF10: 4
PAINLEVEL_OUTOF10: 4
PAINLEVEL_OUTOF10: 8
PAINLEVEL_OUTOF10: 4
PAINLEVEL_OUTOF10: 3
PAINLEVEL_OUTOF10: 4
PAINLEVEL_OUTOF10: 7
PAINLEVEL_OUTOF10: 4
PAINLEVEL_OUTOF10: 0
PAINLEVEL_OUTOF10: 4
PAINLEVEL_OUTOF10: 4

## 2024-11-08 ASSESSMENT — PAIN DESCRIPTION - LOCATION
LOCATION: ABDOMEN
LOCATION: BACK
LOCATION: BACK
LOCATION: ABDOMEN
LOCATION: ABDOMEN
LOCATION: BACK
LOCATION: ABDOMEN
LOCATION: ABDOMEN

## 2024-11-08 ASSESSMENT — PAIN DESCRIPTION - ORIENTATION
ORIENTATION: LOWER
ORIENTATION: MID;POSTERIOR
ORIENTATION: MID;POSTERIOR
ORIENTATION: LOWER

## 2024-11-08 ASSESSMENT — PAIN - FUNCTIONAL ASSESSMENT
PAIN_FUNCTIONAL_ASSESSMENT: ACTIVITIES ARE NOT PREVENTED
PAIN_FUNCTIONAL_ASSESSMENT: PREVENTS OR INTERFERES SOME ACTIVE ACTIVITIES AND ADLS
PAIN_FUNCTIONAL_ASSESSMENT: PREVENTS OR INTERFERES SOME ACTIVE ACTIVITIES AND ADLS
PAIN_FUNCTIONAL_ASSESSMENT: 0-10
PAIN_FUNCTIONAL_ASSESSMENT: ACTIVITIES ARE NOT PREVENTED
PAIN_FUNCTIONAL_ASSESSMENT: ACTIVITIES ARE NOT PREVENTED
PAIN_FUNCTIONAL_ASSESSMENT: PREVENTS OR INTERFERES SOME ACTIVE ACTIVITIES AND ADLS
PAIN_FUNCTIONAL_ASSESSMENT: ACTIVITIES ARE NOT PREVENTED
PAIN_FUNCTIONAL_ASSESSMENT: ACTIVITIES ARE NOT PREVENTED

## 2024-11-08 ASSESSMENT — PAIN DESCRIPTION - FREQUENCY
FREQUENCY: CONTINUOUS

## 2024-11-08 ASSESSMENT — PAIN DESCRIPTION - ONSET
ONSET: ON-GOING

## 2024-11-08 ASSESSMENT — PAIN DESCRIPTION - PAIN TYPE
TYPE: ACUTE PAIN
TYPE: ACUTE PAIN;CHRONIC PAIN

## 2024-11-08 NOTE — PROGRESS NOTES
ICU CONSULT       PCP:  Janet Garcia MD          Admit Date:  11/7/2024                            Hospital Day:2  ICU Day: 2      CC: Fall  Reason for consult: ileus  History obtained from:  the patient    SUBJECTIVE   Interval history:      S:  Abdominal distention notably worsened, moderate back pain but controlled with current medications.   O:  -125, O2 96% on RA. UO 1700mL. Small amount of stool output via rectal tube.     Labs: Na 131 stable, Creat 0.7, WBC 9.2, HgB 12.2  Imaging: KUB shows worsening small bowel dilation  A/P: Possible NSGY intervention today if cleared, may require colonic decompression prior. Will hold opioids.         HPI:   Mr. Juan Mohamud is a 84 y.o. male with a medical hx significant for type II DM, degenerative lumbar disease, arrhythmia s/p AICD, diastolic CHF, and macular degeneration otherwise as listed in the MHx table below, who presented from home to the [facility] ED on 11/3/24 after a fall.     Per chart review, patient fell after feeling like his \"knees were giving out.\" No reported symptoms prior to this fall, patient landed on his left side and hit the back of his head but did not lose consciousness at this time.      Upon arrival to the [facility] ED, patient was alert, oriented and reported with GCS of 15 and NIH of 0. While in the ED patient had increasing generalized weakness and difficulty getting up from a sitting position. Decision was made to admit patient as they did not feel safe discharging him home given his inability to stand unassisted.      Fall was thought to be mechanical and secondary to generalized weakness due to poor nutrition. CT abd pelv performed due to complaints of abdominal pain, distension, and back pain upon arrival to the floor. Imaging revealed acute T11 vertebral fracture extending into the T10-T11 disc space as well as diffuse distention of small and large bowel compatible with ileus and bilateral pleural

## 2024-11-08 NOTE — PROGRESS NOTES
Remains on  2.5 L of O2     Pt c/o gas pain, abd distended. BS X 4    3 Large watery Bms so far on the shift.     Pt to ENDO with transport

## 2024-11-08 NOTE — DISCHARGE INSTRUCTIONS
Extra Heart Failure Education/ Tools/ Resources:     https://DemoHire.com/publication/?p=055119   --- this is American Heart Association interactive Healthier Living with Heart Failure guidebook.  Please click hyperlink or copy / paste link into search bar. The QR Code is also available below. Use your mouse to scroll through the pages.  Lots of information about weight monitoring, diet tips, activity, meds, etc    Heart Failure Tools and Resources QR Code is below. It includes multiple resources to include symptom tracker, med tracker, further HF info, and access to a HF Support Network online Community    HF [place] Davonte  -- this is a free smart phone davonte available for iPhone and Android download.  Use your phone to track sodium / fluid intake, zone tool symptom tracking, weights, medications, etc. Click on this hyperlink  HF [place]   for QR code for easy download or the link is also found in the below HF Tools and Resources.      DASH (Dietary Approach to Stop Hypertension) diet --  https://www.nhlbi.nih.gov/education/dash-eating-plan -- this diet is a flexible eating plan that promotes heart healthy eating style.  Click on hyperlink or copy / paste link into search bar.  Lots of low sodium recipes and tips.    https://www.Tinselvision.GetHired.com/recipes  -- more free recipes

## 2024-11-08 NOTE — PROGRESS NOTES
Neurocritical Care Progress Note    Patient: Juan Mohamud MRN: [id number]    YOB: 1940  Age: 84 y.o.  Sex: male   Unit: [facility] TOWER Room/Bed: 4504/4504-01 Location: [facility]    Today's Date: 11/8/2024  Date of Admission: 11/7/2024 12:52 AM  Admitting Physician: ANA MONTGOMERY    Primary Care Physician: Janet Garcia MD          LOS: 1 day      ASSESSMENT & RECOMMENDATIONS     Assessment  84M with PMH of DM2, neuropathy, VT with ICD, who presented to OSH after a mechanical fall. Patient denies any symptoms at th time. No chest pain, dizziness, short of breath or weakness. CT shows spinal epidural haematoma between T--T11 and MRI shows acute compression fx. He was transferred to [facility] for tentitive surgical plan  Neurosurgical plan is postponed due to distended abd from ileus  Plan for colonoscopy today     Recommendations  - Neurosurgery consulted, apprciate recs.  - Strict bed rest  - TLS spine precautions: log roll, HOB 15 degrees.   - Neuro checks Q4H  - Check basic labs in AM including BMP, Magnesium, CBC, PT/INR, aPTT.   - order type and screen once timing of OR is confirmed.  - TLSO brace post operatively- defer timing by NSGY.   - Qshift bladder scans.   - Recommend Pacer integration       SUBJECTIVE     Chart reviewed, events noted, pt seen & examined. No acute events overnight. Afebrile.     Review of Systems  No changes since pt last seen other than those noted above.    PFSH  No change since the original history and note.     OBJECTIVE     Patient Vitals for the past 24 hrs:   BP Temp Temp src Pulse Resp SpO2   11/08/24 0632 -- -- -- -- 15 --   11/08/24 0630 125/62 -- -- 82 15 96 %   11/08/24 0602 -- -- -- -- 14 --   11/08/24 0600 113/71 -- -- 79 15 96 %   11/08/24 0530 110/65 -- -- 79 15 96 %   11/08/24 0500 105/65 -- -- 77 13 96 %   11/08/24 0430 105/67 -- -- 77 14 95 %   11/08/24 0400 117/69 -- -- 80 12 97 %   11/08/24 0330 109/67 -- -- 76 12 96 %

## 2024-11-08 NOTE — PROGRESS NOTES
Pt refusing all morning meds and states \"I have more gas pain!\" Pt abdomen is very distended, no different than morning handoff. ICU team made aware and resident following GI messaged as well.     IVF started @ 75 ml/hr.     Pt is alert and oriented x 4, denies back pain at this time. All extremities weak, but equal.     NSR on monitor.     Bed alarm on

## 2024-11-08 NOTE — PROGRESS NOTES
Hospital Medicine Progress Note      Date of Admission: 11/7/2024  Hospital Day: 2    Chief Admission Complaint:  fall, back pain     Subjective: Distention has returned, severe, gaseous, patient uncomfortable, plan for C-scope today    Presenting Admission History:       Mr. Juan Mohamud is a 84 y.o. male with a medical hx significant for type II DM, degenerative lumbar disease, arrhythmia s/p AICD, diastolic CHF, and macular degeneration otherwise as listed in the MHx table below, who presented from home to the [facility] ED on 11/3/24 after a fall.     Per chart review, patient fell after feeling like his \"knees were giving out.\" No reported symptoms prior to this fall, patient landed on his left side and hit the back of his head but did not lose consciousness at this time.      Upon arrival to the [facility] ED, patient was alert, oriented and reported with GCS of 15 and NIH of 0. While in the ED patient had increasing generalized weakness and difficulty getting up from a sitting position. Decision was made to admit patient as they did not feel safe discharging him home given his inability to stand unassisted.      Fall was thought to be mechanical and secondary to generalized weakness due to poor nutrition. CT abd pelv performed due to complaints of abdominal pain, distension, and back pain upon arrival to the floor. Imaging revealed acute T11 vertebral fracture extending into the T10-T11 disc space as well as diffuse distention of small and large bowel compatible with ileus and bilateral pleural effusions.     GI consulted for ileus, and patient underwent colonic decompression     MRI thoracic spine then demonstrated large epidural spinal hematoma with cord compression  and additional T6 subacute compression fracture along with bilateral pleural effusions.     Patient was transferred to [facility] on 11/7 for neurosurgical care.     Assessment/Plan:      Current Principal Problem:  Closed unstable burst

## 2024-11-08 NOTE — PROCEDURES
PROCEDURE NOTE  Date: 2024   Name: Juan Mohamud  YOB: 1940    Procedures  Colonoscopy with decompression                Colonoscopy Procedure Note      Patient: Juan Mohamud  : 1940  Acct#:     Procedure: Colonoscopy with decompression    Date:  2024    Surgeon:  Lily Dick MD,     Referring Physician:  Janet Garcia MD      Preoperative Diagnosis:    84-year-old male with colon and small bowel ileus is here for colon with decompression.  He had a recent colon decompression and colon decompression tube is in place.  Worsening abdominal pain and distention and hence repeat colonoscopy with decompression being performed      Consent:  The patient or their legal guardian has signed a consent, and is aware of the potential risks, benefits, alternatives, and potential complications of this procedure.  These include, but are not limited to hemorrhage, bleeding, post procedural pain, perforation, phlebitis, aspiration, hypotension, hypoxia, cardiovascular events such as arryhthmia, and possibly death.  Additionally, the possibility of missed colonic polyps and interval colon cancer was discussed in the consent.      Anesthesia:  MAC        Procedure description:   An informed consent was obtained from the patient after explanation of indications, benefits, possible risks and complications of the procedure.  The patient was then taken to the endoscopy suite, placed in the left lateral decubitus position, and the above IV anesthesia was administered.        The Olympus video colonoscope was placed in the patient's rectum under digital direction and advanced to the hepatic flexure.  Careful circumferential examination of the mucosa was performed. The scope was then withdrawn into the rectum and retroflexed.  The scope was straightened, the colon was decompressed and the scope was withdrawn from the patient.  The patient tolerated the procedure well and was

## 2024-11-08 NOTE — ANESTHESIA POSTPROCEDURE EVALUATION
Department of Anesthesiology  Postprocedure Note    Patient: Juan Mohamud  MRN: [id number]  YOB: 1940  Date of evaluation: 11/8/2024    Procedure Summary       Date: 11/05/24 Room / Location: [address]    Anesthesia Start: 1403 Anesthesia Stop: 1450    Procedure: INCOMPLETE COLONOSCOPY FLEXIBLE DECOMPRESSION Diagnosis: Ileus (HCC)    Surgeons: Andrew Kay MD Responsible Provider: Cricket Kraft MD    Anesthesia Type: MAC ASA Status: 4            Anesthesia Type: MAC    Caitlyn Phase I: Caitlyn Score: 8    Caitlyn Phase II:      Anesthesia Post Evaluation    Patient location during evaluation: PACU  Patient participation: complete - patient participated  Level of consciousness: awake and alert  Pain score: 0  Airway patency: patent  Nausea & Vomiting: no nausea and no vomiting  Cardiovascular status: blood pressure returned to baseline  Respiratory status: acceptable  Hydration status: euvolemic  Pain management: adequate    No notable events documented.

## 2024-11-08 NOTE — PROGRESS NOTES
Progress Note    Patient Juan Mohamud  MRN: [id number]  YOB: 1940 Age: 84 y.o. Sex: male  Room: [address] [place] [address]       Admitting Physician: Royal Cotter MD   Date of Admission: 11/7/2024 12:52 AM   Primary Care Physician: Janet Garcia MD     Subjective:  Juan Mohamud was seen and examined. We are following for ileus.  -- distended and tight abdomen noted. Having multiple loose stools.  Non tender    ROS:  Constitutional: Denies fever, no change in appetite  Respiratory: Denies cough or shortness of breath  Cardiovascular: Denies chest pain or edema    Objective:  Vital Signs:   Vitals:    11/08/24 0632   BP:    Pulse:    Resp: 15   Temp:    SpO2:          Physical Exam:  Constitutional: Alert and oriented x 4. No acute distress.   HEENT: Sclera anicteric, mucosal membranes moist  Cardiovascular: Regular rate and rhythm.  No murmurs.  Respiratory: Respirations nonlabored, no crepitus  GI: Abdomen moderate distetion, tght, soft, and nontender.  Normal active bowel sounds.  No masses palpable.   Rectal: Deferred  Musculoskeletal:  No pitting edema of the lower legs.  Neurological: Gross memory appears intact.       Intake/Output:    Intake/Output Summary (Last 24 hours) at 11/8/2024 0839  Last data filed at 11/8/2024 0600  Gross per 24 hour   Intake 583.96 ml   Output 1700 ml   Net -1116.04 ml        Current Medications:  Current Facility-Administered Medications   Medication Dose Route Frequency Provider Last Rate Last Admin    0.9 % sodium chloride infusion   IntraVENous Continuous Katiuska Dixon APRN - CNP 75 mL/hr at 11/08/24 0822 New Bag at 11/08/24 0822    methocarbamol (ROBAXIN) tablet 1,000 mg  1,000 mg Oral 4x Daily PRN Carlos Mendez MD        sodium chloride flush 0.9 % injection 5-40 mL  5-40 mL IntraVENous PRN Omar Victor MD   10 mL at 11/07/24 0826    0.9 % sodium chloride infusion   IntraVENous PRN Omar Victor MD        ondansetron (ZOFRAN-ODT)

## 2024-11-08 NOTE — ANESTHESIA PRE PROCEDURE
Department of Anesthesiology  Preprocedure Note       Name:  Juan Mohamud   Age:  84 y.o.  :  1940                                          MRN:  [id number]         Date:  2024      Surgeon: Surgeon(s):  Lily Dick MD    Procedure: Procedure(s):  COLONOSCOPY FLEXIBLE DECOMPRESSION    Medications prior to admission:   Prior to Admission medications    Medication Sig Start Date End Date Taking? Authorizing Provider   buPROPion (WELLBUTRIN XL) 300 MG extended release tablet Take 1 tablet by mouth daily 10/17/24  Yes ProviderRylan MD   Multiple Vitamins-Minerals (PRESERVISION AREDS PO) Take 1 tablet by mouth in the morning and at bedtime   Yes Provider, MD Rylan   gabapentin (NEURONTIN) 300 MG capsule Take 2 capsules by mouth 3 times daily.   Yes ProviderRylan MD   torsemide (DEMADEX) 20 MG tablet Take 1 tablet by mouth See Admin Instructions Two days a week 24  Yes Bong Ricci MD   amiodarone (CORDARONE) 200 MG tablet Take 1 tablet by mouth daily 24  Yes Esperanza Posey APRN - CNP   simethicone (MYLICON) 80 MG chewable tablet Take 1 tablet by mouth every 6 hours as needed for Flatulence 24  Yes Esperanza Posey APRN - CNP   spironolactone (ALDACTONE) 25 MG tablet Take 1 tablet by mouth daily 24  Yes Esperanza Posey APRN - CNP   losartan (COZAAR) 50 MG tablet Take 1 tablet by mouth daily 24  Yes Esperanza Posey APRN - CNP   atorvastatin (LIPITOR) 10 MG tablet Take 1 tablet by mouth nightly 24  Yes Esperanaz Posey APRN - CNP   metoprolol succinate (TOPROL XL) 25 MG extended release tablet Take 1 tablet by mouth daily   Yes ProviderRylan MD   omeprazole (PRILOSEC) 40 MG delayed release capsule Take 1 capsule by mouth daily as needed (Heartburn, Indigestion)   Yes Rylan Grider MD   zolpidem (AMBIEN CR) 12.5 MG extended release tablet Take 1 tablet by mouth nightly.   Yes Rylan Grider MD

## 2024-11-08 NOTE — CARE COORDINATION
DISCHARGE PLANNING:  Chart reviewed.  Patient is from a condo with her spouse. No current services. He does have a walker and cane that he uses when out of the house and at night to go to the bathroom.     Current discharge plan is TBD pending medical course. Patient states his goal is to discharge home.    CM team will continue to follow.  Chandrika Hernandez RN Case Manager  [phone]

## 2024-11-08 NOTE — PLAN OF CARE
C/o 4/10 back pain  No numbness or paresthesias.          PHYSICAL EXAM:  Vitals:    11/08/24 0600 11/08/24 0602 11/08/24 0630 11/08/24 0632   BP: 113/71  125/62    Pulse: 79  82    Resp: 15 14 15 15   Temp:       TempSrc:       SpO2: 96%  96%    Weight:       Height:             General: Alert, no distress, well-nourished  Neurologic  Mental status:   Oriented to person, place, time, situation  Able to attend exam well  Following conversation  Follows commands in all 4 limbs.     Cranial nerves:   CN2: Visual fields full w/o extinction on confrontational testing \  CN 3,4,6: Pupils 3 mm > 2 mm equal and reactive to light, extraocular muscles intact  CN5: Facial sensation symmetric   CN7: Face symmetric  CN8: Hearing symmetric to spoken voice  CN9: Palate elevated symmetrically  CN11: Traps full strength on shoulder shrug  CN12: Tongue midline with protrusion     Motor Exam:  5/5 strength throughout     Deep tendon reflexes:     R  L    Biceps  2  2   Brachioradialis  2 2   Patellar  1 1   Clonus Absent Absent   Bruce's Absent Absent   Toes Down Down      Sensory: light touch intact and symmetric in all 4 extremities.  No sensory extinction on bilateral simultaneous stimulation  Cerebellar/coordination: finger nose finger normal without ataxia  Tone: normal in all 4 extremities  Gait: Deferred for safety

## 2024-11-08 NOTE — PROGRESS NOTES
Comprehensive Nutrition Assessment    RECOMMENDATIONS:  PO Diet: NPO- monitor ability to initiate po diet  Nutrition Supplement: Add Ensure compact BID when po resumes  Nutrition Education: Education/Counseling not indicated     NUTRITION ASSESSMENT:   Nutritional summary & status: Positive screen for poor po and wt loss. Patient presented to [facility] with vertebral fracture, developed ileus w/need for decompression, transferred to [facility] for NSGY. Patient with hx of wt loss over the past 8 months, per previous RD note may be intentional, pt has also received diet edu for CHF recently. Pt distended this am, possible colonic decompression today prior to NSGY intervention tomorrow per MD note. Bowel regimen and rectal tube in place, noted pt refusing meds this am r/t gas pain. Pt with stage 1 to sacrum and DM dx in hx however A1C and BG both WNL. Pt also with chronic hyponatremia, currently Na+ 133. Remains NPO for decompression. RD will monitor ability to initiate oral diet.   Admission // PMH: Vertebral fracture // HLD, CHF, DM2    MALNUTRITION ASSESSMENT  Context of Malnutrition: Chronic Illness   Malnutrition Status: At risk for malnutrition  Findings of the 6 clinical characteristics of malnutrition (Minimum of 2 out of 6 clinical characteristics is required to make the diagnosis of moderate or severe Protein Calorie Malnutrition based on AND/ASPEN Guidelines):  Energy Intake:  Unable to assess  Weight Loss:  10% over 6 months     Body Fat Loss:  Unable to assess (Pt in ENDO)     Muscle Mass Loss:  Unable to assess    Fluid Accumulation:  No fluid accumulation     Strength:  Not Performed    NUTRITION DIAGNOSIS   Inadequate oral intake related to acute injury/trauma, altered GI function as evidenced by NPO or clear liquid status due to medical condition    Nutrition Monitoring and Evaluation:   Food/Nutrient Intake Outcomes:  Progression of Nutrition  Physical Signs/Symptoms Outcomes:  Biochemical Data, GI Status,

## 2024-11-08 NOTE — PLAN OF CARE
Care plan reviewed   Problem: Chronic Conditions and Co-morbidities  Goal: Patient's chronic conditions and co-morbidity symptoms are monitored and maintained or improved  11/8/2024 1418 by Zoya Camacho RN  Outcome: Progressing  Flowsheets (Taken 11/8/2024 0800)  Care Plan - Patient's Chronic Conditions and Co-Morbidity Symptoms are Monitored and Maintained or Improved:   Update acute care plan with appropriate goals if chronic or comorbid symptoms are exacerbated and prevent overall improvement and discharge   Monitor and assess patient's chronic conditions and comorbid symptoms for stability, deterioration, or improvement   Collaborate with multidisciplinary team to address chronic and comorbid conditions and prevent exacerbation or deterioration     Problem: Discharge Planning  Goal: Discharge to home or other facility with appropriate resources  11/8/2024 1418 by Zoya Camacho RN  Outcome: Progressing  Flowsheets (Taken 11/8/2024 0800)  Discharge to home or other facility with appropriate resources:   Arrange for needed discharge resources and transportation as appropriate   Identify discharge learning needs (meds, wound care, etc)   Identify barriers to discharge with patient and caregiver   Arrange for interpreters to assist at discharge as needed   Refer to discharge planning if patient needs post-hospital services based on physician order or complex needs related to functional status, cognitive ability or social support system     Problem: Cardiovascular - Adult  Goal: Maintains optimal cardiac output and hemodynamic stability  Outcome: Progressing  Flowsheets (Taken 11/8/2024 0800)  Maintains optimal cardiac output and hemodynamic stability:   Monitor blood pressure and heart rate   Monitor urine output and notify Licensed Independent Practitioner for values outside of normal range   Assess for signs of decreased cardiac output   Administer fluid and/or volume expanders as ordered  Goal:

## 2024-11-08 NOTE — ANESTHESIA POSTPROCEDURE EVALUATION
Department of Anesthesiology  Postprocedure Note    Patient: Juan Mohamud  MRN: [id number]  YOB: 1940  Date of evaluation: 11/8/2024    Procedure Summary       Date: 11/08/24 Room / Location: [place] [address] / [facility]    Anesthesia Start: 1446 Anesthesia Stop: 1512    Procedure: COLONOSCOPY FLEXIBLE DECOMPRESSION Diagnosis:       Ileus (HCC)      (Ileus (HCC) [K56.7])    Surgeons: Lily Dick MD Responsible Provider: Jeramy Soto MD    Anesthesia Type: MAC ASA Status: 3            Anesthesia Type: No value filed.    Caitlyn Phase I: Caitlyn Score: 10    Caitlyn Phase II:      Anesthesia Post Evaluation    Patient location during evaluation: PACU  Patient participation: complete - patient participated  Level of consciousness: awake  Pain score: 2  Airway patency: patent  Nausea & Vomiting: no nausea  Cardiovascular status: hemodynamically stable  Respiratory status: acceptable  Hydration status: stable  Comments: Transported back to ICU  Pain management: adequate    No notable events documented.

## 2024-11-08 NOTE — PROGRESS NOTES
Pt c/o pain from distention. ICU resident spoke with pt about placing NGT. Pt agreed to allow this RN to try once.    Attempted x 1, NGT barely placed in nare and pt began screaming \"take it out!\" ICU team made aware.     No bleeding from nares noted.     IVF infusing, pt informed to stay NPO due to distention. Pt worried about dry mouth. Mouth swabs given

## 2024-11-08 NOTE — PROGRESS NOTES
Dr. Dick ordered NG tube placement. Pt is refusing NG tube. Pt and wife stated pt had massive nose bleed when this was done years ago. ENDO called and made aware.

## 2024-11-08 NOTE — PROGRESS NOTES
Pt back from ENDO. Abd remains distended, softer than prior exam.     Importance of NGT emphasized by this RN and Dr. Dick. Pt profusely refuses NGT, stating it caused a massive nosebleed last time (5-6 years ago per pt.)     Wife updated over the phone.

## 2024-11-08 NOTE — PROGRESS NOTES
NEUROSURGERY PROGRESS NOTE    11/8/2024 9:18 AM                               Juan Mohamud                      LOS: 1 day       Subjective:  No acute events overnight. Patient asking about surgery to stabilize spine but Dr. Posey still has concerns about the patient's abdominal distention. Dr. Posey will discuss with Gastroenterologist. In case the patient were to go to OR today for stabilization, Dr. Posey, in language easily understood by a layperson, the risks and benefits of surgical intervention were discussed at length with the patient and any family members present. Risks including, but not limited to, infection, bleeding, possible blood transfusion, numbness, weakness, paralysis, loss of bowel or bladder control, or death were explained fully.  Any questions were answered to the patient's satisfaction.    Physical Exam:  Patient seen and examined    Vitals:    11/08/24 0632   BP:    Pulse:    Resp: 15   Temp:    SpO2:      GCS:  4 - Opens eyes on own  5 - Alert and oriented  6 - Follows simple motor commands  General: Well developed. Alert and cooperative in no acute distress.   HENT: atraumatic, neck supple  Eyes: Optic discs: Not tested  Pulmonary: unlabored respiratory effort  Cardiovascular:  Warm well perfused. No peripheral edema  Gastrointestinal: abdomen soft, NT, ND    Neurological:  Mental Status: Awake, alert, oriented x 4, speech clear and appropriate  Attention: Intact  Language: No aphasia or dysarthria noted  Sensation: Intact to all extremities to light touch  Coordination: Intact    Cranial Nerves:  II: Visual acuity not tested, denies new visual changes / diplopia  III, IV, VI: PERRL, 3 mm bilaterally, EOMI, no nystagmus noted  V: Facial sensation intact bilaterally to touch  VII: Face symmetric  VIII: Hearing intact bilaterally to spoken voice  IX: Palate movement equal bilaterally  XI: Shoulder shrug equal bilaterally  XII: Tongue midline    Musculoskeletal:   Gait:

## 2024-11-09 ENCOUNTER — APPOINTMENT (OUTPATIENT)
Dept: GENERAL RADIOLOGY | Age: 84
DRG: 447 | End: 2024-11-09
Attending: INTERNAL MEDICINE
Payer: MEDICARE

## 2024-11-09 LAB
ALBUMIN SERPL-MCNC: 2.6 G/DL (ref 3.4–5)
ALBUMIN/GLOB SERPL: 0.9 {RATIO} (ref 1.1–2.2)
ALP SERPL-CCNC: 184 U/L (ref 40–129)
ALT SERPL-CCNC: 34 U/L (ref 10–40)
ANION GAP SERPL CALCULATED.3IONS-SCNC: 15 MMOL/L (ref 3–16)
ANION GAP SERPL CALCULATED.3IONS-SCNC: 16 MMOL/L (ref 3–16)
AST SERPL-CCNC: 46 U/L (ref 15–37)
BASOPHILS # BLD: 0 K/UL (ref 0–0.2)
BASOPHILS # BLD: 0 K/UL (ref 0–0.2)
BASOPHILS NFR BLD: 0.3 %
BASOPHILS NFR BLD: 0.3 %
BILIRUB SERPL-MCNC: 1.1 MG/DL (ref 0–1)
BUN SERPL-MCNC: 23 MG/DL (ref 7–20)
BUN SERPL-MCNC: 37 MG/DL (ref 7–20)
CALCIUM SERPL-MCNC: 7.7 MG/DL (ref 8.3–10.6)
CALCIUM SERPL-MCNC: 7.9 MG/DL (ref 8.3–10.6)
CHLORIDE SERPL-SCNC: 100 MMOL/L (ref 99–110)
CHLORIDE SERPL-SCNC: 102 MMOL/L (ref 99–110)
CO2 SERPL-SCNC: 17 MMOL/L (ref 21–32)
CO2 SERPL-SCNC: 20 MMOL/L (ref 21–32)
CREAT SERPL-MCNC: 0.7 MG/DL (ref 0.8–1.3)
CREAT SERPL-MCNC: 1.4 MG/DL (ref 0.8–1.3)
DEPRECATED RDW RBC AUTO: 16.5 % (ref 12.4–15.4)
DEPRECATED RDW RBC AUTO: 17 % (ref 12.4–15.4)
EOSINOPHIL # BLD: 0.1 K/UL (ref 0–0.6)
EOSINOPHIL # BLD: 0.1 K/UL (ref 0–0.6)
EOSINOPHIL NFR BLD: 0.5 %
EOSINOPHIL NFR BLD: 0.6 %
GFR SERPLBLD CREATININE-BSD FMLA CKD-EPI: 50 ML/MIN/{1.73_M2}
GFR SERPLBLD CREATININE-BSD FMLA CKD-EPI: >90 ML/MIN/{1.73_M2}
GLUCOSE BLD-MCNC: 101 MG/DL (ref 70–99)
GLUCOSE BLD-MCNC: 108 MG/DL (ref 70–99)
GLUCOSE BLD-MCNC: 110 MG/DL (ref 70–99)
GLUCOSE BLD-MCNC: 78 MG/DL (ref 70–99)
GLUCOSE BLD-MCNC: 91 MG/DL (ref 70–99)
GLUCOSE BLD-MCNC: 92 MG/DL (ref 70–99)
GLUCOSE BLD-MCNC: 96 MG/DL (ref 70–99)
GLUCOSE SERPL-MCNC: 119 MG/DL (ref 70–99)
GLUCOSE SERPL-MCNC: 88 MG/DL (ref 70–99)
HCT VFR BLD AUTO: 40.4 % (ref 40.5–52.5)
HCT VFR BLD AUTO: 42.6 % (ref 40.5–52.5)
HGB BLD-MCNC: 13.2 G/DL (ref 13.5–17.5)
HGB BLD-MCNC: 13.8 G/DL (ref 13.5–17.5)
LYMPHOCYTES # BLD: 0.6 K/UL (ref 1–5.1)
LYMPHOCYTES # BLD: 0.8 K/UL (ref 1–5.1)
LYMPHOCYTES NFR BLD: 5.3 %
LYMPHOCYTES NFR BLD: 8.1 %
MAGNESIUM SERPL-MCNC: 2 MG/DL (ref 1.8–2.4)
MAGNESIUM SERPL-MCNC: 2.04 MG/DL (ref 1.8–2.4)
MCH RBC QN AUTO: 29.2 PG (ref 26–34)
MCH RBC QN AUTO: 29.5 PG (ref 26–34)
MCHC RBC AUTO-ENTMCNC: 32.5 G/DL (ref 31–36)
MCHC RBC AUTO-ENTMCNC: 32.5 G/DL (ref 31–36)
MCV RBC AUTO: 89.8 FL (ref 80–100)
MCV RBC AUTO: 90.8 FL (ref 80–100)
MONOCYTES # BLD: 1.3 K/UL (ref 0–1.3)
MONOCYTES # BLD: 1.3 K/UL (ref 0–1.3)
MONOCYTES NFR BLD: 11 %
MONOCYTES NFR BLD: 12.3 %
NEUTROPHILS # BLD: 10.1 K/UL (ref 1.7–7.7)
NEUTROPHILS # BLD: 8 K/UL (ref 1.7–7.7)
NEUTROPHILS NFR BLD: 78.8 %
NEUTROPHILS NFR BLD: 82.8 %
PERFORMED ON: ABNORMAL
PERFORMED ON: NORMAL
PLATELET # BLD AUTO: 205 K/UL (ref 135–450)
PLATELET # BLD AUTO: 211 K/UL (ref 135–450)
PMV BLD AUTO: 8 FL (ref 5–10.5)
PMV BLD AUTO: 8.5 FL (ref 5–10.5)
POTASSIUM SERPL-SCNC: 3.4 MMOL/L (ref 3.5–5.1)
POTASSIUM SERPL-SCNC: 3.6 MMOL/L (ref 3.5–5.1)
PROT SERPL-MCNC: 5.5 G/DL (ref 6.4–8.2)
RBC # BLD AUTO: 4.45 M/UL (ref 4.2–5.9)
RBC # BLD AUTO: 4.75 M/UL (ref 4.2–5.9)
SODIUM SERPL-SCNC: 135 MMOL/L (ref 136–145)
SODIUM SERPL-SCNC: 135 MMOL/L (ref 136–145)
WBC # BLD AUTO: 10.2 K/UL (ref 4–11)
WBC # BLD AUTO: 12.2 K/UL (ref 4–11)

## 2024-11-09 PROCEDURE — 6360000002 HC RX W HCPCS: Performed by: SURGERY

## 2024-11-09 PROCEDURE — 2580000003 HC RX 258: Performed by: SURGERY

## 2024-11-09 PROCEDURE — 80053 COMPREHEN METABOLIC PANEL: CPT

## 2024-11-09 PROCEDURE — 99232 SBSQ HOSP IP/OBS MODERATE 35: CPT | Performed by: NURSE PRACTITIONER

## 2024-11-09 PROCEDURE — 6370000000 HC RX 637 (ALT 250 FOR IP)

## 2024-11-09 PROCEDURE — 36415 COLL VENOUS BLD VENIPUNCTURE: CPT

## 2024-11-09 PROCEDURE — 6360000002 HC RX W HCPCS

## 2024-11-09 PROCEDURE — 2060000000 HC ICU INTERMEDIATE R&B

## 2024-11-09 PROCEDURE — 51798 US URINE CAPACITY MEASURE: CPT

## 2024-11-09 PROCEDURE — 83735 ASSAY OF MAGNESIUM: CPT

## 2024-11-09 PROCEDURE — 85025 COMPLETE CBC W/AUTO DIFF WBC: CPT

## 2024-11-09 PROCEDURE — 74018 RADEX ABDOMEN 1 VIEW: CPT

## 2024-11-09 PROCEDURE — 99222 1ST HOSP IP/OBS MODERATE 55: CPT | Performed by: SURGERY

## 2024-11-09 PROCEDURE — 2580000003 HC RX 258: Performed by: NURSE PRACTITIONER

## 2024-11-09 PROCEDURE — 93005 ELECTROCARDIOGRAM TRACING: CPT | Performed by: NURSE PRACTITIONER

## 2024-11-09 RX ORDER — NEOSTIGMINE METHYLSULFATE 1 MG/ML
2 INJECTION INTRAVENOUS ONCE
Status: COMPLETED | OUTPATIENT
Start: 2024-11-09 | End: 2024-11-09

## 2024-11-09 RX ORDER — HYDROMORPHONE HYDROCHLORIDE 1 MG/ML
0.25 INJECTION, SOLUTION INTRAMUSCULAR; INTRAVENOUS; SUBCUTANEOUS
Status: DISCONTINUED | OUTPATIENT
Start: 2024-11-09 | End: 2024-11-09

## 2024-11-09 RX ORDER — HYDROMORPHONE HYDROCHLORIDE 1 MG/ML
0.5 INJECTION, SOLUTION INTRAMUSCULAR; INTRAVENOUS; SUBCUTANEOUS
Status: DISCONTINUED | OUTPATIENT
Start: 2024-11-09 | End: 2024-11-09

## 2024-11-09 RX ORDER — 0.9 % SODIUM CHLORIDE 0.9 %
500 INTRAVENOUS SOLUTION INTRAVENOUS ONCE
Status: COMPLETED | OUTPATIENT
Start: 2024-11-09 | End: 2024-11-09

## 2024-11-09 RX ORDER — SODIUM CHLORIDE 9 MG/ML
INJECTION, SOLUTION INTRAVENOUS CONTINUOUS
Status: DISCONTINUED | OUTPATIENT
Start: 2024-11-09 | End: 2024-11-09

## 2024-11-09 RX ORDER — HYDROMORPHONE HYDROCHLORIDE 1 MG/ML
1 INJECTION, SOLUTION INTRAMUSCULAR; INTRAVENOUS; SUBCUTANEOUS
Status: DISCONTINUED | OUTPATIENT
Start: 2024-11-09 | End: 2024-11-16

## 2024-11-09 RX ORDER — SODIUM CHLORIDE 9 MG/ML
INJECTION, SOLUTION INTRAVENOUS CONTINUOUS
Status: ACTIVE | OUTPATIENT
Start: 2024-11-09 | End: 2024-11-10

## 2024-11-09 RX ORDER — HYDROMORPHONE HYDROCHLORIDE 1 MG/ML
0.5 INJECTION, SOLUTION INTRAMUSCULAR; INTRAVENOUS; SUBCUTANEOUS
Status: DISCONTINUED | OUTPATIENT
Start: 2024-11-09 | End: 2024-11-16

## 2024-11-09 RX ORDER — NEOSTIGMINE METHYLSULFATE 5 MG/5 ML
2 SYRINGE (ML) INTRAVENOUS ONCE
Status: DISCONTINUED | OUTPATIENT
Start: 2024-11-09 | End: 2024-11-09 | Stop reason: SDUPTHER

## 2024-11-09 RX ORDER — HYDROMORPHONE HYDROCHLORIDE 1 MG/ML
0.5 INJECTION, SOLUTION INTRAMUSCULAR; INTRAVENOUS; SUBCUTANEOUS ONCE
Status: DISCONTINUED | OUTPATIENT
Start: 2024-11-09 | End: 2024-11-11

## 2024-11-09 RX ADMIN — ONDANSETRON 4 MG: 2 INJECTION INTRAMUSCULAR; INTRAVENOUS at 02:25

## 2024-11-09 RX ADMIN — SENNOSIDES AND DOCUSATE SODIUM 2 TABLET: 50; 8.6 TABLET ORAL at 08:07

## 2024-11-09 RX ADMIN — METOCLOPRAMIDE HYDROCHLORIDE 10 MG: 5 INJECTION, SOLUTION INTRAMUSCULAR; INTRAVENOUS at 23:45

## 2024-11-09 RX ADMIN — BUPROPION HYDROCHLORIDE 300 MG: 150 TABLET, EXTENDED RELEASE ORAL at 08:07

## 2024-11-09 RX ADMIN — SODIUM CHLORIDE 500 ML: 9 INJECTION, SOLUTION INTRAVENOUS at 21:29

## 2024-11-09 RX ADMIN — POLYETHYLENE GLYCOL 3350 17 G: 17 POWDER, FOR SOLUTION ORAL at 08:06

## 2024-11-09 RX ADMIN — ATORVASTATIN CALCIUM 10 MG: 10 TABLET, FILM COATED ORAL at 21:03

## 2024-11-09 RX ADMIN — METOCLOPRAMIDE HYDROCHLORIDE 10 MG: 5 INJECTION, SOLUTION INTRAMUSCULAR; INTRAVENOUS at 11:23

## 2024-11-09 RX ADMIN — TAMSULOSIN HYDROCHLORIDE 0.4 MG: 0.4 CAPSULE ORAL at 08:07

## 2024-11-09 RX ADMIN — METOCLOPRAMIDE HYDROCHLORIDE 10 MG: 5 INJECTION, SOLUTION INTRAMUSCULAR; INTRAVENOUS at 18:13

## 2024-11-09 RX ADMIN — SODIUM CHLORIDE: 9 INJECTION, SOLUTION INTRAVENOUS at 09:12

## 2024-11-09 RX ADMIN — PANTOPRAZOLE SODIUM 40 MG: 40 TABLET, DELAYED RELEASE ORAL at 07:01

## 2024-11-09 RX ADMIN — ACETAMINOPHEN 650 MG: 500 TABLET, FILM COATED ORAL at 05:10

## 2024-11-09 RX ADMIN — HYDROMORPHONE HYDROCHLORIDE 0.5 MG: 1 INJECTION, SOLUTION INTRAMUSCULAR; INTRAVENOUS; SUBCUTANEOUS at 09:07

## 2024-11-09 RX ADMIN — METOCLOPRAMIDE HYDROCHLORIDE 10 MG: 5 INJECTION, SOLUTION INTRAMUSCULAR; INTRAVENOUS at 05:09

## 2024-11-09 RX ADMIN — Medication: at 21:30

## 2024-11-09 RX ADMIN — ZOLPIDEM TARTRATE 10 MG: 5 TABLET, COATED ORAL at 21:03

## 2024-11-09 RX ADMIN — ONDANSETRON 4 MG: 2 INJECTION INTRAMUSCULAR; INTRAVENOUS at 13:22

## 2024-11-09 RX ADMIN — HYDROMORPHONE HYDROCHLORIDE 0.5 MG: 1 INJECTION, SOLUTION INTRAMUSCULAR; INTRAVENOUS; SUBCUTANEOUS at 12:33

## 2024-11-09 RX ADMIN — GABAPENTIN 600 MG: 300 CAPSULE ORAL at 08:07

## 2024-11-09 RX ADMIN — AMIODARONE HYDROCHLORIDE 200 MG: 200 TABLET ORAL at 08:07

## 2024-11-09 RX ADMIN — NEOSTIGMINE METHYLSULFATE 2 MG: 1 INJECTION INTRAVENOUS at 12:51

## 2024-11-09 RX ADMIN — SENNOSIDES AND DOCUSATE SODIUM 2 TABLET: 50; 8.6 TABLET ORAL at 21:02

## 2024-11-09 ASSESSMENT — PAIN - FUNCTIONAL ASSESSMENT
PAIN_FUNCTIONAL_ASSESSMENT: ACTIVITIES ARE NOT PREVENTED
PAIN_FUNCTIONAL_ASSESSMENT: ACTIVITIES ARE NOT PREVENTED
PAIN_FUNCTIONAL_ASSESSMENT: INTOLERABLE, UNABLE TO DO ANY ACTIVE OR PASSIVE ACTIVITIES
PAIN_FUNCTIONAL_ASSESSMENT: ACTIVITIES ARE NOT PREVENTED
PAIN_FUNCTIONAL_ASSESSMENT: ACTIVITIES ARE NOT PREVENTED
PAIN_FUNCTIONAL_ASSESSMENT: INTOLERABLE, UNABLE TO DO ANY ACTIVE OR PASSIVE ACTIVITIES

## 2024-11-09 ASSESSMENT — PAIN SCALES - GENERAL
PAINLEVEL_OUTOF10: 8
PAINLEVEL_OUTOF10: 5
PAINLEVEL_OUTOF10: 8
PAINLEVEL_OUTOF10: 4
PAINLEVEL_OUTOF10: 0
PAINLEVEL_OUTOF10: 10
PAINLEVEL_OUTOF10: 8
PAINLEVEL_OUTOF10: 6
PAINLEVEL_OUTOF10: 5
PAINLEVEL_OUTOF10: 5

## 2024-11-09 ASSESSMENT — PAIN DESCRIPTION - DESCRIPTORS
DESCRIPTORS: DISCOMFORT
DESCRIPTORS: DISCOMFORT
DESCRIPTORS: ACHING;DISCOMFORT;SHARP;CRAMPING
DESCRIPTORS: ACHING
DESCRIPTORS: ACHING;STABBING

## 2024-11-09 ASSESSMENT — PAIN DESCRIPTION - LOCATION
LOCATION: BACK
LOCATION: ABDOMEN
LOCATION: ABDOMEN
LOCATION: OTHER (COMMENT)
LOCATION: ABDOMEN;BACK
LOCATION: OTHER (COMMENT)

## 2024-11-09 ASSESSMENT — PAIN DESCRIPTION - PAIN TYPE
TYPE: ACUTE PAIN
TYPE: SURGICAL PAIN
TYPE: ACUTE PAIN
TYPE: ACUTE PAIN

## 2024-11-09 ASSESSMENT — PAIN DESCRIPTION - ONSET
ONSET: ON-GOING

## 2024-11-09 ASSESSMENT — PAIN DESCRIPTION - ORIENTATION
ORIENTATION: MID
ORIENTATION: LOWER
ORIENTATION: MID;ANTERIOR
ORIENTATION: MID;ANTERIOR
ORIENTATION: MID;LOWER;UPPER
ORIENTATION: LOWER

## 2024-11-09 ASSESSMENT — PAIN DESCRIPTION - FREQUENCY
FREQUENCY: CONTINUOUS

## 2024-11-09 NOTE — PROGRESS NOTES
Hospital Medicine Progress Note      Date of Admission: 11/7/2024  Hospital Day: 3    Chief Admission Complaint:  fall, back pain     Subjective: Distention has returned, severe, gaseous, patient uncomfortable, plan for C-scope today    Presenting Admission History:       Mr. Juan Mohamud is a 84 y.o. male with a medical hx significant for type II DM, degenerative lumbar disease, arrhythmia s/p AICD, diastolic CHF, and macular degeneration otherwise as listed in the MHx table below, who presented from home to the [facility] ED on 11/3/24 after a fall.     Per chart review, patient fell after feeling like his \"knees were giving out.\" No reported symptoms prior to this fall, patient landed on his left side and hit the back of his head but did not lose consciousness at this time.      Upon arrival to the [facility] ED, patient was alert, oriented and reported with GCS of 15 and NIH of 0. While in the ED patient had increasing generalized weakness and difficulty getting up from a sitting position. Decision was made to admit patient as they did not feel safe discharging him home given his inability to stand unassisted.      Fall was thought to be mechanical and secondary to generalized weakness due to poor nutrition. CT abd pelv performed due to complaints of abdominal pain, distension, and back pain upon arrival to the floor. Imaging revealed acute T11 vertebral fracture extending into the T10-T11 disc space as well as diffuse distention of small and large bowel compatible with ileus and bilateral pleural effusions.     GI consulted for ileus, and patient underwent colonic decompression     MRI thoracic spine then demonstrated large epidural spinal hematoma with cord compression  and additional T6 subacute compression fracture along with bilateral pleural effusions.     Patient was transferred to [facility] on 11/7 for neurosurgical care.     Assessment/Plan:      Current Principal Problem:  Closed unstable burst  management needs  ----------------------------------------------------------------------  C. Data (any 2)  [x] Discussion of Management with External Professional (any 1):   [x] Discussed current management and discharge planning options with Case Management.   [x] Discussed management of the case with: NSGY   [] Study interpreted by me (any 1):   [] Telemetry personally reviewed and interpreted:     [x] Imaging personally reviewed and interpreted, includes:  CT thoracic spine  [x] Data Review (3+ points):  [] Collateral history obtained from:    [] Consultant Note reviewed with note date, specialty, and summary (1 point each):  [] Studies Reviewed (1 point each):    [x] Lab Tests Reviewed (1 point each): cbc, bmp, pt/inr  [] Appropriate follow-up labs were ordered:    Medications:  Personally reviewed in detail in conjunction w/ labs as documented for evidence of drug toxicity.     Infusion Medications    sodium chloride 75 mL/hr at 11/09/24 0912    sodium chloride      dextrose       Scheduled Medications    HYDROmorphone  0.5 mg IntraVENous Once    [START ON 11/10/2024] naloxegol  25 mg Oral QAM AC    neostigmine  2 mg IntraVENous Once    metoclopramide  10 mg IntraVENous Q6H    balsum peru-castor oil   Topical BID    linaclotide  290 mcg Oral QAM AC    insulin lispro  0-4 Units SubCUTAneous Q4H    gabapentin  600 mg Oral TID    buPROPion  300 mg Oral Daily    atorvastatin  10 mg Oral Nightly    amiodarone  200 mg Oral Daily    [Held by provider] metoprolol succinate  25 mg Oral Daily    pantoprazole  40 mg Oral QAM AC    tamsulosin  0.4 mg Oral Daily    [Held by provider] midodrine  5 mg Oral TID WC    polyethylene glycol  17 g Oral BID    [Held by provider] DESMOpressin  200 mcg Oral Nightly    acetaminophen  1,000 mg Oral BID    sennosides-docusate sodium  2 tablet Oral BID    melatonin  5 mg Oral Nightly     PRN Meds: HYDROmorphone **OR** HYDROmorphone, methocarbamol, zolpidem, sodium chloride flush, sodium

## 2024-11-09 NOTE — CONSULTS
General Surgery   Consult Note    Reason for Consult: severe ileus/colonic pseudo-obstruction    History of Present Illness:   Juan Mohamud is a 84 y.o. male with Hx of type II DM, degenerative lumbar disease, arrhythmia s/p AICD, diastolic CHF, and macular degeneration who has been transferred from [facility] to [facility] after a fall. Per chart review, patient had a fall on 11/3/2024 and was found down for an unknown period of time by family before EMS was called. At [facility] ED, patient was found to have an acute T11 vertebral fracture extending into the T10-T11 disc space, bilateral pleural effusion, and abdominal distension to the small and large bowel. GI was consulted and patient successfully underwent colonic decompression with 14 Fr rectal tube placement. Unfortunately, patient was also found to have a large epidural spinal hematoma with cord compression and T6 subacute compression fracture on MRI. He has now been transferred to [facility] on 11/7 for neurosurgical care. General surgery has been consulted regarding his abdominal distension and ileus    During these recent sequence of events, patient and family reported that he has gradual worsening abdominal distension. Patient normally has a normal bowel movement every other day and takes Linzess daily for constipation. Unfortunately, patient has had abnormal, soft BM since his fall and his last BM was last PM. He had a prior similar episode of abdominal distension and constipation back in Feb 2024 when he was hospitalized after a fall as well. Otherwise, patient has no known history of abdominal disease or surgery. Denies of any associated nausea or vomiting after this recent fall. His only main complaints are abdominal distension and back pain at this time.      Past Medical History:        Diagnosis Date    Diabetes mellitus (HCC)     diet controlled    Hyperlipidemia     Indigestion     Insomnia     Macular degeneration     MI, 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    Neuropathy

## 2024-11-09 NOTE — PLAN OF CARE
Called pharmacy and discussed with Samanta Acevedo about dosing of regular ambien compared to extended release, as the pateint's home prescription is extended release, which we do not carry in the hospital. Per Curtis 12.5 mg ER is actually 10 mg IR ambien. This has been ordered. I also discussed with neurosurgeon Dr. Posey who is okay with addition of this medication as well. Pt is currently neuro intact with 5/5 strength. No focal deficits or subjective reports of neurologic complaints.

## 2024-11-09 NOTE — PROGRESS NOTES
Patient refused all his PO medication. Nurse encouraged patient on taking his prescribed medication and he states he only wants his \"sleeping pill\" Critical Neuro NP was made aware. This nurse asked patient if a NG tube could be put in to decompress in ABD and patient refused at this time. This nurse informed the patient the importance of getting  NG to decompress his ABD and he said he would get it tomorrow and only if he could be sedated for the procedure.

## 2024-11-09 NOTE — PROGRESS NOTES
NEUROSURGERY PROGRESS NOTE    11/9/2024 10:03 AM                               Juan Mohamud                      LOS: 2 days   Procedure(s) (LRB):  COLONOSCOPY FLEXIBLE DECOMPRESSION (N/A)    Subjective:  pt still has distended abdomen  Physical Exam:  Patient seen and examined    Vitals:    11/09/24 0907   BP:    Pulse:    Resp: 16   Temp:    SpO2:      GCS:  4 - Opens eyes on own  5 - Alert and oriented  6 - Follows simple motor commands  General: Well developed. Alert and cooperative in no acute distress.   HENT: atraumatic, neck supple  Eyes: Optic discs: Not tested  Pulmonary: unlabored respiratory effort  Cardiovascular:  Warm well perfused. No peripheral edema  Gastrointestinal: abdomen soft, NT, ND    Neurological:  Mental Status: Awake, alert, oriented x 4, speech clear and appropriate  Attention: Intact  Language: No aphasia or dysarthria noted  Sensation: Intact to all extremities to light touch  Coordination: Intact    Cranial Nerves:  II: Visual acuity not tested, denies new visual changes / diplopia  III, IV, VI: PERRL, 3 mm bilaterally, EOMI, no nystagmus noted  V: Facial sensation intact bilaterally to touch  VII: Face symmetric  VIII: Hearing intact bilaterally to spoken voice  IX: Palate movement equal bilaterally  XI: Shoulder shrug equal bilaterally  XII: Tongue midline    Musculoskeletal:   Gait: Not tested   Assist devices: None   Tone: Normal  Motor strength:    Right  Left    Right  Left    Deltoid  5 5  Hip Flex  5 5   Biceps  5 5  Knee Extensors  5 5   Triceps  5 5  Knee Flexors  5 5   Wrist Ext  5 5  Ankle Dorsiflex.  5 5   Wrist Flex  5 5  Ankle Plantarflex.  5 5   Handgrip  5 5  Ext Olaf Longus  5 5   Thumb Ext  5 5           Radiological Findings:  CT ABDOMEN PELVIS W IV CONTRAST Additional Contrast? None  Result Date: 11/3/2024  1.  Acute fracture thinning through the T10 vertebral body, T10-11 intervertebral disc space and T11 vertebral body.  2.  Diffuse distension of small

## 2024-11-09 NOTE — PLAN OF CARE
Brief note:  C/o 1/10 back pain  No other complaints  Exam unchanged   Oriented x 4  5/5 strength   Full sensation.

## 2024-11-09 NOTE — PROGRESS NOTES
Patient in route to 5511 with all belongings. Patient and receiving nurse updated on plan of care, all questions and concerns addressed.

## 2024-11-09 NOTE — PLAN OF CARE
Problem: Safety - Adult  Goal: Free from fall injury  Outcome: Progressing  All fall precautions in place. Bed locked and in lowest position with alarm on. Overbed table and personal belonings within reach. Call light within reach and patient instructed to use call light for assistance. Non-skid socks on.      Problem: Pain  Goal: Verbalizes/displays adequate comfort level or baseline comfort level  Outcome: Progressing   Pain is being managed with scheduled and PRN pain meds per MAR

## 2024-11-09 NOTE — PROGRESS NOTES
4 Eyes Skin Assessment     NAME:  Juan Mohamud  YOB: 1940  MEDICAL RECORD NUMBER:  [id number]    The patient is being assessed for  Transfer to New Unit    I agree that at least one RN has performed a thorough Head to Toe Skin Assessment on the patient. ALL assessment sites listed below have been assessed.      Areas assessed by both nurses:    Head, Face, Ears, Shoulders, Back, Chest, Arms, Elbows, Hands, Sacrum. Buttock, Coccyx, Ischium, Legs. Feet and Heels, and Under Medical Devices         Does the Patient have a Wound? Yes wound(s) were present on assessment. LDA wound assessment was Initiated and completed by RN     Multiple skin tears bilat extremities, uppers and lowers, toes, scattered bruises, redness/stage 1 sacrum  Piero Prevention initiated by RN: No  Wound Care Orders initiated by RN: No    Pressure Injury (Stage 3,4, Unstageable, DTI, NWPT, and Complex wounds) if present, place Wound referral order by RN under : No    New Ostomies, if present place, Ostomy referral order under : No     Nurse 1 eSignature: Electronically signed by Kandy Urbina RN on 11/9/24 at 5:39 AM EST    **SHARE this note so that the co-signing nurse can place an eSignature**    Nurse 2 eSignature: Electronically signed by Cabrera Norris RN on 11/9/24 at 6:56 AM EST

## 2024-11-09 NOTE — PROGRESS NOTES
Pt transferred to 5511 from ICU. Pt A&Ox4. Pt irritable at this time. Abdomen severely distended. C/o back pain, abdominal cramps. NPO with sips for meds. Rectal tube in place, no output. External catheter for urination, voids well. Pt on strict bedrest. Standard safety measures in place. VSS on 2L NC. WCTM.

## 2024-11-09 NOTE — PROGRESS NOTES
Nurse went into room to turn patient and patient refused to be turned and he just want to sleep. Patient also refused a bed bath tonight. Nurse informed patient that he need to be rounded on for Q 4 neuro's and assessments and nurse will have to wake him up to perform the assessments and be reposition to prevent pressure injures and patient states I just want to sleep \" don't wake me up\".

## 2024-11-09 NOTE — PLAN OF CARE
Problem: Chronic Conditions and Co-morbidities  Goal: Patient's chronic conditions and co-morbidity symptoms are monitored and maintained or improved  11/8/2024 2136 by Ruiz Copeland RN  Outcome: Progressing  Flowsheets (Taken 11/8/2024 0800 by Zoya Camacho, RN)  Care Plan - Patient's Chronic Conditions and Co-Morbidity Symptoms are Monitored and Maintained or Improved:   Update acute care plan with appropriate goals if chronic or comorbid symptoms are exacerbated and prevent overall improvement and discharge   Monitor and assess patient's chronic conditions and comorbid symptoms for stability, deterioration, or improvement   Collaborate with multidisciplinary team to address chronic and comorbid conditions and prevent exacerbation or deterioration  11/8/2024 1418 by Zoya Camacho, RN  Outcome: Progressing  Flowsheets (Taken 11/8/2024 0800)  Care Plan - Patient's Chronic Conditions and Co-Morbidity Symptoms are Monitored and Maintained or Improved:   Update acute care plan with appropriate goals if chronic or comorbid symptoms are exacerbated and prevent overall improvement and discharge   Monitor and assess patient's chronic conditions and comorbid symptoms for stability, deterioration, or improvement   Collaborate with multidisciplinary team to address chronic and comorbid conditions and prevent exacerbation or deterioration     Problem: Discharge Planning  Goal: Discharge to home or other facility with appropriate resources  11/8/2024 2136 by Ruiz Copeland RN  Outcome: Progressing  Flowsheets (Taken 11/8/2024 0800 by Zoya Camacho, RN)  Discharge to home or other facility with appropriate resources:   Arrange for needed discharge resources and transportation as appropriate   Identify discharge learning needs (meds, wound care, etc)   Identify barriers to discharge with patient and caregiver   Arrange for interpreters to assist at discharge as needed   Refer to discharge

## 2024-11-09 NOTE — PROGRESS NOTES
Pt is A/Ox4, VSS on 2L NC, and currently bedrest. Pt is voiding via external catheter, rectal tube, and brief, pt is currently NPO to prep for potential surgery, and currently denies pain. All safety precautions in place, call light within reach, plan of care continues.

## 2024-11-09 NOTE — PROGRESS NOTES
This nurse and charge nurse went into patient room to tell patient the importance of getting a NG tube placed. Patient was informed about the complications of not getting abdominal decompression and that can lead to worsening complications to include a SBO. Patient verbally refused NG at this time and wants to be \"knocked out\" in order for NG tube to be placed. Patient was again informed on the importance of an NG and still he refused.

## 2024-11-09 NOTE — PROGRESS NOTES
Progress Note    Patient Juan Mohamud  MRN: [id number]  YOB: 1940 Age: 84 y.o. Sex: male  Room: [address] [place] [address]       Admitting Physician: Royal Cotter MD   Date of Admission: 11/7/2024 12:52 AM   Primary Care Physician: Janet Garcia MD     Subjective:  Juan Mohamud was seen and examined. We are following for ileus.  Complains of having abdominal distention and pain    Objective:  Vital Signs:   Vitals:    11/09/24 0907   BP:    Pulse:    Resp: 16   Temp:    SpO2:          Physical Exam:  Constitutional: Alert and oriented x 4. No acute distress.   HEENT: Sclera anicteric, mucosal membranes moist  Cardiovascular: Regular rate and rhythm.  No murmurs.  Respiratory: Respirations nonlabored, no crepitus  GI: Abdomen moderate distetion, soft, and nontender.  Normal active bowel sounds.  No masses palpable.   Rectal: Deferred  Musculoskeletal:  No pitting edema of the lower legs.  Neurological: Gross memory appears intact.       Intake/Output:    Intake/Output Summary (Last 24 hours) at 11/9/2024 1030  Last data filed at 11/9/2024 0427  Gross per 24 hour   Intake 1392.98 ml   Output 975 ml   Net 417.98 ml        Current Medications:  Current Facility-Administered Medications   Medication Dose Route Frequency Provider Last Rate Last Admin    HYDROmorphone HCl PF (DILAUDID) injection 0.5 mg  0.5 mg IntraVENous Once Royal Cotter MD        HYDROmorphone HCl PF (DILAUDID) injection 0.5 mg  0.5 mg IntraVENous Q3H PRN Paxton Tapia MD   0.5 mg at 11/09/24 0907    Or    HYDROmorphone HCl PF (DILAUDID) injection 1 mg  1 mg IntraVENous Q3H PRN Paxton Tapia MD        0.9 % sodium chloride infusion   IntraVENous Continuous Paxton Tapia MD 75 mL/hr at 11/09/24 0912 New Bag at 11/09/24 0912    [START ON 11/10/2024] naloxegol (MOVANTIK) tablet 25 mg  25 mg Oral QAM AC Paxton Tapia MD        methocarbamol (ROBAXIN) tablet 1,000 mg  1,000 mg Oral 4x Daily PRN Jay Chu DO         metoclopramide (REGLAN) injection 10 mg  10 mg IntraVENous Q6H Jay Chu DO   10 mg at 11/09/24 0509    balsum peru-castor oil (VENELEX) ointment   Topical BID Jay Chu DO   Given at 11/08/24 1951    zolpidem (AMBIEN) tablet 10 mg  10 mg Oral Nightly PRN Katiuska Dixon APRN - CNP   10 mg at 11/08/24 2200    sodium chloride flush 0.9 % injection 5-40 mL  5-40 mL IntraVENous PRN Jay Chu DO   10 mL at 11/08/24 0910    0.9 % sodium chloride infusion   IntraVENous PRN Jay Chu DO        ondansetron (ZOFRAN-ODT) disintegrating tablet 4 mg  4 mg Oral Q8H PRN Jay Chu DO        Or    ondansetron (ZOFRAN) injection 4 mg  4 mg IntraVENous Q6H PRN Jay Chu DO   4 mg at 11/09/24 0225    acetaminophen (TYLENOL) tablet 650 mg  650 mg Oral Q6H PRN Jay Chu DO   650 mg at 11/09/24 0510    Or    acetaminophen (TYLENOL) suppository 650 mg  650 mg Rectal Q6H PRN Jay Chu DO        linaclotide (LINZESS) capsule 290 mcg  290 mcg Oral QAM AC Jay Chu DO   290 mcg at 11/07/24 0719    insulin lispro (HUMALOG,ADMELOG) injection vial 0-4 Units  0-4 Units SubCUTAneous Q4H Jay Chu DO        gabapentin (NEURONTIN) capsule 600 mg  600 mg Oral TID Jay Chu DO   600 mg at 11/09/24 0807    buPROPion (WELLBUTRIN XL) extended release tablet 300 mg  300 mg Oral Daily Jay Chu DO   300 mg at 11/09/24 0807    atorvastatin (LIPITOR) tablet 10 mg  10 mg Oral Nightly Jay Chu DO   10 mg at 11/07/24 2020    amiodarone (CORDARONE) tablet 200 mg  200 mg Oral Daily Jay Chu DO   200 mg at 11/09/24 0807    [Held by provider] metoprolol succinate (TOPROL XL) extended release tablet 25 mg  25 mg Oral Daily Omar Victor MD        pantoprazole (PROTONIX) tablet 40 mg  40 mg Oral Jay Antoine DO   40 mg at

## 2024-11-10 ENCOUNTER — APPOINTMENT (OUTPATIENT)
Dept: GENERAL RADIOLOGY | Age: 84
DRG: 447 | End: 2024-11-10
Attending: INTERNAL MEDICINE
Payer: MEDICARE

## 2024-11-10 PROBLEM — K59.81 COLONIC PSEUDOOBSTRUCTION: Status: ACTIVE | Noted: 2024-02-29

## 2024-11-10 LAB
ANION GAP SERPL CALCULATED.3IONS-SCNC: 14 MMOL/L (ref 3–16)
ANION GAP SERPL CALCULATED.3IONS-SCNC: 14 MMOL/L (ref 3–16)
BACTERIA URNS QL MICRO: ABNORMAL /HPF
BASOPHILS # BLD: 0 K/UL (ref 0–0.2)
BASOPHILS NFR BLD: 0.2 %
BILIRUB UR QL STRIP.AUTO: ABNORMAL
BUN SERPL-MCNC: 40 MG/DL (ref 7–20)
BUN SERPL-MCNC: 40 MG/DL (ref 7–20)
CALCIUM SERPL-MCNC: 7.7 MG/DL (ref 8.3–10.6)
CALCIUM SERPL-MCNC: 7.8 MG/DL (ref 8.3–10.6)
CHLORIDE SERPL-SCNC: 102 MMOL/L (ref 99–110)
CHLORIDE SERPL-SCNC: 104 MMOL/L (ref 99–110)
CLARITY UR: CLEAR
CO2 SERPL-SCNC: 19 MMOL/L (ref 21–32)
CO2 SERPL-SCNC: 19 MMOL/L (ref 21–32)
COLOR UR: YELLOW
CREAT SERPL-MCNC: 1.2 MG/DL (ref 0.8–1.3)
CREAT SERPL-MCNC: 1.3 MG/DL (ref 0.8–1.3)
DEPRECATED RDW RBC AUTO: 16.9 % (ref 12.4–15.4)
EKG ATRIAL RATE: 100 BPM
EKG DIAGNOSIS: NORMAL
EKG Q-T INTERVAL: 410 MS
EKG QRS DURATION: 154 MS
EKG QTC CALCULATION (BAZETT): 569 MS
EKG R AXIS: -29 DEGREES
EKG T AXIS: 121 DEGREES
EKG VENTRICULAR RATE: 116 BPM
EOSINOPHIL # BLD: 0.1 K/UL (ref 0–0.6)
EOSINOPHIL NFR BLD: 0.8 %
EPI CELLS #/AREA URNS HPF: ABNORMAL /HPF (ref 0–5)
GFR SERPLBLD CREATININE-BSD FMLA CKD-EPI: 54 ML/MIN/{1.73_M2}
GFR SERPLBLD CREATININE-BSD FMLA CKD-EPI: 60 ML/MIN/{1.73_M2}
GLUCOSE BLD-MCNC: 104 MG/DL (ref 70–99)
GLUCOSE BLD-MCNC: 107 MG/DL (ref 70–99)
GLUCOSE BLD-MCNC: 116 MG/DL (ref 70–99)
GLUCOSE BLD-MCNC: 119 MG/DL (ref 70–99)
GLUCOSE BLD-MCNC: 122 MG/DL (ref 70–99)
GLUCOSE BLD-MCNC: 74 MG/DL (ref 70–99)
GLUCOSE BLD-MCNC: 91 MG/DL (ref 70–99)
GLUCOSE BLD-MCNC: 91 MG/DL (ref 70–99)
GLUCOSE BLD-MCNC: 96 MG/DL (ref 70–99)
GLUCOSE SERPL-MCNC: 104 MG/DL (ref 70–99)
GLUCOSE SERPL-MCNC: 119 MG/DL (ref 70–99)
GLUCOSE UR STRIP.AUTO-MCNC: 100 MG/DL
HCT VFR BLD AUTO: 41.6 % (ref 40.5–52.5)
HGB BLD-MCNC: 13.6 G/DL (ref 13.5–17.5)
HGB UR QL STRIP.AUTO: ABNORMAL
HYALINE CASTS #/AREA URNS LPF: >40 /LPF (ref 0–2)
KETONES UR STRIP.AUTO-MCNC: ABNORMAL MG/DL
LEUKOCYTE ESTERASE UR QL STRIP.AUTO: NEGATIVE
LYMPHOCYTES # BLD: 1 K/UL (ref 1–5.1)
LYMPHOCYTES NFR BLD: 8.6 %
MAGNESIUM SERPL-MCNC: 2.07 MG/DL (ref 1.8–2.4)
MCH RBC QN AUTO: 29.3 PG (ref 26–34)
MCHC RBC AUTO-ENTMCNC: 32.7 G/DL (ref 31–36)
MCV RBC AUTO: 89.7 FL (ref 80–100)
MONOCYTES # BLD: 1.2 K/UL (ref 0–1.3)
MONOCYTES NFR BLD: 10.6 %
MUCOUS THREADS #/AREA URNS LPF: ABNORMAL /LPF
NEUTROPHILS # BLD: 9.1 K/UL (ref 1.7–7.7)
NEUTROPHILS NFR BLD: 79.8 %
NITRITE UR QL STRIP.AUTO: NEGATIVE
OSMOLALITY UR: 664 MOSM/KG (ref 390–1070)
PERFORMED ON: ABNORMAL
PERFORMED ON: NORMAL
PH UR STRIP.AUTO: 6 [PH] (ref 5–8)
PLATELET # BLD AUTO: 248 K/UL (ref 135–450)
PMV BLD AUTO: 7.4 FL (ref 5–10.5)
POTASSIUM SERPL-SCNC: 3.4 MMOL/L (ref 3.5–5.1)
POTASSIUM SERPL-SCNC: 3.4 MMOL/L (ref 3.5–5.1)
PROT UR STRIP.AUTO-MCNC: 30 MG/DL
RBC # BLD AUTO: 4.63 M/UL (ref 4.2–5.9)
RBC #/AREA URNS HPF: ABNORMAL /HPF (ref 0–4)
SODIUM SERPL-SCNC: 135 MMOL/L (ref 136–145)
SODIUM SERPL-SCNC: 137 MMOL/L (ref 136–145)
SODIUM UR-SCNC: <20 MMOL/L
SP GR UR STRIP.AUTO: >=1.03 (ref 1–1.03)
UA COMPLETE W REFLEX CULTURE PNL UR: ABNORMAL
UA DIPSTICK W REFLEX MICRO PNL UR: YES
URN SPEC COLLECT METH UR: ABNORMAL
UROBILINOGEN UR STRIP-ACNC: 1 E.U./DL
WBC # BLD AUTO: 11.4 K/UL (ref 4–11)
WBC #/AREA URNS HPF: ABNORMAL /HPF (ref 0–5)

## 2024-11-10 PROCEDURE — 6370000000 HC RX 637 (ALT 250 FOR IP)

## 2024-11-10 PROCEDURE — 84300 ASSAY OF URINE SODIUM: CPT

## 2024-11-10 PROCEDURE — 0DH67UZ INSERTION OF FEEDING DEVICE INTO STOMACH, VIA NATURAL OR ARTIFICIAL OPENING: ICD-10-PCS | Performed by: INTERNAL MEDICINE

## 2024-11-10 PROCEDURE — 81001 URINALYSIS AUTO W/SCOPE: CPT

## 2024-11-10 PROCEDURE — 6370000000 HC RX 637 (ALT 250 FOR IP): Performed by: SURGERY

## 2024-11-10 PROCEDURE — 6360000002 HC RX W HCPCS: Performed by: SURGERY

## 2024-11-10 PROCEDURE — 6360000002 HC RX W HCPCS: Performed by: STUDENT IN AN ORGANIZED HEALTH CARE EDUCATION/TRAINING PROGRAM

## 2024-11-10 PROCEDURE — 2580000003 HC RX 258

## 2024-11-10 PROCEDURE — 93010 ELECTROCARDIOGRAM REPORT: CPT | Performed by: INTERNAL MEDICINE

## 2024-11-10 PROCEDURE — 83935 ASSAY OF URINE OSMOLALITY: CPT

## 2024-11-10 PROCEDURE — 99232 SBSQ HOSP IP/OBS MODERATE 35: CPT | Performed by: SURGERY

## 2024-11-10 PROCEDURE — 6360000002 HC RX W HCPCS

## 2024-11-10 PROCEDURE — 36415 COLL VENOUS BLD VENIPUNCTURE: CPT

## 2024-11-10 PROCEDURE — 2580000003 HC RX 258: Performed by: NURSE PRACTITIONER

## 2024-11-10 PROCEDURE — 83735 ASSAY OF MAGNESIUM: CPT

## 2024-11-10 PROCEDURE — 85025 COMPLETE CBC W/AUTO DIFF WBC: CPT

## 2024-11-10 PROCEDURE — 99232 SBSQ HOSP IP/OBS MODERATE 35: CPT | Performed by: NURSE PRACTITIONER

## 2024-11-10 PROCEDURE — 82570 ASSAY OF URINE CREATININE: CPT

## 2024-11-10 PROCEDURE — 74018 RADEX ABDOMEN 1 VIEW: CPT

## 2024-11-10 PROCEDURE — 80048 BASIC METABOLIC PNL TOTAL CA: CPT

## 2024-11-10 PROCEDURE — 2060000000 HC ICU INTERMEDIATE R&B

## 2024-11-10 RX ORDER — POTASSIUM CHLORIDE 7.45 MG/ML
10 INJECTION INTRAVENOUS
Status: COMPLETED | OUTPATIENT
Start: 2024-11-10 | End: 2024-11-10

## 2024-11-10 RX ORDER — SODIUM CHLORIDE 9 MG/ML
INJECTION, SOLUTION INTRAVENOUS CONTINUOUS
Status: ACTIVE | OUTPATIENT
Start: 2024-11-10 | End: 2024-11-11

## 2024-11-10 RX ORDER — LIDOCAINE HYDROCHLORIDE 20 MG/ML
JELLY TOPICAL PRN
Status: DISCONTINUED | OUTPATIENT
Start: 2024-11-10 | End: 2024-11-15 | Stop reason: ALTCHOICE

## 2024-11-10 RX ORDER — METOPROLOL TARTRATE 1 MG/ML
5 INJECTION, SOLUTION INTRAVENOUS EVERY 6 HOURS
Status: DISCONTINUED | OUTPATIENT
Start: 2024-11-10 | End: 2024-11-15

## 2024-11-10 RX ORDER — 0.9 % SODIUM CHLORIDE 0.9 %
500 INTRAVENOUS SOLUTION INTRAVENOUS ONCE
Status: COMPLETED | OUTPATIENT
Start: 2024-11-10 | End: 2024-11-10

## 2024-11-10 RX ORDER — PANTOPRAZOLE SODIUM 40 MG/10ML
40 INJECTION, POWDER, LYOPHILIZED, FOR SOLUTION INTRAVENOUS DAILY
Status: DISCONTINUED | OUTPATIENT
Start: 2024-11-11 | End: 2024-12-11 | Stop reason: HOSPADM

## 2024-11-10 RX ADMIN — ATORVASTATIN CALCIUM 10 MG: 10 TABLET, FILM COATED ORAL at 20:45

## 2024-11-10 RX ADMIN — POTASSIUM CHLORIDE 10 MEQ: 10 INJECTION, SOLUTION INTRAVENOUS at 10:13

## 2024-11-10 RX ADMIN — BUPROPION HYDROCHLORIDE 300 MG: 150 TABLET, EXTENDED RELEASE ORAL at 09:28

## 2024-11-10 RX ADMIN — TAMSULOSIN HYDROCHLORIDE 0.4 MG: 0.4 CAPSULE ORAL at 09:28

## 2024-11-10 RX ADMIN — ACETAMINOPHEN 1000 MG: 500 TABLET, FILM COATED ORAL at 09:28

## 2024-11-10 RX ADMIN — METOCLOPRAMIDE HYDROCHLORIDE 10 MG: 5 INJECTION, SOLUTION INTRAMUSCULAR; INTRAVENOUS at 11:16

## 2024-11-10 RX ADMIN — SODIUM CHLORIDE: 9 INJECTION, SOLUTION INTRAVENOUS at 16:13

## 2024-11-10 RX ADMIN — PANTOPRAZOLE SODIUM 40 MG: 40 TABLET, DELAYED RELEASE ORAL at 05:19

## 2024-11-10 RX ADMIN — POTASSIUM CHLORIDE 10 MEQ: 10 INJECTION, SOLUTION INTRAVENOUS at 11:19

## 2024-11-10 RX ADMIN — METOCLOPRAMIDE HYDROCHLORIDE 10 MG: 5 INJECTION, SOLUTION INTRAMUSCULAR; INTRAVENOUS at 23:44

## 2024-11-10 RX ADMIN — SODIUM CHLORIDE 500 ML: 9 INJECTION, SOLUTION INTRAVENOUS at 02:26

## 2024-11-10 RX ADMIN — POTASSIUM CHLORIDE 10 MEQ: 10 INJECTION, SOLUTION INTRAVENOUS at 13:26

## 2024-11-10 RX ADMIN — METOCLOPRAMIDE HYDROCHLORIDE 10 MG: 5 INJECTION, SOLUTION INTRAMUSCULAR; INTRAVENOUS at 05:20

## 2024-11-10 RX ADMIN — POTASSIUM CHLORIDE 10 MEQ: 10 INJECTION, SOLUTION INTRAVENOUS at 12:24

## 2024-11-10 RX ADMIN — GABAPENTIN 600 MG: 300 CAPSULE ORAL at 20:45

## 2024-11-10 RX ADMIN — AMIODARONE HYDROCHLORIDE 200 MG: 200 TABLET ORAL at 09:28

## 2024-11-10 RX ADMIN — NALOXEGOL OXALATE 25 MG: 25 TABLET, FILM COATED ORAL at 05:19

## 2024-11-10 RX ADMIN — ZOLPIDEM TARTRATE 10 MG: 5 TABLET, COATED ORAL at 20:45

## 2024-11-10 RX ADMIN — SODIUM CHLORIDE, PRESERVATIVE FREE 10 ML: 5 INJECTION INTRAVENOUS at 20:45

## 2024-11-10 RX ADMIN — SENNOSIDES AND DOCUSATE SODIUM 2 TABLET: 50; 8.6 TABLET ORAL at 09:28

## 2024-11-10 RX ADMIN — SENNOSIDES AND DOCUSATE SODIUM 2 TABLET: 50; 8.6 TABLET ORAL at 20:44

## 2024-11-10 RX ADMIN — Medication: at 09:46

## 2024-11-10 RX ADMIN — HYDROMORPHONE HYDROCHLORIDE 0.5 MG: 1 INJECTION, SOLUTION INTRAMUSCULAR; INTRAVENOUS; SUBCUTANEOUS at 11:59

## 2024-11-10 RX ADMIN — HYDROMORPHONE HYDROCHLORIDE 0.5 MG: 1 INJECTION, SOLUTION INTRAMUSCULAR; INTRAVENOUS; SUBCUTANEOUS at 17:44

## 2024-11-10 RX ADMIN — METOCLOPRAMIDE HYDROCHLORIDE 10 MG: 5 INJECTION, SOLUTION INTRAMUSCULAR; INTRAVENOUS at 17:39

## 2024-11-10 RX ADMIN — ACETAMINOPHEN 1000 MG: 500 TABLET, FILM COATED ORAL at 20:45

## 2024-11-10 ASSESSMENT — PAIN DESCRIPTION - FREQUENCY
FREQUENCY: INTERMITTENT
FREQUENCY: INTERMITTENT

## 2024-11-10 ASSESSMENT — PAIN SCALES - GENERAL
PAINLEVEL_OUTOF10: 3
PAINLEVEL_OUTOF10: 8
PAINLEVEL_OUTOF10: 2
PAINLEVEL_OUTOF10: 4
PAINLEVEL_OUTOF10: 8

## 2024-11-10 ASSESSMENT — PAIN DESCRIPTION - PAIN TYPE
TYPE: ACUTE PAIN
TYPE: ACUTE PAIN

## 2024-11-10 ASSESSMENT — PAIN - FUNCTIONAL ASSESSMENT
PAIN_FUNCTIONAL_ASSESSMENT: ACTIVITIES ARE NOT PREVENTED
PAIN_FUNCTIONAL_ASSESSMENT: ACTIVITIES ARE NOT PREVENTED

## 2024-11-10 ASSESSMENT — PAIN DESCRIPTION - ONSET
ONSET: GRADUAL
ONSET: GRADUAL

## 2024-11-10 ASSESSMENT — PAIN DESCRIPTION - DESCRIPTORS
DESCRIPTORS: DISCOMFORT
DESCRIPTORS: ACHING;DISCOMFORT

## 2024-11-10 ASSESSMENT — PAIN DESCRIPTION - LOCATION
LOCATION: SACRUM
LOCATION: SACRUM

## 2024-11-10 ASSESSMENT — PAIN DESCRIPTION - ORIENTATION
ORIENTATION: POSTERIOR
ORIENTATION: POSTERIOR

## 2024-11-10 ASSESSMENT — PAIN SCALES - WONG BAKER: WONGBAKER_NUMERICALRESPONSE: HURTS A LITTLE BIT

## 2024-11-10 NOTE — PROGRESS NOTES
Bariatric Surgery   Daily Progress Note  Patient: Juan Mohamud      CC:  severe ileus/colonic pseudo-obstruction     SUBJECTIVE:   NAEON. Patient reports no abdominal pain, no n/v. Agreed to have an NGT placed today     ROS:   A 14 point review of systems was conducted, significant findings as noted above. All other systems negative.    OBJECTIVE:    PHYSICAL EXAM:    Vitals:    11/09/24 2319 11/10/24 0000 11/10/24 0414 11/10/24 0530   BP: (!) 97/58  98/60    Pulse: (!) 107  (!) 114    Resp: 16  16    Temp: 97.8 °F (36.6 °C)  97.9 °F (36.6 °C)    TempSrc: Axillary  Oral    SpO2: 95%  94%    Weight:  92 kg (202 lb 13.2 oz)  92.4 kg (203 lb 11.3 oz)   Height:           General appearance: alert, no acute distress  Eyes: no scleral icterus  Neck: trachea midline, neck supple  Respiratory: Normal effort with no accessory muscle use on 2L NC  Cardiovascular: RRR  Abdomen: soft, non-tender, distended, tympanic to percussion, no guarding, no rigidity  Skin: warm and dry, no rashes  Extremities: no edema, no cyanosis, moves all extremities motor 5/5  Neuro: A&Ox3, no focal deficits    ASSESSMENT & PLAN:   This is a 84 y.o. male with Hx of Hx of type II DM, degenerative lumbar disease, arrhythmia s/p AICD, diastolic CHF. He presented with a T11 burst fracture and epidural hematoma requiring neurosurgical fixation. Since fall he has had increased abdominal distention and underwent decompressive colonoscopy on 11/8. Surgical services consulted for ongoing ileus/colonic pseudo obstruction.       - Agree with cardiology and medicine, would attempt neostigmine         - Agree with GI  - continue rectal tube, IVF, NPO ok for meds  - Decompressive scopes as indicated  - recommend correction of electrolyte abnormalities  - NGT placement today, patient is amenable  - recommend restart home Linzess when appropriate  - serial abdominal exams  - medical management per primary     Naty Deshpande MD  PGY1, General  Surgery  11/10/24  7:42 AM  [phone]

## 2024-11-10 NOTE — SIGNIFICANT EVENT
APRN notified by RN of patient with decreased urine output.  Has had 200 mL out thus far this shift.  RN describes this as \"C[employer]a urine\".  She further defines this as dark brown in color.  Bladder scan with 20 mL.  -Blood pressure soft with systolic in the 90s although MAP is maintained in the upper 60s to low 70s  -Patient with sinus tachycardia in the upper 1 teens  -Patient is currently n.p.o. with IV fluids.  Total intake not recorded over the last 24 hours so it shows net -300 but I do not believe this is accurate  -Metabolic panel revealed BOSTON with creatinine 1.4, up from 0.7  -Initially treated with 500 mL bolus.  Patient does have history of CHF however, responded favorably to the initial bolus with heart rate decreased to 107.  Lungs are clear per RN report  -Will repeat 500 mL bolus  -Strict intake and output  -Repeat labs in a.m.  -Further pending reeval by attending in a.m.    TIP Cunningham - NP

## 2024-11-10 NOTE — PROGRESS NOTES
Pt is A/Ox4, VSS on 2L NC, and bedrest. Pt is voiding via external catheter and brief, NPO, and denies pain at this time. NG tube placed today connected to suction. All safety precautions in place, call light within reach, plan of care continues.

## 2024-11-10 NOTE — PLAN OF CARE
Problem: Chronic Conditions and Co-morbidities  Goal: Patient's chronic conditions and co-morbidity symptoms are monitored and maintained or improved  Outcome: Progressing     Problem: Safety - Adult  Goal: Free from fall injury  11/9/2024 2247 by Kandy Urbina RN  Outcome: Progressing  11/9/2024 1517 by Stacy Vasquez RN  Outcome: Progressing     Problem: Skin/Tissue Integrity  Goal: Absence of new skin breakdown  Description: 1.  Monitor for areas of redness and/or skin breakdown  2.  Assess vascular access sites hourly  3.  Every 4-6 hours minimum:  Change oxygen saturation probe site  4.  Every 4-6 hours:  If on nasal continuous positive airway pressure, respiratory therapy assess nares and determine need for appliance change or resting period.  Outcome: Progressing  Note: Pt refuses turns most of the time. Rectal tube in place. External catheter in place. Mepilex and venelex to sacrum. Multiple skin tears and thin, fragile skin. Dressings intact at this time. No new skin breakdown noted.      Problem: Pain  Goal: Verbalizes/displays adequate comfort level or baseline comfort level  11/9/2024 2247 by Kandy Urbina RN  Outcome: Progressing  11/9/2024 1517 by Stacy Vasquez RN  Outcome: Progressing

## 2024-11-10 NOTE — PROGRESS NOTES
Pt with dark brown, coca cola appearing urine. Minimal UO, about 100cc since the start of this shift, averaging <15cc UO/hr. Soft BP, tachycardia to 1-teens(hx of this at times). No edema present in extremities, however multiple skin tears are weeping. 500cc bolus given x1 per orders. EKG completed and labs sent. Lungs are clear, confirmed x2 Rns prior to second 500cc bolus. Second bolus started at this time. Neuro exam remains unchanged. Weight obtained via bedscale at this time 92kg. WCTM.   VS as below s/p 500cc bolus x1  Vitals:    11/09/24 2319   BP: (!) 97/58   Pulse: (!) 107   Resp: 16   Temp: 97.8 °F (36.6 °C)   SpO2: 95%

## 2024-11-10 NOTE — PLAN OF CARE
Problem: Safety - Adult  Goal: Free from fall injury  11/10/2024 0724 by Stacy Vasquez, RN  Outcome: Progressing  All fall precautions in place. Bed locked and in lowest position with alarm on. Overbed table and personal belonings within reach. Call light within reach and patient instructed to use call light for assistance. Non-skid socks on.      Problem: Pain  Goal: Verbalizes/displays adequate comfort level or baseline comfort level  11/10/2024 0724 by Stacy Vasquez, RN  Outcome: Progressing   Pt currently denying pain but has PRN pain meds in MAR

## 2024-11-10 NOTE — PROGRESS NOTES
Pt has only voided 30cc dark brown urine since 0200, however bladder scan does yield 192cc retained at this time. S/p 500cc bolusx2. UA sent. [facility].

## 2024-11-10 NOTE — PROGRESS NOTES
[facility] Medicine Progress Note  V 10.25      Date of Admission: 11/7/2024    Hospital Day: 4      Chief Admission Complaint:  fall, back pain     Subjective:  Patient seen and examined at bedside. Had NG tube placed today. Patient report discomfort related to it. Denies any abdominal pain, remains distended today. Low urine output throughout yesterday evening into today.     Presenting Admission History:       Mr. Juan Mohamud is a 84 y.o. male with a medical hx significant for type II DM, degenerative lumbar disease, arrhythmia s/p AICD, diastolic CHF, and macular degeneration otherwise as listed in the MHx table below, who presented from home to the [facility] ED on 11/3/24 after a fall.     Per chart review, patient fell after feeling like his \"knees were giving out.\" No reported symptoms prior to this fall, patient landed on his left side and hit the back of his head but did not lose consciousness at this time.      Upon arrival to the [facility] ED, patient was alert, oriented and reported with GCS of 15 and NIH of 0. While in the ED patient had increasing generalized weakness and difficulty getting up from a sitting position. Decision was made to admit patient as they did not feel safe discharging him home given his inability to stand unassisted.      Fall was thought to be mechanical and secondary to generalized weakness due to poor nutrition. CT abd pelv performed due to complaints of abdominal pain, distension, and back pain upon arrival to the floor. Imaging revealed acute T11 vertebral fracture extending into the T10-T11 disc space as well as diffuse distention of small and large bowel compatible with ileus and bilateral pleural effusions.     GI consulted for ileus, and patient underwent colonic decompression     MRI thoracic spine then demonstrated large epidural spinal hematoma with cord compression  and additional T6 subacute compression fracture along with bilateral pleural effusions.      Patient was transferred to [facility] on 11/7 for neurosurgical care.     Assessment/Plan:      Current Principal Problem:  Closed unstable burst fracture of eleventh thoracic vertebra (HCC)    Plan:    T10 vertebral body unstable fracture  T11 nondisplaced spinous process fracture  T6 vertebral body subacute compression fracture  Epidural spinal hematoma   Spinal cord compression at T11  - NSGY following  - NCC following  - bed rest  - TLS spine precautions  - q4h neuro checks  - plan for OR early next week assuming GI distension has resolved    [place] syndrome  - severe small and large bowel distention  - s/p 2 colonoscopic decompressions with decompression tube placed in descending colon  - distention seems more proximal however, significant small bowel component  - continue Naloxegol. Restarted home Linzess  - Received 2mg neostigmine IV once yesterday following cardiac clearance, monitored patient on telemetry following administration  - no need for ICU setting, patient has a pacemaker and 5 tower is PCU level of care  - GI and GS consulted   - Patient now amenable to having NG tube placed today    [place]  - Cr 1 on admission, baseline ~0.7-0.8. Up to 1.4 yesterday. Was on continuous IV fluids x24 hours from yesterday. Slight improvement today to 1.3. BUN 40. Possibly prerenal given patient has been NPO for 3 days related to his bowel obstructions. He did receive IV contrast on 11/3/2024  - Resume IV fluids at this time. Monitor on daily labs. Ordered urine osm, Na, Cr. Repeat BMP this afternoon.     Hypokalemia  - K 3.4 this morning. IV repletion ordered. Monitor on daily labs.     Consults:      IP CONSULT TO NEUROSURGERY  IP CONSULT TO NEUROCRITICAL CARE  IP CONSULT TO GI  IP CONSULT TO CARDIOLOGY  IP CONSULT TO GENERAL SURGERY  IP CONSULT TO CARDIOLOGY        --------------------------------------------------      Medications:        Infusion Medications    sodium chloride      dextrose       Scheduled

## 2024-11-10 NOTE — PLAN OF CARE
NGT placement      Procedure explained to patient. Consent obtained and questions answered to patient satisfaction. An 18-Iranian double lumen NGT was introduced to the patients L nares. The patient was instructed to sip water while the NGT was advanced to 65 cm. The NGT was connected to LWCS and an immediate return of 250 ml was achieved. The NGT was secured to the patients nose with a bridle and their gown with tape and a safety pin. Due to NPO status and gastric output, will start patient on IVF. The Patient tolerated the procedure well with no immediate complications. Please monitor with strict I/Os.      Olimpia Hurd DO, MSMEd  PGY-3, General Surgery  11/10/24  12:09 PM  [place]

## 2024-11-10 NOTE — PROGRESS NOTES
NEUROSURGERY PROGRESS NOTE    11/10/2024 7:55 AM                               Juan CAMERON Cade                      LOS: 3 days   Procedure(s) (LRB):  COLONOSCOPY FLEXIBLE DECOMPRESSION (N/A)    Subjective:  pt still has distended abdomen surgery placing NG tube today  Physical Exam:  Patient seen and examined    Vitals:    11/10/24 0745   BP: 99/63   Pulse: (!) 112   Resp: 16   Temp: 97.5 °F (36.4 °C)   SpO2: 94%     GCS:  4 - Opens eyes on own  5 - Alert and oriented  6 - Follows simple motor commands  General: Well developed. Alert and cooperative in no acute distress.   HENT: atraumatic, neck supple  Eyes: Optic discs: Not tested  Pulmonary: unlabored respiratory effort  Cardiovascular:  Warm well perfused. No peripheral edema  Gastrointestinal: abdomen soft, NT, ND    Neurological:  Mental Status: Awake, alert, oriented x 4, speech clear and appropriate  Attention: Intact  Language: No aphasia or dysarthria noted  Sensation: Intact to all extremities to light touch  Coordination: Intact    Cranial Nerves:  II: Visual acuity not tested, denies new visual changes / diplopia  III, IV, VI: PERRL, 3 mm bilaterally, EOMI, no nystagmus noted  V: Facial sensation intact bilaterally to touch  VII: Face symmetric  VIII: Hearing intact bilaterally to spoken voice  IX: Palate movement equal bilaterally  XI: Shoulder shrug equal bilaterally  XII: Tongue midline    Musculoskeletal:   Gait: Not tested   Assist devices: None   Tone: Normal  Motor strength:    Right  Left    Right  Left    Deltoid  5 5  Hip Flex  5 5   Biceps  5 5  Knee Extensors  5 5   Triceps  5 5  Knee Flexors  5 5   Wrist Ext  5 5  Ankle Dorsiflex.  5 5   Wrist Flex  5 5  Ankle Plantarflex.  5 5   Handgrip  5 5  Ext Olaf Longus  5 5   Thumb Ext  5 5           Radiological Findings:  CT ABDOMEN PELVIS W IV CONTRAST Additional Contrast? None  Result Date: 11/3/2024  1.  Acute fracture thinning through the T10 vertebral body, T10-11 intervertebral disc

## 2024-11-10 NOTE — PROGRESS NOTES
Progress Note    Patient Juan Mohamud  MRN: [id number]  YOB: 1940 Age: 84 y.o. Sex: male  Room: [address] [place] [address]       Admitting Physician: Royal Cotter MD   Date of Admission: 11/7/2024 12:52 AM   Primary Care Physician: Janet Garcia MD     Subjective:  Juan Mohamud was seen and examined. We are following for ileus.  Patient received 1 dose of neostigmine 2 mg IV yesterday  Today he feels slightly better.  Abdomen is distended but does not have any pain      Objective:  Vital Signs:   Vitals:    11/10/24 0745   BP: 99/63   Pulse: (!) 112   Resp: 16   Temp: 97.5 °F (36.4 °C)   SpO2: 94%         Physical Exam:  Constitutional: Alert and oriented x 4. No acute distress.   HEENT: Sclera anicteric, mucosal membranes moist  Cardiovascular: Regular rate and rhythm.  No murmurs.  Respiratory: Respirations nonlabored, no crepitus  GI: Abdomen moderate distetion, soft, and nontender.  Normal active bowel sounds.  No masses palpable.   Rectal: Deferred  Musculoskeletal:  No pitting edema of the lower legs.  Neurological: Gross memory appears intact.       Intake/Output:    Intake/Output Summary (Last 24 hours) at 11/10/2024 1109  Last data filed at 11/10/2024 0533  Gross per 24 hour   Intake 1500 ml   Output 330 ml   Net 1170 ml        Current Medications:  Current Facility-Administered Medications   Medication Dose Route Frequency Provider Last Rate Last Admin    butamben-tetracaine-benzocaine (CETACAINE) spray 1 spray  1 spray Topical Once Beloy, Olimpia Bigornia, DO        lidocaine (XYLOCAINE) 2 % uro-jet   Topical PRN Beloy, Olimpia Bigornia, DO        potassium chloride 10 mEq/100 mL IVPB (Peripheral Line)  10 mEq IntraVENous Q1H Naty Deshpande  mL/hr at 11/10/24 1013 10 mEq at 11/10/24 1013    0.9 % sodium chloride infusion   IntraVENous Continuous Brine, Baron, DO 75 mL/hr at 11/10/24 1032 Restarted at 11/10/24 1032    HYDROmorphone HCl PF (DILAUDID) injection 0.5

## 2024-11-11 ENCOUNTER — APPOINTMENT (OUTPATIENT)
Dept: GENERAL RADIOLOGY | Age: 84
DRG: 447 | End: 2024-11-11
Attending: INTERNAL MEDICINE
Payer: MEDICARE

## 2024-11-11 ENCOUNTER — ANESTHESIA EVENT (OUTPATIENT)
Dept: OPERATING ROOM | Age: 84
End: 2024-11-11
Payer: MEDICARE

## 2024-11-11 ENCOUNTER — APPOINTMENT (OUTPATIENT)
Dept: CT IMAGING | Age: 84
DRG: 447 | End: 2024-11-11
Attending: INTERNAL MEDICINE
Payer: MEDICARE

## 2024-11-11 LAB
ABO + RH BLD: NORMAL
ALBUMIN SERPL-MCNC: 2.9 G/DL (ref 3.4–5)
ANION GAP SERPL CALCULATED.3IONS-SCNC: 13 MMOL/L (ref 3–16)
ANION GAP SERPL CALCULATED.3IONS-SCNC: 14 MMOL/L (ref 3–16)
APTT BLD: 23.1 SEC (ref 22.1–36.4)
BASOPHILS # BLD: 0 K/UL (ref 0–0.2)
BASOPHILS NFR BLD: 0.3 %
BLD GP AB SCN SERPL QL: NORMAL
BUN SERPL-MCNC: 37 MG/DL (ref 7–20)
BUN SERPL-MCNC: 37 MG/DL (ref 7–20)
CALCIUM SERPL-MCNC: 7.8 MG/DL (ref 8.3–10.6)
CALCIUM SERPL-MCNC: 7.9 MG/DL (ref 8.3–10.6)
CHLORIDE SERPL-SCNC: 105 MMOL/L (ref 99–110)
CHLORIDE SERPL-SCNC: 106 MMOL/L (ref 99–110)
CO2 SERPL-SCNC: 18 MMOL/L (ref 21–32)
CO2 SERPL-SCNC: 18 MMOL/L (ref 21–32)
CREAT SERPL-MCNC: 1 MG/DL (ref 0.8–1.3)
CREAT SERPL-MCNC: 1 MG/DL (ref 0.8–1.3)
CREAT UR-MCNC: 185 MG/DL (ref 39–259)
DEPRECATED RDW RBC AUTO: 16.8 % (ref 12.4–15.4)
EKG DIAGNOSIS: NORMAL
EKG Q-T INTERVAL: 398 MS
EKG QRS DURATION: 158 MS
EKG QTC CALCULATION (BAZETT): 569 MS
EKG R AXIS: -27 DEGREES
EKG T AXIS: 132 DEGREES
EKG VENTRICULAR RATE: 123 BPM
EOSINOPHIL # BLD: 0.1 K/UL (ref 0–0.6)
EOSINOPHIL NFR BLD: 0.9 %
GFR SERPLBLD CREATININE-BSD FMLA CKD-EPI: 75 ML/MIN/{1.73_M2}
GFR SERPLBLD CREATININE-BSD FMLA CKD-EPI: 75 ML/MIN/{1.73_M2}
GLUCOSE BLD-MCNC: 84 MG/DL (ref 70–99)
GLUCOSE BLD-MCNC: 85 MG/DL (ref 70–99)
GLUCOSE BLD-MCNC: 93 MG/DL (ref 70–99)
GLUCOSE BLD-MCNC: 98 MG/DL (ref 70–99)
GLUCOSE SERPL-MCNC: 107 MG/DL (ref 70–99)
GLUCOSE SERPL-MCNC: 109 MG/DL (ref 70–99)
HCT VFR BLD AUTO: 44 % (ref 40.5–52.5)
HGB BLD-MCNC: 14.4 G/DL (ref 13.5–17.5)
INR PPP: 1.15 (ref 0.85–1.15)
LACTATE BLDV-SCNC: 1 MMOL/L (ref 0.4–2)
LYMPHOCYTES # BLD: 0.7 K/UL (ref 1–5.1)
LYMPHOCYTES NFR BLD: 6 %
MAGNESIUM SERPL-MCNC: 2.09 MG/DL (ref 1.8–2.4)
MCH RBC QN AUTO: 29.5 PG (ref 26–34)
MCHC RBC AUTO-ENTMCNC: 32.7 G/DL (ref 31–36)
MCV RBC AUTO: 90.1 FL (ref 80–100)
MONOCYTES # BLD: 1 K/UL (ref 0–1.3)
MONOCYTES NFR BLD: 8.1 %
NEUTROPHILS # BLD: 10.5 K/UL (ref 1.7–7.7)
NEUTROPHILS NFR BLD: 84.7 %
NT-PROBNP SERPL-MCNC: 7647 PG/ML (ref 0–449)
PERFORMED ON: NORMAL
PHOSPHATE SERPL-MCNC: 2.8 MG/DL (ref 2.5–4.9)
PLATELET # BLD AUTO: 245 K/UL (ref 135–450)
PMV BLD AUTO: 7.6 FL (ref 5–10.5)
POTASSIUM SERPL-SCNC: 3.2 MMOL/L (ref 3.5–5.1)
POTASSIUM SERPL-SCNC: 3.5 MMOL/L (ref 3.5–5.1)
PROCALCITONIN SERPL IA-MCNC: 0.19 NG/ML (ref 0–0.15)
PROTHROMBIN TIME: 14.9 SEC (ref 11.9–14.9)
RBC # BLD AUTO: 4.88 M/UL (ref 4.2–5.9)
SODIUM SERPL-SCNC: 137 MMOL/L (ref 136–145)
SODIUM SERPL-SCNC: 137 MMOL/L (ref 136–145)
TROPONIN, HIGH SENSITIVITY: 60 NG/L (ref 0–22)
WBC # BLD AUTO: 12.3 K/UL (ref 4–11)

## 2024-11-11 PROCEDURE — 83735 ASSAY OF MAGNESIUM: CPT

## 2024-11-11 PROCEDURE — 6370000000 HC RX 637 (ALT 250 FOR IP)

## 2024-11-11 PROCEDURE — 85610 PROTHROMBIN TIME: CPT

## 2024-11-11 PROCEDURE — 93010 ELECTROCARDIOGRAM REPORT: CPT | Performed by: INTERNAL MEDICINE

## 2024-11-11 PROCEDURE — 6360000002 HC RX W HCPCS

## 2024-11-11 PROCEDURE — 6360000004 HC RX CONTRAST MEDICATION

## 2024-11-11 PROCEDURE — 2580000003 HC RX 258

## 2024-11-11 PROCEDURE — 85025 COMPLETE CBC W/AUTO DIFF WBC: CPT

## 2024-11-11 PROCEDURE — 74177 CT ABD & PELVIS W/CONTRAST: CPT

## 2024-11-11 PROCEDURE — 80069 RENAL FUNCTION PANEL: CPT

## 2024-11-11 PROCEDURE — 36415 COLL VENOUS BLD VENIPUNCTURE: CPT

## 2024-11-11 PROCEDURE — 86850 RBC ANTIBODY SCREEN: CPT

## 2024-11-11 PROCEDURE — 84145 PROCALCITONIN (PCT): CPT

## 2024-11-11 PROCEDURE — 93005 ELECTROCARDIOGRAM TRACING: CPT

## 2024-11-11 PROCEDURE — 2060000000 HC ICU INTERMEDIATE R&B

## 2024-11-11 PROCEDURE — 84484 ASSAY OF TROPONIN QUANT: CPT

## 2024-11-11 PROCEDURE — 6360000002 HC RX W HCPCS: Performed by: SURGERY

## 2024-11-11 PROCEDURE — APPNB45 APP NON BILLABLE 31-45 MINUTES: Performed by: NURSE PRACTITIONER

## 2024-11-11 PROCEDURE — 2500000003 HC RX 250 WO HCPCS: Performed by: NURSE PRACTITIONER

## 2024-11-11 PROCEDURE — 85730 THROMBOPLASTIN TIME PARTIAL: CPT

## 2024-11-11 PROCEDURE — 83605 ASSAY OF LACTIC ACID: CPT

## 2024-11-11 PROCEDURE — 71045 X-RAY EXAM CHEST 1 VIEW: CPT

## 2024-11-11 PROCEDURE — 86901 BLOOD TYPING SEROLOGIC RH(D): CPT

## 2024-11-11 PROCEDURE — 6370000000 HC RX 637 (ALT 250 FOR IP): Performed by: SURGERY

## 2024-11-11 PROCEDURE — 86900 BLOOD TYPING SEROLOGIC ABO: CPT

## 2024-11-11 PROCEDURE — 2580000003 HC RX 258: Performed by: STUDENT IN AN ORGANIZED HEALTH CARE EDUCATION/TRAINING PROGRAM

## 2024-11-11 PROCEDURE — 99231 SBSQ HOSP IP/OBS SF/LOW 25: CPT | Performed by: SURGERY

## 2024-11-11 PROCEDURE — 83880 ASSAY OF NATRIURETIC PEPTIDE: CPT

## 2024-11-11 RX ORDER — POTASSIUM CHLORIDE 7.45 MG/ML
10 INJECTION INTRAVENOUS ONCE
Status: COMPLETED | OUTPATIENT
Start: 2024-11-11 | End: 2024-11-11

## 2024-11-11 RX ORDER — POTASSIUM CHLORIDE 7.45 MG/ML
10 INJECTION INTRAVENOUS
Status: DISPENSED | OUTPATIENT
Start: 2024-11-11 | End: 2024-11-11

## 2024-11-11 RX ORDER — IOPAMIDOL 755 MG/ML
75 INJECTION, SOLUTION INTRAVASCULAR
Status: COMPLETED | OUTPATIENT
Start: 2024-11-11 | End: 2024-11-11

## 2024-11-11 RX ORDER — SODIUM CHLORIDE 9 MG/ML
INJECTION, SOLUTION INTRAVENOUS CONTINUOUS
Status: DISCONTINUED | OUTPATIENT
Start: 2024-11-12 | End: 2024-11-12 | Stop reason: HOSPADM

## 2024-11-11 RX ORDER — METOCLOPRAMIDE HYDROCHLORIDE 5 MG/ML
5 INJECTION INTRAMUSCULAR; INTRAVENOUS EVERY 6 HOURS
Status: DISCONTINUED | OUTPATIENT
Start: 2024-11-11 | End: 2024-11-13

## 2024-11-11 RX ADMIN — METOCLOPRAMIDE HYDROCHLORIDE 5 MG: 5 INJECTION INTRAMUSCULAR; INTRAVENOUS at 22:50

## 2024-11-11 RX ADMIN — POTASSIUM CHLORIDE 10 MEQ: 10 INJECTION, SOLUTION INTRAVENOUS at 13:44

## 2024-11-11 RX ADMIN — ACETAMINOPHEN 1000 MG: 500 TABLET, FILM COATED ORAL at 20:16

## 2024-11-11 RX ADMIN — METOCLOPRAMIDE HYDROCHLORIDE 5 MG: 5 INJECTION INTRAMUSCULAR; INTRAVENOUS at 11:09

## 2024-11-11 RX ADMIN — METOCLOPRAMIDE HYDROCHLORIDE 10 MG: 5 INJECTION, SOLUTION INTRAMUSCULAR; INTRAVENOUS at 05:51

## 2024-11-11 RX ADMIN — ACETAMINOPHEN 1000 MG: 500 TABLET, FILM COATED ORAL at 11:12

## 2024-11-11 RX ADMIN — METOPROLOL TARTRATE 5 MG: 1 INJECTION, SOLUTION INTRAVENOUS at 20:17

## 2024-11-11 RX ADMIN — POTASSIUM CHLORIDE 10 MEQ: 10 INJECTION, SOLUTION INTRAVENOUS at 12:49

## 2024-11-11 RX ADMIN — SODIUM CHLORIDE: 9 INJECTION, SOLUTION INTRAVENOUS at 23:14

## 2024-11-11 RX ADMIN — Medication: at 22:50

## 2024-11-11 RX ADMIN — SENNOSIDES AND DOCUSATE SODIUM 2 TABLET: 50; 8.6 TABLET ORAL at 11:08

## 2024-11-11 RX ADMIN — METOCLOPRAMIDE HYDROCHLORIDE 5 MG: 5 INJECTION INTRAMUSCULAR; INTRAVENOUS at 18:26

## 2024-11-11 RX ADMIN — SODIUM CHLORIDE, PRESERVATIVE FREE 10 ML: 5 INJECTION INTRAVENOUS at 20:15

## 2024-11-11 RX ADMIN — PANTOPRAZOLE SODIUM 40 MG: 40 INJECTION, POWDER, FOR SOLUTION INTRAVENOUS at 11:09

## 2024-11-11 RX ADMIN — LINACLOTIDE 290 MCG: 145 CAPSULE, GELATIN COATED ORAL at 05:51

## 2024-11-11 RX ADMIN — NALOXEGOL OXALATE 25 MG: 25 TABLET, FILM COATED ORAL at 05:51

## 2024-11-11 RX ADMIN — POTASSIUM CHLORIDE 10 MEQ: 10 INJECTION, SOLUTION INTRAVENOUS at 16:00

## 2024-11-11 RX ADMIN — ATORVASTATIN CALCIUM 10 MG: 10 TABLET, FILM COATED ORAL at 20:16

## 2024-11-11 RX ADMIN — Medication: at 11:18

## 2024-11-11 RX ADMIN — METOPROLOL TARTRATE 5 MG: 1 INJECTION, SOLUTION INTRAVENOUS at 18:25

## 2024-11-11 RX ADMIN — IOPAMIDOL 75 ML: 755 INJECTION, SOLUTION INTRAVENOUS at 09:08

## 2024-11-11 RX ADMIN — HYDROMORPHONE HYDROCHLORIDE 0.5 MG: 1 INJECTION, SOLUTION INTRAMUSCULAR; INTRAVENOUS; SUBCUTANEOUS at 03:41

## 2024-11-11 RX ADMIN — POLYETHYLENE GLYCOL 3350 17 G: 17 POWDER, FOR SOLUTION ORAL at 11:05

## 2024-11-11 RX ADMIN — ZOLPIDEM TARTRATE 10 MG: 5 TABLET, COATED ORAL at 20:16

## 2024-11-11 RX ADMIN — Medication: at 02:11

## 2024-11-11 RX ADMIN — GABAPENTIN 600 MG: 300 CAPSULE ORAL at 20:16

## 2024-11-11 RX ADMIN — POTASSIUM CHLORIDE 10 MEQ: 10 INJECTION, SOLUTION INTRAVENOUS at 11:39

## 2024-11-11 RX ADMIN — HYDROMORPHONE HYDROCHLORIDE 0.5 MG: 1 INJECTION, SOLUTION INTRAMUSCULAR; INTRAVENOUS; SUBCUTANEOUS at 23:15

## 2024-11-11 RX ADMIN — AMIODARONE HYDROCHLORIDE 200 MG: 200 TABLET ORAL at 11:08

## 2024-11-11 RX ADMIN — HYDROMORPHONE HYDROCHLORIDE 0.5 MG: 1 INJECTION, SOLUTION INTRAMUSCULAR; INTRAVENOUS; SUBCUTANEOUS at 18:07

## 2024-11-11 RX ADMIN — SENNOSIDES AND DOCUSATE SODIUM 2 TABLET: 50; 8.6 TABLET ORAL at 20:15

## 2024-11-11 RX ADMIN — HYDROMORPHONE HYDROCHLORIDE 1 MG: 1 INJECTION, SOLUTION INTRAMUSCULAR; INTRAVENOUS; SUBCUTANEOUS at 11:23

## 2024-11-11 RX ADMIN — GABAPENTIN 600 MG: 300 CAPSULE ORAL at 18:43

## 2024-11-11 RX ADMIN — AMPICILLIN SODIUM, SULBACTAM SODIUM 3000 MG: 2; 1 INJECTION, POWDER, FOR SOLUTION INTRAMUSCULAR; INTRAVENOUS at 20:15

## 2024-11-11 ASSESSMENT — PAIN DESCRIPTION - DESCRIPTORS
DESCRIPTORS: THROBBING;STABBING
DESCRIPTORS: STABBING
DESCRIPTORS: ACHING;DISCOMFORT
DESCRIPTORS: DISCOMFORT;ACHING

## 2024-11-11 ASSESSMENT — PAIN SCALES - WONG BAKER
WONGBAKER_NUMERICALRESPONSE: HURTS WHOLE LOT
WONGBAKER_NUMERICALRESPONSE: HURTS WORST
WONGBAKER_NUMERICALRESPONSE: HURTS LITTLE MORE
WONGBAKER_NUMERICALRESPONSE: HURTS LITTLE MORE

## 2024-11-11 ASSESSMENT — PAIN - FUNCTIONAL ASSESSMENT
PAIN_FUNCTIONAL_ASSESSMENT: PREVENTS OR INTERFERES SOME ACTIVE ACTIVITIES AND ADLS
PAIN_FUNCTIONAL_ASSESSMENT: ACTIVITIES ARE NOT PREVENTED
PAIN_FUNCTIONAL_ASSESSMENT: ACTIVITIES ARE NOT PREVENTED
PAIN_FUNCTIONAL_ASSESSMENT: PREVENTS OR INTERFERES SOME ACTIVE ACTIVITIES AND ADLS

## 2024-11-11 ASSESSMENT — PAIN SCALES - GENERAL
PAINLEVEL_OUTOF10: 4
PAINLEVEL_OUTOF10: 7
PAINLEVEL_OUTOF10: 6
PAINLEVEL_OUTOF10: 5
PAINLEVEL_OUTOF10: 4
PAINLEVEL_OUTOF10: 5
PAINLEVEL_OUTOF10: 10
PAINLEVEL_OUTOF10: 7
PAINLEVEL_OUTOF10: 7

## 2024-11-11 ASSESSMENT — PAIN DESCRIPTION - PAIN TYPE
TYPE: CHRONIC PAIN
TYPE: ACUTE PAIN
TYPE: ACUTE PAIN
TYPE: CHRONIC PAIN

## 2024-11-11 ASSESSMENT — PAIN DESCRIPTION - FREQUENCY
FREQUENCY: CONTINUOUS
FREQUENCY: INTERMITTENT
FREQUENCY: CONTINUOUS
FREQUENCY: CONTINUOUS

## 2024-11-11 ASSESSMENT — PAIN DESCRIPTION - ONSET
ONSET: ON-GOING
ONSET: ON-GOING
ONSET: GRADUAL
ONSET: ON-GOING

## 2024-11-11 ASSESSMENT — PAIN DESCRIPTION - ORIENTATION
ORIENTATION: ANTERIOR;LOWER
ORIENTATION: MID;POSTERIOR
ORIENTATION: MID
ORIENTATION: POSTERIOR

## 2024-11-11 ASSESSMENT — PAIN DESCRIPTION - LOCATION
LOCATION: ABDOMEN
LOCATION: BACK
LOCATION: BACK
LOCATION: BUTTOCKS;BACK

## 2024-11-11 NOTE — PROGRESS NOTES
GI Progress Note      Juan Mohamud is a 84 y.o. male patient.  No diagnosis found.    Admit Date: 11/7/2024    Subjective:       Some distention. No pain. No n/v; NG placed yesterday and then removed      ROS:  As per above    Scheduled Meds:   metoclopramide  5 mg IntraVENous Q6H    potassium chloride  10 mEq IntraVENous Q1H    butamben-tetracaine-benzocaine  1 spray Topical Once    pantoprazole  40 mg IntraVENous Daily    metoprolol  5 mg IntraVENous Q6H    naloxegol  25 mg Oral QAM AC    balsum peru-castor oil   Topical BID    linaclotide  290 mcg Oral QAM AC    insulin lispro  0-4 Units SubCUTAneous Q4H    gabapentin  600 mg Oral TID    buPROPion  300 mg Oral Daily    atorvastatin  10 mg Oral Nightly    amiodarone  200 mg Oral Daily    [Held by provider] metoprolol succinate  25 mg Oral Daily    tamsulosin  0.4 mg Oral Daily    [Held by provider] midodrine  5 mg Oral TID WC    polyethylene glycol  17 g Oral BID    [Held by provider] DESMOpressin  200 mcg Oral Nightly    acetaminophen  1,000 mg Oral BID    sennosides-docusate sodium  2 tablet Oral BID    melatonin  5 mg Oral Nightly       Continuous Infusions:   sodium chloride      dextrose         PRN Meds:  lidocaine, HYDROmorphone **OR** HYDROmorphone, methocarbamol, zolpidem, sodium chloride flush, sodium chloride, ondansetron **OR** ondansetron, acetaminophen **OR** acetaminophen, lidocaine, [Held by provider] HYDROmorphone, [Held by provider] oxyCODONE, glucose, dextrose bolus **OR** dextrose bolus, glucagon (rDNA), dextrose      Objective:       Patient Vitals for the past 24 hrs:   BP Temp Temp src Pulse Resp SpO2   11/11/24 0730 103/67 98.1 °F (36.7 °C) Oral (!) 124 16 92 %   11/11/24 0411 -- -- -- -- 18 --   11/11/24 0343 95/65 97.5 °F (36.4 °C) Oral (!) 121 18 94 %   11/11/24 0341 -- -- -- -- 18 --   11/10/24 2338 97/66 97.6 °F (36.4 °C) Oral (!) 121 16 93 %   11/10/24 2045 101/70 98.1 °F (36.7 °C) Oral (!) 119 18 92 %   11/10/24 1814 -- --  etc    Ruiz Patel MD  11/11/2024  10:58 AM     x7 day fever since 12/27, tmax 104.7 tympanic. Pt COVID+ since Tuesday 12/28. Mom also concerned for "blue skin". Pt noted to have dry cracked lips and pale at triage. Pt awake, alert, and active at triage. Lungs coarse b/l. Cap refill<2secs. PMH: hyptonia, aspir pneumonia, laryngeal cleft, developmental delay, spastic paralegia. NKDA. Vaccines up to date.

## 2024-11-11 NOTE — CARE COORDINATION
CM spoke with patient and spouse at bedside today.  Patient's wife Lizzie who feels that a SNF will be needed at discharge.  Patient has been to [facility] in the past and she would prefer this facility.     Tricia Oakes, RN, BSN,    Ortho/Neuro   [phone]

## 2024-11-11 NOTE — PROGRESS NOTES
NEUROSURGERY PROGRESS NOTE    11/11/2024 3:37 PM                               [place] E Cade                      LOS: 4 days   Procedure(s) (LRB):  COLONOSCOPY FLEXIBLE DECOMPRESSION (N/A)    Subjective:  pt still has distended abdomen surgery placing NG tube today. No acute events overnight    Physical Exam:  Patient seen and examined    Vitals:    11/11/24 1210   BP: 110/63   Pulse: (!) 125   Resp: 16   Temp: 97.7 °F (36.5 °C)   SpO2:      GCS:  4 - Opens eyes on own  5 - Alert and oriented  6 - Follows simple motor commands  General: Well developed. Alert and cooperative in no acute distress.   HENT: atraumatic, neck supple  Eyes: Optic discs: Not tested  Pulmonary: unlabored respiratory effort  Cardiovascular:  Warm well perfused. No peripheral edema  Gastrointestinal: abdomen soft, NT, ND    Neurological:  Mental Status: Awake, alert, oriented x 4, speech clear and appropriate  Attention: Intact  Language: No aphasia or dysarthria noted  Sensation: Intact to all extremities to light touch  Coordination: Intact    Cranial Nerves:  II: Visual acuity not tested, denies new visual changes / diplopia  III, IV, VI: PERRL, 3 mm bilaterally, EOMI, no nystagmus noted  V: Facial sensation intact bilaterally to touch  VII: Face symmetric  VIII: Hearing intact bilaterally to spoken voice  IX: Palate movement equal bilaterally  XI: Shoulder shrug equal bilaterally  XII: Tongue midline    Musculoskeletal:   Gait: Not tested   Assist devices: None   Tone: Normal  Motor strength:    Right  Left    Right  Left    Deltoid  5 5  Hip Flex  5 5   Biceps  5 5  Knee Extensors  5 5   Triceps  5 5  Knee Flexors  5 5   Wrist Ext  5 5  Ankle Dorsiflex.  5 5   Wrist Flex  5 5  Ankle Plantarflex.  5 5   Handgrip  5 5  Ext Olaf Longus  5 5   Thumb Ext  5 5           Radiological Findings:  CT ABDOMEN PELVIS W IV CONTRAST Additional Contrast? None  Result Date: 11/3/2024  1.  Acute fracture thinning through the T10 vertebral body,

## 2024-11-11 NOTE — PLAN OF CARE
Problem: Safety - Adult  Goal: Free from fall injury  Outcome: Progressing   All fall precautions in place. Bed locked and in lowest position with alarm on. Overbed table and personal belonings within reach. Call light within reach and patient instructed to use call light for assistance. Non-skid socks on.  Problem: Skin/Tissue Integrity  Goal: Absence of new skin breakdown  Description: 1.  Monitor for areas of redness and/or skin breakdown  2.  Assess vascular access sites hourly  3.  Every 4-6 hours minimum:  Change oxygen saturation probe site  4.  Every 4-6 hours:  If on nasal continuous positive airway pressure, respiratory therapy assess nares and determine need for appliance change or resting period.  Outcome: Progressing   Patient has multiple skin tears on arms. All were redressed and cleansed with saline this shift. Patients wound on sacrum cleansed and redressed this shift. Will continue to monitor all skin areas. Patient refusing Q2 turns.

## 2024-11-11 NOTE — PROGRESS NOTES
demonstrated large epidural spinal hematoma with cord compression  and additional T6 subacute compression fracture along with bilateral pleural effusions.     Patient was transferred to [facility] on 11/7 for neurosurgical care.     Assessment/Plan:      Current Principal Problem:  Closed unstable burst fracture of eleventh thoracic vertebra (HCC)    Plan:    T10 vertebral body unstable fracture  T11 nondisplaced spinous process fracture  T6 vertebral body subacute compression fracture  Epidural spinal hematoma   Spinal cord compression at T11  - NSGY following  - NCC following  - bed rest  - TLS spine precautions  - q4h neuro checks  - plan for OR tomorrow AM at 1100    [place] syndrome  - severe small and large bowel distention  - s/p 2 colonoscopic decompressions with decompression tube placed in descending colon  - distention seems more proximal however, significant small bowel component  - continue Naloxegol. Restarted home Linzess  - Received 2mg neostigmine IV once yesterday following cardiac clearance, monitored patient on telemetry following administration  - no need for ICU setting, patient has a pacemaker and 5 tower is PCU level of care  - GI and GS consulted   - NG tube placed yesterday. Patient requesting this be removed today. Discussed with surgery team, okay to remove NG tube today, okay for some ice chips    [place]  - Cr back to 1.0 this morning, which is where he was at on admission.     Hypoxia  - Possibly secondary to pneumonia given CT findings vs over resuscitation with IV fluids given increasing pro-BNP and small bilateral pleural effusions.   -  CT shows interval development of groundglass and nodular opacities of right middle lobe most likely infectious or inflammatory, correlate for aspiration, as well as small bilateral pleural effusions with bibasilar atelectasis.   - Afebrile, WBC slowly increasing, to 12.3 this morning. Denies any subjective fevers, cough, SOB.   - Stopped IV fluids given  Narcotic analgesia    [] Aggressive IV diuresis    [] Hypertonic Saline    [x] Critical electrolyte abnormalities requiring IV replacement    [x] Insulin - Scheduled/SSI or Insulin gtt    [] Anticoagulation (Heparin gtt or Coumadin - other anticoagulants in special circumstances)    [] Cardiac Medications (IV Amiodarone/Diltiazem, Tikosyn, etc)    [] Hemodialysis    [] Other -    [] Change in code status    [] Decision to escalate care    [] Major surgery/procedure with associated risk factors    --------------------------------------------------  C. Data (any 2)    [x] Data Review (any 3)    [x] Consultant notes from yesterday/today    [x] All available current labs reviewed interpreted for clinical significance    [x] Appropriate follow-up labs were ordered  [] Collateral history obtained     [x] Independent Interpretation of tests (any 1)    [x] Telemetry (Rhythm Strip) personally reviewed and interpreted        [] Imaging personally reviewed and interpreted     [] Discussion (any 1)  [] Multi-Disciplinary Rounds with Case Management  [] Discussed management of the case with           Labs:  Personally reviewed on 11/11/2024 and interpreted for clinical significance as documented above.     Recent Labs     11/09/24  2145 11/10/24  0729 11/11/24  0755   WBC 10.2 11.4* 12.3*   HGB 13.2* 13.6 14.4   HCT 40.4* 41.6 44.0    248 245     Recent Labs     11/09/24  2145 11/10/24  0729 11/10/24  1706 11/11/24  0755   * 135* 137 137  137   K 3.6 3.4* 3.4* 3.5  3.2*    102 104 106  105   CO2 17* 19* 19* 18*  18*   BUN 37* 40* 40* 37*  37*   CREATININE 1.4* 1.3 1.2 1.0  1.0   CALCIUM 7.7* 7.7* 7.8* 7.8*  7.9*   MG 2.04 2.07  --  2.09   PHOS  --   --   --  2.8     Recent Labs     11/11/24  0755   PROBNP 7,647*   TROPHS 60*     No results for input(s): \"LABA1C\" in the last 72 hours.  Recent Labs     11/09/24  2145   AST 46*   ALT 34   BILITOT 1.1*   ALKPHOS 184*     Recent Labs     11/11/24  9548

## 2024-11-11 NOTE — PROGRESS NOTES
Patient is alert and oriented x4. VSS on 2.5 L of O2. Medications given per MAR, no side effects noted. Patient on bedrest at this time. Patient refused some Q2 turns this shift, but continues to be turned when agreeable. NG tube still in place with few complaints of discomfort. Complaints of pain to bottom, continuing to monitor and manage per MAR. Voiding well via external catheter, no bm this shift. All wounds assessed, cleansed, and redressed this shift.      Patient is currently resting in bed with bed alarm on for safety. Call light within reach and all fall precautions in place. Plan of care continues.

## 2024-11-11 NOTE — PROGRESS NOTES
General Surgery   Daily Progress Note  Patient: Juan Mohamud      CC: Distension, Marion's syndrome        Procedure(s):  COLONOSCOPY FLEXIBLE DECOMPRESSION     11/8/2024     3 Days Post-Op     Surgeon(s):  Lily Dick MD     * No surgical staff found *  -------------------    SUBJECTIVE:       NGT placed yesterday. Patient denies nausea and vomiting. Denies bowel movement.     ROS:   A 14 point review of systems was conducted, significant findings as noted above. All other systems negative.    OBJECTIVE:    PHYSICAL EXAM:    Vitals:    11/10/24 2338 11/11/24 0341 11/11/24 0343 11/11/24 0411   BP: 97/66  95/65    Pulse: (!) 121  (!) 121    Resp: 16 18 18 18   Temp: 97.6 °F (36.4 °C)  97.5 °F (36.4 °C)    TempSrc: Oral  Oral    SpO2: 93%  94%    Weight:       Height:           General appearance: alert, no acute distress  HEENT: NGT in place with gastric output   Neck: trachea midline, neck supple  Respiratory: Normal effort with no accessory muscle use on 2L NC  Cardiovascular: RRR  Abdomen: soft, non-tender, distended, tympanic to percussion, no guarding, no rigidity  Skin: warm and dry, no rashes  Neuro: A&Ox3, no focal deficits    LABS:   Recent Labs     11/09/24  2145 11/10/24  0729   WBC 10.2 11.4*   HGB 13.2* 13.6   HCT 40.4* 41.6   MCV 90.8 89.7    248        Recent Labs     11/10/24  0729 11/10/24  1706   * 137   K 3.4* 3.4*    104   CO2 19* 19*   BUN 40* 40*   CREATININE 1.3 1.2        Recent Labs     11/09/24  2145   AST 46*   ALT 34   BILITOT 1.1*   ALKPHOS 184*      No results for input(s): \"LIPASE\", \"AMYLASE\" in the last 72 hours.   No results for input(s): \"INR\", \"APTT\" in the last 72 hours.    Invalid input(s): \"PROT\"   No results for input(s): \"CKTOTAL\", \"CKMB\", \"CKMBINDEX\", \"TROPONINI\" in the last 72 hours.      ASSESSMENT & PLAN:   This is a 84 y.o. male with Hx of Hx of type II DM, degenerative lumbar disease, arrhythmia s/p AICD, diastolic CHF. He presented

## 2024-11-11 NOTE — PLAN OF CARE
Problem: ABCDS Injury Assessment  Goal: Absence of physical injury  Outcome: Progressing  Note: Patient has remained free of physical injury this shift. Fall and safety precautions in place. Bed exit alarm activated, bed in lowest position with wheels locked, call light and belongings within reach.    Problem: Safety - Adult  Goal: Free from fall injury  Outcome: Progressing  Note: Patient has remained free of fall injury this shift. Gripper socks applied for safety.

## 2024-11-12 ENCOUNTER — APPOINTMENT (OUTPATIENT)
Dept: GENERAL RADIOLOGY | Age: 84
DRG: 447 | End: 2024-11-12
Attending: STUDENT IN AN ORGANIZED HEALTH CARE EDUCATION/TRAINING PROGRAM
Payer: MEDICARE

## 2024-11-12 ENCOUNTER — ANESTHESIA (OUTPATIENT)
Dept: OPERATING ROOM | Age: 84
End: 2024-11-12
Payer: MEDICARE

## 2024-11-12 ENCOUNTER — APPOINTMENT (OUTPATIENT)
Dept: GENERAL RADIOLOGY | Age: 84
DRG: 447 | End: 2024-11-12
Attending: INTERNAL MEDICINE
Payer: MEDICARE

## 2024-11-12 PROBLEM — I47.20 VT (VENTRICULAR TACHYCARDIA) (HCC): Status: ACTIVE | Noted: 2024-02-29

## 2024-11-12 PROBLEM — I47.10 SVT (SUPRAVENTRICULAR TACHYCARDIA) (HCC): Status: ACTIVE | Noted: 2024-11-12

## 2024-11-12 LAB
ANION GAP SERPL CALCULATED.3IONS-SCNC: 12 MMOL/L (ref 3–16)
ANISOCYTOSIS BLD QL SMEAR: ABNORMAL
BASE EXCESS BLDA CALC-SCNC: -10 MMOL/L (ref -3–3)
BASE EXCESS BLDA CALC-SCNC: -9 MMOL/L (ref -3–3)
BASOPHILS # BLD: 0 K/UL (ref 0–0.2)
BASOPHILS NFR BLD: 0 %
BODY TEMPERATURE: 37
BUN SERPL-MCNC: 35 MG/DL (ref 7–20)
CA-I BLD-SCNC: 1.24 MMOL/L (ref 1.12–1.32)
CALCIUM SERPL-MCNC: 7.9 MG/DL (ref 8.3–10.6)
CHLORIDE SERPL-SCNC: 107 MMOL/L (ref 99–110)
CO2 BLDA-SCNC: 18 MMOL/L
CO2 BLDA-SCNC: 20 MMOL/L
CO2 SERPL-SCNC: 18 MMOL/L (ref 21–32)
CREAT SERPL-MCNC: 0.9 MG/DL (ref 0.8–1.3)
DACRYOCYTES BLD QL SMEAR: ABNORMAL
DEPRECATED RDW RBC AUTO: 17.6 % (ref 12.4–15.4)
EOSINOPHIL # BLD: 0.3 K/UL (ref 0–0.6)
EOSINOPHIL NFR BLD: 2 %
GFR SERPLBLD CREATININE-BSD FMLA CKD-EPI: 85 ML/MIN/{1.73_M2}
GLUCOSE BLD-MCNC: 108 MG/DL (ref 70–99)
GLUCOSE BLD-MCNC: 111 MG/DL (ref 70–99)
GLUCOSE BLD-MCNC: 79 MG/DL (ref 70–99)
GLUCOSE BLD-MCNC: 80 MG/DL (ref 70–99)
GLUCOSE BLD-MCNC: 98 MG/DL (ref 70–99)
GLUCOSE SERPL-MCNC: 91 MG/DL (ref 70–99)
HCO3 BLDA-SCNC: 17.4 MMOL/L (ref 21–29)
HCO3 BLDA-SCNC: 18.2 MMOL/L (ref 21–29)
HCT VFR BLD AUTO: 44.9 % (ref 40.5–52.5)
HGB BLD-MCNC: 14.3 G/DL (ref 13.5–17.5)
LYMPHOCYTES # BLD: 0.2 K/UL (ref 1–5.1)
LYMPHOCYTES NFR BLD: 1 %
MAGNESIUM SERPL-MCNC: 2.09 MG/DL (ref 1.8–2.4)
MCH RBC QN AUTO: 29.3 PG (ref 26–34)
MCHC RBC AUTO-ENTMCNC: 31.8 G/DL (ref 31–36)
MCV RBC AUTO: 92.1 FL (ref 80–100)
MONOCYTES # BLD: 1 K/UL (ref 0–1.3)
MONOCYTES NFR BLD: 6 %
NEUTROPHILS # BLD: 15.8 K/UL (ref 1.7–7.7)
NEUTROPHILS NFR BLD: 91 %
OVALOCYTES BLD QL SMEAR: ABNORMAL
PCO2 BLDA: 34.4 MM HG (ref 35–45)
PCO2 BLDA: 45.1 MM HG (ref 35–45)
PERFORMED ON: ABNORMAL
PERFORMED ON: NORMAL
PERFORMED ON: NORMAL
PH BLDA: 7.21 [PH] (ref 7.35–7.45)
PH BLDA: 7.31 [PH] (ref 7.35–7.45)
PLATELET # BLD AUTO: 260 K/UL (ref 135–450)
PMV BLD AUTO: 7.5 FL (ref 5–10.5)
PO2 BLDA: 370.2 MM HG (ref 75–108)
PO2 BLDA: 94 MM HG (ref 75–108)
POC SAMPLE TYPE: ABNORMAL
POC SAMPLE TYPE: ABNORMAL
POTASSIUM BLD-SCNC: 3.4 MMOL/L (ref 3.5–5.1)
POTASSIUM SERPL-SCNC: 3 MMOL/L (ref 3.5–5.1)
RBC # BLD AUTO: 4.88 M/UL (ref 4.2–5.9)
SAO2 % BLDA: 100 % (ref 93–100)
SAO2 % BLDA: 97 % (ref 93–100)
SCHISTOCYTES BLD QL SMEAR: ABNORMAL
SODIUM BLD-SCNC: 143 MMOL/L (ref 136–145)
SODIUM SERPL-SCNC: 137 MMOL/L (ref 136–145)
WBC # BLD AUTO: 17.4 K/UL (ref 4–11)

## 2024-11-12 PROCEDURE — 6360000002 HC RX W HCPCS: Performed by: STUDENT IN AN ORGANIZED HEALTH CARE EDUCATION/TRAINING PROGRAM

## 2024-11-12 PROCEDURE — 8E0WXBF COMPUTER ASSISTED PROCEDURE OF TRUNK REGION, WITH FLUOROSCOPY: ICD-10-PCS | Performed by: DENTIST

## 2024-11-12 PROCEDURE — 72100 X-RAY EXAM L-S SPINE 2/3 VWS: CPT

## 2024-11-12 PROCEDURE — 99231 SBSQ HOSP IP/OBS SF/LOW 25: CPT | Performed by: SURGERY

## 2024-11-12 PROCEDURE — 2500000003 HC RX 250 WO HCPCS: Performed by: INTERNAL MEDICINE

## 2024-11-12 PROCEDURE — 6370000000 HC RX 637 (ALT 250 FOR IP): Performed by: SURGERY

## 2024-11-12 PROCEDURE — 99232 SBSQ HOSP IP/OBS MODERATE 35: CPT | Performed by: NURSE PRACTITIONER

## 2024-11-12 PROCEDURE — 3700000000 HC ANESTHESIA ATTENDED CARE: Performed by: STUDENT IN AN ORGANIZED HEALTH CARE EDUCATION/TRAINING PROGRAM

## 2024-11-12 PROCEDURE — 83735 ASSAY OF MAGNESIUM: CPT

## 2024-11-12 PROCEDURE — 6360000002 HC RX W HCPCS: Performed by: SURGERY

## 2024-11-12 PROCEDURE — 2580000003 HC RX 258

## 2024-11-12 PROCEDURE — 6370000000 HC RX 637 (ALT 250 FOR IP): Performed by: STUDENT IN AN ORGANIZED HEALTH CARE EDUCATION/TRAINING PROGRAM

## 2024-11-12 PROCEDURE — 80048 BASIC METABOLIC PNL TOTAL CA: CPT

## 2024-11-12 PROCEDURE — 2720000010 HC SURG SUPPLY STERILE: Performed by: STUDENT IN AN ORGANIZED HEALTH CARE EDUCATION/TRAINING PROGRAM

## 2024-11-12 PROCEDURE — 2580000003 HC RX 258: Performed by: STUDENT IN AN ORGANIZED HEALTH CARE EDUCATION/TRAINING PROGRAM

## 2024-11-12 PROCEDURE — 2500000003 HC RX 250 WO HCPCS

## 2024-11-12 PROCEDURE — 2000000000 HC ICU R&B

## 2024-11-12 PROCEDURE — 6360000002 HC RX W HCPCS: Performed by: ANESTHESIOLOGY

## 2024-11-12 PROCEDURE — 3600000014 HC SURGERY LEVEL 4 ADDTL 15MIN: Performed by: STUDENT IN AN ORGANIZED HEALTH CARE EDUCATION/TRAINING PROGRAM

## 2024-11-12 PROCEDURE — 84295 ASSAY OF SERUM SODIUM: CPT

## 2024-11-12 PROCEDURE — 2709999900 HC NON-CHARGEABLE SUPPLY: Performed by: STUDENT IN AN ORGANIZED HEALTH CARE EDUCATION/TRAINING PROGRAM

## 2024-11-12 PROCEDURE — 82330 ASSAY OF CALCIUM: CPT

## 2024-11-12 PROCEDURE — P9045 ALBUMIN (HUMAN), 5%, 250 ML: HCPCS

## 2024-11-12 PROCEDURE — 6370000000 HC RX 637 (ALT 250 FOR IP)

## 2024-11-12 PROCEDURE — 2580000003 HC RX 258: Performed by: ANESTHESIOLOGY

## 2024-11-12 PROCEDURE — 6360000002 HC RX W HCPCS

## 2024-11-12 PROCEDURE — 74018 RADEX ABDOMEN 1 VIEW: CPT

## 2024-11-12 PROCEDURE — C1713 ANCHOR/SCREW BN/BN,TIS/BN: HCPCS | Performed by: STUDENT IN AN ORGANIZED HEALTH CARE EDUCATION/TRAINING PROGRAM

## 2024-11-12 PROCEDURE — 82803 BLOOD GASES ANY COMBINATION: CPT

## 2024-11-12 PROCEDURE — 85018 HEMOGLOBIN: CPT

## 2024-11-12 PROCEDURE — 85025 COMPLETE CBC W/AUTO DIFF WBC: CPT

## 2024-11-12 PROCEDURE — 0RG7071 FUSION OF 2 TO 7 THORACIC VERTEBRAL JOINTS WITH AUTOLOGOUS TISSUE SUBSTITUTE, POSTERIOR APPROACH, POSTERIOR COLUMN, OPEN APPROACH: ICD-10-PCS | Performed by: DENTIST

## 2024-11-12 PROCEDURE — 3600000004 HC SURGERY LEVEL 4 BASE: Performed by: STUDENT IN AN ORGANIZED HEALTH CARE EDUCATION/TRAINING PROGRAM

## 2024-11-12 PROCEDURE — 82947 ASSAY GLUCOSE BLOOD QUANT: CPT

## 2024-11-12 PROCEDURE — 36415 COLL VENOUS BLD VENIPUNCTURE: CPT

## 2024-11-12 PROCEDURE — C9290 INJ, BUPIVACAINE LIPOSOME: HCPCS | Performed by: STUDENT IN AN ORGANIZED HEALTH CARE EDUCATION/TRAINING PROGRAM

## 2024-11-12 PROCEDURE — 84132 ASSAY OF SERUM POTASSIUM: CPT

## 2024-11-12 PROCEDURE — 2580000003 HC RX 258: Performed by: NURSE ANESTHETIST, CERTIFIED REGISTERED

## 2024-11-12 PROCEDURE — 00CU0ZZ EXTIRPATION OF MATTER FROM SPINAL CANAL, OPEN APPROACH: ICD-10-PCS | Performed by: DENTIST

## 2024-11-12 PROCEDURE — 0RGA071 FUSION OF THORACOLUMBAR VERTEBRAL JOINT WITH AUTOLOGOUS TISSUE SUBSTITUTE, POSTERIOR APPROACH, POSTERIOR COLUMN, OPEN APPROACH: ICD-10-PCS | Performed by: DENTIST

## 2024-11-12 PROCEDURE — 3E0U0GB INTRODUCTION OF RECOMBINANT BONE MORPHOGENETIC PROTEIN INTO JOINTS, OPEN APPROACH: ICD-10-PCS | Performed by: DENTIST

## 2024-11-12 PROCEDURE — A4217 STERILE WATER/SALINE, 500 ML: HCPCS | Performed by: STUDENT IN AN ORGANIZED HEALTH CARE EDUCATION/TRAINING PROGRAM

## 2024-11-12 PROCEDURE — 3700000001 HC ADD 15 MINUTES (ANESTHESIA): Performed by: STUDENT IN AN ORGANIZED HEALTH CARE EDUCATION/TRAINING PROGRAM

## 2024-11-12 DEVICE — SET SCREW 5540030 5.5 TI NS BRK OFF
Type: IMPLANTABLE DEVICE | Site: SPINE THORACIC | Status: FUNCTIONAL
Brand: CD HORIZON® SPINAL SYSTEM

## 2024-11-12 DEVICE — DBM T44150 10CC ORTHOBLEND SMALL DEFGRAF
Type: IMPLANTABLE DEVICE | Site: SPINE THORACIC | Status: FUNCTIONAL
Brand: GRAFTON®AND GRAFTON PLUS®DEMINERALIZED BONE MATRIX (DBM)

## 2024-11-12 RX ORDER — METHOCARBAMOL 100 MG/ML
1000 INJECTION, SOLUTION INTRAMUSCULAR; INTRAVENOUS EVERY 8 HOURS
Status: COMPLETED | OUTPATIENT
Start: 2024-11-12 | End: 2024-11-14

## 2024-11-12 RX ORDER — SUCCINYLCHOLINE/SOD CL,ISO/PF 200MG/10ML
SYRINGE (ML) INTRAVENOUS
Status: DISCONTINUED | OUTPATIENT
Start: 2024-11-12 | End: 2024-11-12 | Stop reason: SDUPTHER

## 2024-11-12 RX ORDER — PROCHLORPERAZINE EDISYLATE 5 MG/ML
5 INJECTION INTRAMUSCULAR; INTRAVENOUS
Status: ACTIVE | OUTPATIENT
Start: 2024-11-12 | End: 2024-11-13

## 2024-11-12 RX ORDER — POTASSIUM CHLORIDE 7.45 MG/ML
10 INJECTION INTRAVENOUS
Status: DISPENSED | OUTPATIENT
Start: 2024-11-12 | End: 2024-11-12

## 2024-11-12 RX ORDER — SODIUM CHLORIDE 9 MG/ML
INJECTION, SOLUTION INTRAVENOUS PRN
Status: DISCONTINUED | OUTPATIENT
Start: 2024-11-12 | End: 2024-11-17

## 2024-11-12 RX ORDER — NALOXONE HYDROCHLORIDE 0.4 MG/ML
INJECTION, SOLUTION INTRAMUSCULAR; INTRAVENOUS; SUBCUTANEOUS PRN
Status: DISCONTINUED | OUTPATIENT
Start: 2024-11-12 | End: 2024-11-13 | Stop reason: ALTCHOICE

## 2024-11-12 RX ORDER — DEXMEDETOMIDINE HYDROCHLORIDE 4 UG/ML
.1-1.5 INJECTION, SOLUTION INTRAVENOUS CONTINUOUS
Status: DISCONTINUED | OUTPATIENT
Start: 2024-11-12 | End: 2024-11-13

## 2024-11-12 RX ORDER — SODIUM CHLORIDE, SODIUM GLUCONATE, SODIUM ACETATE, POTASSIUM CHLORIDE AND MAGNESIUM CHLORIDE 526; 502; 368; 37; 30 MG/100ML; MG/100ML; MG/100ML; MG/100ML; MG/100ML
INJECTION, SOLUTION INTRAVENOUS
Status: DISCONTINUED | OUTPATIENT
Start: 2024-11-12 | End: 2024-11-12 | Stop reason: SDUPTHER

## 2024-11-12 RX ORDER — POTASSIUM CHLORIDE 7.45 MG/ML
10 INJECTION INTRAVENOUS
Status: COMPLETED | OUTPATIENT
Start: 2024-11-12 | End: 2024-11-12

## 2024-11-12 RX ORDER — VANCOMYCIN HYDROCHLORIDE 1 G/20ML
INJECTION, POWDER, LYOPHILIZED, FOR SOLUTION INTRAVENOUS PRN
Status: DISCONTINUED | OUTPATIENT
Start: 2024-11-12 | End: 2024-11-12 | Stop reason: ALTCHOICE

## 2024-11-12 RX ORDER — PROPOFOL 10 MG/ML
INJECTION, EMULSION INTRAVENOUS
Status: DISCONTINUED | OUTPATIENT
Start: 2024-11-12 | End: 2024-11-12 | Stop reason: SDUPTHER

## 2024-11-12 RX ORDER — VASOPRESSIN 20 U/ML
INJECTION PARENTERAL
Status: DISCONTINUED | OUTPATIENT
Start: 2024-11-12 | End: 2024-11-12 | Stop reason: SDUPTHER

## 2024-11-12 RX ORDER — SODIUM CHLORIDE 0.9 % (FLUSH) 0.9 %
5-40 SYRINGE (ML) INJECTION EVERY 12 HOURS SCHEDULED
Status: DISCONTINUED | OUTPATIENT
Start: 2024-11-12 | End: 2024-11-14

## 2024-11-12 RX ORDER — ALBUMIN, HUMAN INJ 5% 5 %
SOLUTION INTRAVENOUS
Status: DISCONTINUED | OUTPATIENT
Start: 2024-11-12 | End: 2024-11-12 | Stop reason: SDUPTHER

## 2024-11-12 RX ORDER — ROCURONIUM BROMIDE 10 MG/ML
INJECTION, SOLUTION INTRAVENOUS
Status: DISCONTINUED | OUTPATIENT
Start: 2024-11-12 | End: 2024-11-12 | Stop reason: SDUPTHER

## 2024-11-12 RX ORDER — PROPOFOL 10 MG/ML
5-50 INJECTION, EMULSION INTRAVENOUS CONTINUOUS
Status: DISCONTINUED | OUTPATIENT
Start: 2024-11-12 | End: 2024-11-13

## 2024-11-12 RX ORDER — SODIUM CHLORIDE 9 MG/ML
INJECTION, SOLUTION INTRAVENOUS
Status: DISCONTINUED | OUTPATIENT
Start: 2024-11-12 | End: 2024-11-12 | Stop reason: SDUPTHER

## 2024-11-12 RX ORDER — METHOCARBAMOL 500 MG/1
1000 TABLET, FILM COATED ORAL 4 TIMES DAILY
Status: DISCONTINUED | OUTPATIENT
Start: 2024-11-14 | End: 2024-11-12 | Stop reason: SDUPTHER

## 2024-11-12 RX ORDER — ONDANSETRON 2 MG/ML
INJECTION INTRAMUSCULAR; INTRAVENOUS
Status: DISCONTINUED | OUTPATIENT
Start: 2024-11-12 | End: 2024-11-12 | Stop reason: SDUPTHER

## 2024-11-12 RX ORDER — HEPARIN SODIUM 5000 [USP'U]/ML
5000 INJECTION, SOLUTION INTRAVENOUS; SUBCUTANEOUS EVERY 8 HOURS
Status: DISCONTINUED | OUTPATIENT
Start: 2024-11-13 | End: 2024-12-11

## 2024-11-12 RX ORDER — LABETALOL HYDROCHLORIDE 5 MG/ML
10 INJECTION, SOLUTION INTRAVENOUS
Status: DISCONTINUED | OUTPATIENT
Start: 2024-11-12 | End: 2024-11-13 | Stop reason: ALTCHOICE

## 2024-11-12 RX ORDER — REMIFENTANIL HYDROCHLORIDE 1 MG/ML
INJECTION, POWDER, LYOPHILIZED, FOR SOLUTION INTRAVENOUS
Status: DISCONTINUED | OUTPATIENT
Start: 2024-11-12 | End: 2024-11-12 | Stop reason: SDUPTHER

## 2024-11-12 RX ORDER — FENTANYL CITRATE 50 UG/ML
25 INJECTION, SOLUTION INTRAMUSCULAR; INTRAVENOUS EVERY 5 MIN PRN
Status: DISCONTINUED | OUTPATIENT
Start: 2024-11-12 | End: 2024-11-13 | Stop reason: ALTCHOICE

## 2024-11-12 RX ORDER — CALCIUM CHLORIDE 100 MG/ML
INJECTION INTRAVENOUS; INTRAVENTRICULAR
Status: DISCONTINUED | OUTPATIENT
Start: 2024-11-12 | End: 2024-11-12 | Stop reason: SDUPTHER

## 2024-11-12 RX ORDER — METHOCARBAMOL 500 MG/1
1000 TABLET, FILM COATED ORAL 4 TIMES DAILY
Status: DISCONTINUED | OUTPATIENT
Start: 2024-11-14 | End: 2024-11-14

## 2024-11-12 RX ORDER — HYDROMORPHONE HYDROCHLORIDE 1 MG/ML
0.5 INJECTION, SOLUTION INTRAMUSCULAR; INTRAVENOUS; SUBCUTANEOUS EVERY 5 MIN PRN
Status: DISCONTINUED | OUTPATIENT
Start: 2024-11-12 | End: 2024-11-13 | Stop reason: ALTCHOICE

## 2024-11-12 RX ORDER — DEXAMETHASONE SODIUM PHOSPHATE 4 MG/ML
INJECTION, SOLUTION INTRA-ARTICULAR; INTRALESIONAL; INTRAMUSCULAR; INTRAVENOUS; SOFT TISSUE
Status: DISCONTINUED | OUTPATIENT
Start: 2024-11-12 | End: 2024-11-12 | Stop reason: SDUPTHER

## 2024-11-12 RX ORDER — HYDRALAZINE HYDROCHLORIDE 20 MG/ML
10 INJECTION INTRAMUSCULAR; INTRAVENOUS
Status: DISCONTINUED | OUTPATIENT
Start: 2024-11-12 | End: 2024-11-13 | Stop reason: ALTCHOICE

## 2024-11-12 RX ORDER — SODIUM CHLORIDE, SODIUM LACTATE, POTASSIUM CHLORIDE, CALCIUM CHLORIDE 600; 310; 30; 20 MG/100ML; MG/100ML; MG/100ML; MG/100ML
INJECTION, SOLUTION INTRAVENOUS
Status: DISCONTINUED | OUTPATIENT
Start: 2024-11-12 | End: 2024-11-12 | Stop reason: SDUPTHER

## 2024-11-12 RX ORDER — MAGNESIUM HYDROXIDE 1200 MG/15ML
LIQUID ORAL CONTINUOUS PRN
Status: COMPLETED | OUTPATIENT
Start: 2024-11-12 | End: 2024-11-12

## 2024-11-12 RX ORDER — ONDANSETRON 2 MG/ML
4 INJECTION INTRAMUSCULAR; INTRAVENOUS
Status: ACTIVE | OUTPATIENT
Start: 2024-11-12 | End: 2024-11-13

## 2024-11-12 RX ORDER — LIDOCAINE HYDROCHLORIDE 20 MG/ML
INJECTION, SOLUTION INFILTRATION; PERINEURAL
Status: DISCONTINUED | OUTPATIENT
Start: 2024-11-12 | End: 2024-11-12 | Stop reason: SDUPTHER

## 2024-11-12 RX ORDER — SODIUM CHLORIDE 0.9 % (FLUSH) 0.9 %
5-40 SYRINGE (ML) INJECTION PRN
Status: DISCONTINUED | OUTPATIENT
Start: 2024-11-12 | End: 2024-11-17

## 2024-11-12 RX ORDER — CEFAZOLIN SODIUM 1 G/3ML
INJECTION, POWDER, FOR SOLUTION INTRAMUSCULAR; INTRAVENOUS
Status: DISCONTINUED | OUTPATIENT
Start: 2024-11-12 | End: 2024-11-12 | Stop reason: SDUPTHER

## 2024-11-12 RX ORDER — EPHEDRINE SULFATE 50 MG/ML
INJECTION INTRAVENOUS
Status: DISCONTINUED | OUTPATIENT
Start: 2024-11-12 | End: 2024-11-12 | Stop reason: SDUPTHER

## 2024-11-12 RX ADMIN — EPHEDRINE SULFATE 5 MG: 50 INJECTION INTRAVENOUS at 14:01

## 2024-11-12 RX ADMIN — POTASSIUM CHLORIDE 10 MEQ: 7.46 INJECTION, SOLUTION INTRAVENOUS at 20:47

## 2024-11-12 RX ADMIN — AMPICILLIN SODIUM, SULBACTAM SODIUM 3000 MG: 2; 1 INJECTION, POWDER, FOR SOLUTION INTRAMUSCULAR; INTRAVENOUS at 19:02

## 2024-11-12 RX ADMIN — VASOPRESSIN 1 UNITS: 20 INJECTION INTRAVENOUS at 13:04

## 2024-11-12 RX ADMIN — CALCIUM CHLORIDE 0.2 G: 100 INJECTION, SOLUTION INTRAVENOUS at 11:42

## 2024-11-12 RX ADMIN — PHENYLEPHRINE HYDROCHLORIDE 100 MCG: 10 INJECTION, SOLUTION INTRAVENOUS at 14:42

## 2024-11-12 RX ADMIN — VASOPRESSIN 1 UNITS: 20 INJECTION INTRAVENOUS at 13:19

## 2024-11-12 RX ADMIN — MINERAL OIL 330 ML: 999 LIQUID ORAL at 06:47

## 2024-11-12 RX ADMIN — SODIUM CHLORIDE: 9 INJECTION, SOLUTION INTRAVENOUS at 11:45

## 2024-11-12 RX ADMIN — VASOPRESSIN 1 UNITS: 20 INJECTION INTRAVENOUS at 11:59

## 2024-11-12 RX ADMIN — CALCIUM CHLORIDE 0.2 G: 100 INJECTION, SOLUTION INTRAVENOUS at 10:51

## 2024-11-12 RX ADMIN — EPHEDRINE SULFATE 5 MG: 50 INJECTION INTRAVENOUS at 10:50

## 2024-11-12 RX ADMIN — ONDANSETRON 4 MG: 2 INJECTION INTRAMUSCULAR; INTRAVENOUS at 05:18

## 2024-11-12 RX ADMIN — SODIUM CHLORIDE, PRESERVATIVE FREE 10 ML: 5 INJECTION INTRAVENOUS at 08:15

## 2024-11-12 RX ADMIN — GABAPENTIN 600 MG: 300 CAPSULE ORAL at 20:02

## 2024-11-12 RX ADMIN — AMPICILLIN SODIUM, SULBACTAM SODIUM 3000 MG: 2; 1 INJECTION, POWDER, FOR SOLUTION INTRAMUSCULAR; INTRAVENOUS at 20:07

## 2024-11-12 RX ADMIN — PROPOFOL 30 MG: 10 INJECTION, EMULSION INTRAVENOUS at 10:34

## 2024-11-12 RX ADMIN — VASOPRESSIN 1 UNITS: 20 INJECTION INTRAVENOUS at 12:29

## 2024-11-12 RX ADMIN — HYDROMORPHONE HYDROCHLORIDE 1 MG: 1 INJECTION, SOLUTION INTRAMUSCULAR; INTRAVENOUS; SUBCUTANEOUS at 21:59

## 2024-11-12 RX ADMIN — EPHEDRINE SULFATE 5 MG: 50 INJECTION INTRAVENOUS at 14:09

## 2024-11-12 RX ADMIN — REMIFENTANIL HYDROCHLORIDE 0.1 MCG/KG/MIN: 1 INJECTION, POWDER, LYOPHILIZED, FOR SOLUTION INTRAVENOUS at 11:10

## 2024-11-12 RX ADMIN — PHENYLEPHRINE HYDROCHLORIDE 100 MCG: 10 INJECTION, SOLUTION INTRAVENOUS at 12:56

## 2024-11-12 RX ADMIN — EPHEDRINE SULFATE 5 MG: 50 INJECTION INTRAVENOUS at 13:53

## 2024-11-12 RX ADMIN — VASOPRESSIN 1 UNITS: 20 INJECTION INTRAVENOUS at 13:40

## 2024-11-12 RX ADMIN — SODIUM CHLORIDE, SODIUM GLUCONATE, SODIUM ACETATE, POTASSIUM CHLORIDE AND MAGNESIUM CHLORIDE: 526; 502; 368; 37; 30 INJECTION, SOLUTION INTRAVENOUS at 12:34

## 2024-11-12 RX ADMIN — SODIUM CHLORIDE, SODIUM LACTATE, POTASSIUM CHLORIDE, AND CALCIUM CHLORIDE: .6; .31; .03; .02 INJECTION, SOLUTION INTRAVENOUS at 11:46

## 2024-11-12 RX ADMIN — POTASSIUM CHLORIDE 10 MEQ: 7.46 INJECTION, SOLUTION INTRAVENOUS at 17:17

## 2024-11-12 RX ADMIN — VASOPRESSIN 2 UNITS: 20 INJECTION INTRAVENOUS at 13:31

## 2024-11-12 RX ADMIN — VASOPRESSIN 1 UNITS: 20 INJECTION INTRAVENOUS at 12:56

## 2024-11-12 RX ADMIN — SODIUM CHLORIDE: 9 INJECTION, SOLUTION INTRAVENOUS at 10:26

## 2024-11-12 RX ADMIN — METOCLOPRAMIDE HYDROCHLORIDE 5 MG: 5 INJECTION INTRAMUSCULAR; INTRAVENOUS at 18:51

## 2024-11-12 RX ADMIN — METOCLOPRAMIDE HYDROCHLORIDE 5 MG: 5 INJECTION INTRAMUSCULAR; INTRAVENOUS at 05:00

## 2024-11-12 RX ADMIN — SODIUM BICARBONATE 50 MEQ: 84 INJECTION, SOLUTION INTRAVENOUS at 18:40

## 2024-11-12 RX ADMIN — POTASSIUM CHLORIDE 10 MEQ: 10 INJECTION, SOLUTION INTRAVENOUS at 08:10

## 2024-11-12 RX ADMIN — LIDOCAINE HYDROCHLORIDE 100 MG: 20 INJECTION, SOLUTION INFILTRATION; PERINEURAL at 10:33

## 2024-11-12 RX ADMIN — EPHEDRINE SULFATE 2.5 MG: 50 INJECTION INTRAVENOUS at 12:28

## 2024-11-12 RX ADMIN — HYDROMORPHONE HYDROCHLORIDE 0.5 MG: 1 INJECTION, SOLUTION INTRAMUSCULAR; INTRAVENOUS; SUBCUTANEOUS at 05:32

## 2024-11-12 RX ADMIN — ONDANSETRON 4 MG: 2 INJECTION INTRAMUSCULAR; INTRAVENOUS at 13:35

## 2024-11-12 RX ADMIN — VASOPRESSIN 1 UNITS: 20 INJECTION INTRAVENOUS at 10:50

## 2024-11-12 RX ADMIN — PHENYLEPHRINE HYDROCHLORIDE 100 MCG: 10 INJECTION, SOLUTION INTRAVENOUS at 10:34

## 2024-11-12 RX ADMIN — EPHEDRINE SULFATE 10 MG: 50 INJECTION INTRAVENOUS at 10:46

## 2024-11-12 RX ADMIN — CALCIUM CHLORIDE 0.2 G: 100 INJECTION, SOLUTION INTRAVENOUS at 11:12

## 2024-11-12 RX ADMIN — VASOPRESSIN 1 UNITS: 20 INJECTION INTRAVENOUS at 12:20

## 2024-11-12 RX ADMIN — PHENYLEPHRINE HYDROCHLORIDE 100 MCG: 10 INJECTION, SOLUTION INTRAVENOUS at 14:23

## 2024-11-12 RX ADMIN — Medication: at 20:02

## 2024-11-12 RX ADMIN — PANTOPRAZOLE SODIUM 40 MG: 40 INJECTION, POWDER, FOR SOLUTION INTRAVENOUS at 08:14

## 2024-11-12 RX ADMIN — DEXAMETHASONE SODIUM PHOSPHATE 8 MG: 4 INJECTION INTRA-ARTICULAR; INTRALESIONAL; INTRAMUSCULAR; INTRAVENOUS; SOFT TISSUE at 10:40

## 2024-11-12 RX ADMIN — POTASSIUM CHLORIDE 10 MEQ: 10 INJECTION, SOLUTION INTRAVENOUS at 09:08

## 2024-11-12 RX ADMIN — PHENYLEPHRINE HYDROCHLORIDE 200 MCG: 10 INJECTION, SOLUTION INTRAVENOUS at 13:31

## 2024-11-12 RX ADMIN — METOCLOPRAMIDE HYDROCHLORIDE 5 MG: 5 INJECTION INTRAMUSCULAR; INTRAVENOUS at 21:59

## 2024-11-12 RX ADMIN — Medication 120 MG: at 10:36

## 2024-11-12 RX ADMIN — VASOPRESSIN 1 UNITS: 20 INJECTION INTRAVENOUS at 11:13

## 2024-11-12 RX ADMIN — ROCURONIUM BROMIDE 50 MG: 10 INJECTION, SOLUTION INTRAVENOUS at 11:55

## 2024-11-12 RX ADMIN — ALBUMIN (HUMAN) 12.5 G: 12.5 SOLUTION INTRAVENOUS at 12:04

## 2024-11-12 RX ADMIN — VASOPRESSIN 1 UNITS: 20 INJECTION INTRAVENOUS at 13:08

## 2024-11-12 RX ADMIN — AMPICILLIN SODIUM, SULBACTAM SODIUM 3000 MG: 2; 1 INJECTION, POWDER, FOR SOLUTION INTRAMUSCULAR; INTRAVENOUS at 08:21

## 2024-11-12 RX ADMIN — EPHEDRINE SULFATE 2.5 MG: 50 INJECTION INTRAVENOUS at 13:13

## 2024-11-12 RX ADMIN — VASOPRESSIN 0.02 UNITS/MIN: 20 INJECTION INTRAVENOUS at 12:02

## 2024-11-12 RX ADMIN — ATORVASTATIN CALCIUM 10 MG: 10 TABLET, FILM COATED ORAL at 20:02

## 2024-11-12 RX ADMIN — PHENYLEPHRINE HYDROCHLORIDE 20 MCG/MIN: 10 INJECTION INTRAVENOUS at 10:38

## 2024-11-12 RX ADMIN — VASOPRESSIN 1 UNITS: 20 INJECTION INTRAVENOUS at 12:22

## 2024-11-12 RX ADMIN — VASOPRESSIN 1 UNITS: 20 INJECTION INTRAVENOUS at 11:33

## 2024-11-12 RX ADMIN — SODIUM CHLORIDE, SODIUM GLUCONATE, SODIUM ACETATE, POTASSIUM CHLORIDE AND MAGNESIUM CHLORIDE: 526; 502; 368; 37; 30 INJECTION, SOLUTION INTRAVENOUS at 13:53

## 2024-11-12 RX ADMIN — CALCIUM CHLORIDE 0.2 G: 100 INJECTION, SOLUTION INTRAVENOUS at 11:35

## 2024-11-12 RX ADMIN — PROPOFOL 50 MCG/KG/MIN: 10 INJECTION, EMULSION INTRAVENOUS at 10:50

## 2024-11-12 RX ADMIN — SENNOSIDES AND DOCUSATE SODIUM 2 TABLET: 50; 8.6 TABLET ORAL at 20:01

## 2024-11-12 RX ADMIN — VASOPRESSIN 1 UNITS: 20 INJECTION INTRAVENOUS at 12:07

## 2024-11-12 RX ADMIN — PHENYLEPHRINE HYDROCHLORIDE 150 MCG/MIN: 50 INJECTION INTRAVENOUS at 23:33

## 2024-11-12 RX ADMIN — VASOPRESSIN 1 UNITS: 20 INJECTION INTRAVENOUS at 12:45

## 2024-11-12 RX ADMIN — METHOCARBAMOL 1000 MG: 100 INJECTION INTRAMUSCULAR; INTRAVENOUS at 18:48

## 2024-11-12 RX ADMIN — SUGAMMADEX 200 MG: 100 INJECTION, SOLUTION INTRAVENOUS at 13:34

## 2024-11-12 RX ADMIN — POTASSIUM CHLORIDE 10 MEQ: 7.46 INJECTION, SOLUTION INTRAVENOUS at 19:46

## 2024-11-12 RX ADMIN — ALBUMIN (HUMAN) 12.5 G: 12.5 SOLUTION INTRAVENOUS at 11:48

## 2024-11-12 RX ADMIN — VASOPRESSIN 1 UNITS: 20 INJECTION INTRAVENOUS at 09:30

## 2024-11-12 RX ADMIN — EPHEDRINE SULFATE 10 MG: 50 INJECTION INTRAVENOUS at 13:40

## 2024-11-12 RX ADMIN — REMIFENTANIL HYDROCHLORIDE 0.05 MCG/KG/MIN: 1 INJECTION, POWDER, LYOPHILIZED, FOR SOLUTION INTRAVENOUS at 11:00

## 2024-11-12 RX ADMIN — VASOPRESSIN 1 UNITS: 20 INJECTION INTRAVENOUS at 11:07

## 2024-11-12 RX ADMIN — CEFAZOLIN SODIUM 2 G: 1 POWDER, FOR SOLUTION INTRAMUSCULAR; INTRAVENOUS at 11:37

## 2024-11-12 RX ADMIN — PHENYLEPHRINE HYDROCHLORIDE 100 MCG: 10 INJECTION, SOLUTION INTRAVENOUS at 13:59

## 2024-11-12 RX ADMIN — CALCIUM CHLORIDE 0.2 G: 100 INJECTION, SOLUTION INTRAVENOUS at 11:25

## 2024-11-12 RX ADMIN — METOPROLOL TARTRATE 5 MG: 1 INJECTION, SOLUTION INTRAVENOUS at 08:14

## 2024-11-12 RX ADMIN — PHENYLEPHRINE HYDROCHLORIDE 200 MCG: 10 INJECTION, SOLUTION INTRAVENOUS at 14:14

## 2024-11-12 RX ADMIN — EPHEDRINE SULFATE 5 MG: 50 INJECTION INTRAVENOUS at 14:05

## 2024-11-12 RX ADMIN — VASOPRESSIN 1 UNITS: 20 INJECTION INTRAVENOUS at 11:00

## 2024-11-12 RX ADMIN — ACETAMINOPHEN 1000 MG: 500 TABLET, FILM COATED ORAL at 20:01

## 2024-11-12 RX ADMIN — AMPICILLIN SODIUM, SULBACTAM SODIUM 3000 MG: 2; 1 INJECTION, POWDER, FOR SOLUTION INTRAMUSCULAR; INTRAVENOUS at 01:41

## 2024-11-12 RX ADMIN — SODIUM CHLORIDE, PRESERVATIVE FREE 10 ML: 5 INJECTION INTRAVENOUS at 20:02

## 2024-11-12 RX ADMIN — VASOPRESSIN 1 UNITS: 20 INJECTION INTRAVENOUS at 11:50

## 2024-11-12 RX ADMIN — POTASSIUM CHLORIDE 10 MEQ: 7.46 INJECTION, SOLUTION INTRAVENOUS at 18:33

## 2024-11-12 ASSESSMENT — PAIN SCALES - WONG BAKER
WONGBAKER_NUMERICALRESPONSE: HURTS A LITTLE BIT
WONGBAKER_NUMERICALRESPONSE: HURTS WORST
WONGBAKER_NUMERICALRESPONSE: NO HURT

## 2024-11-12 ASSESSMENT — LIFESTYLE VARIABLES: SMOKING_STATUS: 0

## 2024-11-12 ASSESSMENT — PAIN DESCRIPTION - LOCATION
LOCATION: BACK

## 2024-11-12 ASSESSMENT — PAIN SCALES - GENERAL
PAINLEVEL_OUTOF10: 2
PAINLEVEL_OUTOF10: 10
PAINLEVEL_OUTOF10: 2
PAINLEVEL_OUTOF10: 4
PAINLEVEL_OUTOF10: 7

## 2024-11-12 ASSESSMENT — PULMONARY FUNCTION TESTS
PIF_VALUE: 20
PIF_VALUE: 18
PIF_VALUE: 17
PIF_VALUE: 14
PIF_VALUE: 17
PIF_VALUE: 15
PIF_VALUE: 15

## 2024-11-12 ASSESSMENT — PAIN DESCRIPTION - FREQUENCY: FREQUENCY: CONTINUOUS

## 2024-11-12 ASSESSMENT — PAIN DESCRIPTION - ORIENTATION
ORIENTATION: MID
ORIENTATION: POSTERIOR
ORIENTATION: POSTERIOR

## 2024-11-12 ASSESSMENT — PAIN DESCRIPTION - PAIN TYPE
TYPE: CHRONIC PAIN
TYPE: CHRONIC PAIN

## 2024-11-12 ASSESSMENT — PAIN DESCRIPTION - DESCRIPTORS
DESCRIPTORS: ACHING
DESCRIPTORS: ACHING
DESCRIPTORS: ACHING;SORE

## 2024-11-12 ASSESSMENT — PAIN DESCRIPTION - ONSET: ONSET: ON-GOING

## 2024-11-12 NOTE — OP NOTE
Operative Note      Patient: Juan Mohamud  YOB: 1940  MRN: [id number]    Date of Procedure: 11/12/2024    Pre-Op Diagnosis Codes:      * closed unstable burst fracture of eleventh thoracic vertebra; epidural hematoma    Post-Op Diagnosis: Same       Procedure(s):  THORACIC 8-LUMBAR 1 POSTERIOR DECOMPRESSION FIXATION AND FUSION    Surgeon(s):  Nik Posey MD    Assistant:   Surgical Assistant: Matt Justin    Anesthesia: General    Estimated Blood Loss (mL): 400    Complications: None    Specimens:   * No specimens in log *    Implants:  * No implants in log *      Drains:   External Urinary Catheter (Active)   Site Assessment Clean,dry & intact 11/11/24 2000   Placement Replaced 11/11/24 1100   Securement Method Other (Comment) 11/11/24 1100   Catheter Care Catheter/Wick replaced;Suction Canister/Tubing changed 11/11/24 1100   Perineal Care Yes 11/11/24 1100   Suction 40 mmgHg continuous 11/11/24 1100   Urine Color Brown 11/11/24 1100   Urine Appearance Clear 11/11/24 1100   Urine Odor Malodorous 11/11/24 1100   Output (mL) 200 mL 11/11/24 1100       [REMOVED] NG/OG/NJ/NE Tube Right nostril (Removed)   Surrounding Skin Clean, dry & intact 11/11/24 1205   Securement device Adhesive based cervantes 11/11/24 1205   Status Suction-low intermittent 11/11/24 1205   Placement Verified External Catheter Length 11/11/24 1205   NG/OG/NJ/NE External Measurement (cm) 65 cm 11/11/24 1205   Drainage Appearance Brown 11/11/24 1205   Output (mL) 100 ml 11/11/24 1205       [REMOVED] Rectal Tube (Removed)   Site Assessment Clean, dry & intact 11/09/24 0334   Stool Appearance Watery 11/08/24 1800   Stool Color Brown 11/08/24 1800   Rectal Tube Output (mL) 0 ml 11/10/24 0414       Findings:  Infection Present At Time Of Surgery (PATOS) (choose all levels that have infection present):  No infection present    Date of Procedure: Nov 12, 2024    Pre-op Diagnosis: T11 three column extension-distraction

## 2024-11-12 NOTE — PROGRESS NOTES
[facility] Cardiology Progress Note    Primary Cardiologist:     CC/HPI:     S: Seen prior to being transported to OR. Family at bedside. Denies cp or sob.  Requiring 2.5 liters oxygen     Tele: SVT, IVCD    O:  Physical Exam:  /65   Pulse (!) 121   Temp 97.8 °F (36.6 °C) (Oral)   Resp 16   Ht 1.956 m (6' 5\")   Wt 92.4 kg (203 lb 11.3 oz)   SpO2 95%   BMI 24.16 kg/m²    General (appearance):  No acute distress  Eyes: anicteric   Neck: soft, No JVD  Ears/Nose/Mouth/Thorat: No cyanosis  CV: + ectopy   Respiratory:  clear anterior. Normal effort.   GI: soft, non-tender, non-distended  Skin: Warm, dry. No rashes  Neuro/Psych: Alert and cooperative. Appropriate behavior  Ext:  No c/c. + LE edema    I.O's= -2 L     Weight  Admission: Weight - Scale: 89.5 kg (197 lb 5 oz)   Today: Weight - Scale: 92.4 kg (203 lb 11.3 oz)    CBC:   Recent Labs     11/10/24  0729 24  0755 24  0603   WBC 11.4* 12.3* 17.4*   HGB 13.6 14.4 14.3   HCT 41.6 44.0 44.9   MCV 89.7 90.1 92.1    245 260     BMP:   Recent Labs     11/10/24  1706 24  0755 24  0603    137  137 137   K 3.4* 3.5  3.2* 3.0*    106  105 107   CO2 19* 18*  18* 18*   PHOS  --  2.8  --    BUN 40* 37*  37* 35*   CREATININE 1.2 1.0  1.0 0.9     Estimated Creatinine Clearance: 78 mL/min (based on SCr of 0.9 mg/dL).  Mag:   Lab Results   Component Value Date/Time    MG 2.09 2024 06:03 AM     LIVER PROFILE:   Recent Labs     24  2145   AST 46*   ALT 34   BILITOT 1.1*   ALKPHOS 184*     Pro-BNP:   Lab Results   Component Value Date/Time    PROBNP 7,647 2024 07:55 AM    PROBNP 4,975 2024 09:11 PM    PROBNP 2,190 2024 03:05 PM    PROBNP 2,599 2024 10:44 PM     High Sensitivity Troponin:   Lab Results   Component Value Date    TROPHS 60 (H) 2024       Imagin2024 Echo    Left Ventricle: Normal left ventricular systolic function with a visually

## 2024-11-12 NOTE — PROGRESS NOTES
Pt extubated, 2L NC, A/Ox4, moves all extremities. Per , no HOB or turning limitations. Pt off of all sedation, off of vasopressin, 90mcg/kg/hr arleth-synephrine. Currently receiving K+ replacement. Per resident, okay to remove galaviz and complete swallow screen. Pt denies pain at this time.

## 2024-11-12 NOTE — PLAN OF CARE
Problem: Safety - Adult  Goal: Free from fall injury  11/11/2024 2205 by Claudia Phan, RN  Outcome: Progressing   All fall precautions in place. Bed locked and in lowest position with alarm on. Overbed table and personal belonings within reach. Call light within reach and patient instructed to use call light for assistance. Non-skid socks on.  Problem: Pain  Goal: Verbalizes/displays adequate comfort level or baseline comfort level  11/11/2024 2205 by Claudia Phan, RN  Outcome: Progressing   Pt endorsing pain to back. Being treated with PRN pain medication, rest, and frequent repositioning with pillow support for comfort and pressure relief. Pt reports some relief from pain with above interventions.

## 2024-11-12 NOTE — PROGRESS NOTES
NEUROSURGERY PROGRESS NOTE    11/12/2024 10:45 AM                               Juan Mohamud                      LOS: 5 days   POD#0 s/p Procedure(s) (LRB):  THORACIC 8-LUMBAR 1 POSTERIOR DECOMPRESSION FIXATION AND FUSION (N/A)    Subjective: In language easily understood by a layperson, the risks and benefits of surgical intervention were discussed at length with the patient and any family members present. Risks including, but not limited to, infection, bleeding, possible blood transfusion, numbness, weakness, paralysis, loss of bowel or bladder control, or death were explained fully.  Any questions were answered to the patient's satisfaction and signed consent obtained.      Physical Exam:  Patient seen and examined    Vitals:    11/12/24 0755   BP: 107/65   Pulse: (!) 119   Resp: 14   Temp: 96.8 °F (36 °C)   SpO2: 91%     GCS:  4 - Opens eyes on own  5 - Alert and oriented  6 - Follows simple motor commands  General: Well developed. Alert and cooperative in no acute distress.   HENT: atraumatic, neck supple  Eyes: Optic discs: Not tested  Pulmonary: unlabored respiratory effort  Cardiovascular:  Warm well perfused. No peripheral edema  Gastrointestinal: abdomen soft, NT, ND    Neurological:  Mental Status: Awake, alert, oriented x 4, speech clear and appropriate  Attention: Intact  Language: No aphasia or dysarthria noted  Sensation: Intact to all extremities to light touch  Coordination: Intact    Cranial Nerves:  II: Visual acuity not tested, denies new visual changes / diplopia  III, IV, VI: PERRL, 3 mm bilaterally, EOMI, no nystagmus noted  V: Facial sensation intact bilaterally to touch  VII: Face symmetric  VIII: Hearing intact bilaterally to spoken voice  IX: Palate movement equal bilaterally  XI: Shoulder shrug equal bilaterally  XII: Tongue midline    Musculoskeletal:   Gait: Not tested   Assist devices: None   Tone: Normal  Motor strength:    Right  Left    Right  Left    Deltoid  5 5  Hip

## 2024-11-12 NOTE — H&P
ICU CONSULT       PCP:  Janet Garcia MD          Admit Date:  11/7/2024                            Hospital Day:6  ICU Day: 6      CC: mechanical fall   Reason for consult: vent management  History obtained from:  chart review    SUBJECTIVE   HPI:    Mr. Juan Mohamud is a 84 y.o. male with a medical hx significant for type II DM, degenerative lumbar disease, arrhythmia s/p AICD, diastolic CHF, and macular degeneration otherwise as listed in the MHx table below, who presented from home to the [facility] ED on 11/3/24 after a fall.     Per chart review, patient fell after feeling like his \"knees were giving out.\" No reported symptoms prior to this fall, patient landed on his left side and hit the back of his head but did not lose consciousness at this time.      Upon arrival to the [facility] ED, patient was alert, oriented and reported with GCS of 15 and NIH of 0. While in the ED patient had increasing generalized weakness and difficulty getting up from a sitting position. Decision was made to admit patient as they did not feel safe discharging him home given his inability to stand unassisted.      Fall was thought to be mechanical and secondary to generalized weakness due to poor nutrition. CT abd pelv performed due to complaints of abdominal pain, distension, and back pain upon arrival to the floor. Imaging revealed acute T11 vertebral fracture extending into the T10-T11 disc space as well as diffuse distention of small and large bowel compatible with ileus and bilateral pleural effusions.     GI consulted for ileus, and patient underwent colonic decompression     MRI thoracic spine then demonstrated large epidural spinal hematoma with cord compression  and additional T6 subacute compression fracture along with bilateral pleural effusions.     Patient was transferred to [facility] on 11/7 for neurosurgical care.      Hospital Course:   Due to unstable T11 fracture he was planned for surgical  in pulmonary edema and bilateral pleural effusions. Patient is chronically on 3L nasal cannula, currently on 3L here and saturating well. Does not endorse shortness of breath. ProBNP 4,975 upon arrival to [facility], was 2,190 in July. Current stable pleural effusions may be in setting of congestive heart failure.   -consider repeat CXR to assess for possible fluid overload, although no current evidence on physical exam       Chronic Conditions:     Type II DM: last A1C 5.1 (11/2024) low dose sliding scale, home medications held   Congestive heart failure, diastolic (07/2024 EF 50-55%, normal wall motion.   Peripheral neuropathy: hold home gabapentin  Chronic idiopathic constipation: hold home Linzess   Dysthymic disorder: hold home Wellbutrin  Insomnia: hold home zolpidem  BPH, nocturnal polyuria: home tamsulosin and desmopressin-held.      Glycemic goal:  140-180 mg/dL  LDAs:  peripheral IV right forearm, ET tube, L PIV, Cuellar  Infusions:  phenylephrine  Abx: N/A   Diet:  NPO  GI PPx:   protonix 40 mg daily  Bowel Regimen:  none  DVT PPx: SCDs

## 2024-11-12 NOTE — PROGRESS NOTES
General Surgery   Daily Progress Note  Patient: Juan Mohamud      CC: Distension, [place]'s syndrome      SUBJECTIVE:     NGT removed yesterday. Patient with 3 reported bowel movements.     ROS:   A 14 point review of systems was conducted, significant findings as noted above. All other systems negative.    OBJECTIVE:    PHYSICAL EXAM:    Vitals:    11/11/24 2345 11/12/24 0323 11/12/24 0532 11/12/24 0602   BP:  104/65     Pulse:  (!) 121     Resp: 18 18 16 16   Temp:  97.8 °F (36.6 °C)     TempSrc:  Oral     SpO2:  95%     Weight:       Height:           General appearance: alert, no acute distress  HEENT: NGT in place with gastric output   Neck: trachea midline, neck supple  Respiratory: Normal effort with no accessory muscle use on 2L NC  Cardiovascular: RRR  Abdomen: soft, non-tender, distended, tympanic to percussion, no guarding, no rigidity  Skin: warm and dry, no rashes  Neuro: A&Ox3, no focal deficits    LABS:   Recent Labs     11/11/24  0755 11/12/24  0603   WBC 12.3* 17.4*   HGB 14.4 14.3   HCT 44.0 44.9   MCV 90.1 92.1    260        Recent Labs     11/11/24  0755 11/12/24  0603     137 137   K 3.5  3.2* 3.0*     105 107   CO2 18*  18* 18*   PHOS 2.8  --    BUN 37*  37* 35*   CREATININE 1.0  1.0 0.9        Recent Labs     11/09/24  2145   AST 46*   ALT 34   BILITOT 1.1*   ALKPHOS 184*      No results for input(s): \"LIPASE\", \"AMYLASE\" in the last 72 hours.     Recent Labs     11/11/24  1552   INR 1.15   APTT 23.1      No results for input(s): \"CKTOTAL\", \"CKMB\", \"CKMBINDEX\", \"TROPONINI\" in the last 72 hours.      XR ABDOMEN (KUB) (SINGLE AP VIEW)    Result Date: 11/12/2024  EXAM: XR ABDOMEN (KUB) (SINGLE AP VIEW) INDICATION: increased nausea with known ileus COMPARISON: November 11, 2024 FINDINGS: BOWEL GAS PATTERN: Multiple loops of dilated small bowel and colon throughout the abdomen similar to recent CT. BONES AND SOFT TISSUES: No significant abnormality. No abnormal

## 2024-11-12 NOTE — ANESTHESIA POSTPROCEDURE EVALUATION
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    Caitlyn Phase I: Caitlyn Score: 10    Caitlyn Phase II:      Anesthesia Post Evaluation    Patient location during evaluation: ICU  Patient participation: complete - patient participated  Level of consciousness: sedated and ventilated  Airway patency: patent  Nausea & Vomiting: no nausea and no vomiting  Cardiovascular status: blood pressure returned to baseline and hemodynamically stable  Respiratory status: acceptable  Hydration status: euvolemic  Multimodal analgesia pain management approach  Pain management: adequate    No notable events documented.

## 2024-11-12 NOTE — PROGRESS NOTES
Patient is alert and oriented x4. VSS on 2.5 L high flow nasal cannula. Medications given per MAR, no side effects noted. Patient on bedrest. Complaints of pain to back, continuing to monitor and manage per MAR. Voiding well via external catheter, x2 bm this shift. Second IV placed for surgery today.      Patient is currently resting in bed with bed alarm on for safety. Call light within reach and all fall precautions in place. Plan of care continues.

## 2024-11-12 NOTE — PROGRESS NOTES
[facility] Medicine Progress Note  V 10.25      Date of Admission: 11/7/2024    Hospital Day: 6      Chief Admission Complaint:  fall, back pain     Subjective:  Patient seen and examined at bedside. No acute events overnight. Taken to the OR this morning for T8-L1 posterior decompression fixation and fusion for his closed unstable burst fracture of T11 vertebra. Patient seen following procedure, remained intubated and on pressors post-op. On heavy sedation, though did grimace to pain with sternal rub.     Presenting Admission History:       Mr. Juan Mohamud is a 84 y.o. male with a medical hx significant for type II DM, degenerative lumbar disease, arrhythmia s/p AICD, diastolic CHF, and macular degeneration otherwise as listed in the MHx table below, who presented from home to the [facility] ED on 11/3/24 after a fall.     Per chart review, patient fell after feeling like his \"knees were giving out.\" No reported symptoms prior to this fall, patient landed on his left side and hit the back of his head but did not lose consciousness at this time.      Upon arrival to the [facility] ED, patient was alert, oriented and reported with GCS of 15 and NIH of 0. While in the ED patient had increasing generalized weakness and difficulty getting up from a sitting position. Decision was made to admit patient as they did not feel safe discharging him home given his inability to stand unassisted.      Fall was thought to be mechanical and secondary to generalized weakness due to poor nutrition. CT abd pelv performed due to complaints of abdominal pain, distension, and back pain upon arrival to the floor. Imaging revealed acute T11 vertebral fracture extending into the T10-T11 disc space as well as diffuse distention of small and large bowel compatible with ileus and bilateral pleural effusions.     GI consulted for ileus, and patient underwent colonic decompression     MRI thoracic spine then demonstrated large epidural

## 2024-11-12 NOTE — ANESTHESIA PRE PROCEDURE
Department of Anesthesiology  Preprocedure Note       Name:  Juan Mohamud   Age:  84 y.o.  :  1940                                          MRN:  [id number]         Date:  2024      Surgeon: Surgeon(s):  Nik Posey MD    Procedure: Procedure(s):  THORACIC 8-LUMBAR 1 POSTERIOR DECOMPRESSION FIXATION AND FUSION    Medications prior to admission:   Prior to Admission medications    Medication Sig Start Date End Date Taking? Authorizing Provider   buPROPion (WELLBUTRIN XL) 300 MG extended release tablet Take 1 tablet by mouth daily 10/17/24  Yes Provider, MD Rylan   Multiple Vitamins-Minerals (PRESERVISION AREDS PO) Take 1 tablet by mouth in the morning and at bedtime   Yes Provider, MD Rylan   gabapentin (NEURONTIN) 300 MG capsule Take 2 capsules by mouth 3 times daily.   Yes ProviderRylan MD   torsemide (DEMADEX) 20 MG tablet Take 1 tablet by mouth See Admin Instructions Two days a week 24  Yes Bong Ricci MD   amiodarone (CORDARONE) 200 MG tablet Take 1 tablet by mouth daily 24  Yes Esperanza Posey APRN - CNP   simethicone (MYLICON) 80 MG chewable tablet Take 1 tablet by mouth every 6 hours as needed for Flatulence 24  Yes Esperanza Posey APRN - CNP   spironolactone (ALDACTONE) 25 MG tablet Take 1 tablet by mouth daily 24  Yes Esperanza Posey APRN - CNP   losartan (COZAAR) 50 MG tablet Take 1 tablet by mouth daily 24  Yes Esperanza Posey APRN - CNP   atorvastatin (LIPITOR) 10 MG tablet Take 1 tablet by mouth nightly 24  Yes Esperanza Posey APRN - CNP   metoprolol succinate (TOPROL XL) 25 MG extended release tablet Take 1 tablet by mouth daily   Yes ProviderRylan MD   omeprazole (PRILOSEC) 40 MG delayed release capsule Take 1 capsule by mouth daily as needed (Heartburn, Indigestion)   Yes ProviderRylan MD   zolpidem (AMBIEN CR) 12.5 MG extended release tablet Take 1 tablet by mouth nightly.   Yes

## 2024-11-12 NOTE — PROGRESS NOTES
Hibiclens bath completed with linen and gown change. Patient tolerating sips of water and ice chips with medications fairly well. NG tube removed earlier in the shift. Fall and safety precautions in place, call light within reach.
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
## 2024-11-12 NOTE — PLAN OF CARE
Problem: Chronic Conditions and Co-morbidities  Goal: Patient's chronic conditions and co-morbidity symptoms are monitored and maintained or improved  11/12/2024 1018 by Litzy Monroe RN  Outcome: Progressing  11/11/2024 2205 by Claudia Phan RN  Outcome: Progressing     Problem: Discharge Planning  Goal: Discharge to home or other facility with appropriate resources  11/12/2024 1018 by Litzy Monroe RN  Outcome: Progressing  11/11/2024 2205 by Claudia Phan RN  Outcome: Progressing     Problem: Safety - Adult  Goal: Free from fall injury  11/12/2024 1018 by Litzy Monroe RN  Outcome: Progressing  11/11/2024 2205 by Claudia Phan RN  Outcome: Progressing     Problem: Skin/Tissue Integrity  Goal: Absence of new skin breakdown  Description: 1.  Monitor for areas of redness and/or skin breakdown  2.  Assess vascular access sites hourly  3.  Every 4-6 hours minimum:  Change oxygen saturation probe site  4.  Every 4-6 hours:  If on nasal continuous positive airway pressure, respiratory therapy assess nares and determine need for appliance change or resting period.  11/12/2024 1018 by Litzy Monroe RN  Outcome: Progressing  11/11/2024 2205 by Claudia Phan RN  Outcome: Progressing     Problem: ABCDS Injury Assessment  Goal: Absence of physical injury  11/12/2024 1018 by Litzy Monroe RN  Outcome: Progressing  11/11/2024 2205 by Claudia Phan RN  Outcome: Progressing     Problem: Pain  Goal: Verbalizes/displays adequate comfort level or baseline comfort level  11/12/2024 1018 by Litzy Monroe RN  Outcome: Progressing  11/11/2024 2205 by Claudia Phan RN  Outcome: Progressing     Problem: Respiratory - Adult  Goal: Achieves optimal ventilation and oxygenation  11/12/2024 1018 by Litzy Monroe RN  Outcome: Progressing  11/11/2024 2205 by Claudia Phan RN  Outcome: Progressing     Problem: Cardiovascular - Adult  Goal: Maintains optimal cardiac output and hemodynamic

## 2024-11-12 NOTE — PROGRESS NOTES
Patient has been successfully weaned from Mechanical Ventilation.  RSBI before extubation was 62 with EtCO2 of 36 and SpO2 of 100 on 40% FiO2.  Patient extubated and placed on 2 liters/min via nasal cannula.  Post extubation SpO2 is 98% with HR  88 bpm and RR 21 breaths/min.  Patient had mild cough that was productive of clear  and white sputum.  Extubation Well tolerated by patient..

## 2024-11-12 NOTE — PROGRESS NOTES
Patient placed in SBT   11/12/24 1708   Patient Observation   Pulse (!) 118   Respirations 19   SpO2 97 %   Vent Information   Vent Mode CPAP/PS   Ventilator Settings   Vt (Set, mL) 575 mL   Resp Rate (Set) 20 bpm   PEEP/CPAP (cmH2O) 5   Insp Time (sec) 0.9 sec   Pressure Support (cm H2O) 10 cm H2O   Vent Patient Data (Readings)   Vt (Measured) 551 mL   Peak Inspiratory Pressure (cmH2O) 15 cmH2O   Rate Measured 20 br/min   Minute Volume (L/min) 11.3 Liters   Mean Airway Pressure (cmH2O) 7 cmH20   I:E Ratio 1:3.10   Dynamic Compliance (L/cm H2O) 51 L/cm H2O   Vent Alarm Settings   Low Pressure (cmH2O) 10 cmH2O   High Pressure (cmH2O) 40 cmH2O   Low Minute Volume (lpm) 6 L/min   High Minute Volume (lpm) 40 L/min   RR Low (bpm) 10   RR High (bpm) 40 br/min

## 2024-11-12 NOTE — PROGRESS NOTES
GI Progress Note      Juan Mohamud is a 84 y.o. male patient.  No diagnosis found.    Admit Date: 11/7/2024    Subjective:       Went to the OR; intubated/sedated      ROS:  As per above    Scheduled Meds:   [START ON 11/13/2024] SMOG Enema  330 mL Rectal Daily    potassium chloride  10 mEq IntraVENous Q1H    metoclopramide  5 mg IntraVENous Q6H    ampicillin-sulbactam  3,000 mg IntraVENous Q6H    butamben-tetracaine-benzocaine  1 spray Topical Once    pantoprazole  40 mg IntraVENous Daily    metoprolol  5 mg IntraVENous Q6H    naloxegol  25 mg Oral QAM AC    balsum peru-castor oil   Topical BID    linaclotide  290 mcg Oral QAM AC    insulin lispro  0-4 Units SubCUTAneous Q4H    gabapentin  600 mg Oral TID    buPROPion  300 mg Oral Daily    atorvastatin  10 mg Oral Nightly    amiodarone  200 mg Oral Daily    [Held by provider] metoprolol succinate  25 mg Oral Daily    tamsulosin  0.4 mg Oral Daily    [Held by provider] midodrine  5 mg Oral TID WC    polyethylene glycol  17 g Oral BID    [Held by provider] DESMOpressin  200 mcg Oral Nightly    acetaminophen  1,000 mg Oral BID    sennosides-docusate sodium  2 tablet Oral BID       Continuous Infusions:   VASOpressin 20 Units in sodium chloride 0.9 % 100 mL infusion 0.03 Units/min (11/12/24 1223)    phenylephrine      propofol      dexmedeTOMIDine      sodium chloride      dextrose         PRN Meds:  lidocaine, HYDROmorphone **OR** HYDROmorphone, methocarbamol, zolpidem, sodium chloride flush, sodium chloride, ondansetron **OR** ondansetron, acetaminophen **OR** acetaminophen, lidocaine, [Held by provider] HYDROmorphone, [Held by provider] oxyCODONE, glucose, dextrose bolus **OR** dextrose bolus, glucagon (rDNA), dextrose      Objective:       Patient Vitals for the past 24 hrs:   BP Temp Temp src Pulse Resp SpO2   11/12/24 1546 -- -- -- -- -- 99 %   11/12/24 0755 107/65 96.8 °F (36 °C) Axillary (!) 119 14 91 %   11/12/24 0602 -- -- -- -- 16 --   11/12/24 0532  >10cms  Call back with questions or concerns    Ruiz Patel MD  11/12/2024  4:30 PM

## 2024-11-12 NOTE — ANESTHESIA PROCEDURE NOTES
Arterial Line:    An arterial line was placed in the OR for the following indication(s): continuous blood pressure monitoring.    A 20 gauge (size) (length), Arrow (type) catheter was placed, Seldinger technique used, into the left radial artery, secured by tape and Tegaderm.  Anesthesia type: General    Events:  patient tolerated procedure well with no complications and EBL < 5mL.    Additional notes:  Easy placement using ultrasound guidance11/12/2024 10:45 AM11/12/2024 10:50 AM  Anesthesiologist: Abeba Aguero DO  Performed: Anesthesiologist   Preanesthetic Checklist  Completed: patient identified, IV checked, site marked, risks and benefits discussed, surgical/procedural consents, equipment checked, pre-op evaluation, timeout performed, anesthesia consent given, oxygen available, monitors applied/VS acknowledged, fire risk safety assessment completed and verbalized and blood product R/B/A discussed and consented

## 2024-11-12 NOTE — PROGRESS NOTES
Patient began having nausea and difficulty swallowing at this time. Due to ileus, provider notified. STAT abd xray ordered. Zofran administered. Will continue to monitor.

## 2024-11-12 NOTE — PROGRESS NOTES
Patient obtained new skin tears on bilateral arms during positioning for the procedure. Skin tears were managed with 4x4 dressings throughout the procedure.

## 2024-11-13 ENCOUNTER — APPOINTMENT (OUTPATIENT)
Dept: GENERAL RADIOLOGY | Age: 84
DRG: 447 | End: 2024-11-13
Attending: INTERNAL MEDICINE
Payer: MEDICARE

## 2024-11-13 ENCOUNTER — HOSPITAL ENCOUNTER (INPATIENT)
Dept: VASCULAR LAB | Age: 84
Discharge: HOME | DRG: 447 | End: 2024-11-15
Attending: INTERNAL MEDICINE
Payer: MEDICARE

## 2024-11-13 PROBLEM — I95.81 HYPOTENSION AFTER PROCEDURE: Status: ACTIVE | Noted: 2024-11-13

## 2024-11-13 PROBLEM — S22.009A FX DORSAL VERTEBRA-CLOSED (HCC): Status: ACTIVE | Noted: 2024-11-07

## 2024-11-13 PROBLEM — I95.9 HYPOTENSION: Status: ACTIVE | Noted: 2024-11-13

## 2024-11-13 LAB
ANION GAP SERPL CALCULATED.3IONS-SCNC: 10 MMOL/L (ref 3–16)
BASOPHILS # BLD: 0 K/UL (ref 0–0.2)
BASOPHILS NFR BLD: 0.1 %
BUN SERPL-MCNC: 37 MG/DL (ref 7–20)
CALCIUM SERPL-MCNC: 7.7 MG/DL (ref 8.3–10.6)
CHLORIDE SERPL-SCNC: 108 MMOL/L (ref 99–110)
CO2 SERPL-SCNC: 20 MMOL/L (ref 21–32)
CREAT SERPL-MCNC: 1 MG/DL (ref 0.8–1.3)
DEPRECATED RDW RBC AUTO: 17.2 % (ref 12.4–15.4)
ECHO BSA: 2.2 M2
EOSINOPHIL # BLD: 0 K/UL (ref 0–0.6)
EOSINOPHIL NFR BLD: 0 %
GFR SERPLBLD CREATININE-BSD FMLA CKD-EPI: 75 ML/MIN/{1.73_M2}
GLUCOSE BLD-MCNC: 110 MG/DL (ref 70–99)
GLUCOSE BLD-MCNC: 110 MG/DL (ref 70–99)
GLUCOSE BLD-MCNC: 119 MG/DL (ref 70–99)
GLUCOSE BLD-MCNC: 123 MG/DL (ref 70–99)
GLUCOSE BLD-MCNC: 95 MG/DL (ref 70–99)
GLUCOSE SERPL-MCNC: 127 MG/DL (ref 70–99)
HCT VFR BLD AUTO: 34.1 % (ref 40.5–52.5)
HEMOGLOBIN: 11.3 G/DL (ref 13.5–17.5)
HGB BLD-MCNC: 11.3 G/DL (ref 13.5–17.5)
LYMPHOCYTES # BLD: 1.1 K/UL (ref 1–5.1)
LYMPHOCYTES NFR BLD: 6.1 %
MAGNESIUM SERPL-MCNC: 2.11 MG/DL (ref 1.8–2.4)
MCH RBC QN AUTO: 29.8 PG (ref 26–34)
MCHC RBC AUTO-ENTMCNC: 33.1 G/DL (ref 31–36)
MCV RBC AUTO: 89.9 FL (ref 80–100)
MONOCYTES # BLD: 2 K/UL (ref 0–1.3)
MONOCYTES NFR BLD: 10.9 %
NEUTROPHILS # BLD: 15.1 K/UL (ref 1.7–7.7)
NEUTROPHILS NFR BLD: 82.9 %
PERFORMED ON: ABNORMAL
PERFORMED ON: NORMAL
PLATELET # BLD AUTO: 375 K/UL (ref 135–450)
PMV BLD AUTO: 7.7 FL (ref 5–10.5)
POTASSIUM SERPL-SCNC: 3.9 MMOL/L (ref 3.5–5.1)
RBC # BLD AUTO: 3.79 M/UL (ref 4.2–5.9)
SODIUM SERPL-SCNC: 138 MMOL/L (ref 136–145)
WBC # BLD AUTO: 18.2 K/UL (ref 4–11)

## 2024-11-13 PROCEDURE — 6360000002 HC RX W HCPCS: Performed by: STUDENT IN AN ORGANIZED HEALTH CARE EDUCATION/TRAINING PROGRAM

## 2024-11-13 PROCEDURE — 93970 EXTREMITY STUDY: CPT | Performed by: SURGERY

## 2024-11-13 PROCEDURE — 74018 RADEX ABDOMEN 1 VIEW: CPT

## 2024-11-13 PROCEDURE — 6370000000 HC RX 637 (ALT 250 FOR IP)

## 2024-11-13 PROCEDURE — 99223 1ST HOSP IP/OBS HIGH 75: CPT | Performed by: INTERNAL MEDICINE

## 2024-11-13 PROCEDURE — 97166 OT EVAL MOD COMPLEX 45 MIN: CPT

## 2024-11-13 PROCEDURE — 6370000000 HC RX 637 (ALT 250 FOR IP): Performed by: STUDENT IN AN ORGANIZED HEALTH CARE EDUCATION/TRAINING PROGRAM

## 2024-11-13 PROCEDURE — 2580000003 HC RX 258: Performed by: ANESTHESIOLOGY

## 2024-11-13 PROCEDURE — 97162 PT EVAL MOD COMPLEX 30 MIN: CPT

## 2024-11-13 PROCEDURE — C1751 CATH, INF, PER/CENT/MIDLINE: HCPCS

## 2024-11-13 PROCEDURE — 99291 CRITICAL CARE FIRST HOUR: CPT | Performed by: NURSE PRACTITIONER

## 2024-11-13 PROCEDURE — 94150 VITAL CAPACITY TEST: CPT

## 2024-11-13 PROCEDURE — 93970 EXTREMITY STUDY: CPT

## 2024-11-13 PROCEDURE — 6360000002 HC RX W HCPCS

## 2024-11-13 PROCEDURE — 36415 COLL VENOUS BLD VENIPUNCTURE: CPT

## 2024-11-13 PROCEDURE — 2580000003 HC RX 258: Performed by: STUDENT IN AN ORGANIZED HEALTH CARE EDUCATION/TRAINING PROGRAM

## 2024-11-13 PROCEDURE — 2580000003 HC RX 258

## 2024-11-13 PROCEDURE — 99231 SBSQ HOSP IP/OBS SF/LOW 25: CPT | Performed by: SURGERY

## 2024-11-13 PROCEDURE — 80048 BASIC METABOLIC PNL TOTAL CA: CPT

## 2024-11-13 PROCEDURE — 36569 INSJ PICC 5 YR+ W/O IMAGING: CPT

## 2024-11-13 PROCEDURE — 2500000003 HC RX 250 WO HCPCS

## 2024-11-13 PROCEDURE — 94761 N-INVAS EAR/PLS OXIMETRY MLT: CPT

## 2024-11-13 PROCEDURE — 71045 X-RAY EXAM CHEST 1 VIEW: CPT

## 2024-11-13 PROCEDURE — 99232 SBSQ HOSP IP/OBS MODERATE 35: CPT | Performed by: NURSE PRACTITIONER

## 2024-11-13 PROCEDURE — 85025 COMPLETE CBC W/AUTO DIFF WBC: CPT

## 2024-11-13 PROCEDURE — 97530 THERAPEUTIC ACTIVITIES: CPT

## 2024-11-13 PROCEDURE — 05HY33Z INSERTION OF INFUSION DEVICE INTO UPPER VEIN, PERCUTANEOUS APPROACH: ICD-10-PCS | Performed by: INTERNAL MEDICINE

## 2024-11-13 PROCEDURE — 2700000000 HC OXYGEN THERAPY PER DAY

## 2024-11-13 PROCEDURE — 92610 EVALUATE SWALLOWING FUNCTION: CPT

## 2024-11-13 PROCEDURE — 2000000000 HC ICU R&B

## 2024-11-13 PROCEDURE — 83735 ASSAY OF MAGNESIUM: CPT

## 2024-11-13 PROCEDURE — 92526 ORAL FUNCTION THERAPY: CPT

## 2024-11-13 RX ORDER — SODIUM CHLORIDE 9 MG/ML
INJECTION, SOLUTION INTRAVENOUS PRN
Status: DISCONTINUED | OUTPATIENT
Start: 2024-11-13 | End: 2024-12-04

## 2024-11-13 RX ORDER — METOCLOPRAMIDE HYDROCHLORIDE 5 MG/ML
10 INJECTION INTRAMUSCULAR; INTRAVENOUS EVERY 6 HOURS
Status: DISCONTINUED | OUTPATIENT
Start: 2024-11-13 | End: 2024-11-16

## 2024-11-13 RX ORDER — SODIUM CHLORIDE 0.9 % (FLUSH) 0.9 %
5-40 SYRINGE (ML) INJECTION PRN
Status: DISCONTINUED | OUTPATIENT
Start: 2024-11-13 | End: 2024-12-04

## 2024-11-13 RX ORDER — MIDODRINE HYDROCHLORIDE 5 MG/1
10 TABLET ORAL
Status: DISCONTINUED | OUTPATIENT
Start: 2024-11-13 | End: 2024-11-14

## 2024-11-13 RX ORDER — SODIUM CHLORIDE 0.9 % (FLUSH) 0.9 %
5-40 SYRINGE (ML) INJECTION EVERY 12 HOURS SCHEDULED
Status: DISCONTINUED | OUTPATIENT
Start: 2024-11-13 | End: 2024-12-04

## 2024-11-13 RX ORDER — LIDOCAINE HYDROCHLORIDE 10 MG/ML
50 INJECTION, SOLUTION EPIDURAL; INFILTRATION; INTRACAUDAL; PERINEURAL ONCE
Status: COMPLETED | OUTPATIENT
Start: 2024-11-13 | End: 2024-11-13

## 2024-11-13 RX ADMIN — HYDROMORPHONE HYDROCHLORIDE 1 MG: 1 INJECTION, SOLUTION INTRAMUSCULAR; INTRAVENOUS; SUBCUTANEOUS at 05:19

## 2024-11-13 RX ADMIN — METHOCARBAMOL 1000 MG: 100 INJECTION INTRAMUSCULAR; INTRAVENOUS at 10:47

## 2024-11-13 RX ADMIN — HEPARIN SODIUM 5000 UNITS: 5000 INJECTION INTRAVENOUS; SUBCUTANEOUS at 20:47

## 2024-11-13 RX ADMIN — METOCLOPRAMIDE 10 MG: 5 INJECTION, SOLUTION INTRAMUSCULAR; INTRAVENOUS at 12:21

## 2024-11-13 RX ADMIN — AMPICILLIN SODIUM, SULBACTAM SODIUM 3000 MG: 2; 1 INJECTION, POWDER, FOR SOLUTION INTRAMUSCULAR; INTRAVENOUS at 01:31

## 2024-11-13 RX ADMIN — PHENYLEPHRINE HYDROCHLORIDE 80 MCG/MIN: 50 INJECTION INTRAVENOUS at 19:39

## 2024-11-13 RX ADMIN — Medication: at 07:42

## 2024-11-13 RX ADMIN — POLYETHYLENE GLYCOL 3350 17 G: 17 POWDER, FOR SOLUTION ORAL at 20:47

## 2024-11-13 RX ADMIN — PHENYLEPHRINE HYDROCHLORIDE 175 MCG/MIN: 50 INJECTION INTRAVENOUS at 04:45

## 2024-11-13 RX ADMIN — ACETAMINOPHEN 1000 MG: 500 TABLET, FILM COATED ORAL at 07:27

## 2024-11-13 RX ADMIN — GABAPENTIN 600 MG: 300 CAPSULE ORAL at 15:54

## 2024-11-13 RX ADMIN — SENNOSIDES AND DOCUSATE SODIUM 2 TABLET: 50; 8.6 TABLET ORAL at 07:27

## 2024-11-13 RX ADMIN — METHOCARBAMOL 1000 MG: 100 INJECTION INTRAMUSCULAR; INTRAVENOUS at 01:17

## 2024-11-13 RX ADMIN — AMPICILLIN SODIUM, SULBACTAM SODIUM 3000 MG: 2; 1 INJECTION, POWDER, FOR SOLUTION INTRAMUSCULAR; INTRAVENOUS at 15:29

## 2024-11-13 RX ADMIN — METHOCARBAMOL 1000 MG: 100 INJECTION INTRAMUSCULAR; INTRAVENOUS at 15:54

## 2024-11-13 RX ADMIN — PANTOPRAZOLE SODIUM 40 MG: 40 INJECTION, POWDER, FOR SOLUTION INTRAVENOUS at 07:33

## 2024-11-13 RX ADMIN — LIDOCAINE HYDROCHLORIDE ANHYDROUS 50 MG: 10 INJECTION, SOLUTION INFILTRATION at 09:38

## 2024-11-13 RX ADMIN — GABAPENTIN 600 MG: 300 CAPSULE ORAL at 20:47

## 2024-11-13 RX ADMIN — MIDODRINE HYDROCHLORIDE 10 MG: 5 TABLET ORAL at 15:53

## 2024-11-13 RX ADMIN — Medication: at 20:48

## 2024-11-13 RX ADMIN — AMPICILLIN SODIUM, SULBACTAM SODIUM 3000 MG: 2; 1 INJECTION, POWDER, FOR SOLUTION INTRAMUSCULAR; INTRAVENOUS at 08:27

## 2024-11-13 RX ADMIN — NALOXEGOL OXALATE 25 MG: 25 TABLET, FILM COATED ORAL at 06:41

## 2024-11-13 RX ADMIN — GABAPENTIN 600 MG: 300 CAPSULE ORAL at 07:27

## 2024-11-13 RX ADMIN — SODIUM CHLORIDE, PRESERVATIVE FREE 10 ML: 5 INJECTION INTRAVENOUS at 20:48

## 2024-11-13 RX ADMIN — PHENYLEPHRINE HYDROCHLORIDE 125 MCG/MIN: 50 INJECTION INTRAVENOUS at 09:55

## 2024-11-13 RX ADMIN — HYDROMORPHONE HYDROCHLORIDE 1 MG: 1 INJECTION, SOLUTION INTRAMUSCULAR; INTRAVENOUS; SUBCUTANEOUS at 01:17

## 2024-11-13 RX ADMIN — AMIODARONE HYDROCHLORIDE 200 MG: 200 TABLET ORAL at 07:42

## 2024-11-13 RX ADMIN — METOCLOPRAMIDE 10 MG: 5 INJECTION, SOLUTION INTRAMUSCULAR; INTRAVENOUS at 15:54

## 2024-11-13 RX ADMIN — SODIUM CHLORIDE, PRESERVATIVE FREE 10 ML: 5 INJECTION INTRAVENOUS at 20:47

## 2024-11-13 RX ADMIN — ACETAMINOPHEN 1000 MG: 500 TABLET, FILM COATED ORAL at 20:47

## 2024-11-13 RX ADMIN — MIDODRINE HYDROCHLORIDE 5 MG: 5 TABLET ORAL at 07:43

## 2024-11-13 RX ADMIN — AMPICILLIN SODIUM, SULBACTAM SODIUM 3000 MG: 2; 1 INJECTION, POWDER, FOR SOLUTION INTRAMUSCULAR; INTRAVENOUS at 20:46

## 2024-11-13 RX ADMIN — SODIUM CHLORIDE, PRESERVATIVE FREE 10 ML: 5 INJECTION INTRAVENOUS at 07:43

## 2024-11-13 RX ADMIN — METOCLOPRAMIDE HYDROCHLORIDE 5 MG: 5 INJECTION INTRAMUSCULAR; INTRAVENOUS at 05:18

## 2024-11-13 RX ADMIN — BUPROPION HYDROCHLORIDE 300 MG: 150 TABLET, EXTENDED RELEASE ORAL at 07:27

## 2024-11-13 RX ADMIN — SODIUM CHLORIDE, PRESERVATIVE FREE 10 ML: 5 INJECTION INTRAVENOUS at 07:44

## 2024-11-13 RX ADMIN — MIDODRINE HYDROCHLORIDE 10 MG: 5 TABLET ORAL at 12:21

## 2024-11-13 RX ADMIN — SENNOSIDES AND DOCUSATE SODIUM 2 TABLET: 50; 8.6 TABLET ORAL at 20:47

## 2024-11-13 RX ADMIN — ATORVASTATIN CALCIUM 10 MG: 10 TABLET, FILM COATED ORAL at 20:47

## 2024-11-13 RX ADMIN — POLYETHYLENE GLYCOL 3350 17 G: 17 POWDER, FOR SOLUTION ORAL at 07:32

## 2024-11-13 RX ADMIN — LINACLOTIDE 290 MCG: 145 CAPSULE, GELATIN COATED ORAL at 06:41

## 2024-11-13 ASSESSMENT — PAIN - FUNCTIONAL ASSESSMENT
PAIN_FUNCTIONAL_ASSESSMENT: ACTIVITIES ARE NOT PREVENTED

## 2024-11-13 ASSESSMENT — PAIN DESCRIPTION - ONSET
ONSET: ON-GOING

## 2024-11-13 ASSESSMENT — PAIN DESCRIPTION - DIRECTION: RADIATING_TOWARDS: N.A

## 2024-11-13 ASSESSMENT — PAIN DESCRIPTION - LOCATION
LOCATION: BACK

## 2024-11-13 ASSESSMENT — PAIN DESCRIPTION - PAIN TYPE
TYPE: SURGICAL PAIN
TYPE: SURGICAL PAIN
TYPE: CHRONIC PAIN

## 2024-11-13 ASSESSMENT — PAIN DESCRIPTION - DESCRIPTORS
DESCRIPTORS: ACHING

## 2024-11-13 ASSESSMENT — PAIN DESCRIPTION - ORIENTATION
ORIENTATION: MID

## 2024-11-13 ASSESSMENT — PAIN DESCRIPTION - FREQUENCY
FREQUENCY: CONTINUOUS

## 2024-11-13 ASSESSMENT — PAIN SCALES - GENERAL
PAINLEVEL_OUTOF10: 0
PAINLEVEL_OUTOF10: 7
PAINLEVEL_OUTOF10: 7
PAINLEVEL_OUTOF10: 3
PAINLEVEL_OUTOF10: 7
PAINLEVEL_OUTOF10: 4
PAINLEVEL_OUTOF10: 2
PAINLEVEL_OUTOF10: 4
PAINLEVEL_OUTOF10: 3
PAINLEVEL_OUTOF10: 3
PAINLEVEL_OUTOF10: 4

## 2024-11-13 ASSESSMENT — PAIN SCALES - WONG BAKER
WONGBAKER_NUMERICALRESPONSE: NO HURT
WONGBAKER_NUMERICALRESPONSE: NO HURT

## 2024-11-13 NOTE — DISCHARGE INSTR - COC
Continuity of Care Form    Patient Name: Juan Mohamud   :  1940  MRN:  [id number]    Admit date:  2024  Discharge date:  ***    Code Status Order: Full Code   Advance Directives:   Advance Care Flowsheet Documentation        Date/Time Healthcare Directive Type of Healthcare Directive Copy in Chart Healthcare Agent Appointed Healthcare Agent's Name Healthcare Agent's Phone Number    24 0945 Yes, patient has an advance directive for healthcare treatment  Living will;Durable power of  for health care  Yes, copy in chart  Spouse  Lizzie Mohamud  [phone]     24 1356 Unknown, patient unable to respond due to medical condition  --  --  --  --  --                     Admitting Physician:  Royal Cotter MD  PCP: Janet Garcia MD    Discharging Nurse: ***  Discharging Hospital Unit/Room#: 4503/4503-01  Discharging Unit Phone Number: ***    Emergency Contact:   Extended Emergency Contact Information  Primary Emergency Contact: Lizzie Mohamud  Address: [address] [place] [address]            [place], [place] [id number] [place] [address]  Home Phone: [phone]  Work Phone: [phone]  Mobile Phone: [phone]  Relation: Spouse  Secondary Emergency Contact: CadeLito  Address: NEED NEW PHONE  UNK BY PT           [address] [id number] [facility]  Home Phone: [phone]  Relation: Child    Past Surgical History:  Past Surgical History:   Procedure Laterality Date    COLONOSCOPY      COLONOSCOPY N/A 2024    COLONOSCOPY FLEXIBLE DECOMPRESSION performed by Eitan Pino MD at [facility] ENDOSCOPY    COLONOSCOPY N/A 3/6/2024    COLONOSCOPY WITH DECOMPRESSION performed by Anders Cervantes MD at [facility] ENDOSCOPY    COLONOSCOPY N/A 2024    INCOMPLETE COLONOSCOPY FLEXIBLE DECOMPRESSION performed by Andrew Kay MD at [facility] ENDOSCOPY    COLONOSCOPY N/A 2024    COLONOSCOPY FLEXIBLE  EST    PHYSICIAN SECTION    Prognosis: Fair    Condition at Discharge: Stable    Rehab Potential (if transferring to Rehab): Fair    Recommended Labs or Other Treatments After Discharge:   Incisional Care: Open to air. Wash incision daily with warm soapy water or shower daily, and pat dry with clean dry towel.     Needs serial abdominal examinations. Needs close follow up with GI services.     Remove staples on 11/26/2024      Physician Certification: I certify the above information and transfer of Juan Mohamud  is necessary for the continuing treatment of the diagnosis listed and that he requires Skilled Nursing Facility for greater 30 days.     Update Admission H&P: No change in H&P    PHYSICIAN SIGNATURE:  Electronically signed by Ayaan Eddy MD on 11/28/24 at 10:03 AM EST

## 2024-11-13 NOTE — PLAN OF CARE
Brief note:  84 y.o. y/o male who presents s/p elective T11 fx s/p mechanical fall on 11/3 underwent T8-L1 posterior decompression fixation and fusion with Dr Posey. Patients hospitalization was complicated by ileus ([place]) requiring colonic decompression and bilateral pleural effusions concern for underlying PNA. Intraoperative course complicated by hypotension. Arrived to ICU intubated and sedated.     Patient extubated just before shift change.   Currently, patient denies back pain. States \"I'm feeling pretty good given all things considered.\" Denied all other complaints at time of exam.  Currently on 100 mcg James-synephrine     PHYSICAL EXAM:  Vitals:    11/12/24 2000 11/12/24 2015 11/12/24 2030 11/12/24 2045   BP: (!) 80/60 (!) 85/62 (!) 86/59    Pulse: (!) 120 (!) 120 (!) 119 (!) 119   Resp: 18 14 10 16   Temp: 98 °F (36.7 °C)      TempSrc: Temporal      SpO2: 94% 94% 94% 95%   Weight:       Height:             General: Alert, no distress, well-nourished  Neurologic  Mental status:   orientation to person, place, time, situation   Attention intact as able to attend well to the exam     Language fluent in conversation   Comprehension intact; follows simple commands    Cranial nerves:   CN2: Visual fields full w/o extinction on confrontational testing  CN 3,4,6: Pupils equal and reactive to light, extraocular muscles intact  CN5: Facial sensation symmetric   CN7: Face symmetric  CN8: Hearing symmetric to spoken voice  CN9: Palate elevated symmetrically  CN11: Traps full strength on shoulder shrug  CN12: Tongue midline with protrusion    Motor Exam:   R  L    Deltoid 5  5   Biceps 5 5   Triceps 5 5   Wrist extension  5 5   Interossei 5 5      R  L    Hip flexion  5  5   Hip extension  5 5   Knee flexion  5 5   Knee extension  5 5   Ankle dorsiflexion  4 4   Ankle plantar flexion  5 5     Deep tendon reflexes:    R  L    Biceps  1  1   Brachioradialis  1 1   Patellar  trace trace   Bruce's Absent Absent    Toes Mute mute     Sensory: light touch intact and symmetric in all 4 extremities.  No sensory extinction on bilateral simultaneous stimulation  Cerebellar/coordination: finger nose finger normal without ataxia  Tone: normal in all 4 extremities  Gait: Deferred for safety    OTHER SYSTEMS:  Cardiovascular: Warm, appears well perfused   Respiratory: Easy, non-labored respiratory pattern   Abdominal: Abdomen is without distention   Extremities: Upper and lower extremities are atraumatic in appearance without deformity. No swelling or erythema.

## 2024-11-13 NOTE — PROGRESS NOTES
Pt PICC ordered, currently on 125mcg/hr James synephrine. A/Ox4, productive cough. Junctional Tachy rhythm.

## 2024-11-13 NOTE — PROGRESS NOTES
[facility] Medicine Progress Note  V 10.25      Date of Admission: 11/7/2024    Hospital Day: 7      Chief Admission Complaint:  fall, back pain     Subjective:  Patient seen and examined at bedside. No acute events overnight. Extubated yesterday afternoon. Remains on pressors. Patient awake, alert, oriented. Does report some back discomfort.     Presenting Admission History:       Mr. Juan Mohamud is a 84 y.o. male with a medical hx significant for type II DM, degenerative lumbar disease, arrhythmia s/p AICD, diastolic CHF, and macular degeneration otherwise as listed in the MHx table below, who presented from home to the [facility] ED on 11/3/24 after a fall.     Per chart review, patient fell after feeling like his \"knees were giving out.\" No reported symptoms prior to this fall, patient landed on his left side and hit the back of his head but did not lose consciousness at this time.      Upon arrival to the [facility] ED, patient was alert, oriented and reported with GCS of 15 and NIH of 0. While in the ED patient had increasing generalized weakness and difficulty getting up from a sitting position. Decision was made to admit patient as they did not feel safe discharging him home given his inability to stand unassisted.      Fall was thought to be mechanical and secondary to generalized weakness due to poor nutrition. CT abd pelv performed due to complaints of abdominal pain, distension, and back pain upon arrival to the floor. Imaging revealed acute T11 vertebral fracture extending into the T10-T11 disc space as well as diffuse distention of small and large bowel compatible with ileus and bilateral pleural effusions.     GI consulted for ileus, and patient underwent colonic decompression     MRI thoracic spine then demonstrated large epidural spinal hematoma with cord compression  and additional T6 subacute compression fracture along with bilateral pleural effusions.     Patient was transferred to [facility]  recommendations:  Anticipated Discharge Location: []Home w/ []HHC vs []SNF  []Acute Rehab  []LTAC  []Hospice  [x]Other -  tbd  Anticipated Discharge Day/Date:  5 days  Barriers to Discharge: neurosurgery, resolution of ileus  --------------------------------------------------    MDM (any 2 required for High level billing)    A. Problems (any 1)  [x] Acute/Chronic Illness/injury posing ongoing threat to life and/or bodily function without ongoing treatment    [] Severe exacerbation of chronic illness    --------------------------------------------------  B. Risk of Treatment (any 1)    [x] Drugs/treatments that require intensive monitoring for toxicity    [x] IV ABX (Vancomycin, Aminoglycosides, etc)     [] Post-Cath/Contrast study requiring serial monitoring    [] IV Narcotic analgesia    [] Aggressive IV diuresis    [] Hypertonic Saline    [x] Critical electrolyte abnormalities requiring IV replacement    [x] Insulin - Scheduled/SSI or Insulin gtt    [] Anticoagulation (Heparin gtt or Coumadin - other anticoagulants in special circumstances)    [] Cardiac Medications (IV Amiodarone/Diltiazem, Tikosyn, etc)    [] Hemodialysis    [] Other -    [] Change in code status    [] Decision to escalate care    [] Major surgery/procedure with associated risk factors    --------------------------------------------------  C. Data (any 2)    [x] Data Review (any 3)    [x] Consultant notes from yesterday/today    [x] All available current labs reviewed interpreted for clinical significance    [x] Appropriate follow-up labs were ordered  [] Collateral history obtained     [x] Independent Interpretation of tests (any 1)    [x] Telemetry (Rhythm Strip) personally reviewed and interpreted        [] Imaging personally reviewed and interpreted     [] Discussion (any 1)  [] Multi-Disciplinary Rounds with Case Management  [] Discussed management of the case with           Labs:  Personally reviewed on 11/13/2024 and interpreted for

## 2024-11-13 NOTE — PROCEDURES
PROCEDURE NOTE  Date: 2024   Name: Juan Mohamud  YOB: 1940    Procedures                                                             PICC PROCEDURE NOTE WITH PCXR NEEDED  Chart reviewed for allergies, diagnosis, labs, known contraindications, reason for line placement and planned length of treatment.  Informed consent noted to be signed and on chart.  Insertion procedure discussed with patient/family member.  Three patient identifiers - Patient name,   and MRN -  completed &  confirmed verbally.         Time out performed Hat, mask and eye shield donned.  PICC site scrubbed with Chloraprep  One-Step applicator for 30 seconds x 1.   Hand Hygiene  performed with 3% Chlorhexidine surgical scrub x1 min prior to  Sterile gloves, sterile gown being donned.  Patient draped using maximal sterile barrier technique ( head to toe ).  PICC site scrubbed a 2nd time with Chloraprep One-Step applicator x 30 sec. Modified Seldinger  technique/ultrasound assisted and tip locating system utilized for insertion. 1% Lidocaine 5 ml injected interdermally pre-insertion.  PCXR obtained d/t unable to confirm PICC tip with 3CG, will place note and order with clarification of radiologist reading for clearance to use line when available and notify RN.  Positive brisk blood return obtained from all lumens.  Valves applied and each lumen flushed with 10 mls  0.9% Sterile Sodium Chloride.  All lumens flush easily with no resistance.  Catheter secured with non-sutured locking device per hospital protocol.  Bio-patch/CHG impregnated sterile tegaderm dressing applied.   Alcohol Swab Caps applied to all valves.  Sterile field maintained during procedure.  Guide wire and positioning wire accounted for post procedure and disposed of in sharps.  Appearance of site is Clean dry and intact without bleeding or edema. All edges of Tegaderm occlusive.   Site marked with date and initials of RN placing line. Teaching performed

## 2024-11-13 NOTE — PROGRESS NOTES
[facility] Cardiology Progress Note    Primary Cardiologist:     CC/HPI:     S: Denies cp, sob or palpitations.     Tele: Sinus tachycardia     O:  Physical Exam:  BP 93/64   Pulse (!) 120   Temp 97.7 °F (36.5 °C) (Temporal)   Resp 17   Ht 1.956 m (6' 5\")   Wt 91.5 kg (201 lb 11.5 oz)   SpO2 100%   BMI 23.92 kg/m²    General (appearance):  No acute distress  Eyes: anicteric   Neck: soft, No JVD  Ears/Nose/Mouth/Thorat: No cyanosis  CV: tachy   Respiratory:  clear anterior. Normal effort.   GI: soft, non-tender, non-distended  Skin: Warm, dry. No rashes  Neuro/Psych: Alert and cooperative. Appropriate behavior  Ext:  No c/c. + LE edema    I.O's= -3.2 L     Weight  Admission: Weight - Scale: 89.5 kg (197 lb 5 oz)   Today: Weight - Scale: 91.5 kg (201 lb 11.5 oz)    CBC:   Recent Labs     24  0755 24  0603 24  0428   WBC 12.3* 17.4* 18.2*   HGB 14.4 14.3 11.3*   HCT 44.0 44.9 34.1*   MCV 90.1 92.1 89.9    260 375     BMP:   Recent Labs     24  0755 24  0603 24  0428     137 137 138   K 3.5  3.2* 3.0* 3.9     105 107 108   CO2 18*  18* 18* 20*   PHOS 2.8  --   --    BUN 37*  37* 35* 37*   CREATININE 1.0  1.0 0.9 1.0     Estimated Creatinine Clearance: 71 mL/min (based on SCr of 1 mg/dL).  Mag:   Lab Results   Component Value Date/Time    MG 2.11 2024 04:28 AM     LIVER PROFILE:   No results for input(s): \"AST\", \"ALT\", \"LIPASE\", \"AMYLASE\", \"BILIDIR\", \"BILITOT\", \"ALKPHOS\" in the last 72 hours.    Invalid input(s): \"ALB\"    Pro-BNP:   Lab Results   Component Value Date/Time    PROBNP 7,647 2024 07:55 AM    PROBNP 4,975 2024 09:11 PM    PROBNP 2,190 2024 03:05 PM    PROBNP 2,599 2024 10:44 PM     High Sensitivity Troponin:   Lab Results   Component Value Date    TROPHS 60 (H) 2024       Imagin2024 Echo    Left Ventricle: Normal left ventricular systolic function with a visually

## 2024-11-13 NOTE — PROGRESS NOTES
Comprehensive Nutrition Assessment  Vasopressor dosing equivalent score = 21. For VDE >12 trophic TF only (10 mL/hr).    RECOMMENDATIONS:  PO Diet: Continue CLD  Nutrition Supplement: Add Ensure clear TID  Nutrition Education: Education/Counseling not indicated     NUTRITION ASSESSMENT:   Nutritional summary & status: Follow up: Pt transferred to ICU yesterday after OR for spinal decompression/fusion. Now extubated and advanced to CLD. Remains on pressor support, VDE >12. Added clear nutrition supplements TID, pt has been NPO x5 days. Aggressive bowel regimen, pt refusing daily SMOG enemas however he has been having multiple bowel movements/day. Reports that he is hungry, will continue to follow for possible diet advancement. If unable to advance past clears TPN will be discussed.   Admission // PMH: Vertebral fracture//colonic psuedoobstruction, T2DM, HLD, CHF    MALNUTRITION ASSESSMENT  Context of Malnutrition: Acute Illness   Malnutrition Status: Mild malnutrition  Findings of the 6 clinical characteristics of malnutrition (Minimum of 2 out of 6 clinical characteristics is required to make the diagnosis of moderate or severe Protein Calorie Malnutrition based on AND/ASPEN Guidelines):  Energy Intake:  50% or less of estimated energy requirements for 5 or more days  Weight Loss:  Mild weight loss (5% in 3 month period)     Body Fat Loss:  Unable to assess     Muscle Mass Loss:  Unable to assess    Fluid Accumulation:  No fluid accumulation      NUTRITION DIAGNOSIS   Inadequate oral intake related to acute injury/trauma, altered GI function as evidenced by NPO or clear liquid status due to medical condition    Nutrition Monitoring and Evaluation:   Food/Nutrient Intake Outcomes:  Food and Nutrient Intake, Supplement Intake  Physical Signs/Symptoms Outcomes:  Biochemical Data, GI Status, Nausea or Vomiting, Nutrition Focused Physical Findings, Skin, Weight     OBJECTIVE DATA: Significant to nutrition

## 2024-11-13 NOTE — PROGRESS NOTES
Speech Language Pathology  Facility/Department:[facility] ICU  Bedside Swallow Evaluation & Tx                                                       Name: Juan Mohamud  : 1940  MRN: [id number]    Patient Diagnosis(es):   Patient Active Problem List    Diagnosis Date Noted    SVT (supraventricular tachycardia) ([facility]) 2024    Closed unstable burst fracture of eleventh thoracic vertebra ([facility]) 2024    Spinal cord injury at T1-T6 level ([facility]) 2024    Preoperative cardiovascular examination 2024    Chronic heart failure with preserved ejection fraction (HFpEF) ([facility]) 2024    Abdominal distension 2024    Epidural hematoma 2024    Fall at home, initial encounter 2024    Acute congestive heart failure ([facility]) 2024    PVC's (premature ventricular contractions) 2024    Sepsis ([facility]) 2024    ICD (implantable cardioverter-defibrillator) in place 2024    Acute hypoxemic respiratory failure 2024    BOSTON (acute kidney injury) ([facility]) 2024    Arrhythmia 2024    Primary hypertension 2024    Hyperlipidemia 2024    Colonic obstruction ([facility]) 2024    Trauma 2024    History of fall 2024    Type 2 diabetes mellitus ([facility]) 2024    Anemia 2024    Pneumonia 2024    Hyponatremia 2024    Colonic pseudoobstruction 2024    VT (ventricular tachycardia) ([facility]) 2024    Constipation 2024    Cataract extraction status of right eye 2018    DDD (degenerative disc disease), lumbar 2013       Past Medical History:   Diagnosis Date    Diabetes mellitus ([facility])     diet controlled    Hyperlipidemia     Indigestion     Insomnia     Macular degeneration     MI, 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    Education  Provided education to patient re: role of SLP, purpose of visit, recommendations. Patient with good comprehension.    Pt goal: did not state  Pt discharge goal: did not state    Total treatment time: 15' dysphagia eval, 8' dysphagia tx    Plan:  Continue per POC  Recommended diet:   Clear liquid diet  Sit up at 45 degrees  Small single sips  Encourage oral hygiene 2 x daily    Discharge Recommendation: TBD closer to discharge  Discussed with Zenon GANDARA  Needs met prior to leaving room, call light within reach    Lalita Ramirez, SLP, [id number]  Pg. # [phone]    This document will serve as a discharge summary if patient discharges prior to next visit.

## 2024-11-13 NOTE — PROGRESS NOTES
General Surgery   Daily Progress Note  Patient: Juan Mohamud      CC: Distension, Tex's syndrome      SUBJECTIVE:   Patient underwent T8-L1 posterior decompression, fixation and fusion. Had 3 bowel movements yesterday. Reports no abdominal pain. Reports decreased distention.      ROS:   A 14 point review of systems was conducted, significant findings as noted above. All other systems negative.    OBJECTIVE:    PHYSICAL EXAM:    Vitals:    11/13/24 0530 11/13/24 0545 11/13/24 0549 11/13/24 0600   BP: 97/60 96/69  (!) 88/55   Pulse: (!) 117 (!) 116  (!) 114   Resp:   18    Temp:       TempSrc:       SpO2: 100%   91%   Weight:    91.5 kg (201 lb 11.5 oz)   Height:           General appearance: alert, no acute distress  Neck: trachea midline, neck supple  Respiratory: Normal effort with no accessory muscle use on 3 L NC  Cardiovascular: RRR  Abdomen: soft, non-tender, decreased distended, tympanic to percussion, no guarding, no rigidity  Skin: warm and dry, no rashes  Neuro: A&Ox3, no focal deficits    LABS:   Recent Labs     11/12/24  0603 11/13/24  0428   WBC 17.4* 18.2*   HGB 14.3 11.3*   HCT 44.9 34.1*   MCV 92.1 89.9    375        Recent Labs     11/11/24  0755 11/12/24  0603 11/13/24  0428     137 137 138   K 3.5  3.2* 3.0* 3.9     105 107 108   CO2 18*  18* 18* 20*   PHOS 2.8  --   --    BUN 37*  37* 35* 37*   CREATININE 1.0  1.0 0.9 1.0        No results for input(s): \"AST\", \"ALT\", \"BILIDIR\", \"BILITOT\", \"ALKPHOS\" in the last 72 hours.    Invalid input(s): \"ALB\"     No results for input(s): \"LIPASE\", \"AMYLASE\" in the last 72 hours.     Recent Labs     11/11/24  1552   INR 1.15   APTT 23.1      No results for input(s): \"CKTOTAL\", \"CKMB\", \"CKMBINDEX\", \"TROPONINI\" in the last 72 hours.      ASSESSMENT & PLAN:   This is a 84 y.o. male with Hx of Hx of type II DM, degenerative lumbar disease, arrhythmia s/p AICD, diastolic CHF. He presented with a T11 burst fracture and epidural  hematoma requiring neurosurgical fixation. Since fall he has had increased abdominal distention and underwent decompressive colonoscopy on 11/8. S/p T8-L1 posterior decompression, fixation and fusion. 11/12 with NSGY. Surgical services consulted for ongoing ileus/colonic pseudo obstruction.        - Recommend daily SMOG  - Continue Bowel regimen  - Post operative recs per neurosurgery   - Advance diet as tolerated per primary team  - medical management per primary  - Please feel free to reach out with any questions or concerns     Naty Deshpande MD  PGY1, General Surgery  11/13/24  6:53 AM  [phone]

## 2024-11-13 NOTE — CARE COORDINATION
DISCHARGE PLANNING:  Chart reviewed.  Patient is from a condo with his spouse. No current services. He does have a walker and cane that he uses when out of the house and at night to go to the bathroom.     Current discharge plan is TBD pending medical course. Spouse is anticipating SNF and would like a referral to [place]; referral sent via [facility] so they can follow.  Therapy not ordered yet due to pressors.    CM team will continue to follow.  Chandrika Hernandez, RN Case Manager  [phone]

## 2024-11-13 NOTE — PROGRESS NOTES
ICU Progress Note    Admit Date: 11/7/2024  Day: 7  Vent Day: 0  IV Access:Peripheral  IV Fluids:None  Vasopressors:None                Antibiotics: Unasyn  Diet: ADULT DIET; Clear Liquid    CC: fall    Interval history:     S: Feels well after surgery, had restorative sleep. Good appetite. No sensory or motor deficits.   O:  James at 175 to keep MAP>65, Tmax 36.7, O2 sat 92-98% on 4L/min. Abdomen remains distended, mild tenderness. Tympanitic. UO 1L, no evidence of retention  Labs: WBC 18.2, HgB 11.3  Imaging: KUB small bowel dilated but improved from prior  A/P: will attempt to wean off pressors today, start midodrine as bridge. Venous doppler to rule out DVT/PE. PICC placed for lab draw and current pressor requirement.       HPI:   Mr. Juan Mohamud is a 84 y.o. male with a medical hx significant for type II DM, degenerative lumbar disease, arrhythmia s/p AICD, diastolic CHF, and macular degeneration otherwise as listed in the MHx table below, who presented from home to the [facility] ED on 11/3/24 after a fall.     Per chart review, patient fell after feeling like his \"knees were giving out.\" No reported symptoms prior to this fall, patient landed on his left side and hit the back of his head but did not lose consciousness at this time.      Upon arrival to the [facility] ED, patient was alert, oriented and reported with GCS of 15 and NIH of 0. While in the ED patient had increasing generalized weakness and difficulty getting up from a sitting position. Decision was made to admit patient as they did not feel safe discharging him home given his inability to stand unassisted.      Fall was thought to be mechanical and secondary to generalized weakness due to poor nutrition. CT abd pelv performed due to complaints of abdominal pain, distension, and back pain upon arrival to the floor. Imaging revealed acute T11 vertebral fracture extending into the T10-T11 disc space as well as diffuse distention of small

## 2024-11-13 NOTE — PROGRESS NOTES
NEUROCRITICAL CARE CONSULT NOTE       Baron Alaniz DO is requesting this consult.  Reason for Consult: Post-op ICU management   Admission Chief Complaint: Presented for elective surgery     History of Present Illness     Juan Mohamud is a 84 y.o. y/o male with T11 fx s/p mechanical fall on 11/3 underwent T8-L1 posterior decompression fixation and fusion with Dr Posey. Patients hospitalization was complicated by ileus ([place]) requiring colonic decompression and bilateral pleural effusions concern for underlying PNA. Intraoperative course complicated by hypotension. Arrived to ICU intubated and sedated.     24H Events:  Arrived to ICU intubated and sedated on pressors for hypotension  Extubated yesterday evening  Weaned off vasopressin and phenylephrine down to 90  PICC placed this am     Past Medical, Surgical, Family, and Social History   PAST MEDICAL HISTORY:  Past Medical History:   Diagnosis Date    Diabetes mellitus (HCC)     diet controlled    Hyperlipidemia     Indigestion     Insomnia     Macular degeneration     MI, 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    Neuropathy     toes partially on both feet    Pacemaker      SURGICAL HISTORY:  Past Surgical History:   Procedure Laterality Date    COLONOSCOPY      COLONOSCOPY N/A 2/29/2024    COLONOSCOPY FLEXIBLE DECOMPRESSION performed by Eitan Pino MD at [facility] [place] ENDOSCOPY    COLONOSCOPY N/A 3/6/2024    COLONOSCOPY WITH DECOMPRESSION performed by Anders Cervantes MD at [facility] [place] ENDOSCOPY    COLONOSCOPY N/A 11/5/2024    INCOMPLETE COLONOSCOPY FLEXIBLE DECOMPRESSION performed by Andrew Kay MD at [facility] [place] ENDOSCOPY    COLONOSCOPY N/A 11/8/2024    COLONOSCOPY FLEXIBLE DECOMPRESSION performed by Lily Dick MD at [facility] ENDOSCOPY    FRACTURE SURGERY Left     arm    INTRACAPSULAR CATARACT EXTRACTION Right 12/17/2018    PHACOEMULSIFICATION WITH INTRAOCULAR LENS IMPLANT performed by Nik Samayoa MD at [facility] [place] [facility]    JOINT REPLACEMENT Right     TKR from  Bogue Chitto, APRN - CNP   Neurocritical Care   11/13/2024 8:06 AM  [employer]: [facility] Neurocritical Care    Time independently spent by Nurse Practitioner reviewing chart and prior testing, obtaining history from patient, examining patient, ordering appropriate medications / diagnostics, reviewing results of diagnostics, counseling and educating patient / family, communicating plan with other providers and documenting clinical information in the EMR was approximately 47 minutes.

## 2024-11-13 NOTE — PLAN OF CARE
Problem: Chronic Conditions and Co-morbidities  Goal: Patient's chronic conditions and co-morbidity symptoms are monitored and maintained or improved  11/12/2024 2107 by Diya Cabrera RN  Outcome: Progressing  Flowsheets (Taken 11/12/2024 2000)  Care Plan - Patient's Chronic Conditions and Co-Morbidity Symptoms are Monitored and Maintained or Improved:   Monitor and assess patient's chronic conditions and comorbid symptoms for stability, deterioration, or improvement   Collaborate with multidisciplinary team to address chronic and comorbid conditions and prevent exacerbation or deterioration   Update acute care plan with appropriate goals if chronic or comorbid symptoms are exacerbated and prevent overall improvement and discharge  11/12/2024 1018 by Litzy Monroe RN  Outcome: Progressing     Problem: Discharge Planning  Goal: Discharge to home or other facility with appropriate resources  11/12/2024 2107 by Diya Cabrera RN  Outcome: Progressing  Flowsheets (Taken 11/12/2024 2000)  Discharge to home or other facility with appropriate resources:   Arrange for needed discharge resources and transportation as appropriate   Identify barriers to discharge with patient and caregiver   Identify discharge learning needs (meds, wound care, etc)   Arrange for interpreters to assist at discharge as needed   Refer to discharge planning if patient needs post-hospital services based on physician order or complex needs related to functional status, cognitive ability or social support system  11/12/2024 1018 by Litzy Monroe RN  Outcome: Progressing     Problem: Safety - Adult  Goal: Free from fall injury  11/12/2024 2107 by Diya Cabrera, RN  Outcome: Progressing  Flowsheets (Taken 11/12/2024 2105)  Free From Fall Injury:   Instruct family/caregiver on patient safety   Based on caregiver fall risk screen, instruct family/caregiver to ask for assistance with transferring infant if caregiver noted to have fall risk

## 2024-11-13 NOTE — PROGRESS NOTES
Physician Progress Note      PATIENT:               VINNY JOYCE  CSN #:                  [id number]  :                       1940  ADMIT DATE:       2024 12:52 AM  DISCH DATE:  RESPONDING  PROVIDER #:        Baron Alaniz DO          QUERY TEXT:    Pt admitted with T10 vertebral body unstable fracture, T11 nondisplaced   spinous process fracture  T6 vertebral body subacute compression fracture, Epidural spinal hematoma,   Spinal cord compression at T11. Pt noted to have hypotension. If possible,   please document in the progress notes and discharge summary if you are   evaluating and/or treating any of the following:    The medical record reflects the following:  Risk Factors: post op 24 Thoracic 8 - lumbar 1 posterior fixation and   fusion evacuation of hematoma 84 years old DM2  diastolic CHF EBL 400ml  Clinical Indicators: \"Intraoperative course complicated by hypotension.\" BP   80-70/60-50's MAP < 65 h/h 14.3/44.9--11.3/34.1   ABG pH 7.214--7.311/pCO2 45.1--34.4  Treatment: Patient transferred to ICU , Pt on Vasopressin gtt and   James-Synephrine gtt  Options provided:  -- Hypovolemic Shock  -- Hypovolemia without Shock  -- Hypotension without Shock  -- Cardiogenic shock  -- Other - I will add my own diagnosis  -- Disagree - Not applicable / Not valid  -- Disagree - Clinically unable to determine / Unknown  -- Refer to Clinical Documentation Reviewer    PROVIDER RESPONSE TEXT:    Shock, unclear etiology    Query created by: Lindy Palmer on 2024 1:25 PM      Electronically signed by:  Baron Alaniz DO 2024 2:04 PM

## 2024-11-13 NOTE — PLAN OF CARE
Problem: Chronic Conditions and Co-morbidities  Goal: Patient's chronic conditions and co-morbidity symptoms are monitored and maintained or improved  11/13/2024 0921 by Zenon Barber RN  Outcome: Progressing  Flowsheets (Taken 11/13/2024 0800)  Care Plan - Patient's Chronic Conditions and Co-Morbidity Symptoms are Monitored and Maintained or Improved: Monitor and assess patient's chronic conditions and comorbid symptoms for stability, deterioration, or improvement     Problem: Discharge Planning  Goal: Discharge to home or other facility with appropriate resources  11/13/2024 0921 by eZnon Barber RN  Outcome: Progressing  Flowsheets (Taken 11/13/2024 0800)  Discharge to home or other facility with appropriate resources: Identify barriers to discharge with patient and caregiver     Problem: Safety - Adult  Goal: Free from fall injury  11/13/2024 0921 by Zenon Barber RN  Outcome: Progressing  Flowsheets (Taken 11/13/2024 0914)  Free From Fall Injury: Instruct family/caregiver on patient safety     Problem: Skin/Tissue Integrity  Goal: Absence of new skin breakdown  Description: 1.  Monitor for areas of redness and/or skin breakdown  2.  Assess vascular access sites hourly  3.  Every 4-6 hours minimum:  Change oxygen saturation probe site  4.  Every 4-6 hours:  If on nasal continuous positive airway pressure, respiratory therapy assess nares and determine need for appliance change or resting period.  11/13/2024 0921 by Zenon Barber RN  Outcome: Progressing     Problem: ABCDS Injury Assessment  Goal: Absence of physical injury  11/13/2024 0921 by Zenon Barber RN  Outcome: Progressing  Flowsheets (Taken 11/13/2024 0914)  Absence of Physical Injury: Implement safety measures based on patient assessment     Problem: Pain  Goal: Verbalizes/displays adequate comfort level or baseline comfort level  11/13/2024 0921 by Zenon Barber RN  Outcome: Progressing  Flowsheets (Taken 11/13/2024

## 2024-11-13 NOTE — PROGRESS NOTES
[facility] - Clinical Pharmacy Note - Renal Dosing    Metoclopramide ordered for treatment of pseudo obstruction. Per [facility] Extended Infusion Beta-Lactam Policy, Metoclopramide will be changed to 10 mg IV q6h.     Estimated Creatinine Clearance: Estimated Creatinine Clearance: 71 mL/min (based on SCr of 1 mg/dL).  Dialysis Status, BOSTON, CKD: BOSTON improved, SCr today 1.  BMI: Body mass index is 23.92 kg/m².    Rationale for Adjustment: Agent is renally eliminated.    Pharmacy will continue to monitor renal function and adjust dose as necessary.      Please call with any questions.    Hillary Zuniga PharmD., BCPS   11/13/2024 7:35 AM  Wireless: [phone]

## 2024-11-13 NOTE — PROGRESS NOTES
Vascular duplex lower extremity venous doppler revealed acute DVT in left gastrocnemius and soleus veins. Discussed findings with Dr. Otto, ICU intensivist, who recommended heparin drip no bolus as per neuro protocol.     Spoke to neurocritical care team regarding our recommendation on starting heparin drip in the setting of acute DVT and possible PE. Neurocritical care APRN mentioned to hold off on heparin as patient is not 48 hour post op yet and they will consider CTPE and heparin drip tomorrow during rounds after more discussion .     Addendum: Spoke to neurocritical care APRN again and she will order the CTPA.     Addendum: Dr. Otto and Neurocritical care APRN spoke. Decided against CTPA tonight. Will re-consider tomorrow. Patient continues to be on sub q heparin.

## 2024-11-13 NOTE — PROGRESS NOTES
PCXR obtained cleared to use.   Per radiologist reading noted as follows: Right arm PICC line in satisfactory position with its tip at the cavoatrial junction  Zenon GANDARA notified.

## 2024-11-13 NOTE — PROGRESS NOTES
Occupational Therapy  Facility/Department: [place] ICU  Occupational Therapy Initial Assessment/Treatment     Name: Juan Mohamud  : 1940  MRN: [id number]  Date of Service: 2024    Discharge Recommendations:  Subacute/Skilled Nursing Facility  OT Equipment Recommendations  Other: defer       Patient Diagnosis(es): The encounter diagnosis was SVT (supraventricular tachycardia) (HCC).  Past Medical History:  has a past medical history of Diabetes mellitus (HCC), Hyperlipidemia, Indigestion, Insomnia, Macular degeneration, MI, old, Neuropathy, and Pacemaker.  Past Surgical History:  has a past surgical history that includes knee surgery; Tonsillectomy; Colonoscopy; fracture surgery (Left); Mouth surgery; joint replacement (Right); Intracapsular cataract extraction (Right, 2018); Colonoscopy (N/A, 2024); Colonoscopy (N/A, 3/6/2024); Colonoscopy (N/A, 2024); Colonoscopy (N/A, 2024); and lumbar fusion (N/A, 2024).    Treatment Diagnosis: Decreased ADL status and functional mobility 2/2 fall      Assessment    Performance deficits / Impairments: Decreased functional mobility ;Decreased endurance;Decreased ADL status;Decreased posture;Decreased ROM;Decreased balance;Decreased strength;Decreased safe awareness  Assessment: Pt is an 85 yo male who presents to [facility] 2/2 fall at home. Prior to admission pt was independent w/ ADLs and functional mobility though pt's wife reports pt has been getting weaker over the past several months. Today pt required max Ax2 to complete bed mobility and pt was unsuccessful in achieving stance to the RW. Pt was limited by increased back pain w/ movement. Pt is functioning well below baseline and benefits from ongoing OT while inpt and ongoing OT at d/c.  Treatment Diagnosis: Decreased ADL status and functional mobility 2/2 fall  Prognosis: Fair  Decision Making: Medium Complexity  REQUIRES OT FOLLOW-UP: Yes  Activity Tolerance  Activity Tolerance:

## 2024-11-13 NOTE — PROGRESS NOTES
Physical Therapy  Facility/Department: [facility] ICU  Physical Therapy Initial Assessment and Treatment     Name: Juan Mohamud  : 1940  MRN: [id number]  Date of Service: 2024    Discharge Recommendations:  Subacute/Skilled Nursing Facility   PT Equipment Recommendations  Equipment Needed:  (defer to next level of care)      Patient Diagnosis(es): The encounter diagnosis was SVT (supraventricular tachycardia) (HCC).  Past Medical History:  has a past medical history of Diabetes mellitus (HCC), Hyperlipidemia, Indigestion, Insomnia, Macular degeneration, MI, old, Neuropathy, and Pacemaker.  Past Surgical History:  has a past surgical history that includes knee surgery; Tonsillectomy; Colonoscopy; fracture surgery (Left); Mouth surgery; joint replacement (Right); Intracapsular cataract extraction (Right, 2018); Colonoscopy (N/A, 2024); Colonoscopy (N/A, 3/6/2024); Colonoscopy (N/A, 2024); Colonoscopy (N/A, 2024); and lumbar fusion (N/A, 2024).    Assessment  Body Structures, Functions, Activity Limitations Requiring Skilled Therapeutic Intervention: Decreased functional mobility ;Decreased endurance;Increased pain;Decreased balance;Decreased strength;Decreased safe awareness;Decreased ROM  Assessment: Pt is a 83 y/o M presents s/p fall. Pt is typically independent in his functional mobility and self care. However spouse reports pt has had recent decline in his mobility and becoming increasingly weak. Today, pt required two person assist to complete bed mobility and sit to stand transfer attempt. Pt limited 2/2 significant global weakness, deconditioning and pain. He plans to DC to nursing facility for skilled PT services upon medical clearance and will benefit from such level of care to address the above stated deficits and to maximize his functional independence.  Treatment Diagnosis: dec'd strength and impaired mobility  Therapy Prognosis: Good  Decision Making: Medium

## 2024-11-13 NOTE — PROGRESS NOTES
NEUROSURGERY PROGRESS NOTE    11/13/2024 3:33 PM                               Juan CAMERON Cade                      LOS: 6 days   POD#1 s/p Procedure(s) (LRB):  THORACIC 8-LUMBAR 1 POSTERIOR DECOMPRESSION FIXATION AND FUSION (N/A)    Subjective: No acute events overnight. Patient having generalized weakness and is tired, but doing well neurologically.      Physical Exam:  Patient seen and examined    Vitals:    11/13/24 0915   BP:    Pulse: (!) 120   Resp:    Temp:    SpO2: 100%     GCS:  4 - Opens eyes on own  5 - Alert and oriented  6 - Follows simple motor commands  General: Well developed. Alert and cooperative in no acute distress.   HENT: atraumatic, neck supple  Eyes: Optic discs: Not tested  Pulmonary: unlabored respiratory effort  Cardiovascular:  Warm well perfused. No peripheral edema  Gastrointestinal: abdomen soft, NT, ND    Neurological:  Mental Status: Awake, alert, oriented x 4, speech clear and appropriate  Attention: Intact  Language: No aphasia or dysarthria noted  Sensation: Intact to all extremities to light touch  Coordination: Intact    Cranial Nerves:  II: Visual acuity not tested, denies new visual changes / diplopia  III, IV, VI: PERRL, 3 mm bilaterally, EOMI, no nystagmus noted  V: Facial sensation intact bilaterally to touch  VII: Face symmetric  VIII: Hearing intact bilaterally to spoken voice  IX: Palate movement equal bilaterally  XI: Shoulder shrug equal bilaterally  XII: Tongue midline    Musculoskeletal:   Gait: Not tested   Assist devices: None   Tone: Normal  Motor strength: Exam limited 2/2 generalized weakness and effort   Right  Left    Right  Left    Deltoid  4 4  Hip Flex  4 4   Biceps  4 4  Knee Extensors  4 4   Triceps  4 4  Knee Flexors  4 4   Wrist Ext  4 4  Ankle Dorsiflex.  4 4   Wrist Flex  4 4  Ankle Plantarflex.  4 4   Handgrip  4 4  Ext Olaf Longus  4 4   Thumb Ext  4 4         Incision: Old bloody drainage, but intact    Drains:  Left- 160 mL in past 24

## 2024-11-14 ENCOUNTER — APPOINTMENT (OUTPATIENT)
Dept: CT IMAGING | Age: 84
DRG: 447 | End: 2024-11-14
Attending: INTERNAL MEDICINE
Payer: MEDICARE

## 2024-11-14 LAB
ALBUMIN SERPL-MCNC: 2.5 G/DL (ref 3.4–5)
ALBUMIN SERPL-MCNC: 2.6 G/DL (ref 3.4–5)
ANION GAP SERPL CALCULATED.3IONS-SCNC: 10 MMOL/L (ref 3–16)
ANION GAP SERPL CALCULATED.3IONS-SCNC: 9 MMOL/L (ref 3–16)
BASOPHILS # BLD: 0.1 K/UL (ref 0–0.2)
BASOPHILS NFR BLD: 0.4 %
BUN SERPL-MCNC: 32 MG/DL (ref 7–20)
BUN SERPL-MCNC: 35 MG/DL (ref 7–20)
CALCIUM SERPL-MCNC: 7.7 MG/DL (ref 8.3–10.6)
CALCIUM SERPL-MCNC: 7.8 MG/DL (ref 8.3–10.6)
CHLORIDE SERPL-SCNC: 110 MMOL/L (ref 99–110)
CHLORIDE SERPL-SCNC: 111 MMOL/L (ref 99–110)
CO2 SERPL-SCNC: 20 MMOL/L (ref 21–32)
CO2 SERPL-SCNC: 21 MMOL/L (ref 21–32)
CREAT SERPL-MCNC: 0.9 MG/DL (ref 0.8–1.3)
CREAT SERPL-MCNC: 1 MG/DL (ref 0.8–1.3)
DEPRECATED RDW RBC AUTO: 17.2 % (ref 12.4–15.4)
EOSINOPHIL # BLD: 0.1 K/UL (ref 0–0.6)
EOSINOPHIL NFR BLD: 0.8 %
GFR SERPLBLD CREATININE-BSD FMLA CKD-EPI: 75 ML/MIN/{1.73_M2}
GFR SERPLBLD CREATININE-BSD FMLA CKD-EPI: 85 ML/MIN/{1.73_M2}
GLUCOSE BLD-MCNC: 105 MG/DL (ref 70–99)
GLUCOSE BLD-MCNC: 64 MG/DL (ref 70–99)
GLUCOSE BLD-MCNC: 78 MG/DL (ref 70–99)
GLUCOSE BLD-MCNC: 79 MG/DL (ref 70–99)
GLUCOSE BLD-MCNC: 83 MG/DL (ref 70–99)
GLUCOSE BLD-MCNC: 84 MG/DL (ref 70–99)
GLUCOSE BLD-MCNC: 98 MG/DL (ref 70–99)
GLUCOSE SERPL-MCNC: 104 MG/DL (ref 70–99)
GLUCOSE SERPL-MCNC: 87 MG/DL (ref 70–99)
HCT VFR BLD AUTO: 31.5 % (ref 40.5–52.5)
HGB BLD-MCNC: 10.3 G/DL (ref 13.5–17.5)
LYMPHOCYTES # BLD: 1 K/UL (ref 1–5.1)
LYMPHOCYTES NFR BLD: 6.9 %
MAGNESIUM SERPL-MCNC: 1.95 MG/DL (ref 1.8–2.4)
MCH RBC QN AUTO: 29.5 PG (ref 26–34)
MCHC RBC AUTO-ENTMCNC: 32.8 G/DL (ref 31–36)
MCV RBC AUTO: 90 FL (ref 80–100)
MONOCYTES # BLD: 1.2 K/UL (ref 0–1.3)
MONOCYTES NFR BLD: 8.7 %
NEUTROPHILS # BLD: 11.8 K/UL (ref 1.7–7.7)
NEUTROPHILS NFR BLD: 83.2 %
PERFORMED ON: ABNORMAL
PERFORMED ON: ABNORMAL
PERFORMED ON: NORMAL
PHOSPHATE SERPL-MCNC: 2.8 MG/DL (ref 2.5–4.9)
PHOSPHATE SERPL-MCNC: 2.8 MG/DL (ref 2.5–4.9)
PLATELET # BLD AUTO: 254 K/UL (ref 135–450)
PMV BLD AUTO: 7.4 FL (ref 5–10.5)
POTASSIUM SERPL-SCNC: 3 MMOL/L (ref 3.5–5.1)
POTASSIUM SERPL-SCNC: 3 MMOL/L (ref 3.5–5.1)
POTASSIUM SERPL-SCNC: 3.3 MMOL/L (ref 3.5–5.1)
RBC # BLD AUTO: 3.5 M/UL (ref 4.2–5.9)
SODIUM SERPL-SCNC: 140 MMOL/L (ref 136–145)
SODIUM SERPL-SCNC: 141 MMOL/L (ref 136–145)
WBC # BLD AUTO: 14.1 K/UL (ref 4–11)

## 2024-11-14 PROCEDURE — 72128 CT CHEST SPINE W/O DYE: CPT

## 2024-11-14 PROCEDURE — 6360000004 HC RX CONTRAST MEDICATION

## 2024-11-14 PROCEDURE — 85025 COMPLETE CBC W/AUTO DIFF WBC: CPT

## 2024-11-14 PROCEDURE — 2500000003 HC RX 250 WO HCPCS

## 2024-11-14 PROCEDURE — 6360000002 HC RX W HCPCS

## 2024-11-14 PROCEDURE — 92526 ORAL FUNCTION THERAPY: CPT

## 2024-11-14 PROCEDURE — 83735 ASSAY OF MAGNESIUM: CPT

## 2024-11-14 PROCEDURE — 99232 SBSQ HOSP IP/OBS MODERATE 35: CPT | Performed by: NURSE PRACTITIONER

## 2024-11-14 PROCEDURE — 2700000000 HC OXYGEN THERAPY PER DAY

## 2024-11-14 PROCEDURE — 6370000000 HC RX 637 (ALT 250 FOR IP)

## 2024-11-14 PROCEDURE — 2580000003 HC RX 258: Performed by: STUDENT IN AN ORGANIZED HEALTH CARE EDUCATION/TRAINING PROGRAM

## 2024-11-14 PROCEDURE — 99233 SBSQ HOSP IP/OBS HIGH 50: CPT | Performed by: INTERNAL MEDICINE

## 2024-11-14 PROCEDURE — 6360000002 HC RX W HCPCS: Performed by: STUDENT IN AN ORGANIZED HEALTH CARE EDUCATION/TRAINING PROGRAM

## 2024-11-14 PROCEDURE — 2000000000 HC ICU R&B

## 2024-11-14 PROCEDURE — 71260 CT THORAX DX C+: CPT

## 2024-11-14 PROCEDURE — 06H03DZ INSERTION OF INTRALUMINAL DEVICE INTO INFERIOR VENA CAVA, PERCUTANEOUS APPROACH: ICD-10-PCS

## 2024-11-14 PROCEDURE — 2580000003 HC RX 258

## 2024-11-14 PROCEDURE — 6370000000 HC RX 637 (ALT 250 FOR IP): Performed by: STUDENT IN AN ORGANIZED HEALTH CARE EDUCATION/TRAINING PROGRAM

## 2024-11-14 PROCEDURE — 94761 N-INVAS EAR/PLS OXIMETRY MLT: CPT

## 2024-11-14 PROCEDURE — 80069 RENAL FUNCTION PANEL: CPT

## 2024-11-14 RX ORDER — IOPAMIDOL 755 MG/ML
75 INJECTION, SOLUTION INTRAVASCULAR
Status: COMPLETED | OUTPATIENT
Start: 2024-11-14 | End: 2024-11-14

## 2024-11-14 RX ORDER — METHOCARBAMOL 750 MG/1
750 TABLET, FILM COATED ORAL EVERY 6 HOURS PRN
Status: DISCONTINUED | OUTPATIENT
Start: 2024-11-14 | End: 2024-11-16

## 2024-11-14 RX ORDER — POTASSIUM CHLORIDE 29.8 MG/ML
20 INJECTION INTRAVENOUS
Status: DISPENSED | OUTPATIENT
Start: 2024-11-14 | End: 2024-11-14

## 2024-11-14 RX ORDER — METOPROLOL SUCCINATE 25 MG/1
25 TABLET, EXTENDED RELEASE ORAL DAILY
Status: DISCONTINUED | OUTPATIENT
Start: 2024-11-14 | End: 2024-11-14

## 2024-11-14 RX ORDER — MECOBALAMIN 5000 MCG
5 TABLET,DISINTEGRATING ORAL NIGHTLY
Status: DISCONTINUED | OUTPATIENT
Start: 2024-11-15 | End: 2024-12-11

## 2024-11-14 RX ORDER — POTASSIUM CHLORIDE 29.8 MG/ML
20 INJECTION INTRAVENOUS ONCE
Status: COMPLETED | OUTPATIENT
Start: 2024-11-14 | End: 2024-11-14

## 2024-11-14 RX ORDER — MIDODRINE HYDROCHLORIDE 5 MG/1
5 TABLET ORAL ONCE
Status: DISCONTINUED | OUTPATIENT
Start: 2024-11-14 | End: 2024-11-15 | Stop reason: ALTCHOICE

## 2024-11-14 RX ORDER — MIDODRINE HYDROCHLORIDE 5 MG/1
15 TABLET ORAL
Status: DISCONTINUED | OUTPATIENT
Start: 2024-11-14 | End: 2024-11-20

## 2024-11-14 RX ORDER — GUAIFENESIN 200 MG/10ML
200 LIQUID ORAL EVERY 4 HOURS PRN
Status: DISCONTINUED | OUTPATIENT
Start: 2024-11-14 | End: 2024-12-08

## 2024-11-14 RX ADMIN — SODIUM CHLORIDE, PRESERVATIVE FREE 10 ML: 5 INJECTION INTRAVENOUS at 20:31

## 2024-11-14 RX ADMIN — GABAPENTIN 600 MG: 300 CAPSULE ORAL at 14:20

## 2024-11-14 RX ADMIN — ACETAMINOPHEN 1000 MG: 500 TABLET, FILM COATED ORAL at 20:31

## 2024-11-14 RX ADMIN — GUAIFENESIN 200 MG: 100 SOLUTION ORAL at 17:33

## 2024-11-14 RX ADMIN — Medication: at 20:32

## 2024-11-14 RX ADMIN — MIDODRINE HYDROCHLORIDE 15 MG: 5 TABLET ORAL at 17:33

## 2024-11-14 RX ADMIN — METOCLOPRAMIDE 10 MG: 5 INJECTION, SOLUTION INTRAMUSCULAR; INTRAVENOUS at 00:26

## 2024-11-14 RX ADMIN — HEPARIN SODIUM 5000 UNITS: 5000 INJECTION INTRAVENOUS; SUBCUTANEOUS at 05:59

## 2024-11-14 RX ADMIN — MIDODRINE HYDROCHLORIDE 15 MG: 5 TABLET ORAL at 09:59

## 2024-11-14 RX ADMIN — POTASSIUM CHLORIDE 20 MEQ: 400 INJECTION, SOLUTION INTRAVENOUS at 05:58

## 2024-11-14 RX ADMIN — GABAPENTIN 600 MG: 300 CAPSULE ORAL at 09:59

## 2024-11-14 RX ADMIN — METOCLOPRAMIDE 10 MG: 5 INJECTION, SOLUTION INTRAMUSCULAR; INTRAVENOUS at 05:59

## 2024-11-14 RX ADMIN — AMPICILLIN SODIUM, SULBACTAM SODIUM 3000 MG: 2; 1 INJECTION, POWDER, FOR SOLUTION INTRAMUSCULAR; INTRAVENOUS at 17:43

## 2024-11-14 RX ADMIN — METOCLOPRAMIDE 10 MG: 5 INJECTION, SOLUTION INTRAMUSCULAR; INTRAVENOUS at 12:07

## 2024-11-14 RX ADMIN — POTASSIUM CHLORIDE 20 MEQ: 400 INJECTION, SOLUTION INTRAVENOUS at 07:06

## 2024-11-14 RX ADMIN — MIDODRINE HYDROCHLORIDE 15 MG: 5 TABLET ORAL at 12:45

## 2024-11-14 RX ADMIN — AMPICILLIN SODIUM, SULBACTAM SODIUM 3000 MG: 2; 1 INJECTION, POWDER, FOR SOLUTION INTRAMUSCULAR; INTRAVENOUS at 02:20

## 2024-11-14 RX ADMIN — METHOCARBAMOL 1000 MG: 100 INJECTION INTRAMUSCULAR; INTRAVENOUS at 00:55

## 2024-11-14 RX ADMIN — AMPICILLIN SODIUM, SULBACTAM SODIUM 3000 MG: 2; 1 INJECTION, POWDER, FOR SOLUTION INTRAMUSCULAR; INTRAVENOUS at 11:41

## 2024-11-14 RX ADMIN — HEPARIN SODIUM 5000 UNITS: 5000 INJECTION INTRAVENOUS; SUBCUTANEOUS at 12:45

## 2024-11-14 RX ADMIN — AMIODARONE HYDROCHLORIDE 200 MG: 200 TABLET ORAL at 10:00

## 2024-11-14 RX ADMIN — BUPROPION HYDROCHLORIDE 300 MG: 150 TABLET, EXTENDED RELEASE ORAL at 09:59

## 2024-11-14 RX ADMIN — METOCLOPRAMIDE 10 MG: 5 INJECTION, SOLUTION INTRAMUSCULAR; INTRAVENOUS at 17:32

## 2024-11-14 RX ADMIN — IOPAMIDOL 75 ML: 755 INJECTION, SOLUTION INTRAVENOUS at 15:28

## 2024-11-14 RX ADMIN — HEPARIN SODIUM 5000 UNITS: 5000 INJECTION INTRAVENOUS; SUBCUTANEOUS at 20:31

## 2024-11-14 RX ADMIN — Medication: at 11:30

## 2024-11-14 RX ADMIN — METHOCARBAMOL 1000 MG: 100 INJECTION INTRAMUSCULAR; INTRAVENOUS at 10:07

## 2024-11-14 RX ADMIN — POTASSIUM CHLORIDE 20 MEQ: 29.8 INJECTION, SOLUTION INTRAVENOUS at 10:27

## 2024-11-14 RX ADMIN — GABAPENTIN 600 MG: 300 CAPSULE ORAL at 20:31

## 2024-11-14 RX ADMIN — PANTOPRAZOLE SODIUM 40 MG: 40 INJECTION, POWDER, FOR SOLUTION INTRAVENOUS at 10:02

## 2024-11-14 RX ADMIN — ONDANSETRON 4 MG: 2 INJECTION INTRAMUSCULAR; INTRAVENOUS at 07:55

## 2024-11-14 RX ADMIN — PHENYLEPHRINE HYDROCHLORIDE 125 MCG/MIN: 50 INJECTION INTRAVENOUS at 05:08

## 2024-11-14 RX ADMIN — ATORVASTATIN CALCIUM 10 MG: 10 TABLET, FILM COATED ORAL at 20:31

## 2024-11-14 RX ADMIN — ACETAMINOPHEN 1000 MG: 500 TABLET, FILM COATED ORAL at 10:01

## 2024-11-14 NOTE — PLAN OF CARE
Problem: Chronic Conditions and Co-morbidities  Goal: Patient's chronic conditions and co-morbidity symptoms are monitored and maintained or improved  Outcome: Progressing     Problem: Discharge Planning  Goal: Discharge to home or other facility with appropriate resources  Outcome: Progressing     Problem: Safety - Adult  Goal: Free from fall injury  Outcome: Progressing     Problem: Skin/Tissue Integrity  Goal: Absence of new skin breakdown  Description: 1.  Monitor for areas of redness and/or skin breakdown  2.  Assess vascular access sites hourly  3.  Every 4-6 hours minimum:  Change oxygen saturation probe site  4.  Every 4-6 hours:  If on nasal continuous positive airway pressure, respiratory therapy assess nares and determine need for appliance change or resting period.  Outcome: Progressing     Problem: ABCDS Injury Assessment  Goal: Absence of physical injury  Outcome: Progressing     Problem: Pain  Goal: Verbalizes/displays adequate comfort level or baseline comfort level  Outcome: Progressing  Flowsheets (Taken 11/13/2024 1600 by Zenon Barber RN)  Verbalizes/displays adequate comfort level or baseline comfort level: Encourage patient to monitor pain and request assistance     Problem: Respiratory - Adult  Goal: Achieves optimal ventilation and oxygenation  Outcome: Progressing     Problem: Cardiovascular - Adult  Goal: Maintains optimal cardiac output and hemodynamic stability  Outcome: Progressing  Goal: Absence of cardiac dysrhythmias or at baseline  Outcome: Progressing     Problem: Metabolic/Fluid and Electrolytes - Adult  Goal: Electrolytes maintained within normal limits  Outcome: Progressing  Goal: Hemodynamic stability and optimal renal function maintained  Outcome: Progressing     Problem: Neurosensory - Adult  Goal: Achieves stable or improved neurological status  Outcome: Progressing  Goal: Absence of seizures  Outcome: Progressing  Goal: Achieves maximal functionality and self  care  Outcome: Progressing     Problem: Nutrition Deficit:  Goal: Optimize nutritional status  Outcome: Progressing

## 2024-11-14 NOTE — PROGRESS NOTES
ICU Progress Note    Admit Date: 11/7/2024  Day: 8  Vent Day: 0  IV Access:Peripheral  IV Fluids:None  Vasopressors:None                Antibiotics: Unasyn  Diet: ADULT DIET; Dysphagia - Soft and Bite Sized; Low Fiber    CC: post-fall    Interval history:     S: Feels well aside from frequent bowel movements. Abdomen non-tender. Mild burning on urination.   O:  requiring pressor support of arleth 125 for MAP>65. T max 36.9. O2 95-98% RA. UO >1L. 5x loose bowel movements over last 24 hours.   Labs: K+ 3.0, WBC 14.1, HgB 10.3  Imaging: LE Doppler demonstrated acute DVT. CT thoracic spine: Acute fracture of the T10 vertebral body likely not new   A/P:  Increase midodrine to 15mg TID, attempt to wean off pressors. Hold heparin gtt until cleared by NSGY. CTA to assess for PE today. Will change enemas to PRN due to frequency of bowel movements and pt refusal. Will advance diet per SLP.       HPI:   Mr. Juan Mohamud is a 84 y.o. male with a medical hx significant for type II DM, degenerative lumbar disease, arrhythmia s/p AICD, diastolic CHF, and macular degeneration otherwise as listed in the MHx table below, who presented from home to the [facility] ED on 11/3/24 after a fall.     Per chart review, patient fell after feeling like his \"knees were giving out.\" No reported symptoms prior to this fall, patient landed on his left side and hit the back of his head but did not lose consciousness at this time.      Upon arrival to the [facility] ED, patient was alert, oriented and reported with GCS of 15 and NIH of 0. While in the ED patient had increasing generalized weakness and difficulty getting up from a sitting position. Decision was made to admit patient as they did not feel safe discharging him home given his inability to stand unassisted.      Fall was thought to be mechanical and secondary to generalized weakness due to poor nutrition. CT abd pelv performed due to complaints of abdominal pain, distension, and  post-op recs  -Extubated 11/13 without complication    New DVT  Possible PE  Due to concern for possible PE in setting of ongoing hypotension, a venous doppler performed 11/13-demonstrated acute DVT in gastrocnemius.   LE Venous doppler 11/13: Acute deep venous thrombosis in the left gastrocnemius and soleal veins without proximal large vein extension.   -heparin gtt held per neuro instruction, due to concern for bleed within 48h post surgery. Will initiate when cleared.   -CTA to rule out PE, will encourage fluids prior to ensure adequate renal perfusion     GI  Ileus s/p NG decompression and neostigmine x1 (11/09)  Chronic constipation  Prior ileus with hospitalization 03/2024 with prior mechanical fall and rehab following. Patient has a history of chronic idiopathic constipation. Found to have diffuse distention of small and large bowel compatible with ileus on imaging. Underwent colonic decompression at [facility] on 11/5 in addition to rectal tube placement. Had rectal tube placed at [facility] prior to [facility] transfer.   Most likely etiology is Tex/ Pseudo-obstruction.   -GI consulted:   -If to get opioids rec movantik 25mg  -Avoid fiber, lactulose, etc  -Keep lytes supplemented  -Enemas/neostigmine if needed  -Hold on colonoscopy unless colon diameter >10cms  -advance to diet to low fiber/bite sized per SLP     Cardio  Hypotension  Patient with diastolic congestive heart failure (EF 50-55%),  was hypotensive at [facility], was started on midodrine TID. Here, SBP 90s-low 100s with MAPs in the 70s. Per patient his baseline SBP is 100-115 when he measures at home.   -home torsemide, jardiance, losartan, Toprol and spirolactone held in setting of low BP  -Will attempt to wean off arleth vasopressor   -PICC line placed 11/13  -midodrine 10mg-->15mg TID as bridge off pressors started 11/13     History of V Tach  Had episode of sustained V Tach 250 bpm 02/2024, started on amiodarone and ICD placed 03/2024.

## 2024-11-14 NOTE — PROGRESS NOTES
Occupational / Physical Therapy  Per RN pt leaving floor for scan. Will follow up as treatment schedule allows.     Ameena Carrillo, MOT, OTR/L, CNS  Venita Jasso, PT [id number]

## 2024-11-14 NOTE — PLAN OF CARE
Received call from patient's RN that patient is struggling to lift legs antigravity off the bed. LLE 3-/5, RLE 2/5. Sensation intact. No numbness or tingling in BLE's. No saddle anesthesia. Trace patellar reflexes bilaterally. No clonus. Negative bambinski's. Will obtain STAT CT thoracic spine. Discussed with patient's RN.

## 2024-11-14 NOTE — PROGRESS NOTES
Speech Language Pathology  Facility/Department:[facility] ICU  Dysphagia Therapy Note                                                       Name: Juan Mohamud  : 1940  MRN: [id number]    Patient Diagnosis(es):   Patient Active Problem List    Diagnosis Date Noted    Hypotension after procedure 2024    SVT (supraventricular tachycardia) ([facility]) 2024    Fx dorsal vertebra-closed ([facility]) 2024    Spinal cord injury at T1-T6 level ([facility]) 2024    Preoperative cardiovascular examination 2024    Chronic heart failure with preserved ejection fraction (HFpEF) ([facility]) 2024    Abdominal distension 2024    Epidural hematoma 2024    Fall at home, initial encounter 2024    Acute congestive heart failure ([facility]) 2024    PVC's (premature ventricular contractions) 2024    Sepsis ([facility]) 2024    ICD (implantable cardioverter-defibrillator) in place 2024    Acute hypoxemic respiratory failure 2024    BOSTON (acute kidney injury) ([facility]) 2024    Arrhythmia 2024    Primary hypertension 2024    Hyperlipidemia 2024    Colonic obstruction ([facility]) 2024    Trauma 2024    History of fall 2024    Type 2 diabetes mellitus ([facility]) 2024    Anemia 2024    Pneumonia 2024    Hyponatremia 2024    Colonic pseudoobstruction 2024    VT (ventricular tachycardia) ([facility]) 2024    Constipation 2024    Cataract extraction status of right eye 2018    DDD (degenerative disc disease), lumbar 2013       Past Medical History:   Diagnosis Date    Diabetes mellitus ([facility])     diet controlled    Hyperlipidemia     Indigestion     Insomnia     Macular degeneration     MI, 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    Neuropathy     toes partially on both feet    Pacemaker      Past Surgical History:   Procedure Laterality Date    COLONOSCOPY      COLONOSCOPY N/A 2024    COLONOSCOPY FLEXIBLE DECOMPRESSION

## 2024-11-14 NOTE — PLAN OF CARE
Problem: Chronic Conditions and Co-morbidities  Goal: Patient's chronic conditions and co-morbidity symptoms are monitored and maintained or improved  11/14/2024 1703 by Nannette Leslie RN  Outcome: Progressing  11/14/2024 0500 by Prasanth Cuevas RN  Outcome: Progressing     Problem: Discharge Planning  Goal: Discharge to home or other facility with appropriate resources  11/14/2024 1703 by Nannette Leslie RN  Outcome: Progressing  11/14/2024 0500 by Prasanth Cuevas RN  Outcome: Progressing     Problem: Safety - Adult  Goal: Free from fall injury  11/14/2024 1703 by Nannette Leslie RN  Outcome: Progressing  Flowsheets  Taken 11/14/2024 1200  Free From Fall Injury: Instruct family/caregiver on patient safety  Taken 11/14/2024 0800  Free From Fall Injury: Instruct family/caregiver on patient safety  11/14/2024 0500 by Prasanth Cuevas RN  Outcome: Progressing     Problem: Skin/Tissue Integrity  Goal: Absence of new skin breakdown  Description: 1.  Monitor for areas of redness and/or skin breakdown  2.  Assess vascular access sites hourly  3.  Every 4-6 hours minimum:  Change oxygen saturation probe site  4.  Every 4-6 hours:  If on nasal continuous positive airway pressure, respiratory therapy assess nares and determine need for appliance change or resting period.  11/14/2024 1703 by Nannette Leslie RN  Outcome: Progressing  11/14/2024 0500 by Prasanth Cuevas RN  Outcome: Progressing     Problem: ABCDS Injury Assessment  Goal: Absence of physical injury  11/14/2024 1703 by Nannette Leslie RN  Outcome: Progressing  Flowsheets (Taken 11/14/2024 1200)  Absence of Physical Injury: Implement safety measures based on patient assessment  11/14/2024 0500 by Prasanth Cuevas RN  Outcome: Progressing     Problem: Pain  Goal: Verbalizes/displays adequate comfort level or baseline comfort level  11/14/2024 1703 by Nannette Leslie RN  Outcome: Progressing  11/14/2024 0500 by Prasanth Cuevas RN  Outcome:  Progressing  Flowsheets (Taken 11/13/2024 1600 by Zenon Barber RN)  Verbalizes/displays adequate comfort level or baseline comfort level: Encourage patient to monitor pain and request assistance     Problem: Respiratory - Adult  Goal: Achieves optimal ventilation and oxygenation  11/14/2024 1703 by Nannette Leslie RN  Outcome: Progressing  11/14/2024 0500 by Prasanth Cuevas RN  Outcome: Progressing     Problem: Cardiovascular - Adult  Goal: Maintains optimal cardiac output and hemodynamic stability  11/14/2024 1703 by Nannette Leslie RN  Outcome: Progressing  11/14/2024 0500 by Prasanth Cuevas RN  Outcome: Progressing  Goal: Absence of cardiac dysrhythmias or at baseline  11/14/2024 1703 by Nannette Leslie RN  Outcome: Progressing  11/14/2024 0500 by Prasanth Cuevas RN  Outcome: Progressing     Problem: Metabolic/Fluid and Electrolytes - Adult  Goal: Electrolytes maintained within normal limits  11/14/2024 1703 by Nannette Leslie RN  Outcome: Progressing  11/14/2024 0500 by Prasanth Cuevas RN  Outcome: Progressing  Goal: Hemodynamic stability and optimal renal function maintained  11/14/2024 1703 by Nannette Leslie RN  Outcome: Progressing  11/14/2024 0500 by Prasanth Cuevas RN  Outcome: Progressing     Problem: Neurosensory - Adult  Goal: Achieves stable or improved neurological status  11/14/2024 1703 by Nannette Leslie RN  Outcome: Progressing  11/14/2024 0500 by Prasanth Cuevas RN  Outcome: Progressing  Goal: Absence of seizures  11/14/2024 1703 by Nannette Leslie RN  Outcome: Progressing  11/14/2024 0500 by Prasanth Cuevas RN  Outcome: Progressing  Goal: Achieves maximal functionality and self care  11/14/2024 1703 by Nannette Leslie RN  Outcome: Progressing  11/14/2024 0500 by Prasanth Cuevas RN  Outcome: Progressing     Problem: Nutrition Deficit:  Goal: Optimize nutritional status  11/14/2024 1703 by Nannette Leslie RN  Outcome: Progressing  11/14/2024 0500 by Prasanth Cuevas

## 2024-11-14 NOTE — CARE COORDINATION
DISCHARGE PLANNIN  Chart reviewed.  Patient is from a condo with his spouse. No current services. He does have a walker and cane that he uses when out of the house and at night to go to the bathroom.     Current discharge plan is SNF.   Referral was sent to [facility] at [facility] yesterday via Epic. I called liaison, Adele ([phone]) to make sure that referral went through. Unsuccessful in reaching her, voicemail was left with my callback number.    UPDATE: [id number]  This CM spoke with Adele, who stated they could accept patient. They could not take until patients BP is more controlled but will save a bed for him.  I called patients spouse, Lizzie and updated her on the above.    CM team will continue to follow.  Chandrika Hernandez RN Case Manager  [phone]

## 2024-11-14 NOTE — PLAN OF CARE
POD#1 s/p Procedure(s) (LRB):  THORACIC 8-LUMBAR 1 POSTERIOR DECOMPRESSION FIXATION AND FUSION (N/A)    C/o 5/10 back pain.  Also states he has some numbness in his right thumb  No other N/t in BLE's or BUE's (other than the chronic neuropathy in both feet which is not worse than normal)          PHYSICAL EXAM:  Vitals:    11/13/24 2145 11/13/24 2200 11/13/24 2215 11/13/24 2230   BP:       Pulse: (!) 119 (!) 119 (!) 118 (!) 118   Resp:       Temp:       TempSrc:       SpO2: 94% 94% 97% 96%   Weight:       Height:             General: Drowsy, no distress, well-nourished  Neurologic  Mental status:   orientation to person, place, time, situation   Attention intact as able to attend well to the exam     Language fluent in conversation   Comprehension intact; follows simple commands    Cranial nerves:   CN2: Visual fields full w/o extinction on confrontational testing  CN 3,4,6: Pupils equal and reactive to light, extraocular muscles intact  CN5: Facial sensation symmetric   CN7: Face symmetric  CN8: Hearing symmetric to spoken voice  CN9: Palate elevated symmetrically  CN11: Traps full strength on shoulder shrug  CN12: Tongue midline with protrusion    Motor Exam:   R  L    Deltoid 4 4   Biceps 4 4   Triceps 4 4   Wrist extension  4 4   Interossei 4 4      R  L    Hip flexion  3 4   Hip extension  3 4   Knee flexion  3 4   Knee extension  3 4   Ankle dorsiflexion  4 4   Ankle plantar flexion  4 4     Sensory: light touch intact and symmetric in all 4 extremities.  No sensory extinction on bilateral simultaneous stimulation  Cerebellar/coordination: finger nose finger normal without ataxia  Tone: normal in all 4 extremities  Gait: Deferred for safety    OTHER SYSTEMS:  Cardiovascular: Warm, appears well perfused   Respiratory: Easy, non-labored respiratory pattern   Abdominal: Abdomen distended  Extremities: Upper and lower extremities are atraumatic in appearance without deformity. No swelling or erythema.

## 2024-11-14 NOTE — PROGRESS NOTES
Spiritual Health History and Assessment/Progress Note  [facility]    (P) Attempted Encounter,  ,  ,      Name: Juan Mohamud MRN: [id number]    Age: 84 y.o.     Sex: male   Language: English   Confucianism: Taoism   Fx dorsal vertebra-closed (HCC)     Date: 11/14/2024            Total Time Calculated: 10 min               attempted visit with pt.   Electronically signed by PAULY Rodriguez on 11/14/2024 at 12:25 PM

## 2024-11-14 NOTE — PLAN OF CARE
Problem: Chronic Conditions and Co-morbidities  Goal: Patient's chronic conditions and co-morbidity symptoms are monitored and maintained or improved  11/14/2024 1833 by Nannette Leslie RN  Outcome: Progressing  11/14/2024 1703 by Nannette Leslie RN  Outcome: Progressing  11/14/2024 0500 by Prasanth Cuevas RN  Outcome: Progressing     Problem: Discharge Planning  Goal: Discharge to home or other facility with appropriate resources  11/14/2024 1833 by Nannette Leslie RN  Outcome: Progressing  11/14/2024 1703 by Nannette Leslie RN  Outcome: Progressing  11/14/2024 0500 by Prasanth Cuevas RN  Outcome: Progressing     Problem: Safety - Adult  Goal: Free from fall injury  11/14/2024 1833 by Nannette Leslie RN  Outcome: Progressing  11/14/2024 1703 by Nannette Leslie RN  Outcome: Progressing  Flowsheets  Taken 11/14/2024 1200  Free From Fall Injury: Instruct family/caregiver on patient safety  Taken 11/14/2024 0800  Free From Fall Injury: Instruct family/caregiver on patient safety  11/14/2024 0500 by Prasanth Cuevas RN  Outcome: Progressing     Problem: Skin/Tissue Integrity  Goal: Absence of new skin breakdown  Description: 1.  Monitor for areas of redness and/or skin breakdown  2.  Assess vascular access sites hourly  3.  Every 4-6 hours minimum:  Change oxygen saturation probe site  4.  Every 4-6 hours:  If on nasal continuous positive airway pressure, respiratory therapy assess nares and determine need for appliance change or resting period.  11/14/2024 1833 by Nannette Leslie RN  Outcome: Progressing  11/14/2024 1703 by Nannette Leslie RN  Outcome: Progressing  11/14/2024 0500 by Prasanth Cuevas RN  Outcome: Progressing     Problem: ABCDS Injury Assessment  Goal: Absence of physical injury  11/14/2024 1833 by Nannette Leslie RN  Outcome: Progressing  11/14/2024 1703 by Nannette Leslie RN  Outcome: Progressing  Flowsheets (Taken 11/14/2024 1200)  Absence of Physical Injury: Implement safety

## 2024-11-14 NOTE — PROGRESS NOTES
NEUROSURGERY PROGRESS NOTE    11/14/2024 10:08 AM                               Juan CAMERON Mohamud                      LOS: 7 days   POD#2 s/p Procedure(s) (LRB):  THORACIC 8-LUMBAR 1 POSTERIOR DECOMPRESSION FIXATION AND FUSION (N/A)    Subjective: Patient was seen overnight by NCC NP for BLE weakness. Patient continues to have generalized weakness with 3/5 strength in both legs, and he c/o burning sensation when urinating.    Physical Exam:  Patient seen and examined    Vitals:    11/14/24 1000   BP: (!) 110/56   Pulse: (!) 121   Resp:    Temp:    SpO2:      GCS:  4 - Opens eyes on own  5 - Alert and oriented  6 - Follows simple motor commands  General: Well developed. Alert and cooperative in no acute distress.   HENT: atraumatic, neck supple  Eyes: Optic discs: Not tested  Pulmonary: unlabored respiratory effort  Cardiovascular:  Warm well perfused. No peripheral edema  Gastrointestinal: abdomen soft, NT, ND    Neurological:  Mental Status: Awake, alert, oriented x 4, speech clear and appropriate  Attention: Intact  Language: No aphasia or dysarthria noted  Sensation: Intact to all extremities to light touch  Coordination: Intact    Cranial Nerves:  II: Visual acuity not tested, denies new visual changes / diplopia  III, IV, VI: PERRL, 3 mm bilaterally, EOMI, no nystagmus noted  V: Facial sensation intact bilaterally to touch  VII: Face symmetric  VIII: Hearing intact bilaterally to spoken voice  IX: Palate movement equal bilaterally  XI: Shoulder shrug equal bilaterally  XII: Tongue midline    Musculoskeletal:   Gait: Not tested   Assist devices: None   Tone: Normal  Motor strength: Exam limited 2/2 generalized weakness and effort   Right  Left    Right  Left    Deltoid  4 4  Hip Flex  3 3   Biceps  4 4  Knee Extensors  3 3   Triceps  4 4  Knee Flexors  3 3   Wrist Ext  4 4  Ankle Dorsiflex.  4 4   Wrist Flex  4 4  Ankle Plantarflex.  4 4   Handgrip  4 4  Ext Olaf Longus  4 4   Thumb Ext  4 4

## 2024-11-14 NOTE — PROGRESS NOTES
[facility] Medicine Progress Note  V 10.25      Date of Admission: 11/7/2024    Hospital Day: 8      Chief Admission Complaint:  fall, back pain     Subjective:  Patient seen and examined at bedside. No acute events overnight. Patient reports some dysuria today. Also reports back pain, especially when he's being moved around or sat up. Patient denies any SOB, chest pain, pleuritic chest pain, fevers, chills, nausea, vomiting.     Presenting Admission History:       Mr. Juan Mohamud is a 84 y.o. male with a medical hx significant for type II DM, degenerative lumbar disease, arrhythmia s/p AICD, diastolic CHF, and macular degeneration otherwise as listed in the MHx table below, who presented from home to the [facility] ED on 11/3/24 after a fall.     Per chart review, patient fell after feeling like his \"knees were giving out.\" No reported symptoms prior to this fall, patient landed on his left side and hit the back of his head but did not lose consciousness at this time.      Upon arrival to the [facility] ED, patient was alert, oriented and reported with GCS of 15 and NIH of 0. While in the ED patient had increasing generalized weakness and difficulty getting up from a sitting position. Decision was made to admit patient as they did not feel safe discharging him home given his inability to stand unassisted.      Fall was thought to be mechanical and secondary to generalized weakness due to poor nutrition. CT abd pelv performed due to complaints of abdominal pain, distension, and back pain upon arrival to the floor. Imaging revealed acute T11 vertebral fracture extending into the T10-T11 disc space as well as diffuse distention of small and large bowel compatible with ileus and bilateral pleural effusions.     GI consulted for ileus, and patient underwent colonic decompression     MRI thoracic spine then demonstrated large epidural spinal hematoma with cord compression  and additional T6 subacute compression  fracture along with bilateral pleural effusions.     Patient was transferred to [facility] on 11/7 for neurosurgical care.     Assessment/Plan:      Current Principal Problem:  Fx dorsal vertebra-closed (HCC)    Plan:    T10 vertebral body unstable fracture  T11 nondisplaced spinous process fracture  T6 vertebral body subacute compression fracture  Epidural spinal hematoma   Spinal cord compression at T11  - NSGY following  - NCC following  - bed rest  - TLS spine precautions  - q4h neuro checks   - Taken to OR 11/13 for T8-L1 posterior decompression fixation and fusion for his closed unstable burst fracture of T11 vertebra. Patient remained intubated following procedure and was on pressors. On 50mcg propofol on arrival from the OR, did get reversal for paralytic downstairs.   - Weaned off sedation and extubated yesterday, tolerated well.  - Remains on high dose phenylephrine, 125mcg. Added midodrine today    Tex syndrome - resolved  - severe small and large bowel distention  - s/p 2 colonoscopic decompressions with decompression tube placed in descending colon  - distention seems more proximal however, significant small bowel component  - continue Naloxegol. Restarted home Linzess  - Received 2mg neostigmine IV once 11/9 following cardiac clearance, monitored patient on telemetry following administration  - GI and GS consulted   - NG tube removed 11/11. Is having bowel movements now. Per surgery team, recommending daily enema, SMOG ordered for today as well. Patient refusing bowel regimen as having several loose bowel movements now    BOSTON - resolved  - Cr 1.0 today. Continue to monitor on daily labs    Hypoxia  Possible aspiration pneumonia  - Possibly secondary to pneumonia given CT findings vs over resuscitation with IV fluids given increasing pro-BNP and small bilateral pleural effusions.   -  CT shows interval development of groundglass and nodular opacities of right middle lobe most likely infectious or

## 2024-11-14 NOTE — PROGRESS NOTES
[facility] Cardiology Progress Note    Primary Cardiologist:     CC/HPI:     S: Denies cp, sob or palpitations.     Tele: Sinus tachycardia     O:  Physical Exam:  BP (!) 110/54   Pulse (!) 120   Temp 97 °F (36.1 °C) (Temporal)   Resp 14   Ht 1.956 m (6' 5\")   Wt 91.5 kg (201 lb 11.5 oz)   SpO2 99%   BMI 23.92 kg/m²    General (appearance):  No acute distress  Eyes: anicteric   Neck: soft, No JVD  Ears/Nose/Mouth/Thorat: No cyanosis  CV: tachy   Respiratory:  clear anterior. Normal effort.   GI: soft, non-tender, non-distended  Skin: Warm, dry. No rashes  Neuro/Psych: Alert and cooperative. Appropriate behavior  Ext:  No c/c. + LE edema    I.O's= -3.2 L     Weight  Admission: Weight - Scale: 89.5 kg (197 lb 5 oz)   Today: Weight - Scale: 91.5 kg (201 lb 11.5 oz)    CBC:   Recent Labs     24  0603 24  1235 24  0428 24  0434   WBC 17.4*  --  18.2* 14.1*   HGB 14.3 11.3* 11.3* 10.3*   HCT 44.9  --  34.1* 31.5*   MCV 92.1  --  89.9 90.0     --  375 254     BMP:   Recent Labs     24  0603 24  0428 24  0435    138 141   K 3.0* 3.9 3.0*  3.0*    108 111*   CO2 18* 20* 20*   PHOS  --   --  2.8   BUN 35* 37* 35*   CREATININE 0.9 1.0 1.0     Estimated Creatinine Clearance: 71 mL/min (based on SCr of 1 mg/dL).  Mag:   Lab Results   Component Value Date/Time    MG 1.95 2024 04:35 AM     LIVER PROFILE:   No results for input(s): \"AST\", \"ALT\", \"LIPASE\", \"AMYLASE\", \"BILIDIR\", \"BILITOT\", \"ALKPHOS\" in the last 72 hours.    Invalid input(s): \"ALB\"    Pro-BNP:   Lab Results   Component Value Date/Time    PROBNP 7,647 2024 07:55 AM    PROBNP 4,975 2024 09:11 PM    PROBNP 2,190 2024 03:05 PM    PROBNP 2,599 2024 10:44 PM     High Sensitivity Troponin:   Lab Results   Component Value Date    TROPHS 60 (H) 2024       Imagin2024 Venous duplex      Acute deep venous thrombosis in the left

## 2024-11-15 ENCOUNTER — APPOINTMENT (OUTPATIENT)
Dept: INTERVENTIONAL RADIOLOGY/VASCULAR | Age: 84
DRG: 447 | End: 2024-11-15
Attending: INTERNAL MEDICINE
Payer: MEDICARE

## 2024-11-15 LAB
ANION GAP SERPL CALCULATED.3IONS-SCNC: 11 MMOL/L (ref 3–16)
BACTERIA URNS QL MICRO: ABNORMAL /HPF
BASOPHILS # BLD: 0 K/UL (ref 0–0.2)
BASOPHILS NFR BLD: 0.3 %
BILIRUB UR QL STRIP.AUTO: ABNORMAL
BUN SERPL-MCNC: 30 MG/DL (ref 7–20)
CALCIUM SERPL-MCNC: 7.6 MG/DL (ref 8.3–10.6)
CHLORIDE SERPL-SCNC: 111 MMOL/L (ref 99–110)
CLARITY UR: CLEAR
CO2 SERPL-SCNC: 19 MMOL/L (ref 21–32)
COLOR UR: YELLOW
CREAT SERPL-MCNC: 0.9 MG/DL (ref 0.8–1.3)
DEPRECATED RDW RBC AUTO: 17.7 % (ref 12.4–15.4)
ECHO BSA: 2.2 M2
EOSINOPHIL # BLD: 0.1 K/UL (ref 0–0.6)
EOSINOPHIL NFR BLD: 0.6 %
GFR SERPLBLD CREATININE-BSD FMLA CKD-EPI: 85 ML/MIN/{1.73_M2}
GLUCOSE BLD-MCNC: 76 MG/DL (ref 70–99)
GLUCOSE BLD-MCNC: 86 MG/DL (ref 70–99)
GLUCOSE BLD-MCNC: 91 MG/DL (ref 70–99)
GLUCOSE BLD-MCNC: 93 MG/DL (ref 70–99)
GLUCOSE BLD-MCNC: 95 MG/DL (ref 70–99)
GLUCOSE BLD-MCNC: 97 MG/DL (ref 70–99)
GLUCOSE SERPL-MCNC: 96 MG/DL (ref 70–99)
GLUCOSE UR STRIP.AUTO-MCNC: NEGATIVE MG/DL
HCT VFR BLD AUTO: 35.6 % (ref 40.5–52.5)
HGB BLD-MCNC: 11.6 G/DL (ref 13.5–17.5)
HGB UR QL STRIP.AUTO: ABNORMAL
KETONES UR STRIP.AUTO-MCNC: NEGATIVE MG/DL
LEUKOCYTE ESTERASE UR QL STRIP.AUTO: NEGATIVE
LYMPHOCYTES # BLD: 0.9 K/UL (ref 1–5.1)
LYMPHOCYTES NFR BLD: 6.1 %
MAGNESIUM SERPL-MCNC: 2.05 MG/DL (ref 1.8–2.4)
MCH RBC QN AUTO: 29.6 PG (ref 26–34)
MCHC RBC AUTO-ENTMCNC: 32.6 G/DL (ref 31–36)
MCV RBC AUTO: 90.7 FL (ref 80–100)
MONOCYTES # BLD: 1 K/UL (ref 0–1.3)
MONOCYTES NFR BLD: 7.2 %
NEUTROPHILS # BLD: 12.3 K/UL (ref 1.7–7.7)
NEUTROPHILS NFR BLD: 85.8 %
NITRITE UR QL STRIP.AUTO: NEGATIVE
PERFORMED ON: NORMAL
PH UR STRIP.AUTO: 6 [PH] (ref 5–8)
PLATELET # BLD AUTO: 302 K/UL (ref 135–450)
PMV BLD AUTO: 7.9 FL (ref 5–10.5)
POTASSIUM SERPL-SCNC: 3.4 MMOL/L (ref 3.5–5.1)
PROT UR STRIP.AUTO-MCNC: 30 MG/DL
RBC # BLD AUTO: 3.92 M/UL (ref 4.2–5.9)
RBC #/AREA URNS HPF: ABNORMAL /HPF (ref 0–4)
SODIUM SERPL-SCNC: 141 MMOL/L (ref 136–145)
SP GR UR STRIP.AUTO: 1.02 (ref 1–1.03)
UA COMPLETE W REFLEX CULTURE PNL UR: ABNORMAL
UA DIPSTICK W REFLEX MICRO PNL UR: YES
URN SPEC COLLECT METH UR: ABNORMAL
UROBILINOGEN UR STRIP-ACNC: 0.2 E.U./DL
WBC # BLD AUTO: 14.3 K/UL (ref 4–11)
WBC #/AREA URNS HPF: ABNORMAL /HPF (ref 0–5)

## 2024-11-15 PROCEDURE — 2580000003 HC RX 258

## 2024-11-15 PROCEDURE — 6370000000 HC RX 637 (ALT 250 FOR IP)

## 2024-11-15 PROCEDURE — 6370000000 HC RX 637 (ALT 250 FOR IP): Performed by: STUDENT IN AN ORGANIZED HEALTH CARE EDUCATION/TRAINING PROGRAM

## 2024-11-15 PROCEDURE — 80048 BASIC METABOLIC PNL TOTAL CA: CPT

## 2024-11-15 PROCEDURE — 37191 INS ENDOVAS VENA CAVA FILTR: CPT | Performed by: STUDENT IN AN ORGANIZED HEALTH CARE EDUCATION/TRAINING PROGRAM

## 2024-11-15 PROCEDURE — 99232 SBSQ HOSP IP/OBS MODERATE 35: CPT | Performed by: NURSE PRACTITIONER

## 2024-11-15 PROCEDURE — 99233 SBSQ HOSP IP/OBS HIGH 50: CPT | Performed by: INTERNAL MEDICINE

## 2024-11-15 PROCEDURE — 6360000004 HC RX CONTRAST MEDICATION: Performed by: STUDENT IN AN ORGANIZED HEALTH CARE EDUCATION/TRAINING PROGRAM

## 2024-11-15 PROCEDURE — 85025 COMPLETE CBC W/AUTO DIFF WBC: CPT

## 2024-11-15 PROCEDURE — 6360000002 HC RX W HCPCS: Performed by: STUDENT IN AN ORGANIZED HEALTH CARE EDUCATION/TRAINING PROGRAM

## 2024-11-15 PROCEDURE — 6360000002 HC RX W HCPCS

## 2024-11-15 PROCEDURE — C1880 VENA CAVA FILTER: HCPCS | Performed by: STUDENT IN AN ORGANIZED HEALTH CARE EDUCATION/TRAINING PROGRAM

## 2024-11-15 PROCEDURE — 2709999900 HC NON-CHARGEABLE SUPPLY: Performed by: STUDENT IN AN ORGANIZED HEALTH CARE EDUCATION/TRAINING PROGRAM

## 2024-11-15 PROCEDURE — 97535 SELF CARE MNGMENT TRAINING: CPT

## 2024-11-15 PROCEDURE — 2580000003 HC RX 258: Performed by: STUDENT IN AN ORGANIZED HEALTH CARE EDUCATION/TRAINING PROGRAM

## 2024-11-15 PROCEDURE — 94761 N-INVAS EAR/PLS OXIMETRY MLT: CPT

## 2024-11-15 PROCEDURE — 97530 THERAPEUTIC ACTIVITIES: CPT

## 2024-11-15 PROCEDURE — C1769 GUIDE WIRE: HCPCS | Performed by: STUDENT IN AN ORGANIZED HEALTH CARE EDUCATION/TRAINING PROGRAM

## 2024-11-15 PROCEDURE — 37191 INS ENDOVAS VENA CAVA FILTR: CPT

## 2024-11-15 PROCEDURE — 6370000000 HC RX 637 (ALT 250 FOR IP): Performed by: NURSE PRACTITIONER

## 2024-11-15 PROCEDURE — 83735 ASSAY OF MAGNESIUM: CPT

## 2024-11-15 PROCEDURE — 2000000000 HC ICU R&B

## 2024-11-15 PROCEDURE — 81001 URINALYSIS AUTO W/SCOPE: CPT

## 2024-11-15 PROCEDURE — 2500000003 HC RX 250 WO HCPCS

## 2024-11-15 PROCEDURE — C1894 INTRO/SHEATH, NON-LASER: HCPCS | Performed by: STUDENT IN AN ORGANIZED HEALTH CARE EDUCATION/TRAINING PROGRAM

## 2024-11-15 PROCEDURE — 2700000000 HC OXYGEN THERAPY PER DAY

## 2024-11-15 RX ORDER — POTASSIUM CHLORIDE 29.8 MG/ML
20 INJECTION INTRAVENOUS
Status: COMPLETED | OUTPATIENT
Start: 2024-11-15 | End: 2024-11-15

## 2024-11-15 RX ORDER — ZOLPIDEM TARTRATE 5 MG/1
5 TABLET ORAL NIGHTLY PRN
Status: DISCONTINUED | OUTPATIENT
Start: 2024-11-15 | End: 2024-11-15

## 2024-11-15 RX ORDER — ZOLPIDEM TARTRATE 5 MG/1
10 TABLET ORAL NIGHTLY
Status: DISCONTINUED | OUTPATIENT
Start: 2024-11-15 | End: 2024-11-19

## 2024-11-15 RX ORDER — METOPROLOL TARTRATE 1 MG/ML
2.5 INJECTION, SOLUTION INTRAVENOUS EVERY 6 HOURS
Status: DISCONTINUED | OUTPATIENT
Start: 2024-11-15 | End: 2024-12-11

## 2024-11-15 RX ORDER — BENZONATATE 100 MG/1
100 CAPSULE ORAL 3 TIMES DAILY PRN
Status: DISCONTINUED | OUTPATIENT
Start: 2024-11-15 | End: 2024-12-11 | Stop reason: HOSPADM

## 2024-11-15 RX ORDER — POTASSIUM CHLORIDE 29.8 MG/ML
20 INJECTION INTRAVENOUS ONCE
Status: COMPLETED | OUTPATIENT
Start: 2024-11-15 | End: 2024-11-15

## 2024-11-15 RX ORDER — IOPAMIDOL 612 MG/ML
INJECTION, SOLUTION INTRAVASCULAR PRN
Status: DISCONTINUED | OUTPATIENT
Start: 2024-11-15 | End: 2024-11-26 | Stop reason: HOSPADM

## 2024-11-15 RX ADMIN — METOPROLOL TARTRATE 2.5 MG: 1 INJECTION, SOLUTION INTRAVENOUS at 20:48

## 2024-11-15 RX ADMIN — ATORVASTATIN CALCIUM 10 MG: 10 TABLET, FILM COATED ORAL at 20:48

## 2024-11-15 RX ADMIN — SODIUM CHLORIDE, PRESERVATIVE FREE 10 ML: 5 INJECTION INTRAVENOUS at 09:19

## 2024-11-15 RX ADMIN — GABAPENTIN 600 MG: 300 CAPSULE ORAL at 09:20

## 2024-11-15 RX ADMIN — HYDROMORPHONE HYDROCHLORIDE 0.5 MG: 1 INJECTION, SOLUTION INTRAMUSCULAR; INTRAVENOUS; SUBCUTANEOUS at 15:53

## 2024-11-15 RX ADMIN — ACETAMINOPHEN 1000 MG: 500 TABLET, FILM COATED ORAL at 09:20

## 2024-11-15 RX ADMIN — HEPARIN SODIUM 5000 UNITS: 5000 INJECTION INTRAVENOUS; SUBCUTANEOUS at 06:30

## 2024-11-15 RX ADMIN — METOCLOPRAMIDE 10 MG: 5 INJECTION, SOLUTION INTRAMUSCULAR; INTRAVENOUS at 23:49

## 2024-11-15 RX ADMIN — METOCLOPRAMIDE 10 MG: 5 INJECTION, SOLUTION INTRAMUSCULAR; INTRAVENOUS at 00:36

## 2024-11-15 RX ADMIN — Medication 5 MG: at 00:37

## 2024-11-15 RX ADMIN — NALOXEGOL OXALATE 25 MG: 25 TABLET, FILM COATED ORAL at 09:20

## 2024-11-15 RX ADMIN — GABAPENTIN 600 MG: 300 CAPSULE ORAL at 15:46

## 2024-11-15 RX ADMIN — AMPICILLIN SODIUM, SULBACTAM SODIUM 3000 MG: 2; 1 INJECTION, POWDER, FOR SOLUTION INTRAMUSCULAR; INTRAVENOUS at 00:46

## 2024-11-15 RX ADMIN — HYDROMORPHONE HYDROCHLORIDE 0.5 MG: 1 INJECTION, SOLUTION INTRAMUSCULAR; INTRAVENOUS; SUBCUTANEOUS at 01:08

## 2024-11-15 RX ADMIN — AMPICILLIN SODIUM, SULBACTAM SODIUM 3000 MG: 2; 1 INJECTION, POWDER, FOR SOLUTION INTRAMUSCULAR; INTRAVENOUS at 06:29

## 2024-11-15 RX ADMIN — HYDROMORPHONE HYDROCHLORIDE 0.5 MG: 1 INJECTION, SOLUTION INTRAMUSCULAR; INTRAVENOUS; SUBCUTANEOUS at 21:03

## 2024-11-15 RX ADMIN — SODIUM CHLORIDE, PRESERVATIVE FREE 10 ML: 5 INJECTION INTRAVENOUS at 20:50

## 2024-11-15 RX ADMIN — MIDODRINE HYDROCHLORIDE 15 MG: 5 TABLET ORAL at 19:00

## 2024-11-15 RX ADMIN — SENNOSIDES AND DOCUSATE SODIUM 2 TABLET: 50; 8.6 TABLET ORAL at 20:49

## 2024-11-15 RX ADMIN — METHOCARBAMOL TABLETS 750 MG: 750 TABLET, COATED ORAL at 09:20

## 2024-11-15 RX ADMIN — SENNOSIDES AND DOCUSATE SODIUM 2 TABLET: 50; 8.6 TABLET ORAL at 09:21

## 2024-11-15 RX ADMIN — Medication: at 09:21

## 2024-11-15 RX ADMIN — METOCLOPRAMIDE 10 MG: 5 INJECTION, SOLUTION INTRAMUSCULAR; INTRAVENOUS at 19:00

## 2024-11-15 RX ADMIN — MIDODRINE HYDROCHLORIDE 15 MG: 5 TABLET ORAL at 09:20

## 2024-11-15 RX ADMIN — AMPICILLIN SODIUM, SULBACTAM SODIUM 3000 MG: 2; 1 INJECTION, POWDER, FOR SOLUTION INTRAMUSCULAR; INTRAVENOUS at 23:49

## 2024-11-15 RX ADMIN — AMPICILLIN SODIUM, SULBACTAM SODIUM 3000 MG: 2; 1 INJECTION, POWDER, FOR SOLUTION INTRAMUSCULAR; INTRAVENOUS at 12:26

## 2024-11-15 RX ADMIN — PHENYLEPHRINE HYDROCHLORIDE 30 MCG/MIN: 50 INJECTION INTRAVENOUS at 07:15

## 2024-11-15 RX ADMIN — POTASSIUM BICARBONATE 20 MEQ: 782 TABLET, EFFERVESCENT ORAL at 12:07

## 2024-11-15 RX ADMIN — BUPROPION HYDROCHLORIDE 300 MG: 150 TABLET, EXTENDED RELEASE ORAL at 09:20

## 2024-11-15 RX ADMIN — PANTOPRAZOLE SODIUM 40 MG: 40 INJECTION, POWDER, FOR SOLUTION INTRAVENOUS at 09:21

## 2024-11-15 RX ADMIN — AMIODARONE HYDROCHLORIDE 200 MG: 200 TABLET ORAL at 09:20

## 2024-11-15 RX ADMIN — IOPAMIDOL 32 ML: 612 INJECTION, SOLUTION INTRAVENOUS at 07:59

## 2024-11-15 RX ADMIN — ONDANSETRON 4 MG: 2 INJECTION INTRAMUSCULAR; INTRAVENOUS at 03:57

## 2024-11-15 RX ADMIN — POTASSIUM CHLORIDE 20 MEQ: 29.8 INJECTION, SOLUTION INTRAVENOUS at 01:41

## 2024-11-15 RX ADMIN — METOPROLOL TARTRATE 2.5 MG: 1 INJECTION, SOLUTION INTRAVENOUS at 09:21

## 2024-11-15 RX ADMIN — METOCLOPRAMIDE 10 MG: 5 INJECTION, SOLUTION INTRAMUSCULAR; INTRAVENOUS at 11:15

## 2024-11-15 RX ADMIN — POTASSIUM CHLORIDE 20 MEQ: 29.8 INJECTION, SOLUTION INTRAVENOUS at 09:24

## 2024-11-15 RX ADMIN — METOCLOPRAMIDE 10 MG: 5 INJECTION, SOLUTION INTRAMUSCULAR; INTRAVENOUS at 06:32

## 2024-11-15 RX ADMIN — METOPROLOL TARTRATE 2.5 MG: 1 INJECTION, SOLUTION INTRAVENOUS at 15:46

## 2024-11-15 RX ADMIN — Medication: at 20:50

## 2024-11-15 RX ADMIN — AMPICILLIN SODIUM, SULBACTAM SODIUM 3000 MG: 2; 1 INJECTION, POWDER, FOR SOLUTION INTRAMUSCULAR; INTRAVENOUS at 19:00

## 2024-11-15 RX ADMIN — HYDROMORPHONE HYDROCHLORIDE 0.5 MG: 1 INJECTION, SOLUTION INTRAMUSCULAR; INTRAVENOUS; SUBCUTANEOUS at 08:51

## 2024-11-15 RX ADMIN — ZOLPIDEM TARTRATE 10 MG: 5 TABLET, COATED ORAL at 20:49

## 2024-11-15 RX ADMIN — GABAPENTIN 600 MG: 300 CAPSULE ORAL at 20:49

## 2024-11-15 RX ADMIN — POTASSIUM CHLORIDE 20 MEQ: 29.8 INJECTION, SOLUTION INTRAVENOUS at 02:42

## 2024-11-15 RX ADMIN — LINACLOTIDE 290 MCG: 145 CAPSULE, GELATIN COATED ORAL at 09:20

## 2024-11-15 RX ADMIN — MIDODRINE HYDROCHLORIDE 15 MG: 5 TABLET ORAL at 12:07

## 2024-11-15 ASSESSMENT — PAIN DESCRIPTION - ORIENTATION
ORIENTATION: MID
ORIENTATION: MID
ORIENTATION: MID;UPPER
ORIENTATION: MID;UPPER
ORIENTATION: LEFT

## 2024-11-15 ASSESSMENT — PAIN DESCRIPTION - FREQUENCY
FREQUENCY: CONTINUOUS

## 2024-11-15 ASSESSMENT — PAIN SCALES - GENERAL
PAINLEVEL_OUTOF10: 0
PAINLEVEL_OUTOF10: 6
PAINLEVEL_OUTOF10: 2
PAINLEVEL_OUTOF10: 5
PAINLEVEL_OUTOF10: 3
PAINLEVEL_OUTOF10: 4
PAINLEVEL_OUTOF10: 8
PAINLEVEL_OUTOF10: 8
PAINLEVEL_OUTOF10: 5
PAINLEVEL_OUTOF10: 2
PAINLEVEL_OUTOF10: 2
PAINLEVEL_OUTOF10: 6
PAINLEVEL_OUTOF10: 3
PAINLEVEL_OUTOF10: 9

## 2024-11-15 ASSESSMENT — PAIN DESCRIPTION - ONSET
ONSET: ON-GOING
ONSET: ON-GOING

## 2024-11-15 ASSESSMENT — PAIN DESCRIPTION - DESCRIPTORS
DESCRIPTORS: ACHING
DESCRIPTORS: CRAMPING

## 2024-11-15 ASSESSMENT — PAIN DESCRIPTION - PAIN TYPE
TYPE: ACUTE PAIN;SURGICAL PAIN
TYPE: ACUTE PAIN;SURGICAL PAIN

## 2024-11-15 ASSESSMENT — PAIN - FUNCTIONAL ASSESSMENT
PAIN_FUNCTIONAL_ASSESSMENT: ACTIVITIES ARE NOT PREVENTED

## 2024-11-15 ASSESSMENT — PAIN SCALES - WONG BAKER
WONGBAKER_NUMERICALRESPONSE: NO HURT

## 2024-11-15 ASSESSMENT — PAIN DESCRIPTION - LOCATION
LOCATION: OTHER (COMMENT)
LOCATION: BACK

## 2024-11-15 NOTE — PROGRESS NOTES
Physical Therapy  Facility/Department: [facility] ICU  Physical Therapy Treatment    Name: Juan Mohamud  : 1940  MRN: [id number]  Date of Service: 11/15/2024    Discharge Recommendations:  Subacute/Skilled Nursing Facility   PT Equipment Recommendations  Equipment Needed:  (defer)      Patient Diagnosis(es): The encounter diagnosis was SVT (supraventricular tachycardia) (HCC).  Past Medical History:  has a past medical history of Diabetes mellitus (HCC), DVT (deep venous thrombosis) (HCC), Hyperlipidemia, Indigestion, Insomnia, Macular degeneration, MI, old, Neuropathy, and Pacemaker.  Past Surgical History:  has a past surgical history that includes knee surgery; Tonsillectomy; Colonoscopy; fracture surgery (Left); Mouth surgery; joint replacement (Right); Intracapsular cataract extraction (Right, 2018); Colonoscopy (N/A, 2024); Colonoscopy (N/A, 3/6/2024); Colonoscopy (N/A, 2024); Colonoscopy (N/A, 2024); lumbar fusion (N/A, 2024); and IR GUIDED IVC FILTER PLACEMENT (11/15/2024).    Assessment  Assessment: Pt now POD 3 from THORACIC 8-LUMBAR 1 POSTERIOR DECOMPRESSION FIXATION AND FUSION.  Had IVC filter placed this am.  Pt required heavy A of 2 to roll side to side for pericare during therapy session.  Heavy A of 3 for supine<>sit & could only tolerate sitting about 4 minutes due to dizziness. Encouraged to perform LE ex in bed as able- pt is extremely stiff in all extremities.  Will continue to follow & progress as tolerated.  Anticipate need for SNF at [place]. Pt is well below his independent baseline.  Treatment Diagnosis: dec'd strength and impaired mobility  Therapy Prognosis: Good  Requires PT Follow-Up: Yes  Activity Tolerance  Activity Tolerance: Patient limited by endurance  Activity Tolerance Comments: dizzy with sitting EOB & worsening as he sat- assisted back to supine, RN notified    Plan  Physical Therapy Plan  General Plan: 5-7 times per week  Current Treatment  PT.  7/10 back pain, RN aware. Reported dizziness with sitting upright & in bed afterwards- RN aware.  \"I've been in bed for a week & a half.\" \"I'm stiff.\"         Social/Functional History  Social/Functional History  Lives With: Significant other  Type of Home: Condo  Home Layout: Two level (13 stairs to access basement where living room/TV is located and pt primarily spends his time here)  Home Access: Stairs to enter with rails  Entrance Stairs - Number of Steps: 5  Entrance Stairs - Rails: Both  Bathroom Shower/Tub: Walk-in shower  Bathroom Toilet: Handicap height  Bathroom Equipment: Grab bars in shower, Grab bars around toilet  Home Equipment: Walker - 4-Wheeled, Cane  Has the patient had two or more falls in the past year or any fall with injury in the past year?: Yes  Receives Help From: Family  ADL Assistance: Independent  Homemaking Assistance: Independent  Ambulation Assistance: Independent (with SPC. Family reports pt has had a decline in his functional mobility and becoming increasingly weak)  Transfer Assistance: Independent  Active : No  Additional Comments: Wife is available 24/7       Cognition   Orientation  Overall Orientation Status: Within Functional Limits    Objective            Bed mobility  Rolling to Left: 2 Person assistance;Dependent/Total; pt unable to initiate mvt despite cues & given extra time to attempt  Rolling to Right: 2 Person assistance;Dependent/Total  Supine to Sit: Dependent/Total (of 3)  Sit to Supine: Dependent/Total (of 3)  Scooting: Dependent/Total;2 Person assistance  Bed Mobility Comments: Pt soiled upon arrival & required rolling side to side for pericare. New gown donned.           Balance  Sitting - Static:  (EOB sitting CG/min A x 4 min.  Reported feeling too dizzy to sit any longer & asked to lie down.)  Exercise Treatment: LAQ sitting EOB x 3 reps each but limited range.  APs in supine x 10 with cues.  Able to initiate  B HS but A to complete.

## 2024-11-15 NOTE — BRIEF OP NOTE
Interventional Radiology Post Procedure    Date: 11/15/2024    Physician: Obed Lee MD    Pre-op Diagnosis: PE and DVT and unable to anticoagulate    Post-op Diagnosis: same    Variation from Planned Procedure: None       Findings: infrarenal IVC filter placed without complication    Patient condition: Stable    Estimated Blood Loss: Minimal    Specimens:  None      Filter should be removed as soon as patient no longer needs it or is able to tolerate anticoagulation.    Signed,  Obed Lee MD  8:06 AM  11/15/2024

## 2024-11-15 NOTE — PROGRESS NOTES
[facility] Medicine Progress Note  V 10.25      Date of Admission: 11/7/2024    Hospital Day: 9      Chief Admission Complaint:  fall, back pain     Subjective:  Patient seen and examined at bedside. No acute events overnight. Had IVC filter placed today. Patient denies any abdominal pain, chest pain, SOB, fevers, chills. Does continue to report some back pain, especially when moved around.     Presenting Admission History:       Mr. Juan Mohamud is a 84 y.o. male with a medical hx significant for type II DM, degenerative lumbar disease, arrhythmia s/p AICD, diastolic CHF, and macular degeneration otherwise as listed in the MHx table below, who presented from home to the [facility] ED on 11/3/24 after a fall.     Per chart review, patient fell after feeling like his \"knees were giving out.\" No reported symptoms prior to this fall, patient landed on his left side and hit the back of his head but did not lose consciousness at this time.      Upon arrival to the [facility] ED, patient was alert, oriented and reported with GCS of 15 and NIH of 0. While in the ED patient had increasing generalized weakness and difficulty getting up from a sitting position. Decision was made to admit patient as they did not feel safe discharging him home given his inability to stand unassisted.      Fall was thought to be mechanical and secondary to generalized weakness due to poor nutrition. CT abd pelv performed due to complaints of abdominal pain, distension, and back pain upon arrival to the floor. Imaging revealed acute T11 vertebral fracture extending into the T10-T11 disc space as well as diffuse distention of small and large bowel compatible with ileus and bilateral pleural effusions.     GI consulted for ileus, and patient underwent colonic decompression     MRI thoracic spine then demonstrated large epidural spinal hematoma with cord compression  and additional T6 subacute compression fracture along with bilateral pleural

## 2024-11-15 NOTE — PROGRESS NOTES
ICU Progress Note    Admit Date: 11/7/2024  Day: 9  Vent Day: 0  IV Access:Peripheral  IV Fluids:None  Vasopressors:None                Antibiotics: Unasyn  Diet: ADULT DIET; Dysphagia - Soft and Bite Sized; Low Fiber    CC: post fall    Interval history:     S: Feels well, just thirsty and mild discomfort to lower abdomen.   O: T 36.9, MAP>65 without pressor requirement. O2 100% on 4L/min. Abdomen distended, only mild tenderness.    Labs: K+ 3.4, UA 4+ bacteria,  mL  Imaging: CT chest shows several subsegmental PEs  A/P: IVC filter placed today, tolerated well. Had brief run of V Tach overnight, received metoprolol 2.5mg x1 but blood pressure remains soft. No longer on pressors, will monitor BP. Restart ambien nightly per pt request.       HPI:     Mr. Juan Mohamud is a 84 y.o. male with a medical hx significant for type II DM, degenerative lumbar disease, arrhythmia s/p AICD, diastolic CHF, and macular degeneration otherwise as listed in the MHx table below, who presented from home to the [facility] ED on 11/3/24 after a fall.     Per chart review, patient fell after feeling like his \"knees were giving out.\" No reported symptoms prior to this fall, patient landed on his left side and hit the back of his head but did not lose consciousness at this time.      Upon arrival to the [facility] ED, patient was alert, oriented and reported with GCS of 15 and NIH of 0. While in the ED patient had increasing generalized weakness and difficulty getting up from a sitting position. Decision was made to admit patient as they did not feel safe discharging him home given his inability to stand unassisted.      Fall was thought to be mechanical and secondary to generalized weakness due to poor nutrition. CT abd pelv performed due to complaints of abdominal pain, distension, and back pain upon arrival to the floor. Imaging revealed acute T11 vertebral fracture extending into the T10-T11 disc space as well as

## 2024-11-15 NOTE — CARE COORDINATION
DISCHARGE PLANNING:  Chart reviewed.  Patient is from a condo with his spouse. No current services. He does have a walker and cane that he uses when out of the house and at night to go to the bathroom.     Current discharge plan is [facility] [place] (accepted, spouse aware). Admission contact with Adele, [phone].    Patient is currently on pressors. Dr. Alaniz stated to not start pre-cert yet.    CM team will continue to follow.  Chandrika Hernandez RN Case Manager  [phone]

## 2024-11-15 NOTE — PLAN OF CARE
POD#2 s/p Procedure(s) (LRB):  THORACIC 8-LUMBAR 1 POSTERIOR DECOMPRESSION FIXATION AND FUSION (N/A)    On 25 mcg/min Phenylephrine.     Denies pain.   States he only has incisional tenderness when touched but none while at rest.  No numbness or paresthesias in his extremities.  No saddle anesthesia  Does report having some burning with urination. Denies abdominal pain.   UA ordered        PHYSICAL EXAM:  Vitals:    11/14/24 1915 11/14/24 1930 11/14/24 1945 11/14/24 2000   BP: 100/78 94/68 90/75 98/74   Pulse: (!) 121 (!) 120 (!) 120 (!) 121   Resp:    17   Temp:    98 °F (36.7 °C)   TempSrc:    Temporal   SpO2: 99%   99%   Weight:       Height:             General: Drowsy, no distress, well-nourished  Neurologic  Mental status:   orientation to person, place, time, situation   Attention intact as able to attend well to the exam     Language fluent in conversation   Comprehension intact; follows simple commands    Cranial nerves:   CN2: Visual fields full w/o extinction on confrontational testing  CN 3,4,6: Pupils equal and reactive to light, extraocular muscles intact  CN5: Facial sensation symmetric   CN7: Face symmetric  CN8: Hearing symmetric to spoken voice  CN9: Palate elevated symmetrically  CN11: Traps full strength on shoulder shrug  CN12: Tongue midline with protrusion    Motor Exam:   R  L    Deltoid 4 4   Biceps 4 4   Triceps 4 4   Wrist extension  4 4   Interossei 4 4      R  L    Hip flexion  3- 3-   Hip extension  3- 3-   Knee flexion  3 3   Knee extension  3 3   Ankle dorsiflexion  4 3   Ankle plantar flexion  4 3         Sensory: light touch intact and symmetric in all 4 extremities.  No sensory extinction on bilateral simultaneous stimulation  Cerebellar/coordination: finger nose finger normal without ataxia  Tone: normal in all 4 extremities  Gait: Deferred for safety    OTHER SYSTEMS:  Cardiovascular: Warm, appears well perfused   Respiratory: Easy, non-labored respiratory pattern   Abdominal:

## 2024-11-15 NOTE — PROGRESS NOTES
Comprehensive Nutrition Assessment    RECOMMENDATIONS:  PO Diet: Continue dysphagia - soft & bite sized; low fiber diet  Nutrition Supplement: Add Ensure BID  Nutrition Education: Education/Counseling not indicated     NUTRITION ASSESSMENT:   Nutritional summary & status: Pt advanced to dysphagia diet yesterday afternoon, ate ~25% of meal. He remains lethargic and is having difficulty making his own selections for meals. Worked with spouse yesterday so chose items and informed RN that he will need guidance with meal selections when family is not in the room. Adding Ensure BID for poor intakes. Discussed restrictions with wife (low fiber, increasing protein). Modified bowel regimen yesterday and pt had been refusing d/t increased stool output. x2 BMs in the past 24 hours.   Admission // PMH: Vertebral fracture//colonic psuedoobstruction, T2DM, HLD, CHF    MALNUTRITION ASSESSMENT  Context of Malnutrition: Acute Illness   Malnutrition Status: Mild malnutrition  Findings of the 6 clinical characteristics of malnutrition (Minimum of 2 out of 6 clinical characteristics is required to make the diagnosis of moderate or severe Protein Calorie Malnutrition based on AND/ASPEN Guidelines):  Energy Intake:  50% or less of estimated energy requirements for 5 or more days  Weight Loss:  Mild weight loss (5% in 3 month period)     Body Fat Loss:  Unable to assess     Muscle Mass Loss:  Unable to assess    Fluid Accumulation:  No fluid accumulation      NUTRITION DIAGNOSIS   Inadequate oral intake related to acute injury/trauma as evidenced by intake 0-25%    Nutrition Monitoring and Evaluation:   Food/Nutrient Intake Outcomes:  Food and Nutrient Intake, Supplement Intake  Physical Signs/Symptoms Outcomes:  Biochemical Data, GI Status, Nausea or Vomiting, Nutrition Focused Physical Findings, Skin, Weight     OBJECTIVE DATA: Significant to nutrition assessment  Nutrition Related Findings: K 3.4. +6L. +BM 11/15.  Wounds: Stage  I  Nutrition Goals: Meet at least 75% of estimated needs, within 2 days     CURRENT NUTRITION THERAPIES  ADULT DIET; Dysphagia - Soft and Bite Sized; Low Fiber  PO Intake: 1-25%   PO Supplement Intake:None Ordered  Additional Sources of Calories/IVF:N/A     COMPARATIVE STANDARDS  Energy (kcal):  1735-8316 (25-30 kcal/kg)     Protein (g):  107-125 (1.2-1.5 gm/kg IBW)       Fluid (ml/day):  1 mL/kcal    ANTHROPOMETRICS  Current Height: 195.6 cm (6' 5\")  Current Weight - Scale: 91.5 kg (201 lb 11.5 oz)    Admission weight: 89.5 kg (197 lb 5 oz)    The patient will be monitored per nutrition standards of care. Consult dietitian if additional nutrition interventions are needed prior to RD reassessment.     Yazmin Caceres RD, LD  Paul:  [phone]

## 2024-11-15 NOTE — PRE SEDATION
Sedation Pre-Procedure Note    Patient Name: Juan Mohamud   YOB: 1940  Room/Bed: 4503/4503-01  Medical Record Number: [id number]  Date: 11/15/2024   Time: 8:04 AM       Indication:  VTE with contraindication to anticoagulation    Consent: I have discussed with the patient and/or the patient representative the indication, alternatives, and the possible risks and/or complications of the planned procedure and the anesthesia methods. The patient and/or patient representative appear to understand and agree to proceed.    Vital Signs:   Vitals:    11/15/24 0715   BP: 111/81   Pulse: (!) 121   Resp: 17   Temp:    SpO2: 95%       Past Medical History:   has a past medical history of Diabetes mellitus (HCC), DVT (deep venous thrombosis) (HCC), Hyperlipidemia, Indigestion, Insomnia, Macular degeneration, MI, old, Neuropathy, and Pacemaker.    Past Surgical History:   has a past surgical history that includes knee surgery; Tonsillectomy; Colonoscopy; fracture surgery (Left); Mouth surgery; joint replacement (Right); Intracapsular cataract extraction (Right, 12/17/2018); Colonoscopy (N/A, 2/29/2024); Colonoscopy (N/A, 3/6/2024); Colonoscopy (N/A, 11/5/2024); Colonoscopy (N/A, 11/8/2024); and lumbar fusion (N/A, 11/12/2024).    Medications:   Scheduled Meds:    metoprolol  2.5 mg IntraVENous Q6H    potassium chloride  20 mEq IntraVENous Once    midodrine  15 mg Oral TID WC    midodrine  5 mg Oral Once    melatonin  5 mg Oral Nightly    sodium chloride flush  5-40 mL IntraVENous 2 times per day    metoclopramide  10 mg IntraVENous Q6H    heparin (porcine)  5,000 Units SubCUTAneous Q8H    ampicillin-sulbactam  3,000 mg IntraVENous Q6H    pantoprazole  40 mg IntraVENous Daily    [Held by provider] metoprolol  5 mg IntraVENous Q6H    naloxegol  25 mg Oral QAM AC    balsum peru-castor oil   Topical BID    linaclotide  290 mcg Oral QAM AC    insulin lispro  0-4 Units SubCUTAneous Q4H    gabapentin  600 mg Oral TID

## 2024-11-15 NOTE — CONSULTS
IR consult complete. Successful IVC filter placed with Dr Lee. See procedure dictation for additional information. Of note, this is a retrievable filter and IR should be contacted for removal when clinically indicated.

## 2024-11-15 NOTE — PROGRESS NOTES
Referral from other . Attempted to follow up. Pt asleep. No family present. Will try again at a later time/date.

## 2024-11-15 NOTE — PROGRESS NOTES
NEUROSURGERY PROGRESS NOTE    11/15/2024 10:46 AM                               Juan Mohamud                      LOS: 8 days   POD#3 s/p Procedure(s) (LRB):  THORACIC 8-LUMBAR 1 POSTERIOR DECOMPRESSION FIXATION AND FUSION (N/A)    Subjective: Patient was found to have lower extremity below knee DVTs yesterday, and upper lobe bilateral PE's. Kalpesh Bryson preferred avoidance of anticoagulation, especially in the setting of the patient's thoracic epidural hematoma, which he partly evacuated during fusion procedure. However, he should be considered higher risk for that to recur, so I favor holding on anticoagulation until there is a stronger indication. IVC filter was placed this morning. Patient remain neurologically intact, but continues to have global decompensation.    Physical Exam:  Patient seen and examined    Vitals:    11/15/24 0907   BP:    Pulse: (!) 121   Resp: 13   Temp:    SpO2: 96%     GCS:  4 - Opens eyes on own  5 - Alert and oriented  6 - Follows simple motor commands  General: Well developed. Alert and cooperative in no acute distress.   HENT: atraumatic, neck supple  Eyes: Optic discs: Not tested  Pulmonary: unlabored respiratory effort  Cardiovascular:  Warm well perfused. No peripheral edema  Gastrointestinal: abdomen soft, NT, ND    Neurological:  Mental Status: Awake, alert, oriented x 4, speech clear and appropriate  Attention: Intact  Language: No aphasia or dysarthria noted  Sensation: Intact to all extremities to light touch  Coordination: Intact    Cranial Nerves:  II: Visual acuity not tested, denies new visual changes / diplopia  III, IV, VI: PERRL, 3 mm bilaterally, EOMI, no nystagmus noted  V: Facial sensation intact bilaterally to touch  VII: Face symmetric  VIII: Hearing intact bilaterally to spoken voice  IX: Palate movement equal bilaterally  XI: Shoulder shrug equal bilaterally  XII: Tongue midline    Musculoskeletal:   Gait: Not tested   Assist devices: None   Tone:

## 2024-11-15 NOTE — PROGRESS NOTES
Occupational Therapy  Facility/Department: [facility] ICU  Occupational Therapy Treatment Note    Name: Juan Mohamud  : 1940  MRN: [id number]  Date of Service: 11/15/2024    Discharge Recommendations:  Subacute/Skilled Nursing Facility  OT Equipment Recommendations  Other: defer to next level of care       Patient Diagnosis(es): The encounter diagnosis was SVT (supraventricular tachycardia) (HCC).  Past Medical History:  has a past medical history of Diabetes mellitus (HCC), DVT (deep venous thrombosis) (HCC), Hyperlipidemia, Indigestion, Insomnia, Macular degeneration, MI, old, Neuropathy, and Pacemaker.  Past Surgical History:  has a past surgical history that includes knee surgery; Tonsillectomy; Colonoscopy; fracture surgery (Left); Mouth surgery; joint replacement (Right); Intracapsular cataract extraction (Right, 2018); Colonoscopy (N/A, 2024); Colonoscopy (N/A, 3/6/2024); Colonoscopy (N/A, 2024); Colonoscopy (N/A, 2024); lumbar fusion (N/A, 2024); and IR GUIDED IVC FILTER PLACEMENT (11/15/2024).    Treatment Diagnosis: Decreased ADL status and functional mobility 2/2 fall      Assessment  Performance deficits / Impairments: Decreased functional mobility ;Decreased endurance;Decreased ADL status;Decreased posture;Decreased ROM;Decreased balance;Decreased strength;Decreased safe awareness  Assessment: Pt is POD 3 s/pT8-L1 posterior decompression fixation and fusion, had IVC filter placement this morning.  He needed increased assist with bed mobility and supine><sit on this date.  Pt is max A with UE dressing, dep toileting.  OT edcuated pt on elbow/hand exerc on this date and he verbalized understanding.  Recommend SNF at discharge.  Treatment Diagnosis: Decreased ADL status and functional mobility 2/2 fall  Prognosis: Fair  REQUIRES OT FOLLOW-UP: Yes  Activity Tolerance  Activity Tolerance: Patient limited by fatigue;Patient limited by pain     Plan  Occupational Therapy  Method: Demonstration;Verbal  Education Outcome: Verbalized understanding;Continued education needed                     G-Code     OutComes Score                                                  AM-PAC - ADL  AM-PAC Daily Activity - Inpatient   How much help is needed for putting on and taking off regular lower body clothing?: Total  How much help is needed for bathing (which includes washing, rinsing, drying)?: Total  How much help is needed for toileting (which includes using toilet, bedpan, or urinal)?: Total  How much help is needed for putting on and taking off regular upper body clothing?: A Lot  How much help is needed for taking care of personal grooming?: A Lot  How much help for eating meals?: A Lot  AM-[facility] Inpatient Daily Activity Raw Score: 9  AM-PAC Inpatient ADL T-Scale Score : 25.33  ADL Inpatient CMS 0-100% Score: 79.59  ADL Inpatient CMS G-Code Modifier : CL         Goals  Short Term Goals  Time Frame for Short Term Goals: By D/C  Short Term Goal 1: Pt will complete sit to stand transfer w/ max A-ongiong  Short Term Goal 2: Pt will complete supine to sit w/ max A-ongoing  Short Term Goal 3: Pt will maintain seated balance at EOB w/ SBA for 10 mins to complete grooming task-ongoing  Patient Goals   Patient goals : get home      Therapy Time   Individual Concurrent Group Co-treatment   Time In  1427         Time Out  1508         Minutes  41             Timed Code Treatment Minutes:  41    Total Treatment Minutes:   41    Camille Zuniga OTR/L [id number]

## 2024-11-15 NOTE — PROGRESS NOTES
[facility] Cardiology Progress Note    Primary Cardiologist: Dr Mendez     CC/HPI: HF, VT, ICD    S: Denies cp, sob or palpitations.     Tele: Sinus tachycardia , NSVT    O:  Physical Exam:  BP 94/72   Pulse (!) 121   Temp 97.6 °F (36.4 °C) (Temporal)   Resp 13   Ht 1.956 m (6' 5\")   Wt 91.5 kg (201 lb 11.5 oz)   SpO2 96%   BMI 23.92 kg/m²    General (appearance):  No acute distress  Eyes: anicteric   Neck: soft, No JVD  Ears/Nose/Mouth/Thorat: No cyanosis  CV: tachy   Respiratory:  clear anterior. Normal effort.   GI: soft, non-tender, non-distended  Skin: Warm, dry. No rashes  Neuro/Psych: Alert and cooperative. Appropriate behavior  Ext:  No c/c. + LE edema    I.O's= -6.4 L     Weight  Admission: Weight - Scale: 89.5 kg (197 lb 5 oz)   Today: Weight - Scale: 91.5 kg (201 lb 11.5 oz)    CBC:   Recent Labs     24  0428 24  0434 11/15/24  0536   WBC 18.2* 14.1* 14.3*   HGB 11.3* 10.3* 11.6*   HCT 34.1* 31.5* 35.6*   MCV 89.9 90.0 90.7    254 302     BMP:   Recent Labs     24  0435 24  1315 11/15/24  0536    140 141   K 3.0*  3.0* 3.3* 3.4*   * 110 111*   CO2 20* 21 19*   PHOS 2.8 2.8  --    BUN 35* 32* 30*   CREATININE 1.0 0.9 0.9     Estimated Creatinine Clearance: 78 mL/min (based on SCr of 0.9 mg/dL).  Mag:   Lab Results   Component Value Date/Time    MG 2.05 11/15/2024 05:36 AM     LIVER PROFILE:   No results for input(s): \"AST\", \"ALT\", \"LIPASE\", \"AMYLASE\", \"BILIDIR\", \"BILITOT\", \"ALKPHOS\" in the last 72 hours.    Invalid input(s): \"ALB\"    Pro-BNP:   Lab Results   Component Value Date/Time    PROBNP 7,647 2024 07:55 AM    PROBNP 4,975 2024 09:11 PM    PROBNP 2,190 2024 03:05 PM    PROBNP 2,599 2024 10:44 PM     High Sensitivity Troponin:   Lab Results   Component Value Date    TROPHS 60 (H) 2024       Imagin2024 Venous duplex      Acute deep venous thrombosis in the left gastrocnemius and

## 2024-11-16 ENCOUNTER — APPOINTMENT (OUTPATIENT)
Dept: GENERAL RADIOLOGY | Age: 84
DRG: 447 | End: 2024-11-16
Attending: INTERNAL MEDICINE
Payer: MEDICARE

## 2024-11-16 LAB
ALBUMIN SERPL-MCNC: 2.5 G/DL (ref 3.4–5)
ANION GAP SERPL CALCULATED.3IONS-SCNC: 12 MMOL/L (ref 3–16)
ANION GAP SERPL CALCULATED.3IONS-SCNC: 14 MMOL/L (ref 3–16)
BASOPHILS # BLD: 0 K/UL (ref 0–0.2)
BASOPHILS NFR BLD: 0.3 %
BUN SERPL-MCNC: 36 MG/DL (ref 7–20)
BUN SERPL-MCNC: 36 MG/DL (ref 7–20)
CALCIUM SERPL-MCNC: 7.7 MG/DL (ref 8.3–10.6)
CALCIUM SERPL-MCNC: 7.9 MG/DL (ref 8.3–10.6)
CHLORIDE SERPL-SCNC: 109 MMOL/L (ref 99–110)
CHLORIDE SERPL-SCNC: 110 MMOL/L (ref 99–110)
CO2 SERPL-SCNC: 17 MMOL/L (ref 21–32)
CO2 SERPL-SCNC: 20 MMOL/L (ref 21–32)
CREAT SERPL-MCNC: 1.3 MG/DL (ref 0.8–1.3)
CREAT SERPL-MCNC: 1.5 MG/DL (ref 0.8–1.3)
DEPRECATED RDW RBC AUTO: 17.4 % (ref 12.4–15.4)
EOSINOPHIL # BLD: 0.1 K/UL (ref 0–0.6)
EOSINOPHIL NFR BLD: 0.7 %
GFR SERPLBLD CREATININE-BSD FMLA CKD-EPI: 46 ML/MIN/{1.73_M2}
GFR SERPLBLD CREATININE-BSD FMLA CKD-EPI: 54 ML/MIN/{1.73_M2}
GLUCOSE BLD-MCNC: 77 MG/DL (ref 70–99)
GLUCOSE BLD-MCNC: 84 MG/DL (ref 70–99)
GLUCOSE BLD-MCNC: 85 MG/DL (ref 70–99)
GLUCOSE BLD-MCNC: 86 MG/DL (ref 70–99)
GLUCOSE BLD-MCNC: 93 MG/DL (ref 70–99)
GLUCOSE BLD-MCNC: 97 MG/DL (ref 70–99)
GLUCOSE SERPL-MCNC: 88 MG/DL (ref 70–99)
GLUCOSE SERPL-MCNC: 93 MG/DL (ref 70–99)
HCT VFR BLD AUTO: 32.3 % (ref 40.5–52.5)
HGB BLD-MCNC: 10.6 G/DL (ref 13.5–17.5)
LYMPHOCYTES # BLD: 1 K/UL (ref 1–5.1)
LYMPHOCYTES NFR BLD: 7.2 %
MAGNESIUM SERPL-MCNC: 2.12 MG/DL (ref 1.8–2.4)
MCH RBC QN AUTO: 29.7 PG (ref 26–34)
MCHC RBC AUTO-ENTMCNC: 32.7 G/DL (ref 31–36)
MCV RBC AUTO: 90.7 FL (ref 80–100)
MONOCYTES # BLD: 1 K/UL (ref 0–1.3)
MONOCYTES NFR BLD: 7.2 %
NEUTROPHILS # BLD: 11.4 K/UL (ref 1.7–7.7)
NEUTROPHILS NFR BLD: 84.6 %
PERFORMED ON: NORMAL
PHOSPHATE SERPL-MCNC: 3.9 MG/DL (ref 2.5–4.9)
PLATELET # BLD AUTO: 352 K/UL (ref 135–450)
PMV BLD AUTO: 7.8 FL (ref 5–10.5)
POTASSIUM SERPL-SCNC: 2.9 MMOL/L (ref 3.5–5.1)
POTASSIUM SERPL-SCNC: 3.2 MMOL/L (ref 3.5–5.1)
RBC # BLD AUTO: 3.57 M/UL (ref 4.2–5.9)
SODIUM SERPL-SCNC: 141 MMOL/L (ref 136–145)
SODIUM SERPL-SCNC: 141 MMOL/L (ref 136–145)
WBC # BLD AUTO: 13.6 K/UL (ref 4–11)

## 2024-11-16 PROCEDURE — APPNB30 APP NON BILLABLE TIME 0-30 MINS: Performed by: NURSE PRACTITIONER

## 2024-11-16 PROCEDURE — 74230 X-RAY XM SWLNG FUNCJ C+: CPT

## 2024-11-16 PROCEDURE — 6370000000 HC RX 637 (ALT 250 FOR IP): Performed by: STUDENT IN AN ORGANIZED HEALTH CARE EDUCATION/TRAINING PROGRAM

## 2024-11-16 PROCEDURE — 2580000003 HC RX 258

## 2024-11-16 PROCEDURE — 6370000000 HC RX 637 (ALT 250 FOR IP)

## 2024-11-16 PROCEDURE — 6360000002 HC RX W HCPCS: Performed by: STUDENT IN AN ORGANIZED HEALTH CARE EDUCATION/TRAINING PROGRAM

## 2024-11-16 PROCEDURE — 80069 RENAL FUNCTION PANEL: CPT

## 2024-11-16 PROCEDURE — 0DH67UZ INSERTION OF FEEDING DEVICE INTO STOMACH, VIA NATURAL OR ARTIFICIAL OPENING: ICD-10-PCS | Performed by: INTERNAL MEDICINE

## 2024-11-16 PROCEDURE — 2580000003 HC RX 258: Performed by: STUDENT IN AN ORGANIZED HEALTH CARE EDUCATION/TRAINING PROGRAM

## 2024-11-16 PROCEDURE — 92526 ORAL FUNCTION THERAPY: CPT

## 2024-11-16 PROCEDURE — 99233 SBSQ HOSP IP/OBS HIGH 50: CPT | Performed by: INTERNAL MEDICINE

## 2024-11-16 PROCEDURE — 74018 RADEX ABDOMEN 1 VIEW: CPT

## 2024-11-16 PROCEDURE — 94761 N-INVAS EAR/PLS OXIMETRY MLT: CPT

## 2024-11-16 PROCEDURE — 2000000000 HC ICU R&B

## 2024-11-16 PROCEDURE — 6360000002 HC RX W HCPCS

## 2024-11-16 PROCEDURE — 83735 ASSAY OF MAGNESIUM: CPT

## 2024-11-16 PROCEDURE — 36415 COLL VENOUS BLD VENIPUNCTURE: CPT

## 2024-11-16 PROCEDURE — 71045 X-RAY EXAM CHEST 1 VIEW: CPT

## 2024-11-16 PROCEDURE — 99024 POSTOP FOLLOW-UP VISIT: CPT | Performed by: NURSE PRACTITIONER

## 2024-11-16 PROCEDURE — 92611 MOTION FLUOROSCOPY/SWALLOW: CPT

## 2024-11-16 PROCEDURE — 85025 COMPLETE CBC W/AUTO DIFF WBC: CPT

## 2024-11-16 PROCEDURE — 2700000000 HC OXYGEN THERAPY PER DAY

## 2024-11-16 RX ORDER — POTASSIUM CHLORIDE 29.8 MG/ML
20 INJECTION INTRAVENOUS
Status: COMPLETED | OUTPATIENT
Start: 2024-11-16 | End: 2024-11-16

## 2024-11-16 RX ORDER — METHOCARBAMOL 100 MG/ML
1000 INJECTION, SOLUTION INTRAMUSCULAR; INTRAVENOUS EVERY 8 HOURS PRN
Status: DISCONTINUED | OUTPATIENT
Start: 2024-11-16 | End: 2024-11-19

## 2024-11-16 RX ORDER — METOCLOPRAMIDE HYDROCHLORIDE 5 MG/ML
5 INJECTION INTRAMUSCULAR; INTRAVENOUS EVERY 6 HOURS
Status: DISCONTINUED | OUTPATIENT
Start: 2024-11-16 | End: 2024-11-18

## 2024-11-16 RX ADMIN — BUPROPION HYDROCHLORIDE 300 MG: 150 TABLET, EXTENDED RELEASE ORAL at 09:26

## 2024-11-16 RX ADMIN — METOCLOPRAMIDE 5 MG: 5 INJECTION, SOLUTION INTRAMUSCULAR; INTRAVENOUS at 11:56

## 2024-11-16 RX ADMIN — NALOXEGOL OXALATE 25 MG: 25 TABLET, FILM COATED ORAL at 05:27

## 2024-11-16 RX ADMIN — METOCLOPRAMIDE 5 MG: 5 INJECTION, SOLUTION INTRAMUSCULAR; INTRAVENOUS at 18:45

## 2024-11-16 RX ADMIN — OXYCODONE 5 MG: 5 TABLET ORAL at 16:28

## 2024-11-16 RX ADMIN — MIDODRINE HYDROCHLORIDE 15 MG: 5 TABLET ORAL at 16:29

## 2024-11-16 RX ADMIN — AMPICILLIN SODIUM, SULBACTAM SODIUM 3000 MG: 2; 1 INJECTION, POWDER, FOR SOLUTION INTRAMUSCULAR; INTRAVENOUS at 11:59

## 2024-11-16 RX ADMIN — PANTOPRAZOLE SODIUM 40 MG: 40 INJECTION, POWDER, FOR SOLUTION INTRAVENOUS at 09:27

## 2024-11-16 RX ADMIN — Medication 5 MG: at 21:23

## 2024-11-16 RX ADMIN — POTASSIUM BICARBONATE 40 MEQ: 782 TABLET, EFFERVESCENT ORAL at 18:45

## 2024-11-16 RX ADMIN — POTASSIUM CHLORIDE 20 MEQ: 29.8 INJECTION, SOLUTION INTRAVENOUS at 08:22

## 2024-11-16 RX ADMIN — POTASSIUM CHLORIDE 20 MEQ: 29.8 INJECTION, SOLUTION INTRAVENOUS at 06:46

## 2024-11-16 RX ADMIN — ZOLPIDEM TARTRATE 10 MG: 5 TABLET, COATED ORAL at 21:21

## 2024-11-16 RX ADMIN — AMIODARONE HYDROCHLORIDE 200 MG: 200 TABLET ORAL at 09:26

## 2024-11-16 RX ADMIN — METOCLOPRAMIDE 10 MG: 5 INJECTION, SOLUTION INTRAMUSCULAR; INTRAVENOUS at 05:30

## 2024-11-16 RX ADMIN — POTASSIUM CHLORIDE 20 MEQ: 29.8 INJECTION, SOLUTION INTRAVENOUS at 09:23

## 2024-11-16 RX ADMIN — POTASSIUM BICARBONATE 40 MEQ: 782 TABLET, EFFERVESCENT ORAL at 21:19

## 2024-11-16 RX ADMIN — Medication: at 09:24

## 2024-11-16 RX ADMIN — HEPARIN SODIUM 5000 UNITS: 5000 INJECTION INTRAVENOUS; SUBCUTANEOUS at 21:19

## 2024-11-16 RX ADMIN — GABAPENTIN 600 MG: 300 CAPSULE ORAL at 21:21

## 2024-11-16 RX ADMIN — MIDODRINE HYDROCHLORIDE 15 MG: 5 TABLET ORAL at 08:24

## 2024-11-16 RX ADMIN — GABAPENTIN 600 MG: 300 CAPSULE ORAL at 09:25

## 2024-11-16 RX ADMIN — Medication: at 21:19

## 2024-11-16 RX ADMIN — AMPICILLIN SODIUM, SULBACTAM SODIUM 3000 MG: 2; 1 INJECTION, POWDER, FOR SOLUTION INTRAMUSCULAR; INTRAVENOUS at 05:26

## 2024-11-16 RX ADMIN — POTASSIUM CHLORIDE 20 MEQ: 29.8 INJECTION, SOLUTION INTRAVENOUS at 10:43

## 2024-11-16 RX ADMIN — SODIUM CHLORIDE, PRESERVATIVE FREE 10 ML: 5 INJECTION INTRAVENOUS at 09:27

## 2024-11-16 RX ADMIN — ATORVASTATIN CALCIUM 10 MG: 10 TABLET, FILM COATED ORAL at 21:21

## 2024-11-16 RX ADMIN — SODIUM CHLORIDE, PRESERVATIVE FREE 10 ML: 5 INJECTION INTRAVENOUS at 21:25

## 2024-11-16 RX ADMIN — BENZONATATE 100 MG: 100 CAPSULE ORAL at 05:27

## 2024-11-16 RX ADMIN — AMPICILLIN SODIUM, SULBACTAM SODIUM 3000 MG: 2; 1 INJECTION, POWDER, FOR SOLUTION INTRAMUSCULAR; INTRAVENOUS at 19:39

## 2024-11-16 RX ADMIN — HYDROMORPHONE HYDROCHLORIDE 0.5 MG: 1 INJECTION, SOLUTION INTRAMUSCULAR; INTRAVENOUS; SUBCUTANEOUS at 05:29

## 2024-11-16 RX ADMIN — METHOCARBAMOL 1000 MG: 100 INJECTION INTRAMUSCULAR; INTRAVENOUS at 14:37

## 2024-11-16 ASSESSMENT — PAIN DESCRIPTION - LOCATION
LOCATION: BACK

## 2024-11-16 ASSESSMENT — PAIN - FUNCTIONAL ASSESSMENT
PAIN_FUNCTIONAL_ASSESSMENT: PREVENTS OR INTERFERES SOME ACTIVE ACTIVITIES AND ADLS

## 2024-11-16 ASSESSMENT — PAIN SCALES - WONG BAKER
WONGBAKER_NUMERICALRESPONSE: NO HURT
WONGBAKER_NUMERICALRESPONSE: HURTS A LITTLE BIT
WONGBAKER_NUMERICALRESPONSE: NO HURT

## 2024-11-16 ASSESSMENT — PAIN DESCRIPTION - DESCRIPTORS
DESCRIPTORS: ACHING;CRUSHING
DESCRIPTORS: ACHING
DESCRIPTORS: ACHING;DISCOMFORT

## 2024-11-16 ASSESSMENT — PAIN SCALES - GENERAL
PAINLEVEL_OUTOF10: 3
PAINLEVEL_OUTOF10: 0
PAINLEVEL_OUTOF10: 6
PAINLEVEL_OUTOF10: 0
PAINLEVEL_OUTOF10: 9
PAINLEVEL_OUTOF10: 10
PAINLEVEL_OUTOF10: 10

## 2024-11-16 ASSESSMENT — PAIN DESCRIPTION - PAIN TYPE: TYPE: SURGICAL PAIN;ACUTE PAIN

## 2024-11-16 ASSESSMENT — PAIN DESCRIPTION - ORIENTATION
ORIENTATION: MID;UPPER
ORIENTATION: MID
ORIENTATION: MID

## 2024-11-16 ASSESSMENT — PAIN DESCRIPTION - FREQUENCY: FREQUENCY: CONTINUOUS

## 2024-11-16 ASSESSMENT — PAIN DESCRIPTION - ONSET: ONSET: PROGRESSIVE

## 2024-11-16 NOTE — PROGRESS NOTES
NEUROSURGERY PROGRESS NOTE    11/16/2024 7:36 AM                               Juan CAMERON Cade                      LOS: 9 days   POD#3 s/p Procedure(s) (LRB):  THORACIC 8-LUMBAR 1 POSTERIOR DECOMPRESSION FIXATION AND FUSION (N/A)    Subjective: Patient neurologically stable, but remains globally decompensated from extended time in bed, lack of nutrition, and ongoing medical issues.    Physical Exam:  Patient seen and examined    Vitals:    11/16/24 0700   BP: 106/75   Pulse: (!) 114   Resp: 17   Temp:    SpO2: (!) 79%     GCS:  4 - Opens eyes on own  5 - Alert and oriented  6 - Follows simple motor commands  General: Well developed. Alert and cooperative in no acute distress.   HENT: atraumatic, neck supple  Eyes: Optic discs: Not tested  Pulmonary: unlabored respiratory effort  Cardiovascular:  Warm well perfused. No peripheral edema  Gastrointestinal: abdomen soft, NT, ND    Neurological:  Mental Status: Awake, alert, oriented x 4, speech clear and appropriate  Attention: Intact  Language: No aphasia or dysarthria noted  Sensation: Intact to all extremities to light touch  Coordination: Intact    Cranial Nerves:  II: Visual acuity not tested, denies new visual changes / diplopia  III, IV, VI: PERRL, 3 mm bilaterally, EOMI, no nystagmus noted  V: Facial sensation intact bilaterally to touch  VII: Face symmetric  VIII: Hearing intact bilaterally to spoken voice  IX: Palate movement equal bilaterally  XI: Shoulder shrug equal bilaterally  XII: Tongue midline    Musculoskeletal:   Gait: Not tested   Assist devices: None   Tone: Normal  Motor strength: Exam limited 2/2 generalized weakness and effort   Right  Left    Right  Left    Deltoid  4 4  Hip Flex  3 3   Biceps  4 4  Knee Extensors  3 3   Triceps  4 4  Knee Flexors  3 3   Wrist Ext  4+ 4  Ankle Dorsiflex.  4+ 4+   Wrist Flex  4+ 4  Ankle Plantarflex.  4+ 4+   Handgrip  4+ 4+  Ext Olaf Longus  4+ 4+   Thumb Ext  4+ 4+         Incision: Old bloody  drainage, but intact    Drains:  Left- 85 mL in past 24  hours  Right- 40 mL in past 24 hours    Radiological Findings:  CT ABDOMEN PELVIS W IV CONTRAST Additional Contrast? None  Result Date: 11/3/2024  1.  Acute fracture thinning through the T10 vertebral body, T10-11 intervertebral disc space and T11 vertebral body.  2.  Diffuse distension of small and large bowel compatible with ileus.  3.  Bilateral pleural effusions.      MRI THORACIC SPINE WO CONTRAST  Result Date: 11/6/2024  1. Unstable fracture through the T11 vertebral body again noted extending to the T10-T11 disc space with 8 mm distraction.  Possible nondisplaced T11 spinous process fracture.  2. Long segment dorsal epidural collection throughout the thoracic spinal canal extending from T3 through T11-T12 suspicious for hematoma in the setting of trauma, with long segment narrowing of the thecal sac, most severe at T11 with associated spinal cord compression.  No definite cord signal abnormality.  3. Linear signal abnormality within the T6 vertebral body without associated height loss could represent acute to subacute compression fracture.  4. Bilateral pleural effusions.     CT THORACIC SPINE WO CONTRAST  Result Date: 11/7/2024  1.  T11 vertebral fracture involving the T10-T11 disc base and with distraction.  2.  Nondisplaced fracture in the T11 lamina and spinous process.  3.  Nondisplaced fracture in the T12 spinous process.  4.  No other fracture seen, but the possibility of nondisplaced fracture in the T6 vertebral body is not refuted by this CT.  5.  Known epidural hematoma not well visualized by CT.  6.  Severe multilevel degenerative changes resulting in multiple levels of severe foraminal stenosis.     CT LUMBAR SPINE WO CONTRAST  Result Date: 11/7/2024  1.  No CT evidence of an acute fracture in the lumbar spine.  2.  Prominent endplate degenerative Schmorl's nodes and/or chronic mild endplate compression fractures in the L1, L2 and L3

## 2024-11-16 NOTE — PLAN OF CARE
Problem: Chronic Conditions and Co-morbidities  Goal: Patient's chronic conditions and co-morbidity symptoms are monitored and maintained or improved  Outcome: Progressing     Problem: Discharge Planning  Goal: Discharge to home or other facility with appropriate resources  Outcome: Progressing     Problem: Safety - Adult  Goal: Free from fall injury  Outcome: Progressing     Problem: Skin/Tissue Integrity  Goal: Absence of new skin breakdown  Description: 1.  Monitor for areas of redness and/or skin breakdown  2.  Assess vascular access sites hourly  3.  Every 4-6 hours minimum:  Change oxygen saturation probe site  4.  Every 4-6 hours:  If on nasal continuous positive airway pressure, respiratory therapy assess nares and determine need for appliance change or resting period.  Outcome: Progressing     Problem: ABCDS Injury Assessment  Goal: Absence of physical injury  Outcome: Progressing     Problem: Pain  Goal: Verbalizes/displays adequate comfort level or baseline comfort level  Outcome: Progressing  Flowsheets (Taken 11/16/2024 0800)  Verbalizes/displays adequate comfort level or baseline comfort level:   Encourage patient to monitor pain and request assistance   Assess pain using appropriate pain scale   Administer analgesics based on type and severity of pain and evaluate response     Problem: Respiratory - Adult  Goal: Achieves optimal ventilation and oxygenation  Outcome: Progressing     Problem: Cardiovascular - Adult  Goal: Maintains optimal cardiac output and hemodynamic stability  Outcome: Progressing  Goal: Absence of cardiac dysrhythmias or at baseline  Outcome: Progressing     Problem: Metabolic/Fluid and Electrolytes - Adult  Goal: Electrolytes maintained within normal limits  Outcome: Progressing  Goal: Hemodynamic stability and optimal renal function maintained  Outcome: Progressing     Problem: Neurosensory - Adult  Goal: Achieves stable or improved neurological status  Outcome:  Progressing  Flowsheets (Taken 11/16/2024 0800)  Achieves stable or improved neurological status:   Assess for and report changes in neurological status   Initiate measures to prevent increased intracranial pressure  Goal: Absence of seizures  Outcome: Progressing  Goal: Achieves maximal functionality and self care  Outcome: Progressing     Problem: Nutrition Deficit:  Goal: Optimize nutritional status  Outcome: Progressing

## 2024-11-16 NOTE — PLAN OF CARE
POD#3 s/p Procedure(s) (LRB):  THORACIC 8-LUMBAR 1 POSTERIOR DECOMPRESSION FIXATION AND FUSION (N/A)    +8/10 back pain  No numbness or paresthesias.  No other complaints at time of exam.   S/p IVC filter placement  On 20 mcg/min Phenylephrine.           PHYSICAL EXAM:  Vitals:    11/15/24 1845 11/15/24 1900 11/15/24 1900 11/15/24 1915   BP: 92/61 (!) 82/61 (!) 82/61 (!) 86/48   Pulse: (!) 116 (!) 116 (!) 116 (!) 115   Resp: 14 14 14 21   Temp:       TempSrc:       SpO2:       Weight:       Height:             General: Alert, no distress, well-nourished  Neurologic  Mental status:   orientation to person, place, time, situation   Attention intact as able to attend well to the exam     Language fluent in conversation   Comprehension intact; follows simple commands    Cranial nerves:   CN2: Visual fields full w/o extinction on confrontational testing  CN 3,4,6: Pupils equal and reactive to light, extraocular muscles intact  CN5: Facial sensation symmetric   CN7: Face symmetric  CN8: Hearing symmetric to spoken voice  CN9: Palate elevated symmetrically  CN11: Traps full strength on shoulder shrug  CN12: Tongue midline with protrusion    Motor Exam:  BLE exam limited secondary to pain.    R  L    Deltoid 4 4   Biceps 4 4   Triceps 4 4   Wrist extension  4 4   Interossei 4 4      R  L    Hip flexion  3- 3-   Hip extension  3- 3-   Knee flexion  3 4   Knee extension  3 4   Ankle dorsiflexion  4 4   Ankle plantar flexion  4 4         Sensory: light touch intact and symmetric in all 4 extremities.  No sensory extinction on bilateral simultaneous stimulation  Cerebellar/coordination: finger nose finger normal without ataxia  Tone: normal in all 4 extremities  Gait: Deferred for safety    OTHER SYSTEMS:  Cardiovascular: Warm, appears well perfused   Respiratory: Easy, non-labored respiratory pattern   Abdominal: Abdomen is distended, nontender, hyperactive BS's  Extremities: Upper and lower extremities are atraumatic in

## 2024-11-16 NOTE — PROCEDURES
INSTRUMENTAL SWALLOW REPORT  MODIFIED BARIUM SWALLOW    NAME: Juan Mohamud     : 1940  MRN: [id number]       Date of Eval: 2024     Ordering Physician: Dr. Baron Alaniz  Referring Diagnosis: Dysphagia    Past Medical History:  has a past medical history of Diabetes mellitus (HCC), DVT (deep venous thrombosis) (HCC), Hyperlipidemia, Indigestion, Insomnia, Macular degeneration, MI, old, Neuropathy, and Pacemaker.  Past Surgical History:  has a past surgical history that includes knee surgery; Tonsillectomy; Colonoscopy; fracture surgery (Left); Mouth surgery; joint replacement (Right); Intracapsular cataract extraction (Right, 2018); Colonoscopy (N/A, 2024); Colonoscopy (N/A, 3/6/2024); Colonoscopy (N/A, 2024); Colonoscopy (N/A, 2024); lumbar fusion (N/A, 2024); and IR GUIDED IVC FILTER PLACEMENT (11/15/2024).    CXR: 2024  1. Evidence of some radiographic improvement with decreasing but persistent  congestive changes    Prior MBS Results: NA    Patient Complaints/Reason for Referral:  Juan Mohamud was referred for a MBS to assess the efficiency of his swallow function, assess for aspiration, and to make recommendations regarding safe dietary consistencies, effective compensatory strategies, and safe eating environment.    Onset of problem:   2024    Pain   Chronic back pain; RN aware    Subjective:  Awake but evidently fatigued/lethargic. Resting in bed, on 2L O2 via NC. Accompanied by RN. D/t back pain, only able to be positioned up to ~ 45 degrees.    Impressions:  Oral Phase  Moderate dysfunction characterized by lingual-pumping and slowed A-P transit of puree, with moderate residue.  Pharyngeal Phase  Moderate dysfunction, characterized by impaired strength and endurance, with reduced tongue base movement, hyolaryngeal excursion, epiglottic retraction, and pharyngeal constriction.  Adequate laryngeal vestibule closure present during  height of initial trials (thins via straw), however reduced strength resulted in pooled residue in valleculae and pyriforms, with spillage and deep penetration to the cords after the swallow. Unable to conclusively visualize tracheal aspiration d/t patient positioning/shoulder-shadow.  Improved bolus control noted for mildly thick liquids, with reduced laryngeal penetration (shallow vs deep), however resulted in increasingly diffuse pharyngeal residue as study progressed, with pt becoming visibly fatigued. Required multiple attempts to initiate swallow to clear puree.  Upper Esophageal Screen  Indirectly assessed; reduced UES clearance    Discussion  Swallow safety and efficiency are both impaired.  Although no conclusive aspiration was visualized, pt with increased likelihood to aspirate (had deep laryngeal penetration thin liquids). Improved bolus control noted for thickened liquids, however required increased effort to swallow, with increasing residue and decreasing endurance.  Question patient's ability to safely maintain adequate oral intake at this time.    Treatment Dx and ICD 10: dysphagia  Position: Lateral and upright  Consistencies administered: thins via straw, mildly thick via straw, puree    Penetration Aspiration Scale (PAS)  [x] 5 Material enters the airway, contacts the vocal folds, and is not ejected into the airway    Dysphagia Outcome Severity Scale  [x] Level 3: Moderate dysphagia - Total assistance, supervision or strategies. Two or more diet consistencies restricted    Recommendations/Treatment  Yes ongoing speech therapy    Recommended Exercises:   Effortful swallow    Education: Images and recommendations were reviewed with the patient following this exam. Patient with some comprehension    Goals:    Goal 1: Patient will tolerate least restrictive diet with adequate oral prep and without overt signs of aspiration or associated decline in respiratory status.  11/14: Pt seen bedside awake,

## 2024-11-16 NOTE — PROGRESS NOTES
Pt having difficulty swallowing liquids/pudding this morning with meds. SLP and MD notified. SLP to bedside - recommended MBS. See findings from SLP.

## 2024-11-16 NOTE — PROGRESS NOTES
ICU Progress Note    Admit Date: 11/7/2024  Day: 10  Vent Day: 0  IV Access:Peripheral  IV Fluids:None  Vasopressors:None                Antibiotics: Unasyn  Diet: ADULT DIET; Dysphagia - Soft and Bite Sized; Low Fiber  ADULT ORAL NUTRITION SUPPLEMENT; Lunch, Dinner; Standard High Calorie/High Protein Oral Supplement    CC: fall    Interval history:     S: Feels generally well, no abdominal pain.  O: T 36.2, requiring low dose pressors to sustain MAP>65 . O2 96% on 2L/min. Abdomen distended, only mild tenderness.    Labs: K 2.9, WBC 13.6, HgB 10.6  Imaging: Failed modified barium swallow  A/P: Will require NG if not tolerating oral intake. Hold metoprolol and try to avoi dilaudid due to soft blood pressures.     HPI:     Mr. Juan Mohamud is a 84 y.o. male with a medical hx significant for type II DM, degenerative lumbar disease, arrhythmia s/p AICD, diastolic CHF, and macular degeneration otherwise as listed in the MHx table below, who presented from home to the [facility] ED on 11/3/24 after a fall.     Per chart review, patient fell after feeling like his \"knees were giving out.\" No reported symptoms prior to this fall, patient landed on his left side and hit the back of his head but did not lose consciousness at this time.      Upon arrival to the [facility] ED, patient was alert, oriented and reported with GCS of 15 and NIH of 0. While in the ED patient had increasing generalized weakness and difficulty getting up from a sitting position. Decision was made to admit patient as they did not feel safe discharging him home given his inability to stand unassisted.      Fall was thought to be mechanical and secondary to generalized weakness due to poor nutrition. CT abd pelv performed due to complaints of abdominal pain, distension, and back pain upon arrival to the floor. Imaging revealed acute T11 vertebral fracture extending into the T10-T11 disc space as well as diffuse distention of small and large  SBP is 100-115 when he measures at home.   -home torsemide, jardiance, losartan, Toprol and spirolactone held in setting of low BP  -Weaned off arleth vasopressor 11/15 1000. Restarted in HS  -PICC line placed 11/13  -midodrine 10mg-->15mg TID as bridge off pressors started 11/13  -D/C dilaudid due to hypotensive effect  -Long acting oxy for pain     History of V Tach  Had episode of sustained V Tach 250 bpm 02/2024, started on amiodarone and ICD placed 03/2024. Had brief <15 second run of V. tach morning of 11/15, asymptomatic.   -telemetry  -home amiodarone 200mg daily  -restart metoprolol home dose once MAP>70 consistently, hold currently      Wide Complex Tachycardia  Possible Atrial Fibrillation   Described as new over this hospitalization, no prior EKG demonstrating A fib but noted on previous exams. 11/11 EKG appears to be wide QRS tachycardia, not suggestive of overt Afib.   -Cardiology consulted: continue amiodarone. Low suspicion for Afib. Hold metoprolol d/t hypotension  -telemetry        Pulm  Bilateral pleural effusions  Per chart review, imaging in July demonstrated moderate sized pleural effusions and septal thickening consistent with pulmonary edema. ProBNP 4,975 upon arrival to [facility] ED, was 2,190 in July. Current stable pleural effusions may be in setting of congestive heart failure. Repeat imaging CT thoracic 11/14 showed stable bilateral pleural effusions.   -consider diuresis once hypotension resolved, no current indication  -BNP elevation possibly 2/2 PE        Chronic Conditions:     Type II DM: last A1C 5.1 (11/2024)   Congestive heart failure, diastolic (07/2024 EF 50-55%, normal wall motion).   Peripheral neuropathy: home gabapentin  Chronic idiopathic constipation: home Linzess   Dysthymic disorder: home Wellbutrin  Insomnia: restart zolpidem 10mg nightly  BPH, nocturnal polyuria: home tamsulosin and desmopressin-held.      Glycemic goal:  140-180 mg/dL  LDAs:  peripheral IV right

## 2024-11-16 NOTE — PROGRESS NOTES
Speech Language Pathology  Facility/Department:[facility] ICU  Dysphagia Therapy Note                                                       Name: Juan Mohamud  : 1940  MRN: [id number]    Patient Diagnosis(es):   Patient Active Problem List    Diagnosis Date Noted    Hypotension 2024    SVT (supraventricular tachycardia) ([facility]) 2024    Fx dorsal vertebra-closed ([facility]) 2024    Spinal cord injury at T1-T6 level ([facility]) 2024    Preoperative cardiovascular examination 2024    Chronic heart failure with preserved ejection fraction (HFpEF) ([facility]) 2024    Abdominal distension 2024    Epidural hematoma 2024    Fall at home, initial encounter 2024    Acute congestive heart failure ([facility]) 2024    PVC's (premature ventricular contractions) 2024    Sepsis ([facility]) 2024    ICD (implantable cardioverter-defibrillator) in place 2024    Acute hypoxemic respiratory failure 2024    BOSTON (acute kidney injury) ([facility]) 2024    Arrhythmia 2024    Primary hypertension 2024    Hyperlipidemia 2024    Colonic obstruction ([facility]) 2024    Trauma 2024    History of fall 2024    Type 2 diabetes mellitus ([facility]) 2024    Anemia 2024    Pneumonia 2024    Hyponatremia 2024    Colonic pseudoobstruction 2024    VT (ventricular tachycardia) ([facility]) 2024    Constipation 2024    Cataract extraction status of right eye 2018    DDD (degenerative disc disease), lumbar 2013       Past Medical History:   Diagnosis Date    Diabetes mellitus ([facility])     diet controlled    DVT (deep venous thrombosis) ([facility]) 2024      Acute deep venous thrombosis in the left gastrocnemius and soleal veins without proximal large vein extension..    Hyperlipidemia     Indigestion     Insomnia     Macular degeneration     MI, 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    Neuropathy     toes partially on both feet

## 2024-11-16 NOTE — PROGRESS NOTES
Pt with persistent wet cough after taking meds with water. Keeps asking for water however recently received PRN IV dilaudid for pain as requested by patient. Pt educated on caution taken d/t aspiration risk and will provide water when pt is more alert. Pt resting comfortably at this time.

## 2024-11-17 LAB
ANION GAP SERPL CALCULATED.3IONS-SCNC: 12 MMOL/L (ref 3–16)
BASOPHILS # BLD: 0.1 K/UL (ref 0–0.2)
BASOPHILS NFR BLD: 0.4 %
BUN SERPL-MCNC: 39 MG/DL (ref 7–20)
CALCIUM SERPL-MCNC: 7.8 MG/DL (ref 8.3–10.6)
CHLORIDE SERPL-SCNC: 111 MMOL/L (ref 99–110)
CO2 SERPL-SCNC: 20 MMOL/L (ref 21–32)
CREAT SERPL-MCNC: 1.5 MG/DL (ref 0.8–1.3)
DEPRECATED RDW RBC AUTO: 17.4 % (ref 12.4–15.4)
EOSINOPHIL # BLD: 0.1 K/UL (ref 0–0.6)
EOSINOPHIL NFR BLD: 0.7 %
GFR SERPLBLD CREATININE-BSD FMLA CKD-EPI: 46 ML/MIN/{1.73_M2}
GLUCOSE BLD-MCNC: 55 MG/DL (ref 70–99)
GLUCOSE BLD-MCNC: 55 MG/DL (ref 70–99)
GLUCOSE BLD-MCNC: 72 MG/DL (ref 70–99)
GLUCOSE BLD-MCNC: 72 MG/DL (ref 70–99)
GLUCOSE BLD-MCNC: 80 MG/DL (ref 70–99)
GLUCOSE BLD-MCNC: 80 MG/DL (ref 70–99)
GLUCOSE BLD-MCNC: 81 MG/DL (ref 70–99)
GLUCOSE BLD-MCNC: 86 MG/DL (ref 70–99)
GLUCOSE BLD-MCNC: 94 MG/DL (ref 70–99)
GLUCOSE SERPL-MCNC: 132 MG/DL (ref 70–99)
HCT VFR BLD AUTO: 29.9 % (ref 40.5–52.5)
HGB BLD-MCNC: 9.8 G/DL (ref 13.5–17.5)
LYMPHOCYTES # BLD: 0.5 K/UL (ref 1–5.1)
LYMPHOCYTES NFR BLD: 4.7 %
MAGNESIUM SERPL-MCNC: 2.17 MG/DL (ref 1.8–2.4)
MCH RBC QN AUTO: 29.9 PG (ref 26–34)
MCHC RBC AUTO-ENTMCNC: 32.8 G/DL (ref 31–36)
MCV RBC AUTO: 91.2 FL (ref 80–100)
MONOCYTES # BLD: 0.8 K/UL (ref 0–1.3)
MONOCYTES NFR BLD: 6.6 %
NEUTROPHILS # BLD: 10.3 K/UL (ref 1.7–7.7)
NEUTROPHILS NFR BLD: 87.6 %
PERFORMED ON: ABNORMAL
PERFORMED ON: ABNORMAL
PERFORMED ON: NORMAL
PLATELET # BLD AUTO: 295 K/UL (ref 135–450)
PMV BLD AUTO: 7.4 FL (ref 5–10.5)
POTASSIUM SERPL-SCNC: 3.5 MMOL/L (ref 3.5–5.1)
RBC # BLD AUTO: 3.28 M/UL (ref 4.2–5.9)
SODIUM SERPL-SCNC: 143 MMOL/L (ref 136–145)
WBC # BLD AUTO: 11.8 K/UL (ref 4–11)

## 2024-11-17 PROCEDURE — 2580000003 HC RX 258

## 2024-11-17 PROCEDURE — 6360000002 HC RX W HCPCS

## 2024-11-17 PROCEDURE — 99024 POSTOP FOLLOW-UP VISIT: CPT | Performed by: NURSE PRACTITIONER

## 2024-11-17 PROCEDURE — 83735 ASSAY OF MAGNESIUM: CPT

## 2024-11-17 PROCEDURE — 2500000003 HC RX 250 WO HCPCS

## 2024-11-17 PROCEDURE — 03HB33Z INSERTION OF INFUSION DEVICE INTO RIGHT RADIAL ARTERY, PERCUTANEOUS APPROACH: ICD-10-PCS

## 2024-11-17 PROCEDURE — 36620 INSERTION CATHETER ARTERY: CPT

## 2024-11-17 PROCEDURE — 2700000000 HC OXYGEN THERAPY PER DAY

## 2024-11-17 PROCEDURE — 6370000000 HC RX 637 (ALT 250 FOR IP)

## 2024-11-17 PROCEDURE — 80048 BASIC METABOLIC PNL TOTAL CA: CPT

## 2024-11-17 PROCEDURE — 99233 SBSQ HOSP IP/OBS HIGH 50: CPT | Performed by: INTERNAL MEDICINE

## 2024-11-17 PROCEDURE — 85025 COMPLETE CBC W/AUTO DIFF WBC: CPT

## 2024-11-17 PROCEDURE — 92526 ORAL FUNCTION THERAPY: CPT

## 2024-11-17 PROCEDURE — 93005 ELECTROCARDIOGRAM TRACING: CPT

## 2024-11-17 PROCEDURE — 51798 US URINE CAPACITY MEASURE: CPT

## 2024-11-17 PROCEDURE — 6370000000 HC RX 637 (ALT 250 FOR IP): Performed by: STUDENT IN AN ORGANIZED HEALTH CARE EDUCATION/TRAINING PROGRAM

## 2024-11-17 PROCEDURE — 94761 N-INVAS EAR/PLS OXIMETRY MLT: CPT

## 2024-11-17 PROCEDURE — 2000000000 HC ICU R&B

## 2024-11-17 PROCEDURE — 6360000002 HC RX W HCPCS: Performed by: STUDENT IN AN ORGANIZED HEALTH CARE EDUCATION/TRAINING PROGRAM

## 2024-11-17 PROCEDURE — APPNB30 APP NON BILLABLE TIME 0-30 MINS: Performed by: NURSE PRACTITIONER

## 2024-11-17 RX ORDER — DEXTROSE MONOHYDRATE 25 G/50ML
25 INJECTION, SOLUTION INTRAVENOUS PRN
Status: DISCONTINUED | OUTPATIENT
Start: 2024-11-17 | End: 2024-12-11 | Stop reason: HOSPADM

## 2024-11-17 RX ORDER — FUROSEMIDE 10 MG/ML
20 INJECTION INTRAMUSCULAR; INTRAVENOUS ONCE
Status: COMPLETED | OUTPATIENT
Start: 2024-11-17 | End: 2024-11-17

## 2024-11-17 RX ORDER — POTASSIUM CHLORIDE 7.45 MG/ML
10 INJECTION INTRAVENOUS
Status: DISCONTINUED | OUTPATIENT
Start: 2024-11-17 | End: 2024-11-17

## 2024-11-17 RX ORDER — POTASSIUM CHLORIDE 29.8 MG/ML
20 INJECTION INTRAVENOUS ONCE
Status: COMPLETED | OUTPATIENT
Start: 2024-11-17 | End: 2024-11-17

## 2024-11-17 RX ORDER — INSULIN LISPRO 100 [IU]/ML
0-4 INJECTION, SOLUTION INTRAVENOUS; SUBCUTANEOUS EVERY 6 HOURS
Status: DISCONTINUED | OUTPATIENT
Start: 2024-11-17 | End: 2024-11-22

## 2024-11-17 RX ORDER — MIDODRINE HYDROCHLORIDE 5 MG/1
5 TABLET ORAL ONCE
Status: DISCONTINUED | OUTPATIENT
Start: 2024-11-17 | End: 2024-11-19 | Stop reason: ALTCHOICE

## 2024-11-17 RX ADMIN — NALOXEGOL OXALATE 25 MG: 25 TABLET, FILM COATED ORAL at 06:52

## 2024-11-17 RX ADMIN — METOCLOPRAMIDE 5 MG: 5 INJECTION, SOLUTION INTRAMUSCULAR; INTRAVENOUS at 06:29

## 2024-11-17 RX ADMIN — PHENYLEPHRINE HYDROCHLORIDE 30 MCG/MIN: 50 INJECTION INTRAVENOUS at 10:04

## 2024-11-17 RX ADMIN — GABAPENTIN 600 MG: 300 CAPSULE ORAL at 20:17

## 2024-11-17 RX ADMIN — SODIUM CHLORIDE, PRESERVATIVE FREE 10 ML: 5 INJECTION INTRAVENOUS at 00:36

## 2024-11-17 RX ADMIN — GABAPENTIN 600 MG: 300 CAPSULE ORAL at 08:53

## 2024-11-17 RX ADMIN — METOCLOPRAMIDE 5 MG: 5 INJECTION, SOLUTION INTRAMUSCULAR; INTRAVENOUS at 00:36

## 2024-11-17 RX ADMIN — FUROSEMIDE 20 MG: 10 INJECTION, SOLUTION INTRAMUSCULAR; INTRAVENOUS at 01:45

## 2024-11-17 RX ADMIN — METOCLOPRAMIDE 5 MG: 5 INJECTION, SOLUTION INTRAMUSCULAR; INTRAVENOUS at 11:41

## 2024-11-17 RX ADMIN — GABAPENTIN 600 MG: 300 CAPSULE ORAL at 15:15

## 2024-11-17 RX ADMIN — DEXTROSE MONOHYDRATE 12.5 G: 25 INJECTION, SOLUTION INTRAVENOUS at 06:21

## 2024-11-17 RX ADMIN — METOCLOPRAMIDE 5 MG: 5 INJECTION, SOLUTION INTRAMUSCULAR; INTRAVENOUS at 23:48

## 2024-11-17 RX ADMIN — Medication: at 20:17

## 2024-11-17 RX ADMIN — METOCLOPRAMIDE 5 MG: 5 INJECTION, SOLUTION INTRAMUSCULAR; INTRAVENOUS at 17:57

## 2024-11-17 RX ADMIN — Medication: at 09:30

## 2024-11-17 RX ADMIN — HEPARIN SODIUM 5000 UNITS: 5000 INJECTION INTRAVENOUS; SUBCUTANEOUS at 20:18

## 2024-11-17 RX ADMIN — MIDODRINE HYDROCHLORIDE 15 MG: 5 TABLET ORAL at 12:38

## 2024-11-17 RX ADMIN — HEPARIN SODIUM 5000 UNITS: 5000 INJECTION INTRAVENOUS; SUBCUTANEOUS at 12:38

## 2024-11-17 RX ADMIN — ACETAMINOPHEN 1000 MG: 500 TABLET, FILM COATED ORAL at 08:53

## 2024-11-17 RX ADMIN — Medication 5 MG: at 20:17

## 2024-11-17 RX ADMIN — SODIUM CHLORIDE, PRESERVATIVE FREE 10 ML: 5 INJECTION INTRAVENOUS at 10:00

## 2024-11-17 RX ADMIN — PANTOPRAZOLE SODIUM 40 MG: 40 INJECTION, POWDER, FOR SOLUTION INTRAVENOUS at 09:03

## 2024-11-17 RX ADMIN — MIDODRINE HYDROCHLORIDE 15 MG: 5 TABLET ORAL at 08:53

## 2024-11-17 RX ADMIN — ATORVASTATIN CALCIUM 10 MG: 10 TABLET, FILM COATED ORAL at 20:18

## 2024-11-17 RX ADMIN — POTASSIUM CHLORIDE 20 MEQ: 29.8 INJECTION, SOLUTION INTRAVENOUS at 09:58

## 2024-11-17 RX ADMIN — OXYCODONE 5 MG: 5 TABLET ORAL at 09:58

## 2024-11-17 RX ADMIN — AMIODARONE HYDROCHLORIDE 200 MG: 200 TABLET ORAL at 08:53

## 2024-11-17 RX ADMIN — HEPARIN SODIUM 5000 UNITS: 5000 INJECTION INTRAVENOUS; SUBCUTANEOUS at 06:28

## 2024-11-17 RX ADMIN — MIDODRINE HYDROCHLORIDE 15 MG: 5 TABLET ORAL at 17:57

## 2024-11-17 RX ADMIN — ACETAMINOPHEN 1000 MG: 500 TABLET, FILM COATED ORAL at 20:17

## 2024-11-17 RX ADMIN — SODIUM CHLORIDE, PRESERVATIVE FREE 10 ML: 5 INJECTION INTRAVENOUS at 20:20

## 2024-11-17 ASSESSMENT — PAIN SCALES - GENERAL
PAINLEVEL_OUTOF10: 0
PAINLEVEL_OUTOF10: 10
PAINLEVEL_OUTOF10: 4
PAINLEVEL_OUTOF10: 2
PAINLEVEL_OUTOF10: 2

## 2024-11-17 ASSESSMENT — PAIN - FUNCTIONAL ASSESSMENT
PAIN_FUNCTIONAL_ASSESSMENT: ACTIVITIES ARE NOT PREVENTED
PAIN_FUNCTIONAL_ASSESSMENT: ACTIVITIES ARE NOT PREVENTED
PAIN_FUNCTIONAL_ASSESSMENT: PREVENTS OR INTERFERES SOME ACTIVE ACTIVITIES AND ADLS

## 2024-11-17 ASSESSMENT — PAIN DESCRIPTION - DESCRIPTORS
DESCRIPTORS: ACHING;SORE
DESCRIPTORS: ACHING;CRUSHING
DESCRIPTORS: ACHING;SORE

## 2024-11-17 ASSESSMENT — PAIN DESCRIPTION - LOCATION
LOCATION: BACK

## 2024-11-17 ASSESSMENT — PAIN DESCRIPTION - ORIENTATION
ORIENTATION: MID

## 2024-11-17 ASSESSMENT — PAIN DESCRIPTION - PAIN TYPE
TYPE: SURGICAL PAIN
TYPE: SURGICAL PAIN

## 2024-11-17 ASSESSMENT — PAIN SCALES - WONG BAKER
WONGBAKER_NUMERICALRESPONSE: NO HURT
WONGBAKER_NUMERICALRESPONSE: NO HURT

## 2024-11-17 NOTE — PROGRESS NOTES
ICU Progress Note    Admit Date: 11/7/2024  Day: 10  Vent Day: 0  IV Access:Peripheral  IV Fluids:None  Vasopressors:None                Antibiotics: None  Diet: Diet NPO  ADULT TUBE FEEDING; Nasogastric; Peptide Based; Continuous; 10; Yes; 10; Q 8 hours; 60; 30; Q 4 hours; Protein; 2 Doses; Daily    CC: fall    Interval history:     S: Alert and oriented, no acute complaints. Per nursing, patient was confused overnight. Tube feeds started thisi morning.  O: T 36.7, did not require pressors overnight, required phenylephrine this morning for MAP of 57. O2 97% on 2L/min. Abdomen distended, only mild tenderness.    Labs: K 3.5, WBC 11.8, HgB 9.8  Imaging: none  A/P: Wean pressors as tolerated. Hold metoprolol and try to avoid dilaudid due to soft blood pressures.     HPI:     Mr. Juan Mohamud is a 84 y.o. male with a medical hx significant for type II DM, degenerative lumbar disease, arrhythmia s/p AICD, diastolic CHF, and macular degeneration otherwise as listed in the MHx table below, who presented from home to the [facility] ED on 11/3/24 after a fall.     Per chart review, patient fell after feeling like his \"knees were giving out.\" No reported symptoms prior to this fall, patient landed on his left side and hit the back of his head but did not lose consciousness at this time.      Upon arrival to the [facility] ED, patient was alert, oriented and reported with GCS of 15 and NIH of 0. While in the ED patient had increasing generalized weakness and difficulty getting up from a sitting position. Decision was made to admit patient as they did not feel safe discharging him home given his inability to stand unassisted.      Fall was thought to be mechanical and secondary to generalized weakness due to poor nutrition. CT abd pelv performed due to complaints of abdominal pain, distension, and back pain upon arrival to the floor. Imaging revealed acute T11 vertebral fracture extending into the T10-T11 disc

## 2024-11-17 NOTE — PLAN OF CARE
Problem: Chronic Conditions and Co-morbidities  Goal: Patient's chronic conditions and co-morbidity symptoms are monitored and maintained or improved  11/16/2024 2341 by Rigoberto Rivera RN  Outcome: Progressing  11/16/2024 1749 by Nannette Leslie RN  Outcome: Progressing     Problem: Discharge Planning  Goal: Discharge to home or other facility with appropriate resources  11/16/2024 2341 by Rigoberto Rivera RN  Outcome: Progressing  11/16/2024 1749 by Nnanette Leslie RN  Outcome: Progressing     Problem: Safety - Adult  Goal: Free from fall injury  11/16/2024 2341 by Rigoberto Rivera RN  Outcome: Progressing  11/16/2024 1749 by Nannette Leslie RN  Outcome: Progressing     Problem: Skin/Tissue Integrity  Goal: Absence of new skin breakdown  Description: 1.  Monitor for areas of redness and/or skin breakdown  2.  Assess vascular access sites hourly  3.  Every 4-6 hours minimum:  Change oxygen saturation probe site  4.  Every 4-6 hours:  If on nasal continuous positive airway pressure, respiratory therapy assess nares and determine need for appliance change or resting period.  11/16/2024 2341 by Rigoberto Rivera RN  Outcome: Progressing  11/16/2024 1749 by Nannette Leslie RN  Outcome: Progressing     Problem: ABCDS Injury Assessment  Goal: Absence of physical injury  11/16/2024 2341 by Rigoberto Rivera RN  Outcome: Progressing  11/16/2024 1749 by Nannette Leslie RN  Outcome: Progressing     Problem: Pain  Goal: Verbalizes/displays adequate comfort level or baseline comfort level  11/16/2024 2341 by Rigoberto Rivera RN  Outcome: Progressing  11/16/2024 1749 by Nannette Leslie RN  Outcome: Progressing  Flowsheets (Taken 11/16/2024 0800)  Verbalizes/displays adequate comfort level or baseline comfort level:   Encourage patient to monitor pain and request assistance   Assess pain using appropriate pain scale   Administer analgesics based on type and severity of pain and evaluate response     Problem:  Respiratory - Adult  Goal: Achieves optimal ventilation and oxygenation  11/16/2024 2341 by Rigoberto Rivera RN  Outcome: Progressing  11/16/2024 1749 by Nannette Leslie RN  Outcome: Progressing  Flowsheets (Taken 11/16/2024 0800)  Achieves optimal ventilation and oxygenation:   Assess for changes in respiratory status   Assess for changes in mentation and behavior   Position to facilitate oxygenation and minimize respiratory effort     Problem: Cardiovascular - Adult  Goal: Maintains optimal cardiac output and hemodynamic stability  11/16/2024 2341 by Rigoberto Rivera RN  Outcome: Progressing  11/16/2024 1749 by Nannette Leslie RN  Outcome: Progressing  Flowsheets (Taken 11/16/2024 0800)  Maintains optimal cardiac output and hemodynamic stability:   Monitor blood pressure and heart rate   Monitor urine output and notify Licensed Independent Practitioner for values outside of normal range   Assess for signs of decreased cardiac output  Goal: Absence of cardiac dysrhythmias or at baseline  11/16/2024 2341 by Rigoberto Rivera RN  Outcome: Progressing  11/16/2024 1749 by Nannette Leslie RN  Outcome: Progressing  Flowsheets (Taken 11/16/2024 0800)  Absence of cardiac dysrhythmias or at baseline:   Monitor cardiac rate and rhythm   Assess for signs of decreased cardiac output     Problem: Metabolic/Fluid and Electrolytes - Adult  Goal: Electrolytes maintained within normal limits  11/16/2024 2341 by Rigoberto Rivera RN  Outcome: Progressing  11/16/2024 1749 by Nannette Leslie RN  Outcome: Progressing  Goal: Hemodynamic stability and optimal renal function maintained  11/16/2024 2341 by Rigoberto Rivera RN  Outcome: Progressing  11/16/2024 1749 by Nannette Leslie RN  Outcome: Progressing     Problem: Neurosensory - Adult  Goal: Achieves stable or improved neurological status  11/16/2024 2341 by Rigoberto Rivera RN  Outcome: Progressing  11/16/2024 1749 by Nannette Leslie RN  Outcome:

## 2024-11-17 NOTE — PROGRESS NOTES
Per pts wife, pt has schedueld shot for macular degeneration on 11/25. Pt worried about missing appointment, stating his vision is starting to get a little blurry today. MD aware.

## 2024-11-17 NOTE — PROGRESS NOTES
NEUROSURGERY PROGRESS NOTE    11/17/2024 8:58 AM                               Juan Mohamud                      LOS: 10 days   POD#5 s/p Procedure(s) (LRB):  THORACIC 8-LUMBAR 1 POSTERIOR DECOMPRESSION FIXATION AND FUSION (N/A)    Subjective: No acute events overnight. Patient required placement of Corepak for nutrition yesterday. Neurologically he remains stable.    Physical Exam:  Patient seen and examined    Vitals:    11/17/24 0853   BP: (!) 89/62   Pulse: (!) 117   Resp:    Temp:    SpO2:      GCS:  4 - Opens eyes on own  5 - Alert and oriented  6 - Follows simple motor commands  General: Well developed. Alert and cooperative in no acute distress.   HENT: atraumatic, neck supple  Eyes: Optic discs: Not tested  Pulmonary: unlabored respiratory effort  Cardiovascular:  Warm well perfused. No peripheral edema  Gastrointestinal: abdomen soft, NT, ND    Neurological:  Mental Status: Awake, alert, oriented x 4, speech clear and appropriate  Attention: Intact  Language: No aphasia or dysarthria noted  Sensation: Intact to all extremities to light touch  Coordination: Intact    Cranial Nerves:  II: Visual acuity not tested, denies new visual changes / diplopia  III, IV, VI: PERRL, 3 mm bilaterally, EOMI, no nystagmus noted  V: Facial sensation intact bilaterally to touch  VII: Face symmetric  VIII: Hearing intact bilaterally to spoken voice  IX: Palate movement equal bilaterally  XI: Shoulder shrug equal bilaterally  XII: Tongue midline    Musculoskeletal:   Gait: Not tested   Assist devices: None   Tone: Normal  Motor strength: Exam limited 2/2 generalized weakness and effort   Right  Left    Right  Left    Deltoid  4 4  Hip Flex  3 3   Biceps  4 4  Knee Extensors  3 3   Triceps  4 4  Knee Flexors  3 3   Wrist Ext  4+ 4  Ankle Dorsiflex.  4+ 4+   Wrist Flex  4+ 4  Ankle Plantarflex.  4+ 4+   Handgrip  4+ 4+  Ext Olaf Longus  4+ 4+   Thumb Ext  4+ 4+         Incision: Old bloody drainage, but  or decline in neurological status    DISPO: Remain inpatient from neurosurgery standpoint. Dispo timing to be determined by primary team once patient is medically stable for discharge.    Patient was discussed with Dr. Posey who agrees with above assessment and plan.     Electronically signed by: TIP Castanon CNP, APRN-CNP, 11/17/2024 8:58 AM  [phone]

## 2024-11-17 NOTE — CONSULTS
Comprehensive Nutrition Assessment    RECOMMENDATIONS:  PO Diet: NPO per SLP recommendations- NGT placed  Tube Feeding Recommendations:  Initiate Jevity 1.5 (standard w/fiber) formula at 10 ml/hr  Monitor K+, Phos, Mg and replete as needed  As tolerated, advance by 10 ml Q8 hrs to goal rate of 65 ml/hr  Water flush 100 ml Q4 hrs, monitor Na+ and need to adjust flush rate  Administer 1 ProSource daily via NGT, flush with 30 ml H20 before and after administration    Vasopressor dosing equivalent score = 3.1. For VDE >12 trophic TF only (10 mL/hr).    NUTRITION ASSESSMENT:   Nutritional summary & status: Consult for tube feed ordering and management. Patient re-evaluated by SLP 11/16 after RN reported po intolerance. MBS performed and SLP recommends NPO with alternative nutrition. NGT placed yesterday evening and RD placed order for EN to start today. Pt requring small amount of pressor support this am. Bowels are moving with regimen scheduled. RD will monitor EN tolerance and progress with speech therapy.   Admission // PMH: Vertebral fracture//colonic psuedoobstruction, T2DM, HLD, CHF    MALNUTRITION ASSESSMENT  Context of Malnutrition: Acute Illness   Malnutrition Status: Mild malnutrition  Findings of the 6 clinical characteristics of malnutrition (Minimum of 2 out of 6 clinical characteristics is required to make the diagnosis of moderate or severe Protein Calorie Malnutrition based on AND/ASPEN Guidelines):  Energy Intake:  50% or less of estimated energy requirements for 5 or more days  Weight Loss:  Mild weight loss (5% in 3 month period)     Body Fat Loss:  Unable to assess     Muscle Mass Loss:  Unable to assess    Fluid Accumulation:  No fluid accumulation      NUTRITION DIAGNOSIS   Inadequate oral intake related to swallowing difficulty as evidenced by NPO or clear liquid status due to medical condition, nutrition support - enteral nutrition    Nutrition Monitoring and Evaluation:   Food/Nutrient Intake

## 2024-11-17 NOTE — PLAN OF CARE
Problem: Chronic Conditions and Co-morbidities  Goal: Patient's chronic conditions and co-morbidity symptoms are monitored and maintained or improved  Outcome: Progressing  Flowsheets (Taken 11/17/2024 0800)  Care Plan - Patient's Chronic Conditions and Co-Morbidity Symptoms are Monitored and Maintained or Improved:   Monitor and assess patient's chronic conditions and comorbid symptoms for stability, deterioration, or improvement   Collaborate with multidisciplinary team to address chronic and comorbid conditions and prevent exacerbation or deterioration   Update acute care plan with appropriate goals if chronic or comorbid symptoms are exacerbated and prevent overall improvement and discharge     Problem: Discharge Planning  Goal: Discharge to home or other facility with appropriate resources  Outcome: Progressing  Flowsheets (Taken 11/17/2024 0800)  Discharge to home or other facility with appropriate resources:   Identify barriers to discharge with patient and caregiver   Identify discharge learning needs (meds, wound care, etc)     Problem: Safety - Adult  Goal: Free from fall injury  Outcome: Progressing  Flowsheets (Taken 11/17/2024 1000)  Free From Fall Injury: Instruct family/caregiver on patient safety     Problem: Skin/Tissue Integrity  Goal: Absence of new skin breakdown  Description: 1.  Monitor for areas of redness and/or skin breakdown  2.  Assess vascular access sites hourly  3.  Every 4-6 hours minimum:  Change oxygen saturation probe site  4.  Every 4-6 hours:  If on nasal continuous positive airway pressure, respiratory therapy assess nares and determine need for appliance change or resting period.  Outcome: Progressing     Problem: ABCDS Injury Assessment  Goal: Absence of physical injury  Outcome: Progressing  Flowsheets (Taken 11/17/2024 1000)  Absence of Physical Injury: Implement safety measures based on patient assessment     Problem: Pain  Goal: Verbalizes/displays adequate comfort level  or baseline comfort level  Outcome: Progressing  Flowsheets (Taken 11/17/2024 0800)  Verbalizes/displays adequate comfort level or baseline comfort level:   Encourage patient to monitor pain and request assistance   Assess pain using appropriate pain scale   Administer analgesics based on type and severity of pain and evaluate response     Problem: Respiratory - Adult  Goal: Achieves optimal ventilation and oxygenation  Outcome: Progressing  Flowsheets (Taken 11/17/2024 0800)  Achieves optimal ventilation and oxygenation:   Assess for changes in respiratory status   Assess for changes in mentation and behavior   Position to facilitate oxygenation and minimize respiratory effort     Problem: Cardiovascular - Adult  Goal: Maintains optimal cardiac output and hemodynamic stability  Outcome: Progressing  Flowsheets (Taken 11/17/2024 0800)  Maintains optimal cardiac output and hemodynamic stability:   Monitor blood pressure and heart rate   Monitor urine output and notify Licensed Independent Practitioner for values outside of normal range  Goal: Absence of cardiac dysrhythmias or at baseline  Outcome: Progressing  Flowsheets (Taken 11/17/2024 0800)  Absence of cardiac dysrhythmias or at baseline: Monitor cardiac rate and rhythm     Problem: Metabolic/Fluid and Electrolytes - Adult  Goal: Electrolytes maintained within normal limits  Outcome: Progressing  Flowsheets (Taken 11/17/2024 0800)  Electrolytes maintained within normal limits:   Monitor labs and assess patient for signs and symptoms of electrolyte imbalances   Administer electrolyte replacement as ordered  Goal: Hemodynamic stability and optimal renal function maintained  Outcome: Progressing  Flowsheets (Taken 11/17/2024 0800)  Hemodynamic stability and optimal renal function maintained:   Monitor labs and assess for signs and symptoms of volume excess or deficit   Monitor intake, output and patient weight     Problem: Neurosensory - Adult  Goal: Achieves

## 2024-11-17 NOTE — PROGRESS NOTES
Speech Language Pathology  Facility/Department:[facility] ICU  Dysphagia Therapy Note                                                       Name: Juan Mohamud  : 1940  MRN: [id number]    Patient Diagnosis(es):   Patient Active Problem List    Diagnosis Date Noted    Hypotension 2024    SVT (supraventricular tachycardia) ([facility]) 2024    Fx dorsal vertebra-closed ([facility]) 2024    Spinal cord injury at T1-T6 level ([facility]) 2024    Preoperative cardiovascular examination 2024    Chronic heart failure with preserved ejection fraction (HFpEF) ([facility]) 2024    Abdominal distension 2024    Epidural hematoma 2024    Fall at home, initial encounter 2024    Acute congestive heart failure ([facility]) 2024    PVC's (premature ventricular contractions) 2024    Sepsis ([facility]) 2024    ICD (implantable cardioverter-defibrillator) in place 2024    Acute hypoxemic respiratory failure 2024    BOSTON (acute kidney injury) ([facility]) 2024    Arrhythmia 2024    Primary hypertension 2024    Hyperlipidemia 2024    Colonic obstruction ([facility]) 2024    Trauma 2024    History of fall 2024    Type 2 diabetes mellitus ([facility]) 2024    Anemia 2024    Pneumonia 2024    Hyponatremia 2024    Colonic pseudoobstruction 2024    VT (ventricular tachycardia) ([facility]) 2024    Constipation 2024    Cataract extraction status of right eye 2018    DDD (degenerative disc disease), lumbar 2013       Past Medical History:   Diagnosis Date    Diabetes mellitus ([facility])     diet controlled    DVT (deep venous thrombosis) ([facility]) 2024      Acute deep venous thrombosis in the left gastrocnemius and soleal veins without proximal large vein extension..    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 Once agreeable to positioning further upright (45 degrees), pt tolerated all PO trials with no further s/s aspiration. Recommend continue current diet, small single sips, head of bed up to at least 45 degrees. If further s/s aspiration arise, recommend withhold PO and contact speech.    MBS 11/16/24  Oral Phase  Moderate dysfunction characterized by lingual-pumping and slowed A-P transit of puree, with moderate residue.  Pharyngeal Phase  Moderate dysfunction, characterized by impaired strength and endurance, with reduced tongue base movement, hyolaryngeal excursion, epiglottic retraction, and pharyngeal constriction.  Adequate laryngeal vestibule closure present during height of initial trials (thins via straw), however reduced strength resulted in pooled residue in valleculae and pyriforms, with spillage and deep penetration to the cords after the swallow. Unable to conclusively visualize tracheal aspiration d/t patient positioning/shoulder-shadow.  Improved bolus control noted for mildly thick liquids, with reduced laryngeal penetration (shallow vs deep), however resulted in increasingly diffuse pharyngeal residue as study progressed, with pt becoming visibly fatigued. Required multiple attempts to initiate swallow to clear puree.  Upper Esophageal Screen  Indirectly assessed; reduced UES clearance     Discussion  Swallow safety and efficiency are both impaired.  Although no  Consent 2/Introductory Paragraph: Mohs surgery was explained to the patient and consent was obtained. The risks, benefits and alternatives to therapy were discussed in detail. Specifically, the risks of infection, scarring, bleeding, prolonged wound healing, incomplete removal, allergy to anesthesia, nerve injury and recurrence were addressed. Prior to the procedure, the treatment site was clearly identified and confirmed by the patient. All components of Universal Protocol/PAUSE Rule completed.

## 2024-11-17 NOTE — PROGRESS NOTES
[facility] Medicine Progress Note  V 10.25      Date of Admission: 11/7/2024    Hospital Day: 11      Chief Admission Complaint:  fall, back pain     Subjective:  Patient seen and examined at bedside. Patient becoming hypotensive again this morning, put back on pressors. Patient removed A-line last night. Somewhat somnolent this morning, is able to follow commands to all extremities, though he is generally weak. NG tube placed and started on tube feeds as well. Denies any acute concerns at this time.     Presenting Admission History:       Mr. Juan Mohamud is a 84 y.o. male with a medical hx significant for type II DM, degenerative lumbar disease, arrhythmia s/p AICD, diastolic CHF, and macular degeneration otherwise as listed in the MHx table below, who presented from home to the [facility] ED on 11/3/24 after a fall.     Per chart review, patient fell after feeling like his \"knees were giving out.\" No reported symptoms prior to this fall, patient landed on his left side and hit the back of his head but did not lose consciousness at this time.      Upon arrival to the [facility] ED, patient was alert, oriented and reported with GCS of 15 and NIH of 0. While in the ED patient had increasing generalized weakness and difficulty getting up from a sitting position. Decision was made to admit patient as they did not feel safe discharging him home given his inability to stand unassisted.      Fall was thought to be mechanical and secondary to generalized weakness due to poor nutrition. CT abd pelv performed due to complaints of abdominal pain, distension, and back pain upon arrival to the floor. Imaging revealed acute T11 vertebral fracture extending into the T10-T11 disc space as well as diffuse distention of small and large bowel compatible with ileus and bilateral pleural effusions.     GI consulted for ileus, and patient underwent colonic decompression     MRI thoracic spine then demonstrated large epidural    --------------------------------------------------  C. Data (any 2)    [x] Data Review (any 3)    [x] Consultant notes from yesterday/today    [x] All available current labs reviewed interpreted for clinical significance    [x] Appropriate follow-up labs were ordered  [] Collateral history obtained     [x] Independent Interpretation of tests (any 1)    [x] Telemetry (Rhythm Strip) personally reviewed and interpreted        [] Imaging personally reviewed and interpreted     [] Discussion (any 1)  [] Multi-Disciplinary Rounds with Case Management  [] Discussed management of the case with           Labs:  Personally reviewed on 11/17/2024 and interpreted for clinical significance as documented above.     Recent Labs     11/15/24  0536 11/16/24  0537 11/17/24  0643   WBC 14.3* 13.6* 11.8*   HGB 11.6* 10.6* 9.8*   HCT 35.6* 32.3* 29.9*    352 295     Recent Labs     11/14/24  1315 11/14/24  1315 11/15/24  0536 11/16/24  0537 11/16/24  1500 11/17/24  0643     --  141 141 141 143   K 3.3*   < > 3.4* 2.9* 3.2* 3.5     --  111* 109 110 111*   CO2 21  --  19* 20* 17* 20*   BUN 32*  --  30* 36* 36* 39*   CREATININE 0.9  --  0.9 1.3 1.5* 1.5*   CALCIUM 7.7*  --  7.6* 7.9* 7.7* 7.8*   MG  --   --  2.05 2.12  --  2.17   PHOS 2.8  --   --   --  3.9  --     < > = values in this interval not displayed.     No results for input(s): \"PROBNP\", \"TROPHS\" in the last 72 hours.    No results for input(s): \"LABA1C\" in the last 72 hours.  No results for input(s): \"AST\", \"ALT\", \"BILIDIR\", \"BILITOT\", \"ALKPHOS\" in the last 72 hours.    No results for input(s): \"INR\", \"LACTA\", \"TSH\" in the last 72 hours.      Urine Cultures:   Lab Results   Component Value Date/Time    LABURIN >100,000 CFU/ml 03/27/2024 12:46 PM     Blood Cultures:   Lab Results   Component Value Date/Time    BC No Growth after 4 days of incubation. 03/30/2024 08:38 AM     Lab Results   Component Value Date/Time    BLOODCULT2 No Growth after 4 days of

## 2024-11-17 NOTE — PROCEDURES
PROCEDURE NOTE  Date: 11/17/2024   Name: Juan Mohamud  YOB: 1940    Insert Arterial Line    Date/Time: 11/17/2024 5:42 PM    Performed by: Omar Victor MD  Authorized by: Omar Victor MD  Consent: Written consent obtained.  Consent given by: spouse and patient  Patient understanding: patient states understanding of the procedure being performed  Patient consent: the patient's understanding of the procedure matches consent given  Procedure consent: procedure consent matches procedure scheduled  Relevant documents: relevant documents present and verified  Test results: test results available and properly labeled  Site marked: the operative site was marked  Imaging studies: imaging studies available  Required items: required blood products, implants, devices, and special equipment available  Patient identity confirmed: arm band  Time out: Immediately prior to procedure a \"time out\" was called to verify the correct patient, procedure, equipment, support staff and site/side marked as required.  Preparation: Patient was prepped and draped in the usual sterile fashion.  Indications: hemodynamic monitoring  Location: right radial    Anesthesia:  Local Anesthetic: lidocaine 2% without epinephrine    Sedation:  Patient sedated: no    Ted's test normal: yes  Needle gauge: 20  Number of attempts: 1  Post-procedure: line sutured and dressing applied  Post-procedure CMS: normal  Patient tolerance: patient tolerated the procedure well with no immediate complications

## 2024-11-18 ENCOUNTER — APPOINTMENT (OUTPATIENT)
Dept: GENERAL RADIOLOGY | Age: 84
DRG: 447 | End: 2024-11-18
Attending: INTERNAL MEDICINE
Payer: MEDICARE

## 2024-11-18 LAB
ANION GAP SERPL CALCULATED.3IONS-SCNC: 9 MMOL/L (ref 3–16)
BUN SERPL-MCNC: 35 MG/DL (ref 7–20)
CALCIUM SERPL-MCNC: 7.8 MG/DL (ref 8.3–10.6)
CHLORIDE SERPL-SCNC: 112 MMOL/L (ref 99–110)
CO2 SERPL-SCNC: 21 MMOL/L (ref 21–32)
CREAT SERPL-MCNC: 1.2 MG/DL (ref 0.8–1.3)
EKG ATRIAL RATE: 91 BPM
EKG DIAGNOSIS: NORMAL
EKG Q-T INTERVAL: 406 MS
EKG QRS DURATION: 158 MS
EKG QTC CALCULATION (BAZETT): 564 MS
EKG R AXIS: -13 DEGREES
EKG T AXIS: 121 DEGREES
EKG VENTRICULAR RATE: 116 BPM
GFR SERPLBLD CREATININE-BSD FMLA CKD-EPI: 60 ML/MIN/{1.73_M2}
GLUCOSE BLD-MCNC: 113 MG/DL (ref 70–99)
GLUCOSE BLD-MCNC: 128 MG/DL (ref 70–99)
GLUCOSE BLD-MCNC: 139 MG/DL (ref 70–99)
GLUCOSE BLD-MCNC: 140 MG/DL (ref 70–99)
GLUCOSE SERPL-MCNC: 98 MG/DL (ref 70–99)
MAGNESIUM SERPL-MCNC: 2.21 MG/DL (ref 1.8–2.4)
PERFORMED ON: ABNORMAL
PHOSPHATE SERPL-MCNC: 3.3 MG/DL (ref 2.5–4.9)
POTASSIUM SERPL-SCNC: 3.4 MMOL/L (ref 3.5–5.1)
SODIUM SERPL-SCNC: 142 MMOL/L (ref 136–145)

## 2024-11-18 PROCEDURE — 2580000003 HC RX 258

## 2024-11-18 PROCEDURE — 6370000000 HC RX 637 (ALT 250 FOR IP)

## 2024-11-18 PROCEDURE — 6360000002 HC RX W HCPCS

## 2024-11-18 PROCEDURE — 6370000000 HC RX 637 (ALT 250 FOR IP): Performed by: STUDENT IN AN ORGANIZED HEALTH CARE EDUCATION/TRAINING PROGRAM

## 2024-11-18 PROCEDURE — 80048 BASIC METABOLIC PNL TOTAL CA: CPT

## 2024-11-18 PROCEDURE — 97530 THERAPEUTIC ACTIVITIES: CPT

## 2024-11-18 PROCEDURE — 97535 SELF CARE MNGMENT TRAINING: CPT

## 2024-11-18 PROCEDURE — 0DH67UZ INSERTION OF FEEDING DEVICE INTO STOMACH, VIA NATURAL OR ARTIFICIAL OPENING: ICD-10-PCS | Performed by: INTERNAL MEDICINE

## 2024-11-18 PROCEDURE — 83735 ASSAY OF MAGNESIUM: CPT

## 2024-11-18 PROCEDURE — 92526 ORAL FUNCTION THERAPY: CPT

## 2024-11-18 PROCEDURE — 74018 RADEX ABDOMEN 1 VIEW: CPT

## 2024-11-18 PROCEDURE — 99024 POSTOP FOLLOW-UP VISIT: CPT | Performed by: NURSE PRACTITIONER

## 2024-11-18 PROCEDURE — 6360000002 HC RX W HCPCS: Performed by: STUDENT IN AN ORGANIZED HEALTH CARE EDUCATION/TRAINING PROGRAM

## 2024-11-18 PROCEDURE — 99233 SBSQ HOSP IP/OBS HIGH 50: CPT | Performed by: INTERNAL MEDICINE

## 2024-11-18 PROCEDURE — 2700000000 HC OXYGEN THERAPY PER DAY

## 2024-11-18 PROCEDURE — 99232 SBSQ HOSP IP/OBS MODERATE 35: CPT | Performed by: NURSE PRACTITIONER

## 2024-11-18 PROCEDURE — 84100 ASSAY OF PHOSPHORUS: CPT

## 2024-11-18 PROCEDURE — 94761 N-INVAS EAR/PLS OXIMETRY MLT: CPT

## 2024-11-18 PROCEDURE — APPNB30 APP NON BILLABLE TIME 0-30 MINS: Performed by: NURSE PRACTITIONER

## 2024-11-18 PROCEDURE — 93010 ELECTROCARDIOGRAM REPORT: CPT | Performed by: INTERNAL MEDICINE

## 2024-11-18 PROCEDURE — 2000000000 HC ICU R&B

## 2024-11-18 RX ORDER — 0.9 % SODIUM CHLORIDE 0.9 %
500 INTRAVENOUS SOLUTION INTRAVENOUS ONCE
Status: COMPLETED | OUTPATIENT
Start: 2024-11-18 | End: 2024-11-18

## 2024-11-18 RX ORDER — OXYCODONE HYDROCHLORIDE 5 MG/1
2.5 TABLET ORAL ONCE
Status: COMPLETED | OUTPATIENT
Start: 2024-11-18 | End: 2024-11-18

## 2024-11-18 RX ORDER — 0.9 % SODIUM CHLORIDE 0.9 %
1000 INTRAVENOUS SOLUTION INTRAVENOUS ONCE
Status: DISCONTINUED | OUTPATIENT
Start: 2024-11-18 | End: 2024-11-18

## 2024-11-18 RX ORDER — POTASSIUM CHLORIDE 29.8 MG/ML
20 INJECTION INTRAVENOUS ONCE
Status: COMPLETED | OUTPATIENT
Start: 2024-11-18 | End: 2024-11-18

## 2024-11-18 RX ORDER — BUPROPION HYDROCHLORIDE 100 MG/1
150 TABLET ORAL 2 TIMES DAILY
Status: DISCONTINUED | OUTPATIENT
Start: 2024-11-18 | End: 2024-12-11

## 2024-11-18 RX ORDER — ZOLPIDEM TARTRATE 5 MG/1
5 TABLET ORAL
Status: COMPLETED | OUTPATIENT
Start: 2024-11-18 | End: 2024-11-18

## 2024-11-18 RX ORDER — BUPROPION HYDROCHLORIDE 100 MG/1
150 TABLET ORAL 2 TIMES DAILY
Status: DISCONTINUED | OUTPATIENT
Start: 2024-11-18 | End: 2024-11-18

## 2024-11-18 RX ADMIN — OXYCODONE 2.5 MG: 5 TABLET ORAL at 11:27

## 2024-11-18 RX ADMIN — Medication 5 MG: at 20:07

## 2024-11-18 RX ADMIN — SENNOSIDES AND DOCUSATE SODIUM 2 TABLET: 50; 8.6 TABLET ORAL at 20:12

## 2024-11-18 RX ADMIN — MIDODRINE HYDROCHLORIDE 15 MG: 5 TABLET ORAL at 08:17

## 2024-11-18 RX ADMIN — POTASSIUM BICARBONATE 40 MEQ: 782 TABLET, EFFERVESCENT ORAL at 06:33

## 2024-11-18 RX ADMIN — MIDODRINE HYDROCHLORIDE 15 MG: 5 TABLET ORAL at 17:51

## 2024-11-18 RX ADMIN — GABAPENTIN 600 MG: 300 CAPSULE ORAL at 08:18

## 2024-11-18 RX ADMIN — MIDODRINE HYDROCHLORIDE 15 MG: 5 TABLET ORAL at 11:26

## 2024-11-18 RX ADMIN — GABAPENTIN 600 MG: 300 CAPSULE ORAL at 20:08

## 2024-11-18 RX ADMIN — ZOLPIDEM TARTRATE 10 MG: 5 TABLET, COATED ORAL at 20:13

## 2024-11-18 RX ADMIN — ACETAMINOPHEN 1000 MG: 500 TABLET, FILM COATED ORAL at 08:18

## 2024-11-18 RX ADMIN — ATORVASTATIN CALCIUM 10 MG: 10 TABLET, FILM COATED ORAL at 20:09

## 2024-11-18 RX ADMIN — SODIUM CHLORIDE, PRESERVATIVE FREE 10 ML: 5 INJECTION INTRAVENOUS at 08:28

## 2024-11-18 RX ADMIN — PANTOPRAZOLE SODIUM 40 MG: 40 INJECTION, POWDER, FOR SOLUTION INTRAVENOUS at 08:28

## 2024-11-18 RX ADMIN — Medication: at 08:29

## 2024-11-18 RX ADMIN — METOCLOPRAMIDE 5 MG: 5 INJECTION, SOLUTION INTRAMUSCULAR; INTRAVENOUS at 05:24

## 2024-11-18 RX ADMIN — OXYCODONE 5 MG: 5 TABLET ORAL at 09:20

## 2024-11-18 RX ADMIN — POTASSIUM CHLORIDE 20 MEQ: 29.8 INJECTION, SOLUTION INTRAVENOUS at 08:21

## 2024-11-18 RX ADMIN — AMIODARONE HYDROCHLORIDE 200 MG: 200 TABLET ORAL at 08:18

## 2024-11-18 RX ADMIN — ACETAMINOPHEN 1000 MG: 500 TABLET, FILM COATED ORAL at 20:08

## 2024-11-18 RX ADMIN — BUPROPION HYDROCHLORIDE 150 MG: 100 TABLET, FILM COATED ORAL at 20:06

## 2024-11-18 RX ADMIN — HEPARIN SODIUM 5000 UNITS: 5000 INJECTION INTRAVENOUS; SUBCUTANEOUS at 13:03

## 2024-11-18 RX ADMIN — OXYCODONE 5 MG: 5 TABLET ORAL at 20:13

## 2024-11-18 RX ADMIN — HEPARIN SODIUM 5000 UNITS: 5000 INJECTION INTRAVENOUS; SUBCUTANEOUS at 05:22

## 2024-11-18 RX ADMIN — SODIUM CHLORIDE, PRESERVATIVE FREE 10 ML: 5 INJECTION INTRAVENOUS at 20:07

## 2024-11-18 RX ADMIN — HEPARIN SODIUM 5000 UNITS: 5000 INJECTION INTRAVENOUS; SUBCUTANEOUS at 20:30

## 2024-11-18 RX ADMIN — PHENYLEPHRINE HYDROCHLORIDE 30 MCG/MIN: 50 INJECTION INTRAVENOUS at 21:06

## 2024-11-18 RX ADMIN — ZOLPIDEM TARTRATE 5 MG: 5 TABLET, COATED ORAL at 02:38

## 2024-11-18 RX ADMIN — GABAPENTIN 600 MG: 300 CAPSULE ORAL at 13:03

## 2024-11-18 RX ADMIN — SODIUM CHLORIDE 500 ML: 9 INJECTION, SOLUTION INTRAVENOUS at 20:27

## 2024-11-18 RX ADMIN — Medication: at 20:05

## 2024-11-18 RX ADMIN — BUPROPION HYDROCHLORIDE 150 MG: 100 TABLET, FILM COATED ORAL at 11:26

## 2024-11-18 ASSESSMENT — PAIN SCALES - GENERAL
PAINLEVEL_OUTOF10: 0
PAINLEVEL_OUTOF10: 0
PAINLEVEL_OUTOF10: 2
PAINLEVEL_OUTOF10: 10
PAINLEVEL_OUTOF10: 7
PAINLEVEL_OUTOF10: 0
PAINLEVEL_OUTOF10: 3
PAINLEVEL_OUTOF10: 8
PAINLEVEL_OUTOF10: 0
PAINLEVEL_OUTOF10: 4
PAINLEVEL_OUTOF10: 7
PAINLEVEL_OUTOF10: 7

## 2024-11-18 ASSESSMENT — PAIN DESCRIPTION - DESCRIPTORS
DESCRIPTORS: ACHING;SORE
DESCRIPTORS: ACHING;SORE

## 2024-11-18 ASSESSMENT — PAIN SCALES - WONG BAKER
WONGBAKER_NUMERICALRESPONSE: HURTS A LITTLE BIT
WONGBAKER_NUMERICALRESPONSE: NO HURT

## 2024-11-18 ASSESSMENT — PAIN DESCRIPTION - LOCATION
LOCATION: BACK

## 2024-11-18 ASSESSMENT — PAIN DESCRIPTION - ORIENTATION
ORIENTATION: MID
ORIENTATION: RIGHT

## 2024-11-18 NOTE — PROGRESS NOTES
This RN reached out to the [facility] about the pt's upcoming appointment. I notified them that it is unsure at this point in time if the patient will be discharged from the hospital by the time of his appointment and if he is he will more than likely be at a rehab facility. [facility] said they would reach out to the pt's physician and formulate a plan. [facility] will reach out to pt's wife in the next 24-72 hours.     Electronically signed by Camacho Gunderson RN on 11/18/2024 at 3:02 PM

## 2024-11-18 NOTE — CARE COORDINATION
ADDENDUM:  11:59 AM  SW received a call from pt's wife. RYAN met w/pt's wife at bedside. Pt's wife is worried about pt missing his shots for his eyes. She stated she mentioned it the other day and hadn't heard anything yet therefore she wanted to make sure it was being arranged.  RYAN informed her they will make sure MD is aware. RYAN messaged MD.     Electronically signed by SHARLA Cadet, KIET, DESIREE on 11/18/2024 at 11:59 AM  11:48 AM    Patient is from a condo with his spouse. No current services. He does have a walker and cane that he uses when out of the house and at night to go to the bathroom.     Current discharge plan is SNF - [facility] [place] (accepted, spouse aware). Admission contact with [facility], [phone]. SW spoke with admissions they can accept a corepak as long as it wont be for a long duration.     Patient is currently off pressors. Pt is still NPO on a corepak, diet plan will need to be finalized prior to starting cert.     RYAN updated MD.    RYAN following.   Electronically signed by SHARLA Cadet, KIET, DESIREE on 11/18/2024 at 11:43 AM  [phone]

## 2024-11-18 NOTE — PROGRESS NOTES
ICU Progress Note    Admit Date: 11/7/2024  Day: 10  Vent Day: 0  IV Access:Peripheral  IV Fluids:None  Vasopressors:None                Antibiotics: None  Diet: Diet NPO  ADULT TUBE FEEDING VOLUME BASED; Nasogastric; Standard with Fiber; 100; Continuous; 1,560 (65 ml/hr); 24    CC: fall    Interval history:     S: Alert and oriented,  complaints of back pain.   O: T 36.7, on phenylephrine yesterday morning for MAP of 57. O2 97% on 2L/min. Abdomen distended,no tenderness.    BP continue to be soft , MAP   Labs: K 3.4, WBC 11.8, HgB 9.8      HPI:     Mr. Juan Mohamud is a 84 y.o. male with a medical hx significant for type II DM, degenerative lumbar disease, arrhythmia s/p AICD, diastolic CHF, and macular degeneration otherwise as listed in the MHx table below, who presented from home to the [facility] ED on 11/3/24 after a fall.Patient landed on his left side and hit the back of his head but did not lose consciousness at this time.      CT imaging revealed acute T11 vertebral fracture extending into the T10-T11 disc space as well as diffuse distention of small and large bowel compatible with ileus and bilateral pleural effusions. GI consulted for ileus, and patient underwent colonic decompression  MRI thoracic spine then demonstrated large epidural spinal hematoma with cord compression  and additional T6 subacute compression fracture along with bilateral pleural effusions.   Patient was transferred to [facility] on 11/7 for neurosurgical care.     He got thoracic T8-L1 posterior decompression fixation on 11/12.Transferred to ICU due to concern for hypotension post procedure he was receiving pressors to maintain BP and was stop on nov 15, got a venous doppler demonstrated acute DVT in gastrocnemius. CT PE 11/14: small bilateral segmental/subsegmental pulmonary emboli in upper lobes ,got IVC placed as he is not receiving any anticoagulation as he has epidural hematoma   For ileus he received Between 11/07 and  50-55%),  was hypotensive at [facility], was started on midodrine TID. Here, SBP 90s-low 100s with MAPs in the 70s. Per patient his baseline SBP is 100-115 when he measures at home.   -home torsemide, jardiance, losartan, Toprol and spirolactone held in setting of low BP  -Weaned off arleth vasopressor 11/15 1000. Restarted in HS  -PICC line placed 11/13  -midodrine 10mg-->15mg TID as bridge off pressors started 11/13  -requiring pressor support as of 11/17  -D/C dilaudid due to hypotensive effect  -Long acting oxy for pain     History of V Tach  Had episode of sustained V Tach 250 bpm 02/2024, started on amiodarone and ICD placed 03/2024. Had brief <15 second run of V. tach morning of 11/15, asymptomatic.   -telemetry  -home amiodarone 200mg daily  -restart metoprolol home dose once MAP>70 consistently, hold currently      Wide Complex Tachycardia  Possible Atrial Fibrillation   Described as new over this hospitalization, no prior EKG demonstrating A fib but noted on previous exams. 11/11 EKG appears to be wide QRS tachycardia, not suggestive of overt Afib.   -Cardiology consulted: continue amiodarone. Low suspicion for Afib. Hold metoprolol d/t hypotension  -telemetry        Pulm  Bilateral pleural effusions  Per chart review, imaging in July demonstrated moderate sized pleural effusions and septal thickening consistent with pulmonary edema. ProBNP 4,975 upon arrival to [facility] ED, was 2,190 in July. Current stable pleural effusions may be in setting of congestive heart failure. Repeat imaging CT thoracic 11/14 showed stable bilateral pleural effusions.   -consider diuresis once hypotension resolved, no current indication  -BNP elevation possibly 2/2 PE  -unasyn for possible aspiration pneumonia 5 day course completed 11/16        Chronic Conditions:     Type II DM: last A1C 5.1 (11/2024)   Congestive heart failure, diastolic (07/2024 EF 50-55%, normal wall motion).   Peripheral neuropathy: home

## 2024-11-18 NOTE — PROGRESS NOTES
[facility] Cardiology Progress Note    Primary Cardiologist: Dr Mendez     CC/HPI: HF, VT, ICD    S: Sitting up in the chair. C/o back pain and wants to get back into bed.     Tele: Sinus tachycardia     O:  Physical Exam:  BP 94/68   Pulse (!) 122   Temp 97.5 °F (36.4 °C)   Resp 25   Ht 1.956 m (6' 5\")   Wt 96.6 kg (212 lb 15.4 oz)   SpO2 95%   BMI 25.25 kg/m²    General (appearance):  No acute distress  Eyes: anicteric   Neck: soft, No JVD  Ears/Nose/Mouth/Thorat: No cyanosis  CV: tachy   Respiratory:  normal effort   GI: soft, non-tender, non-distended  Skin: Warm, dry. No rashes  Neuro/Psych: Alert and cooperative. Appropriate behavior  Ext:  No c/c. + LE edema    I.O's= -7 L     Weight  Admission: Weight - Scale: 89.5 kg (197 lb 5 oz)   Today: Weight - Scale: 96.6 kg (212 lb 15.4 oz)    CBC:   Recent Labs     24  0537 24  0643   WBC 13.6* 11.8*   HGB 10.6* 9.8*   HCT 32.3* 29.9*   MCV 90.7 91.2    295     BMP:   Recent Labs     24  1500 24  0643 24  0430    143 142   K 3.2* 3.5 3.4*    111* 112*   CO2 17* 20* 21   PHOS 3.9  --   --    BUN 36* 39* 35*   CREATININE 1.5* 1.5* 1.2     Estimated Creatinine Clearance: 59 mL/min (based on SCr of 1.2 mg/dL).  Mag:   Lab Results   Component Value Date/Time    MG 2.21 2024 04:30 AM     LIVER PROFILE:   No results for input(s): \"AST\", \"ALT\", \"LIPASE\", \"AMYLASE\", \"BILIDIR\", \"BILITOT\", \"ALKPHOS\" in the last 72 hours.    Invalid input(s): \"ALB\"    Pro-BNP:   Lab Results   Component Value Date/Time    PROBNP 7,647 2024 07:55 AM    PROBNP 4,975 2024 09:11 PM    PROBNP 2,190 2024 03:05 PM    PROBNP 2,599 2024 10:44 PM     High Sensitivity Troponin:   Lab Results   Component Value Date    TROPHS 60 (H) 2024       Imagin2024 CTPA  1. Small volume segmental/subsegmental pulmonary emboli involving the bilateral  upper lobes. No CT sequelae of right

## 2024-11-18 NOTE — PROGRESS NOTES
Removed pt's IV as they were not being utilized. PICC left in place and dressing changed. All dressings on arms changed.     Electronically signed by Camacho Gunderson RN on 11/18/2024 at 4:34 PM

## 2024-11-18 NOTE — PROGRESS NOTES
Accordion drains removed x2 per nursing communication. All sutures removed intact. Sites cleaned w/ CHG. Tegaderm and gauze dressings applied.     Electronically signed by Camacho Gunderson RN on 11/18/2024 at 10:24 AM

## 2024-11-18 NOTE — PLAN OF CARE
Problem: Chronic Conditions and Co-morbidities  Goal: Patient's chronic conditions and co-morbidity symptoms are monitored and maintained or improved  11/17/2024 2254 by Ruiz Copeland RN  Outcome: Progressing  Flowsheets (Taken 11/17/2024 0800 by Nannette Leslie RN)  Care Plan - Patient's Chronic Conditions and Co-Morbidity Symptoms are Monitored and Maintained or Improved:   Monitor and assess patient's chronic conditions and comorbid symptoms for stability, deterioration, or improvement   Collaborate with multidisciplinary team to address chronic and comorbid conditions and prevent exacerbation or deterioration   Update acute care plan with appropriate goals if chronic or comorbid symptoms are exacerbated and prevent overall improvement and discharge  11/17/2024 1725 by Nannette Leslie RN  Outcome: Progressing  Flowsheets (Taken 11/17/2024 0800)  Care Plan - Patient's Chronic Conditions and Co-Morbidity Symptoms are Monitored and Maintained or Improved:   Monitor and assess patient's chronic conditions and comorbid symptoms for stability, deterioration, or improvement   Collaborate with multidisciplinary team to address chronic and comorbid conditions and prevent exacerbation or deterioration   Update acute care plan with appropriate goals if chronic or comorbid symptoms are exacerbated and prevent overall improvement and discharge     Problem: Discharge Planning  Goal: Discharge to home or other facility with appropriate resources  11/17/2024 2254 by Ruiz Copeland RN  Outcome: Progressing  Flowsheets (Taken 11/17/2024 0800 by Nannette Leslie RN)  Discharge to home or other facility with appropriate resources:   Identify barriers to discharge with patient and caregiver   Identify discharge learning needs (meds, wound care, etc)  11/17/2024 1725 by Nannette Leslie RN  Outcome: Progressing  Flowsheets (Taken 11/17/2024 0800)  Discharge to home or other facility with appropriate resources:

## 2024-11-18 NOTE — PROGRESS NOTES
[facility] Medicine Progress Note  V 10.25      Date of Admission: 11/7/2024    Hospital Day: 12      Chief Admission Complaint:  fall, back pain     Subjective:  Patient seen and examined at bedside. A-line replaced yesterday. Weaned off pressors this morning. Patient seen sitting up in chair. Pain is tolerable at this time. Continues on tube feeds. Requesting water. Will be seen by SLP. Denies any other acute concerns.     Presenting Admission History:       Mr. Juan Mohamud is a 84 y.o. male with a medical hx significant for type II DM, degenerative lumbar disease, arrhythmia s/p AICD, diastolic CHF, and macular degeneration otherwise as listed in the MHx table below, who presented from home to the [facility] ED on 11/3/24 after a fall.     Per chart review, patient fell after feeling like his \"knees were giving out.\" No reported symptoms prior to this fall, patient landed on his left side and hit the back of his head but did not lose consciousness at this time.      Upon arrival to the [facility] ED, patient was alert, oriented and reported with GCS of 15 and NIH of 0. While in the ED patient had increasing generalized weakness and difficulty getting up from a sitting position. Decision was made to admit patient as they did not feel safe discharging him home given his inability to stand unassisted.      Fall was thought to be mechanical and secondary to generalized weakness due to poor nutrition. CT abd pelv performed due to complaints of abdominal pain, distension, and back pain upon arrival to the floor. Imaging revealed acute T11 vertebral fracture extending into the T10-T11 disc space as well as diffuse distention of small and large bowel compatible with ileus and bilateral pleural effusions.     GI consulted for ileus, and patient underwent colonic decompression     MRI thoracic spine then demonstrated large epidural spinal hematoma with cord compression  and additional T6 subacute compression

## 2024-11-18 NOTE — PROGRESS NOTES
Speech Language Pathology  Facility/Department:[facility] ICU  Dysphagia Therapy Note                                                       Name: Juan Mohamud  : 1940  MRN: [id number]    Patient Diagnosis(es):   Patient Active Problem List    Diagnosis Date Noted    Hypotension 2024    SVT (supraventricular tachycardia) ([facility]) 2024    Fx dorsal vertebra-closed ([facility]) 2024    Spinal cord injury at T1-T6 level ([facility]) 2024    Preoperative cardiovascular examination 2024    Chronic heart failure with preserved ejection fraction (HFpEF) ([facility]) 2024    Abdominal distension 2024    Epidural hematoma 2024    Fall at home, initial encounter 2024    Acute congestive heart failure ([facility]) 2024    PVC's (premature ventricular contractions) 2024    Sepsis ([facility]) 2024    ICD (implantable cardioverter-defibrillator) in place 2024    Acute hypoxemic respiratory failure 2024    BOSTON (acute kidney injury) ([facility]) 2024    Arrhythmia 2024    Primary hypertension 2024    Hyperlipidemia 2024    Colonic obstruction ([facility]) 2024    Trauma 2024    History of fall 2024    Type 2 diabetes mellitus ([facility]) 2024    Anemia 2024    Pneumonia 2024    Hyponatremia 2024    Colonic pseudoobstruction 2024    Wide-complex tachycardia 2024    Constipation 2024    Cataract extraction status of right eye 2018    DDD (degenerative disc disease), lumbar 2013       Past Medical History:   Diagnosis Date    Diabetes mellitus ([facility])     diet controlled    DVT (deep venous thrombosis) ([facility]) 2024      Acute deep venous thrombosis in the left gastrocnemius and soleal veins without proximal large vein extension..    Hyperlipidemia     Indigestion     Insomnia     Macular degeneration     MI, 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    Neuropathy     toes partially on both feet    Pacemaker      Past

## 2024-11-18 NOTE — PROGRESS NOTES
Speech Therapy   attempt    Patient was attempted to be seen by Speech Therapy Department this date for dysphagia therapy. Patient was unable to be seen due to working with [facility]OT. Speech Therapy will re-attempt to see patient if/when patient is appropriate and available and as time permits    Meena Delacruz MA, CCC-SLP [phone]  Speech-Language Pathologist  Pager  [phone]

## 2024-11-18 NOTE — PROGRESS NOTES
Physical Therapy  Facility/Department: [facility] ICU  Physical Therapy Treatment    Name: Juan Mohamud  : 1940  MRN: [id number]  Date of Service: 2024    Discharge Recommendations:  Subacute/Skilled Nursing Facility    DME - defer      Patient Diagnosis(es): The primary encounter diagnosis was SVT (supraventricular tachycardia) (HCC). A diagnosis of Hypotension, unspecified hypotension type was also pertinent to this visit.  Past Medical History:  has a past medical history of Diabetes mellitus (HCC), DVT (deep venous thrombosis) (HCC), Hyperlipidemia, Indigestion, Insomnia, Macular degeneration, MI, old, Neuropathy, and Pacemaker.  Past Surgical History:  has a past surgical history that includes knee surgery; Tonsillectomy; Colonoscopy; fracture surgery (Left); Mouth surgery; joint replacement (Right); Intracapsular cataract extraction (Right, 2018); Colonoscopy (N/A, 2024); Colonoscopy (N/A, 3/6/2024); Colonoscopy (N/A, 2024); Colonoscopy (N/A, 2024); lumbar fusion (N/A, 2024); and IR GUIDED IVC FILTER PLACEMENT (11/15/2024).    Assessment  Assessment: Pt post T8-L1 fusion, normally independent with gait and ADL>  Currently dependent for bed mobility and transfers.  Recommend continued IP therapies to maximize mobility, safety and independence  Activity Tolerance  Activity Tolerance: Patient limited by pain;Patient limited by endurance    Plan  Physical Therapy Plan  General Plan: 5-7 times per week  Current Treatment Recommendations: Strengthening, Balance training, ROM, Gait training, Functional mobility training, Safety education & training, Therapeutic activities, Transfer training, Patient/Caregiver education & training, Equipment evaluation, education, & procurement, Endurance training  Safety Devices  Type of Devices: Call light within reach, Left in chair, Chair alarm in place, Nurse notified (RN present)  Restraints  Restraints Initially in Place:  94/68  MAP (Calculated): 77  BP Location: Left upper arm  BP Method: Automatic  Patient Position: Semi fowlers           Bed mobility  Bed Mobility Comments: Pt assumed sitting position with bed adjusted in chair position.  Transfers  Bed to Chair: Dependent/Total ([place])        Balance  Comments: In supported sitting in bed (chair position) - feet down - pt was able to pull self to left using side rails to assume midline.  Did this 2 x.   Pt also performed AP x 10 with VC, performed FAQ x 5 Each with CG.   Performed AAROM x 5 - flexion R/L UE  Assisted pt with oral care, washing face.      AM-[facility] - Mobility    AM-PAC Basic Mobility - Inpatient   How much help is needed turning from your back to your side while in a flat bed without using bedrails?: Total  How much help is needed moving from lying on your back to sitting on the side of a flat bed without using bedrails?: Total  How much help is needed moving to and from a bed to a chair?: Total  How much help is needed standing up from a chair using your arms?: Total  How much help is needed walking in hospital room?: Total  How much help is needed climbing 3-5 steps with a railing?: Total  AM-PAC Inpatient Mobility Raw Score : 6  AM-PAC Inpatient T-Scale Score : 23.55  Mobility Inpatient CMS 0-100% Score: 100  Mobility Inpatient CMS G-Code Modifier : CN        Goals  Short Term Goals  Time Frame for Short Term Goals: DC  ongoing 11/15  Short Term Goal 1: Pt will complete bed mobility with minimal assist.  Short Term Goal 2: Pt will complete sit to stand transfer with minimal assist up to RW.  Short Term Goal 3: Pt will complete bed to chair transfer with minimal assist and RW.  Patient Goals   Patient Goals : To decrease pain       Education  Patient Education  Education Given To: Patient;Family  Education Provided: Role of Therapy  Education Provided Comments: importance of OOB, acctivity  Education Method: Verbal  Education Outcome: Continued education

## 2024-11-18 NOTE — PROGRESS NOTES
NEUROSURGERY PROGRESS NOTE    11/18/2024 8:41 AM                               Juan Mohamud                      LOS: 11 days   POD# 6 s/p Procedure(s) (LRB):  THORACIC 8-LUMBAR 1 POSTERIOR DECOMPRESSION FIXATION AND FUSION (N/A)    Subjective: No acute events overnight. Patient required placement of Corepak for nutrition yesterday. Neurologically he remains stable, but still remains globally deconditioned    Physical Exam:  Patient seen and examined    Vitals:    11/18/24 0818   BP: (!) 100/54   Pulse: (!) 121   Resp:    Temp:    SpO2:      GCS:  4 - Opens eyes on own  5 - Alert and oriented  6 - Follows simple motor commands  General: Well developed. Alert and cooperative in no acute distress.   HENT: atraumatic, neck supple  Eyes: Optic discs: Not tested  Pulmonary: unlabored respiratory effort  Cardiovascular:  Warm well perfused. No peripheral edema  Gastrointestinal: abdomen soft, NT, ND    Neurological:  Mental Status: Awake, alert, oriented x 4, speech clear and appropriate  Attention: Intact  Language: No aphasia or dysarthria noted  Sensation: Intact to all extremities to light touch  Coordination: Intact    Cranial Nerves:  II: Visual acuity not tested, denies new visual changes / diplopia  III, IV, VI: PERRL, 3 mm bilaterally, EOMI, no nystagmus noted  V: Facial sensation intact bilaterally to touch  VII: Face symmetric  VIII: Hearing intact bilaterally to spoken voice  IX: Palate movement equal bilaterally  XI: Shoulder shrug equal bilaterally  XII: Tongue midline    Musculoskeletal:   Gait: Not tested   Assist devices: None   Tone: Normal  Motor strength: Exam limited 2/2 generalized weakness and effort   Right  Left    Right  Left    Deltoid  4 4  Hip Flex  3 3   Biceps  4 4  Knee Extensors  3 3   Triceps  4 4  Knee Flexors  3 3   Wrist Ext  4 4  Ankle Dorsiflex.  4 4   Wrist Flex  4 4  Ankle Plantarflex.  4 4   Handgrip  4 4  Ext Olaf Longus  4 4   Thumb Ext  4 4         Incision: Old  contractions) 04/01/2024    Sepsis ([facility]) 03/27/2024    ICD (implantable cardioverter-defibrillator) in place 03/11/2024    Acute hypoxemic respiratory failure 03/05/2024    BOSTON (acute kidney injury) ([facility]) 03/05/2024    Arrhythmia 02/29/2024    Primary hypertension 02/29/2024    Hyperlipidemia 02/29/2024    Colonic obstruction ([facility]) 02/29/2024    Trauma 02/29/2024    History of fall 02/29/2024    Type 2 diabetes mellitus ([facility]) 02/29/2024    Anemia 02/29/2024    Pneumonia 02/29/2024    Hyponatremia 02/29/2024    Colonic pseudoobstruction 02/29/2024    VT (ventricular tachycardia) ([facility]) 02/29/2024    Constipation 02/24/2024    Cataract extraction status of right eye 12/17/2018    DDD (degenerative disc disease), lumbar 11/19/2013       Assessment:  Patient is a 84 y.o. male w/mechanical fall on 11/3/24 d/t generalized weakness. On initial workup T11 fx was found. Appears unstable. Patient s/p THORACIC 8-LUMBAR 1 POSTERIOR DECOMPRESSION FIXATION AND FUSION by Dr. Posey on 11/12/2024. HPatient was found to have lower extremity below knee DVTs yesterday, and upper lobe bilateral PE's. Kalpesh Bryson preferred avoidance of anticoagulation, especially in the setting of the patient's thoracic epidural hematoma, which he partly evacuated during fusion procedure. However, he should be considered higher risk for that to recur, so I favor holding on anticoagulation until there is a stronger indication. IVC filter was placed this morning. Patient remain neurologically intact, but continues to have global decompensation.    Plan:  Neurologic exams frequency: Q4H  For change in exam MUST contact neurosurgery team along with critical care or primary team  Closed unstable burst fracture of eleventh thoracic vertebra:   - Remove drains  - Tylenol & PRN Roxicodone for pain control  - PRN Robaxin for muscle spasms  - GI managing bowel regimen  - No brace needed  - Incisional Care: Open to air. Wash incision daily with warm soapy water

## 2024-11-18 NOTE — PROGRESS NOTES
Arterial line removed per order. Pressure held for 5 minutes. Hemostasis achieved.     Electronically signed by Camacho Gunderson RN on 11/18/2024 at 6:14 PM

## 2024-11-18 NOTE — PROGRESS NOTES
Notified by pt's wife, Lizzie, that the pt is due for a macular degeneration shot on Monday, 11/25. ICU senior and Dr. Alaniz notified via telephone.     Electronically signed by Camacho Gunderson RN on 11/18/2024 at 11:48 AM

## 2024-11-18 NOTE — PROGRESS NOTES
Occupational Therapy  Facility/Department: [facility] ICU  Occupational Therapy Treatment    Name: Juan Mohamud  : 1940  MRN: [id number]  Date of Service: 2024    Discharge Recommendations:  Subacute/Skilled Nursing Facility  OT Equipment Recommendations  Equipment Needed: No  Other: defer to next level of care       Patient Diagnosis(es): The primary encounter diagnosis was SVT (supraventricular tachycardia) (Formerly [facility]). A diagnosis of Hypotension, unspecified hypotension type was also pertinent to this visit.  Past Medical History:  has a past medical history of Diabetes mellitus (HCC), DVT (deep venous thrombosis) (HCC), Hyperlipidemia, Indigestion, Insomnia, Macular degeneration, MI, old, Neuropathy, and Pacemaker.  Past Surgical History:  has a past surgical history that includes knee surgery; Tonsillectomy; Colonoscopy; fracture surgery (Left); Mouth surgery; joint replacement (Right); Intracapsular cataract extraction (Right, 2018); Colonoscopy (N/A, 2024); Colonoscopy (N/A, 3/6/2024); Colonoscopy (N/A, 2024); Colonoscopy (N/A, 2024); lumbar fusion (N/A, 2024); and IR GUIDED IVC FILTER PLACEMENT (11/15/2024).    Treatment Diagnosis: Decreased ADL status and functional mobility      Assessment  Performance deficits / Impairments: Decreased functional mobility ;Decreased endurance;Decreased ADL status;Decreased posture;Decreased ROM;Decreased balance;Decreased strength;Decreased safe awareness  Assessment: Pt is POD5 s/p T8-L1 posterior decompression fixation and fusion. Pt completing bed to chair transfer via skylift this session. Pt engaging in BLE exercises and grooming this date. Recommend d/c to SNF to maximize independence and strength. Will cont to follow on acute OT POC.  Treatment Diagnosis: Decreased ADL status and functional mobility  REQUIRES OT FOLLOW-UP: Yes  Activity Tolerance  Activity Tolerance: Patient limited by pain  Activity Tolerance Comments: Pt

## 2024-11-19 ENCOUNTER — APPOINTMENT (OUTPATIENT)
Dept: GENERAL RADIOLOGY | Age: 84
DRG: 447 | End: 2024-11-19
Attending: INTERNAL MEDICINE
Payer: MEDICARE

## 2024-11-19 LAB
ANION GAP SERPL CALCULATED.3IONS-SCNC: 7 MMOL/L (ref 3–16)
BACTERIA URNS QL MICRO: ABNORMAL /HPF
BASOPHILS # BLD: 0.1 K/UL (ref 0–0.2)
BASOPHILS NFR BLD: 0.6 %
BILIRUB UR QL STRIP.AUTO: NEGATIVE
BUN SERPL-MCNC: 33 MG/DL (ref 7–20)
CALCIUM SERPL-MCNC: 7.8 MG/DL (ref 8.3–10.6)
CHLORIDE SERPL-SCNC: 111 MMOL/L (ref 99–110)
CLARITY UR: CLEAR
CO2 SERPL-SCNC: 24 MMOL/L (ref 21–32)
COARSE GRAN CASTS #/AREA URNS LPF: ABNORMAL /LPF (ref 0–2)
COLOR UR: YELLOW
CREAT SERPL-MCNC: 1.1 MG/DL (ref 0.8–1.3)
DEPRECATED RDW RBC AUTO: 17.3 % (ref 12.4–15.4)
EOSINOPHIL # BLD: 0.2 K/UL (ref 0–0.6)
EOSINOPHIL NFR BLD: 1.5 %
EPI CELLS #/AREA URNS HPF: ABNORMAL /HPF (ref 0–5)
GFR SERPLBLD CREATININE-BSD FMLA CKD-EPI: 67 ML/MIN/{1.73_M2}
GLUCOSE BLD-MCNC: 113 MG/DL (ref 70–99)
GLUCOSE BLD-MCNC: 114 MG/DL (ref 70–99)
GLUCOSE BLD-MCNC: 155 MG/DL (ref 70–99)
GLUCOSE BLD-MCNC: 82 MG/DL (ref 70–99)
GLUCOSE SERPL-MCNC: 151 MG/DL (ref 70–99)
GLUCOSE UR STRIP.AUTO-MCNC: NEGATIVE MG/DL
HCT VFR BLD AUTO: 31.3 % (ref 40.5–52.5)
HGB BLD-MCNC: 10.4 G/DL (ref 13.5–17.5)
HGB UR QL STRIP.AUTO: ABNORMAL
HYALINE CASTS #/AREA URNS LPF: ABNORMAL /LPF (ref 0–2)
KETONES UR STRIP.AUTO-MCNC: NEGATIVE MG/DL
LEUKOCYTE ESTERASE UR QL STRIP.AUTO: ABNORMAL
LYMPHOCYTES # BLD: 0.8 K/UL (ref 1–5.1)
LYMPHOCYTES NFR BLD: 7.5 %
MAGNESIUM SERPL-MCNC: 2.23 MG/DL (ref 1.8–2.4)
MCH RBC QN AUTO: 30.5 PG (ref 26–34)
MCHC RBC AUTO-ENTMCNC: 33.3 G/DL (ref 31–36)
MCV RBC AUTO: 91.6 FL (ref 80–100)
MONOCYTES # BLD: 1 K/UL (ref 0–1.3)
MONOCYTES NFR BLD: 9.4 %
NEUTROPHILS # BLD: 8.3 K/UL (ref 1.7–7.7)
NEUTROPHILS NFR BLD: 81 %
NITRITE UR QL STRIP.AUTO: NEGATIVE
PERFORMED ON: ABNORMAL
PERFORMED ON: NORMAL
PH UR STRIP.AUTO: 6 [PH] (ref 5–8)
PLATELET # BLD AUTO: 360 K/UL (ref 135–450)
PMV BLD AUTO: 7.4 FL (ref 5–10.5)
POTASSIUM SERPL-SCNC: 4 MMOL/L (ref 3.5–5.1)
PROT UR STRIP.AUTO-MCNC: NEGATIVE MG/DL
RBC # BLD AUTO: 3.42 M/UL (ref 4.2–5.9)
RBC #/AREA URNS HPF: ABNORMAL /HPF (ref 0–4)
SODIUM SERPL-SCNC: 142 MMOL/L (ref 136–145)
SP GR UR STRIP.AUTO: 1.01 (ref 1–1.03)
UA DIPSTICK W REFLEX MICRO PNL UR: YES
URN SPEC COLLECT METH UR: ABNORMAL
UROBILINOGEN UR STRIP-ACNC: 0.2 E.U./DL
WBC # BLD AUTO: 10.3 K/UL (ref 4–11)
WBC #/AREA URNS HPF: ABNORMAL /HPF (ref 0–5)

## 2024-11-19 PROCEDURE — 6370000000 HC RX 637 (ALT 250 FOR IP)

## 2024-11-19 PROCEDURE — 97110 THERAPEUTIC EXERCISES: CPT

## 2024-11-19 PROCEDURE — 74018 RADEX ABDOMEN 1 VIEW: CPT

## 2024-11-19 PROCEDURE — 6370000000 HC RX 637 (ALT 250 FOR IP): Performed by: NURSE PRACTITIONER

## 2024-11-19 PROCEDURE — 2700000000 HC OXYGEN THERAPY PER DAY

## 2024-11-19 PROCEDURE — 6360000002 HC RX W HCPCS

## 2024-11-19 PROCEDURE — 2580000003 HC RX 258

## 2024-11-19 PROCEDURE — 97530 THERAPEUTIC ACTIVITIES: CPT

## 2024-11-19 PROCEDURE — 85025 COMPLETE CBC W/AUTO DIFF WBC: CPT

## 2024-11-19 PROCEDURE — 6370000000 HC RX 637 (ALT 250 FOR IP): Performed by: STUDENT IN AN ORGANIZED HEALTH CARE EDUCATION/TRAINING PROGRAM

## 2024-11-19 PROCEDURE — 6360000002 HC RX W HCPCS: Performed by: NURSE PRACTITIONER

## 2024-11-19 PROCEDURE — 81001 URINALYSIS AUTO W/SCOPE: CPT

## 2024-11-19 PROCEDURE — 83735 ASSAY OF MAGNESIUM: CPT

## 2024-11-19 PROCEDURE — 51798 US URINE CAPACITY MEASURE: CPT

## 2024-11-19 PROCEDURE — 2000000000 HC ICU R&B

## 2024-11-19 PROCEDURE — 71045 X-RAY EXAM CHEST 1 VIEW: CPT

## 2024-11-19 PROCEDURE — 6360000002 HC RX W HCPCS: Performed by: STUDENT IN AN ORGANIZED HEALTH CARE EDUCATION/TRAINING PROGRAM

## 2024-11-19 PROCEDURE — 99233 SBSQ HOSP IP/OBS HIGH 50: CPT | Performed by: INTERNAL MEDICINE

## 2024-11-19 PROCEDURE — 80048 BASIC METABOLIC PNL TOTAL CA: CPT

## 2024-11-19 PROCEDURE — 99232 SBSQ HOSP IP/OBS MODERATE 35: CPT | Performed by: NURSE PRACTITIONER

## 2024-11-19 PROCEDURE — 94761 N-INVAS EAR/PLS OXIMETRY MLT: CPT

## 2024-11-19 RX ORDER — ACETAMINOPHEN 325 MG/1
650 TABLET ORAL EVERY 4 HOURS PRN
Status: DISCONTINUED | OUTPATIENT
Start: 2024-11-19 | End: 2024-11-19

## 2024-11-19 RX ORDER — ACETAMINOPHEN 650 MG/1
650 SUPPOSITORY RECTAL EVERY 4 HOURS PRN
Status: DISCONTINUED | OUTPATIENT
Start: 2024-11-19 | End: 2024-12-11 | Stop reason: HOSPADM

## 2024-11-19 RX ORDER — ACETAMINOPHEN 325 MG/1
650 TABLET ORAL EVERY 4 HOURS PRN
Status: DISCONTINUED | OUTPATIENT
Start: 2024-11-19 | End: 2024-12-11 | Stop reason: HOSPADM

## 2024-11-19 RX ORDER — FUROSEMIDE 10 MG/ML
20 INJECTION INTRAMUSCULAR; INTRAVENOUS DAILY
Status: DISCONTINUED | OUTPATIENT
Start: 2024-11-19 | End: 2024-11-21

## 2024-11-19 RX ORDER — MORPHINE SULFATE 2 MG/ML
2 INJECTION, SOLUTION INTRAMUSCULAR; INTRAVENOUS
Status: DISCONTINUED | OUTPATIENT
Start: 2024-11-19 | End: 2024-11-24

## 2024-11-19 RX ORDER — MORPHINE SULFATE 4 MG/ML
4 INJECTION INTRAVENOUS
Status: DISCONTINUED | OUTPATIENT
Start: 2024-11-19 | End: 2024-11-24

## 2024-11-19 RX ORDER — METHOCARBAMOL 100 MG/ML
500 INJECTION, SOLUTION INTRAMUSCULAR; INTRAVENOUS EVERY 6 HOURS
Status: COMPLETED | OUTPATIENT
Start: 2024-11-19 | End: 2024-11-22

## 2024-11-19 RX ORDER — METOCLOPRAMIDE HYDROCHLORIDE 5 MG/ML
10 INJECTION INTRAMUSCULAR; INTRAVENOUS EVERY 6 HOURS
Status: DISCONTINUED | OUTPATIENT
Start: 2024-11-19 | End: 2024-11-20

## 2024-11-19 RX ADMIN — Medication 5 MG: at 20:54

## 2024-11-19 RX ADMIN — HEPARIN SODIUM 5000 UNITS: 5000 INJECTION INTRAVENOUS; SUBCUTANEOUS at 12:47

## 2024-11-19 RX ADMIN — PHENYLEPHRINE HYDROCHLORIDE 50 MCG/MIN: 50 INJECTION INTRAVENOUS at 12:16

## 2024-11-19 RX ADMIN — FUROSEMIDE 20 MG: 10 INJECTION, SOLUTION INTRAMUSCULAR; INTRAVENOUS at 10:27

## 2024-11-19 RX ADMIN — LINACLOTIDE 290 MCG: 145 CAPSULE, GELATIN COATED ORAL at 05:35

## 2024-11-19 RX ADMIN — PANTOPRAZOLE SODIUM 40 MG: 40 INJECTION, POWDER, FOR SOLUTION INTRAVENOUS at 07:44

## 2024-11-19 RX ADMIN — METHOCARBAMOL 500 MG: 100 INJECTION INTRAMUSCULAR; INTRAVENOUS at 11:33

## 2024-11-19 RX ADMIN — GABAPENTIN 600 MG: 300 CAPSULE ORAL at 12:57

## 2024-11-19 RX ADMIN — HEPARIN SODIUM 5000 UNITS: 5000 INJECTION INTRAVENOUS; SUBCUTANEOUS at 05:08

## 2024-11-19 RX ADMIN — OXYCODONE 5 MG: 5 TABLET ORAL at 17:24

## 2024-11-19 RX ADMIN — BUPROPION HYDROCHLORIDE 150 MG: 100 TABLET, FILM COATED ORAL at 20:54

## 2024-11-19 RX ADMIN — Medication: at 20:54

## 2024-11-19 RX ADMIN — POLYETHYLENE GLYCOL 3350 17 G: 17 POWDER, FOR SOLUTION ORAL at 20:53

## 2024-11-19 RX ADMIN — Medication: at 10:00

## 2024-11-19 RX ADMIN — MIDODRINE HYDROCHLORIDE 15 MG: 5 TABLET ORAL at 16:49

## 2024-11-19 RX ADMIN — ACETAMINOPHEN 650 MG: 325 TABLET ORAL at 16:48

## 2024-11-19 RX ADMIN — MIDODRINE HYDROCHLORIDE 15 MG: 5 TABLET ORAL at 11:33

## 2024-11-19 RX ADMIN — METHOCARBAMOL 500 MG: 100 INJECTION INTRAMUSCULAR; INTRAVENOUS at 16:56

## 2024-11-19 RX ADMIN — OXYCODONE 5 MG: 5 TABLET ORAL at 12:47

## 2024-11-19 RX ADMIN — ATORVASTATIN CALCIUM 10 MG: 10 TABLET, FILM COATED ORAL at 20:53

## 2024-11-19 RX ADMIN — METOCLOPRAMIDE 10 MG: 5 INJECTION, SOLUTION INTRAMUSCULAR; INTRAVENOUS at 10:27

## 2024-11-19 RX ADMIN — SENNOSIDES AND DOCUSATE SODIUM 2 TABLET: 50; 8.6 TABLET ORAL at 20:54

## 2024-11-19 RX ADMIN — SODIUM CHLORIDE, PRESERVATIVE FREE 10 ML: 5 INJECTION INTRAVENOUS at 20:54

## 2024-11-19 RX ADMIN — HEPARIN SODIUM 5000 UNITS: 5000 INJECTION INTRAVENOUS; SUBCUTANEOUS at 20:54

## 2024-11-19 RX ADMIN — OXYCODONE 5 MG: 5 TABLET ORAL at 04:16

## 2024-11-19 RX ADMIN — GABAPENTIN 600 MG: 300 CAPSULE ORAL at 20:53

## 2024-11-19 ASSESSMENT — PAIN SCALES - GENERAL
PAINLEVEL_OUTOF10: 10
PAINLEVEL_OUTOF10: 8
PAINLEVEL_OUTOF10: 0
PAINLEVEL_OUTOF10: 4
PAINLEVEL_OUTOF10: 0
PAINLEVEL_OUTOF10: 5
PAINLEVEL_OUTOF10: 0
PAINLEVEL_OUTOF10: 7
PAINLEVEL_OUTOF10: 7
PAINLEVEL_OUTOF10: 0
PAINLEVEL_OUTOF10: 7
PAINLEVEL_OUTOF10: 0
PAINLEVEL_OUTOF10: 8
PAINLEVEL_OUTOF10: 5
PAINLEVEL_OUTOF10: 3
PAINLEVEL_OUTOF10: 0
PAINLEVEL_OUTOF10: 5
PAINLEVEL_OUTOF10: 7

## 2024-11-19 ASSESSMENT — PAIN DESCRIPTION - PAIN TYPE: TYPE: SURGICAL PAIN

## 2024-11-19 ASSESSMENT — PAIN DESCRIPTION - LOCATION
LOCATION: HEAD
LOCATION: BACK

## 2024-11-19 ASSESSMENT — PAIN SCALES - WONG BAKER
WONGBAKER_NUMERICALRESPONSE: HURTS A LITTLE BIT
WONGBAKER_NUMERICALRESPONSE: HURTS A LITTLE BIT

## 2024-11-19 ASSESSMENT — PAIN DESCRIPTION - ORIENTATION
ORIENTATION: MID
ORIENTATION: MID
ORIENTATION: MID;LOWER;RIGHT;LEFT
ORIENTATION: LEFT;MID
ORIENTATION: MID;UPPER;RIGHT;LEFT
ORIENTATION: LEFT

## 2024-11-19 ASSESSMENT — PAIN DESCRIPTION - DESCRIPTORS
DESCRIPTORS: ACHING;SORE
DESCRIPTORS: ACHING;SORE
DESCRIPTORS: ACHING

## 2024-11-19 ASSESSMENT — PAIN DESCRIPTION - FREQUENCY: FREQUENCY: CONTINUOUS

## 2024-11-19 ASSESSMENT — PAIN DESCRIPTION - ONSET: ONSET: PROGRESSIVE

## 2024-11-19 NOTE — PROGRESS NOTES
Speech-Language Pathology    Attempted to see patient for dysphagia therapy follow-up. Reviewed chart, spoke with RN. Will hold as pt is strict NPO d/t GI concern. Will continue to follow.    Lalita Ramirez, SLP, [id number]  Ph. # [phone]

## 2024-11-19 NOTE — PROGRESS NOTES
Pt complaining of pain w/ urination. See new order for urinalysis. All external cath equipment exchanged.     Per ICU team ok for PO meds through NG.     Electronically signed by Camacho Gunderson RN on 11/19/2024 at 11:49 AM

## 2024-11-19 NOTE — PLAN OF CARE
Problem: Chronic Conditions and Co-morbidities  Goal: Patient's chronic conditions and co-morbidity symptoms are monitored and maintained or improved  11/18/2024 2153 by Ruiz Copeland RN  Outcome: Progressing  Flowsheets (Taken 11/17/2024 0800 by Nannette Leslie, RN)  Care Plan - Patient's Chronic Conditions and Co-Morbidity Symptoms are Monitored and Maintained or Improved:   Monitor and assess patient's chronic conditions and comorbid symptoms for stability, deterioration, or improvement   Collaborate with multidisciplinary team to address chronic and comorbid conditions and prevent exacerbation or deterioration   Update acute care plan with appropriate goals if chronic or comorbid symptoms are exacerbated and prevent overall improvement and discharge  11/18/2024 1814 by Camacho Gunderson RN  Outcome: Progressing     Problem: Discharge Planning  Goal: Discharge to home or other facility with appropriate resources  11/18/2024 2153 by Ruiz Copeland RN  Outcome: Progressing  Flowsheets (Taken 11/17/2024 0800 by Nannette Leslie RN)  Discharge to home or other facility with appropriate resources:   Identify barriers to discharge with patient and caregiver   Identify discharge learning needs (meds, wound care, etc)  11/18/2024 1814 by Camacho Gunderson RN  Outcome: Progressing     Problem: Safety - Adult  Goal: Free from fall injury  11/18/2024 2153 by Ruiz Copeland RN  Outcome: Progressing  Flowsheets (Taken 11/18/2024 1800 by Camacho Gunderson RN)  Free From Fall Injury: Instruct family/caregiver on patient safety  11/18/2024 1814 by Camacho Gunderson RN  Outcome: Progressing  Flowsheets (Taken 11/18/2024 1800)  Free From Fall Injury: Instruct family/caregiver on patient safety     Problem: Skin/Tissue Integrity  Goal: Absence of new skin breakdown  Description: 1.  Monitor for areas of redness and/or skin breakdown  2.  Assess vascular access sites hourly  3.  Every 4-6 hours minimum:  Change oxygen

## 2024-11-19 NOTE — PROGRESS NOTES
[facility] Cardiology Progress Note    Primary Cardiologist: Dr Mendez     CC/HPI: HF, VT, ICD    S: He notes palpitations. Breathing ok. Concerned about his worsening eye sight and need for his eye injection for macular degeneration.     Tele: Sinus tachycardia     O:  Physical Exam:  BP (!) 89/68   Pulse (!) 121   Temp 97.4 °F (36.3 °C) (Tympanic)   Resp 21   Ht 1.956 m (6' 5\")   Wt 100 kg (220 lb 7.4 oz)   SpO2 91%   BMI 26.14 kg/m²    General (appearance):  No acute distress  Eyes: anicteric   Neck: soft, No JVD  Ears/Nose/Mouth/Thorat: No cyanosis  CV: tachy   Respiratory:  normal effort   GI: soft, non-tender, non-distended  Skin: Warm, dry. No rashes  Neuro/Psych: Alert and cooperative. Appropriate behavior  Ext:  No c/c. + LE edema    I.O's= +8.7 L     Weight  Admission: Weight - Scale: 89.5 kg (197 lb 5 oz)   Today: Weight - Scale: 100 kg (220 lb 7.4 oz)    CBC:   Recent Labs     24  0643 24  0400   WBC 11.8* 10.3   HGB 9.8* 10.4*   HCT 29.9* 31.3*   MCV 91.2 91.6    360     BMP:   Recent Labs     24  1500 24  0643 24  0430 24  0400    143 142 142   K 3.2* 3.5 3.4* 4.0    111* 112* 111*   CO2 17* 20* 21 24   PHOS 3.9  --  3.3  --    BUN 36* 39* 35* 33*   CREATININE 1.5* 1.5* 1.2 1.1     Estimated Creatinine Clearance: 64 mL/min (based on SCr of 1.1 mg/dL).  Mag:   Lab Results   Component Value Date/Time    MG 2.23 2024 04:00 AM     LIVER PROFILE:   No results for input(s): \"AST\", \"ALT\", \"LIPASE\", \"AMYLASE\", \"BILIDIR\", \"BILITOT\", \"ALKPHOS\" in the last 72 hours.    Invalid input(s): \"ALB\"    Pro-BNP:   Lab Results   Component Value Date/Time    PROBNP 7,647 2024 07:55 AM    PROBNP 4,975 2024 09:11 PM    PROBNP 2,190 2024 03:05 PM    PROBNP 2,599 2024 10:44 PM     High Sensitivity Troponin:   Lab Results   Component Value Date    TROPHS 60 (H) 2024       Imagin2024 CTPA  1.

## 2024-11-19 NOTE — PROGRESS NOTES
Comprehensive Nutrition Assessment  Vasopressor dosing equivalent score = 11.5. For VDE >12 trophic TF only (10 mL/hr).    RECOMMENDATIONS:  Modify to fiber free formula when able to restart TF  Osmolite 1.5 @ 65 mL/hr  +1 Prosource daily  Flushes: 100 mL q4h  Nutrition Education: Education/Counseling not indicated     NUTRITION ASSESSMENT:   Nutritional summary & status: Pt continues on TF held overnight for increasing distention overnight. Once able to resume TF modifying to fiber free formula. Increased pressor requirements, VDE <12 however if pressor requirements increase at all only trophic TF will be appropriate.   Admission // PMH: Vertebral fracture//colonic psuedoobstruction, T2DM, HLD, CHF    MALNUTRITION ASSESSMENT  Context of Malnutrition: Acute Illness   Malnutrition Status: Mild malnutrition  Findings of the 6 clinical characteristics of malnutrition (Minimum of 2 out of 6 clinical characteristics is required to make the diagnosis of moderate or severe Protein Calorie Malnutrition based on AND/ASPEN Guidelines):  Energy Intake:  50% or less of estimated energy requirements for 5 or more days  Weight Loss:  Mild weight loss (5% in 3 month period)     Body Fat Loss:  Unable to assess     Muscle Mass Loss:  Unable to assess    Fluid Accumulation:  No fluid accumulation      NUTRITION DIAGNOSIS   Inadequate oral intake related to swallowing difficulty as evidenced by NPO or clear liquid status due to medical condition, nutrition support - enteral nutrition    Nutrition Monitoring and Evaluation:   Food/Nutrient Intake Outcomes:  Enteral Nutrition Intake/Tolerance  Physical Signs/Symptoms Outcomes:  Biochemical Data, GI Status, Nausea or Vomiting, Nutrition Focused Physical Findings, Skin, Weight     OBJECTIVE DATA: Significant to nutrition assessment  Nutrition Related Findings: . +BM 11/18.  Wounds: Stage I  Nutrition Goals: Initiate nutrition support     CURRENT NUTRITION THERAPIES  Current Tube

## 2024-11-19 NOTE — PROGRESS NOTES
Pt had a large watery BM. ICU team notified. Reglan held.    Electronically signed by Camacho Gunderson RN on 11/19/2024 at 5:34 PM

## 2024-11-19 NOTE — PROGRESS NOTES
[facility] Medicine Progress Note  V 10.25      Date of Admission: 11/7/2024    Hospital Day: 13      Chief Admission Complaint:  fall, back pain     Subjective:  Patient seen and examined at bedside in the presence of his wife.  Is demanding a glass of water.  Feels frustrated about not being able to drink.  Overnight patient pulled out his NG tube which was replaced.  Developed abdominal distention overnight.  Due to hypotension patient was placed back on phenylephrine.  Patient also complains of some burning micturition.    Presenting Admission History:       Per chart review,  \"Mr. Juan Mohamud is a 84 y.o. male with a medical hx significant for type II DM, degenerative lumbar disease, arrhythmia s/p AICD, diastolic CHF, and macular degeneration, who presented from home to the [facility] ED on 11/3/24 after a fall.     Reportedly, patient fell after feeling like his \"knees were giving out.\" No reported symptoms prior to this fall, patient landed on his left side and hit the back of his head but did not lose consciousness at this time.      Upon arrival to the [facility] ED, patient was alert, oriented and reported with GCS of 15 and NIH of 0. While in the ED patient had increasing generalized weakness and difficulty getting up from a sitting position. Decision was made to admit patient as they did not feel safe discharging him home given his inability to stand unassisted.      Fall was thought to be mechanical and secondary to generalized weakness due to poor nutrition. CT abd pelv performed due to complaints of abdominal pain, distension, and back pain upon arrival to the floor. Imaging revealed acute T11 vertebral fracture extending into the T10-T11 disc space as well as diffuse distention of small and large bowel compatible with ileus and bilateral pleural effusions.     GI consulted for ileus, and patient underwent colonic decompression     MRI thoracic spine then demonstrated large epidural spinal

## 2024-11-19 NOTE — PLAN OF CARE
Problem: Chronic Conditions and Co-morbidities  Goal: Patient's chronic conditions and co-morbidity symptoms are monitored and maintained or improved  11/19/2024 0911 by Camacho Gunderson RN  Outcome: Progressing  11/19/2024 0910 by Camacho Gunderson RN  Outcome: Progressing  11/18/2024 2153 by Ruiz Copeland RN  Outcome: Progressing  Flowsheets (Taken 11/17/2024 0800 by Nannette Leslie RN)  Care Plan - Patient's Chronic Conditions and Co-Morbidity Symptoms are Monitored and Maintained or Improved:   Monitor and assess patient's chronic conditions and comorbid symptoms for stability, deterioration, or improvement   Collaborate with multidisciplinary team to address chronic and comorbid conditions and prevent exacerbation or deterioration   Update acute care plan with appropriate goals if chronic or comorbid symptoms are exacerbated and prevent overall improvement and discharge     Problem: Discharge Planning  Goal: Discharge to home or other facility with appropriate resources  11/19/2024 0911 by Camacho Gunderson RN  Outcome: Progressing  11/19/2024 0910 by Camacho Gunderson RN  Outcome: Progressing  11/18/2024 2153 by Ruiz Copeland RN  Outcome: Progressing  Flowsheets (Taken 11/17/2024 0800 by Nannette Leslie RN)  Discharge to home or other facility with appropriate resources:   Identify barriers to discharge with patient and caregiver   Identify discharge learning needs (meds, wound care, etc)     Problem: Safety - Adult  Goal: Free from fall injury  11/19/2024 0911 by Camacho Gunderson RN  Outcome: Progressing  11/19/2024 0910 by Camacho Gunderson RN  Outcome: Progressing  11/18/2024 2153 by Ruiz Copeland RN  Outcome: Progressing  Flowsheets (Taken 11/18/2024 1800 by Camacho Gunderson RN)  Free From Fall Injury: Instruct family/caregiver on patient safety     Problem: Skin/Tissue Integrity  Goal: Absence of new skin breakdown  Description: 1.  Monitor for areas of redness and/or skin breakdown  2.  Assess

## 2024-11-19 NOTE — PROGRESS NOTES
Per nursing communication hold on all oral meds at this time. See MAR all PO meds held.     Electronically signed by Camacho Gunderson RN on 11/19/2024 at 9:09 AM

## 2024-11-19 NOTE — PROGRESS NOTES
Occupational Therapy  Facility/Department: [facility] ICU  Occupational Therapy Treatment    Name: Juan Mohamud  : 1940  MRN: [id number]  Date of Service: 2024    Discharge Recommendations:  Subacute/Skilled Nursing Facility  OT Equipment Recommendations  Equipment Needed: No  Other: defer to next level of care     Patient Diagnosis(es): The primary encounter diagnosis was SVT (supraventricular tachycardia) (Formerly [facility]). A diagnosis of Hypotension, unspecified hypotension type was also pertinent to this visit.  Past Medical History:  has a past medical history of Diabetes mellitus (HCC), DVT (deep venous thrombosis) (HCC), Hyperlipidemia, Indigestion, Insomnia, Macular degeneration, MI, old, Neuropathy, and Pacemaker.  Past Surgical History:  has a past surgical history that includes knee surgery; Tonsillectomy; Colonoscopy; fracture surgery (Left); Mouth surgery; joint replacement (Right); Intracapsular cataract extraction (Right, 2018); Colonoscopy (N/A, 2024); Colonoscopy (N/A, 3/6/2024); Colonoscopy (N/A, 2024); Colonoscopy (N/A, 2024); lumbar fusion (N/A, 2024); and IR GUIDED IVC FILTER PLACEMENT (11/15/2024).    Treatment Diagnosis: Decreased ADL status and functional mobility    Assessment  Performance deficits / Impairments: Decreased functional mobility ;Decreased endurance;Decreased ADL status;Decreased posture;Decreased ROM;Decreased balance;Decreased strength;Decreased safe awareness  Assessment: Pt completing bed mobility w MaxAx2. Once sitting EOB pt requiring MaxA progressing to ModA for posterior lean while hands on sal stedy. PT completing 2x full sit<>stand transfers w MaxAx2. Recommend pt d/c to SNF to maximize independence. Will cont to follow on acute OT POC.  Treatment Diagnosis: Decreased ADL status and functional mobility  REQUIRES OT FOLLOW-UP: Yes  Activity Tolerance  Activity Tolerance: Patient Tolerated treatment well     Plan  Occupational Therapy  assistance  Stand to sit: Maximum assistance;2 Person assistance  Transfer Comments: 2x partial, 2x full sit<>stands using sal greenberg    Education Given To: Patient  Education Provided: Role of Therapy;Plan of Care;Precautions;ADL Adaptive Strategies;Transfer Training;Mobility Training  Education Provided Comments: activity promotion  Education Method: Demonstration;Verbal  Barriers to Learning: None  Education Outcome: Verbalized understanding    AM-PAC - ADL  AM-PAC Daily Activity - Inpatient   How much help is needed for putting on and taking off regular lower body clothing?: Total  How much help is needed for bathing (which includes washing, rinsing, drying)?: Total  How much help is needed for toileting (which includes using toilet, bedpan, or urinal)?: Total  How much help is needed for putting on and taking off regular upper body clothing?: A Lot  How much help is needed for taking care of personal grooming?: A Lot  How much help for eating meals?: A Lot  AM-[facility] Inpatient Daily Activity Raw Score: 9  AM-PAC Inpatient ADL T-Scale Score : 25.33  ADL Inpatient CMS 0-100% Score: 79.59  ADL Inpatient CMS G-Code Modifier : CL    Goals  Short Term Goals  Time Frame for Short Term Goals: By D/C  Short Term Goal 1: Pt will complete sit to stand transfer w/ max A-ongiong  Short Term Goal 2: Pt will complete supine to sit w/ max A-ongoing  Short Term Goal 3: Pt will maintain seated balance at EOB w/ SBA for 10 mins to complete grooming task-ongoing  Patient Goals   Patient goals : get home      Therapy Time   Individual Concurrent Group Co-treatment   Time In 1028         Time Out 1106         Minutes 38         Timed Code Treatment Minutes: 38 Minutes       Natacha Taylor S/OT    Therapist was present, directed the patients care, made skilled judgement and was responsible for assessment and treatment of the patient.

## 2024-11-19 NOTE — PROGRESS NOTES
Patient had his wife call this nurse and Patient wife was updated on Patient condition and all her questions and concerns were addressed. Patient wife said she will come up to hospital to visit with her  today.

## 2024-11-19 NOTE — PROGRESS NOTES
At 6 am this morning Patient reports he was having some trouble breathing and his ABD was more distended. The ICU team came to the Bed side and a KUB and CXR was obtained. Per ICU resident the nurse is to stop Tube feed at this time once imaging was reviewed at bedside. Patient did not have a BM this shift and he reports he is not having Flatulence. Pt has a HX of ileus requiring decompression.

## 2024-11-19 NOTE — PLAN OF CARE
Brief note  POD# 6 s/p Procedure(s) (LRB):  THORACIC 8-LUMBAR 1 POSTERIOR DECOMPRESSION FIXATION AND FUSION (N/A)    Off pressors at time of exam  Reports having 8/10 back pain. .     PHYSICAL EXAM:  Vitals:    11/18/24 1800 11/18/24 1830 11/18/24 1900 11/18/24 2000   BP: (!) 86/64 90/71 101/71 90/65   Pulse: (!) 119 (!) 118 (!) 119 (!) 118   Resp: 16 12 20 19   Temp:    97.1 °F (36.2 °C)   TempSrc:    Tympanic   SpO2:   97%    Weight:       Height:             General: Drowsy,  no distress, well-nourished  Neurologic  Mental status:   orientation to person, place, time, situation   Attention intact as able to attend well to the exam     Language fluent in conversation   Comprehension intact; follows simple commands    Cranial nerves:   CN2: Visual fields full w/o extinction on confrontational testing  CN 3,4,6: Pupils 4 mm > 3 mm equal and reactive to light, extraocular muscles intact  CN5: Facial sensation symmetric   CN7: Face symmetric  CN8: Hearing symmetric to spoken voice  CN9: Palate elevated symmetrically  CN11: Traps full strength on shoulder shrug  CN12: Tongue midline with protrusion    Motor Exam:  BLE limited secondary to pain.    R  L    Deltoid 4  4   Biceps 4 4   Triceps 4 4   Wrist extension  4 4   Interossei 4 4      R  L    Hip flexion  3-  3   Hip extension  3- 3   Knee flexion  3 3   Knee extension  3 3   Ankle dorsiflexion  4 4   Ankle plantar flexion  4 4       Sensory: BLE numbness on the outside of his legs but reports full sensation to light touch on the inside. States this has been present for the past \"8-9 days.\" Full sensation to light touch in BUE's.   Cerebellar/coordination: finger nose finger normal without ataxia  Tone: normal in all 4 extremities  Gait: Deferred for safety    OTHER SYSTEMS:  Cardiovascular: Warm, appears well perfused   Respiratory: Easy, non-labored respiratory pattern   Abdominal: Abdomen is distended. Non tender to palpation.   Extremities: Upper and lower

## 2024-11-19 NOTE — PROGRESS NOTES
Physical Therapy  Facility/Department: [facility] ICU  Physical Therapy daily Treatment      Name: Juan Mohamud  : 1940  MRN: [id number]  Date of Service: 2024    Discharge Recommendations:  Subacute/Skilled Nursing Facility   PT Equipment Recommendations  Other: defer      Patient Diagnosis(es): The primary encounter diagnosis was SVT (supraventricular tachycardia) (HCC). A diagnosis of Hypotension, unspecified hypotension type was also pertinent to this visit.  Past Medical History:  has a past medical history of Diabetes mellitus (HCC), DVT (deep venous thrombosis) (HCC), Hyperlipidemia, Indigestion, Insomnia, Macular degeneration, MI, old, Neuropathy, and Pacemaker.  Past Surgical History:  has a past surgical history that includes knee surgery; Tonsillectomy; Colonoscopy; fracture surgery (Left); Mouth surgery; joint replacement (Right); Intracapsular cataract extraction (Right, 2018); Colonoscopy (N/A, 2024); Colonoscopy (N/A, 3/6/2024); Colonoscopy (N/A, 2024); Colonoscopy (N/A, 2024); lumbar fusion (N/A, 2024); and IR GUIDED IVC FILTER PLACEMENT (11/15/2024).    Assessment  Assessment: Pt post T8-L1 fusion, normally independent with gait and ADL, now requiring max A x2 for bed mobility and to stand from EOB to sarastedy from elevated height. Pt demo's strong posterior lean and poor anterior translation with attempts to stand. Pt well below his baseline and currently nonambulatory. Will benefit from IP PT at DC.  Activity Tolerance  Activity Tolerance: Patient limited by endurance  Activity Tolerance Comments: spO2 88-95% on 1L O2 throughout session. BP WFL    Plan  Physical Therapy Plan  General Plan: 5-7 times per week  Current Treatment Recommendations: Strengthening, Balance training, ROM, Gait training, Functional mobility training, Safety education & training, Therapeutic activities, Transfer training, Patient/Caregiver education & training, Equipment

## 2024-11-19 NOTE — PROGRESS NOTES
Pt had a small watery BM. Endorses passing gas all day during this shift. Pt bathed (see flowsheet).     Electronically signed by Camacho Gunderson RN on 11/19/2024 at 3:23 PM

## 2024-11-19 NOTE — PROGRESS NOTES
ICU Progress Note    Admit Date: 11/7/2024  Day: 10  Vent Day: 0  IV Access:Peripheral  IV Fluids:None  Vasopressors:None                Antibiotics: None  Diet: Diet NPO  ADULT TUBE FEEDING VOLUME BASED; Nasogastric; Standard with Fiber; 100; Continuous; 1,560 (65 ml/hr); 24    CC: Fall    Interval history:   Patient pulled out his NG tube at night (was replaced it )  Patient was having some trouble breathing and his abdomen was more distended overnight  Patient didn't have any bowel movement or flatulence during the night .  BP continue to be fluctuating and he had to received 40 mc on phenylephrine      HPI:     Mr. Jaun Mohamud is a 84 y.o. male with a medical hx significant for type II DM, degenerative lumbar disease, arrhythmia s/p AICD, diastolic CHF, and macular degeneration otherwise as listed in the MHx table below, who presented from home to the [facility] ED on 11/3/24 after a fall.Patient landed on his left side and hit the back of his head but did not lose consciousness at this time.      CT imaging revealed acute T11 vertebral fracture extending into the T10-T11 disc space as well as diffuse distention of small and large bowel compatible with ileus and bilateral pleural effusions. GI consulted for ileus, and patient underwent colonic decompression  MRI thoracic spine then demonstrated large epidural spinal hematoma with cord compression  and additional T6 subacute compression fracture along with bilateral pleural effusions.   Patient was transferred to [facility] on 11/7 for neurosurgical care.     He got thoracic T8-L1 posterior decompression fixation on 11/12.Transferred to ICU due to concern for hypotension post procedure he was receiving pressors to maintain BP and was stop on nov 15, got a venous doppler demonstrated acute DVT in gastrocnemius. CT PE 11/14: small bilateral segmental/subsegmental pulmonary emboli in upper lobes ,got IVC placed as he is not receiving any anticoagulation as  baseline SBP is 100-115 when he measures at home.   -home torsemide, jardiance, losartan, Toprol and spirolactone held in setting of low BP  -Weaned off arleth vasopressor 11/15 1000. Restarted in HS  -PICC line placed 11/13  -midodrine 10mg-->15mg TID as bridge off pressors started 11/13  -requiring pressor support as of 11/17  -D/C dilaudid due to hypotensive effect  -Long acting oxy for pain     History of V Tach  Had episode of sustained V Tach 250 bpm 02/2024, started on amiodarone and ICD placed 03/2024. Had brief <15 second run of V. tach morning of 11/15, asymptomatic.   -telemetry  -home amiodarone 200mg daily  -restart metoprolol home dose once MAP>70 consistently, hold currently      Wide Complex Tachycardia  Possible Atrial Fibrillation   Described as new over this hospitalization, no prior EKG demonstrating A fib but noted on previous exams. 11/11 EKG appears to be wide QRS tachycardia, not suggestive of overt Afib.   -Cardiology consulted: continue amiodarone. Low suspicion for Afib. Hold metoprolol d/t hypotension  -telemetry        Pulm  Bilateral pleural effusions  Per chart review, imaging in July demonstrated moderate sized pleural effusions and septal thickening consistent with pulmonary edema. ProBNP 4,975 upon arrival to [facility] ED, was 2,190 in July. Current stable pleural effusions may be in setting of congestive heart failure. Repeat imaging CT thoracic 11/14 showed stable bilateral pleural effusions.   -consider diuresis once hypotension resolved, no current indication  -BNP elevation possibly 2/2 PE  -unasyn for possible aspiration pneumonia 5 day course completed 11/16        Chronic Conditions:     Type II DM: last A1C 5.1 (11/2024)   Congestive heart failure, diastolic (07/2024 EF 50-55%, normal wall motion).   Peripheral neuropathy: home gabapentin  Chronic idiopathic constipation: home Linzess   Dysthymic disorder: home Wellbutrin  Insomnia: restart zolpidem 10mg nightly  BPH,

## 2024-11-19 NOTE — CARE COORDINATION
Chart review day 12- from home with wife; IPTA, fall, burst fracture; thoracic/lumbar surgery, PT/OT rec SNF.; SLP following, pt to have MBS middle to end of this week. Pt currently with [facility]. [facility] accepted and is following. Pt will need precert when medically ready. Cm will continue to follow for DCP needs.

## 2024-11-19 NOTE — PROGRESS NOTES
Patient has become confused after getting his dose of Ambien tonight and he removed his NG tube. ICU resident was made aware and nurse will have to replaced NG tube. Tube Feed was paused and a new NG was placed left nare  at 65. KUB was ordered, will restart feed when placement is verified. Patient tolerated NG well.

## 2024-11-19 NOTE — PROGRESS NOTES
NEUROSURGERY PROGRESS NOTE    11/19/2024 1:05 PM                               Gibbon Gladekelly Jeffreymalick                      LOS: 12 days   POD# 7 s/p Procedure(s) (LRB):  THORACIC 8-LUMBAR 1 POSTERIOR DECOMPRESSION FIXATION AND FUSION (N/A)    Subjective: No acute events overnight. Patient, once again, having constipation and abdominal distention with possible ileus.     Physical Exam:  Patient seen and examined    Vitals:    11/19/24 1300   BP: (!) 81/68   Pulse: (!) 122   Resp: 14   Temp:    SpO2: 91%     GCS:  4 - Opens eyes on own  5 - Alert and oriented  6 - Follows simple motor commands  General: Well developed. Alert and cooperative in no acute distress.   HENT: atraumatic, neck supple  Eyes: Optic discs: Not tested  Pulmonary: unlabored respiratory effort  Cardiovascular:  Warm well perfused. No peripheral edema  Gastrointestinal: abdomen soft, NT, ND    Neurological:  Mental Status: Awake, alert, oriented x 4, speech clear and appropriate  Attention: Intact  Language: No aphasia or dysarthria noted  Sensation: Intact to all extremities to light touch  Coordination: Intact    Cranial Nerves:  II: Visual acuity not tested, denies new visual changes / diplopia  III, IV, VI: PERRL, 3 mm bilaterally, EOMI, no nystagmus noted  V: Facial sensation intact bilaterally to touch  VII: Face symmetric  VIII: Hearing intact bilaterally to spoken voice  IX: Palate movement equal bilaterally  XI: Shoulder shrug equal bilaterally  XII: Tongue midline    Musculoskeletal:   Gait: Not tested   Assist devices: None   Tone: Normal  Motor strength: Exam limited 2/2 generalized weakness and effort   Right  Left    Right  Left    Deltoid  4 4  Hip Flex  3 3   Biceps  4 4  Knee Extensors  3 3   Triceps  4 4  Knee Flexors  3 3   Wrist Ext  4 4  Ankle Dorsiflex.  4 4   Wrist Flex  4 4  Ankle Plantarflex.  4 4   Handgrip  4 4  Ext Olaf Longus  4 4   Thumb Ext  4 4         Incision: Old bloody drainage, but intact    Radiological

## 2024-11-20 ENCOUNTER — APPOINTMENT (OUTPATIENT)
Dept: GENERAL RADIOLOGY | Age: 84
DRG: 447 | End: 2024-11-20
Attending: INTERNAL MEDICINE
Payer: MEDICARE

## 2024-11-20 LAB
ALBUMIN SERPL-MCNC: 2.3 G/DL (ref 3.4–5)
ALBUMIN SERPL-MCNC: 2.4 G/DL (ref 3.4–5)
ANION GAP SERPL CALCULATED.3IONS-SCNC: 8 MMOL/L (ref 3–16)
BASOPHILS # BLD: 0 K/UL (ref 0–0.2)
BASOPHILS NFR BLD: 0.5 %
BUN SERPL-MCNC: 26 MG/DL (ref 7–20)
CALCIUM SERPL-MCNC: 7.6 MG/DL (ref 8.3–10.6)
CHLORIDE SERPL-SCNC: 112 MMOL/L (ref 99–110)
CO2 SERPL-SCNC: 24 MMOL/L (ref 21–32)
CREAT SERPL-MCNC: 0.9 MG/DL (ref 0.8–1.3)
DEPRECATED RDW RBC AUTO: 17.6 % (ref 12.4–15.4)
EOSINOPHIL # BLD: 0.1 K/UL (ref 0–0.6)
EOSINOPHIL NFR BLD: 1 %
GFR SERPLBLD CREATININE-BSD FMLA CKD-EPI: 85 ML/MIN/{1.73_M2}
GLUCOSE BLD-MCNC: 102 MG/DL (ref 70–99)
GLUCOSE BLD-MCNC: 122 MG/DL (ref 70–99)
GLUCOSE BLD-MCNC: 74 MG/DL (ref 70–99)
GLUCOSE BLD-MCNC: 93 MG/DL (ref 70–99)
GLUCOSE SERPL-MCNC: 102 MG/DL (ref 70–99)
HCT VFR BLD AUTO: 30.6 % (ref 40.5–52.5)
HGB BLD-MCNC: 10 G/DL (ref 13.5–17.5)
LACTATE BLDV-SCNC: 0.9 MMOL/L (ref 0.4–1.9)
LACTATE BLDV-SCNC: 1 MMOL/L (ref 0.4–1.9)
LYMPHOCYTES # BLD: 0.9 K/UL (ref 1–5.1)
LYMPHOCYTES NFR BLD: 8.8 %
MAGNESIUM SERPL-MCNC: 2.1 MG/DL (ref 1.8–2.4)
MCH RBC QN AUTO: 30.1 PG (ref 26–34)
MCHC RBC AUTO-ENTMCNC: 32.8 G/DL (ref 31–36)
MCV RBC AUTO: 91.8 FL (ref 80–100)
MONOCYTES # BLD: 1.3 K/UL (ref 0–1.3)
MONOCYTES NFR BLD: 13.4 %
NEUTROPHILS # BLD: 7.4 K/UL (ref 1.7–7.7)
NEUTROPHILS NFR BLD: 76.3 %
PERFORMED ON: ABNORMAL
PERFORMED ON: ABNORMAL
PERFORMED ON: NORMAL
PERFORMED ON: NORMAL
PHOSPHATE SERPL-MCNC: 3.2 MG/DL (ref 2.5–4.9)
PLATELET # BLD AUTO: 289 K/UL (ref 135–450)
PMV BLD AUTO: 7.5 FL (ref 5–10.5)
POTASSIUM SERPL-SCNC: 3.3 MMOL/L (ref 3.5–5.1)
RBC # BLD AUTO: 3.33 M/UL (ref 4.2–5.9)
SODIUM SERPL-SCNC: 144 MMOL/L (ref 136–145)
WBC # BLD AUTO: 9.7 K/UL (ref 4–11)

## 2024-11-20 PROCEDURE — 6360000002 HC RX W HCPCS

## 2024-11-20 PROCEDURE — 2580000003 HC RX 258

## 2024-11-20 PROCEDURE — 97530 THERAPEUTIC ACTIVITIES: CPT

## 2024-11-20 PROCEDURE — 82040 ASSAY OF SERUM ALBUMIN: CPT

## 2024-11-20 PROCEDURE — 2000000000 HC ICU R&B

## 2024-11-20 PROCEDURE — 2580000003 HC RX 258: Performed by: INTERNAL MEDICINE

## 2024-11-20 PROCEDURE — 83605 ASSAY OF LACTIC ACID: CPT

## 2024-11-20 PROCEDURE — 92526 ORAL FUNCTION THERAPY: CPT

## 2024-11-20 PROCEDURE — 6370000000 HC RX 637 (ALT 250 FOR IP)

## 2024-11-20 PROCEDURE — 74018 RADEX ABDOMEN 1 VIEW: CPT

## 2024-11-20 PROCEDURE — 83735 ASSAY OF MAGNESIUM: CPT

## 2024-11-20 PROCEDURE — 6370000000 HC RX 637 (ALT 250 FOR IP): Performed by: STUDENT IN AN ORGANIZED HEALTH CARE EDUCATION/TRAINING PROGRAM

## 2024-11-20 PROCEDURE — 99233 SBSQ HOSP IP/OBS HIGH 50: CPT | Performed by: INTERNAL MEDICINE

## 2024-11-20 PROCEDURE — 80048 BASIC METABOLIC PNL TOTAL CA: CPT

## 2024-11-20 PROCEDURE — 97535 SELF CARE MNGMENT TRAINING: CPT

## 2024-11-20 PROCEDURE — 99232 SBSQ HOSP IP/OBS MODERATE 35: CPT | Performed by: NURSE PRACTITIONER

## 2024-11-20 PROCEDURE — 6370000000 HC RX 637 (ALT 250 FOR IP): Performed by: INTERNAL MEDICINE

## 2024-11-20 PROCEDURE — 6360000002 HC RX W HCPCS: Performed by: INTERNAL MEDICINE

## 2024-11-20 PROCEDURE — 85025 COMPLETE CBC W/AUTO DIFF WBC: CPT

## 2024-11-20 PROCEDURE — 2700000000 HC OXYGEN THERAPY PER DAY

## 2024-11-20 PROCEDURE — 94761 N-INVAS EAR/PLS OXIMETRY MLT: CPT

## 2024-11-20 PROCEDURE — 6360000002 HC RX W HCPCS: Performed by: NURSE PRACTITIONER

## 2024-11-20 PROCEDURE — 36415 COLL VENOUS BLD VENIPUNCTURE: CPT

## 2024-11-20 PROCEDURE — 6360000002 HC RX W HCPCS: Performed by: STUDENT IN AN ORGANIZED HEALTH CARE EDUCATION/TRAINING PROGRAM

## 2024-11-20 PROCEDURE — 84100 ASSAY OF PHOSPHORUS: CPT

## 2024-11-20 RX ORDER — MIDODRINE HYDROCHLORIDE 5 MG/1
20 TABLET ORAL
Status: DISCONTINUED | OUTPATIENT
Start: 2024-11-20 | End: 2024-11-20

## 2024-11-20 RX ORDER — POTASSIUM CHLORIDE 7.45 MG/ML
10 INJECTION INTRAVENOUS
Status: COMPLETED | OUTPATIENT
Start: 2024-11-20 | End: 2024-11-20

## 2024-11-20 RX ORDER — MIDODRINE HYDROCHLORIDE 5 MG/1
20 TABLET ORAL EVERY 8 HOURS
Status: DISCONTINUED | OUTPATIENT
Start: 2024-11-20 | End: 2024-11-21

## 2024-11-20 RX ADMIN — METHOCARBAMOL 500 MG: 100 INJECTION INTRAMUSCULAR; INTRAVENOUS at 05:56

## 2024-11-20 RX ADMIN — SODIUM CHLORIDE, PRESERVATIVE FREE 10 ML: 5 INJECTION INTRAVENOUS at 08:02

## 2024-11-20 RX ADMIN — POTASSIUM CHLORIDE 10 MEQ: 10 INJECTION, SOLUTION INTRAVENOUS at 11:30

## 2024-11-20 RX ADMIN — BUPROPION HYDROCHLORIDE 150 MG: 100 TABLET, FILM COATED ORAL at 07:56

## 2024-11-20 RX ADMIN — BUPROPION HYDROCHLORIDE 150 MG: 100 TABLET, FILM COATED ORAL at 21:27

## 2024-11-20 RX ADMIN — Medication 5 MG: at 21:27

## 2024-11-20 RX ADMIN — POTASSIUM CHLORIDE 10 MEQ: 10 INJECTION, SOLUTION INTRAVENOUS at 08:02

## 2024-11-20 RX ADMIN — SENNOSIDES AND DOCUSATE SODIUM 2 TABLET: 50; 8.6 TABLET ORAL at 21:27

## 2024-11-20 RX ADMIN — NALOXEGOL OXALATE 25 MG: 25 TABLET, FILM COATED ORAL at 05:46

## 2024-11-20 RX ADMIN — MIDODRINE HYDROCHLORIDE 20 MG: 5 TABLET ORAL at 17:31

## 2024-11-20 RX ADMIN — GABAPENTIN 600 MG: 300 CAPSULE ORAL at 13:06

## 2024-11-20 RX ADMIN — PHENYLEPHRINE HYDROCHLORIDE 80 MCG/MIN: 10 INJECTION INTRAVENOUS at 16:48

## 2024-11-20 RX ADMIN — FUROSEMIDE 20 MG: 10 INJECTION, SOLUTION INTRAMUSCULAR; INTRAVENOUS at 07:57

## 2024-11-20 RX ADMIN — HEPARIN SODIUM 5000 UNITS: 5000 INJECTION INTRAVENOUS; SUBCUTANEOUS at 05:55

## 2024-11-20 RX ADMIN — MIDODRINE HYDROCHLORIDE 20 MG: 5 TABLET ORAL at 10:16

## 2024-11-20 RX ADMIN — PANTOPRAZOLE SODIUM 40 MG: 40 INJECTION, POWDER, FOR SOLUTION INTRAVENOUS at 07:56

## 2024-11-20 RX ADMIN — METHOCARBAMOL 500 MG: 100 INJECTION INTRAMUSCULAR; INTRAVENOUS at 10:23

## 2024-11-20 RX ADMIN — OXYCODONE 5 MG: 5 TABLET ORAL at 17:31

## 2024-11-20 RX ADMIN — GABAPENTIN 600 MG: 300 CAPSULE ORAL at 07:57

## 2024-11-20 RX ADMIN — HEPARIN SODIUM 5000 UNITS: 5000 INJECTION INTRAVENOUS; SUBCUTANEOUS at 13:06

## 2024-11-20 RX ADMIN — POTASSIUM CHLORIDE 10 MEQ: 10 INJECTION, SOLUTION INTRAVENOUS at 09:10

## 2024-11-20 RX ADMIN — POTASSIUM CHLORIDE 10 MEQ: 10 INJECTION, SOLUTION INTRAVENOUS at 10:12

## 2024-11-20 RX ADMIN — OXYCODONE 5 MG: 5 TABLET ORAL at 03:26

## 2024-11-20 RX ADMIN — Medication: at 07:56

## 2024-11-20 RX ADMIN — MORPHINE SULFATE 2 MG: 2 INJECTION, SOLUTION INTRAMUSCULAR; INTRAVENOUS at 10:16

## 2024-11-20 RX ADMIN — LINACLOTIDE 290 MCG: 145 CAPSULE, GELATIN COATED ORAL at 05:48

## 2024-11-20 RX ADMIN — HEPARIN SODIUM 5000 UNITS: 5000 INJECTION INTRAVENOUS; SUBCUTANEOUS at 21:27

## 2024-11-20 RX ADMIN — AMIODARONE HYDROCHLORIDE 200 MG: 200 TABLET ORAL at 07:57

## 2024-11-20 RX ADMIN — ATORVASTATIN CALCIUM 10 MG: 10 TABLET, FILM COATED ORAL at 21:27

## 2024-11-20 RX ADMIN — Medication: at 21:44

## 2024-11-20 RX ADMIN — GABAPENTIN 600 MG: 300 CAPSULE ORAL at 21:27

## 2024-11-20 RX ADMIN — METHOCARBAMOL 500 MG: 100 INJECTION INTRAMUSCULAR; INTRAVENOUS at 00:27

## 2024-11-20 RX ADMIN — MIDODRINE HYDROCHLORIDE 15 MG: 5 TABLET ORAL at 07:56

## 2024-11-20 RX ADMIN — METHOCARBAMOL 500 MG: 100 INJECTION INTRAMUSCULAR; INTRAVENOUS at 15:41

## 2024-11-20 ASSESSMENT — PAIN DESCRIPTION - DESCRIPTORS
DESCRIPTORS: ACHING;DISCOMFORT
DESCRIPTORS: ACHING;SORE
DESCRIPTORS: ACHING;DISCOMFORT
DESCRIPTORS: ACHING;SORE

## 2024-11-20 ASSESSMENT — PAIN DESCRIPTION - ORIENTATION
ORIENTATION: LOWER;UPPER
ORIENTATION: MID
ORIENTATION: LOWER

## 2024-11-20 ASSESSMENT — PAIN SCALES - GENERAL
PAINLEVEL_OUTOF10: 4
PAINLEVEL_OUTOF10: 4
PAINLEVEL_OUTOF10: 6
PAINLEVEL_OUTOF10: 4
PAINLEVEL_OUTOF10: 4
PAINLEVEL_OUTOF10: 9
PAINLEVEL_OUTOF10: 0
PAINLEVEL_OUTOF10: 8

## 2024-11-20 ASSESSMENT — PAIN DESCRIPTION - LOCATION
LOCATION: BACK

## 2024-11-20 ASSESSMENT — PAIN - FUNCTIONAL ASSESSMENT
PAIN_FUNCTIONAL_ASSESSMENT: ACTIVITIES ARE NOT PREVENTED

## 2024-11-20 NOTE — PROGRESS NOTES
Assessment  84-year-old male with history of diabetes mellitus, VT status post ICD, admitted with a fall and found to have epidural hematoma burst compression fracture.  Developed colonic distention versus ileus requiring colonoscopic decompression 11/5/2024.  This was repeated 11/8/2024.  Additionally required administration of neostigmine 2mg IV x2.  Noted to have worsening abdominal distension today and XR showing ileus pattern with small and large intestinal dilation.  On exam there are good bowel sounds, soft abdomen with moderate upper abdominal distension.  Two bowel movements today with improved distension noted.  There are signs of chronic colonic distension since 2/2024 making risk of colonic ischemia low given chronicity and signs of associated small bowel dilation.      Plan:  Continue to optimize electrolytes as needed  Avoid anticholinergic agents and opiates is much as possible  OOB or movement in bed as much as permitted by NSGY   Receiving ongoing oxycodone and appears Movantik was discontinued.  Resume Movantik.   Ok for tubefeeding with improvement in distension  Check AXR in AM   If worsening distension/symptoms will repeat neostigmine    Subjective:  We are following for ileus.  Patient denies abdominal pain, nausea, vomiting.  He had two bowel movements today and states his abdomen feels less distended.      Objective:    Review of Systems:    Constitutional: Negative for fever, chills, and unexpected weight change.   HENT: Negative for trouble swallowing.    Respiratory: Negative for cough, chest tightness and shortness of breath.    Cardiovascular: Negative for chest pain  Gastrointestinal: see HPI  Musculoskeletal: Negative for unusual arthralgias.   Skin: Negative for rash.     Scheduled Meds:   furosemide  20 mg IntraVENous Daily    [Held by provider] metoclopramide  10 mg IntraVENous Q6H    methocarbamol  500 mg IntraVENous Q6H    buPROPion  150 mg Per NG tube BID    insulin lispro

## 2024-11-20 NOTE — PROGRESS NOTES
Physical Therapy  Facility/Department: [facility] ICU  Physical Therapy Daily Treatment    Name: Juan Mohamud  : 1940  MRN: [id number]  Date of Service: 2024    Discharge Recommendations:  Subacute/Skilled Nursing Facility   PT Equipment Recommendations  Other: defer      Patient Diagnosis(es): The primary encounter diagnosis was SVT (supraventricular tachycardia) (HCC). A diagnosis of Hypotension, unspecified hypotension type was also pertinent to this visit.  Past Medical History:  has a past medical history of Diabetes mellitus (HCC), DVT (deep venous thrombosis) (HCC), Hyperlipidemia, Indigestion, Insomnia, Macular degeneration, MI, old, Neuropathy, and Pacemaker.  Past Surgical History:  has a past surgical history that includes knee surgery; Tonsillectomy; Colonoscopy; fracture surgery (Left); Mouth surgery; joint replacement (Right); Intracapsular cataract extraction (Right, 2018); Colonoscopy (N/A, 2024); Colonoscopy (N/A, 3/6/2024); Colonoscopy (N/A, 2024); Colonoscopy (N/A, 2024); lumbar fusion (N/A, 2024); and IR GUIDED IVC FILTER PLACEMENT (11/15/2024).    Assessment  Assessment: pt progressing well this date, able to stand for 5 trials to sarastedy from recliner chair. Continues to require max A x2 to stand, tolerates standing 15-30s this date. Pt gómez's improved posture and ability to achieve upright stance with cues. Continues to be well below his baseline and fatigues quickly, will benefit from IP PT at [facility].  Activity Tolerance  Activity Tolerance: Patient limited by fatigue;Patient tolerated treatment well    Plan  Physical Therapy Plan  General Plan: 5-7 times per week  Current Treatment Recommendations: Strengthening, Balance training, ROM, Gait training, Functional mobility training, Safety education & training, Therapeutic activities, Transfer training, Patient/Caregiver education & training, Equipment evaluation, education, & procurement, Endurance  training  Safety Devices  Type of Devices: Nurse notified, All fall risk precautions in place, Call light within reach, Left in chair, Gait belt, Chair alarm in place  Restraints  Restraints Initially in Place: No    Restrictions  Position Activity Restriction  Spinal Precautions: No Lifting, No Twisting, No Bending  Other position/activity restrictions: spinal precautions     Subjective  General Comment  Comments: pt cleared to see for therapy by RN.  Subjective  Subjective: pt sitting up in chair on arrival, agreeable to therapy session. Wife present in room, no c/o pain.         Social/Functional History  Social/Functional History  Lives With: Significant other  Type of Home: Condo  Home Layout: Two level (13 stairs to access basement where living room/TV is located and pt primarily spends his time here)  Home Access: Stairs to enter with rails  Entrance Stairs - Number of Steps: 5  Entrance Stairs - Rails: Both  Bathroom Shower/Tub: Walk-in shower  Bathroom Toilet: Handicap height  Bathroom Equipment: Grab bars in shower, Grab bars around toilet  Home Equipment: Walker - 4-Wheeled, Cane  Has the patient had two or more falls in the past year or any fall with injury in the past year?: Yes  Receives Help From: Family  ADL Assistance: Independent  Homemaking Assistance: Independent  Ambulation Assistance: Independent (with SPC. Family reports pt has had a decline in his functional mobility and becoming increasingly weak)  Transfer Assistance: Independent  Active : No  Additional Comments: Wife is available 24/7  Vision/Hearing  Vision  Vision: Impaired (macular degeneration)  Vision Exceptions: Wears glasses at all times  Hearing  Hearing: Exceptions to WFL (cochlear implant L ear)  Hearing Exceptions: Hard of hearing/hearing concerns;Right hearing aid    Cognition   Orientation  Overall Orientation Status: Within Functional Limits  Cognition  Overall Cognitive Status: Exceptions  Arousal/Alertness:

## 2024-11-20 NOTE — PROGRESS NOTES
breath sounds in the bases, 1+ pedal edema  GI/: Abdomen soft, nontender, mildly distended, bowel sounds audible  Skin: No rashes or ulcers present  MSK: Peripheral pulses intact    BP 96/67   Pulse (!) 113   Temp 97.7 °F (36.5 °C) (Oral)   Resp 19   Ht 1.956 m (6' 5\")   Wt 98 kg (216 lb 0.8 oz)   SpO2 97%   BMI 25.62 kg/m²     Telemetry:      Personally reviewed and interpreted telemetry (Rhythm Strip) on 11/20/2024 with the following findings: sinus tachycardia    Diet: Diet NPO  ADULT TUBE FEEDING VOLUME BASED; Nasogastric; Standard without Fiber; 100; Continuous; 1,560 (65 ml/hr); 24    DVT Prophylaxis: []PPx LMWH  []SQ Heparin  [x]IPC/SCDs  []Eliquis  []Xarelto  []Coumadin  [] Heparin Drip  []Other -      Code status: Full Code    PT/OT Eval Status:   []NOT yet ordered  []Ordered and Pending   [x]Seen with Recommendations for:   []Home independently  []Home w/ assist  []HHC  [x]SNF  []Acute Rehab    Multi-Disciplinary Rounds with Case Management completed on 11/20/2024 with the following recommendations:  Anticipated Discharge Location: []Home w/ []HHC vs [x]SNF  []Acute Rehab  []LTAC  []Hospice  []Other -  tbd  Anticipated Discharge Day/Date:  5 days  Barriers to Discharge: neurosurgery, resolution of ileus  --------------------------------------------------    MDM (any 2 required for High level billing)    A. Problems (any 1)  [] Acute/Chronic Illness/injury posing ongoing threat to life and/or bodily function without ongoing treatment    [] Severe exacerbation of chronic illness    --------------------------------------------------  B. Risk of Treatment (any 1)    [x] Drugs/treatments that require intensive monitoring for toxicity    [] IV ABX (Vancomycin, Aminoglycosides, etc)     [] Post-Cath/Contrast study requiring serial monitoring    [] IV Narcotic analgesia    [] Aggressive IV diuresis    [] Hypertonic Saline    [x] Critical electrolyte abnormalities requiring IV replacement    [x] Insulin -  08:38 AM     Lab Results   Component Value Date/Time    BLOODCULT2 No Growth after 4 days of incubation. 03/30/2024 08:38 AM     Organism:   Lab Results   Component Value Date/Time    ORG Klebsiella pneumoniae 03/27/2024 12:46 PM         Osbaldo Bernal MD

## 2024-11-20 NOTE — PLAN OF CARE
Brief note  POD# 8 s/p Procedure(s) (LRB):  THORACIC 8-LUMBAR 1 POSTERIOR DECOMPRESSION FIXATION AND FUSION (N/A)    On James synephrine 30 mcg/min  Reports having 8/10 back pain. RN at bedside to give pain meds.     PHYSICAL EXAM:  Vitals:    11/20/24 0200 11/20/24 0215 11/20/24 0220 11/20/24 0230   BP: (!) 84/57 (S) (!) 77/47 (!) 82/63 (!) 79/61   Pulse: 100 (!) 108 (!) 116 (!) 103   Resp: 16 15 14 13   Temp:       TempSrc:       SpO2: 98% 98%  95%   Weight:       Height:             General: Drowsy,  no distress, well-nourished  Neurologic  Mental status:   orientation to person, place, time, situation   Attention intact as able to attend well to the exam     Language fluent in conversation   Comprehension intact; follows simple commands    Cranial nerves:   CN2: Visual fields full w/o extinction on confrontational testing  CN 3,4,6: Pupils 4 mm > 3 mm equal and reactive to light, extraocular muscles intact  CN5: Facial sensation symmetric   CN7: Face symmetric  CN8: Hearing symmetric to spoken voice  CN9: Palate elevated symmetrically  CN11: Traps full strength on shoulder shrug  CN12: Tongue midline with protrusion    Motor Exam:  BLE limited secondary to pain.    R  L    Deltoid 4  4   Biceps 4 4   Triceps 4 4   Wrist extension  4 4   Interossei 4 4      R  L    Hip flexion  3-  3-   Hip extension  3- 3-   Knee flexion  3- 3-   Knee extension  3- 3-   Ankle dorsiflexion  4 4   Ankle plantar flexion  4 4       Sensory: denies numbness this evening and says reports having full sensation to light touch in his BLE's this morning. Full sensation to light touch in BUE's. No sensory extinction.   Tone: normal in all 4 extremities  Gait: Deferred for safety  deformity. No swelling or erythema.

## 2024-11-20 NOTE — PROGRESS NOTES
bilateral pleural effusions, right greater than left.   3. Groundglass and consolidative opacities on the right suspicious for  pneumonia.    11/13/2024 Venous duplex      Acute deep venous thrombosis in the left gastrocnemius and soleal veins without proximal large vein extension..    Pulsatile venous flow most commonly associated with fluid volume overload.    7/24/2024 Echo    Left Ventricle: Normal left ventricular systolic function with a visually estimated EF of 50 - 55%. Left ventricle size is normal. Mild to moderately increased wall thickness. Normal wall motion.    Right Ventricle: Right ventricle is mildly dilated. Lead present in the right ventricle.    Mitral Valve: Possible mild prolapse of the posterior leaflet of the mitral valve. Moderate to severe regurgitation with an anterior directed jet.    Tricuspid Valve: Mild regurgitation.    Extracardiac: Bilateral pleural effusion.    Image quality is fair.    Assessment:  -Preop for spinal surgery after fall.    -T11 vertebral fracture as well as large epidural spinal hematoma with cord compression and additional T6 subacute compression fracture.   -HFpEF-  -hx VT s/p ICD  -HLD  -DM  -SVT  -Wide complex tachycardia   -Ileus   -Acute DVT in the left gastrocnemius and soleal veins   -Bilateral upper lobe PEs  -IVC filter placed   -possible aspiration pna        Plan:  -Keep K>4, Mg>2.  -amio 200 mg po daily   -Lipitor 10 mg daily   -Midodrine TID   -Pressors as needed   -Heparin 5000 units SQ q8 hours   -Lasix 20 mg IV daily     Spironolactone, losartan and torsemide on hold d/t hypotension and need for pressors    Please call the nurse navigator at [phone] during the weekdays  Please call the office at [phone] after hours and weekends.

## 2024-11-20 NOTE — CARE COORDINATION
DISCHARGE PLANNING:  Chart reviewed.  Patient is from a condo with his spouse. No current services. He does have a walker and cane that he uses when out of the house and at night to go to the bathroom.     Current discharge plan is SNF - [facility] and is following.   Will need pre-cert.  Admission contact is Adele [phone].    SLP following. Patient will need a feeding plan and medical stability before he can go to SNF.     CM team will continue to follow.  Chandrika Hernandez RN Case Manager  [phone]

## 2024-11-20 NOTE — PLAN OF CARE
status  Outcome: Progressing  Flowsheets (Taken 11/17/2024 0800 by Nannette Leslie RN)  Achieves stable or improved neurological status:   Assess for and report changes in neurological status   Initiate measures to prevent increased intracranial pressure  Goal: Absence of seizures  Outcome: Progressing  Flowsheets (Taken 11/17/2024 0800 by Nannette Leslie RN)  Absence of seizures:   Monitor for seizure activity.  If seizure occurs, document type and location of movements and any associated apnea   If seizure occurs, turn head to side and suction secretions as needed  Goal: Achieves maximal functionality and self care  Outcome: Progressing  Flowsheets (Taken 11/16/2024 0800 by Nannette Leslie RN)  Achieves maximal functionality and self care: Monitor swallowing and airway patency with patient fatigue and changes in neurological status     Problem: Nutrition Deficit:  Goal: Optimize nutritional status  11/19/2024 2127 by Ruiz Copeland RN  Outcome: Progressing  Flowsheets (Taken 11/19/2024 0955 by Yazmin Caceres RD)  Nutrient intake appropriate for improving, restoring, or maintaining nutritional needs: Recommend, monitor, and adjust tube feedings and TPN/PPN based on assessed needs  11/19/2024 0911 by Camacho Gunderson RN  Outcome: Not Progressing     Problem: Gastrointestinal - Adult  Goal: Minimal or absence of nausea and vomiting  11/19/2024 2127 by Ruiz Copeland RN  Outcome: Progressing  Flowsheets (Taken 11/19/2024 2127)  Minimal or absence of nausea and vomiting: Administer ordered antiemetic medications as needed  11/19/2024 0911 by Camacho Gunderson RN  Outcome: Progressing  Goal: Maintains or returns to baseline bowel function  11/19/2024 2127 by Ruiz Copeland RN  Outcome: Progressing  Flowsheets (Taken 11/19/2024 2127)  Maintains or returns to baseline bowel function: Assess bowel function  11/19/2024 0911 by Camacho Gunderson RN  Outcome: Not Progressing  Goal: Maintains adequate  nutritional intake  11/19/2024 2127 by Ruiz Copeland RN  Outcome: Progressing  Flowsheets (Taken 11/19/2024 2127)  Maintains adequate nutritional intake: Monitor intake and output, weight and lab values  11/19/2024 0911 by Camacho Gunderson RN  Outcome: Not Progressing     Problem: Nutrition Deficit:  Goal: Optimize nutritional status  11/19/2024 2127 by Ruiz Copeland RN  Outcome: Progressing  Flowsheets (Taken 11/19/2024 0955 by Yazmin Caceres, ALFRED)  Nutrient intake appropriate for improving, restoring, or maintaining nutritional needs: Recommend, monitor, and adjust tube feedings and TPN/PPN based on assessed needs  11/19/2024 0911 by Camacho Gunderson RN  Outcome: Not Progressing     Problem: Gastrointestinal - Adult  Goal: Maintains or returns to baseline bowel function  11/19/2024 2127 by Ruiz Copeland RN  Outcome: Progressing  Flowsheets (Taken 11/19/2024 2127)  Maintains or returns to baseline bowel function: Assess bowel function  11/19/2024 0911 by Camacho Gunderson RN  Outcome: Not Progressing  Goal: Maintains adequate nutritional intake  11/19/2024 2127 by Ruiz Copeland RN  Outcome: Progressing  Flowsheets (Taken 11/19/2024 2127)  Maintains adequate nutritional intake: Monitor intake and output, weight and lab values  11/19/2024 0911 by Camacho Gunderson RN  Outcome: Not Progressing

## 2024-11-20 NOTE — PROGRESS NOTES
ICU Progress Note    Admit Date: 11/7/2024  Day: 10  Vent Day: 0  IV Access:Peripheral  IV Fluids:None  Vasopressors:None                Antibiotics: None  Diet: Diet NPO    CC: Fall    Interval history:   Patient was noticed to have some discoloration in his finger tips continues to have good cap refill in finger tips  BP has been continually low this shift and MAP has dropped below 65 for some readings, Phenylephrine restarted   BP was 77/47 with a MAP of (today at 2 am), Phenylephrine increased to 20mcg   5 bowel movements in the last 24 hours   Started lasix yesterday : net balance negative with U/O 0.6 ml/kg/hr  Re-start movantik (yesterday 11/19)  Re start diet by tube feeding today(11/19)    HPI:     Mr. Juan Mohamud is a 84 y.o. male with a medical hx significant for type II DM, degenerative lumbar disease, arrhythmia s/p AICD, diastolic CHF, and macular degeneration otherwise as listed in the MHx table below, who presented from home to the [facility] ED on 11/3/24 after a fall.Patient landed on his left side and hit the back of his head but did not lose consciousness at this time.      CT imaging revealed acute T11 vertebral fracture extending into the T10-T11 disc space as well as diffuse distention of small and large bowel compatible with ileus and bilateral pleural effusions. GI consulted for ileus, and patient underwent colonic decompression  MRI thoracic spine then demonstrated large epidural spinal hematoma with cord compression  and additional T6 subacute compression fracture along with bilateral pleural effusions.   Patient was transferred to [facility] on 11/7 for neurosurgical care.     He got thoracic T8-L1 posterior decompression fixation on 11/12.Transferred to ICU due to concern for hypotension post procedure he was receiving pressors to maintain BP and was stop on nov 15, got a venous doppler demonstrated acute DVT in gastrocnemius. CT PE 11/14: small bilateral segmental/subsegmental  pulmonary emboli in upper lobes ,got IVC placed as he is not receiving any anticoagulation as he has epidural hematoma   For ileus he received Between 11/07 and 11/12 he received neostigmine 11/09 and NG tube for successful ileus decompression.  Medications:     Scheduled Meds:   naloxegol  25 mg Oral QAM AC    potassium chloride  10 mEq IntraVENous Q1H    furosemide  20 mg IntraVENous Daily    [Held by provider] metoclopramide  10 mg IntraVENous Q6H    methocarbamol  500 mg IntraVENous Q6H    buPROPion  150 mg Per NG tube BID    insulin lispro  0-4 Units SubCUTAneous Q6H    [Held by provider] metoprolol  2.5 mg IntraVENous Q6H    midodrine  15 mg Oral TID WC    melatonin  5 mg Oral Nightly    sodium chloride flush  5-40 mL IntraVENous 2 times per day    heparin (porcine)  5,000 Units SubCUTAneous Q8H    pantoprazole  40 mg IntraVENous Daily    balsum peru-castor oil   Topical BID    linaclotide  290 mcg Oral QAM AC    gabapentin  600 mg Oral TID    [Held by provider] buPROPion  300 mg Oral Daily    atorvastatin  10 mg Oral Nightly    amiodarone  200 mg Oral Daily    [Held by provider] tamsulosin  0.4 mg Oral Daily    polyethylene glycol  17 g Oral BID    [Held by provider] DESMOpressin  200 mcg Oral Nightly    sennosides-docusate sodium  2 tablet Oral BID     Continuous Infusions:   phenylephrine 40 mcg/min (11/20/24 0602)    sodium chloride      sodium chloride      dextrose       PRN Meds:morphine **OR** morphine, acetaminophen **OR** acetaminophen, dextrose, iopamidol, benzonatate, SMOG Enema, guaiFENesin, sodium chloride flush, sodium chloride, sodium chloride flush, sodium chloride, ondansetron **OR** ondansetron, lidocaine, oxyCODONE, glucose, dextrose bolus **OR** dextrose bolus, glucagon (rDNA), dextrose    Objective:   Vitals:   T-max:  Patient Vitals for the past 8 hrs:   BP Temp Temp src Pulse Resp SpO2 Weight   11/20/24 0600 92/69 -- -- (!) 122 20 93 % 98 kg (216 lb 0.8 oz)   11/20/24 0530 (!) 84/43 --

## 2024-11-20 NOTE — PROGRESS NOTES
Patients BP has been continually low this shift and MAP has dropped below 65 for some readings, Phenylephrine restarted. Currently BP is 80/55. ICU resident team was made aware for restarting James.

## 2024-11-20 NOTE — PROGRESS NOTES
NEUROSURGERY PROGRESS NOTE    11/20/2024 10:49 AM                               Juan CAMERON Cade                      LOS: 13 days   POD# 8 s/p Procedure(s) (LRB):  THORACIC 8-LUMBAR 1 POSTERIOR DECOMPRESSION FIXATION AND FUSION (N/A)    Subjective: No acute events overnight. Patient, once again, having hypotension requiring pressors.    Physical Exam:  Patient seen and examined    Vitals:    11/20/24 1045   BP: 92/67   Pulse: (!) 119   Resp: 13   Temp:    SpO2: 96%     GCS:  4 - Opens eyes on own  5 - Alert and oriented  6 - Follows simple motor commands  General: Well developed. Alert and cooperative in no acute distress.   HENT: atraumatic, neck supple  Eyes: Optic discs: Not tested  Pulmonary: unlabored respiratory effort  Cardiovascular:  Warm well perfused. No peripheral edema  Gastrointestinal: abdomen soft, NT, ND    Neurological:  Mental Status: Awake, alert, oriented x 4, speech clear and appropriate  Attention: Intact  Language: No aphasia or dysarthria noted  Sensation: Intact to all extremities to light touch  Coordination: Intact    Cranial Nerves:  II: Visual acuity not tested, denies new visual changes / diplopia  III, IV, VI: PERRL, 3 mm bilaterally, EOMI, no nystagmus noted  V: Facial sensation intact bilaterally to touch  VII: Face symmetric  VIII: Hearing intact bilaterally to spoken voice  IX: Palate movement equal bilaterally  XI: Shoulder shrug equal bilaterally  XII: Tongue midline    Musculoskeletal:   Gait: Not tested   Assist devices: None   Tone: Normal  Motor strength: Exam limited 2/2 generalized weakness and effort   Right  Left    Right  Left    Deltoid  4 4  Hip Flex  3 3   Biceps  4 4  Knee Extensors  3 3   Triceps  4 4  Knee Flexors  3 3   Wrist Ext  4 4  Ankle Dorsiflex.  4 4   Wrist Flex  4 4  Ankle Plantarflex.  4 4   Handgrip  4 4  Ext Olaf Longus  4 4   Thumb Ext  4 4         Incision: CDI    Radiological Findings:  CT ABDOMEN PELVIS W IV CONTRAST Additional Contrast?

## 2024-11-20 NOTE — PROGRESS NOTES
Patient was noticed to have some discoloration in his finger tips and ICU resident  team came to bedside to evaluate. Patient continues to be on phenylephrine and he is requiring it for BP support. Per  ICU residents nurse to monitor finger tips and let them know if worsening, Patient continues to have good cap refill in finger tips, Finger tips are also bruised from frequent FSBS.

## 2024-11-20 NOTE — PROGRESS NOTES
Patient requesting new bed due to back pain/uncomfortableness & also refusing multi podus boots. Pillows placed under heels and placed on medsurg bed. New orders for low air loss bed placed, waiting on bed arrival.   Electronically signed by Yazmin Mattson RN on 11/20/2024 at 3:18 PM

## 2024-11-20 NOTE — PROGRESS NOTES
Occupational Therapy  Facility/Department: [facility] ICU  Occupational Therapy Treatment    Name: Juan Mohamud  : 1940  MRN: [id number]  Date of Service: 2024    Discharge Recommendations:  Subacute/Skilled Nursing Facility  OT Equipment Recommendations  Equipment Needed: No  Other: defer to next level of care     Patient Diagnosis(es): The primary encounter diagnosis was SVT (supraventricular tachycardia) ([facility]). A diagnosis of Hypotension, unspecified hypotension type was also pertinent to this visit.  Past Medical History:  has a past medical history of Diabetes mellitus (HCC), DVT (deep venous thrombosis) (HCC), Hyperlipidemia, Indigestion, Insomnia, Macular degeneration, MI, old, Neuropathy, and Pacemaker.  Past Surgical History:  has a past surgical history that includes knee surgery; Tonsillectomy; Colonoscopy; fracture surgery (Left); Mouth surgery; joint replacement (Right); Intracapsular cataract extraction (Right, 2018); Colonoscopy (N/A, 2024); Colonoscopy (N/A, 3/6/2024); Colonoscopy (N/A, 2024); Colonoscopy (N/A, 2024); lumbar fusion (N/A, 2024); and IR GUIDED IVC FILTER PLACEMENT (11/15/2024).    Treatment Diagnosis: Decreased ADL status and functional mobility    Assessment  Performance deficits / Impairments: Decreased functional mobility ;Decreased endurance;Decreased ADL status;Decreased posture;Decreased ROM;Decreased balance;Decreased strength;Decreased safe awareness  Assessment: Pt completing 5x full sit<>stand transfers onto sal stedy from chair this date w MaxAx2. Pt w incontinent BM requiring A for pericare in stance. Recommend pt d/c to SNF to maximize independence. Will cont to follow on acute OT POC.  Treatment Diagnosis: Decreased ADL status and functional mobility  Prognosis: Fair  Decision Making: Medium Complexity  REQUIRES OT FOLLOW-UP: Yes  Activity Tolerance  Activity Tolerance: Patient Tolerated treatment well     Plan  Occupational

## 2024-11-20 NOTE — PROGRESS NOTES
recommended. Cont.   11/20: Provided ongoing education to patient re: swallow function, oral hygiene completion/rationale, positioning, effortful swallow exercises, ongoing therapy. Pt verbalized some comprehension.  Cont goal    Goal 3: Patient will participate in further assessment of swallow function as appropriate.  11/16: planned for today  11/16- Completed, d/c goal   11/17- Resume goal - pt with  NG in place. pt alert self suctioning very thick white secretions, covering teeth and tongue with dried secretions peeling on lip. Performed aggressive oral care with suction in an attempt to remove build up on teeth and tongue with fair success as well as applied lip moisturizer. Pt with frequent congested coughing prior to trials with ice chips. pt analyzed with 10 trials of ice chips with instructions to perform effortful swallow. Suspected weak swallow movement visualized with intermittent congested coughing. Recommend con't NPO with oral care with suction with ice chips presented by staff through out the day to encourage swallow function. Con't goal.   11/18: Pt seated upright in bed, wife at side providing pt with ice chips. SLP assisted pt with oral care via suction toothbrush. Clean and clear oral cavity. Pt with instances of fatigue vs lethargy. Required cues to remain attentive to task and keep eyes open throughout. SLP provided pt with trials ice chips and tsps water and cued to implement effortful swallows across all. ? Actual use, however pt endorsing \"good\" effort when asked. Pt appeared to swallow all trials. X4 instances of pt throat clearing/coughing after completion of swallow. WBC and Neutrophils Absolute remain elevated, however trending down. Based on assessment and recent MBS results SLP recommends continue NPO with allowance of ice chips and tsps water AFTER oral care via suction toothbrush and ONLY with RN/SLP present. Relayed OK for pt's spouse to provide ice chips if pt is requesting.  Expressed will continue to target exercises at the bedside prior to repeating MBS, hopefully towards middle/end of this week. Spouse expressed understanding. Cont.   11/20: Pt no longer strict NPO for ileus concern as he had multiple bowel movements overnight. Received pt awake, alert, resting in bed, on room air, NG tube in place. Voice is much stronger and speech clearer than when last seen by this therapist on Saturday. Positioned partially upright and presented ice chips, thins via tsp, with cues for effortful swallow. Pt with positive oral acceptance/containment, positive swallow movement, oral clearance. No change in vocal quality, no coughing. [place] through visit, pt repositioned for xrays; following which pt was noticeably more fatigued and had increased discomfort. D/w RN. Recommend continue NPO + NG tube, oral care, ice chips/tsp water. Possible MBS tomorrow (TBD).  Cont goal    Education  Provided education to patient re: role of SLP, purpose of visit, recommendations. Patient with some comprehension.    Total treatment time: 15' dysphagia tx     Plan  Diet Recommendations:   Continue NPO + temporary alternative means nutrition/hydration  Oral hygiene 2-3 x daily  Ice chips + water via tsp  Possible repeat MBS tomorrow (TBD)    Discharge Plan:  TBD  Discussed with RNs, Sara.  Needs within reach.    Electronically Signed by:  Lalita Ramirez, [place] [id number]  Phone #[phone]    This document will serve as a discharge summary if pt discharges before next treatment.

## 2024-11-20 NOTE — PLAN OF CARE
Problem: Chronic Conditions and Co-morbidities  Goal: Patient's chronic conditions and co-morbidity symptoms are monitored and maintained or improved  11/20/2024 1112 by Yazmin Mattson RN  Outcome: Progressing  11/19/2024 2127 by Ruiz Copeland RN  Outcome: Progressing  Flowsheets (Taken 11/19/2024 0800 by Camacho Gunderson RN)  Care Plan - Patient's Chronic Conditions and Co-Morbidity Symptoms are Monitored and Maintained or Improved: Monitor and assess patient's chronic conditions and comorbid symptoms for stability, deterioration, or improvement     Problem: Discharge Planning  Goal: Discharge to home or other facility with appropriate resources  11/20/2024 1112 by Yazmin Mattson RN  Outcome: Progressing  11/19/2024 2127 by Ruiz Copeland RN  Outcome: Progressing  Flowsheets (Taken 11/19/2024 0800 by Camacho Gunderson RN)  Discharge to home or other facility with appropriate resources: Identify barriers to discharge with patient and caregiver     Problem: Safety - Adult  Goal: Free from fall injury  11/20/2024 1112 by Yazmin Mattson RN  Outcome: Progressing  11/19/2024 2127 by Ruiz Copeland RN  Outcome: Progressing  Flowsheets (Taken 11/19/2024 1700 by Camacho Gunderson RN)  Free From Fall Injury: Instruct family/caregiver on patient safety     Problem: Skin/Tissue Integrity  Goal: Absence of new skin breakdown  Description: 1.  Monitor for areas of redness and/or skin breakdown  2.  Assess vascular access sites hourly  3.  Every 4-6 hours minimum:  Change oxygen saturation probe site  4.  Every 4-6 hours:  If on nasal continuous positive airway pressure, respiratory therapy assess nares and determine need for appliance change or resting period.  11/20/2024 1112 by Yazmin Mattson RN  Outcome: Progressing  11/19/2024 2127 by Ruiz Copeland RN  Outcome: Progressing     Problem: ABCDS Injury Assessment  Goal: Absence of physical injury  11/20/2024 1112 by Yazmin Mattson RN  Outcome:  Progressing  11/19/2024 2127 by Ruiz Copeland RN  Outcome: Progressing  Flowsheets (Taken 11/17/2024 1000 by Nannette Leslie RN)  Absence of Physical Injury: Implement safety measures based on patient assessment     Problem: Pain  Goal: Verbalizes/displays adequate comfort level or baseline comfort level  11/20/2024 1112 by Yazmin Mattson RN  Outcome: Progressing  11/19/2024 2127 by Ruiz Copeland RN  Outcome: Progressing  Flowsheets (Taken 11/17/2024 0800 by Nannette Leslie RN)  Verbalizes/displays adequate comfort level or baseline comfort level:   Encourage patient to monitor pain and request assistance   Assess pain using appropriate pain scale   Administer analgesics based on type and severity of pain and evaluate response     Problem: Respiratory - Adult  Goal: Achieves optimal ventilation and oxygenation  11/20/2024 1112 by Yazmin Mattson RN  Outcome: Progressing  11/19/2024 2127 by Ruiz Copeland RN  Outcome: Progressing  Flowsheets (Taken 11/17/2024 0800 by Nannette Leslie RN)  Achieves optimal ventilation and oxygenation:   Assess for changes in respiratory status   Assess for changes in mentation and behavior   Position to facilitate oxygenation and minimize respiratory effort     Problem: Cardiovascular - Adult  Goal: Maintains optimal cardiac output and hemodynamic stability  11/20/2024 1112 by Yazmin Mattson RN  Outcome: Progressing  11/19/2024 2127 by Ruiz Copeland RN  Outcome: Progressing  Flowsheets (Taken 11/17/2024 0800 by Nannette Leslie RN)  Maintains optimal cardiac output and hemodynamic stability:   Monitor blood pressure and heart rate   Monitor urine output and notify Licensed Independent Practitioner for values outside of normal range  Goal: Absence of cardiac dysrhythmias or at baseline  11/20/2024 1112 by Yazmin Mattson RN  Outcome: Progressing  11/19/2024 2127 by Ruiz Copeland RN  Outcome: Progressing  Flowsheets (Taken

## 2024-11-21 ENCOUNTER — APPOINTMENT (OUTPATIENT)
Dept: GENERAL RADIOLOGY | Age: 84
DRG: 447 | End: 2024-11-21
Attending: INTERNAL MEDICINE
Payer: MEDICARE

## 2024-11-21 LAB
ALBUMIN SERPL-MCNC: 1.9 G/DL (ref 3.4–5)
ALBUMIN SERPL-MCNC: 2.5 G/DL (ref 3.4–5)
ANION GAP SERPL CALCULATED.3IONS-SCNC: 8 MMOL/L (ref 3–16)
BASOPHILS # BLD: 0.1 K/UL (ref 0–0.2)
BASOPHILS NFR BLD: 0.6 %
BUN SERPL-MCNC: 20 MG/DL (ref 7–20)
BUN SERPL-MCNC: 25 MG/DL (ref 7–20)
BUN SERPL-MCNC: 25 MG/DL (ref 7–20)
CALCIUM SERPL-MCNC: 5.7 MG/DL (ref 8.3–10.6)
CALCIUM SERPL-MCNC: 7.5 MG/DL (ref 8.3–10.6)
CALCIUM SERPL-MCNC: 7.7 MG/DL (ref 8.3–10.6)
CHLORIDE SERPL-SCNC: 109 MMOL/L (ref 99–110)
CHLORIDE SERPL-SCNC: 110 MMOL/L (ref 99–110)
CHLORIDE SERPL-SCNC: 116 MMOL/L (ref 99–110)
CO2 SERPL-SCNC: 19 MMOL/L (ref 21–32)
CO2 SERPL-SCNC: 24 MMOL/L (ref 21–32)
CO2 SERPL-SCNC: 25 MMOL/L (ref 21–32)
CREAT SERPL-MCNC: 0.6 MG/DL (ref 0.8–1.3)
CREAT SERPL-MCNC: 0.9 MG/DL (ref 0.8–1.3)
CREAT SERPL-MCNC: 0.9 MG/DL (ref 0.8–1.3)
DEPRECATED RDW RBC AUTO: 18.7 % (ref 12.4–15.4)
EOSINOPHIL # BLD: 0.2 K/UL (ref 0–0.6)
EOSINOPHIL NFR BLD: 1.6 %
GFR SERPLBLD CREATININE-BSD FMLA CKD-EPI: 85 ML/MIN/{1.73_M2}
GFR SERPLBLD CREATININE-BSD FMLA CKD-EPI: 85 ML/MIN/{1.73_M2}
GFR SERPLBLD CREATININE-BSD FMLA CKD-EPI: >90 ML/MIN/{1.73_M2}
GLUCOSE BLD-MCNC: 118 MG/DL (ref 70–99)
GLUCOSE BLD-MCNC: 135 MG/DL (ref 70–99)
GLUCOSE BLD-MCNC: 145 MG/DL (ref 70–99)
GLUCOSE BLD-MCNC: 148 MG/DL (ref 70–99)
GLUCOSE BLD-MCNC: 159 MG/DL (ref 70–99)
GLUCOSE SERPL-MCNC: 126 MG/DL (ref 70–99)
GLUCOSE SERPL-MCNC: 143 MG/DL (ref 70–99)
GLUCOSE SERPL-MCNC: 156 MG/DL (ref 70–99)
HCT VFR BLD AUTO: 31.3 % (ref 40.5–52.5)
HGB BLD-MCNC: 10.3 G/DL (ref 13.5–17.5)
LACTATE BLDV-SCNC: 1.5 MMOL/L (ref 0.4–2)
LYMPHOCYTES # BLD: 0.9 K/UL (ref 1–5.1)
LYMPHOCYTES NFR BLD: 8.7 %
MAGNESIUM SERPL-MCNC: 1.45 MG/DL (ref 1.8–2.4)
MAGNESIUM SERPL-MCNC: 1.89 MG/DL (ref 1.8–2.4)
MAGNESIUM SERPL-MCNC: 2.05 MG/DL (ref 1.8–2.4)
MCH RBC QN AUTO: 30.2 PG (ref 26–34)
MCHC RBC AUTO-ENTMCNC: 32.8 G/DL (ref 31–36)
MCV RBC AUTO: 92.2 FL (ref 80–100)
MONOCYTES # BLD: 1.2 K/UL (ref 0–1.3)
MONOCYTES NFR BLD: 12.1 %
NEUTROPHILS # BLD: 7.5 K/UL (ref 1.7–7.7)
NEUTROPHILS NFR BLD: 77 %
PERFORMED ON: ABNORMAL
PHOSPHATE SERPL-MCNC: 1.9 MG/DL (ref 2.5–4.9)
PHOSPHATE SERPL-MCNC: 2.5 MG/DL (ref 2.5–4.9)
PHOSPHATE SERPL-MCNC: 3.4 MG/DL (ref 2.5–4.9)
PLATELET # BLD AUTO: 363 K/UL (ref 135–450)
PMV BLD AUTO: 7.2 FL (ref 5–10.5)
POTASSIUM SERPL-SCNC: 2.9 MMOL/L (ref 3.5–5.1)
POTASSIUM SERPL-SCNC: 3.2 MMOL/L (ref 3.5–5.1)
POTASSIUM SERPL-SCNC: 3.4 MMOL/L (ref 3.5–5.1)
RBC # BLD AUTO: 3.4 M/UL (ref 4.2–5.9)
SODIUM SERPL-SCNC: 142 MMOL/L (ref 136–145)
SODIUM SERPL-SCNC: 142 MMOL/L (ref 136–145)
SODIUM SERPL-SCNC: 143 MMOL/L (ref 136–145)
WBC # BLD AUTO: 9.8 K/UL (ref 4–11)

## 2024-11-21 PROCEDURE — 6360000002 HC RX W HCPCS

## 2024-11-21 PROCEDURE — 92526 ORAL FUNCTION THERAPY: CPT

## 2024-11-21 PROCEDURE — 99233 SBSQ HOSP IP/OBS HIGH 50: CPT | Performed by: INTERNAL MEDICINE

## 2024-11-21 PROCEDURE — 6370000000 HC RX 637 (ALT 250 FOR IP)

## 2024-11-21 PROCEDURE — 83605 ASSAY OF LACTIC ACID: CPT

## 2024-11-21 PROCEDURE — 36415 COLL VENOUS BLD VENIPUNCTURE: CPT

## 2024-11-21 PROCEDURE — 6360000002 HC RX W HCPCS: Performed by: INTERNAL MEDICINE

## 2024-11-21 PROCEDURE — 2700000000 HC OXYGEN THERAPY PER DAY

## 2024-11-21 PROCEDURE — 2000000000 HC ICU R&B

## 2024-11-21 PROCEDURE — 94761 N-INVAS EAR/PLS OXIMETRY MLT: CPT

## 2024-11-21 PROCEDURE — 74230 X-RAY XM SWLNG FUNCJ C+: CPT

## 2024-11-21 PROCEDURE — 99232 SBSQ HOSP IP/OBS MODERATE 35: CPT | Performed by: NURSE PRACTITIONER

## 2024-11-21 PROCEDURE — 80069 RENAL FUNCTION PANEL: CPT

## 2024-11-21 PROCEDURE — 6370000000 HC RX 637 (ALT 250 FOR IP): Performed by: STUDENT IN AN ORGANIZED HEALTH CARE EDUCATION/TRAINING PROGRAM

## 2024-11-21 PROCEDURE — 85025 COMPLETE CBC W/AUTO DIFF WBC: CPT

## 2024-11-21 PROCEDURE — 2580000003 HC RX 258

## 2024-11-21 PROCEDURE — 92611 MOTION FLUOROSCOPY/SWALLOW: CPT

## 2024-11-21 PROCEDURE — 83735 ASSAY OF MAGNESIUM: CPT

## 2024-11-21 PROCEDURE — 87086 URINE CULTURE/COLONY COUNT: CPT

## 2024-11-21 PROCEDURE — 6360000002 HC RX W HCPCS: Performed by: STUDENT IN AN ORGANIZED HEALTH CARE EDUCATION/TRAINING PROGRAM

## 2024-11-21 PROCEDURE — 2580000003 HC RX 258: Performed by: INTERNAL MEDICINE

## 2024-11-21 PROCEDURE — 6360000002 HC RX W HCPCS: Performed by: NURSE PRACTITIONER

## 2024-11-21 PROCEDURE — 84100 ASSAY OF PHOSPHORUS: CPT

## 2024-11-21 RX ORDER — FUROSEMIDE 10 MG/ML
20 INJECTION INTRAMUSCULAR; INTRAVENOUS 2 TIMES DAILY
Status: COMPLETED | OUTPATIENT
Start: 2024-11-21 | End: 2024-11-25

## 2024-11-21 RX ORDER — ZOLPIDEM TARTRATE 5 MG/1
5 TABLET ORAL NIGHTLY
Status: DISCONTINUED | OUTPATIENT
Start: 2024-11-21 | End: 2024-12-04

## 2024-11-21 RX ORDER — POTASSIUM CHLORIDE 7.45 MG/ML
10 INJECTION INTRAVENOUS
Status: DISPENSED | OUTPATIENT
Start: 2024-11-21 | End: 2024-11-21

## 2024-11-21 RX ORDER — SULFAMETHOXAZOLE AND TRIMETHOPRIM 800; 160 MG/1; MG/1
1 TABLET ORAL EVERY 12 HOURS SCHEDULED
Status: DISCONTINUED | OUTPATIENT
Start: 2024-11-21 | End: 2024-11-23

## 2024-11-21 RX ORDER — POTASSIUM CHLORIDE 7.45 MG/ML
10 INJECTION INTRAVENOUS
Status: COMPLETED | OUTPATIENT
Start: 2024-11-21 | End: 2024-11-21

## 2024-11-21 RX ORDER — MULTIVITAMIN WITH IRON
1 TABLET ORAL DAILY
Status: DISCONTINUED | OUTPATIENT
Start: 2024-11-21 | End: 2024-12-11

## 2024-11-21 RX ORDER — GAUZE BANDAGE 2" X 2"
100 BANDAGE TOPICAL DAILY
Status: DISCONTINUED | OUTPATIENT
Start: 2024-11-21 | End: 2024-12-11

## 2024-11-21 RX ORDER — MIDODRINE HYDROCHLORIDE 5 MG/1
25 TABLET ORAL EVERY 8 HOURS
Status: DISCONTINUED | OUTPATIENT
Start: 2024-11-21 | End: 2024-12-11

## 2024-11-21 RX ADMIN — THERA TABS 1 TABLET: TAB at 11:34

## 2024-11-21 RX ADMIN — HEPARIN SODIUM 5000 UNITS: 5000 INJECTION INTRAVENOUS; SUBCUTANEOUS at 20:49

## 2024-11-21 RX ADMIN — POTASSIUM CHLORIDE 10 MEQ: 7.46 INJECTION, SOLUTION INTRAVENOUS at 23:10

## 2024-11-21 RX ADMIN — BUPROPION HYDROCHLORIDE 150 MG: 100 TABLET, FILM COATED ORAL at 07:36

## 2024-11-21 RX ADMIN — GABAPENTIN 600 MG: 300 CAPSULE ORAL at 07:35

## 2024-11-21 RX ADMIN — POTASSIUM CHLORIDE 10 MEQ: 7.46 INJECTION, SOLUTION INTRAVENOUS at 06:39

## 2024-11-21 RX ADMIN — Medication 5 MG: at 20:48

## 2024-11-21 RX ADMIN — METHOCARBAMOL 500 MG: 100 INJECTION INTRAMUSCULAR; INTRAVENOUS at 00:05

## 2024-11-21 RX ADMIN — Medication: at 07:36

## 2024-11-21 RX ADMIN — SULFAMETHOXAZOLE AND TRIMETHOPRIM 1 TABLET: 800; 160 TABLET ORAL at 20:59

## 2024-11-21 RX ADMIN — HEPARIN SODIUM 5000 UNITS: 5000 INJECTION INTRAVENOUS; SUBCUTANEOUS at 05:55

## 2024-11-21 RX ADMIN — PHENYLEPHRINE HYDROCHLORIDE 30 MCG/MIN: 50 INJECTION INTRAVENOUS at 13:54

## 2024-11-21 RX ADMIN — POTASSIUM CHLORIDE 10 MEQ: 7.46 INJECTION, SOLUTION INTRAVENOUS at 10:18

## 2024-11-21 RX ADMIN — OXYCODONE 5 MG: 5 TABLET ORAL at 00:35

## 2024-11-21 RX ADMIN — SODIUM CHLORIDE, PRESERVATIVE FREE 10 ML: 5 INJECTION INTRAVENOUS at 07:36

## 2024-11-21 RX ADMIN — Medication: at 21:00

## 2024-11-21 RX ADMIN — SENNOSIDES AND DOCUSATE SODIUM 2 TABLET: 50; 8.6 TABLET ORAL at 20:49

## 2024-11-21 RX ADMIN — BUPROPION HYDROCHLORIDE 150 MG: 100 TABLET, FILM COATED ORAL at 20:48

## 2024-11-21 RX ADMIN — METHOCARBAMOL 500 MG: 100 INJECTION INTRAMUSCULAR; INTRAVENOUS at 05:57

## 2024-11-21 RX ADMIN — ATORVASTATIN CALCIUM 10 MG: 10 TABLET, FILM COATED ORAL at 21:00

## 2024-11-21 RX ADMIN — POTASSIUM CHLORIDE 10 MEQ: 7.46 INJECTION, SOLUTION INTRAVENOUS at 21:04

## 2024-11-21 RX ADMIN — ZOLPIDEM TARTRATE 5 MG: 5 TABLET, COATED ORAL at 20:48

## 2024-11-21 RX ADMIN — METHOCARBAMOL 500 MG: 100 INJECTION INTRAMUSCULAR; INTRAVENOUS at 16:26

## 2024-11-21 RX ADMIN — POTASSIUM BICARBONATE 40 MEQ: 782 TABLET, EFFERVESCENT ORAL at 11:34

## 2024-11-21 RX ADMIN — AMIODARONE HYDROCHLORIDE 200 MG: 200 TABLET ORAL at 07:36

## 2024-11-21 RX ADMIN — MIDODRINE HYDROCHLORIDE 20 MG: 5 TABLET ORAL at 08:43

## 2024-11-21 RX ADMIN — POTASSIUM CHLORIDE 10 MEQ: 7.46 INJECTION, SOLUTION INTRAVENOUS at 07:41

## 2024-11-21 RX ADMIN — FUROSEMIDE 20 MG: 10 INJECTION, SOLUTION INTRAMUSCULAR; INTRAVENOUS at 16:26

## 2024-11-21 RX ADMIN — SENNOSIDES AND DOCUSATE SODIUM 2 TABLET: 50; 8.6 TABLET ORAL at 07:35

## 2024-11-21 RX ADMIN — PANTOPRAZOLE SODIUM 40 MG: 40 INJECTION, POWDER, FOR SOLUTION INTRAVENOUS at 07:36

## 2024-11-21 RX ADMIN — PHENYLEPHRINE HYDROCHLORIDE 30 MCG/MIN: 50 INJECTION INTRAVENOUS at 20:12

## 2024-11-21 RX ADMIN — GABAPENTIN 600 MG: 300 CAPSULE ORAL at 13:54

## 2024-11-21 RX ADMIN — MIDODRINE HYDROCHLORIDE 20 MG: 5 TABLET ORAL at 02:49

## 2024-11-21 RX ADMIN — HEPARIN SODIUM 5000 UNITS: 5000 INJECTION INTRAVENOUS; SUBCUTANEOUS at 12:49

## 2024-11-21 RX ADMIN — GABAPENTIN 600 MG: 300 CAPSULE ORAL at 20:48

## 2024-11-21 RX ADMIN — POTASSIUM CHLORIDE 10 MEQ: 7.46 INJECTION, SOLUTION INTRAVENOUS at 20:00

## 2024-11-21 RX ADMIN — FUROSEMIDE 20 MG: 10 INJECTION, SOLUTION INTRAMUSCULAR; INTRAVENOUS at 07:36

## 2024-11-21 RX ADMIN — POTASSIUM CHLORIDE 10 MEQ: 7.46 INJECTION, SOLUTION INTRAVENOUS at 08:43

## 2024-11-21 RX ADMIN — METHOCARBAMOL 500 MG: 100 INJECTION INTRAMUSCULAR; INTRAVENOUS at 11:35

## 2024-11-21 RX ADMIN — MIDODRINE HYDROCHLORIDE 25 MG: 5 TABLET ORAL at 16:48

## 2024-11-21 RX ADMIN — THIAMINE HCL TAB 100 MG 100 MG: 100 TAB at 11:34

## 2024-11-21 RX ADMIN — PHENYLEPHRINE HYDROCHLORIDE 100 MCG/MIN: 10 INJECTION INTRAVENOUS at 03:49

## 2024-11-21 ASSESSMENT — PAIN DESCRIPTION - DESCRIPTORS
DESCRIPTORS: CRAMPING
DESCRIPTORS: CRAMPING

## 2024-11-21 ASSESSMENT — PAIN DESCRIPTION - ORIENTATION
ORIENTATION: RIGHT;POSTERIOR
ORIENTATION: RIGHT;POSTERIOR

## 2024-11-21 ASSESSMENT — PAIN SCALES - GENERAL
PAINLEVEL_OUTOF10: 9
PAINLEVEL_OUTOF10: 5
PAINLEVEL_OUTOF10: 0
PAINLEVEL_OUTOF10: 4

## 2024-11-21 ASSESSMENT — PAIN DESCRIPTION - LOCATION
LOCATION: SHOULDER
LOCATION: SHOULDER

## 2024-11-21 ASSESSMENT — PAIN - FUNCTIONAL ASSESSMENT: PAIN_FUNCTIONAL_ASSESSMENT: ACTIVITIES ARE NOT PREVENTED

## 2024-11-21 NOTE — CARE COORDINATION
DISCHARGE PLANNING:  Chart reviewed.  Patient is from a condo with his spouse. No current services. He does have a walker and cane that he uses when out of the house and at night to go to the bathroom.      Current discharge plan is [facility] and is following.   Will need pre-cert.  Admission contact is Adele [phone].    SLP following. Patient will need a feeding plan, hopeful for MBS today.  Still on pressors.    CM team will continue to follow.  Chandrika Hernandez RN Case Manager  [phone]

## 2024-11-21 NOTE — PROGRESS NOTES
[facility] Cardiology Progress Note    Primary Cardiologist: Dr Mendez     CC/HPI: HF, VT, ICD    S: Denies cp, sob, or palpitations     Tele: Sinus tachycardia     O:  Physical Exam:  /72   Pulse (!) 113   Temp 98.5 °F (36.9 °C) (Oral)   Resp 19   Ht 1.956 m (6' 5\")   Wt 98 kg (216 lb 0.8 oz)   SpO2 (!) 7%   BMI 25.62 kg/m²    General (appearance):  No acute distress  Eyes: anicteric   Neck: soft, No JVD  Ears/Nose/Mouth/Thorat: No cyanosis.+ NG tube   CV: tachy   Respiratory:  normal effort   GI: soft, non-tender, non-distended  Skin: Warm, dry. No rashes  Neuro/Psych: Alert and cooperative. Appropriate behavior  Ext:  No c/c. + LE edema    I.O's= +9 L     Weight  Admission: Weight - Scale: 89.5 kg (197 lb 5 oz)   Today: Weight - Scale: 98 kg (216 lb 0.8 oz)    CBC:   Recent Labs     24  0400 24  0508 24  0502   WBC 10.3 9.7 9.8   HGB 10.4* 10.0* 10.3*   HCT 31.3* 30.6* 31.3*   MCV 91.6 91.8 92.2    289 363     BMP:   Recent Labs     24  0400 24  0508 24  0502    144 142   K 4.0 3.3* 3.2*   * 112* 110   CO2  24   PHOS  --  3.2 3.4   BUN 33* 26* 25*   CREATININE 1.1 0.9 0.9     Estimated Creatinine Clearance: 78 mL/min (based on SCr of 0.9 mg/dL).  Mag:   Lab Results   Component Value Date/Time    MG 2.05 2024 05:02 AM     LIVER PROFILE:   No results for input(s): \"AST\", \"ALT\", \"LIPASE\", \"AMYLASE\", \"BILIDIR\", \"BILITOT\", \"ALKPHOS\" in the last 72 hours.    Invalid input(s): \"ALB\"    Pro-BNP:   Lab Results   Component Value Date/Time    PROBNP 7,647 2024 07:55 AM    PROBNP 4,975 2024 09:11 PM    PROBNP 2,190 2024 03:05 PM    PROBNP 2,599 2024 10:44 PM     High Sensitivity Troponin:   Lab Results   Component Value Date    TROPHS 60 (H) 2024       Imagin2024 CTPA  1. Small volume segmental/subsegmental pulmonary emboli involving the bilateral upper lobes. No CT sequelae of

## 2024-11-21 NOTE — PLAN OF CARE
Injury Assessment  Goal: Absence of physical injury  Outcome: Progressing     Problem: Pain  Goal: Verbalizes/displays adequate comfort level or baseline comfort level  Outcome: Progressing  Flowsheets (Taken 11/20/2024 2000 by Sonny Brewster, RN)  Verbalizes/displays adequate comfort level or baseline comfort level:   Encourage patient to monitor pain and request assistance   Assess pain using appropriate pain scale   Administer analgesics based on type and severity of pain and evaluate response   Consider cultural and social influences on pain and pain management   Notify Licensed Independent Practitioner if interventions unsuccessful or patient reports new pain   Implement non-pharmacological measures as appropriate and evaluate response     Problem: Respiratory - Adult  Goal: Achieves optimal ventilation and oxygenation  Outcome: Progressing  Flowsheets (Taken 11/20/2024 2000 by Sonny Brewster, RN)  Achieves optimal ventilation and oxygenation:   Assess for changes in respiratory status   Assess for changes in mentation and behavior   Position to facilitate oxygenation and minimize respiratory effort   Oxygen supplementation based on oxygen saturation or arterial blood gases   Initiate smoking cessation protocol as indicated   Encourage broncho-pulmonary hygiene including cough, deep breathe, incentive spirometry   Assess the need for suctioning and aspirate as needed   Assess and instruct to report shortness of breath or any respiratory difficulty   Respiratory therapy support as indicated     Problem: Cardiovascular - Adult  Goal: Maintains optimal cardiac output and hemodynamic stability  Outcome: Progressing  Flowsheets (Taken 11/20/2024 2000 by Sonny Brewster, RN)  Maintains optimal cardiac output and hemodynamic stability:   Monitor blood pressure and heart rate   Administer fluid and/or volume expanders as ordered   Monitor urine output and notify Licensed Independent Practitioner for values outside of

## 2024-11-21 NOTE — PROGRESS NOTES
Comprehensive Nutrition Assessment  Vasopressor dosing equivalent score = 11. For VDE >12 trophic TF only (10 mL/hr).    RECOMMENDATIONS:  Continue current TF  Osmolite 1.5 @ 65 mL/hr (1560 mL TV)  Flushes: 100 mL q4h  Nutrition Education: Education/Counseling not indicated     NUTRITION ASSESSMENT:   Nutritional summary & status: Pt pressor requirements increased overnight. VDE <12, however if increase beyond current rates TF should be reduced to trophic rate, communicated w/ MD and RN. TF at goal, pt denies any abdominal pain or bloating. Bowel regimen modified over the past 2 days and pt had x2 BMs in the past 24 hours. Plan to continue current regimen. Na trending down, plan to continue flushes at current rate. 3rd spacing w/ pitting edema in lower extremities, new albumin checked yesterday, low and replaced.  Likely related to his lack of nutrition throughout admission. Plan for additional diuresis today, electrolytes being checked q12.   Admission // PMH: Vertebral fracture//colonic psuedoobstruction, T2DM, HLD, CHF    MALNUTRITION ASSESSMENT  Context of Malnutrition: Acute Illness   Malnutrition Status: Mild malnutrition  Findings of the 6 clinical characteristics of malnutrition (Minimum of 2 out of 6 clinical characteristics is required to make the diagnosis of moderate or severe Protein Calorie Malnutrition based on AND/ASPEN Guidelines):  Energy Intake:  50% or less of estimated energy requirements for 5 or more days  Weight Loss:  Mild weight loss (5% in 3 month period)     Body Fat Loss:  Unable to assess     Muscle Mass Loss:  Unable to assess    Fluid Accumulation:  No fluid accumulation      NUTRITION DIAGNOSIS   Inadequate oral intake related to swallowing difficulty as evidenced by NPO or clear liquid status due to medical condition, nutrition support - enteral nutrition    Nutrition Monitoring and Evaluation:   Food/Nutrient Intake Outcomes:  Enteral Nutrition Intake/Tolerance  Physical

## 2024-11-21 NOTE — PROGRESS NOTES
ICU Progress Note    Admit Date: 11/7/2024  Day: 10  Vent Day: 0  IV Access:Peripheral  IV Fluids:None  Vasopressors:None                Antibiotics: None  Diet: Diet NPO  ADULT TUBE FEEDING VOLUME BASED; Nasogastric; Standard without Fiber; 100; Continuous; 1,560 (65 ml/hr); 24    CC: Fall    Interval history:   Feeding/fluids : Adult tube feeding (nasogastric) ,   Analgesia : Morphine PRN  Sedation :None  Thromboprophylaxis :Heparin 5000 units  Head up position:  Ulcer prophylaxis:Protonix 40 mg   Glycemic control : BG<145, not on insulin   Bowel care : 2 bowel movement in the last 24 hours  Indwelling catheter removal :  Deescalation of antibiotic :Not using antibiotics    Patient continue with some episodes of systolic <90, on 100 mcg of norepinephrine   2 bowel movements in the last 24 hours (continue on Movantik)  Started lasix 11/19 : net balance positive with U/O 1350 ml   Re-start movantik (yesterday 11/19)  Re start diet by tube feeding today(11/19)    HPI:     Mr. Juan Mohamud is a 84 y.o. male with a medical hx significant for type II DM, degenerative lumbar disease, arrhythmia s/p AICD, diastolic CHF, and macular degeneration otherwise as listed in the MHx table below, who presented from home to the [facility] ED on 11/3/24 after a fall.Patient landed on his left side and hit the back of his head but did not lose consciousness at this time.      CT imaging revealed acute T11 vertebral fracture extending into the T10-T11 disc space as well as diffuse distention of small and large bowel compatible with ileus and bilateral pleural effusions. GI consulted for ileus, and patient underwent colonic decompression  MRI thoracic spine then demonstrated large epidural spinal hematoma with cord compression  and additional T6 subacute compression fracture along with bilateral pleural effusions.   Patient was transferred to [facility] on 11/7 for neurosurgical care.     He got thoracic T8-L1 posterior

## 2024-11-21 NOTE — PROGRESS NOTES
Patient is very upset, understandably, about his SLP results. He states that he \"[doesn't] care if he gets pneumonia, [he's] done [with ice chips only]\". \"I've been swallowing for 84 years and I'm swallowing fine now.\" He and his wife Lizzie were both present for conversation with myself and SLP Lalita, who went through recommended options and offered empathy.    Discussed conversation with primary team, who continues to recommend oral care and ice chips only with supplemental tube feed. Patient and spouse both verbalized the risks, and patient states that he \"...does not care\".     Palliative care consult placed for support with goals.

## 2024-11-21 NOTE — PROGRESS NOTES
Patient bathed and turned, redness persists behind lower left calf and heels. Venelex ointment applied and multipodus boots on. Patient previously preferring pradip hose but has agreed to SCDs. Specialty mattress in place. Patient has scattered skin tears on BUE, blanchable redness on heels, & blisters on R upper calf. Plan of care ongoing. Q2 turns and sorin scale ordered.

## 2024-11-21 NOTE — PLAN OF CARE
Problem: Chronic Conditions and Co-morbidities  Goal: Patient's chronic conditions and co-morbidity symptoms are monitored and maintained or improved  11/21/2024 0758 by Sonny Brewster RN  Outcome: Progressing  11/21/2024 0752 by Yazmin Mattson RN  Outcome: Progressing  Flowsheets (Taken 11/20/2024 2000 by Sonny Brewster, RN)  Care Plan - Patient's Chronic Conditions and Co-Morbidity Symptoms are Monitored and Maintained or Improved:   Monitor and assess patient's chronic conditions and comorbid symptoms for stability, deterioration, or improvement   Update acute care plan with appropriate goals if chronic or comorbid symptoms are exacerbated and prevent overall improvement and discharge   Collaborate with multidisciplinary team to address chronic and comorbid conditions and prevent exacerbation or deterioration     Problem: Discharge Planning  Goal: Discharge to home or other facility with appropriate resources  11/21/2024 0758 by Sonny Brewster RN  Outcome: Progressing  11/21/2024 0752 by Yazmin Mattson RN  Outcome: Progressing  Flowsheets (Taken 11/20/2024 2000 by Sonny Brewster, RN)  Discharge to home or other facility with appropriate resources:   Identify barriers to discharge with patient and caregiver   Arrange for needed discharge resources and transportation as appropriate   Arrange for interpreters to assist at discharge as needed   Identify discharge learning needs (meds, wound care, etc)   Refer to discharge planning if patient needs post-hospital services based on physician order or complex needs related to functional status, cognitive ability or social support system     Problem: Safety - Adult  Goal: Free from fall injury  11/21/2024 0758 by Sonny Brewster RN  Outcome: Progressing  11/21/2024 0752 by Yazmin Mattson RN  Outcome: Progressing     Problem: Skin/Tissue Integrity  Goal: Absence of new skin breakdown  Description: 1.  Monitor for areas of redness and/or skin breakdown  2.

## 2024-11-21 NOTE — PROGRESS NOTES
Assessment  84-year-old male with history of diabetes mellitus, VT status post ICD, admitted with a fall and found to have epidural hematoma in association with burst compression fracture.  Developed colonic distention versus ileus requiring colonoscopic decompression 11/5/2024.  This was repeated 11/8/2024.  Additionally required administration of neostigmine 2mg IV x2.  Noted to have worsening abdominal distension 11/18-11/19 and XR showing ileus pattern with small and large intestinal dilation.  On exam there are good bowel sounds, soft abdomen with moderate abdominal distension, and no abdominal pain.  Patient continues to have good stool output today. There are signs of chronic colonic distension since 2/2024 making risk of colonic ischemia low despite dilation given chronicity and signs of associated small bowel dilation.      Plan:  Continue to optimize electrolytes as needed  Avoid anticholinergic agents and opiates is much as possible  OOB or movement in bed as much as permitted by NSGY   Receiving ongoing oxycodone and Movantik was discontinued prior to worsening distension.  Have resumed Movantik 25mg daily.  Ok for tubefeeding with improvement in distension  If worsening distension/symptoms will repeat neostigmine  Continue to monitor abdominal exam   Will sign off for now, call if worsening distension noted     Subjective:  We are following for ileus.  Patient denies abdominal pain, nausea, vomiting.  Reports three bowel movements today and states his abdomen feels less distended.      Objective:    Review of Systems:    Constitutional: Negative for fever, chills, and unexpected weight change.   HENT: Negative for trouble swallowing.    Respiratory: Negative for cough, chest tightness and shortness of breath.    Cardiovascular: Negative for chest pain  Gastrointestinal: see HPI  Musculoskeletal: Negative for unusual arthralgias.   Skin: Negative for rash.     Scheduled Meds:   [START ON  11/22/2024] naloxegol  25 mg Oral QAM AC    midodrine  20 mg Oral q8h    furosemide  20 mg IntraVENous Daily    methocarbamol  500 mg IntraVENous Q6H    buPROPion  150 mg Per NG tube BID    insulin lispro  0-4 Units SubCUTAneous Q6H    [Held by provider] metoprolol  2.5 mg IntraVENous Q6H    melatonin  5 mg Oral Nightly    sodium chloride flush  5-40 mL IntraVENous 2 times per day    heparin (porcine)  5,000 Units SubCUTAneous Q8H    pantoprazole  40 mg IntraVENous Daily    balsum peru-castor oil   Topical BID    linaclotide  290 mcg Oral QAM AC    gabapentin  600 mg Oral TID    [Held by provider] buPROPion  300 mg Oral Daily    atorvastatin  10 mg Oral Nightly    amiodarone  200 mg Oral Daily    [Held by provider] tamsulosin  0.4 mg Oral Daily    [Held by provider] DESMOpressin  200 mcg Oral Nightly    sennosides-docusate sodium  2 tablet Oral BID     Continuous Infusions:   phenylephrine 70 mcg/min (11/20/24 1826)    sodium chloride      sodium chloride      dextrose         Vitals:  /75   Pulse (!) 117   Temp 97.6 °F (36.4 °C) (Oral)   Resp 21   Ht 1.956 m (6' 5\")   Wt 98 kg (216 lb 0.8 oz)   SpO2 100%   BMI 25.62 kg/m²     Exam:  General:  comfortable  Heent: There is no scleral icterus.   Cardiovascular: The heart is regular rate and rhythm.  Respiratory:  The patient's breathing is non-labored with normal chest wall excursion and normal muscle movement.    Abdomen:  The abdomen is moderately distended superiorly, soft, and nontender. There are active bowel sounds. There are no masses or organomegaly  Rectal:  deferred   Neurological:  Gross memory appears intact.  Patient is alert and oriented.    Labs and Imaging:  I reviewed the labs and imaging results from last 24 hours.     Recent Labs     11/19/24  0400 11/20/24  0508   HGB 10.4* 10.0*   WBC 10.3 9.7      AXR 11/19/24  Persistent gaseous dilation of bowel    Ryder Melgoza MD  November 20, 2024

## 2024-11-21 NOTE — PROGRESS NOTES
Speech Language Pathology  Facility/Department:[facility] ICU  Dysphagia Therapy Note                                                       Name: Juan Mohamud  : 1940  MRN: [id number]    Patient Diagnosis(es):   Patient Active Problem List    Diagnosis Date Noted    Hypotension 2024    SVT (supraventricular tachycardia) ([facility]) 2024    Fx dorsal vertebra-closed ([facility]) 2024    Spinal cord injury at T1-T6 level ([facility]) 2024    Preoperative cardiovascular examination 2024    Chronic heart failure with preserved ejection fraction (HFpEF) ([facility]) 2024    Abdominal distension 2024    Epidural hematoma 2024    Fall at home, initial encounter 2024    Acute congestive heart failure ([facility]) 2024    PVC's (premature ventricular contractions) 2024    Sepsis ([facility]) 2024    ICD (implantable cardioverter-defibrillator) in place 2024    Acute hypoxemic respiratory failure 2024    BOSTON (acute kidney injury) ([facility]) 2024    Arrhythmia 2024    Primary hypertension 2024    Hyperlipidemia 2024    Colonic obstruction ([facility]) 2024    Trauma 2024    History of fall 2024    Type 2 diabetes mellitus ([facility]) 2024    Anemia 2024    Pneumonia 2024    Hyponatremia 2024    Colonic pseudoobstruction 2024    Wide-complex tachycardia 2024    Constipation 2024    Cataract extraction status of right eye 2018    DDD (degenerative disc disease), lumbar 2013       Past Medical History:   Diagnosis Date    (HFpEF) heart failure with preserved ejection fraction ([facility]) 2024    EF 50-55%, mod/severe MR    Diabetes mellitus ([facility])     diet controlled    DVT (deep venous thrombosis) ([facility]) 2024      Acute deep venous thrombosis in the left gastrocnemius and soleal veins without proximal large vein extension..    Hyperlipidemia     ICD (implantable

## 2024-11-21 NOTE — PROGRESS NOTES
[facility] Medicine Progress Note  V 10.25      Date of Admission: 11/7/2024    Hospital Day: 15      Chief Admission Complaint:  fall, back pain     Subjective:  Patient seen and examined at bedside in the presence of bedside RN and patient's wife. Is still demanding a glass of water.  Patient complains of insomnia.  Has not had a bowel movement overnight    Presenting Admission History:       Per chart review,  \"Mr. Juan Mohamud is a 84 y.o. male with a medical hx significant for type II DM, degenerative lumbar disease, arrhythmia s/p AICD, diastolic CHF, and macular degeneration, who presented from home to the [facility] ED on 11/3/24 after a fall.     Reportedly, patient fell after feeling like his \"knees were giving out.\" No reported symptoms prior to this fall, patient landed on his left side and hit the back of his head but did not lose consciousness at this time.      Upon arrival to the [facility] ED, patient was alert, oriented and reported with GCS of 15 and NIH of 0. While in the ED patient had increasing generalized weakness and difficulty getting up from a sitting position. Decision was made to admit patient as they did not feel safe discharging him home given his inability to stand unassisted.      Fall was thought to be mechanical and secondary to generalized weakness due to poor nutrition. CT abd pelv performed due to complaints of abdominal pain, distension, and back pain upon arrival to the floor. Imaging revealed acute T11 vertebral fracture extending into the T10-T11 disc space as well as diffuse distention of small and large bowel compatible with ileus and bilateral pleural effusions.     GI consulted for ileus, and patient underwent colonic decompression     MRI thoracic spine then demonstrated large epidural spinal hematoma with cord compression  and additional T6 subacute compression fracture along with bilateral pleural effusions.     Patient was transferred to [facility] on 11/7 for

## 2024-11-21 NOTE — PROGRESS NOTES
SLP evaluated patient with modified barium swallow and failed the swallow evaluation. Patient is adamant about oral intake and does not want the NG tube for tube feeds. He is aware of the potential consequences of aspiration with oral intake and understands the risks. Discussed situation with Dr. Levin.       Lolly Alejandre PGY-2   Internal Medicine resident

## 2024-11-21 NOTE — PROCEDURES
INSTRUMENTAL SWALLOW REPORT  MODIFIED BARIUM SWALLOW    NAME: Juan Mohamud     : 1940  MRN: [id number]       Date of Eval: 2024     Ordering Physician: Dr. Osbaldo Bernal  Referring Diagnosis: Dysphagia    Past Medical History:  has a past medical history of (HFpEF) heart failure with preserved ejection fraction (HCC), Diabetes mellitus (HCC), DVT (deep venous thrombosis) (HCC), Hyperlipidemia, ICD (implantable cardioverter-defibrillator) in place, Indigestion, Insomnia, Macular degeneration, MI, old, Neuropathy, Pulmonary emboli (HCC), and Wide-complex tachycardia.  Past Surgical History:  has a past surgical history that includes knee surgery; Tonsillectomy; Colonoscopy; fracture surgery (Left); Mouth surgery; joint replacement (Right); Intracapsular cataract extraction (Right, 2018); Colonoscopy (N/A, 2024); Colonoscopy (N/A, 3/6/2024); Colonoscopy (N/A, 2024); Colonoscopy (N/A, 2024); lumbar fusion (N/A, 2024); and IR GUIDED IVC FILTER PLACEMENT (11/15/2024).    CXR: 2024  1. Persistent effusion and right-sided airspace disease with underlying  pulmonary venous congestion. Slight improvement in the interval.    Prior MBS Results: 24  Oral Phase  Moderate dysfunction characterized by lingual-pumping and slowed A-P transit of puree, with moderate residue.  Pharyngeal Phase  Moderate dysfunction, characterized by impaired strength and endurance, with reduced tongue base movement, hyolaryngeal excursion, epiglottic retraction, and pharyngeal constriction.  Adequate laryngeal vestibule closure present during height of initial trials (thins via straw), however reduced strength resulted in pooled residue in valleculae and pyriforms, with spillage and deep penetration to the cords after the swallow. Unable to conclusively visualize tracheal aspiration d/t patient positioning/shoulder-shadow.  Improved bolus control noted for mildly thick liquids, with  dysphagia study    Plan  Diet Recommendations:   NPO + alternative means nutrition/hydration  Vs  Full liquid diet (as \"safer\" options; still aspiration risk, but reduced choking risk)  Vs  Unrestricted regular texture diet + thin liquids    Discharge Plan:  TBD closer to discharge  Discussed with RN, Lolita; Dr. Alejandre.  Needs within reach.    Electronically Signed by:  Lalita Ramirez, SLP, [id number]  Ph. # [phone]    This document will serve as a discharge summary if pt discharges before next treatment.

## 2024-11-22 ENCOUNTER — APPOINTMENT (OUTPATIENT)
Dept: GENERAL RADIOLOGY | Age: 84
DRG: 447 | End: 2024-11-22
Attending: INTERNAL MEDICINE
Payer: MEDICARE

## 2024-11-22 PROBLEM — I47.20 VT (VENTRICULAR TACHYCARDIA) (HCC): Status: ACTIVE | Noted: 2024-11-22

## 2024-11-22 PROBLEM — R13.10 DYSPHAGIA: Status: ACTIVE | Noted: 2024-11-22

## 2024-11-22 PROBLEM — Z51.5 ENCOUNTER FOR PALLIATIVE CARE: Status: ACTIVE | Noted: 2024-11-22

## 2024-11-22 LAB
ALBUMIN SERPL-MCNC: 2.3 G/DL (ref 3.4–5)
ALBUMIN SERPL-MCNC: 2.4 G/DL (ref 3.4–5)
ANION GAP SERPL CALCULATED.3IONS-SCNC: 7 MMOL/L (ref 3–16)
ANION GAP SERPL CALCULATED.3IONS-SCNC: 9 MMOL/L (ref 3–16)
BACTERIA UR CULT: NORMAL
BASOPHILS # BLD: 0 K/UL (ref 0–0.2)
BASOPHILS NFR BLD: 0.5 %
BUN SERPL-MCNC: 24 MG/DL (ref 7–20)
BUN SERPL-MCNC: 24 MG/DL (ref 7–20)
CALCIUM SERPL-MCNC: 7.5 MG/DL (ref 8.3–10.6)
CALCIUM SERPL-MCNC: 7.7 MG/DL (ref 8.3–10.6)
CHLORIDE SERPL-SCNC: 108 MMOL/L (ref 99–110)
CHLORIDE SERPL-SCNC: 110 MMOL/L (ref 99–110)
CO2 SERPL-SCNC: 24 MMOL/L (ref 21–32)
CO2 SERPL-SCNC: 25 MMOL/L (ref 21–32)
CREAT SERPL-MCNC: 0.9 MG/DL (ref 0.8–1.3)
CREAT SERPL-MCNC: 1 MG/DL (ref 0.8–1.3)
DEPRECATED RDW RBC AUTO: 18.8 % (ref 12.4–15.4)
EOSINOPHIL # BLD: 0.1 K/UL (ref 0–0.6)
EOSINOPHIL NFR BLD: 1.6 %
GFR SERPLBLD CREATININE-BSD FMLA CKD-EPI: 75 ML/MIN/{1.73_M2}
GFR SERPLBLD CREATININE-BSD FMLA CKD-EPI: 85 ML/MIN/{1.73_M2}
GLUCOSE BLD-MCNC: 118 MG/DL (ref 70–99)
GLUCOSE BLD-MCNC: 135 MG/DL (ref 70–99)
GLUCOSE BLD-MCNC: 153 MG/DL (ref 70–99)
GLUCOSE BLD-MCNC: 94 MG/DL (ref 70–99)
GLUCOSE SERPL-MCNC: 103 MG/DL (ref 70–99)
GLUCOSE SERPL-MCNC: 158 MG/DL (ref 70–99)
HCT VFR BLD AUTO: 30.7 % (ref 40.5–52.5)
HGB BLD-MCNC: 10.1 G/DL (ref 13.5–17.5)
LYMPHOCYTES # BLD: 0.8 K/UL (ref 1–5.1)
LYMPHOCYTES NFR BLD: 9.2 %
MAGNESIUM SERPL-MCNC: 1.88 MG/DL (ref 1.8–2.4)
MAGNESIUM SERPL-MCNC: 1.94 MG/DL (ref 1.8–2.4)
MCH RBC QN AUTO: 30.2 PG (ref 26–34)
MCHC RBC AUTO-ENTMCNC: 33 G/DL (ref 31–36)
MCV RBC AUTO: 91.4 FL (ref 80–100)
MONOCYTES # BLD: 1 K/UL (ref 0–1.3)
MONOCYTES NFR BLD: 11.9 %
NEUTROPHILS # BLD: 6.6 K/UL (ref 1.7–7.7)
NEUTROPHILS NFR BLD: 76.8 %
PERFORMED ON: ABNORMAL
PERFORMED ON: NORMAL
PHOSPHATE SERPL-MCNC: 2.4 MG/DL (ref 2.5–4.9)
PHOSPHATE SERPL-MCNC: 3 MG/DL (ref 2.5–4.9)
PLATELET # BLD AUTO: 267 K/UL (ref 135–450)
PMV BLD AUTO: 7.6 FL (ref 5–10.5)
POTASSIUM SERPL-SCNC: 2.9 MMOL/L (ref 3.5–5.1)
POTASSIUM SERPL-SCNC: 3.5 MMOL/L (ref 3.5–5.1)
RBC # BLD AUTO: 3.36 M/UL (ref 4.2–5.9)
SODIUM SERPL-SCNC: 140 MMOL/L (ref 136–145)
SODIUM SERPL-SCNC: 143 MMOL/L (ref 136–145)
WBC # BLD AUTO: 8.5 K/UL (ref 4–11)

## 2024-11-22 PROCEDURE — 6360000002 HC RX W HCPCS

## 2024-11-22 PROCEDURE — 6360000002 HC RX W HCPCS: Performed by: STUDENT IN AN ORGANIZED HEALTH CARE EDUCATION/TRAINING PROGRAM

## 2024-11-22 PROCEDURE — 6370000000 HC RX 637 (ALT 250 FOR IP): Performed by: STUDENT IN AN ORGANIZED HEALTH CARE EDUCATION/TRAINING PROGRAM

## 2024-11-22 PROCEDURE — 2000000000 HC ICU R&B

## 2024-11-22 PROCEDURE — 99232 SBSQ HOSP IP/OBS MODERATE 35: CPT | Performed by: NURSE PRACTITIONER

## 2024-11-22 PROCEDURE — 83735 ASSAY OF MAGNESIUM: CPT

## 2024-11-22 PROCEDURE — 6370000000 HC RX 637 (ALT 250 FOR IP)

## 2024-11-22 PROCEDURE — 97530 THERAPEUTIC ACTIVITIES: CPT

## 2024-11-22 PROCEDURE — 2580000003 HC RX 258

## 2024-11-22 PROCEDURE — 99233 SBSQ HOSP IP/OBS HIGH 50: CPT | Performed by: INTERNAL MEDICINE

## 2024-11-22 PROCEDURE — 97110 THERAPEUTIC EXERCISES: CPT

## 2024-11-22 PROCEDURE — 99222 1ST HOSP IP/OBS MODERATE 55: CPT | Performed by: NURSE PRACTITIONER

## 2024-11-22 PROCEDURE — 6360000002 HC RX W HCPCS: Performed by: NURSE PRACTITIONER

## 2024-11-22 PROCEDURE — 36415 COLL VENOUS BLD VENIPUNCTURE: CPT

## 2024-11-22 PROCEDURE — 85025 COMPLETE CBC W/AUTO DIFF WBC: CPT

## 2024-11-22 PROCEDURE — 80069 RENAL FUNCTION PANEL: CPT

## 2024-11-22 PROCEDURE — 92526 ORAL FUNCTION THERAPY: CPT

## 2024-11-22 RX ORDER — POTASSIUM CHLORIDE 7.45 MG/ML
10 INJECTION INTRAVENOUS
Status: DISCONTINUED | OUTPATIENT
Start: 2024-11-22 | End: 2024-11-22

## 2024-11-22 RX ORDER — POTASSIUM CHLORIDE 29.8 MG/ML
20 INJECTION INTRAVENOUS
Status: COMPLETED | OUTPATIENT
Start: 2024-11-22 | End: 2024-11-22

## 2024-11-22 RX ORDER — MIDODRINE HYDROCHLORIDE 5 MG/1
10 TABLET ORAL ONCE
Status: COMPLETED | OUTPATIENT
Start: 2024-11-22 | End: 2024-11-22

## 2024-11-22 RX ORDER — POTASSIUM CHLORIDE 7.45 MG/ML
10 INJECTION INTRAVENOUS ONCE
Status: COMPLETED | OUTPATIENT
Start: 2024-11-22 | End: 2024-11-22

## 2024-11-22 RX ADMIN — FUROSEMIDE 20 MG: 10 INJECTION, SOLUTION INTRAMUSCULAR; INTRAVENOUS at 18:03

## 2024-11-22 RX ADMIN — SULFAMETHOXAZOLE AND TRIMETHOPRIM 1 TABLET: 800; 160 TABLET ORAL at 08:11

## 2024-11-22 RX ADMIN — ONDANSETRON 4 MG: 2 INJECTION INTRAMUSCULAR; INTRAVENOUS at 14:16

## 2024-11-22 RX ADMIN — Medication: at 08:03

## 2024-11-22 RX ADMIN — FUROSEMIDE 20 MG: 10 INJECTION, SOLUTION INTRAMUSCULAR; INTRAVENOUS at 08:01

## 2024-11-22 RX ADMIN — DIBASIC SODIUM PHOSPHATE, MONOBASIC POTASSIUM PHOSPHATE AND MONOBASIC SODIUM PHOSPHATE 2 TABLET: 852; 155; 130 TABLET ORAL at 08:48

## 2024-11-22 RX ADMIN — MIDODRINE HYDROCHLORIDE 25 MG: 5 TABLET ORAL at 03:22

## 2024-11-22 RX ADMIN — SULFAMETHOXAZOLE AND TRIMETHOPRIM 1 TABLET: 800; 160 TABLET ORAL at 21:37

## 2024-11-22 RX ADMIN — GABAPENTIN 600 MG: 300 CAPSULE ORAL at 13:57

## 2024-11-22 RX ADMIN — ATORVASTATIN CALCIUM 10 MG: 10 TABLET, FILM COATED ORAL at 21:35

## 2024-11-22 RX ADMIN — POTASSIUM CHLORIDE 20 MEQ: 29.8 INJECTION, SOLUTION INTRAVENOUS at 20:32

## 2024-11-22 RX ADMIN — AMIODARONE HYDROCHLORIDE 200 MG: 200 TABLET ORAL at 08:02

## 2024-11-22 RX ADMIN — GABAPENTIN 600 MG: 300 CAPSULE ORAL at 08:01

## 2024-11-22 RX ADMIN — HEPARIN SODIUM 5000 UNITS: 5000 INJECTION INTRAVENOUS; SUBCUTANEOUS at 13:57

## 2024-11-22 RX ADMIN — MIDODRINE HYDROCHLORIDE 10 MG: 5 TABLET ORAL at 22:15

## 2024-11-22 RX ADMIN — METHOCARBAMOL 500 MG: 100 INJECTION INTRAMUSCULAR; INTRAVENOUS at 06:20

## 2024-11-22 RX ADMIN — POTASSIUM CHLORIDE 20 MEQ: 29.8 INJECTION, SOLUTION INTRAVENOUS at 21:35

## 2024-11-22 RX ADMIN — THERA TABS 1 TABLET: TAB at 08:02

## 2024-11-22 RX ADMIN — PANTOPRAZOLE SODIUM 40 MG: 40 INJECTION, POWDER, FOR SOLUTION INTRAVENOUS at 08:01

## 2024-11-22 RX ADMIN — HEPARIN SODIUM 5000 UNITS: 5000 INJECTION INTRAVENOUS; SUBCUTANEOUS at 06:20

## 2024-11-22 RX ADMIN — MIDODRINE HYDROCHLORIDE 25 MG: 5 TABLET ORAL at 18:03

## 2024-11-22 RX ADMIN — METHOCARBAMOL 500 MG: 100 INJECTION INTRAMUSCULAR; INTRAVENOUS at 00:59

## 2024-11-22 RX ADMIN — THIAMINE HCL TAB 100 MG 100 MG: 100 TAB at 08:03

## 2024-11-22 RX ADMIN — ZOLPIDEM TARTRATE 5 MG: 5 TABLET, COATED ORAL at 21:35

## 2024-11-22 RX ADMIN — BUPROPION HYDROCHLORIDE 150 MG: 100 TABLET, FILM COATED ORAL at 08:11

## 2024-11-22 RX ADMIN — Medication: at 21:56

## 2024-11-22 RX ADMIN — SODIUM CHLORIDE, PRESERVATIVE FREE 10 ML: 5 INJECTION INTRAVENOUS at 08:04

## 2024-11-22 RX ADMIN — GABAPENTIN 600 MG: 300 CAPSULE ORAL at 21:35

## 2024-11-22 RX ADMIN — NALOXEGOL OXALATE 25 MG: 25 TABLET, FILM COATED ORAL at 06:21

## 2024-11-22 RX ADMIN — LINACLOTIDE 290 MCG: 145 CAPSULE, GELATIN COATED ORAL at 06:21

## 2024-11-22 RX ADMIN — HEPARIN SODIUM 5000 UNITS: 5000 INJECTION INTRAVENOUS; SUBCUTANEOUS at 21:36

## 2024-11-22 RX ADMIN — POTASSIUM BICARBONATE 40 MEQ: 782 TABLET, EFFERVESCENT ORAL at 20:33

## 2024-11-22 RX ADMIN — POTASSIUM CHLORIDE 10 MEQ: 7.46 INJECTION, SOLUTION INTRAVENOUS at 00:57

## 2024-11-22 RX ADMIN — BUPROPION HYDROCHLORIDE 150 MG: 100 TABLET, FILM COATED ORAL at 21:35

## 2024-11-22 RX ADMIN — Medication 5 MG: at 21:35

## 2024-11-22 RX ADMIN — MIDODRINE HYDROCHLORIDE 25 MG: 5 TABLET ORAL at 08:48

## 2024-11-22 ASSESSMENT — PAIN SCALES - GENERAL: PAINLEVEL_OUTOF10: 6

## 2024-11-22 ASSESSMENT — PAIN DESCRIPTION - LOCATION: LOCATION: ABDOMEN

## 2024-11-22 ASSESSMENT — PAIN DESCRIPTION - ONSET: ONSET: GRADUAL

## 2024-11-22 ASSESSMENT — PAIN DESCRIPTION - PAIN TYPE: TYPE: ACUTE PAIN

## 2024-11-22 ASSESSMENT — PAIN DESCRIPTION - ORIENTATION: ORIENTATION: MID

## 2024-11-22 ASSESSMENT — PAIN - FUNCTIONAL ASSESSMENT: PAIN_FUNCTIONAL_ASSESSMENT: ACTIVITIES ARE NOT PREVENTED

## 2024-11-22 ASSESSMENT — PAIN DESCRIPTION - FREQUENCY: FREQUENCY: INTERMITTENT

## 2024-11-22 ASSESSMENT — PAIN DESCRIPTION - DESCRIPTORS: DESCRIPTORS: ACHING;CRAMPING;SHARP

## 2024-11-22 NOTE — PROGRESS NOTES
comprehension, and adamantly insisting on PO, requesting NG tube be pulled. D/w RN and physician. Consider palliative care consult.  Cont goal  11/22: Reviewed swallow function, aspiration concerns, oral hygiene completion/rationale, effortful swallow exercise, and swallow strategies (including positioning, bolus size, rate of intake, hard swallow, throat clear, re-swallow). Pt and wife verbalized some comprehension.  Cont goal    Goal 3: Patient will participate in further assessment of swallow function as appropriate.  11/16: ...please see prior notes...  11/18: Pt seated upright in bed, wife at side providing pt with ice chips. SLP assisted pt with oral care via suction toothbrush. Clean and clear oral cavity. Pt with instances of fatigue vs lethargy. Required cues to remain attentive to task and keep eyes open throughout. SLP provided pt with trials ice chips and tsps water and cued to implement effortful swallows across all. ? Actual use, however pt endorsing \"good\" effort when asked. Pt appeared to swallow all trials. X4 instances of pt throat clearing/coughing after completion of swallow. WBC and Neutrophils Absolute remain elevated, however trending down. Based on assessment and recent MBS results SLP recommends continue NPO with allowance of ice chips and tsps water AFTER oral care via suction toothbrush and ONLY with RN/SLP present. Relayed OK for pt's spouse to provide ice chips if pt is requesting. Expressed will continue to target exercises at the bedside prior to repeating MBS, hopefully towards middle/end of this week. Spouse expressed understanding. Cont.   11/20: Pt no longer strict NPO for ileus concern as he had multiple bowel movements overnight. Received pt awake, alert, resting in bed, on room air, NG tube in place. Voice is much stronger and speech clearer than when last seen by this therapist on Saturday. Positioned partially upright and presented ice chips, thins via tsp, with cues for  effortful swallow. Pt with positive oral acceptance/containment, positive swallow movement, oral clearance. No change in vocal quality, no coughing. [place] through visit, pt repositioned for xrays; following which pt was noticeably more fatigued and had increased discomfort. D/w RN. Recommend continue NPO + NG tube, oral care, ice chips/tsp water. Possible MBS tomorrow (TBD).  Cont goal  11/21: Patient seen seated up in chair. Assessed ice chips, thins via tsp/straw; positive oral acceptance, swallow movement, oral clearance, inconsistent cough (however reduced from prior), voice sounds stronger. Recommend proceed with repeat MBS.  Cont goal  11/22: Per chart review/discussion with RN, pt opting to consume full liquid diet. With pt up in chair, on 2L O2, NG tube in place, presented trials of ice chips, thins via tsp/straw, apple sauce. Pt with positive oral acceptance, slowed oral prep, delayed swallow movement, with pt endorsing globus sensation. Cued throat clear + hard re-swallow. Pt fatiguing after several minutes, declining further trials, continues to have poor stamina.  Cont goal     Education  Provided education to patient re: role of SLP, purpose of visit, recommendations. Patient with some comprehension.    Total treatment time: 15' dysphagia tx     Plan  Diet Recommendations:   NPO + alternative means nutrition/hydration  vs  Full liquid diet (as per patient preference for QoL)  Sit fully upright  Small single bites / sips  Slow rate  Hard swallow  Throat clear  Re-swallow  Oral hygiene 2 x daily    Discharge Plan:  TBD  Discussed with RN, Camacho & Yamile.  Needs within reach.    Electronically Signed by:  IRVING Sandoval [id number]  Phone #[phone]    This document will serve as a discharge summary if pt discharges before next treatment.
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
## 2024-11-22 NOTE — PROGRESS NOTES
ICU Progress Note    Admit Date: 11/7/2024  Day: 10  Vent Day: 0  IV Access:Peripheral  IV Fluids:None  Vasopressors:Phenylephrine                Antibiotics: None  Diet: Diet NPO Exceptions are: Ice Chips, Sips of Clear Liquids    CC: Fall    Interval history:   Feeding/fluids : Adult tube feeding (nasogastric) ,   Analgesia : Morphine PRN  Sedation :None  Thromboprophylaxis :Heparin 5000 units  Head up position:  Ulcer prophylaxis:Protonix 40 mg   Glycemic control : BG<159, not on insulin   Bowel care : 3 bowel movement in the last 24 hours  Indwelling catheter removal :  Deescalation of antibiotic :Not using antibiotics    Patient maintain MAP>65 and systolic >75   3 bowel movements in the last 24 hours (continue on Movantik)  Increased lasix two times daily : net balance positive with U/O 1000 ml       HPI:     Mr. Juan Mohamud is a 84 y.o. male with a medical hx significant for type II DM, degenerative lumbar disease, arrhythmia s/p AICD, diastolic CHF, and macular degeneration otherwise as listed in the MHx table below, who presented from home to the [facility] ED on 11/3/24 after a fall.Patient landed on his left side and hit the back of his head but did not lose consciousness at this time.      CT imaging revealed acute T11 vertebral fracture extending into the T10-T11 disc space as well as diffuse distention of small and large bowel compatible with ileus and bilateral pleural effusions. GI consulted for ileus, and patient underwent colonic decompression  MRI thoracic spine then demonstrated large epidural spinal hematoma with cord compression  and additional T6 subacute compression fracture along with bilateral pleural effusions.   Patient was transferred to [facility] on 11/7 for neurosurgical care.     He got thoracic T8-L1 posterior decompression fixation on 11/12.Transferred to ICU due to concern for hypotension post procedure he was receiving pressors to maintain BP and was stop on nov 15, got  CT.   6.  Severe multilevel degenerative changes resulting in multiple levels of   severe foraminal stenosis.      ABDOMEN AND PELVIS   1.  No CT evidence of an acute fracture in the lumbar spine.   2.  Prominent endplate degenerative Schmorl's nodes and/or chronic mild endplate   compression fractures in the L1, L2 and L3 vertebrae.   3.  Subacute nondisplaced bilateral sacral ala insufficiency fractures. Subacute   nondisplaced fractures in the anterior column of the left hip. Subacute   nondisplaced fracture in the right parasymphyseal pubic bone.   4.  Prior L4-S1 posterior fusion. No intervertebral bony bridging. Lucency   suggesting loosening of the left S1 pedicle screw.   5.  Severe multilevel degenerative changes resulting in at least moderate if not   severe stenosis at L2-L3 and at least mild stenosis at L3-L4. Multiple levels of   severe foraminal stenosis.      Electronically signed by Hiram Lechuga      XR ABDOMEN (KUB) (SINGLE AP VIEW)   Final Result   Impression:      1. Dilated small bowel which could relate to small bowel ileus or small bowel obstruction.      Electronically signed by Jeremie Montilla MD      XR THORACOLUMBAR SPINE (MIN 2 VIEWS)    (Results Pending)         Assessment/Plan:   84 y.o. male who presented to the [facility] ED on 11/3 after a mechanical fall, was found to have several unstable spinal fractures on imaging and was transferred to [facility] on 11/7 for neurosurgical care.     New DVT  PE  Due to concern for possible PE in setting of ongoing hypotension, a venous doppler performed 11/13-demonstrated acute DVT in gastrocnemius.   LE Venous doppler 11/13: Acute deep venous thrombosis in the left gastrocnemius and soleal veins without proximal large vein extension.   CT PE 11/14: small bilateral segmental/subsegmental pulmonary emboli in upper lobes  -IR consulted: Temporary infrarenal IVC placed 11/15 AM without complication.   -heparin SQ  Neuro  Epidural spinal hematoma   Spinal

## 2024-11-22 NOTE — PROGRESS NOTES
[facility] Medicine Progress Note  V 10.25      Date of Admission: 11/7/2024    Hospital Day: 16      Chief Admission Complaint:  fall, back pain     Subjective:  Patient seen and examined at bedside in the presence of patient's wife.  Discussed with bedside RN.  Wants to eat.  Reports having multiple bowel movements today.    Presenting Admission History:       Per chart review,  \"Mr. Juan Mohamud is a 84 y.o. male with a medical hx significant for type II DM, degenerative lumbar disease, arrhythmia s/p AICD, diastolic CHF, and macular degeneration, who presented from home to the [facility] ED on 11/3/24 after a fall.     Reportedly, patient fell after feeling like his \"knees were giving out.\" No reported symptoms prior to this fall, patient landed on his left side and hit the back of his head but did not lose consciousness at this time.      Upon arrival to the [facility] ED, patient was alert, oriented and reported with GCS of 15 and NIH of 0. While in the ED patient had increasing generalized weakness and difficulty getting up from a sitting position. Decision was made to admit patient as they did not feel safe discharging him home given his inability to stand unassisted.      Fall was thought to be mechanical and secondary to generalized weakness due to poor nutrition. CT abd pelv performed due to complaints of abdominal pain, distension, and back pain upon arrival to the floor. Imaging revealed acute T11 vertebral fracture extending into the T10-T11 disc space as well as diffuse distention of small and large bowel compatible with ileus and bilateral pleural effusions.     GI consulted for ileus, and patient underwent colonic decompression     MRI thoracic spine then demonstrated large epidural spinal hematoma with cord compression  and additional T6 subacute compression fracture along with bilateral pleural effusions.     Patient was transferred to [facility] on 11/7 for neurosurgical care.

## 2024-11-22 NOTE — PROGRESS NOTES
Physical Therapy  Facility/Department: [facility] ICU  Physical Therapy Treatment    Name: Juan Mohamud  : 1940  MRN: [id number]  Date of Service: 2024    Discharge Recommendations:  Subacute/Skilled Nursing Facility   PT Equipment Recommendations  Equipment Needed:  (defer)      Patient Diagnosis(es): The primary encounter diagnosis was SVT (supraventricular tachycardia) (HCC). A diagnosis of Hypotension, unspecified hypotension type was also pertinent to this visit.  Past Medical History:  has a past medical history of (HFpEF) heart failure with preserved ejection fraction (HCC), Diabetes mellitus (HCC), DVT (deep venous thrombosis) (HCC), Hyperlipidemia, ICD (implantable cardioverter-defibrillator) in place, Indigestion, Insomnia, Macular degeneration, MI, old, Neuropathy, Pulmonary emboli (HCC), and Wide-complex tachycardia.  Past Surgical History:  has a past surgical history that includes knee surgery; Tonsillectomy; Colonoscopy; fracture surgery (Left); Mouth surgery; joint replacement (Right); Intracapsular cataract extraction (Right, 2018); Colonoscopy (N/A, 2024); Colonoscopy (N/A, 3/6/2024); Colonoscopy (N/A, 2024); Colonoscopy (N/A, 2024); lumbar fusion (N/A, 2024); and IR GUIDED IVC FILTER PLACEMENT (11/15/2024).    Assessment  Assessment: Requiring max A of 2 for all activity.  Has great difficulty standing & is very weak overall.  Educated that he needs to be moving UEs/LEs more throughout day on his own- wife & RN aware.  Recommend SNF at [place].  Treatment Diagnosis: dec'd strength and impaired mobility  Therapy Prognosis: Fair  Requires PT Follow-Up: Yes  Activity Tolerance  Activity Tolerance: Patient limited by fatigue  Activity Tolerance Comments:  with activity- MD in & states this has been normal for him in past week. O2 sats 97% on RA.    Plan  Physical Therapy Plan  General Plan: 5-7 times per week  Current Treatment Recommendations: Strengthening,  sec with sal greenberg demneyda flexed posture & rotates trunk to R & leans R.   Exercise Treatment: 5 reps x 2 APs with minimal movement; 5 reps HS B with min A.                                                     AM-PAC - Mobility    AM-PAC Basic Mobility - Inpatient   How much help is needed turning from your back to your side while in a flat bed without using bedrails?: Total  How much help is needed moving from lying on your back to sitting on the side of a flat bed without using bedrails?: Total  How much help is needed moving to and from a bed to a chair?: Total  How much help is needed standing up from a chair using your arms?: Total  How much help is needed walking in hospital room?: Total  How much help is needed climbing 3-5 steps with a railing?: Total  AM-PAC Inpatient Mobility Raw Score : 6  AM-PAC Inpatient T-Scale Score : 23.55  Mobility Inpatient CMS 0-100% Score: 100  Mobility Inpatient CMS G-Code Modifier : CN         Goals- no goals met today  Short Term Goals  Time Frame for Short Term Goals: DC   Short Term Goal 1: Pt will complete bed mobility with minimal assist.  Short Term Goal 2: Pt will complete sit to stand transfer with minimal assist up to RW.  Short Term Goal 3: Pt will complete bed to chair transfer with minimal assist and RW.  Patient Goals   Patient Goals : To decrease pain       Education  Patient Education  Education Given To: Patient;Family;Staff  Education Provided: Role of Therapy;Plan of Care;Energy Conservation;Transfer Training;Home Exercise Program  Education Provided Comments: importance of UE/LE ex throughout day, sitting in chair, use of lift equipment- ceiling lift for back to bed  Education Method: Verbal;Printed Information/Hand-outs (exercises written on board)  Barriers to Learning: None  Education Outcome: Continued education needed;Verbalized understanding      Therapy Time   Individual Concurrent Group Co-treatment   Time In 0959         Time Out 1052         Minutes

## 2024-11-22 NOTE — PROGRESS NOTES
[facility] Cardiology Progress Note    Primary Cardiologist: Dr Mendez     CC/HPI: HF, VT, ICD    S: Sitting on the side of the bed working with therapy. He denies cp or sob. He has palpitations.     Tele: Sinus tachycardia     O:  Physical Exam:  BP 95/69   Pulse (!) 120   Temp 97.8 °F (36.6 °C) (Oral)   Resp 26   Ht 1.956 m (6' 5\")   Wt 98 kg (216 lb 0.8 oz)   SpO2 98%   BMI 25.62 kg/m²    General (appearance):  No acute distress  Eyes: anicteric   Neck: soft, No JVD  Ears/Nose/Mouth/Thorat: No cyanosis.+ NG tube   CV: tachy   Respiratory:  normal effort   GI: soft, non-tender, non-distended  Skin: Warm, dry. No rashes. Stables down back CDI  Neuro/Psych: Alert and cooperative. Appropriate behavior  Ext:  No c/c. + LE edema    I.O's= +13 L     Weight  Admission: Weight - Scale: 89.5 kg (197 lb 5 oz)   Today: Weight - Scale: 98 kg (216 lb 0.8 oz)    CBC:   Recent Labs     24  0508 24  0502 24  0347   WBC 9.7 9.8 8.5   HGB 10.0* 10.3* 10.1*   HCT 30.6* 31.3* 30.7*   MCV 91.8 92.2 91.4    363 267     BMP:   Recent Labs     24  1700 24  1800 24  0347    142 140   K 2.9* 3.4* 3.5   * 109 108   CO2 19* 25 25   PHOS 1.9* 2.5 2.4*   BUN 20 25* 24*   CREATININE 0.6* 0.9 0.9     Estimated Creatinine Clearance: 78 mL/min (based on SCr of 0.9 mg/dL).  Mag:   Lab Results   Component Value Date/Time    MG 1.94 2024 03:47 AM     LIVER PROFILE:   No results for input(s): \"AST\", \"ALT\", \"LIPASE\", \"AMYLASE\", \"BILIDIR\", \"BILITOT\", \"ALKPHOS\" in the last 72 hours.    Invalid input(s): \"ALB\"    Pro-BNP:   Lab Results   Component Value Date/Time    PROBNP 7,647 2024 07:55 AM    PROBNP 4,975 2024 09:11 PM    PROBNP 2,190 2024 03:05 PM    PROBNP 2,599 2024 10:44 PM     High Sensitivity Troponin:   Lab Results   Component Value Date    TROPHS 60 (H) 2024       Imagin2024 CTPA  1. Small volume

## 2024-11-22 NOTE — PLAN OF CARE
Problem: Chronic Conditions and Co-morbidities  Goal: Patient's chronic conditions and co-morbidity symptoms are monitored and maintained or improved  Outcome: Progressing  Flowsheets (Taken 11/21/2024 2000)  Care Plan - Patient's Chronic Conditions and Co-Morbidity Symptoms are Monitored and Maintained or Improved:   Monitor and assess patient's chronic conditions and comorbid symptoms for stability, deterioration, or improvement   Collaborate with multidisciplinary team to address chronic and comorbid conditions and prevent exacerbation or deterioration   Update acute care plan with appropriate goals if chronic or comorbid symptoms are exacerbated and prevent overall improvement and discharge     Problem: Discharge Planning  Goal: Discharge to home or other facility with appropriate resources  Outcome: Progressing  Flowsheets (Taken 11/21/2024 2000)  Discharge to home or other facility with appropriate resources:   Identify barriers to discharge with patient and caregiver   Arrange for needed discharge resources and transportation as appropriate   Identify discharge learning needs (meds, wound care, etc)   Arrange for interpreters to assist at discharge as needed   Refer to discharge planning if patient needs post-hospital services based on physician order or complex needs related to functional status, cognitive ability or social support system     Problem: Safety - Adult  Goal: Free from fall injury  Outcome: Progressing  Flowsheets (Taken 11/22/2024 0500)  Free From Fall Injury:   Instruct family/caregiver on patient safety   Based on caregiver fall risk screen, instruct family/caregiver to ask for assistance with transferring infant if caregiver noted to have fall risk factors     Problem: Skin/Tissue Integrity  Goal: Absence of new skin breakdown  Description: 1.  Monitor for areas of redness and/or skin breakdown  2.  Assess vascular access sites hourly  3.  Every 4-6 hours minimum:  Change oxygen  maximal functionality and self care  Outcome: Progressing     Problem: Nutrition Deficit:  Goal: Optimize nutritional status  Outcome: Progressing     Problem: Gastrointestinal - Adult  Goal: Minimal or absence of nausea and vomiting  Outcome: Progressing  Flowsheets (Taken 11/21/2024 2000)  Minimal or absence of nausea and vomiting:   Administer IV fluids as ordered to ensure adequate hydration   Maintain NPO status until nausea and vomiting are resolved   Administer ordered antiemetic medications as needed   Nasogastric tube to low intermittent suction as ordered   Provide nonpharmacologic comfort measures as appropriate   Advance diet as tolerated, if ordered   Nutrition consult to assist patient with adequate nutrition and appropriate food choices  Goal: Maintains or returns to baseline bowel function  Outcome: Progressing  Flowsheets (Taken 11/21/2024 2000)  Maintains or returns to baseline bowel function:   Assess bowel function   Encourage oral fluids to ensure adequate hydration   Administer IV fluids as ordered to ensure adequate hydration   Administer ordered medications as needed   Encourage mobilization and activity   Nutrition consult to assist patient with appropriate food choices  Goal: Maintains adequate nutritional intake  Outcome: Progressing  Flowsheets (Taken 11/21/2024 2000)  Maintains adequate nutritional intake:   Monitor percentage of each meal consumed   Identify factors contributing to decreased intake, treat as appropriate   Monitor intake and output, weight and lab values   Assist with meals as needed   Obtain nutritional consult as needed

## 2024-11-22 NOTE — PROGRESS NOTES
Occupational Therapy  Facility/Department: [facility] ICU  Occupational Therapy Treatment     Name: Juan Mohamud  : 1940  MRN: [id number]  Date of Service: 2024    Discharge Recommendations:  Subacute/Skilled Nursing Facility          Patient Diagnosis(es): The primary encounter diagnosis was SVT (supraventricular tachycardia) (HCC). A diagnosis of Hypotension, unspecified hypotension type was also pertinent to this visit.  Past Medical History:  has a past medical history of (HFpEF) heart failure with preserved ejection fraction (HCC), Diabetes mellitus (HCC), DVT (deep venous thrombosis) (HCC), Hyperlipidemia, ICD (implantable cardioverter-defibrillator) in place, Indigestion, Insomnia, Macular degeneration, MI, old, Neuropathy, Pulmonary emboli (HCC), and Wide-complex tachycardia.  Past Surgical History:  has a past surgical history that includes knee surgery; Tonsillectomy; Colonoscopy; fracture surgery (Left); Mouth surgery; joint replacement (Right); Intracapsular cataract extraction (Right, 2018); Colonoscopy (N/A, 2024); Colonoscopy (N/A, 3/6/2024); Colonoscopy (N/A, 2024); Colonoscopy (N/A, 2024); lumbar fusion (N/A, 2024); and IR GUIDED IVC FILTER PLACEMENT (11/15/2024).    Treatment Diagnosis: Decreased ADL status and functional mobility      Assessment  Performance deficits / Impairments: Decreased functional mobility ;Decreased endurance;Decreased ADL status;Decreased posture;Decreased ROM;Decreased balance;Decreased strength;Decreased safe awareness  Assessment: Requiring Ax2 for all mobility this date. Ax2 for bed mobility, sit<>stands, bed to chair dependent via JEREMI greenberg. Pt/family education on importance of UE/LE therex. Significant time spent with lines, positioning, and education. Would benefit from cont therapies while in acute care and cont inpt care at [facility]. Pt with low endurance. Will cont POC.  Treatment Diagnosis: Decreased ADL status and functional

## 2024-11-22 NOTE — CARE COORDINATION
DISCHARGE PLANNING:  Chart reviewed.  Patient is from a condo with his spouse. No current services. He does have a walker and cane that he uses when out of the house and at night to go to the bathroom.      Current discharge plan is [facility] and is following.   Will need pre-cert.  Admission contact is Adele [phone].     SLP following. Patient will need a definite feeding plan, still on tube feeds with corpak.  Still on pressors.    CM team will continue to follow.  Chandrika Hernandez RN Case Manager  [phone]

## 2024-11-23 LAB
ALBUMIN SERPL-MCNC: 2.4 G/DL (ref 3.4–5)
ALBUMIN SERPL-MCNC: 2.4 G/DL (ref 3.4–5)
ANION GAP SERPL CALCULATED.3IONS-SCNC: 12 MMOL/L (ref 3–16)
ANION GAP SERPL CALCULATED.3IONS-SCNC: 8 MMOL/L (ref 3–16)
BASOPHILS # BLD: 0.1 K/UL (ref 0–0.2)
BASOPHILS NFR BLD: 0.8 %
BUN SERPL-MCNC: 24 MG/DL (ref 7–20)
BUN SERPL-MCNC: 24 MG/DL (ref 7–20)
CALCIUM SERPL-MCNC: 7.6 MG/DL (ref 8.3–10.6)
CALCIUM SERPL-MCNC: 7.6 MG/DL (ref 8.3–10.6)
CHLORIDE SERPL-SCNC: 106 MMOL/L (ref 99–110)
CHLORIDE SERPL-SCNC: 107 MMOL/L (ref 99–110)
CO2 SERPL-SCNC: 23 MMOL/L (ref 21–32)
CO2 SERPL-SCNC: 24 MMOL/L (ref 21–32)
CREAT SERPL-MCNC: 1.1 MG/DL (ref 0.8–1.3)
CREAT SERPL-MCNC: 1.1 MG/DL (ref 0.8–1.3)
DEPRECATED RDW RBC AUTO: 18.8 % (ref 12.4–15.4)
EOSINOPHIL # BLD: 0.1 K/UL (ref 0–0.6)
EOSINOPHIL NFR BLD: 1.8 %
GFR SERPLBLD CREATININE-BSD FMLA CKD-EPI: 67 ML/MIN/{1.73_M2}
GFR SERPLBLD CREATININE-BSD FMLA CKD-EPI: 67 ML/MIN/{1.73_M2}
GLUCOSE BLD-MCNC: 90 MG/DL (ref 70–99)
GLUCOSE BLD-MCNC: 95 MG/DL (ref 70–99)
GLUCOSE BLD-MCNC: 97 MG/DL (ref 70–99)
GLUCOSE SERPL-MCNC: 103 MG/DL (ref 70–99)
GLUCOSE SERPL-MCNC: 94 MG/DL (ref 70–99)
HCT VFR BLD AUTO: 31.2 % (ref 40.5–52.5)
HGB BLD-MCNC: 10.3 G/DL (ref 13.5–17.5)
LYMPHOCYTES # BLD: 0.9 K/UL (ref 1–5.1)
LYMPHOCYTES NFR BLD: 11.8 %
MAGNESIUM SERPL-MCNC: 2.23 MG/DL (ref 1.8–2.4)
MAGNESIUM SERPL-MCNC: 2.32 MG/DL (ref 1.8–2.4)
MCH RBC QN AUTO: 30.2 PG (ref 26–34)
MCHC RBC AUTO-ENTMCNC: 32.9 G/DL (ref 31–36)
MCV RBC AUTO: 91.7 FL (ref 80–100)
MONOCYTES # BLD: 0.8 K/UL (ref 0–1.3)
MONOCYTES NFR BLD: 10.6 %
NEUTROPHILS # BLD: 6 K/UL (ref 1.7–7.7)
NEUTROPHILS NFR BLD: 75 %
PERFORMED ON: NORMAL
PHOSPHATE SERPL-MCNC: 3.5 MG/DL (ref 2.5–4.9)
PHOSPHATE SERPL-MCNC: 3.7 MG/DL (ref 2.5–4.9)
PLATELET # BLD AUTO: 250 K/UL (ref 135–450)
PMV BLD AUTO: 7.8 FL (ref 5–10.5)
POTASSIUM SERPL-SCNC: 3.1 MMOL/L (ref 3.5–5.1)
POTASSIUM SERPL-SCNC: 3.1 MMOL/L (ref 3.5–5.1)
POTASSIUM SERPL-SCNC: 3.5 MMOL/L (ref 3.5–5.1)
POTASSIUM SERPL-SCNC: 3.7 MMOL/L (ref 3.5–5.1)
RBC # BLD AUTO: 3.4 M/UL (ref 4.2–5.9)
SODIUM SERPL-SCNC: 139 MMOL/L (ref 136–145)
SODIUM SERPL-SCNC: 141 MMOL/L (ref 136–145)
WBC # BLD AUTO: 8 K/UL (ref 4–11)

## 2024-11-23 PROCEDURE — 83735 ASSAY OF MAGNESIUM: CPT

## 2024-11-23 PROCEDURE — 6370000000 HC RX 637 (ALT 250 FOR IP): Performed by: STUDENT IN AN ORGANIZED HEALTH CARE EDUCATION/TRAINING PROGRAM

## 2024-11-23 PROCEDURE — 6360000002 HC RX W HCPCS

## 2024-11-23 PROCEDURE — 84132 ASSAY OF SERUM POTASSIUM: CPT

## 2024-11-23 PROCEDURE — 85025 COMPLETE CBC W/AUTO DIFF WBC: CPT

## 2024-11-23 PROCEDURE — 2580000003 HC RX 258

## 2024-11-23 PROCEDURE — 36592 COLLECT BLOOD FROM PICC: CPT

## 2024-11-23 PROCEDURE — 80069 RENAL FUNCTION PANEL: CPT

## 2024-11-23 PROCEDURE — 6370000000 HC RX 637 (ALT 250 FOR IP)

## 2024-11-23 PROCEDURE — 2060000000 HC ICU INTERMEDIATE R&B

## 2024-11-23 PROCEDURE — 6360000002 HC RX W HCPCS: Performed by: STUDENT IN AN ORGANIZED HEALTH CARE EDUCATION/TRAINING PROGRAM

## 2024-11-23 PROCEDURE — 36415 COLL VENOUS BLD VENIPUNCTURE: CPT

## 2024-11-23 PROCEDURE — 2580000003 HC RX 258: Performed by: STUDENT IN AN ORGANIZED HEALTH CARE EDUCATION/TRAINING PROGRAM

## 2024-11-23 PROCEDURE — 99232 SBSQ HOSP IP/OBS MODERATE 35: CPT | Performed by: INTERNAL MEDICINE

## 2024-11-23 PROCEDURE — 99233 SBSQ HOSP IP/OBS HIGH 50: CPT | Performed by: INTERNAL MEDICINE

## 2024-11-23 RX ORDER — POTASSIUM CHLORIDE 29.8 MG/ML
20 INJECTION INTRAVENOUS ONCE
Status: DISCONTINUED | OUTPATIENT
Start: 2024-11-23 | End: 2024-11-23 | Stop reason: SDUPTHER

## 2024-11-23 RX ORDER — POTASSIUM CHLORIDE 29.8 MG/ML
20 INJECTION INTRAVENOUS
Status: COMPLETED | OUTPATIENT
Start: 2024-11-23 | End: 2024-11-23

## 2024-11-23 RX ORDER — MAGNESIUM SULFATE IN WATER 40 MG/ML
2000 INJECTION, SOLUTION INTRAVENOUS ONCE
Status: COMPLETED | OUTPATIENT
Start: 2024-11-23 | End: 2024-11-23

## 2024-11-23 RX ADMIN — THIAMINE HCL TAB 100 MG 100 MG: 100 TAB at 08:48

## 2024-11-23 RX ADMIN — SODIUM CHLORIDE, PRESERVATIVE FREE 10 ML: 5 INJECTION INTRAVENOUS at 20:31

## 2024-11-23 RX ADMIN — GABAPENTIN 600 MG: 300 CAPSULE ORAL at 20:31

## 2024-11-23 RX ADMIN — POTASSIUM CHLORIDE 20 MEQ: 29.8 INJECTION, SOLUTION INTRAVENOUS at 14:56

## 2024-11-23 RX ADMIN — ONDANSETRON 4 MG: 2 INJECTION INTRAMUSCULAR; INTRAVENOUS at 14:02

## 2024-11-23 RX ADMIN — NALOXEGOL OXALATE 25 MG: 25 TABLET, FILM COATED ORAL at 07:35

## 2024-11-23 RX ADMIN — HEPARIN SODIUM 5000 UNITS: 5000 INJECTION INTRAVENOUS; SUBCUTANEOUS at 14:02

## 2024-11-23 RX ADMIN — THERA TABS 1 TABLET: TAB at 08:47

## 2024-11-23 RX ADMIN — Medication 5 MG: at 20:31

## 2024-11-23 RX ADMIN — SODIUM CHLORIDE, PRESERVATIVE FREE 10 ML: 5 INJECTION INTRAVENOUS at 14:02

## 2024-11-23 RX ADMIN — HEPARIN SODIUM 5000 UNITS: 5000 INJECTION INTRAVENOUS; SUBCUTANEOUS at 20:31

## 2024-11-23 RX ADMIN — BUPROPION HYDROCHLORIDE 150 MG: 100 TABLET, FILM COATED ORAL at 20:30

## 2024-11-23 RX ADMIN — FUROSEMIDE 20 MG: 10 INJECTION, SOLUTION INTRAMUSCULAR; INTRAVENOUS at 08:46

## 2024-11-23 RX ADMIN — ZOLPIDEM TARTRATE 5 MG: 5 TABLET, COATED ORAL at 20:30

## 2024-11-23 RX ADMIN — AMIODARONE HYDROCHLORIDE 200 MG: 200 TABLET ORAL at 08:47

## 2024-11-23 RX ADMIN — MIDODRINE HYDROCHLORIDE 25 MG: 5 TABLET ORAL at 08:47

## 2024-11-23 RX ADMIN — MAGNESIUM SULFATE IN WATER 2000 MG: 40 INJECTION, SOLUTION INTRAVENOUS at 02:08

## 2024-11-23 RX ADMIN — GABAPENTIN 600 MG: 300 CAPSULE ORAL at 14:02

## 2024-11-23 RX ADMIN — PANTOPRAZOLE SODIUM 40 MG: 40 INJECTION, POWDER, FOR SOLUTION INTRAVENOUS at 08:46

## 2024-11-23 RX ADMIN — POTASSIUM BICARBONATE 40 MEQ: 782 TABLET, EFFERVESCENT ORAL at 20:30

## 2024-11-23 RX ADMIN — MIDODRINE HYDROCHLORIDE 25 MG: 5 TABLET ORAL at 17:37

## 2024-11-23 RX ADMIN — Medication: at 20:32

## 2024-11-23 RX ADMIN — Medication: at 09:14

## 2024-11-23 RX ADMIN — BUPROPION HYDROCHLORIDE 150 MG: 100 TABLET, FILM COATED ORAL at 08:47

## 2024-11-23 RX ADMIN — ATORVASTATIN CALCIUM 10 MG: 10 TABLET, FILM COATED ORAL at 20:31

## 2024-11-23 RX ADMIN — SODIUM CHLORIDE, PRESERVATIVE FREE 10 ML: 5 INJECTION INTRAVENOUS at 17:39

## 2024-11-23 RX ADMIN — LINACLOTIDE 290 MCG: 145 CAPSULE, GELATIN COATED ORAL at 07:36

## 2024-11-23 RX ADMIN — GABAPENTIN 600 MG: 300 CAPSULE ORAL at 08:46

## 2024-11-23 RX ADMIN — MIDODRINE HYDROCHLORIDE 25 MG: 5 TABLET ORAL at 02:08

## 2024-11-23 RX ADMIN — POTASSIUM CHLORIDE 20 MEQ: 29.8 INJECTION, SOLUTION INTRAVENOUS at 17:28

## 2024-11-23 RX ADMIN — FUROSEMIDE 20 MG: 10 INJECTION, SOLUTION INTRAMUSCULAR; INTRAVENOUS at 17:39

## 2024-11-23 RX ADMIN — SULFAMETHOXAZOLE AND TRIMETHOPRIM 1 TABLET: 800; 160 TABLET ORAL at 09:07

## 2024-11-23 RX ADMIN — SODIUM CHLORIDE, PRESERVATIVE FREE 10 ML: 5 INJECTION INTRAVENOUS at 08:48

## 2024-11-23 ASSESSMENT — PAIN SCALES - GENERAL: PAINLEVEL_OUTOF10: 0

## 2024-11-23 NOTE — PROGRESS NOTES
ICU Progress Note    Admit Date: 11/7/2024  Day: 10  IV Access:Peripheral  IV Fluids:None  Vasopressors:None                Diet: ADULT DIET; Full Liquid  ADULT ORAL NUTRITION SUPPLEMENT; Breakfast, Lunch, Dinner; Standard High Calorie/High Protein Oral Supplement  Abx: Bactrim (11/21 -)    CC: Mechanical fall     Interval history: Overnight the patient's blood pressure dropped with SBP in 70 with a MAP of 77.  10 mg of midodrine ordered.  BP improved.  Potassium repleted.  Patient has had 4 bowel movements in the last 24 hours.  And had around 6 bowel movements yesterday.  Patient still complains of dysuria.    Patient has failed his MBS twice and is on 10 liquid diet but has not eaten much since yesterday.  Will discuss possibility of restarting tube feeds again today.  HPI:     Mr. Juan Mohamud is a 84 y.o. male with a medical hx significant for type II DM, degenerative lumbar disease, arrhythmia s/p AICD, diastolic CHF, and macular degeneration otherwise as listed in the MHx table below, who presented from home to the [facility] ED on 11/3/24 after a fall.Patient landed on his left side and hit the back of his head but did not lose consciousness at this time.      CT imaging revealed acute T11 vertebral fracture extending into the T10-T11 disc space as well as diffuse distention of small and large bowel compatible with ileus and bilateral pleural effusions. GI consulted for ileus, and patient underwent colonic decompression  MRI thoracic spine then demonstrated large epidural spinal hematoma with cord compression  and additional T6 subacute compression fracture along with bilateral pleural effusions.   Patient was transferred to [facility] on 11/7 for neurosurgical care.      He got thoracic T8-L1 posterior decompression fixation on 11/12.Transferred to ICU due to concern for hypotension post procedure he was receiving pressors to maintain BP and was stop on nov 15, got a venous doppler demonstrated  --   11/23/24 0400 105/75 98 °F (36.7 °C) Oral (!) 113 28 97 % --   11/23/24 0300 90/67 -- -- (!) 116 17 -- --   11/23/24 0200 95/64 -- -- (!) 119 26 -- --   11/23/24 0100 (!) 82/67 -- -- (!) 120 21 94 % --       Intake/Output Summary (Last 24 hours) at 11/23/2024 0845  Last data filed at 11/23/2024 0734  Gross per 24 hour   Intake 1812.9 ml   Output 2150 ml   Net -337.1 ml       Review of Systems   Constitutional:  Positive for appetite change. Negative for fever.   Respiratory:  Negative for cough, shortness of breath and wheezing.    Cardiovascular:  Negative for chest pain, palpitations and leg swelling.   Gastrointestinal:  Positive for diarrhea. Negative for abdominal pain and vomiting.   Genitourinary:  Positive for dysuria. Negative for flank pain.   Neurological:  Negative for tremors, seizures, weakness and numbness.   Psychiatric/Behavioral:  Negative for behavioral problems and confusion.    All other systems reviewed and are negative.      Physical Exam  Constitutional:       Appearance: He is obese.   HENT:      Mouth/Throat:      Mouth: Mucous membranes are moist.   Eyes:      Extraocular Movements: Extraocular movements intact.   Cardiovascular:      Rate and Rhythm: Normal rate and regular rhythm.      Pulses: Normal pulses.   Pulmonary:      Breath sounds: Normal breath sounds.   Abdominal:      General: Bowel sounds are normal.      Palpations: Abdomen is soft.   Musculoskeletal:         General: Normal range of motion.      Cervical back: Neck supple.   Skin:     General: Skin is warm.   Neurological:      General: No focal deficit present.      Mental Status: He is alert and oriented to person, place, and time.   Psychiatric:         Mood and Affect: Mood normal.         Behavior: Behavior normal.           LABS:    CBC:   Recent Labs     11/21/24  0502 11/22/24  0347 11/23/24  0427   WBC 9.8 8.5 8.0   HGB 10.3* 10.1* 10.3*   HCT 31.3* 30.7* 31.2*    267 250   MCV 92.2 91.4 91.7

## 2024-11-23 NOTE — PROGRESS NOTES
-The [facility] Internal Medicine-  -ICU to [place] Transfer Note-  HPI from H&P  Mr. Juan Mohamud is a 84 y.o. male with a medical hx significant for type II DM, degenerative lumbar disease, arrhythmia s/p AICD, diastolic CHF, and macular degeneration otherwise as listed in the MHx table below, who presented from home to the [facility] ED on 11/3/24 after a fall.Patient landed on his left side and hit the back of his head but did not lose consciousness at this time.      CT imaging revealed acute T11 vertebral fracture extending into the T10-T11 disc space as well as diffuse distention of small and large bowel compatible with ileus and bilateral pleural effusions. GI consulted for ileus, and patient underwent colonic decompression  MRI thoracic spine then demonstrated large epidural spinal hematoma with cord compression  and additional T6 subacute compression fracture along with bilateral pleural effusions.   Patient was transferred to [facility] on 11/7 for neurosurgical care.      He got thoracic T8-L1 posterior decompression fixation on 11/12.Transferred to ICU due to concern for hypotension post procedure he was receiving pressors to maintain BP and was stop on nov 15, got a venous doppler demonstrated acute DVT in gastrocnemius. CT PE 11/14: small bilateral segmental/subsegmental pulmonary emboli in upper lobes ,got IVC placed as he is not receiving any anticoagulation as he has epidural hematoma   For ileus he received Between 11/07 and 11/12 he received neostigmine 11/09 and NG tube for successful ileus decompression.       I  ICU Admission Reason & Brief ICU Course:     Over the course of ICU patient developed multiple problems as listed below. However, today the patient is stable, his Bactrim stopped as urine culture came back negative and patient's BP is stable for him to be transferred to floors     C  Code Status/DPOA Info/Goals of Care/ACP Note:   -Code Status Full  -Any changes to goals of care?  no  -is palliative care consulted? yes       U Unprescribing & Pertinent High-Risk Medications  -Anticoagulation:  subc heparin  -Antibiotics:  Bactrim  -[] GI PPX : yes started     P Pending Tests at the Time of Transfer  None  Procedures   none      A Active consultants, including Rehab:  OT/PT  SLP - full liquid diet,   tube feeds at night      U Uncertainty Measure/Diagnostic Pause:   Working diagnosis at the time of transfer Epidural spinal hematoma     S Summary of Major Problems and To-Dos:  #New DVT  PE  Due to concern for possible PE in setting of ongoing hypotension, a venous doppler performed 11/13-demonstrated acute DVT in gastrocnemius.   LE Venous doppler 11/13: Acute deep venous thrombosis in the left gastrocnemius and soleal veins without proximal large vein extension.   CT PE 11/14: small bilateral segmental/subsegmental pulmonary emboli in upper lobes  -IR consulted: Temporary infrarenal IVC placed 11/15 AM without complication.   -heparin SQ       UTI:   Patient's UA had trace leukocyte esterase but was symptomatic.  -On Bactrim (11/21-)  -Urine culture  - NGTD ( 11/22)        Neuro  Epidural spinal hematoma   Spinal cord compression at T11   T10 vertebral body unstable fracture  T11 nondisplaced spinous process fracture  T6 vertebral body subacute compression fracture   S/p T8-L1 decompression, fusion and fixation   -PT/OT   -Out of chair daily     GI  Ileus s/p NG decompression and neostigmine x1 (11/09)  Chronic constipation  Most likely etiology is [place]/ Pseudo-obstruction.   KUB:diffuse gaseous distention of bowel, likely related to ileus (11/19)  -GI consulted:   -started Movantik on 11/19  Linzess decreased as patient has multiple BM  -Enemas/neostigmine if needed  -SLP recommendation:  failed MBS - full liquid diet  -  started on  tube feeds at night as patient has decreased po intake               Hypotension  Midodrine TID     History of V Tach  -telemetry  -home amiodarone 200mg

## 2024-11-23 NOTE — PROGRESS NOTES
[facility] Cardiology Progress Note    Primary Cardiologist: Dr Mendez     CC/HPI: HF, VT, ICD    S: Sitting on the side of the bed working with therapy. He denies cp or sob. He has palpitations. Complains of diarrhea. Still can not swallow.    Tele: Sinus tachycardia     O:  Physical Exam:  BP (!) 88/63   Pulse (!) 113   Temp 97.9 °F (36.6 °C) (Axillary)   Resp 19   Ht 1.956 m (6' 5\")   Wt 98.2 kg (216 lb 7.9 oz)   SpO2 91%   BMI 25.67 kg/m²    General (appearance):  No acute distress  Eyes: anicteric   Neck: soft, No JVD  Ears/Nose/Mouth/Thorat: No cyanosis.+ NG tube   CV: tachy   Respiratory:  normal effort   GI: soft, non-tender, non-distended  Skin: Warm, dry. No rashes. Stables down back CDI  Neuro/Psych: Alert and cooperative. Appropriate behavior  Ext:  No c/c. + LE edema    I.O's= +13 L     Weight  Admission: Weight - Scale: 89.5 kg (197 lb 5 oz)   Today: Weight - Scale: 98.2 kg (216 lb 7.9 oz)    CBC:   Recent Labs     11/21/24  0502 11/22/24  0347 11/23/24  0427   WBC 9.8 8.5 8.0   HGB 10.3* 10.1* 10.3*   HCT 31.3* 30.7* 31.2*   MCV 92.2 91.4 91.7    267 250     BMP:   Recent Labs     11/22/24  0347 11/22/24  1806 11/23/24  0145 11/23/24  0427    143  --  139   K 3.5 2.9* 3.7 3.5    110  --  107   CO2 25 24  --  24   PHOS 2.4* 3.0  --  3.5   BUN 24* 24*  --  24*   CREATININE 0.9 1.0  --  1.1     Estimated Creatinine Clearance: 64 mL/min (based on SCr of 1.1 mg/dL).  Mag:   Lab Results   Component Value Date/Time    MG 2.32 11/23/2024 04:27 AM     LIVER PROFILE:   No results for input(s): \"AST\", \"ALT\", \"LIPASE\", \"AMYLASE\", \"BILIDIR\", \"BILITOT\", \"ALKPHOS\" in the last 72 hours.    Invalid input(s): \"ALB\"    Pro-BNP:   Lab Results   Component Value Date/Time    PROBNP 7,647 11/11/2024 07:55 AM    PROBNP 4,975 11/03/2024 09:11 PM    PROBNP 2,190 07/23/2024 03:05 PM    PROBNP 2,599 02/28/2024 10:44 PM     High Sensitivity Troponin:   Lab Results   Component

## 2024-11-23 NOTE — PLAN OF CARE
Problem: Chronic Conditions and Co-morbidities  Goal: Patient's chronic conditions and co-morbidity symptoms are monitored and maintained or improved  11/23/2024 0928 by Ameena Lynn RN  Outcome: Progressing  Flowsheets (Taken 11/23/2024 0825)  Care Plan - Patient's Chronic Conditions and Co-Morbidity Symptoms are Monitored and Maintained or Improved: Monitor and assess patient's chronic conditions and comorbid symptoms for stability, deterioration, or improvement  11/22/2024 2346 by Delilah Dove RN  Outcome: Progressing     Problem: Discharge Planning  Goal: Discharge to home or other facility with appropriate resources  11/23/2024 0928 by Ameena Lynn RN  Outcome: Progressing  Flowsheets (Taken 11/23/2024 0825)  Discharge to home or other facility with appropriate resources: Identify barriers to discharge with patient and caregiver  11/22/2024 2346 by Delilah Dove RN  Outcome: Progressing     Problem: Safety - Adult  Goal: Free from fall injury  11/23/2024 0928 by Ameena Lynn RN  Outcome: Progressing  11/22/2024 2346 by Delilah Dove RN  Outcome: Progressing  Flowsheets (Taken 11/22/2024 1200 by Yamile Chadwick RN)  Free From Fall Injury:   Instruct family/caregiver on patient safety   Based on caregiver fall risk screen, instruct family/caregiver to ask for assistance with transferring infant if caregiver noted to have fall risk factors     Problem: Skin/Tissue Integrity  Goal: Absence of new skin breakdown  Description: 1.  Monitor for areas of redness and/or skin breakdown  2.  Assess vascular access sites hourly  3.  Every 4-6 hours minimum:  Change oxygen saturation probe site  4.  Every 4-6 hours:  If on nasal continuous positive airway pressure, respiratory therapy assess nares and determine need for appliance change or resting period.  11/23/2024 0928 by Ameena Lynn RN  Outcome: Progressing  11/22/2024 2346 by Delilah Dove RN  Outcome: Progressing     Problem:

## 2024-11-23 NOTE — PROGRESS NOTES
Patient having frequent PVC's along with an 8 beat run of V tach. K lab re-drawn and Mag replacement ordered.

## 2024-11-23 NOTE — PROGRESS NOTES
[facility] Medicine Progress Note  V 10.25      Date of Admission: 11/7/2024    Hospital Day: 17      Chief Admission Complaint:  fall, back pain     Subjective:  Patient seen and examined at bedside.  Discussed with bedside RN and ICU resident.  Ate minimal amount of creamer with this morning.  Reports having multiple bowel movements overnight.    Presenting Admission History:       Per chart review,  \"Mr. Juan Mohamud is a 84 y.o. male with a medical hx significant for type II DM, degenerative lumbar disease, arrhythmia s/p AICD, diastolic CHF, and macular degeneration, who presented from home to the [facility] ED on 11/3/24 after a fall.     Reportedly, patient fell after feeling like his \"knees were giving out.\" No reported symptoms prior to this fall, patient landed on his left side and hit the back of his head but did not lose consciousness at this time.      Upon arrival to the [facility] ED, patient was alert, oriented and reported with GCS of 15 and NIH of 0. While in the ED patient had increasing generalized weakness and difficulty getting up from a sitting position. Decision was made to admit patient as they did not feel safe discharging him home given his inability to stand unassisted.      Fall was thought to be mechanical and secondary to generalized weakness due to poor nutrition. CT abd pelv performed due to complaints of abdominal pain, distension, and back pain upon arrival to the floor. Imaging revealed acute T11 vertebral fracture extending into the T10-T11 disc space as well as diffuse distention of small and large bowel compatible with ileus and bilateral pleural effusions.     GI consulted for ileus, and patient underwent colonic decompression     MRI thoracic spine then demonstrated large epidural spinal hematoma with cord compression  and additional T6 subacute compression fracture along with bilateral pleural effusions.     Patient was transferred to [facility] on 11/7 for neurosurgical

## 2024-11-23 NOTE — CONSULTS
Comprehensive Nutrition Assessment    RECOMMENDATIONS:  PO Diet: Full liquid  Nutrition Supplement: Discontinue Ensure Plus TID, Start Ensure Clear BID   EN: Recommend Cyclic EN formula Standard Formula with Fiber  Jevity 1.5 @ goal rate 90 ml/hr (over 12hrs; 7pm-7am)  Initiate EN @ 10mL/hr and as tolerated, increase by 25 mL/hr q 4 hours until goal of 90 mL/hr is met.   Recommend water bolus 120 ml every 4 hours     NUTRITION ASSESSMENT:   Nutritional summary & status: RD consulted for nocturnal TF. Diet advanced to full liquid on 11/22 but pt is eating minimal amounts.Visited pt and wife in room. Reports pt is drinking some broth but started to feel sick after. Has consumed some bites of pudding. Pt is not drinking ensure plus because he does not like it. Ensure clear is not completely desirable to pt but willing to mix juice he likes with the ensure clear to help promote intake. Pt reports having \"20 bm\" throughout the day. TF to meet ~75% of pts needs to promote PO intake during the day. Modifying TF to a formula with fiber to help control diarrhea. RD to monitor initiation and tolerance.  Admission // PMH: Vertebral fracture//colonic psuedoobstruction, T2DM, HLD, CHF    MALNUTRITION ASSESSMENT  Context of Malnutrition: Acute Illness   Malnutrition Status: Mild malnutrition  Findings of the 6 clinical characteristics of malnutrition (Minimum of 2 out of 6 clinical characteristics is required to make the diagnosis of moderate or severe Protein Calorie Malnutrition based on AND/ASPEN Guidelines):  Energy Intake:  50% or less of estimated energy requirements for 5 or more days  Weight Loss:  Mild weight loss (5% in 3 month period)     Body Fat Loss:  Unable to assess     Muscle Mass Loss:  Unable to assess    Fluid Accumulation:  No fluid accumulation      NUTRITION DIAGNOSIS   Inadequate oral intake related to swallowing difficulty as evidenced by nutrition support - enteral nutrition    Nutrition Monitoring and

## 2024-11-23 NOTE — PLAN OF CARE
Problem: Skin/Tissue Integrity  Goal: Absence of new skin breakdown  Description: 1.  Monitor for areas of redness and/or skin breakdown  2.  Assess vascular access sites hourly  3.  Every 4-6 hours minimum:  Change oxygen saturation probe site  4.  Every 4-6 hours:  If on nasal continuous positive airway pressure, respiratory therapy assess nares and determine need for appliance change or resting period.  Outcome: Progressing     Problem: Safety - Adult  Goal: Free from fall injury  Outcome: Progressing  Flowsheets (Taken 11/22/2024 1200 by Yamile Chadwick RN)  Free From Fall Injury:   Instruct family/caregiver on patient safety   Based on caregiver fall risk screen, instruct family/caregiver to ask for assistance with transferring infant if caregiver noted to have fall risk factors     Problem: Respiratory - Adult  Goal: Achieves optimal ventilation and oxygenation  Outcome: Progressing     Problem: Metabolic/Fluid and Electrolytes - Adult  Goal: Electrolytes maintained within normal limits  Outcome: Progressing     Problem: Neurosensory - Adult  Goal: Achieves stable or improved neurological status  Outcome: Progressing     Problem: Gastrointestinal - Adult  Goal: Maintains or returns to baseline bowel function  Outcome: Progressing     Problem: Nutrition Deficit:  Goal: Optimize nutritional status  Outcome: Progressing

## 2024-11-24 LAB
ALBUMIN SERPL-MCNC: 2.5 G/DL (ref 3.4–5)
ANION GAP SERPL CALCULATED.3IONS-SCNC: 10 MMOL/L (ref 3–16)
BASOPHILS # BLD: 0.1 K/UL (ref 0–0.2)
BASOPHILS NFR BLD: 0.9 %
BUN SERPL-MCNC: 25 MG/DL (ref 7–20)
CALCIUM SERPL-MCNC: 7.7 MG/DL (ref 8.3–10.6)
CHLORIDE SERPL-SCNC: 104 MMOL/L (ref 99–110)
CO2 SERPL-SCNC: 24 MMOL/L (ref 21–32)
CREAT SERPL-MCNC: 1.3 MG/DL (ref 0.8–1.3)
DEPRECATED RDW RBC AUTO: 22 % (ref 12.4–15.4)
EOSINOPHIL # BLD: 0.1 K/UL (ref 0–0.6)
EOSINOPHIL NFR BLD: 1.3 %
GFR SERPLBLD CREATININE-BSD FMLA CKD-EPI: 54 ML/MIN/{1.73_M2}
GLUCOSE BLD-MCNC: 109 MG/DL (ref 70–99)
GLUCOSE BLD-MCNC: 113 MG/DL (ref 70–99)
GLUCOSE BLD-MCNC: 142 MG/DL (ref 70–99)
GLUCOSE SERPL-MCNC: 109 MG/DL (ref 70–99)
HCT VFR BLD AUTO: 30.8 % (ref 40.5–52.5)
HGB BLD-MCNC: 10.2 G/DL (ref 13.5–17.5)
LYMPHOCYTES # BLD: 0.8 K/UL (ref 1–5.1)
LYMPHOCYTES NFR BLD: 10.7 %
MAGNESIUM SERPL-MCNC: 2.18 MG/DL (ref 1.8–2.4)
MCH RBC QN AUTO: 30.5 PG (ref 26–34)
MCHC RBC AUTO-ENTMCNC: 33 G/DL (ref 31–36)
MCV RBC AUTO: 92.3 FL (ref 80–100)
MONOCYTES # BLD: 0.8 K/UL (ref 0–1.3)
MONOCYTES NFR BLD: 10.6 %
NEUTROPHILS # BLD: 5.8 K/UL (ref 1.7–7.7)
NEUTROPHILS NFR BLD: 76.5 %
PERFORMED ON: ABNORMAL
PHOSPHATE SERPL-MCNC: 4 MG/DL (ref 2.5–4.9)
PLATELET # BLD AUTO: 278 K/UL (ref 135–450)
PMV BLD AUTO: 7.8 FL (ref 5–10.5)
POTASSIUM SERPL-SCNC: 4 MMOL/L (ref 3.5–5.1)
RBC # BLD AUTO: 3.33 M/UL (ref 4.2–5.9)
SODIUM SERPL-SCNC: 138 MMOL/L (ref 136–145)
WBC # BLD AUTO: 7.6 K/UL (ref 4–11)

## 2024-11-24 PROCEDURE — 6370000000 HC RX 637 (ALT 250 FOR IP): Performed by: STUDENT IN AN ORGANIZED HEALTH CARE EDUCATION/TRAINING PROGRAM

## 2024-11-24 PROCEDURE — 6370000000 HC RX 637 (ALT 250 FOR IP)

## 2024-11-24 PROCEDURE — 85025 COMPLETE CBC W/AUTO DIFF WBC: CPT

## 2024-11-24 PROCEDURE — 2060000000 HC ICU INTERMEDIATE R&B

## 2024-11-24 PROCEDURE — 6360000002 HC RX W HCPCS

## 2024-11-24 PROCEDURE — 6360000002 HC RX W HCPCS: Performed by: STUDENT IN AN ORGANIZED HEALTH CARE EDUCATION/TRAINING PROGRAM

## 2024-11-24 PROCEDURE — 2580000003 HC RX 258

## 2024-11-24 PROCEDURE — 83735 ASSAY OF MAGNESIUM: CPT

## 2024-11-24 PROCEDURE — 80069 RENAL FUNCTION PANEL: CPT

## 2024-11-24 RX ORDER — MORPHINE SULFATE 4 MG/ML
4 INJECTION INTRAVENOUS
Status: DISCONTINUED | OUTPATIENT
Start: 2024-11-24 | End: 2024-12-05 | Stop reason: SDUPTHER

## 2024-11-24 RX ORDER — MORPHINE SULFATE 2 MG/ML
2 INJECTION, SOLUTION INTRAMUSCULAR; INTRAVENOUS
Status: DISCONTINUED | OUTPATIENT
Start: 2024-11-24 | End: 2024-12-05 | Stop reason: SDUPTHER

## 2024-11-24 RX ADMIN — MIDODRINE HYDROCHLORIDE 25 MG: 5 TABLET ORAL at 17:52

## 2024-11-24 RX ADMIN — POTASSIUM BICARBONATE 40 MEQ: 782 TABLET, EFFERVESCENT ORAL at 08:46

## 2024-11-24 RX ADMIN — Medication: at 20:32

## 2024-11-24 RX ADMIN — ATORVASTATIN CALCIUM 10 MG: 10 TABLET, FILM COATED ORAL at 20:31

## 2024-11-24 RX ADMIN — NALOXEGOL OXALATE 25 MG: 25 TABLET, FILM COATED ORAL at 06:37

## 2024-11-24 RX ADMIN — ZOLPIDEM TARTRATE 5 MG: 5 TABLET, COATED ORAL at 20:31

## 2024-11-24 RX ADMIN — BUPROPION HYDROCHLORIDE 150 MG: 100 TABLET, FILM COATED ORAL at 20:31

## 2024-11-24 RX ADMIN — MIDODRINE HYDROCHLORIDE 25 MG: 5 TABLET ORAL at 08:46

## 2024-11-24 RX ADMIN — AMIODARONE HYDROCHLORIDE 200 MG: 200 TABLET ORAL at 08:46

## 2024-11-24 RX ADMIN — GABAPENTIN 600 MG: 300 CAPSULE ORAL at 13:49

## 2024-11-24 RX ADMIN — GABAPENTIN 600 MG: 300 CAPSULE ORAL at 20:31

## 2024-11-24 RX ADMIN — GABAPENTIN 600 MG: 300 CAPSULE ORAL at 08:45

## 2024-11-24 RX ADMIN — HEPARIN SODIUM 5000 UNITS: 5000 INJECTION INTRAVENOUS; SUBCUTANEOUS at 20:29

## 2024-11-24 RX ADMIN — Medication: at 08:46

## 2024-11-24 RX ADMIN — BUPROPION HYDROCHLORIDE 150 MG: 100 TABLET, FILM COATED ORAL at 08:46

## 2024-11-24 RX ADMIN — SODIUM CHLORIDE, PRESERVATIVE FREE 10 ML: 5 INJECTION INTRAVENOUS at 20:32

## 2024-11-24 RX ADMIN — SODIUM CHLORIDE, PRESERVATIVE FREE 10 ML: 5 INJECTION INTRAVENOUS at 08:46

## 2024-11-24 RX ADMIN — OXYCODONE 5 MG: 5 TABLET ORAL at 10:09

## 2024-11-24 RX ADMIN — THERA TABS 1 TABLET: TAB at 08:45

## 2024-11-24 RX ADMIN — LINACLOTIDE 145 MCG: 145 CAPSULE, GELATIN COATED ORAL at 06:37

## 2024-11-24 RX ADMIN — HEPARIN SODIUM 5000 UNITS: 5000 INJECTION INTRAVENOUS; SUBCUTANEOUS at 13:49

## 2024-11-24 RX ADMIN — FUROSEMIDE 20 MG: 10 INJECTION, SOLUTION INTRAMUSCULAR; INTRAVENOUS at 17:52

## 2024-11-24 RX ADMIN — MIDODRINE HYDROCHLORIDE 25 MG: 5 TABLET ORAL at 02:13

## 2024-11-24 RX ADMIN — PANTOPRAZOLE SODIUM 40 MG: 40 INJECTION, POWDER, FOR SOLUTION INTRAVENOUS at 08:46

## 2024-11-24 RX ADMIN — FUROSEMIDE 20 MG: 10 INJECTION, SOLUTION INTRAMUSCULAR; INTRAVENOUS at 08:45

## 2024-11-24 RX ADMIN — HEPARIN SODIUM 5000 UNITS: 5000 INJECTION INTRAVENOUS; SUBCUTANEOUS at 05:16

## 2024-11-24 RX ADMIN — THIAMINE HCL TAB 100 MG 100 MG: 100 TAB at 08:45

## 2024-11-24 RX ADMIN — Medication 5 MG: at 20:31

## 2024-11-24 ASSESSMENT — PAIN SCALES - GENERAL
PAINLEVEL_OUTOF10: 10
PAINLEVEL_OUTOF10: 3
PAINLEVEL_OUTOF10: 3

## 2024-11-24 ASSESSMENT — PAIN DESCRIPTION - ORIENTATION: ORIENTATION: MID

## 2024-11-24 ASSESSMENT — PAIN DESCRIPTION - DESCRIPTORS: DESCRIPTORS: ACHING;DISCOMFORT

## 2024-11-24 ASSESSMENT — PAIN - FUNCTIONAL ASSESSMENT: PAIN_FUNCTIONAL_ASSESSMENT: PREVENTS OR INTERFERES SOME ACTIVE ACTIVITIES AND ADLS

## 2024-11-24 ASSESSMENT — PAIN DESCRIPTION - LOCATION: LOCATION: BUTTOCKS;BACK

## 2024-11-24 NOTE — PLAN OF CARE
Litzy RN  Outcome: Progressing  Flowsheets (Taken 11/24/2024 0800 by Nannette Leslie, RN)  Maintains adequate nutritional intake: Monitor percentage of each meal consumed  11/24/2024 0658 by Delilah Dove, RN  Outcome: Progressing

## 2024-11-24 NOTE — PROGRESS NOTES
[facility] Medicine Progress Note  V 10.25      Date of Admission: 11/7/2024    Hospital Day: 18      Chief Admission Complaint:  fall, back pain     Subjective:  Patient seen and examined at bedside.  Discussed with bedside RN.  Continues to have poor appetite.  Still having multiple bowel movements requiring rectal tube placement.  Has been weaned off of pressors.  Reports some lower back pain.    Presenting Admission History:       Per chart review,  \"Mr. Juan Mohamud is a 84 y.o. male with a medical hx significant for type II DM, degenerative lumbar disease, arrhythmia s/p AICD, diastolic CHF, and macular degeneration, who presented from home to the [facility] ED on 11/3/24 after a fall.     Reportedly, patient fell after feeling like his \"knees were giving out.\" No reported symptoms prior to this fall, patient landed on his left side and hit the back of his head but did not lose consciousness at this time.      Upon arrival to the [facility] ED, patient was alert, oriented and reported with GCS of 15 and NIH of 0. While in the ED patient had increasing generalized weakness and difficulty getting up from a sitting position. Decision was made to admit patient as they did not feel safe discharging him home given his inability to stand unassisted.      Fall was thought to be mechanical and secondary to generalized weakness due to poor nutrition. CT abd pelv performed due to complaints of abdominal pain, distension, and back pain upon arrival to the floor. Imaging revealed acute T11 vertebral fracture extending into the T10-T11 disc space as well as diffuse distention of small and large bowel compatible with ileus and bilateral pleural effusions.     GI consulted for ileus, and patient underwent colonic decompression     MRI thoracic spine then demonstrated large epidural spinal hematoma with cord compression  and additional T6 subacute compression fracture along with bilateral pleural effusions.     Patient  exacerbation of chronic illness    --------------------------------------------------  B. Risk of Treatment (any 1)    [x] Drugs/treatments that require intensive monitoring for toxicity    [] IV ABX (Vancomycin, Aminoglycosides, etc)     [] Post-Cath/Contrast study requiring serial monitoring    [] IV Narcotic analgesia    [x] Aggressive IV diuresis    [] Hypertonic Saline    [] Critical electrolyte abnormalities requiring IV replacement    [x] Insulin - Scheduled/SSI or Insulin gtt    [] Anticoagulation (Heparin gtt or Coumadin - other anticoagulants in special circumstances)    [] Cardiac Medications (IV Amiodarone/Diltiazem, Tikosyn, etc)    [] Hemodialysis    [] Other - IV pressors    [] Change in code status    [] Decision to escalate care    [] Major surgery/procedure with associated risk factors    --------------------------------------------------  C. Data (any 2)    [x] Data Review (any 3)    [x] Consultant notes from yesterday/today    [x] All available current labs reviewed interpreted for clinical significance    [x] Appropriate follow-up labs were ordered  [] Collateral history obtained     [x] Independent Interpretation of tests (any 1)    [x] Telemetry (Rhythm Strip) personally reviewed and interpreted        [] Imaging personally reviewed and interpreted     [] Discussion (any 1)  [] Multi-Disciplinary Rounds with Case Management  [] Discussed management of the case with           Labs:  Personally reviewed on 11/24/2024 and interpreted for clinical significance as documented above.     Recent Labs     11/22/24  0347 11/23/24 0427 11/24/24  0623   WBC 8.5 8.0 7.6   HGB 10.1* 10.3* 10.2*   HCT 30.7* 31.2* 30.8*    250 278     Recent Labs     11/23/24 0427 11/23/24  1255 11/24/24  0623    141 138   K 3.5 3.1*  3.1* 4.0    106 104   CO2 24 23 24   BUN 24* 24* 25*   CREATININE 1.1 1.1 1.3   CALCIUM 7.6* 7.6* 7.7*   MG 2.32 2.23 2.18   PHOS 3.5 3.7 4.0     No results for input(s):

## 2024-11-24 NOTE — PLAN OF CARE
Problem: Safety - Adult  Goal: Free from fall injury  Outcome: Progressing     Problem: Skin/Tissue Integrity  Goal: Absence of new skin breakdown  Description: 1.  Monitor for areas of redness and/or skin breakdown  2.  Assess vascular access sites hourly  3.  Every 4-6 hours minimum:  Change oxygen saturation probe site  4.  Every 4-6 hours:  If on nasal continuous positive airway pressure, respiratory therapy assess nares and determine need for appliance change or resting period.  Outcome: Progressing     Problem: Respiratory - Adult  Goal: Achieves optimal ventilation and oxygenation  Outcome: Progressing     Problem: Metabolic/Fluid and Electrolytes - Adult  Goal: Electrolytes maintained within normal limits  Outcome: Progressing     Problem: Neurosensory - Adult  Goal: Achieves stable or improved neurological status  Outcome: Progressing     Problem: Gastrointestinal - Adult  Goal: Minimal or absence of nausea and vomiting  Outcome: Progressing     Problem: Gastrointestinal - Adult  Goal: Maintains or returns to baseline bowel function  Outcome: Progressing     Problem: Gastrointestinal - Adult  Goal: Maintains adequate nutritional intake  Outcome: Progressing     Problem: Nutrition Deficit:  Goal: Optimize nutritional status  Outcome: Progressing

## 2024-11-24 NOTE — PROGRESS NOTES
4 Eyes Skin Assessment     NAME:  Juan Mohamud  YOB: 1940  MEDICAL RECORD NUMBER:  [id number]    The patient is being assessed for  Transfer to New Unit    I agree that at least one RN has performed a thorough Head to Toe Skin Assessment on the patient. ALL assessment sites listed below have been assessed.      Areas assessed by both nurses:    Head, Face, Ears, Shoulders, Back, Chest, Arms, Elbows, Hands, Sacrum. Buttock, Coccyx, Ischium, Legs. Feet and Heels, and Under Medical Devices     Scattered bruising and wounds BUE and BLE extremities, tear in gluteal cleft  Blister x2 back R leg  Redness petros area        Does the Patient have a Wound? Yes wound(s) were present on assessment. LDA wound assessment was Initiated and completed by JULIOCESAR Harry Prevention initiated by RN: Yes  Wound Care Orders initiated by RN: No    Pressure Injury (Stage 3,4, Unstageable, DTI, NWPT, and Complex wounds) if present, place Wound referral order by RN under : No    New Ostomies, if present place, Ostomy referral order under : No     Nurse 1 eSignature: Electronically signed by Litzy Monroe RN on 11/24/24 at 2:28 PM EST    **SHARE this note so that the co-signing nurse can place an eSignature**    Nurse 2 eSignature: Electronically signed by Natacha Meehan RN on 11/24/24 at 3:11 PM EST

## 2024-11-25 ENCOUNTER — APPOINTMENT (OUTPATIENT)
Dept: GENERAL RADIOLOGY | Age: 84
DRG: 447 | End: 2024-11-25
Attending: INTERNAL MEDICINE
Payer: MEDICARE

## 2024-11-25 LAB
ANION GAP SERPL CALCULATED.3IONS-SCNC: 7 MMOL/L (ref 3–16)
BASOPHILS # BLD: 0.1 K/UL (ref 0–0.2)
BASOPHILS NFR BLD: 0.6 %
BUN SERPL-MCNC: 25 MG/DL (ref 7–20)
CALCIUM SERPL-MCNC: 7.9 MG/DL (ref 8.3–10.6)
CHLORIDE SERPL-SCNC: 103 MMOL/L (ref 99–110)
CO2 SERPL-SCNC: 27 MMOL/L (ref 21–32)
CREAT SERPL-MCNC: 1.3 MG/DL (ref 0.8–1.3)
DEPRECATED RDW RBC AUTO: 20.3 % (ref 12.4–15.4)
EOSINOPHIL # BLD: 0.1 K/UL (ref 0–0.6)
EOSINOPHIL NFR BLD: 0.6 %
GFR SERPLBLD CREATININE-BSD FMLA CKD-EPI: 54 ML/MIN/{1.73_M2}
GLUCOSE BLD-MCNC: 160 MG/DL (ref 70–99)
GLUCOSE BLD-MCNC: 176 MG/DL (ref 70–99)
GLUCOSE BLD-MCNC: 201 MG/DL (ref 70–99)
GLUCOSE BLD-MCNC: 208 MG/DL (ref 70–99)
GLUCOSE SERPL-MCNC: 146 MG/DL (ref 70–99)
HCT VFR BLD AUTO: 32.7 % (ref 40.5–52.5)
HGB BLD-MCNC: 10.7 G/DL (ref 13.5–17.5)
LYMPHOCYTES # BLD: 0.8 K/UL (ref 1–5.1)
LYMPHOCYTES NFR BLD: 7.8 %
MCH RBC QN AUTO: 30.1 PG (ref 26–34)
MCHC RBC AUTO-ENTMCNC: 32.8 G/DL (ref 31–36)
MCV RBC AUTO: 91.8 FL (ref 80–100)
MONOCYTES # BLD: 1.1 K/UL (ref 0–1.3)
MONOCYTES NFR BLD: 10.7 %
NEUTROPHILS # BLD: 8.2 K/UL (ref 1.7–7.7)
NEUTROPHILS NFR BLD: 80.3 %
PERFORMED ON: ABNORMAL
PLATELET # BLD AUTO: 322 K/UL (ref 135–450)
PMV BLD AUTO: 8.3 FL (ref 5–10.5)
POTASSIUM SERPL-SCNC: 4.2 MMOL/L (ref 3.5–5.1)
RBC # BLD AUTO: 3.56 M/UL (ref 4.2–5.9)
SODIUM SERPL-SCNC: 137 MMOL/L (ref 136–145)
WBC # BLD AUTO: 10.2 K/UL (ref 4–11)

## 2024-11-25 PROCEDURE — 6370000000 HC RX 637 (ALT 250 FOR IP)

## 2024-11-25 PROCEDURE — 97530 THERAPEUTIC ACTIVITIES: CPT

## 2024-11-25 PROCEDURE — 2060000000 HC ICU INTERMEDIATE R&B

## 2024-11-25 PROCEDURE — 92526 ORAL FUNCTION THERAPY: CPT

## 2024-11-25 PROCEDURE — 6360000002 HC RX W HCPCS: Performed by: STUDENT IN AN ORGANIZED HEALTH CARE EDUCATION/TRAINING PROGRAM

## 2024-11-25 PROCEDURE — 6370000000 HC RX 637 (ALT 250 FOR IP): Performed by: STUDENT IN AN ORGANIZED HEALTH CARE EDUCATION/TRAINING PROGRAM

## 2024-11-25 PROCEDURE — 6360000002 HC RX W HCPCS: Performed by: NURSE PRACTITIONER

## 2024-11-25 PROCEDURE — 6360000002 HC RX W HCPCS

## 2024-11-25 PROCEDURE — 97535 SELF CARE MNGMENT TRAINING: CPT

## 2024-11-25 PROCEDURE — 80048 BASIC METABOLIC PNL TOTAL CA: CPT

## 2024-11-25 PROCEDURE — 97110 THERAPEUTIC EXERCISES: CPT

## 2024-11-25 PROCEDURE — 99232 SBSQ HOSP IP/OBS MODERATE 35: CPT | Performed by: NURSE PRACTITIONER

## 2024-11-25 PROCEDURE — 85025 COMPLETE CBC W/AUTO DIFF WBC: CPT

## 2024-11-25 PROCEDURE — 2580000003 HC RX 258

## 2024-11-25 RX ORDER — FUROSEMIDE 20 MG/1
20 TABLET ORAL 2 TIMES DAILY
Status: DISCONTINUED | OUTPATIENT
Start: 2024-11-26 | End: 2024-12-05

## 2024-11-25 RX ADMIN — BUPROPION HYDROCHLORIDE 150 MG: 100 TABLET, FILM COATED ORAL at 08:31

## 2024-11-25 RX ADMIN — THIAMINE HCL TAB 100 MG 100 MG: 100 TAB at 08:27

## 2024-11-25 RX ADMIN — THERA TABS 1 TABLET: TAB at 08:27

## 2024-11-25 RX ADMIN — FUROSEMIDE 20 MG: 10 INJECTION, SOLUTION INTRAMUSCULAR; INTRAVENOUS at 17:27

## 2024-11-25 RX ADMIN — HEPARIN SODIUM 5000 UNITS: 5000 INJECTION INTRAVENOUS; SUBCUTANEOUS at 05:52

## 2024-11-25 RX ADMIN — GABAPENTIN 600 MG: 300 CAPSULE ORAL at 15:08

## 2024-11-25 RX ADMIN — NALOXEGOL OXALATE 25 MG: 25 TABLET, FILM COATED ORAL at 05:52

## 2024-11-25 RX ADMIN — HEPARIN SODIUM 5000 UNITS: 5000 INJECTION INTRAVENOUS; SUBCUTANEOUS at 15:09

## 2024-11-25 RX ADMIN — OXYCODONE 5 MG: 5 TABLET ORAL at 02:01

## 2024-11-25 RX ADMIN — MIDODRINE HYDROCHLORIDE 25 MG: 5 TABLET ORAL at 02:12

## 2024-11-25 RX ADMIN — FUROSEMIDE 20 MG: 10 INJECTION, SOLUTION INTRAMUSCULAR; INTRAVENOUS at 08:27

## 2024-11-25 RX ADMIN — Medication: at 20:32

## 2024-11-25 RX ADMIN — ATORVASTATIN CALCIUM 10 MG: 10 TABLET, FILM COATED ORAL at 20:29

## 2024-11-25 RX ADMIN — PANTOPRAZOLE SODIUM 40 MG: 40 INJECTION, POWDER, FOR SOLUTION INTRAVENOUS at 08:27

## 2024-11-25 RX ADMIN — AMIODARONE HYDROCHLORIDE 200 MG: 200 TABLET ORAL at 08:27

## 2024-11-25 RX ADMIN — MIDODRINE HYDROCHLORIDE 25 MG: 5 TABLET ORAL at 17:27

## 2024-11-25 RX ADMIN — BUPROPION HYDROCHLORIDE 150 MG: 100 TABLET, FILM COATED ORAL at 20:29

## 2024-11-25 RX ADMIN — GABAPENTIN 600 MG: 300 CAPSULE ORAL at 20:29

## 2024-11-25 RX ADMIN — LINACLOTIDE 145 MCG: 145 CAPSULE, GELATIN COATED ORAL at 06:10

## 2024-11-25 RX ADMIN — SODIUM CHLORIDE, PRESERVATIVE FREE 10 ML: 5 INJECTION INTRAVENOUS at 08:28

## 2024-11-25 RX ADMIN — GABAPENTIN 600 MG: 300 CAPSULE ORAL at 08:27

## 2024-11-25 RX ADMIN — Medication: at 08:28

## 2024-11-25 RX ADMIN — MIDODRINE HYDROCHLORIDE 25 MG: 5 TABLET ORAL at 08:31

## 2024-11-25 RX ADMIN — SODIUM CHLORIDE, PRESERVATIVE FREE 10 ML: 5 INJECTION INTRAVENOUS at 20:36

## 2024-11-25 RX ADMIN — ZOLPIDEM TARTRATE 5 MG: 5 TABLET, COATED ORAL at 20:28

## 2024-11-25 ASSESSMENT — PAIN SCALES - GENERAL
PAINLEVEL_OUTOF10: 0
PAINLEVEL_OUTOF10: 0
PAINLEVEL_OUTOF10: 6
PAINLEVEL_OUTOF10: 0

## 2024-11-25 ASSESSMENT — PAIN DESCRIPTION - FREQUENCY: FREQUENCY: INTERMITTENT

## 2024-11-25 ASSESSMENT — PAIN - FUNCTIONAL ASSESSMENT: PAIN_FUNCTIONAL_ASSESSMENT: PREVENTS OR INTERFERES WITH MANY ACTIVE NOT PASSIVE ACTIVITIES

## 2024-11-25 ASSESSMENT — PAIN DESCRIPTION - DESCRIPTORS: DESCRIPTORS: ACHING;DISCOMFORT

## 2024-11-25 ASSESSMENT — PAIN DESCRIPTION - ORIENTATION: ORIENTATION: LOWER

## 2024-11-25 ASSESSMENT — PAIN DESCRIPTION - PAIN TYPE: TYPE: ACUTE PAIN

## 2024-11-25 ASSESSMENT — PAIN SCALES - WONG BAKER
WONGBAKER_NUMERICALRESPONSE: HURTS A LITTLE BIT
WONGBAKER_NUMERICALRESPONSE: HURTS A LITTLE BIT

## 2024-11-25 ASSESSMENT — PAIN DESCRIPTION - LOCATION: LOCATION: BACK

## 2024-11-25 ASSESSMENT — PAIN DESCRIPTION - ONSET: ONSET: AWAKENED FROM SLEEP

## 2024-11-25 NOTE — CARE COORDINATION
CM following: Pt is from [facility] with his spouse and was not active with any services PTA. Pt has a walker and cane at home, which he uses for ambulation outside of the home and at night. Pt is agreeable to discharge to SNF - [facility] [place] and they have accepted and Adele [phone] in admissions is following. Pt will need a long term nutrition plan prior to starting precert for SNF. Documentation indicates that referral to GI has been placed as pt and spouse now open to PEG tube. CM will continue to follow for discharge planning.  Electronically signed by SHARLA Blum on 11/25/2024 at 4:27 PM  [phone]

## 2024-11-25 NOTE — PROGRESS NOTES
Comprehensive Nutrition Assessment    RECOMMENDATIONS:  Nutrition Support: Osmolite 1.5 @ 65 ml/hr  Flushes: 30 q4  ]PO Diet: Full liquid  SLP recommends NPO, pt desires fulls for quality of life  Nutrition Supplement: Ensure Clear BID  Nutrition Education: Education/Counseling not indicated     NUTRITION ASSESSMENT:   Nutritional summary & status: Follow up. Weekend RD consulted for nocturnal TF. TF regimen modified to fiberous formula nightly. Pt tolerating fiber free formula. SLP recommends NPO. Pt prefers full liquid diet for quality of life. Unable to tolerate PO diet/meet needs due to general apathy towards food. TF regimen modified this AM from nocturnal to continuous fiber free formula he was previously tolerating. BM x9 11/23 with fiberous formula. BM x3 yesterday, x2 so far today. Pt denies TF intolerance. Receiving Linzess + Movantik daily. MD decreased Movantik dose. Pt/pt's wife with several questions regarding PEG placement. RD answered questions. Spoke to SW, SW to make pt aware of options of home health assistance to utilize after SNF. Continue current TF regimen, RD to follow.     Admission // PMH: Vertebral fracture//colonic psuedoobstruction, T2DM, HLD, CHF    MALNUTRITION ASSESSMENT  Context of Malnutrition: Acute Illness   Malnutrition Status: Mild malnutrition  Findings of the 6 clinical characteristics of malnutrition (Minimum of 2 out of 6 clinical characteristics is required to make the diagnosis of moderate or severe Protein Calorie Malnutrition based on AND/ASPEN Guidelines):  Energy Intake:  50% or less of estimated energy requirements for 5 or more days  Weight Loss:  Mild weight loss (5% in 3 month period)     Body Fat Loss:  Unable to assess     Muscle Mass Loss:  Unable to assess    Fluid Accumulation:  No fluid accumulation     Strength:  Not Performed    NUTRITION DIAGNOSIS   Inadequate oral intake related to swallowing difficulty as evidenced by nutrition support - enteral

## 2024-11-25 NOTE — PROGRESS NOTES
Patient alert and oriented x4. Hard of hearing. Intermittent episodes of confusion when waking up. Corpak in place and bridled, external measurement 64 cm. On 3L/NC. Plan for PEG tomorrow, NPO at midnight. Patient up to chair today via maxi move.

## 2024-11-25 NOTE — PROGRESS NOTES
[facility]  GI Progress Note          Juan Mohamud is a 84 y.o. male patient.  1. SVT (supraventricular tachycardia) (HCC)    2. Hypotension, unspecified hypotension type        Admit Date: 11/7/2024    Subjective:       Feeling okay, still a little short of breath.      ROS:  Cardiovascular ROS: positive for - shortness of breath  Gastrointestinal ROS: no abdominal pain, change in bowel habits, or black or bloody stools  Respiratory ROS: no cough, shortness of breath, or wheezing    Scheduled Meds:   [START ON 11/26/2024] furosemide  20 mg Oral BID    [START ON 11/26/2024] naloxegol  12.5 mg Oral QAM AC    [START ON 11/26/2024] ceFAZolin  1,000 mg IntraVENous Once    linaclotide  145 mcg Oral QAM AC    midodrine  25 mg Oral q8h    multivitamin  1 tablet Oral Daily    thiamine  100 mg Oral Daily    zolpidem  5 mg Oral Nightly    buPROPion  150 mg Per NG tube BID    [Held by provider] metoprolol  2.5 mg IntraVENous Q6H    melatonin  5 mg Oral Nightly    sodium chloride flush  5-40 mL IntraVENous 2 times per day    heparin (porcine)  5,000 Units SubCUTAneous Q8H    pantoprazole  40 mg IntraVENous Daily    balsum peru-castor oil   Topical BID    gabapentin  600 mg Oral TID    [Held by provider] buPROPion  300 mg Oral Daily    atorvastatin  10 mg Oral Nightly    amiodarone  200 mg Oral Daily    [Held by provider] tamsulosin  0.4 mg Oral Daily    [Held by provider] DESMOpressin  200 mcg Oral Nightly       Continuous Infusions:   sodium chloride      sodium chloride      dextrose         PRN Meds:  morphine **OR** morphine, acetaminophen **OR** acetaminophen, dextrose, iopamidol, benzonatate, SMOG Enema, guaiFENesin, sodium chloride flush, sodium chloride, sodium chloride flush, sodium chloride, ondansetron **OR** ondansetron, lidocaine, oxyCODONE, glucose, dextrose bolus **OR** dextrose bolus, glucagon (rDNA), dextrose      Objective:       Patient Vitals for the past 24 hrs:   BP Temp Temp

## 2024-11-25 NOTE — PROGRESS NOTES
Alert and oriented x 4.  Vitals stable, afebrile.  Denies pain, sob.  NG with Osmolite 1.5 at 40 ml/hr, goal 65 ml/hr.  Lungs diminished.  +2 pitting BLE edema.  Patient repositioned q 2 hours with pillow support, heels elevated.  Fall precautions in place.  Call light and belongings in reach.  Patient encouraged to call with needs and concerns.

## 2024-11-25 NOTE — PROGRESS NOTES
Occupational Therapy  Facility/Department: [facility] 5T ORTHO/NEURO  Occupational Therapy Treatment    Name: Juan Mohamud  : 1940  MRN: [id number]  Date of Service: 2024    Discharge Recommendations:  Subacute/Skilled Nursing Facility  OT Equipment Recommendations  Equipment Needed: No  Other: defer to next level of care       Patient Diagnosis(es): The primary encounter diagnosis was SVT (supraventricular tachycardia) (HCC). A diagnosis of Hypotension, unspecified hypotension type was also pertinent to this visit.  Past Medical History:  has a past medical history of (HFpEF) heart failure with preserved ejection fraction (HCC), Diabetes mellitus (HCC), DVT (deep venous thrombosis) (HCC), Hyperlipidemia, ICD (implantable cardioverter-defibrillator) in place, Indigestion, Insomnia, Macular degeneration, MI, old, Neuropathy, Pulmonary emboli (HCC), and Wide-complex tachycardia.  Past Surgical History:  has a past surgical history that includes knee surgery; Tonsillectomy; Colonoscopy; fracture surgery (Left); Mouth surgery; joint replacement (Right); Intracapsular cataract extraction (Right, 2018); Colonoscopy (N/A, 2024); Colonoscopy (N/A, 3/6/2024); Colonoscopy (N/A, 2024); Colonoscopy (N/A, 2024); lumbar fusion (N/A, 2024); and IR GUIDED IVC FILTER PLACEMENT (11/15/2024).    Treatment Diagnosis: Decreased ADL status and functional mobility      Assessment  Performance deficits / Impairments: Decreased functional mobility ;Decreased endurance;Decreased ADL status;Decreased posture;Decreased ROM;Decreased balance;Decreased strength;Decreased safe awareness  Assessment: Pt demo partial rolling in bed w/ Mod Ax1, required Max Ax2 for attempted sit>stands from recliner>sal yari, unable to complete full stance. Pt required use of maximove for recliner to bed transfer. Pt remains below baseline and would benefit from ongoing IP OT services upon dc to maximize safety and

## 2024-11-25 NOTE — PROGRESS NOTES
[facility] Medicine Progress Note  V 10.25      Date of Admission: 11/7/2024    Hospital Day: 19      Chief Admission Complaint:  fall, back pain     Subjective:  Patient seen and examined at bedside.  Discussed with bedside RN.  Continues to have poor appetite and is not doing well with speech therapy.  Still having multiple bowel movements.     Presenting Admission History:       Per chart review,  \"Mr. Juan Mohamud is a 84 y.o. male with a medical hx significant for type II DM, degenerative lumbar disease, arrhythmia s/p AICD, diastolic CHF, and macular degeneration, who presented from home to the [facility] ED on 11/3/24 after a fall.     Reportedly, patient fell after feeling like his \"knees were giving out.\" No reported symptoms prior to this fall, patient landed on his left side and hit the back of his head but did not lose consciousness at this time.      Upon arrival to the [facility] ED, patient was alert, oriented and reported with GCS of 15 and NIH of 0. While in the ED patient had increasing generalized weakness and difficulty getting up from a sitting position. Decision was made to admit patient as they did not feel safe discharging him home given his inability to stand unassisted.      Fall was thought to be mechanical and secondary to generalized weakness due to poor nutrition. CT abd pelv performed due to complaints of abdominal pain, distension, and back pain upon arrival to the floor. Imaging revealed acute T11 vertebral fracture extending into the T10-T11 disc space as well as diffuse distention of small and large bowel compatible with ileus and bilateral pleural effusions.     GI consulted for ileus, and patient underwent colonic decompression     MRI thoracic spine then demonstrated large epidural spinal hematoma with cord compression  and additional T6 subacute compression fracture along with bilateral pleural effusions.     Patient was transferred to [facility] on 11/7 for neurosurgical    --------------------------------------------------  B. Risk of Treatment (any 1)    [] Drugs/treatments that require intensive monitoring for toxicity    [] IV ABX (Vancomycin, Aminoglycosides, etc)     [] Post-Cath/Contrast study requiring serial monitoring    [] IV Narcotic analgesia    [] Aggressive IV diuresis    [] Hypertonic Saline    [] Critical electrolyte abnormalities requiring IV replacement    [] Insulin - Scheduled/SSI or Insulin gtt    [] Anticoagulation (Heparin gtt or Coumadin - other anticoagulants in special circumstances)    [] Cardiac Medications (IV Amiodarone/Diltiazem, Tikosyn, etc)    [] Hemodialysis    [] Other - IV pressors    [] Change in code status    [] Decision to escalate care    [] Major surgery/procedure with associated risk factors    --------------------------------------------------  C. Data (any 2)    [x] Data Review (any 3)    [x] Consultant notes from yesterday/today    [x] All available current labs reviewed interpreted for clinical significance    [x] Appropriate follow-up labs were ordered  [] Collateral history obtained     [x] Independent Interpretation of tests (any 1)    [x] Telemetry (Rhythm Strip) personally reviewed and interpreted        [] Imaging personally reviewed and interpreted     [] Discussion (any 1)  [] Multi-Disciplinary Rounds with Case Management  [] Discussed management of the case with           Labs:  Personally reviewed on 11/25/2024 and interpreted for clinical significance as documented above.     Recent Labs     11/23/24 0427 11/24/24  0623 11/25/24  0400   WBC 8.0 7.6 10.2   HGB 10.3* 10.2* 10.7*   HCT 31.2* 30.8* 32.7*    278 322     Recent Labs     11/23/24 0427 11/23/24  1255 11/24/24  0623 11/25/24  0400    141 138 137   K 3.5 3.1*  3.1* 4.0 4.2    106 104 103   CO2 24 23 24 27   BUN 24* 24* 25* 25*   CREATININE 1.1 1.1 1.3 1.3   CALCIUM 7.6* 7.6* 7.7* 7.9*   MG 2.32 2.23 2.18  --    PHOS 3.5 3.7 4.0  --      No

## 2024-11-25 NOTE — PLAN OF CARE
Problem: Discharge Planning  Goal: Discharge to home or other facility with appropriate resources  11/25/2024 0014 by Esperanza Bosch, RN  Outcome: Progressing   Case management following for d/c needs.    Problem: Safety - Adult  Goal: Free from fall injury  11/25/2024 0014 by Esperanza Bosch, RN  Outcome: Progressing   Fall precautions in place.  Call light and belongings within reach.  AvDruidlys monitor in use with 24 hour monitoring per MW.      Problem: Skin/Tissue Integrity  Goal: Absence of new skin breakdown  Description: 1.  Monitor for areas of redness and/or skin breakdown  2.  Assess vascular access sites hourly  3.  Every 4-6 hours minimum:  Change oxygen saturation probe site  4.  Every 4-6 hours:  If on nasal continuous positive airway pressure, respiratory therapy assess nares and determine need for appliance change or resting period.  11/25/2024 0014 by Esperanza Bosch, RN  Outcome: Progressing   No new areas of skin breakdown noted.  Patient repositioned with pillow support.  Prevalon boots to BLE.      Problem: Pain  Goal: Verbalizes/displays adequate comfort level or baseline comfort level  11/25/2024 0014 by Esperanza Bosch, RN  Outcome: Progressing   Denies.    Problem: Respiratory - Adult  Goal: Achieves optimal ventilation and oxygenation  11/25/2024 0014 by Esperanza Bosch, RN  Outcome: Progressing   SpO2 92-93% 3 L NC.      Problem: Metabolic/Fluid and Electrolytes - Adult  Goal: Electrolytes maintained within normal limits  11/25/2024 0014 by Esperanza Bosch, RN  Outcome: Progressing

## 2024-11-25 NOTE — PLAN OF CARE
Problem: Chronic Conditions and Co-morbidities  Goal: Patient's chronic conditions and co-morbidity symptoms are monitored and maintained or improved  Outcome: Progressing     Problem: Discharge Planning  Goal: Discharge to home or other facility with appropriate resources  Outcome: Progressing     Problem: Safety - Adult  Goal: Free from fall injury  Outcome: Progressing     Problem: Skin/Tissue Integrity  Goal: Absence of new skin breakdown  Description: 1.  Monitor for areas of redness and/or skin breakdown  2.  Assess vascular access sites hourly  3.  Every 4-6 hours minimum:  Change oxygen saturation probe site  4.  Every 4-6 hours:  If on nasal continuous positive airway pressure, respiratory therapy assess nares and determine need for appliance change or resting period.  Outcome: Progressing     Problem: ABCDS Injury Assessment  Goal: Absence of physical injury  Outcome: Progressing     Problem: Pain  Goal: Verbalizes/displays adequate comfort level or baseline comfort level  Outcome: Progressing     Problem: Respiratory - Adult  Goal: Achieves optimal ventilation and oxygenation  Outcome: Progressing     Problem: Cardiovascular - Adult  Goal: Maintains optimal cardiac output and hemodynamic stability  Outcome: Progressing  Goal: Absence of cardiac dysrhythmias or at baseline  Outcome: Progressing     Problem: Metabolic/Fluid and Electrolytes - Adult  Goal: Electrolytes maintained within normal limits  Outcome: Progressing  Goal: Hemodynamic stability and optimal renal function maintained  Outcome: Progressing     Problem: Neurosensory - Adult  Goal: Achieves stable or improved neurological status  Outcome: Progressing  Goal: Absence of seizures  Outcome: Progressing  Goal: Achieves maximal functionality and self care  Outcome: Progressing     Problem: Nutrition Deficit:  Goal: Optimize nutritional status  Outcome: Progressing     Problem: Gastrointestinal - Adult  Goal: Minimal or absence of nausea and

## 2024-11-25 NOTE — PROGRESS NOTES
Physical Therapy  Facility/Department: [facility] 5T ORTHO/NEURO  Physical Therapy Treatment    Name: Juan Mohamud  : 1940  MRN: [id number]  Date of Service: 2024    Discharge Recommendations:  Subacute/Skilled Nursing Facility   PT Equipment Recommendations  Other: defer      Patient Diagnosis(es): The primary encounter diagnosis was SVT (supraventricular tachycardia) (HCC). A diagnosis of Hypotension, unspecified hypotension type was also pertinent to this visit.  Past Medical History:  has a past medical history of (HFpEF) heart failure with preserved ejection fraction (HCC), Diabetes mellitus (HCC), DVT (deep venous thrombosis) (HCC), Hyperlipidemia, ICD (implantable cardioverter-defibrillator) in place, Indigestion, Insomnia, Macular degeneration, MI, old, Neuropathy, Pulmonary emboli (HCC), and Wide-complex tachycardia.  Past Surgical History:  has a past surgical history that includes knee surgery; Tonsillectomy; Colonoscopy; fracture surgery (Left); Mouth surgery; joint replacement (Right); Intracapsular cataract extraction (Right, 2018); Colonoscopy (N/A, 2024); Colonoscopy (N/A, 3/6/2024); Colonoscopy (N/A, 2024); Colonoscopy (N/A, 2024); lumbar fusion (N/A, 2024); and IR GUIDED IVC FILTER PLACEMENT (11/15/2024).    Assessment  Body Structures, Functions, Activity Limitations Requiring Skilled Therapeutic Intervention: Decreased functional mobility ;Decreased endurance;Increased pain;Decreased balance;Decreased strength;Decreased safe awareness;Decreased ROM  Assessment: Pt demos good effort and participation but is limited by generalized weakness and fatigue.  Unable to complete sit to stand from chair today.  Rec transfer attempt from raised surface at future session.  Requires use of maxi move for safe transfer back to bed.  Rec SNF at d/c.  Will follow.  Treatment Diagnosis: dec'd strength and impaired mobility  Therapy Prognosis: Fair  Requires PT Follow-Up:

## 2024-11-25 NOTE — PROGRESS NOTES
Speech Language Pathology  Facility/Department:[facility] ICU  Dysphagia Therapy Note                                                       Name: Juan Mohamud  : 1940  MRN: [id number]    Patient Diagnosis(es):   Patient Active Problem List    Diagnosis Date Noted    VT (ventricular tachycardia) ([facility]) 2024    Dysphagia 2024    Encounter for palliative care 2024    Hypotension 2024    SVT (supraventricular tachycardia) ([facility]) 2024    Fx dorsal vertebra-closed ([facility]) 2024    Spinal cord injury at T1-T6 level ([facility]) 2024    Preoperative cardiovascular examination 2024    Chronic heart failure with preserved ejection fraction (HFpEF) ([facility]) 2024    Abdominal distension 2024    Epidural hematoma 2024    Fall at home, initial encounter 2024    Acute congestive heart failure ([facility]) 2024    PVC's (premature ventricular contractions) 2024    Sepsis ([facility]) 2024    ICD (implantable cardioverter-defibrillator) in place 2024    Acute hypoxemic respiratory failure 2024    BOSTON (acute kidney injury) ([facility]) 2024    Arrhythmia 2024    Primary hypertension 2024    Hyperlipidemia 2024    Colonic obstruction ([facility]) 2024    Trauma 2024    History of fall 2024    Type 2 diabetes mellitus ([facility]) 2024    Anemia 2024    Pneumonia 2024    Hyponatremia 2024    Colonic pseudoobstruction 2024    Wide-complex tachycardia 2024    Constipation 2024    Cataract extraction status of right eye 2018    DDD (degenerative disc disease), lumbar 2013       Past Medical History:   Diagnosis Date    (HFpEF) heart failure with preserved ejection fraction ([facility]) 2024    EF 50-55%, mod/severe MR    Diabetes mellitus ([facility])     diet controlled    DVT (deep venous thrombosis) ([facility]) 2024      Acute deep venous thrombosis in the left gastrocnemius and  Clean and clear oral cavity. Pt with instances of fatigue vs lethargy. Required cues to remain attentive to task and keep eyes open throughout. SLP provided pt with trials ice chips and tsps water and cued to implement effortful swallows across all. ? Actual use, however pt endorsing \"good\" effort when asked. Pt appeared to swallow all trials. X4 instances of pt throat clearing/coughing after completion of swallow. WBC and Neutrophils Absolute remain elevated, however trending down. Based on assessment and recent MBS results SLP recommends continue NPO with allowance of ice chips and tsps water AFTER oral care via suction toothbrush and ONLY with RN/SLP present. Relayed OK for pt's spouse to provide ice chips if pt is requesting. Expressed will continue to target exercises at the bedside prior to repeating MBS, hopefully towards middle/end of this week. Spouse expressed understanding. Cont.   11/20: Pt no longer strict NPO for ileus concern as he had multiple bowel movements overnight. Received pt awake, alert, resting in bed, on room air, NG tube in place. Voice is much stronger and speech clearer than when last seen by this therapist on Saturday. Positioned partially upright and presented ice chips, thins via tsp, with cues for effortful swallow. Pt with positive oral acceptance/containment, positive swallow movement, oral clearance. No change in vocal quality, no coughing. [place] through visit, pt repositioned for xrays; following which pt was noticeably more fatigued and had increased discomfort. D/w RN. Recommend continue NPO + NG tube, oral care, ice chips/tsp water. Possible MBS tomorrow (TBD).  Cont goal  11/21: Patient seen seated up in chair. Assessed ice chips, thins via tsp/straw; positive oral acceptance, swallow movement, oral clearance, inconsistent cough (however reduced from prior), voice sounds stronger. Recommend proceed with repeat MBS.  Cont goal  11/22: Per chart review/discussion with RN, pt

## 2024-11-25 NOTE — PROGRESS NOTES
[facility] Cardiology Progress Note    Primary Cardiologist: Dr Mendez     CC/HPI: HF, VT, ICD    S: Sitting on the side of the bed working with therapy. He denies cp or sob. He has palpitations.     Tele: Sinus tachycardia     O:  Physical Exam:  /70   Pulse (!) 109   Temp 97.9 °F (36.6 °C) (Oral)   Resp 20   Ht 1.956 m (6' 5\")   Wt 98.2 kg (216 lb 7.9 oz)   SpO2 93%   BMI 25.67 kg/m²    General (appearance):  No acute distress  Eyes: anicteric   Neck: soft, No JVD  Ears/Nose/Mouth/Thorat: No cyanosis.+ NG tube   CV: tachy   Respiratory:  normal effort   GI: soft, non-tender, non-distended  Skin: Warm, dry. No rashes. Stables down back CDI  Neuro/Psych: Alert and cooperative. Appropriate behavior  Ext:  No c/c. + LE edema    I.O's= +12 L     Weight  Admission: Weight - Scale: 89.5 kg (197 lb 5 oz)   Today: Weight - Scale: 98.2 kg (216 lb 7.9 oz)    CBC:   Recent Labs     11/23/24  0427 11/24/24  0623 11/25/24  0400   WBC 8.0 7.6 10.2   HGB 10.3* 10.2* 10.7*   HCT 31.2* 30.8* 32.7*   MCV 91.7 92.3 91.8    278 322     BMP:   Recent Labs     11/23/24  0427 11/23/24  1255 11/24/24  0623 11/25/24  0400    141 138 137   K 3.5 3.1*  3.1* 4.0 4.2    106 104 103   CO2 24 23 24 27   PHOS 3.5 3.7 4.0  --    BUN 24* 24* 25* 25*   CREATININE 1.1 1.1 1.3 1.3     Estimated Creatinine Clearance: 54 mL/min (based on SCr of 1.3 mg/dL).  Mag:   Lab Results   Component Value Date/Time    MG 2.18 11/24/2024 06:23 AM     LIVER PROFILE:   No results for input(s): \"AST\", \"ALT\", \"LIPASE\", \"AMYLASE\", \"BILIDIR\", \"BILITOT\", \"ALKPHOS\" in the last 72 hours.    Invalid input(s): \"ALB\"    Pro-BNP:   Lab Results   Component Value Date/Time    PROBNP 7,647 11/11/2024 07:55 AM    PROBNP 4,975 11/03/2024 09:11 PM    PROBNP 2,190 07/23/2024 03:05 PM    PROBNP 2,599 02/28/2024 10:44 PM     High Sensitivity Troponin:   Lab Results   Component Value Date    TROPHS 60 (H) 11/11/2024

## 2024-11-25 NOTE — PROGRESS NOTES
Palliative Care Chart Review  and Check in Note:     NAME:  Juan Mohamud  Admit Date: 11/7/2024  Hospital Day:  Hospital Day: 19   Current Code status: Full Code    Palliative care is continuing to following Mr. Mohamud for symptom management,  and goals of care discussion as needed. Patient's chart reviewed today 11/25/24.      Noted that multiple staff have discussed a PEG tube with the pt and he stated he would think about it, although still does not seem to accept that he needs nutritional support.    Given concern for pt's lack of acceptance regarding his need for nutritional support and risk for aspiration I called his son Lito and informed him of pt's current situation. Lito reported that the pt is very stubborn. We discussed risks of aspiration and malnutrition, and alternative of hospice if pt declines nutritional support and does not eat. Lito feels that pt should accept the PEG tube and does not feel that he is currently dying, although understands that he could die should he not improve. Lito will call the pt today to discuss PEG tube. I provided my phone number in the event that he has any questions.       The following are the currently established goals/code status, and Symptom management.     Goals of care: Pt was clear that he is a full code and that his goals are life prolonging and rehabilitative. He reported that his wife would make decisions for him if he could not, but he does not want to burden her with decision making at this time. Pt reported thank he would \"think\" about a PEG tube. I discussed concerns above with pt's son Lito today.     Code status: Full Code    Discharge plan: Pt wants to go to [facility] when medically ready for discharge, NGT remains a barrier. Pt needs to consider a PEG tube. Discussed in depth today.       Karen Prajapati, TIP - CNP  11/25/24  1:10 PM

## 2024-11-26 ENCOUNTER — ANESTHESIA EVENT (OUTPATIENT)
Dept: ENDOSCOPY | Age: 84
End: 2024-11-26
Payer: MEDICARE

## 2024-11-26 ENCOUNTER — ANESTHESIA (OUTPATIENT)
Dept: ENDOSCOPY | Age: 84
End: 2024-11-26
Payer: MEDICARE

## 2024-11-26 ENCOUNTER — APPOINTMENT (OUTPATIENT)
Dept: ENDOSCOPY | Age: 84
DRG: 447 | End: 2024-11-26
Attending: INTERNAL MEDICINE
Payer: MEDICARE

## 2024-11-26 LAB
ANION GAP SERPL CALCULATED.3IONS-SCNC: 12 MMOL/L (ref 3–16)
BASOPHILS # BLD: 0 K/UL (ref 0–0.2)
BASOPHILS NFR BLD: 0.5 %
BUN SERPL-MCNC: 28 MG/DL (ref 7–20)
CALCIUM SERPL-MCNC: 7.7 MG/DL (ref 8.3–10.6)
CHLORIDE SERPL-SCNC: 101 MMOL/L (ref 99–110)
CO2 SERPL-SCNC: 20 MMOL/L (ref 21–32)
CREAT SERPL-MCNC: 1.4 MG/DL (ref 0.8–1.3)
DEPRECATED RDW RBC AUTO: 20.5 % (ref 12.4–15.4)
EOSINOPHIL # BLD: 0.1 K/UL (ref 0–0.6)
EOSINOPHIL NFR BLD: 0.8 %
GFR SERPLBLD CREATININE-BSD FMLA CKD-EPI: 50 ML/MIN/{1.73_M2}
GLUCOSE BLD-MCNC: 103 MG/DL (ref 70–99)
GLUCOSE BLD-MCNC: 121 MG/DL (ref 70–99)
GLUCOSE BLD-MCNC: 90 MG/DL (ref 70–99)
GLUCOSE SERPL-MCNC: 140 MG/DL (ref 70–99)
HCT VFR BLD AUTO: 32.9 % (ref 40.5–52.5)
HGB BLD-MCNC: 10.7 G/DL (ref 13.5–17.5)
INR PPP: 1.13 (ref 0.85–1.15)
LYMPHOCYTES # BLD: 0.8 K/UL (ref 1–5.1)
LYMPHOCYTES NFR BLD: 8.5 %
MCH RBC QN AUTO: 30.6 PG (ref 26–34)
MCHC RBC AUTO-ENTMCNC: 32.4 G/DL (ref 31–36)
MCV RBC AUTO: 94.2 FL (ref 80–100)
MONOCYTES # BLD: 1 K/UL (ref 0–1.3)
MONOCYTES NFR BLD: 11.9 %
NEUTROPHILS # BLD: 6.9 K/UL (ref 1.7–7.7)
NEUTROPHILS NFR BLD: 78.3 %
PERFORMED ON: ABNORMAL
PERFORMED ON: ABNORMAL
PERFORMED ON: NORMAL
PLATELET # BLD AUTO: 258 K/UL (ref 135–450)
PMV BLD AUTO: 8.6 FL (ref 5–10.5)
POTASSIUM SERPL-SCNC: ABNORMAL MMOL/L (ref 3.5–5.1)
PROTHROMBIN TIME: 14.7 SEC (ref 11.9–14.9)
RBC # BLD AUTO: 3.49 M/UL (ref 4.2–5.9)
SODIUM SERPL-SCNC: 133 MMOL/L (ref 136–145)
WBC # BLD AUTO: 8.8 K/UL (ref 4–11)

## 2024-11-26 PROCEDURE — 3700000000 HC ANESTHESIA ATTENDED CARE: Performed by: INTERNAL MEDICINE

## 2024-11-26 PROCEDURE — 0DJ08ZZ INSPECTION OF UPPER INTESTINAL TRACT, VIA NATURAL OR ARTIFICIAL OPENING ENDOSCOPIC: ICD-10-PCS | Performed by: INTERNAL MEDICINE

## 2024-11-26 PROCEDURE — 2580000003 HC RX 258: Performed by: INTERNAL MEDICINE

## 2024-11-26 PROCEDURE — 6360000002 HC RX W HCPCS: Performed by: NURSE ANESTHETIST, CERTIFIED REGISTERED

## 2024-11-26 PROCEDURE — 6370000000 HC RX 637 (ALT 250 FOR IP)

## 2024-11-26 PROCEDURE — 94761 N-INVAS EAR/PLS OXIMETRY MLT: CPT

## 2024-11-26 PROCEDURE — 6360000002 HC RX W HCPCS

## 2024-11-26 PROCEDURE — 2580000003 HC RX 258

## 2024-11-26 PROCEDURE — 85025 COMPLETE CBC W/AUTO DIFF WBC: CPT

## 2024-11-26 PROCEDURE — 3609013300 HC EGD TUBE PLACEMENT: Performed by: INTERNAL MEDICINE

## 2024-11-26 PROCEDURE — 0DH63UZ INSERTION OF FEEDING DEVICE INTO STOMACH, PERCUTANEOUS APPROACH: ICD-10-PCS | Performed by: INTERNAL MEDICINE

## 2024-11-26 PROCEDURE — 80048 BASIC METABOLIC PNL TOTAL CA: CPT

## 2024-11-26 PROCEDURE — 2700000000 HC OXYGEN THERAPY PER DAY

## 2024-11-26 PROCEDURE — 99232 SBSQ HOSP IP/OBS MODERATE 35: CPT | Performed by: NURSE PRACTITIONER

## 2024-11-26 PROCEDURE — 7100000011 HC PHASE II RECOVERY - ADDTL 15 MIN: Performed by: INTERNAL MEDICINE

## 2024-11-26 PROCEDURE — 3700000001 HC ADD 15 MINUTES (ANESTHESIA): Performed by: INTERNAL MEDICINE

## 2024-11-26 PROCEDURE — 2709999900 HC NON-CHARGEABLE SUPPLY: Performed by: INTERNAL MEDICINE

## 2024-11-26 PROCEDURE — 6370000000 HC RX 637 (ALT 250 FOR IP): Performed by: STUDENT IN AN ORGANIZED HEALTH CARE EDUCATION/TRAINING PROGRAM

## 2024-11-26 PROCEDURE — 85610 PROTHROMBIN TIME: CPT

## 2024-11-26 PROCEDURE — 6370000000 HC RX 637 (ALT 250 FOR IP): Performed by: NURSE PRACTITIONER

## 2024-11-26 PROCEDURE — 2720000010 HC SURG SUPPLY STERILE: Performed by: INTERNAL MEDICINE

## 2024-11-26 PROCEDURE — 36415 COLL VENOUS BLD VENIPUNCTURE: CPT

## 2024-11-26 PROCEDURE — 6360000002 HC RX W HCPCS: Performed by: STUDENT IN AN ORGANIZED HEALTH CARE EDUCATION/TRAINING PROGRAM

## 2024-11-26 PROCEDURE — 1200000000 HC SEMI PRIVATE

## 2024-11-26 PROCEDURE — 7100000010 HC PHASE II RECOVERY - FIRST 15 MIN: Performed by: INTERNAL MEDICINE

## 2024-11-26 PROCEDURE — 6360000002 HC RX W HCPCS: Performed by: INTERNAL MEDICINE

## 2024-11-26 RX ORDER — METOCLOPRAMIDE HYDROCHLORIDE 5 MG/ML
10 INJECTION INTRAMUSCULAR; INTRAVENOUS
Status: CANCELLED | OUTPATIENT
Start: 2024-11-26 | End: 2024-11-27

## 2024-11-26 RX ORDER — SODIUM CHLORIDE 9 MG/ML
INJECTION, SOLUTION INTRAVENOUS PRN
Status: CANCELLED | OUTPATIENT
Start: 2024-11-26

## 2024-11-26 RX ORDER — SODIUM CHLORIDE 0.9 % (FLUSH) 0.9 %
5-40 SYRINGE (ML) INJECTION PRN
Status: CANCELLED | OUTPATIENT
Start: 2024-11-26

## 2024-11-26 RX ORDER — SODIUM CHLORIDE 0.9 % (FLUSH) 0.9 %
5-40 SYRINGE (ML) INJECTION EVERY 12 HOURS SCHEDULED
Status: CANCELLED | OUTPATIENT
Start: 2024-11-26

## 2024-11-26 RX ORDER — PROPOFOL 10 MG/ML
INJECTION, EMULSION INTRAVENOUS
Status: DISCONTINUED | OUTPATIENT
Start: 2024-11-26 | End: 2024-11-26 | Stop reason: SDUPTHER

## 2024-11-26 RX ORDER — LABETALOL HYDROCHLORIDE 5 MG/ML
10 INJECTION, SOLUTION INTRAVENOUS
Status: CANCELLED | OUTPATIENT
Start: 2024-11-26

## 2024-11-26 RX ORDER — MEPERIDINE HYDROCHLORIDE 25 MG/ML
12.5 INJECTION INTRAMUSCULAR; INTRAVENOUS; SUBCUTANEOUS EVERY 5 MIN PRN
Status: CANCELLED | OUTPATIENT
Start: 2024-11-26

## 2024-11-26 RX ORDER — NALOXONE HYDROCHLORIDE 0.4 MG/ML
INJECTION, SOLUTION INTRAMUSCULAR; INTRAVENOUS; SUBCUTANEOUS PRN
Status: CANCELLED | OUTPATIENT
Start: 2024-11-26

## 2024-11-26 RX ORDER — LIDOCAINE HYDROCHLORIDE 20 MG/ML
INJECTION, SOLUTION INTRAVENOUS
Status: DISCONTINUED | OUTPATIENT
Start: 2024-11-26 | End: 2024-11-26 | Stop reason: SDUPTHER

## 2024-11-26 RX ORDER — HYDRALAZINE HYDROCHLORIDE 20 MG/ML
10 INJECTION INTRAMUSCULAR; INTRAVENOUS
Status: CANCELLED | OUTPATIENT
Start: 2024-11-26

## 2024-11-26 RX ORDER — ONDANSETRON 2 MG/ML
4 INJECTION INTRAMUSCULAR; INTRAVENOUS
Status: CANCELLED | OUTPATIENT
Start: 2024-11-26 | End: 2024-11-27

## 2024-11-26 RX ORDER — FENTANYL CITRATE 50 UG/ML
25 INJECTION, SOLUTION INTRAMUSCULAR; INTRAVENOUS EVERY 5 MIN PRN
Status: CANCELLED | OUTPATIENT
Start: 2024-11-26

## 2024-11-26 RX ORDER — SODIUM CHLORIDE 9 MG/ML
INJECTION, SOLUTION INTRAVENOUS
Status: DISCONTINUED | OUTPATIENT
Start: 2024-11-26 | End: 2024-11-26 | Stop reason: SDUPTHER

## 2024-11-26 RX ORDER — OXYCODONE HYDROCHLORIDE 5 MG/1
10 TABLET ORAL PRN
Status: CANCELLED | OUTPATIENT
Start: 2024-11-26 | End: 2024-11-26

## 2024-11-26 RX ORDER — OXYCODONE HYDROCHLORIDE 5 MG/1
5 TABLET ORAL PRN
Status: CANCELLED | OUTPATIENT
Start: 2024-11-26 | End: 2024-11-26

## 2024-11-26 RX ORDER — LORAZEPAM 2 MG/ML
0.5 INJECTION INTRAMUSCULAR
Status: CANCELLED | OUTPATIENT
Start: 2024-11-26 | End: 2024-11-27

## 2024-11-26 RX ORDER — HYDROMORPHONE HYDROCHLORIDE 1 MG/ML
0.5 INJECTION, SOLUTION INTRAMUSCULAR; INTRAVENOUS; SUBCUTANEOUS EVERY 5 MIN PRN
Status: CANCELLED | OUTPATIENT
Start: 2024-11-26

## 2024-11-26 RX ADMIN — SODIUM CHLORIDE, PRESERVATIVE FREE 10 ML: 5 INJECTION INTRAVENOUS at 10:35

## 2024-11-26 RX ADMIN — PROPOFOL 100 MCG/KG/MIN: 10 INJECTION, EMULSION INTRAVENOUS at 15:18

## 2024-11-26 RX ADMIN — PROPOFOL 25 MG: 10 INJECTION, EMULSION INTRAVENOUS at 16:22

## 2024-11-26 RX ADMIN — THIAMINE HCL TAB 100 MG 100 MG: 100 TAB at 10:37

## 2024-11-26 RX ADMIN — THERA TABS 1 TABLET: TAB at 10:36

## 2024-11-26 RX ADMIN — CEFAZOLIN SODIUM 1000 MG: 1 POWDER, FOR SOLUTION INTRAMUSCULAR; INTRAVENOUS at 15:18

## 2024-11-26 RX ADMIN — FUROSEMIDE 20 MG: 20 TABLET ORAL at 10:36

## 2024-11-26 RX ADMIN — LIDOCAINE HYDROCHLORIDE 100 MG: 20 INJECTION, SOLUTION INTRAVENOUS at 15:18

## 2024-11-26 RX ADMIN — AMIODARONE HYDROCHLORIDE 200 MG: 200 TABLET ORAL at 10:36

## 2024-11-26 RX ADMIN — PROPOFOL 25 MG: 10 INJECTION, EMULSION INTRAVENOUS at 16:24

## 2024-11-26 RX ADMIN — MIDODRINE HYDROCHLORIDE 25 MG: 5 TABLET ORAL at 02:53

## 2024-11-26 RX ADMIN — SODIUM CHLORIDE, PRESERVATIVE FREE 10 ML: 5 INJECTION INTRAVENOUS at 21:44

## 2024-11-26 RX ADMIN — PHENYLEPHRINE HYDROCHLORIDE 100 MCG: 10 INJECTION, SOLUTION INTRAMUSCULAR; INTRAVENOUS; SUBCUTANEOUS at 16:24

## 2024-11-26 RX ADMIN — MIDODRINE HYDROCHLORIDE 25 MG: 5 TABLET ORAL at 10:35

## 2024-11-26 RX ADMIN — PANTOPRAZOLE SODIUM 40 MG: 40 INJECTION, POWDER, FOR SOLUTION INTRAVENOUS at 10:29

## 2024-11-26 RX ADMIN — HEPARIN SODIUM 5000 UNITS: 5000 INJECTION INTRAVENOUS; SUBCUTANEOUS at 21:42

## 2024-11-26 RX ADMIN — Medication: at 22:01

## 2024-11-26 RX ADMIN — Medication: at 11:33

## 2024-11-26 RX ADMIN — Medication 5 MG: at 23:31

## 2024-11-26 RX ADMIN — OXYCODONE 5 MG: 5 TABLET ORAL at 23:28

## 2024-11-26 RX ADMIN — BUPROPION HYDROCHLORIDE 150 MG: 100 TABLET, FILM COATED ORAL at 23:31

## 2024-11-26 RX ADMIN — ZOLPIDEM TARTRATE 5 MG: 5 TABLET, COATED ORAL at 23:31

## 2024-11-26 RX ADMIN — ATORVASTATIN CALCIUM 10 MG: 10 TABLET, FILM COATED ORAL at 23:30

## 2024-11-26 RX ADMIN — SODIUM CHLORIDE: 9 INJECTION, SOLUTION INTRAVENOUS at 15:18

## 2024-11-26 RX ADMIN — GABAPENTIN 600 MG: 300 CAPSULE ORAL at 23:30

## 2024-11-26 RX ADMIN — PHENYLEPHRINE HYDROCHLORIDE 100 MCG: 10 INJECTION, SOLUTION INTRAMUSCULAR; INTRAVENOUS; SUBCUTANEOUS at 16:26

## 2024-11-26 RX ADMIN — PROPOFOL 25 MG: 10 INJECTION, EMULSION INTRAVENOUS at 16:26

## 2024-11-26 RX ADMIN — BUPROPION HYDROCHLORIDE 150 MG: 100 TABLET, FILM COATED ORAL at 10:36

## 2024-11-26 ASSESSMENT — PAIN - FUNCTIONAL ASSESSMENT
PAIN_FUNCTIONAL_ASSESSMENT: ACTIVITIES ARE NOT PREVENTED
PAIN_FUNCTIONAL_ASSESSMENT: WONG-BAKER FACES
PAIN_FUNCTIONAL_ASSESSMENT: NONE - DENIES PAIN
PAIN_FUNCTIONAL_ASSESSMENT: WONG-BAKER FACES

## 2024-11-26 ASSESSMENT — PAIN SCALES - GENERAL
PAINLEVEL_OUTOF10: 6
PAINLEVEL_OUTOF10: 3

## 2024-11-26 ASSESSMENT — PAIN SCALES - WONG BAKER
WONGBAKER_NUMERICALRESPONSE: HURTS A LITTLE BIT

## 2024-11-26 ASSESSMENT — PAIN DESCRIPTION - LOCATION: LOCATION: ABDOMEN

## 2024-11-26 ASSESSMENT — PAIN DESCRIPTION - FREQUENCY: FREQUENCY: CONTINUOUS

## 2024-11-26 ASSESSMENT — PAIN DESCRIPTION - PAIN TYPE: TYPE: SURGICAL PAIN;ACUTE PAIN

## 2024-11-26 ASSESSMENT — PAIN DESCRIPTION - ONSET: ONSET: ON-GOING

## 2024-11-26 ASSESSMENT — PAIN DESCRIPTION - DESCRIPTORS: DESCRIPTORS: ACHING;DISCOMFORT

## 2024-11-26 ASSESSMENT — PAIN DESCRIPTION - ORIENTATION: ORIENTATION: LEFT;UPPER

## 2024-11-26 NOTE — PROGRESS NOTES
Staples removed from mid back incision. Pt tolerated well. Scant amount of  serosanguinous drainage from lower half of incision site, the area was covered with a light gauze dressing and Tegaderm. Incision area was cleansed with soap and water.

## 2024-11-26 NOTE — ANESTHESIA PRE PROCEDURE
Lab Results   Component Value Date/Time    COVID19 Not Detected 11/03/2024 04:50 PM           Anesthesia Evaluation    Airway: Mallampati: III     Neck ROM: limited     Dental: normal exam         Pulmonary:normal exam  breath sounds clear to auscultation                             Cardiovascular:    (+) hypertension:, pacemaker:, dysrhythmias: atrial fibrillation, hyperlipidemia      ECG reviewed  Rhythm: regular  Rate: normal  Echocardiogram reviewed         Beta Blocker:  Dose within 24 Hrs         Neuro/Psych:   (+) neuromuscular disease:            GI/Hepatic/Renal:   (+) GERD:          Endo/Other:    (+) DiabetesType II DM.                 Abdominal:   (+) obese          Vascular:          Other Findings:         Anesthesia Plan      MAC     ASA 3       Induction: intravenous.    MIPS: Postoperative opioids intended and Prophylactic antiemetics administered.  Anesthetic plan and risks discussed with patient.    Use of blood products discussed with patient whom.    Plan discussed with CRNA.    Attending anesthesiologist reviewed and agrees with Preprocedure content              Marzena Greenwood MD   11/26/2024

## 2024-11-26 NOTE — PLAN OF CARE
Problem: Chronic Conditions and Co-morbidities  Goal: Patient's chronic conditions and co-morbidity symptoms are monitored and maintained or improved  11/26/2024 1212 by Marcelle Chan RN  Outcome: Progressing     Problem: Discharge Planning  Goal: Discharge to home or other facility with appropriate resources  11/26/2024 1212 by Marcelle Chan RN  Outcome: Progressing     Problem: Safety - Adult  Goal: Free from fall injury  11/26/2024 1212 by Marcelle Chan RN  Outcome: Progressing     Problem: Skin/Tissue Integrity  Goal: Absence of new skin breakdown  Description: 1.  Monitor for areas of redness and/or skin breakdown  2.  Assess vascular access sites hourly  3.  Every 4-6 hours minimum:  Change oxygen saturation probe site  4.  Every 4-6 hours:  If on nasal continuous positive airway pressure, respiratory therapy assess nares and determine need for appliance change or resting period.  11/26/2024 1212 by Marcelle Chan RN  Outcome: Progressing     Problem: ABCDS Injury Assessment  Goal: Absence of physical injury  11/26/2024 1212 by Marcelle Chan RN  Outcome: Progressing     Problem: Pain  Goal: Verbalizes/displays adequate comfort level or baseline comfort level  11/26/2024 1212 by Marcelle Chan RN  Outcome: Progressing     Problem: Respiratory - Adult  Goal: Achieves optimal ventilation and oxygenation  11/26/2024 1212 by Marcelle Chan RN  Outcome: Progressing     Problem: Cardiovascular - Adult  Goal: Maintains optimal cardiac output and hemodynamic stability  11/26/2024 1212 by Marcelle Chan RN  Outcome: Progressing     Problem: Cardiovascular - Adult  Goal: Absence of cardiac dysrhythmias or at baseline  11/26/2024 1212 by Marcelle Chan RN  Outcome: Progressing     Problem: Metabolic/Fluid and Electrolytes - Adult  Goal: Electrolytes maintained within normal limits  11/26/2024 1212 by Marcelle Chan RN  Outcome: Progressing     Problem: Metabolic/Fluid and Electrolytes - Adult  Goal: Hemodynamic

## 2024-11-26 NOTE — CARE COORDINATION
NITO following: NITO spoke with Adele [phone] with [facility] and provided an update on pt's plan of care including plan for PEG placement today. Adele aware of need for low air loss mattress, which Adele says they can provide. Pt will need to be at goal for tube feeds prior to admission at [facility].   NITO spoke with pt's spouse on the phone. CM verified the discharge plan is [facility] [place] - and spouse agrees this is the discharge plan of choice. Spouse thanked NITO for the phone call and reports she will be back up to visit her  tomorrow.  CM will continue to follow for discharge planning.  Electronically signed by SHARLA Blum on 11/26/2024 at 1:45 PM  [phone]

## 2024-11-26 NOTE — ANESTHESIA POSTPROCEDURE EVALUATION
Department of Anesthesiology  Postprocedure Note    Patient: Juan Mohamud  MRN: [id number]  YOB: 1940  Date of evaluation: 11/26/2024    Procedure Summary       Date: 11/26/24 Room / Location: [place] [address] / [facility]    Anesthesia Start: 1510 Anesthesia Stop: 1632    Procedure: ESOPHAGOGASTRODUODENOSCOPY PERCUTANEOUS ENDOSCOPIC GASTROSTOMY TUBE INSERTION Diagnosis: Poor nutrition    Surgeons: Edward Gu MD Responsible Provider: Carl Vital MD    Anesthesia Type: MAC ASA Status: 3            Anesthesia Type: No value filed.    Caitlyn Phase I: Caitlyn Score: 9    Caitlyn Phase II: Caitlyn Score: 8    Anesthesia Post Evaluation    Patient location during evaluation: PACU  Patient participation: complete - patient participated  Level of consciousness: awake  Airway patency: patent  Nausea & Vomiting: no nausea and no vomiting  Cardiovascular status: blood pressure returned to baseline and hemodynamically stable  Respiratory status: acceptable  Hydration status: euvolemic  Multimodal analgesia pain management approach  Pain management: adequate    No notable events documented.

## 2024-11-26 NOTE — PROGRESS NOTES
NEUROSURGERY PROGRESS NOTE    11/26/2024 8:54 AM                               Juan CAMERON Cade                      LOS: 19 days   POD# 14 s/p Procedure(s) (LRB):  THORACIC 8-LUMBAR 1 POSTERIOR DECOMPRESSION FIXATION AND FUSION (N/A)    Interval history:  On floor now  Still poor PO intake. Awaiting PEG tube reportedly    Patient denies pain in back. Feels relatively comfortable. Eager to leave hospital.    Physical Exam:  Patient seen and examined    Vitals:    11/26/24 0800   BP: 90/65   Pulse: (!) 110   Resp: 19   Temp: 97.7 °F (36.5 °C)   SpO2: 94%     GCS:    General: Well developed. Alert and cooperative in no acute distress.   Pulmonary: unlabored respiratory effort  Cardiovascular:  Warm well perfused. No peripheral edema  Gastrointestinal: abdomen soft, NT, ND    Neurological:  Mental Status: Awake, alert, oriented x 4, speech soft but clear and appropriate  Attention: Intact  Language: No aphasia or dysarthria noted  Sensation: Intact to all extremities to light touch    Motor : uppers move purposefully, no asymmetry  Lower extremities, at least 4-/5, elevates briefly off the bed, flex/extends both feet    Incision: CDI - midline incision with staples in place open to air, clean, dry, non fluctuant,   Prior drain sites with clean bandages in place    Labs:  Recent Labs     11/26/24  0647   WBC 8.8   HGB 10.7*   HCT 32.9*          Recent Labs     11/24/24  0623 11/25/24  0400 11/26/24  0632      < > 133*   K 4.0   < > see below      < > 101   CO2 24   < > 20*   BUN 25*   < > 28*   CREATININE 1.3   < > 1.4*   GLUCOSE 109*   < > 140*   CALCIUM 7.7*   < > 7.7*   PHOS 4.0  --   --    MG 2.18  --   --     < > = values in this interval not displayed.       Recent Labs     11/26/24  0647   PROTIME 14.7   INR 1.13           Patient Active Problem List    Diagnosis Date Noted    VT (ventricular tachycardia) (HCC) 11/22/2024    Dysphagia 11/22/2024    Encounter for palliative care  11/22/2024    Hypotension 11/13/2024    SVT (supraventricular tachycardia) ([facility]) 11/12/2024    Fx dorsal vertebra-closed ([facility]) 11/07/2024    Spinal cord injury at T1-T6 level ([facility]) 11/07/2024    Preoperative cardiovascular examination 11/07/2024    Chronic heart failure with preserved ejection fraction (HFpEF) ([facility]) 11/07/2024    Abdominal distension 11/06/2024    Epidural hematoma 11/06/2024    Fall at home, initial encounter 11/03/2024    Acute congestive heart failure ([facility]) 07/23/2024    PVC's (premature ventricular contractions) 04/01/2024    Sepsis ([facility]) 03/27/2024    ICD (implantable cardioverter-defibrillator) in place 03/11/2024    Acute hypoxemic respiratory failure 03/05/2024    BOSTON (acute kidney injury) ([facility]) 03/05/2024    Arrhythmia 02/29/2024    Primary hypertension 02/29/2024    Hyperlipidemia 02/29/2024    Colonic obstruction ([facility]) 02/29/2024    Trauma 02/29/2024    History of fall 02/29/2024    Type 2 diabetes mellitus ([facility]) 02/29/2024    Anemia 02/29/2024    Pneumonia 02/29/2024    Hyponatremia 02/29/2024    Colonic pseudoobstruction 02/29/2024    Wide-complex tachycardia 02/29/2024    Constipation 02/24/2024    Cataract extraction status of right eye 12/17/2018    DDD (degenerative disc disease), lumbar 11/19/2013       Assessment:  Patient is a 84 y.o. male w/mechanical fall on 11/3/24 d/t generalized weakness. On initial workup T11 fx was found. Appears unstable. Patient s/p THORACIC 8-LUMBAR 1 POSTERIOR DECOMPRESSION FIXATION AND FUSION by Dr. Posey on 11/12/2024.   Received IVC filter for DVT.     Plan:  - No brace needed  - Remove staples and dressings on back today. Keep area open to air. Clean daily with soap and water.  -DVT Prophylaxis: SCD's & SQ Heparin; IVC Filter placed on 11/15/2024  -if he can briefly stand, please obtain the pending thoracolumbar upright xrays for a baseline before discharge        Nik Posey MD, PhD  Endovascular Neurosurgery  [place]

## 2024-11-26 NOTE — PROGRESS NOTES
[facility] Cardiology Progress Note    Primary Cardiologist: Dr Mendez     CC/HPI: HF, VT, ICD    S: No cp or sob.     Tele: Sinus tachycardia     O:  Physical Exam:  BP 90/65   Pulse (!) 110   Temp 97.7 °F (36.5 °C) (Temporal)   Resp 19   Ht 1.956 m (6' 5\")   Wt 98.2 kg (216 lb 7.9 oz)   SpO2 94%   BMI 25.67 kg/m²    General (appearance):  No acute distress  Eyes: anicteric   Neck: soft, No JVD  Ears/Nose/Mouth/Thorat: No cyanosis.+ NG tube   CV: tachy   Respiratory:  normal effort   GI: soft, non-tender, non-distended  Skin: Warm, dry. No rashes. Stables down back CDI  Neuro/Psych: Alert and cooperative. Appropriate behavior  Ext:  No c/c. + LE edema    I.O's= +13.8     Weight  Admission: Weight - Scale: 89.5 kg (197 lb 5 oz)   Today: Weight - Scale: 98.2 kg (216 lb 7.9 oz)    CBC:   Recent Labs     24  0623 24  0400 24  0647   WBC 7.6 10.2 8.8   HGB 10.2* 10.7* 10.7*   HCT 30.8* 32.7* 32.9*   MCV 92.3 91.8 94.2    322 258     BMP:   Recent Labs     24  1255 24  0623 24  0400 24  0632    138 137 133*   K 3.1*  3.1* 4.0 4.2 see below    104 103 101   CO2 23 24 27 20*   PHOS 3.7 4.0  --   --    BUN 24* 25* 25* 28*   CREATININE 1.1 1.3 1.3 1.4*     Estimated Creatinine Clearance: 50 mL/min (A) (based on SCr of 1.4 mg/dL (H)).  Mag:   Lab Results   Component Value Date/Time    MG 2.18 2024 06:23 AM     LIVER PROFILE:   No results for input(s): \"AST\", \"ALT\", \"LIPASE\", \"AMYLASE\", \"BILIDIR\", \"BILITOT\", \"ALKPHOS\" in the last 72 hours.    Invalid input(s): \"ALB\"    Pro-BNP:   Lab Results   Component Value Date/Time    PROBNP 7,647 2024 07:55 AM    PROBNP 4,975 2024 09:11 PM    PROBNP 2,190 2024 03:05 PM    PROBNP 2,599 2024 10:44 PM     High Sensitivity Troponin:   Lab Results   Component Value Date    TROPHS 60 (H) 2024       Imagin2024 CTPA  1. Small volume segmental/subsegmental

## 2024-11-26 NOTE — PROGRESS NOTES
V2.0    [facility] Progress Note      Name:  Juan Mohamud /Age/Sex: 1940  (84 y.o. male)   MRN & CSN:  [id number] & [id number] Encounter Date/Time: 2024 2:26 PM EST   Location:  Endo Pool/NONE PCP: Janet Garcia MD     Attending:Ayaan Elias*       Hospital Day: 20    Assessment and Recommendations     84 y.o. male with a medical hx significant for type II DM, degenerative lumbar disease, arrhythmia s/p AICD, diastolic CHF, and macular degeneration, who presented from home to the [facility] ED on 11/3/24 after a fall.     Reportedly, patient fell after feeling like his \"knees were giving out.\" No reported symptoms prior to this fall, patient landed on his left side and hit the back of his head but did not lose consciousness at this time.      Upon arrival to the [facility] ED, patient was alert, oriented and reported with GCS of 15 and NIH of 0. While in the ED patient had increasing generalized weakness and difficulty getting up from a sitting position. Decision was made to admit patient as they did not feel safe discharging him home given his inability to stand unassisted.      Fall was thought to be mechanical and secondary to generalized weakness due to poor nutrition. CT abd pelv performed due to complaints of abdominal pain, distension, and back pain upon arrival to the floor. Imaging revealed acute T11 vertebral fracture extending into the T10-T11 disc space as well as diffuse distention of small and large bowel compatible with ileus and bilateral pleural effusions.     GI consulted for ileus, and patient underwent colonic decompression     MRI thoracic spine then demonstrated large epidural spinal hematoma with cord compression  and additional T6 subacute compression fracture along with bilateral pleural effusions.     Patient was transferred to [facility] on  for neurosurgical care.\"      Assessment/Plan:       Current Principal Problem:  Fx dorsal vertebra-closed  4/3 lmom to call back   Needs to set up a quick nurse visit to sign a lorazapam agreement per Dr Ari Joiner

## 2024-11-26 NOTE — OP NOTE
Patient:   Juan Mohamud   :    1940   Facility:   [facility]  Procedure:   Esophagogastroduodenoscopy with placement of a percutaneous endoscopic gastrostomy tube  Date:     2024   Endoscopist:  Edward Gu MD     Preoperative Diagnosis:  Feeding Difficulties    Postoperative Diagnosis:  Feeding Difficulties    Preprocedure medications: One gram of Cefazolin was given. prior to the procedure.     Anesthesia:  MAC    Estimated blood loss: Minimal    Complications: None    Description of Procedure:  Informed consent was obtained  after explanation of the procedure including indications, description of the procedure,  benefits and possible risks and complications of the procedure, and alternatives. Questions were answered.  The patient's history was reviewed and a directed physical examination was performed prior to the procedure.    Patient recieved prophylactic antibiotics prior to the start of the procedure and was monitored throughout the procedure with pulse oximetry and periodic assessment of vital signs. Patient was sedated as noted above.With the patient in the semi-Farias's position, Olympus flexible endoscope was introduced and passed without difficulty. Visualization of the esophagus, stomach, and duodenum was performed and retroflexed view of the proximal stomach was obtained. The scope was passed to the 2nd portion of the duodenum.    No contraindication to placement of the gastrostomy tube was appreciated.     The site for placement of the gastrostomy tube was identified with palpation and transillumination of the abdomen. The abdomen, having been prepared, was draped in a sterile fashion. Local anesthesia was provided with  1% Xylocaine. A nick was made in the skin with a #11 scalpel blade. Angiocath was introduced through the anterior abdominal wall. This was visualized internally with endoscope. The needle was withdrawn and an insertion wire introduced. This was

## 2024-11-26 NOTE — PROGRESS NOTES
Tube feed stopped at this time per orders in preparation for PEG placement today. Flushed corpak with 30 ml tap water. Will continue to monitor.

## 2024-11-26 NOTE — PROGRESS NOTES
PEG tube could not be performed today because of lack of IV access.  We will need a proper IV access at which time we can reschedule the procedure.    Edward Gu MD    Addendum: IV access obtained by anesthesiologist Dr. Vital.  Will proceed with the procedure.    Edward Gu MD

## 2024-11-26 NOTE — PROGRESS NOTES
Speech Therapy   attempt    Patient was attempted to be seen by Speech Therapy Department this date for dysphagia therapy. Patient was unable to be seen due to being NPO for PEG placement .Will attempt to see again tomorrow.    Meena Delacruz MA, CCC-SLP [id number]  Speech-Language Pathologist  Pager  [phone]

## 2024-11-26 NOTE — PLAN OF CARE
Problem: Chronic Conditions and Co-morbidities  Goal: Patient's chronic conditions and co-morbidity symptoms are monitored and maintained or improved  11/25/2024 2224 by Herman Arias, RN  Outcome: Progressing  Flowsheets (Taken 11/25/2024 2224)  Care Plan - Patient's Chronic Conditions and Co-Morbidity Symptoms are Monitored and Maintained or Improved:   Monitor and assess patient's chronic conditions and comorbid symptoms for stability, deterioration, or improvement   Collaborate with multidisciplinary team to address chronic and comorbid conditions and prevent exacerbation or deterioration   Update acute care plan with appropriate goals if chronic or comorbid symptoms are exacerbated and prevent overall improvement and discharge       Problem: Discharge Planning  Goal: Discharge to home or other facility with appropriate resources  11/25/2024 2224 by Herman Arias, RN  Outcome: Progressing  Flowsheets (Taken 11/25/2024 2224)  Discharge to home or other facility with appropriate resources:   Identify barriers to discharge with patient and caregiver   Identify discharge learning needs (meds, wound care, etc)   Arrange for needed discharge resources and transportation as appropriate       Problem: Safety - Adult  Goal: Free from fall injury  11/25/2024 2224 by Herman Arias, RN  Outcome: Progressing  Flowsheets (Taken 11/25/2024 2224)  Free From Fall Injury:   Instruct family/caregiver on patient safety   Based on caregiver fall risk screen, instruct family/caregiver to ask for assistance with transferring infant if caregiver noted to have fall risk factors       Problem: Skin/Tissue Integrity  Goal: Absence of new skin breakdown  Description: 1.  Monitor for areas of redness and/or skin breakdown  2.  Assess vascular access sites hourly  3.  Every 4-6 hours minimum:  Change oxygen saturation probe site  4.  Every 4-6 hours:  If on nasal continuous positive airway pressure, respiratory therapy assess nares and  RN  Outcome: Progressing  Flowsheets (Taken 11/25/2024 2224)  Absence of cardiac dysrhythmias or at baseline: Monitor cardiac rate and rhythm       Problem: Metabolic/Fluid and Electrolytes - Adult  Goal: Electrolytes maintained within normal limits  11/25/2024 2224 by Herman Arias RN  Outcome: Progressing  Flowsheets (Taken 11/25/2024 2224)  Electrolytes maintained within normal limits: Monitor labs and assess patient for signs and symptoms of electrolyte imbalances  Goal: Hemodynamic stability and optimal renal function maintained    Problem: Neurosensory - Adult  Goal: Achieves maximal functionality and self care  11/25/2024 2226 by Herman Arias RN  Outcome: Progressing  Flowsheets (Taken 11/24/2024 0800 by Nannette Leslie, RN)  Achieves maximal functionality and self care: Monitor swallowing and airway patency with patient fatigue and changes in neurological status  Problem: Neurosensory - Adult  Goal: Achieves stable or improved neurological status  11/25/2024 2224 by Herman Arias RN  Outcome: Progressing  Flowsheets (Taken 11/25/2024 2224)  Achieves stable or improved neurological status:   Assess for and report changes in neurological status   Initiate measures to prevent increased intracranial pressure    Problem: Nutrition Deficit:  Goal: Optimize nutritional status  11/25/2024 2224 by Herman Arias RN  Outcome: Progressing  Flowsheets (Taken 11/25/2024 2224)  Nutrient intake appropriate for improving, restoring, or maintaining nutritional needs:   Assess nutritional status and recommend course of action   Recommend, monitor, and adjust tube feedings and TPN/PPN based on assessed needs       Problem: Gastrointestinal - Adult  Goal: Minimal or absence of nausea and vomiting  11/25/2024 2224 by Herman Arias, RN  Outcome: Progressing  Flowsheets (Taken 11/25/2024 2224)  Minimal or absence of nausea and vomiting: Administer IV fluids as ordered to ensure adequate hydration    Goal: Maintains or

## 2024-11-26 NOTE — PROGRESS NOTES
Physical /Occupational Therapy    Pt off floor for ESOPHAGOGASTRODUODENOSCOPY PERCUTANEOUS ENDOSCOPIC GASTROSTOMY TUBE INSERTION. Will follow up per POC as treatment schedule allows.     Jacqueline Daniel, PT, DPT  [facility] OTR

## 2024-11-26 NOTE — PROGRESS NOTES
Patients AVASYS camera alarming in room.   This RN to bed side, patient pulling at all lines, gown off and tele leads off.   This RN noted patient pulled PICC line to RUE out. No s/s of bleeding or further issues.   Patient A&O x4 and answered all orientation questions appropriately, states he \"felt smothered and doesn't like to be tucked in\".   This RN educated patient on importance of not pulling at lines/tubes.   Attempted to place PIV X2 and was unsuccessful. Going to attempt with US guidance.   Ameena Simmons NP covering made aware of the above.   Will continue to monitor.

## 2024-11-27 ENCOUNTER — APPOINTMENT (OUTPATIENT)
Dept: GENERAL RADIOLOGY | Age: 84
DRG: 447 | End: 2024-11-27
Attending: INTERNAL MEDICINE
Payer: MEDICARE

## 2024-11-27 LAB
ANION GAP SERPL CALCULATED.3IONS-SCNC: 10 MMOL/L (ref 3–16)
BASOPHILS # BLD: 0 K/UL (ref 0–0.2)
BASOPHILS NFR BLD: 0.4 %
BUN SERPL-MCNC: 26 MG/DL (ref 7–20)
CALCIUM SERPL-MCNC: 7.7 MG/DL (ref 8.3–10.6)
CHLORIDE SERPL-SCNC: 101 MMOL/L (ref 99–110)
CO2 SERPL-SCNC: 24 MMOL/L (ref 21–32)
CREAT SERPL-MCNC: 1.2 MG/DL (ref 0.8–1.3)
DEPRECATED RDW RBC AUTO: 21.7 % (ref 12.4–15.4)
EOSINOPHIL # BLD: 0.1 K/UL (ref 0–0.6)
EOSINOPHIL NFR BLD: 0.9 %
GFR SERPLBLD CREATININE-BSD FMLA CKD-EPI: 60 ML/MIN/{1.73_M2}
GLUCOSE BLD-MCNC: 78 MG/DL (ref 70–99)
GLUCOSE BLD-MCNC: 80 MG/DL (ref 70–99)
GLUCOSE SERPL-MCNC: 86 MG/DL (ref 70–99)
HCT VFR BLD AUTO: 32.2 % (ref 40.5–52.5)
HGB BLD-MCNC: 10.3 G/DL (ref 13.5–17.5)
LYMPHOCYTES # BLD: 0.7 K/UL (ref 1–5.1)
LYMPHOCYTES NFR BLD: 8.3 %
MCH RBC QN AUTO: 30.2 PG (ref 26–34)
MCHC RBC AUTO-ENTMCNC: 31.9 G/DL (ref 31–36)
MCV RBC AUTO: 94.8 FL (ref 80–100)
MONOCYTES # BLD: 0.7 K/UL (ref 0–1.3)
MONOCYTES NFR BLD: 8.2 %
NEUTROPHILS # BLD: 7.1 K/UL (ref 1.7–7.7)
NEUTROPHILS NFR BLD: 82.2 %
PERFORMED ON: NORMAL
PERFORMED ON: NORMAL
PLATELET # BLD AUTO: 242 K/UL (ref 135–450)
PMV BLD AUTO: 8.4 FL (ref 5–10.5)
POTASSIUM SERPL-SCNC: 4.8 MMOL/L (ref 3.5–5.1)
RBC # BLD AUTO: 3.4 M/UL (ref 4.2–5.9)
SODIUM SERPL-SCNC: 135 MMOL/L (ref 136–145)
WBC # BLD AUTO: 8.7 K/UL (ref 4–11)

## 2024-11-27 PROCEDURE — 6370000000 HC RX 637 (ALT 250 FOR IP)

## 2024-11-27 PROCEDURE — 1200000000 HC SEMI PRIVATE

## 2024-11-27 PROCEDURE — 6370000000 HC RX 637 (ALT 250 FOR IP): Performed by: STUDENT IN AN ORGANIZED HEALTH CARE EDUCATION/TRAINING PROGRAM

## 2024-11-27 PROCEDURE — 6360000002 HC RX W HCPCS: Performed by: STUDENT IN AN ORGANIZED HEALTH CARE EDUCATION/TRAINING PROGRAM

## 2024-11-27 PROCEDURE — 80048 BASIC METABOLIC PNL TOTAL CA: CPT

## 2024-11-27 PROCEDURE — 6370000000 HC RX 637 (ALT 250 FOR IP): Performed by: NURSE PRACTITIONER

## 2024-11-27 PROCEDURE — 36415 COLL VENOUS BLD VENIPUNCTURE: CPT

## 2024-11-27 PROCEDURE — 6360000002 HC RX W HCPCS: Performed by: NURSE PRACTITIONER

## 2024-11-27 PROCEDURE — 74018 RADEX ABDOMEN 1 VIEW: CPT

## 2024-11-27 PROCEDURE — 85025 COMPLETE CBC W/AUTO DIFF WBC: CPT

## 2024-11-27 PROCEDURE — 6360000002 HC RX W HCPCS

## 2024-11-27 PROCEDURE — 2580000003 HC RX 258

## 2024-11-27 RX ORDER — METOCLOPRAMIDE HYDROCHLORIDE 5 MG/ML
5 INJECTION INTRAMUSCULAR; INTRAVENOUS EVERY 6 HOURS
Status: COMPLETED | OUTPATIENT
Start: 2024-11-27 | End: 2024-11-29

## 2024-11-27 RX ADMIN — BUPROPION HYDROCHLORIDE 150 MG: 100 TABLET, FILM COATED ORAL at 11:09

## 2024-11-27 RX ADMIN — Medication 5 MG: at 20:21

## 2024-11-27 RX ADMIN — THERA TABS 1 TABLET: TAB at 11:08

## 2024-11-27 RX ADMIN — METOCLOPRAMIDE HYDROCHLORIDE 5 MG: 5 INJECTION INTRAMUSCULAR; INTRAVENOUS at 20:21

## 2024-11-27 RX ADMIN — Medication: at 20:22

## 2024-11-27 RX ADMIN — ATORVASTATIN CALCIUM 10 MG: 10 TABLET, FILM COATED ORAL at 20:22

## 2024-11-27 RX ADMIN — Medication: at 11:15

## 2024-11-27 RX ADMIN — HEPARIN SODIUM 5000 UNITS: 5000 INJECTION INTRAVENOUS; SUBCUTANEOUS at 20:21

## 2024-11-27 RX ADMIN — HEPARIN SODIUM 5000 UNITS: 5000 INJECTION INTRAVENOUS; SUBCUTANEOUS at 15:06

## 2024-11-27 RX ADMIN — PANTOPRAZOLE SODIUM 40 MG: 40 INJECTION, POWDER, FOR SOLUTION INTRAVENOUS at 11:08

## 2024-11-27 RX ADMIN — NALOXEGOL OXALATE 12.5 MG: 25 TABLET, FILM COATED ORAL at 05:55

## 2024-11-27 RX ADMIN — MIDODRINE HYDROCHLORIDE 25 MG: 5 TABLET ORAL at 20:21

## 2024-11-27 RX ADMIN — GABAPENTIN 600 MG: 300 CAPSULE ORAL at 15:06

## 2024-11-27 RX ADMIN — HEPARIN SODIUM 5000 UNITS: 5000 INJECTION INTRAVENOUS; SUBCUTANEOUS at 05:55

## 2024-11-27 RX ADMIN — GABAPENTIN 600 MG: 300 CAPSULE ORAL at 11:09

## 2024-11-27 RX ADMIN — FUROSEMIDE 20 MG: 20 TABLET ORAL at 20:21

## 2024-11-27 RX ADMIN — BUPROPION HYDROCHLORIDE 150 MG: 100 TABLET, FILM COATED ORAL at 20:21

## 2024-11-27 RX ADMIN — FUROSEMIDE 20 MG: 20 TABLET ORAL at 11:08

## 2024-11-27 RX ADMIN — ZOLPIDEM TARTRATE 5 MG: 5 TABLET, COATED ORAL at 20:22

## 2024-11-27 RX ADMIN — AMIODARONE HYDROCHLORIDE 200 MG: 200 TABLET ORAL at 11:07

## 2024-11-27 RX ADMIN — GABAPENTIN 600 MG: 300 CAPSULE ORAL at 20:22

## 2024-11-27 RX ADMIN — MIDODRINE HYDROCHLORIDE 25 MG: 5 TABLET ORAL at 02:10

## 2024-11-27 RX ADMIN — THIAMINE HCL TAB 100 MG 100 MG: 100 TAB at 11:08

## 2024-11-27 RX ADMIN — SODIUM CHLORIDE, PRESERVATIVE FREE 10 ML: 5 INJECTION INTRAVENOUS at 11:11

## 2024-11-27 RX ADMIN — MIDODRINE HYDROCHLORIDE 25 MG: 5 TABLET ORAL at 11:08

## 2024-11-27 RX ADMIN — LINACLOTIDE 145 MCG: 145 CAPSULE, GELATIN COATED ORAL at 11:11

## 2024-11-27 NOTE — CARE COORDINATION
UPDATE: NITO spoke with Adele [phone] with [facility] [place] who reports precert is approved. Currently working to get pt to goal on tube feeds through PEG. Adele able to accept pt tomorrow once at goal. Adele is weekend contact at [facility]. Adele also reports that they have a low air loss mattress already set for the pt's arrival.    NITO spoke with pt's wife on the phone and reviewed discharge plan anticipated for tomorrow. Spouse in agreement with plan and requested a call tomorrow when medical transportation has been arranged for the pt. CM will continue to follow for discharge planning.  Electronically signed by SHARLA Blum on 11/27/2024 at 3:04 PM  [phone]    NITO following: NITO spoke with Adele [phone] at [facility] and updated on pt's status. Pt restarting on tube feeds today with plan to be at goal by end of day today. NITO discovered CM not able to submit pt's precert through [facility] to submit precert today. If precert not approved today pt will need updated PT/OT evals. CM will continue to follow for discharge planning.  Electronically signed by SHARLA Blum on 11/27/2024 at 12:11 PM  [phone]

## 2024-11-27 NOTE — PLAN OF CARE
Problem: Discharge Planning  Goal: Discharge to home or other facility with appropriate resources  Outcome: Progressing  Note: Internal medicine following for plan of care. Patient and family updated and educated on barriers to discharge and plan of care.     Problem: Safety - Adult  Goal: Free from fall injury  Outcome: Progressing  Note: Patient has remained free of fall injury this shift. Gait belt and gripper socks applied for safety.

## 2024-11-27 NOTE — PROGRESS NOTES
V2.0    [facility] Progress Note      Name:  Juan Mohamud /Age/Sex: 1940  (84 y.o. male)   MRN & CSN:  [id number] & [id number] Encounter Date/Time: 2024 2:26 PM EST   Location:  5528/5528-01 PCP: Janet Garcia MD     Attending:Ayaan Elias*       Hospital Day: 21    Assessment and Recommendations     84 y.o. male with a medical hx significant for type II DM, degenerative lumbar disease, arrhythmia s/p AICD, diastolic CHF, and macular degeneration, who presented from home to the [facility] ED on 11/3/24 after a fall.     Reportedly, patient fell after feeling like his \"knees were giving out.\" No reported symptoms prior to this fall, patient landed on his left side and hit the back of his head but did not lose consciousness at this time.      Upon arrival to the [facility] ED, patient was alert, oriented and reported with GCS of 15 and NIH of 0. While in the ED patient had increasing generalized weakness and difficulty getting up from a sitting position. Decision was made to admit patient as they did not feel safe discharging him home given his inability to stand unassisted.      Fall was thought to be mechanical and secondary to generalized weakness due to poor nutrition. CT abd pelv performed due to complaints of abdominal pain, distension, and back pain upon arrival to the floor. Imaging revealed acute T11 vertebral fracture extending into the T10-T11 disc space as well as diffuse distention of small and large bowel compatible with ileus and bilateral pleural effusions.     GI consulted for ileus, and patient underwent colonic decompression     MRI thoracic spine then demonstrated large epidural spinal hematoma with cord compression  and additional T6 subacute compression fracture along with bilateral pleural effusions.  4     Assessment/Plan:       Current Principal Problem:  Fx dorsal vertebra-closed (HCC)     Plan:     Hypoxia  Possible aspiration pneumonia - finished

## 2024-11-27 NOTE — PROGRESS NOTES
Patient is alert and oriented x4. Experiences intermittent confusion. Vital signs are stable, tachy rhythm with pacemaker. Patient is on tube feed via PEG tube. Patient is a maxi move. Has a external catheter. Patient complains of pain \"it hurts from here (left side mid abdomen) all the way to my shoulder(left)\". Tube feeding has been paused, internal medicine made aware with a KUB underway. Fall and safety precautions in place.

## 2024-11-27 NOTE — PLAN OF CARE
Problem: Discharge Planning  Goal: Discharge to home or other facility with appropriate resources  11/26/2024 2038 by Cabrera Norris RN  Outcome: Progressing  Patient will have all needs met prior to discharge      Problem: Chronic Conditions and Co-morbidities  Goal: Patient's chronic conditions and co-morbidity symptoms are monitored and maintained or improved  11/26/2024 2038 by Cabrera Norris RN  Outcome: Progressing     Problem: Safety - Adult  Goal: Free from fall injury  11/26/2024 2038 by Cabrera Norris RN  Outcome: Progressing  Patient will remain fall free during stay by implementing and following all safety precautions.      Problem: Skin/Tissue Integrity  Goal: Absence of new skin breakdown  Description: 1.  Monitor for areas of redness and/or skin breakdown  2.  Assess vascular access sites hourly  3.  Every 4-6 hours minimum:  Change oxygen saturation probe site  4.  Every 4-6 hours:  If on nasal continuous positive airway pressure, respiratory therapy assess nares and determine need for appliance change or resting period.  11/26/2024 2038 by Cabrera Norris RN  Outcome: Progressing

## 2024-11-27 NOTE — CONSULTS
Pt with new PEG placement, consulted to restart TF.  He has been tolerating standard fiber free formula through NGT, does not require a diabetic formula. Restarting previous TF order.     Osmolite @ 65 mL/hr  Flushes: 30 mL q4h    Yazmin Caceres RD, LD    Pilot: [phone]  Office:  [phone]

## 2024-11-27 NOTE — PROGRESS NOTES
Physical/Occupational Therapy    Attempted therapy session, pt adamantly refusing despite maximum encouragement and education on importance of participation in therapy. Pt not feeling well and too fatigued for attempt at OOB activity. Will follow up per POC as treatment schedule allows.     Jacqueline Daniel, PT, DPT  LORRAINE Donovan/TIN

## 2024-11-27 NOTE — PROGRESS NOTES
Patient is A&Ox4 w/ intermittent confusion . VSS. Patient has endorsed pain to abd managed per MAR and non-pharm measures. Patient is tolerating PO diet. Pt is a maxi move. Voiding via external cath.Pt denies any needs at this time. Patient updated on plan of care. Fall and safety precautions in place, call light within reach. Continuing care.

## 2024-11-27 NOTE — PROGRESS NOTES
PEG site looks clean and dry.  Abdomen soft and non tender.  Tube feeds running well.    Rec:  Daily dressing changes.  Advance tube feeds to goal.  Please call with questions.    Edward Gu MD

## 2024-11-28 ENCOUNTER — APPOINTMENT (OUTPATIENT)
Dept: GENERAL RADIOLOGY | Age: 84
DRG: 447 | End: 2024-11-28
Attending: INTERNAL MEDICINE
Payer: MEDICARE

## 2024-11-28 LAB
ANION GAP SERPL CALCULATED.3IONS-SCNC: 10 MMOL/L (ref 3–16)
BASOPHILS # BLD: 0 K/UL (ref 0–0.2)
BASOPHILS NFR BLD: 0.5 %
BUN SERPL-MCNC: 24 MG/DL (ref 7–20)
CALCIUM SERPL-MCNC: 7.8 MG/DL (ref 8.3–10.6)
CHLORIDE SERPL-SCNC: 100 MMOL/L (ref 99–110)
CO2 SERPL-SCNC: 26 MMOL/L (ref 21–32)
CREAT SERPL-MCNC: 1.1 MG/DL (ref 0.8–1.3)
DEPRECATED RDW RBC AUTO: 21.2 % (ref 12.4–15.4)
EOSINOPHIL # BLD: 0.1 K/UL (ref 0–0.6)
EOSINOPHIL NFR BLD: 1.2 %
GFR SERPLBLD CREATININE-BSD FMLA CKD-EPI: 67 ML/MIN/{1.73_M2}
GLUCOSE BLD-MCNC: 62 MG/DL (ref 70–99)
GLUCOSE BLD-MCNC: 66 MG/DL (ref 70–99)
GLUCOSE BLD-MCNC: 70 MG/DL (ref 70–99)
GLUCOSE BLD-MCNC: 89 MG/DL (ref 70–99)
GLUCOSE BLD-MCNC: 96 MG/DL (ref 70–99)
GLUCOSE SERPL-MCNC: 90 MG/DL (ref 70–99)
HCT VFR BLD AUTO: 32.8 % (ref 40.5–52.5)
HGB BLD-MCNC: 10.5 G/DL (ref 13.5–17.5)
LYMPHOCYTES # BLD: 0.5 K/UL (ref 1–5.1)
LYMPHOCYTES NFR BLD: 7.6 %
MCH RBC QN AUTO: 29.9 PG (ref 26–34)
MCHC RBC AUTO-ENTMCNC: 32 G/DL (ref 31–36)
MCV RBC AUTO: 93.5 FL (ref 80–100)
MONOCYTES # BLD: 0.7 K/UL (ref 0–1.3)
MONOCYTES NFR BLD: 9.1 %
NEUTROPHILS # BLD: 5.9 K/UL (ref 1.7–7.7)
NEUTROPHILS NFR BLD: 81.6 %
PERFORMED ON: ABNORMAL
PERFORMED ON: ABNORMAL
PERFORMED ON: NORMAL
PLATELET # BLD AUTO: 240 K/UL (ref 135–450)
PMV BLD AUTO: 8 FL (ref 5–10.5)
POTASSIUM SERPL-SCNC: 4.1 MMOL/L (ref 3.5–5.1)
RBC # BLD AUTO: 3.51 M/UL (ref 4.2–5.9)
SODIUM SERPL-SCNC: 136 MMOL/L (ref 136–145)
WBC # BLD AUTO: 7.2 K/UL (ref 4–11)

## 2024-11-28 PROCEDURE — 1200000000 HC SEMI PRIVATE

## 2024-11-28 PROCEDURE — 6370000000 HC RX 637 (ALT 250 FOR IP): Performed by: STUDENT IN AN ORGANIZED HEALTH CARE EDUCATION/TRAINING PROGRAM

## 2024-11-28 PROCEDURE — 6370000000 HC RX 637 (ALT 250 FOR IP): Performed by: NURSE PRACTITIONER

## 2024-11-28 PROCEDURE — 6370000000 HC RX 637 (ALT 250 FOR IP)

## 2024-11-28 PROCEDURE — 6360000002 HC RX W HCPCS: Performed by: NURSE PRACTITIONER

## 2024-11-28 PROCEDURE — 6360000002 HC RX W HCPCS: Performed by: STUDENT IN AN ORGANIZED HEALTH CARE EDUCATION/TRAINING PROGRAM

## 2024-11-28 PROCEDURE — 2060000000 HC ICU INTERMEDIATE R&B

## 2024-11-28 PROCEDURE — 80048 BASIC METABOLIC PNL TOTAL CA: CPT

## 2024-11-28 PROCEDURE — 85025 COMPLETE CBC W/AUTO DIFF WBC: CPT

## 2024-11-28 PROCEDURE — 99232 SBSQ HOSP IP/OBS MODERATE 35: CPT | Performed by: SURGERY

## 2024-11-28 PROCEDURE — 71045 X-RAY EXAM CHEST 1 VIEW: CPT

## 2024-11-28 PROCEDURE — 2580000003 HC RX 258

## 2024-11-28 PROCEDURE — 6360000002 HC RX W HCPCS

## 2024-11-28 PROCEDURE — 36415 COLL VENOUS BLD VENIPUNCTURE: CPT

## 2024-11-28 PROCEDURE — 74018 RADEX ABDOMEN 1 VIEW: CPT

## 2024-11-28 PROCEDURE — 2500000003 HC RX 250 WO HCPCS

## 2024-11-28 RX ORDER — FUROSEMIDE 20 MG/1
20 TABLET ORAL 2 TIMES DAILY
Qty: 60 TABLET | Refills: 3 | Status: SHIPPED | OUTPATIENT
Start: 2024-11-28

## 2024-11-28 RX ORDER — KETOROLAC TROMETHAMINE 30 MG/ML
15 INJECTION, SOLUTION INTRAMUSCULAR; INTRAVENOUS
Status: COMPLETED | OUTPATIENT
Start: 2024-11-28 | End: 2024-11-28

## 2024-11-28 RX ADMIN — Medication 5 MG: at 21:02

## 2024-11-28 RX ADMIN — METOCLOPRAMIDE HYDROCHLORIDE 5 MG: 5 INJECTION INTRAMUSCULAR; INTRAVENOUS at 10:55

## 2024-11-28 RX ADMIN — GABAPENTIN 600 MG: 300 CAPSULE ORAL at 14:15

## 2024-11-28 RX ADMIN — ATORVASTATIN CALCIUM 10 MG: 10 TABLET, FILM COATED ORAL at 21:02

## 2024-11-28 RX ADMIN — BUPROPION HYDROCHLORIDE 150 MG: 100 TABLET, FILM COATED ORAL at 10:54

## 2024-11-28 RX ADMIN — SODIUM CHLORIDE, PRESERVATIVE FREE 10 ML: 5 INJECTION INTRAVENOUS at 10:55

## 2024-11-28 RX ADMIN — THIAMINE HCL TAB 100 MG 100 MG: 100 TAB at 10:54

## 2024-11-28 RX ADMIN — KETOROLAC TROMETHAMINE 15 MG: 30 INJECTION INTRAMUSCULAR; INTRAVENOUS at 04:06

## 2024-11-28 RX ADMIN — MINERAL OIL 330 ML: 999 LIQUID ORAL at 21:23

## 2024-11-28 RX ADMIN — PANTOPRAZOLE SODIUM 40 MG: 40 INJECTION, POWDER, FOR SOLUTION INTRAVENOUS at 10:55

## 2024-11-28 RX ADMIN — ZOLPIDEM TARTRATE 5 MG: 5 TABLET, COATED ORAL at 21:02

## 2024-11-28 RX ADMIN — MORPHINE SULFATE 2 MG: 2 INJECTION, SOLUTION INTRAMUSCULAR; INTRAVENOUS at 14:14

## 2024-11-28 RX ADMIN — HEPARIN SODIUM 5000 UNITS: 5000 INJECTION INTRAVENOUS; SUBCUTANEOUS at 06:23

## 2024-11-28 RX ADMIN — Medication: at 21:04

## 2024-11-28 RX ADMIN — Medication: at 10:55

## 2024-11-28 RX ADMIN — METOCLOPRAMIDE HYDROCHLORIDE 5 MG: 5 INJECTION INTRAMUSCULAR; INTRAVENOUS at 01:27

## 2024-11-28 RX ADMIN — HEPARIN SODIUM 5000 UNITS: 5000 INJECTION INTRAVENOUS; SUBCUTANEOUS at 14:15

## 2024-11-28 RX ADMIN — SODIUM CHLORIDE, PRESERVATIVE FREE 10 ML: 5 INJECTION INTRAVENOUS at 21:03

## 2024-11-28 RX ADMIN — METOCLOPRAMIDE HYDROCHLORIDE 5 MG: 5 INJECTION INTRAMUSCULAR; INTRAVENOUS at 21:03

## 2024-11-28 RX ADMIN — METOCLOPRAMIDE HYDROCHLORIDE 5 MG: 5 INJECTION INTRAMUSCULAR; INTRAVENOUS at 14:15

## 2024-11-28 RX ADMIN — DEXTROSE MONOHYDRATE 25 G: 25 INJECTION, SOLUTION INTRAVENOUS at 04:42

## 2024-11-28 RX ADMIN — HEPARIN SODIUM 5000 UNITS: 5000 INJECTION INTRAVENOUS; SUBCUTANEOUS at 21:03

## 2024-11-28 RX ADMIN — FUROSEMIDE 20 MG: 20 TABLET ORAL at 10:54

## 2024-11-28 RX ADMIN — THERA TABS 1 TABLET: TAB at 10:54

## 2024-11-28 RX ADMIN — BUPROPION HYDROCHLORIDE 150 MG: 100 TABLET, FILM COATED ORAL at 21:02

## 2024-11-28 RX ADMIN — GABAPENTIN 600 MG: 300 CAPSULE ORAL at 21:02

## 2024-11-28 RX ADMIN — GABAPENTIN 600 MG: 300 CAPSULE ORAL at 10:54

## 2024-11-28 RX ADMIN — AMIODARONE HYDROCHLORIDE 200 MG: 200 TABLET ORAL at 10:55

## 2024-11-28 RX ADMIN — MORPHINE SULFATE 2 MG: 2 INJECTION, SOLUTION INTRAMUSCULAR; INTRAVENOUS at 17:06

## 2024-11-28 RX ADMIN — MIDODRINE HYDROCHLORIDE 25 MG: 5 TABLET ORAL at 01:28

## 2024-11-28 RX ADMIN — MIDODRINE HYDROCHLORIDE 25 MG: 5 TABLET ORAL at 11:01

## 2024-11-28 ASSESSMENT — PAIN - FUNCTIONAL ASSESSMENT
PAIN_FUNCTIONAL_ASSESSMENT: ACTIVITIES ARE NOT PREVENTED
PAIN_FUNCTIONAL_ASSESSMENT: PREVENTS OR INTERFERES SOME ACTIVE ACTIVITIES AND ADLS
PAIN_FUNCTIONAL_ASSESSMENT: PREVENTS OR INTERFERES SOME ACTIVE ACTIVITIES AND ADLS

## 2024-11-28 ASSESSMENT — PAIN SCALES - GENERAL
PAINLEVEL_OUTOF10: 6
PAINLEVEL_OUTOF10: 5
PAINLEVEL_OUTOF10: 4
PAINLEVEL_OUTOF10: 6
PAINLEVEL_OUTOF10: 2
PAINLEVEL_OUTOF10: 4
PAINLEVEL_OUTOF10: 8

## 2024-11-28 ASSESSMENT — PAIN DESCRIPTION - LOCATION
LOCATION: ABDOMEN

## 2024-11-28 ASSESSMENT — PAIN DESCRIPTION - FREQUENCY
FREQUENCY: CONTINUOUS

## 2024-11-28 ASSESSMENT — PAIN DESCRIPTION - ORIENTATION
ORIENTATION: LEFT

## 2024-11-28 ASSESSMENT — PAIN DESCRIPTION - DESCRIPTORS
DESCRIPTORS: STABBING;ACHING
DESCRIPTORS: SHARP;ACHING;DISCOMFORT
DESCRIPTORS: SHARP;ACHING

## 2024-11-28 ASSESSMENT — PAIN DESCRIPTION - PAIN TYPE
TYPE: SURGICAL PAIN
TYPE: ACUTE PAIN
TYPE: ACUTE PAIN

## 2024-11-28 ASSESSMENT — PAIN DESCRIPTION - ONSET
ONSET: ON-GOING
ONSET: ON-GOING
ONSET: PROGRESSIVE

## 2024-11-28 NOTE — PLAN OF CARE
Problem: Safety - Adult  Goal: Free from fall injury  11/28/2024 0808 by Soo Ayala, RN  Outcome: Progressing   All fall precautions in place. Bed locked and in lowest position with alarm on. Overbed table and personal belonings within reach. Call light within reach and patient instructed to use call light for assistance. Non-skid socks on.    Problem: Pain  Goal: Verbalizes/displays adequate comfort level or baseline comfort level  11/28/2024 0808 by Soo Ayala, RN  Outcome: Progressing   Pt endorsing pain to abdomen and back. Being treated with PRN pain medication, rest, and frequent repositioning with pillow support for comfort and pressure relief. Pt reports some relief from pain with above interventions.

## 2024-11-28 NOTE — PROGRESS NOTES
Asked to see patient  Having severe abd pain with tube feeds    Oxygen dropped for about 30 min to 70s   Sats back up      Vss RR 18 afebrile    Lungs decreased bs  Abd distended bowel sound diminished  Tender  No rebound      Cxr ? Asd R side (unofficial read by me)        Abd pain  Likely from tube feeds  Hold tube feeds  Suggest gastrografin to assess where tube feeds are going  Consider surgical consult    Hypoxia  No cough fever or vomiting  May have some crowding from bowel loops  Hold abx until cxr back    D/w Dr Phillip  Electronically signed by Noel Pardo MD on 11/28/2024 at 5:46 PM

## 2024-11-28 NOTE — PLAN OF CARE
Problem: Safety - Adult  Goal: Free from fall injury  11/28/2024 0345 by Zoya Jung, RN  Outcome: Progressing  Note: Pt free of fall injury this shift. All proper safety precautions are in place. Call light is within reach. Bed alarm is on.     Problem: Pain  Goal: Verbalizes/displays adequate comfort level or baseline comfort level  11/28/2024 0345 by Zoya Jung, RN  Outcome: Progressing  Note: Pt encouraged to monitor pain and request assistance. Appropriate pain scale is being used.

## 2024-11-28 NOTE — PROGRESS NOTES
Tube feed resumed at 25mL/hr per PEG, will attempt to advance in 4 hours per order as patient tolerates.     Electronically signed by Soo Ayala RN on 11/28/2024 at 11:29 AM

## 2024-11-28 NOTE — PROGRESS NOTES
Tube feed stopped, patient yelling complaining of pain to abdomen, O2 79%, pt suctioned and put on HFNC, O2 recovered and is now in 90s%. Dr. Phillip notified. KUB and chest XR ordered. Dr. Pardo and RRT came to bedside. Awaiting results of XR.

## 2024-11-28 NOTE — PROGRESS NOTES
Patient is A&Ox 4, cane be disoriented at times. VSS room air. Patient has endorsed pain to mid back managed well per MAR and non-pharm measures. Pt currently NPO- tube feed held overnight per MD. Ambulates maxi-move. Voiding via external catheter. Patient updated on plan of care. Fall and safety precautions in place, call light within reach.

## 2024-11-28 NOTE — CARE COORDINATION
Addendum 0830    Per primary RN, Mr. Mohamud will not be able to leave today. ABD pain. KUB pending. Need to restart TF and titrate to goal rate 65ml. Updated pt's spouse. Canceled transportation. Attempted to call Cami at [facility]. Unable to leave a message.         Case Management Assessment            Discharge Note                    Date / Time of Note: 11/28/2024 8:11 AM                  Discharge Note Completed by: Isela Pichardo RN    Mr. Mohamud will discharge to [facility] today around 12p via [facility].    RN CALL REPORT [phone]   AVS Fax: [phone]     Patient Name: Juan Mohamud   YOB: 1940  Diagnosis: Epidural hematoma [S06.4XAA]   Date / Time: 11/7/2024 12:52 AM    Current PCP: Janet Garcia MD  Clinic patient: No    Hospitalization in the last 30 days: Yes  Readmission Assessment  Number of Days since last admission?: 1-7 days (transfer from [facility])  Previous Disposition: Other (comment) (transfer from [facility])  Who is being Interviewed: Patient (transfer from [facility])  What was the patient's/caregiver's perception as to why they think they needed to return back to the hospital?: Other (Comment) (transfer from [facility])  Did you visit your Primary Care Physician after you left the hospital, before you returned this time?: No  Why weren't you able to visit your PCP?: Did not have an appointment (transfer from [facility])  Did you see a specialist, such as Cardiac, Pulmonary, Orthopedic Physician, etc. after you left the hospital?: No (transfer from [facility])  Who advised the patient to return to the hospital?: Other (Comment) (transfer from [facility])  Does the patient report anything that got in the way of taking their medications?: No (transfer from [facility])  In our efforts to provide the best possible care to you and others like you, can you think of anything that we could have done to

## 2024-11-29 ENCOUNTER — APPOINTMENT (OUTPATIENT)
Dept: GENERAL RADIOLOGY | Age: 84
DRG: 447 | End: 2024-11-29
Attending: INTERNAL MEDICINE
Payer: MEDICARE

## 2024-11-29 PROBLEM — K56.0 PARALYTIC ILEUS OF SMALL INTESTINE AND COLON (HCC): Status: ACTIVE | Noted: 2024-11-29

## 2024-11-29 LAB
ANION GAP SERPL CALCULATED.3IONS-SCNC: 10 MMOL/L (ref 3–16)
BASOPHILS # BLD: 0 K/UL (ref 0–0.2)
BASOPHILS NFR BLD: 0.2 %
BUN SERPL-MCNC: 23 MG/DL (ref 7–20)
CALCIUM SERPL-MCNC: 7.8 MG/DL (ref 8.3–10.6)
CHLORIDE SERPL-SCNC: 99 MMOL/L (ref 99–110)
CO2 SERPL-SCNC: 27 MMOL/L (ref 21–32)
CREAT SERPL-MCNC: 1.1 MG/DL (ref 0.8–1.3)
DEPRECATED RDW RBC AUTO: 20.4 % (ref 12.4–15.4)
EOSINOPHIL # BLD: 0 K/UL (ref 0–0.6)
EOSINOPHIL NFR BLD: 0.4 %
GFR SERPLBLD CREATININE-BSD FMLA CKD-EPI: 67 ML/MIN/{1.73_M2}
GLUCOSE BLD-MCNC: 108 MG/DL (ref 70–99)
GLUCOSE BLD-MCNC: 118 MG/DL (ref 70–99)
GLUCOSE BLD-MCNC: 147 MG/DL (ref 70–99)
GLUCOSE BLD-MCNC: 62 MG/DL (ref 70–99)
GLUCOSE SERPL-MCNC: 87 MG/DL (ref 70–99)
HCT VFR BLD AUTO: 35 % (ref 40.5–52.5)
HGB BLD-MCNC: 11.2 G/DL (ref 13.5–17.5)
LYMPHOCYTES # BLD: 0.8 K/UL (ref 1–5.1)
LYMPHOCYTES NFR BLD: 7.1 %
MCH RBC QN AUTO: 29.5 PG (ref 26–34)
MCHC RBC AUTO-ENTMCNC: 32 G/DL (ref 31–36)
MCV RBC AUTO: 92 FL (ref 80–100)
MONOCYTES # BLD: 1.2 K/UL (ref 0–1.3)
MONOCYTES NFR BLD: 11 %
NEUTROPHILS # BLD: 8.6 K/UL (ref 1.7–7.7)
NEUTROPHILS NFR BLD: 81.3 %
PERFORMED ON: ABNORMAL
PLATELET # BLD AUTO: 201 K/UL (ref 135–450)
PMV BLD AUTO: 8.2 FL (ref 5–10.5)
POTASSIUM SERPL-SCNC: 4.3 MMOL/L (ref 3.5–5.1)
RBC # BLD AUTO: 3.81 M/UL (ref 4.2–5.9)
SODIUM SERPL-SCNC: 136 MMOL/L (ref 136–145)
WBC # BLD AUTO: 10.5 K/UL (ref 4–11)

## 2024-11-29 PROCEDURE — 6370000000 HC RX 637 (ALT 250 FOR IP): Performed by: STUDENT IN AN ORGANIZED HEALTH CARE EDUCATION/TRAINING PROGRAM

## 2024-11-29 PROCEDURE — 6360000002 HC RX W HCPCS: Performed by: STUDENT IN AN ORGANIZED HEALTH CARE EDUCATION/TRAINING PROGRAM

## 2024-11-29 PROCEDURE — 36415 COLL VENOUS BLD VENIPUNCTURE: CPT

## 2024-11-29 PROCEDURE — 80048 BASIC METABOLIC PNL TOTAL CA: CPT

## 2024-11-29 PROCEDURE — 6370000000 HC RX 637 (ALT 250 FOR IP)

## 2024-11-29 PROCEDURE — 2580000003 HC RX 258

## 2024-11-29 PROCEDURE — 74018 RADEX ABDOMEN 1 VIEW: CPT

## 2024-11-29 PROCEDURE — 2500000003 HC RX 250 WO HCPCS

## 2024-11-29 PROCEDURE — 43762 RPLC GTUBE NO REVJ TRC: CPT

## 2024-11-29 PROCEDURE — 92526 ORAL FUNCTION THERAPY: CPT

## 2024-11-29 PROCEDURE — 6360000002 HC RX W HCPCS

## 2024-11-29 PROCEDURE — 6360000002 HC RX W HCPCS: Performed by: NURSE PRACTITIONER

## 2024-11-29 PROCEDURE — 71045 X-RAY EXAM CHEST 1 VIEW: CPT

## 2024-11-29 PROCEDURE — 99232 SBSQ HOSP IP/OBS MODERATE 35: CPT | Performed by: SURGERY

## 2024-11-29 PROCEDURE — 6370000000 HC RX 637 (ALT 250 FOR IP): Performed by: NURSE PRACTITIONER

## 2024-11-29 PROCEDURE — 2060000000 HC ICU INTERMEDIATE R&B

## 2024-11-29 PROCEDURE — 85025 COMPLETE CBC W/AUTO DIFF WBC: CPT

## 2024-11-29 RX ORDER — FUROSEMIDE 10 MG/ML
20 INJECTION INTRAMUSCULAR; INTRAVENOUS ONCE
Status: COMPLETED | OUTPATIENT
Start: 2024-11-29 | End: 2024-11-29

## 2024-11-29 RX ADMIN — MIDODRINE HYDROCHLORIDE 25 MG: 5 TABLET ORAL at 18:07

## 2024-11-29 RX ADMIN — HEPARIN SODIUM 5000 UNITS: 5000 INJECTION INTRAVENOUS; SUBCUTANEOUS at 12:35

## 2024-11-29 RX ADMIN — ATORVASTATIN CALCIUM 10 MG: 10 TABLET, FILM COATED ORAL at 19:55

## 2024-11-29 RX ADMIN — ZOLPIDEM TARTRATE 5 MG: 5 TABLET, COATED ORAL at 19:55

## 2024-11-29 RX ADMIN — DEXTROSE MONOHYDRATE 25 G: 25 INJECTION, SOLUTION INTRAVENOUS at 08:45

## 2024-11-29 RX ADMIN — GABAPENTIN 600 MG: 300 CAPSULE ORAL at 14:25

## 2024-11-29 RX ADMIN — NALOXEGOL OXALATE 12.5 MG: 25 TABLET, FILM COATED ORAL at 09:54

## 2024-11-29 RX ADMIN — BUPROPION HYDROCHLORIDE 150 MG: 100 TABLET, FILM COATED ORAL at 09:53

## 2024-11-29 RX ADMIN — MIDODRINE HYDROCHLORIDE 25 MG: 5 TABLET ORAL at 04:40

## 2024-11-29 RX ADMIN — FUROSEMIDE 20 MG: 10 INJECTION, SOLUTION INTRAMUSCULAR; INTRAVENOUS at 18:44

## 2024-11-29 RX ADMIN — METOCLOPRAMIDE HYDROCHLORIDE 5 MG: 5 INJECTION INTRAMUSCULAR; INTRAVENOUS at 14:24

## 2024-11-29 RX ADMIN — PANTOPRAZOLE SODIUM 40 MG: 40 INJECTION, POWDER, FOR SOLUTION INTRAVENOUS at 09:53

## 2024-11-29 RX ADMIN — SODIUM CHLORIDE, PRESERVATIVE FREE 10 ML: 5 INJECTION INTRAVENOUS at 09:53

## 2024-11-29 RX ADMIN — HEPARIN SODIUM 5000 UNITS: 5000 INJECTION INTRAVENOUS; SUBCUTANEOUS at 19:54

## 2024-11-29 RX ADMIN — METOCLOPRAMIDE HYDROCHLORIDE 5 MG: 5 INJECTION INTRAMUSCULAR; INTRAVENOUS at 04:39

## 2024-11-29 RX ADMIN — GABAPENTIN 600 MG: 300 CAPSULE ORAL at 19:55

## 2024-11-29 RX ADMIN — LINACLOTIDE 145 MCG: 145 CAPSULE, GELATIN COATED ORAL at 10:48

## 2024-11-29 RX ADMIN — FUROSEMIDE 20 MG: 20 TABLET ORAL at 18:07

## 2024-11-29 RX ADMIN — AMIODARONE HYDROCHLORIDE 200 MG: 200 TABLET ORAL at 09:54

## 2024-11-29 RX ADMIN — MIDODRINE HYDROCHLORIDE 25 MG: 5 TABLET ORAL at 10:54

## 2024-11-29 RX ADMIN — GABAPENTIN 600 MG: 300 CAPSULE ORAL at 09:54

## 2024-11-29 RX ADMIN — Medication: at 09:57

## 2024-11-29 RX ADMIN — SODIUM CHLORIDE, PRESERVATIVE FREE 10 ML: 5 INJECTION INTRAVENOUS at 19:55

## 2024-11-29 RX ADMIN — METOCLOPRAMIDE HYDROCHLORIDE 5 MG: 5 INJECTION INTRAMUSCULAR; INTRAVENOUS at 09:53

## 2024-11-29 RX ADMIN — THIAMINE HCL TAB 100 MG 100 MG: 100 TAB at 09:54

## 2024-11-29 RX ADMIN — Medication 5 MG: at 19:54

## 2024-11-29 RX ADMIN — OXYCODONE 5 MG: 5 TABLET ORAL at 11:25

## 2024-11-29 RX ADMIN — HEPARIN SODIUM 5000 UNITS: 5000 INJECTION INTRAVENOUS; SUBCUTANEOUS at 04:40

## 2024-11-29 RX ADMIN — THERA TABS 1 TABLET: TAB at 09:54

## 2024-11-29 RX ADMIN — FUROSEMIDE 20 MG: 20 TABLET ORAL at 09:54

## 2024-11-29 RX ADMIN — Medication: at 19:54

## 2024-11-29 RX ADMIN — GUAIFENESIN 200 MG: 100 SOLUTION ORAL at 20:08

## 2024-11-29 RX ADMIN — BUPROPION HYDROCHLORIDE 150 MG: 100 TABLET, FILM COATED ORAL at 19:55

## 2024-11-29 ASSESSMENT — PAIN SCALES - GENERAL
PAINLEVEL_OUTOF10: 0
PAINLEVEL_OUTOF10: 0
PAINLEVEL_OUTOF10: 9
PAINLEVEL_OUTOF10: 0

## 2024-11-29 ASSESSMENT — PAIN DESCRIPTION - DESCRIPTORS: DESCRIPTORS: ACHING

## 2024-11-29 ASSESSMENT — PAIN DESCRIPTION - LOCATION: LOCATION: ABDOMEN

## 2024-11-29 ASSESSMENT — PAIN DESCRIPTION - PAIN TYPE: TYPE: ACUTE PAIN

## 2024-11-29 ASSESSMENT — PAIN - FUNCTIONAL ASSESSMENT: PAIN_FUNCTIONAL_ASSESSMENT: ACTIVITIES ARE NOT PREVENTED

## 2024-11-29 ASSESSMENT — PAIN DESCRIPTION - FREQUENCY: FREQUENCY: CONTINUOUS

## 2024-11-29 ASSESSMENT — PAIN DESCRIPTION - ORIENTATION: ORIENTATION: MID

## 2024-11-29 ASSESSMENT — PAIN DESCRIPTION - ONSET: ONSET: ON-GOING

## 2024-11-29 NOTE — CARE COORDINATION
CM following: NITO spoke with Adele [phone] in admissions at [facility]. NITO updated Adele that we are increasing tube feeds slowly and pt may not be ready until the beginning of the week. Adele reports that they will hold the bed for the pt and Adele to review the precert to determine how long the precert is valid. Adele to call CM back with this information. NITO met with pt and pt's spouse at bedside and updated that [place] at  will hold the bed until the pt is medically stable for discharge. Pt and spouse in continued agreement with discharge plan. CM will continue to follow for discharge planning.  Electronically signed by SHARLA Blum on 11/29/2024 at 1:41 PM  [phone]

## 2024-11-29 NOTE — PROGRESS NOTES
V2.0    [facility] [place]      Name:  Juan Mohamud /Age/Sex: 1940  (84 y.o. male)   MRN & CSN:  [id number] & [id number] Encounter Date/Time: 2024 2:26 PM EST   Location:  5528/5528-01 PCP: Janet Garcia MD     Attending:Ayaan Elias*       Hospital Day: 23    Assessment and Recommendations     84 y.o. male with a medical hx significant for type II DM, degenerative lumbar disease, arrhythmia s/p AICD, diastolic CHF, and macular degeneration, who presented from home to the [facility] ED on 11/3/24 after a fall.     Reportedly, patient fell after feeling like his \"knees were giving out.\" No reported symptoms prior to this fall, patient landed on his left side and hit the back of his head but did not lose consciousness at this time.      Upon arrival to the [facility] ED, patient was alert, oriented and reported with GCS of 15 and NIH of 0. While in the ED patient had increasing generalized weakness and difficulty getting up from a sitting position. Decision was made to admit patient as they did not feel safe discharging him home given his inability to stand unassisted.      Fall was thought to be mechanical and secondary to generalized weakness due to poor nutrition. CT abd pelv performed due to complaints of abdominal pain, distension, and back pain upon arrival to the floor. Imaging revealed acute T11 vertebral fracture extending into the T10-T11 disc space as well as diffuse distention of small and large bowel compatible with ileus and bilateral pleural effusions.     GI consulted for ileus, and patient underwent colonic decompression     MRI thoracic spine then demonstrated large epidural spinal hematoma with cord compression  and additional T6 subacute compression fracture along with bilateral pleural effusions.  4    Has been having issues with tolerating PEG tube feeds. General surgery reconsulted and currently treating supportively in the setting of Tex's

## 2024-11-29 NOTE — PROGRESS NOTES
Patient is alert and oriented x4, however requires periodic reorientation. VSS on room air. Medications given per MAR, no side effects noted. Patient is non ambulatory. Endorses pain to abdomen. Continuing to monitor and manage per MAR. Voiding well via male purewick. No bowel movements this shift.     Tube feeds running at 30 mL/hour. PEG tube care with soap and water completed this shift. Tolerating PO clear liquid diet.     Patient has bilateral upper extremity weeping wounds, dressed with abd pad and kerlix. Required 2 instances of dressing changes due to wound weeping clear fluid.      Patient is currently resting in bed with bed alarm on for safety. Call light within reach and all fall precautions in place. Plan of care continues.

## 2024-11-29 NOTE — PROGRESS NOTES
Comprehensive Nutrition Assessment    RECOMMENDATIONS:  Nutrition Support: Osmolite 1.5 @ 20 ml/hr  Advance by 10 ml/hr q8  Goal: 65 ml/hr  Flushes: 30 q4  PO Diet: Full liquid diet per SLP  Nutrition Education: Education/Counseling not indicated     NUTRITION ASSESSMENT:   Nutritional summary & status: Follow up. S/p PEG placement 11/26. TF started @ 25 ml/hr after placement. Pt reported w/ abd discomfort and mild distention hours after started on 25 ml/hr. TF at 10 ml/hr, advanced to 20 ml/hr this AM. Per surgery, pt must remain on movantik and linzess. Receiving bowel regimen per EMR. Also received daily SMOG, asked MD if this is still necessary. BM x5 yesterday, BM 1-2/day prior per EMR documentation. KUB this AM shows stable gaseous colonic distension. At first half of RD encounter, pt denied abd pain/distention. At last half, he reported abd pain and said he had 10 BMs today. RN reported he has not had a bowel movement today + has been c/o abd pain. Unsure how accurate pt is reporting symptoms, but would not want to too aggressively advance TF and inflict discomfort. Pt tolerated same TF regimen @ goal rate when he had a NGT. SLP eval today, pt on full liquid diet. Will monitor TF tolerance, slow advancement recommended above per surgery. Planning to d/c to SNF     Admission // PMH: Vertebral fracture//colonic psuedoobstruction, T2DM, HLD, CHF    MALNUTRITION ASSESSMENT  Context of Malnutrition: Acute Illness   Malnutrition Status: Mild malnutrition  Findings of the 6 clinical characteristics of malnutrition (Minimum of 2 out of 6 clinical characteristics is required to make the diagnosis of moderate or severe Protein Calorie Malnutrition based on AND/ASPEN Guidelines):  Energy Intake:  50% or less of estimated energy requirements for 5 or more days  Weight Loss:  Mild weight loss (5% in 3 month period)     Body Fat Loss:  Unable to assess     Muscle Mass Loss:  Unable to assess    Fluid Accumulation:  No  fluid accumulation     Strength:  Not Performed    NUTRITION DIAGNOSIS   Inadequate oral intake related to swallowing difficulty as evidenced by nutrition support - enteral nutrition    Nutrition Monitoring and Evaluation:   Food/Nutrient Intake Outcomes:  Enteral Nutrition Intake/Tolerance, Diet Advancement/Tolerance  Physical Signs/Symptoms Outcomes:  Biochemical Data, Nutrition Focused Physical Findings, Weight, Skin, GI Status     OBJECTIVE DATA: Significant to nutrition assessment  Nutrition Related Findings: labs reviewed, + 5 L, loose BM 11/28  Wounds: Stage I  Nutrition Goals: Meet at least 75% of estimated needs, Tolerate nutrition support at goal rate, by next RD assessment     CURRENT NUTRITION THERAPIES  ADULT TUBE FEEDING; PEG; Standard without Fiber; Continuous; 10; Yes; 10; Q 6 hours; 65; 30; Q 4 hours  ADULT DIET; Full Liquid  PO Intake: NPO   PO Supplement Intake:NPO  Additional Sources of Calories/IVF:NA     COMPARATIVE STANDARDS  Energy (kcal):  6669-8290 (25-28 kcal/kg admission wt)     Protein (g):  107-125 (1.2-1.5 gm/kg)       Fluid (ml/day):  per free water deficit    ANTHROPOMETRICS  Current Height: 195.6 cm (6' 5\")  Current Weight - Scale: 98.2 kg (216 lb 7.9 oz)    Admission weight: 89.5 kg (197 lb 5 oz)    The patient will be monitored per nutrition standards of care. Consult dietitian if additional nutrition interventions are needed prior to RD reassessment.     Jaimie Norris RD  [place]:  [phone]

## 2024-11-29 NOTE — PROGRESS NOTES
General Surgery   Daily Progress Note  Patient: Juan Mohamud      CC: Distension, Tex's syndrome      SUBJECTIVE:       Re-engaged due to abdominal pain after starting TF yesterday. Patient without BM today, last was early am. Did not receive PRN smog, linzess, or movantik. Patient states his pain started after being moved this evening. Denies pain during day. TF currently held since last night, was started at 25 ml / hr.   ROS:   A 14 point review of systems was conducted, significant findings as noted above. All other systems negative.    OBJECTIVE:    PHYSICAL EXAM:    Vitals:    11/28/24 1444 11/28/24 1629 11/28/24 1706 11/28/24 1736   BP:  113/74     Pulse:  (!) 109     Resp: 16 16 16 16   Temp:  98.5 °F (36.9 °C)     TempSrc:  Oral     SpO2:  98%     Weight:       Height:           General appearance: alert, no acute distress  Neck: trachea midline, neck supple  Respiratory: Normal effort with no accessory muscle use on RA  Cardiovascular: RRR  Abdomen: soft, non-tender, distended, tympanic to percussion, no guarding, no rigidity. PEG tube in place, flushes without issue. 8 cm at skin. Non tender.   Rectal:  normal tone, no stool in vault. No masses.   Skin: warm and dry, no rashes  Neuro: A&Ox3, no focal deficits    LABS:   Recent Labs     11/27/24  0616 11/28/24  0607   WBC 8.7 7.2   HGB 10.3* 10.5*   HCT 32.2* 32.8*   MCV 94.8 93.5    240        Recent Labs     11/27/24  0616 11/28/24  0607   * 136   K 4.8 4.1    100   CO2 24 26   BUN 26* 24*   CREATININE 1.2 1.1        No results for input(s): \"AST\", \"ALT\", \"BILIDIR\", \"BILITOT\", \"ALKPHOS\" in the last 72 hours.    Invalid input(s): \"ALB\"     No results for input(s): \"LIPASE\", \"AMYLASE\" in the last 72 hours.     Recent Labs     11/26/24  0647   INR 1.13      No results for input(s): \"CKTOTAL\", \"CKMB\", \"CKMBINDEX\", \"TROPONINI\" in the last 72 hours.      ASSESSMENT & PLAN:   This is a 84 y.o. male with Hx of Hx of type II DM,  degenerative lumbar disease, arrhythmia s/p AICD, diastolic CHF. He presented with a T11 burst fracture and epidural hematoma requiring neurosurgical fixation. Since fall he has had increased abdominal distention and underwent decompressive colonoscopy on 11/8. S/p T8-L1 posterior decompression, fixation and fusion. 11/12 with NSGY. Surgical services consulted for ongoing ileus/colonic pseudo obstruction    Patient failed MBS and got PEG tube on 11/26. TF started at 25 ml, patient having discomfort after 2 hours of feeds. Surgery re-engaged.       - Recommend daily SMOG   - Continue aggressive bowel regimen  Patient must receive linzess and movantik  - Recommend starting TF at 10 and advancing slowly.   - KUB in am  - Serial exams     Jacques Pimentel DO  PGY1, General Surgery  11/28/24  8:54 PM  [email or web address]  Pager: [phone]     Inpatient consult to General Surgery  Consult performed by: Jacques Pimentel DO  Consult ordered by: Ayaan Elias MD

## 2024-11-29 NOTE — PROGRESS NOTES
PT aox4. VSSRA. Max assist. SMOG enema given this shift, moderate BM afterward. Voiding via external catheter.  Pain endorsed to back, being managed per MAR. Every 2 hour turns preformed. NAEO. All fall / safety precautions in place. POC continues.

## 2024-11-29 NOTE — PROGRESS NOTES
Speech Language Pathology  Facility/Department:[address]  Dysphagia Therapy Note                                                       Name: Juan Mohamud  : 1940  MRN: [id number]    Patient Diagnosis(es):   Patient Active Problem List    Diagnosis Date Noted    Paralytic ileus of small intestine and colon ([facility]) 2024    VT (ventricular tachycardia) ([facility]) 2024    Dysphagia 2024    Encounter for palliative care 2024    Hypotension 2024    SVT (supraventricular tachycardia) ([facility]) 2024    Fx dorsal vertebra-closed ([facility]) 2024    Spinal cord injury at T1-T6 level ([facility]) 2024    Preoperative cardiovascular examination 2024    Chronic heart failure with preserved ejection fraction (HFpEF) ([facility]) 2024    Abdominal distension 2024    Epidural hematoma 2024    Fall at home, initial encounter 2024    Acute congestive heart failure ([facility]) 2024    PVC's (premature ventricular contractions) 2024    Sepsis ([facility]) 2024    ICD (implantable cardioverter-defibrillator) in place 2024    Acute hypoxemic respiratory failure 2024    BOSTON (acute kidney injury) ([facility]) 2024    Arrhythmia 2024    Primary hypertension 2024    Hyperlipidemia 2024    Colonic obstruction ([facility]) 2024    Trauma 2024    History of fall 2024    Type 2 diabetes mellitus ([facility]) 2024    Anemia 2024    Pneumonia 2024    Hyponatremia 2024    Colonic pseudoobstruction 2024    Wide-complex tachycardia 2024    Constipation 2024    Cataract extraction status of right eye 2018    DDD (degenerative disc disease), lumbar 2013       Past Medical History:   Diagnosis Date    (HFpEF) heart failure with preserved ejection fraction ([facility]) 2024    EF 50-55%, mod/severe MR    Diabetes mellitus ([facility])     diet controlled    DVT (deep venous thrombosis) ([facility])  assessment of swallow function as appropriate.  11/16: ...please see prior notes...  11/25: See goal above. Pt requests ongoing full liquid diet, however has reported inability to tolerate medications in puree. Pt tolerated single sips of thin liquid this date. Cues needed for precautions d/t decreased recall and inability to see printed precautions (macular degeneration). Ongoing education needed regarding dysphagia dx and POC. Pt and family with overall decreased understanding.  11/29: Pt remains on speech caseload, however received new order to assess swallow function as pt requesting PO. Since last SLP visit, pt now has PEG tube. Received pt awake, resting in bed, on 3L O2 via NC, voice remains weak. Positioned upright and presented ice chips, thins via tsp/straw, and puree; pt with positive oral acceptance, slowed/limited oral prep, seemingly delayed swallow movement, inconsistent cough. Suspect swallow function is similar to recent MBS. Continue to recommend NPO vs PO for quality of life, with pt previously opting for full liquid diet as 'safer' option. Recommend resume diet per his preference for QoL. D/w RN.    Education  Provided education to patient re: role of SLP, purpose of visit, recommendations. Patient with questionable comprehension.    Total treatment time: 15' dysphagia tx     Plan  Diet Recommendations:   NPO + alternative means nutrition/hydration  vs  Full liquid diet (as per patient preference for QoL)  Sit fully upright  Small single bites / sips  Slow rate  Hard swallow  Throat clear  Re-swallow  Oral hygiene 2 x daily    Discharge Plan: ongoing speech therapy at next level  Discussed with Coreen GANDARA  Needs within reach.    Electronically Signed by:  Lalita Ramirez, SLP, [id number]  Ph. # [phone]  Pg. # [phone]    This document will serve as a discharge summary if pt discharges before next treatment.

## 2024-11-29 NOTE — PLAN OF CARE
Problem: Chronic Conditions and Co-morbidities  Goal: Patient's chronic conditions and co-morbidity symptoms are monitored and maintained or improved  11/28/2024 1959 by Ulices Tipton RN  Outcome: Progressing  Flowsheets (Taken 11/25/2024 2224 by Herman Arias, RN)  Care Plan - Patient's Chronic Conditions and Co-Morbidity Symptoms are Monitored and Maintained or Improved:   Monitor and assess patient's chronic conditions and comorbid symptoms for stability, deterioration, or improvement   Collaborate with multidisciplinary team to address chronic and comorbid conditions and prevent exacerbation or deterioration   Update acute care plan with appropriate goals if chronic or comorbid symptoms are exacerbated and prevent overall improvement and discharge  11/28/2024 0808 by Soo Ayala RN  Outcome: Progressing     Problem: Discharge Planning  Goal: Discharge to home or other facility with appropriate resources  11/28/2024 1959 by Ulices Tipton RN  Outcome: Progressing  Flowsheets (Taken 11/25/2024 2224 by Herman Arias, RN)  Discharge to home or other facility with appropriate resources:   Identify barriers to discharge with patient and caregiver   Identify discharge learning needs (meds, wound care, etc)   Arrange for needed discharge resources and transportation as appropriate  11/28/2024 0808 by Soo Ayala, RN  Outcome: Progressing     Problem: Safety - Adult  Goal: Free from fall injury  11/28/2024 1959 by Ulices Tipton RN  Outcome: Progressing  Flowsheets (Taken 11/25/2024 2224 by Herman Arias, RN)  Free From Fall Injury:   Instruct family/caregiver on patient safety   Based on caregiver fall risk screen, instruct family/caregiver to ask for assistance with transferring infant if caregiver noted to have fall risk factors  11/28/2024 0808 by Soo Ayala, RN  Outcome: Progressing     Problem: Skin/Tissue Integrity  Goal: Absence of new skin breakdown  Description: 1.  Monitor for areas of

## 2024-11-29 NOTE — PROGRESS NOTES
General Surgery   Daily Progress Note  Patient: Juan Mohamud      CC: Distension, Tex's syndrome      SUBJECTIVE:   Patient denies abdominal pain with TF @ 10 mL/hr    ROS:   A 14 point review of systems was conducted, significant findings as noted above. All other systems negative.    OBJECTIVE:    PHYSICAL EXAM:    Vitals:    11/28/24 1736 11/28/24 2020 11/28/24 2356 11/29/24 0355   BP:  98/72 96/70 101/82   Pulse:  (!) 101 98 (!) 102   Resp: 16 16 14 16   Temp:  97.6 °F (36.4 °C) 97.7 °F (36.5 °C) 97.7 °F (36.5 °C)   TempSrc:  Temporal Temporal Temporal   SpO2:  96% 94% 96%   Weight:       Height:           General appearance: alert, no acute distress  Respiratory: Normal effort with no accessory muscle use on RA  Cardiovascular: RRR  Abdomen: soft, non-tender, distended, tympanic to percussion, no guarding, no rigidity. PEG tube in place, flushes without issue. 8 cm at skin. Non tender.   Neuro: A&Ox3, no focal deficits    LABS:   Recent Labs     11/27/24  0616 11/28/24  0607   WBC 8.7 7.2   HGB 10.3* 10.5*   HCT 32.2* 32.8*   MCV 94.8 93.5    240        Recent Labs     11/27/24  0616 11/28/24  0607   * 136   K 4.8 4.1    100   CO2 24 26   BUN 26* 24*   CREATININE 1.2 1.1        No results for input(s): \"AST\", \"ALT\", \"BILIDIR\", \"BILITOT\", \"ALKPHOS\" in the last 72 hours.    Invalid input(s): \"ALB\"     No results for input(s): \"LIPASE\", \"AMYLASE\" in the last 72 hours.     No results for input(s): \"INR\", \"APTT\" in the last 72 hours.    Invalid input(s): \"PROT\"     No results for input(s): \"CKTOTAL\", \"CKMB\", \"CKMBINDEX\", \"TROPONINI\" in the last 72 hours.      ASSESSMENT & PLAN:   This is a 84 y.o. male with Hx of Hx of type II DM, degenerative lumbar disease, arrhythmia s/p AICD, diastolic CHF. He presented with a T11 burst fracture and epidural hematoma requiring neurosurgical fixation. Since fall he has had increased abdominal distention and underwent decompressive colonoscopy on

## 2024-11-29 NOTE — PROGRESS NOTES
Occupational Therapy/Physical Therapy  Chart reviewed.  Attempted to see pt for therapy, but unable secondary to nursing providing wound care.  Will follow up per plan of care.  ELY Fernandez, OTR/L [id number]   Meagan Vital, PT, DPT  [id number]

## 2024-11-29 NOTE — PLAN OF CARE
Problem: Chronic Conditions and Co-morbidities  Goal: Patient's chronic conditions and co-morbidity symptoms are monitored and maintained or improved  Recent Flowsheet Documentation  Taken 11/29/2024 1215 by Coreen Ma RN  Care Plan - Patient's Chronic Conditions and Co-Morbidity Symptoms are Monitored and Maintained or Improved: Monitor and assess patient's chronic conditions and comorbid symptoms for stability, deterioration, or improvement    Problem: Discharge Planning  Goal: Discharge to home or other facility with appropriate resources  Outcome: Progressing  Flowsheets  Taken 11/29/2024 1215  Discharge to home or other facility with appropriate resources: Identify barriers to discharge with patient and caregiver  Pt involved in discharge planning. Barriers to discharge discussed with patient. Discharge learning needs identified. Discuss with patient any additional needed resources and transportation plans. Case management following plan of care.      Problem: Safety - Adult  Goal: Free from fall injury  Outcome: Progressing  All fall precautions in place. Bed locked and in lowest position with alarm on. Overbed table and personal belonings within reach. Call light within reach and patient instructed to use call light for assistance. Non-skid socks on.

## 2024-11-29 NOTE — PROGRESS NOTES
Patient c/o shortness of breath stating \"I feel like I can't catch my breath\" Oxygen is 92% on 3 liters high flow nasal cannula. Dr. Phillip notified. Chest x ray and lasix ordered at this time.

## 2024-11-30 ENCOUNTER — APPOINTMENT (OUTPATIENT)
Dept: GENERAL RADIOLOGY | Age: 84
DRG: 447 | End: 2024-11-30
Attending: INTERNAL MEDICINE
Payer: MEDICARE

## 2024-11-30 LAB
ANION GAP SERPL CALCULATED.3IONS-SCNC: 9 MMOL/L (ref 3–16)
BASE EXCESS BLDA CALC-SCNC: 2.4 MMOL/L (ref -3–3)
BASOPHILS # BLD: 0 K/UL (ref 0–0.2)
BASOPHILS NFR BLD: 0.2 %
BUN SERPL-MCNC: 24 MG/DL (ref 7–20)
CALCIUM SERPL-MCNC: 8.1 MG/DL (ref 8.3–10.6)
CHLORIDE SERPL-SCNC: 95 MMOL/L (ref 99–110)
CO2 BLDA-SCNC: 30 MMOL/L
CO2 SERPL-SCNC: 28 MMOL/L (ref 21–32)
COHGB MFR BLDA: 2 % (ref 0–1.5)
CREAT SERPL-MCNC: 1.1 MG/DL (ref 0.8–1.3)
DEPRECATED RDW RBC AUTO: 20.3 % (ref 12.4–15.4)
EOSINOPHIL # BLD: 0 K/UL (ref 0–0.6)
EOSINOPHIL NFR BLD: 0.3 %
GFR SERPLBLD CREATININE-BSD FMLA CKD-EPI: 67 ML/MIN/{1.73_M2}
GLUCOSE BLD-MCNC: 105 MG/DL (ref 70–99)
GLUCOSE BLD-MCNC: 175 MG/DL (ref 70–99)
GLUCOSE BLD-MCNC: 188 MG/DL (ref 70–99)
GLUCOSE SERPL-MCNC: 198 MG/DL (ref 70–99)
HCO3 BLDA-SCNC: 29 MMOL/L (ref 21–29)
HCT VFR BLD AUTO: 36.5 % (ref 40.5–52.5)
HGB BLD-MCNC: 11.8 G/DL (ref 13.5–17.5)
HGB BLDA-MCNC: 11.2 G/DL
LYMPHOCYTES # BLD: 0.6 K/UL (ref 1–5.1)
LYMPHOCYTES NFR BLD: 4.4 %
MCH RBC QN AUTO: 29.8 PG (ref 26–34)
MCHC RBC AUTO-ENTMCNC: 32.3 G/DL (ref 31–36)
MCV RBC AUTO: 92.4 FL (ref 80–100)
METHGB MFR BLDA: <0 % (ref 0–1.4)
MONOCYTES # BLD: 0.9 K/UL (ref 0–1.3)
MONOCYTES NFR BLD: 7.4 %
NEUTROPHILS # BLD: 11.1 K/UL (ref 1.7–7.7)
NEUTROPHILS NFR BLD: 87.7 %
NT-PROBNP SERPL-MCNC: ABNORMAL PG/ML (ref 0–449)
PCO2 BLDA: 51.7 MMHG (ref 35–45)
PERFORMED ON: ABNORMAL
PH BLDA: 7.35 [PH] (ref 7.35–7.45)
PHOSPHATE SERPL-MCNC: 3.4 MG/DL (ref 2.5–4.9)
PLATELET # BLD AUTO: 293 K/UL (ref 135–450)
PMV BLD AUTO: 8 FL (ref 5–10.5)
PO2 BLDA: 80.3 MMHG (ref 75–108)
POTASSIUM SERPL-SCNC: 4.2 MMOL/L (ref 3.5–5.1)
RBC # BLD AUTO: 3.94 M/UL (ref 4.2–5.9)
SAO2 % BLDA: 96 % (ref 93–100)
SODIUM SERPL-SCNC: 132 MMOL/L (ref 136–145)
TROPONIN, HIGH SENSITIVITY: 56 NG/L (ref 0–22)
WBC # BLD AUTO: 12.6 K/UL (ref 4–11)

## 2024-11-30 PROCEDURE — 82803 BLOOD GASES ANY COMBINATION: CPT

## 2024-11-30 PROCEDURE — 6370000000 HC RX 637 (ALT 250 FOR IP): Performed by: INTERNAL MEDICINE

## 2024-11-30 PROCEDURE — 97530 THERAPEUTIC ACTIVITIES: CPT

## 2024-11-30 PROCEDURE — 6370000000 HC RX 637 (ALT 250 FOR IP)

## 2024-11-30 PROCEDURE — 6370000000 HC RX 637 (ALT 250 FOR IP): Performed by: STUDENT IN AN ORGANIZED HEALTH CARE EDUCATION/TRAINING PROGRAM

## 2024-11-30 PROCEDURE — 6370000000 HC RX 637 (ALT 250 FOR IP): Performed by: NURSE PRACTITIONER

## 2024-11-30 PROCEDURE — 99232 SBSQ HOSP IP/OBS MODERATE 35: CPT | Performed by: SURGERY

## 2024-11-30 PROCEDURE — 6360000002 HC RX W HCPCS

## 2024-11-30 PROCEDURE — 6360000002 HC RX W HCPCS: Performed by: STUDENT IN AN ORGANIZED HEALTH CARE EDUCATION/TRAINING PROGRAM

## 2024-11-30 PROCEDURE — 36600 WITHDRAWAL OF ARTERIAL BLOOD: CPT

## 2024-11-30 PROCEDURE — 80048 BASIC METABOLIC PNL TOTAL CA: CPT

## 2024-11-30 PROCEDURE — 84484 ASSAY OF TROPONIN QUANT: CPT

## 2024-11-30 PROCEDURE — 2060000000 HC ICU INTERMEDIATE R&B

## 2024-11-30 PROCEDURE — 2580000003 HC RX 258: Performed by: STUDENT IN AN ORGANIZED HEALTH CARE EDUCATION/TRAINING PROGRAM

## 2024-11-30 PROCEDURE — 6360000004 HC RX CONTRAST MEDICATION: Performed by: NURSE PRACTITIONER

## 2024-11-30 PROCEDURE — 2580000003 HC RX 258

## 2024-11-30 PROCEDURE — 85025 COMPLETE CBC W/AUTO DIFF WBC: CPT

## 2024-11-30 PROCEDURE — 74018 RADEX ABDOMEN 1 VIEW: CPT

## 2024-11-30 PROCEDURE — 2700000000 HC OXYGEN THERAPY PER DAY

## 2024-11-30 PROCEDURE — 36415 COLL VENOUS BLD VENIPUNCTURE: CPT

## 2024-11-30 PROCEDURE — 94761 N-INVAS EAR/PLS OXIMETRY MLT: CPT

## 2024-11-30 PROCEDURE — 6360000002 HC RX W HCPCS: Performed by: NURSE PRACTITIONER

## 2024-11-30 PROCEDURE — 87040 BLOOD CULTURE FOR BACTERIA: CPT

## 2024-11-30 PROCEDURE — 83880 ASSAY OF NATRIURETIC PEPTIDE: CPT

## 2024-11-30 PROCEDURE — 2580000003 HC RX 258: Performed by: NURSE PRACTITIONER

## 2024-11-30 PROCEDURE — 97535 SELF CARE MNGMENT TRAINING: CPT

## 2024-11-30 PROCEDURE — 84100 ASSAY OF PHOSPHORUS: CPT

## 2024-11-30 PROCEDURE — 71045 X-RAY EXAM CHEST 1 VIEW: CPT

## 2024-11-30 RX ORDER — DIATRIZOATE MEGLUMINE AND DIATRIZOATE SODIUM 660; 100 MG/ML; MG/ML
30 SOLUTION ORAL; RECTAL
Status: DISCONTINUED | OUTPATIENT
Start: 2024-11-30 | End: 2024-12-11 | Stop reason: HOSPADM

## 2024-11-30 RX ORDER — FUROSEMIDE 10 MG/ML
20 INJECTION INTRAMUSCULAR; INTRAVENOUS 2 TIMES DAILY
Status: DISCONTINUED | OUTPATIENT
Start: 2024-11-30 | End: 2024-12-06

## 2024-11-30 RX ORDER — FUROSEMIDE 10 MG/ML
20 INJECTION INTRAMUSCULAR; INTRAVENOUS ONCE
Status: COMPLETED | OUTPATIENT
Start: 2024-11-30 | End: 2024-11-30

## 2024-11-30 RX ADMIN — FUROSEMIDE 20 MG: 10 INJECTION, SOLUTION INTRAMUSCULAR; INTRAVENOUS at 10:31

## 2024-11-30 RX ADMIN — HEPARIN SODIUM 5000 UNITS: 5000 INJECTION INTRAVENOUS; SUBCUTANEOUS at 19:54

## 2024-11-30 RX ADMIN — PIPERACILLIN AND TAZOBACTAM 3375 MG: 3; .375 INJECTION, POWDER, LYOPHILIZED, FOR SOLUTION INTRAVENOUS at 23:12

## 2024-11-30 RX ADMIN — LINACLOTIDE 145 MCG: 145 CAPSULE, GELATIN COATED ORAL at 05:10

## 2024-11-30 RX ADMIN — MIDODRINE HYDROCHLORIDE 25 MG: 5 TABLET ORAL at 02:25

## 2024-11-30 RX ADMIN — ATORVASTATIN CALCIUM 10 MG: 10 TABLET, FILM COATED ORAL at 19:53

## 2024-11-30 RX ADMIN — FUROSEMIDE 20 MG: 10 INJECTION, SOLUTION INTRAMUSCULAR; INTRAVENOUS at 23:09

## 2024-11-30 RX ADMIN — Medication: at 13:49

## 2024-11-30 RX ADMIN — LINACLOTIDE 145 MCG: 145 CAPSULE, GELATIN COATED ORAL at 18:16

## 2024-11-30 RX ADMIN — Medication 5 MG: at 19:53

## 2024-11-30 RX ADMIN — BUPROPION HYDROCHLORIDE 150 MG: 100 TABLET, FILM COATED ORAL at 19:53

## 2024-11-30 RX ADMIN — PANTOPRAZOLE SODIUM 40 MG: 40 INJECTION, POWDER, FOR SOLUTION INTRAVENOUS at 13:44

## 2024-11-30 RX ADMIN — SODIUM CHLORIDE, PRESERVATIVE FREE 10 ML: 5 INJECTION INTRAVENOUS at 19:54

## 2024-11-30 RX ADMIN — HEPARIN SODIUM 5000 UNITS: 5000 INJECTION INTRAVENOUS; SUBCUTANEOUS at 05:00

## 2024-11-30 RX ADMIN — THIAMINE HCL TAB 100 MG 100 MG: 100 TAB at 13:39

## 2024-11-30 RX ADMIN — MIDODRINE HYDROCHLORIDE 25 MG: 5 TABLET ORAL at 18:15

## 2024-11-30 RX ADMIN — DIATRIZOATE MEGLUMINE AND DIATRIZOATE SODIUM 30 ML: 660; 100 LIQUID ORAL; RECTAL at 22:29

## 2024-11-30 RX ADMIN — ZOLPIDEM TARTRATE 5 MG: 5 TABLET, COATED ORAL at 19:53

## 2024-11-30 RX ADMIN — MINERAL OIL 330 ML: 999 LIQUID ORAL at 12:13

## 2024-11-30 RX ADMIN — Medication: at 19:54

## 2024-11-30 RX ADMIN — FUROSEMIDE 20 MG: 20 TABLET ORAL at 18:16

## 2024-11-30 RX ADMIN — GABAPENTIN 600 MG: 300 CAPSULE ORAL at 19:53

## 2024-11-30 RX ADMIN — WATER 7.5 MG: 1 INJECTION INTRAMUSCULAR; INTRAVENOUS; SUBCUTANEOUS at 02:20

## 2024-11-30 RX ADMIN — AMIODARONE HYDROCHLORIDE 200 MG: 200 TABLET ORAL at 13:39

## 2024-11-30 RX ADMIN — GABAPENTIN 600 MG: 300 CAPSULE ORAL at 13:41

## 2024-11-30 RX ADMIN — NALOXEGOL OXALATE 12.5 MG: 25 TABLET, FILM COATED ORAL at 05:10

## 2024-11-30 RX ADMIN — THERA TABS 1 TABLET: TAB at 13:40

## 2024-11-30 RX ADMIN — SODIUM CHLORIDE, PRESERVATIVE FREE 10 ML: 5 INJECTION INTRAVENOUS at 13:45

## 2024-11-30 RX ADMIN — HEPARIN SODIUM 5000 UNITS: 5000 INJECTION INTRAVENOUS; SUBCUTANEOUS at 13:43

## 2024-11-30 ASSESSMENT — PAIN SCALES - WONG BAKER: WONGBAKER_NUMERICALRESPONSE: NO HURT

## 2024-11-30 NOTE — PROGRESS NOTES
Occupational Therapy  Facility/Department: [facility] 5T ORTHO/NEURO  Occupational Therapy Treatment Note     Name: Juan Mohamud  : 1940  MRN: [id number]  Date of Service: 2024    Discharge Recommendations:  Subacute/Skilled Nursing Facility  OT Equipment Recommendations  Equipment Needed: No  Other: defer to next level of care       Patient Diagnosis(es): The primary encounter diagnosis was SVT (supraventricular tachycardia) (HCC). A diagnosis of Hypotension, unspecified hypotension type was also pertinent to this visit.  Past Medical History:  has a past medical history of (HFpEF) heart failure with preserved ejection fraction (HCC), Diabetes mellitus (HCC), DVT (deep venous thrombosis) (HCC), Hyperlipidemia, ICD (implantable cardioverter-defibrillator) in place, Indigestion, Insomnia, Macular degeneration, MI, old, Neuropathy, Pulmonary emboli (HCC), and Wide-complex tachycardia.  Past Surgical History:  has a past surgical history that includes knee surgery; Tonsillectomy; Colonoscopy; fracture surgery (Left); Mouth surgery; joint replacement (Right); Intracapsular cataract extraction (Right, 2018); Colonoscopy (N/A, 2024); Colonoscopy (N/A, 3/6/2024); Colonoscopy (N/A, 2024); Colonoscopy (N/A, 2024); lumbar fusion (N/A, 2024); IR GUIDED IVC FILTER PLACEMENT (11/15/2024); and Upper gastrointestinal endoscopy (N/A, 2024).    Treatment Diagnosis: Decreased ADL status / sitting balance / cognition and functional mobility 2/2 vert fx s/p surgical fixation      Assessment  Performance deficits / Impairments: Decreased functional mobility ;Decreased endurance;Decreased ADL status;Decreased ROM;Decreased balance;Decreased strength;Decreased safe awareness  Assessment: Pt remains with ongoing deficits.  Pt keeps eyes closed throughout session but will attempt to answer questions / commands with reinforcement and increased time. Pt continues to requires assist x 2 for bed

## 2024-11-30 NOTE — PROGRESS NOTES
This RN spoke with  in unit about current orders on Pt. Tech proceeded to inform this RN that she tried to draw labs this AM and was unsuccessful and would be unable to draw labs on Pt this evening. This RN notified charge RN. Plan of care continues.

## 2024-11-30 NOTE — PROGRESS NOTES
Physical Therapy  Facility/Department: [facility] 5T ORTHO/NEURO  Daily Treatment Note  NAME: Juan Mohamud  : 1940  MRN: [id number]    Date of Service: 2024    Discharge Recommendations:  Subacute/Skilled Nursing Facility   PT Equipment Recommendations  Equipment Needed: No (defer to next level of care)    Patient Diagnosis(es): The primary encounter diagnosis was SVT (supraventricular tachycardia) (HCC). A diagnosis of Hypotension, unspecified hypotension type was also pertinent to this visit.    Assessment  Assessment: Patient tolerated session fair with mobility being limited due to significant lethargy.  Patient required heavy assist x 2 for bed mobility and anywhere from brief periods of CGA to total assist to maintain static EOB sitting balance.  Patient attempted to stand x 2 trials from raised bed to sal stedy but unable to clear buttocks despite max assist x 2.  Patient needs continous verbal cues for attention to tasks and command following.  Recommend out of bed to chair with RN staff and maxi move when patient is able to tolerate.  He continues to function well below baseline level.  Recommend skilled PT upon discharge.  Will follow while in acute care setting to improve functional mobility.  Frequency decreased to 2-5x/week as surgery was on .  Activity Tolerance: Patient limited by fatigue;Patient limited by endurance (lethargy)  Equipment Needed: No (defer to next level of care)    Plan  Physical Therapy Plan  General Plan:  (2-5)  Current Treatment Recommendations: Strengthening;Balance training;ROM;Gait training;Functional mobility training;Safety education & training;Therapeutic activities;Transfer training;Patient/Caregiver education & training;Equipment evaluation, education, & procurement;Endurance training    Restrictions  Position Activity Restriction  Spinal Precautions: No Lifting, No Twisting, No Bending  Other position/activity restrictions: spinal precautions

## 2024-11-30 NOTE — PROGRESS NOTES
Pt aox 3-4, disoriented at times. VSS NC@3L. Tolerating fluids w/o complications. Tube feed running no complications, pt tolerating. Voiding external catheter. No complaints of pain this shift. NAEO. All fall / safety precautions in pace. POC continues.

## 2024-11-30 NOTE — PLAN OF CARE
Problem: Chronic Conditions and Co-morbidities  Goal: Patient's chronic conditions and co-morbidity symptoms are monitored and maintained or improved  Outcome: Progressing  Flowsheets  Taken 11/29/2024 1616 by Coreen Ma RN  Care Plan - Patient's Chronic Conditions and Co-Morbidity Symptoms are Monitored and Maintained or Improved: Monitor and assess patient's chronic conditions and comorbid symptoms for stability, deterioration, or improvement  Taken 11/29/2024 1215 by Coreen Ma RN  Care Plan - Patient's Chronic Conditions and Co-Morbidity Symptoms are Monitored and Maintained or Improved: Monitor and assess patient's chronic conditions and comorbid symptoms for stability, deterioration, or improvement  Taken 11/29/2024 0830 by Coreen Ma RN  Care Plan - Patient's Chronic Conditions and Co-Morbidity Symptoms are Monitored and Maintained or Improved: Monitor and assess patient's chronic conditions and comorbid symptoms for stability, deterioration, or improvement     Problem: Discharge Planning  Goal: Discharge to home or other facility with appropriate resources  11/29/2024 2223 by Ulices Tipton RN  Outcome: Progressing  Flowsheets  Taken 11/29/2024 2223 by Ulices Tipton RN  Discharge to home or other facility with appropriate resources:   Identify barriers to discharge with patient and caregiver   Arrange for needed discharge resources and transportation as appropriate   Identify discharge learning needs (meds, wound care, etc)   Arrange for interpreters to assist at discharge as needed  Taken 11/29/2024 1616 by Coreen Ma RN  Discharge to home or other facility with appropriate resources: Identify barriers to discharge with patient and caregiver  11/29/2024 1412 by Coreen Ma RN  Outcome: Progressing  Flowsheets  Taken 11/29/2024 1215  Discharge to home or other facility with appropriate resources: Identify barriers to discharge with patient and caregiver  Taken 11/29/2024  or baseline comfort level: Encourage patient to monitor pain and request assistance  Taken 11/29/2024 1134  Verbalizes/displays adequate comfort level or baseline comfort level: Encourage patient to monitor pain and request assistance  Taken 11/29/2024 1125  Verbalizes/displays adequate comfort level or baseline comfort level: Encourage patient to monitor pain and request assistance     Problem: Respiratory - Adult  Goal: Achieves optimal ventilation and oxygenation  Outcome: Progressing  Flowsheets  Taken 11/29/2024 1616 by Coreen Ma RN  Achieves optimal ventilation and oxygenation: Assess for changes in respiratory status  Taken 11/29/2024 1215 by Coreen Ma RN  Achieves optimal ventilation and oxygenation: Assess for changes in respiratory status  Taken 11/29/2024 0830 by Coreen Ma RN  Achieves optimal ventilation and oxygenation: Assess for changes in respiratory status

## 2024-11-30 NOTE — PROGRESS NOTES
General Surgery   Daily Progress Note  Patient: Juan Mohamud      CC: Distension, Tex's syndrome      SUBJECTIVE:   Patient resting comfortably in bed. Patient denies of any abdomin pain with TF @ 40 mL/hr but reports feeling bloated. He has not had any BM since last night. No suppository was given.   ROS:   A 14 point review of systems was conducted, significant findings as noted above. All other systems negative.    OBJECTIVE:    PHYSICAL EXAM:    Vitals:    11/29/24 1930 11/29/24 2355 11/30/24 0410 11/30/24 0511   BP: 119/65 98/76 107/76    Pulse: 65 84 (!) 109    Resp: 18 16 14    Temp: 98.3 °F (36.8 °C) 97.6 °F (36.4 °C) 97.3 °F (36.3 °C)    TempSrc: Temporal Temporal Oral    SpO2: 94% 96% 97%    Weight:    98 kg (216 lb 0.8 oz)   Height:           General appearance: alert, no acute distress  Respiratory: Normal effort with no accessory muscle use on RA  Cardiovascular: RRR  Abdomen: soft, non-tender, moderately distended, tympanic to percussion, no guarding, no rigidity. PEG tube in place, flushes without issue. 8 cm at skin. TF running at 40 mL/hr      LABS:   Recent Labs     11/29/24  0810 11/30/24  0549   WBC 10.5 12.6*   HGB 11.2* 11.8*   HCT 35.0* 36.5*   MCV 92.0 92.4    293        Recent Labs     11/29/24  0810 11/30/24  0549    132*   K 4.3 4.2   CL 99 95*   CO2 27 28   PHOS  --  3.4   BUN 23* 24*   CREATININE 1.1 1.1        No results for input(s): \"AST\", \"ALT\", \"BILIDIR\", \"BILITOT\", \"ALKPHOS\" in the last 72 hours.    Invalid input(s): \"ALB\"     No results for input(s): \"LIPASE\", \"AMYLASE\" in the last 72 hours.     No results for input(s): \"INR\", \"APTT\" in the last 72 hours.    Invalid input(s): \"PROT\"     No results for input(s): \"CKTOTAL\", \"CKMB\", \"CKMBINDEX\", \"TROPONINI\" in the last 72 hours.      ASSESSMENT & PLAN:   This is a 84 y.o. male with Hx of Hx of type II DM, degenerative lumbar disease, arrhythmia s/p AICD, diastolic CHF. He presented with a T11 burst fracture

## 2024-11-30 NOTE — PROGRESS NOTES
Shows trace leukocyte esterase and 2+ bacteria  Urine culture negative, DC Bactrim  Patient uses alfuzosin, on hold due to hypotension      Diet ADULT TUBE FEEDING; PEG; Standard without Fiber; Continuous; 10; Yes; 10; Q 6 hours; 65; 30; Q 4 hours  ADULT DIET; Full Liquid   DVT Prophylaxis [] Lovenox, []  Heparin, [] SCDs, [] Ambulation,  [] Eliquis, [] Xarelto  [] Coumadin   Code Status Full Code   Disposition   Expected Disposition: SNF  Estimated Date of Discharge: 1-2 days.   Patient requires continued admission due to abdominal pain.    Surrogate Decision Maker/ POA  On file     Personally reviewed Lab Studies and Imaging     Discussed management of the case with case management. I reviewed GI's note.     Subjective:   Patient seen and evaluated at bedside. Complaining of abdominal pain.       Review of Systems:      Pertinent positives and negatives discussed in HPI    Objective:     Intake/Output Summary (Last 24 hours) at 11/30/2024 1231  Last data filed at 11/30/2024 0647  Gross per 24 hour   Intake 710 ml   Output 1150 ml   Net -440 ml      Vitals:   Vitals:    11/29/24 2355 11/30/24 0410 11/30/24 0511 11/30/24 0830   BP: 98/76 107/76  99/82   Pulse: 84 (!) 109  100   Resp: 16 14  15   Temp: 97.6 °F (36.4 °C) 97.3 °F (36.3 °C)  98.6 °F (37 °C)   TempSrc: Temporal Oral  Oral   SpO2: 96% 97%  96%   Weight:   98 kg (216 lb 0.8 oz)    Height:             Physical Exam:      General: Chronically ill  Eyes: EOMI  ENT: neck supple  Cardiovascular: Sinus tachycardia.   Respiratory: Clear to auscultation  Gastrointestinal: Distended  Genitourinary: no suprapubic tenderness  Musculoskeletal: bruising noticed  Skin: warm, dry  Neuro: Oriented x1.  Psych: Mood appropriate.         Medications:   Medications:    linaclotide  145 mcg Oral BID    furosemide  20 mg Oral BID    naloxegol  12.5 mg Oral QAM AC    midodrine  25 mg Oral q8h    multivitamin  1 tablet Oral Daily    thiamine  100 mg Oral Daily    zolpidem  5 mg  03/30/2024 08:38 AM     Organism:   Lab Results   Component Value Date/Time    ORG Klebsiella pneumoniae 03/27/2024 12:46 PM         Electronically signed by Ayaan Eddy MD on 11/30/2024 at 12:31 PM

## 2024-11-30 NOTE — PROGRESS NOTES
Patient is A&Ox4. Pt is agitated and having a difficult time with verbalization. SMOG enema administered per PRN order. VSS this shift with exception that Pt is receiving 4 L O2 via nc.Patient has endorsed no pain this shift. Patient is tolerating PO die with minimal appetite. Pt has not ambulated for RN this shift. However did work with PT/OT. Voiding via external catheter. This RN spoke with attending, Dr Phillip via telephone. He ordered blood culture and abx. At present, lab has not drawn cultures.Provider would like abx to be started after cultures have been drawn. RN called lab with no answer. RN will continue to follow up. Patient updated on plan of care. Fall and safety precautions in place, call light within reach.

## 2024-12-01 ENCOUNTER — APPOINTMENT (OUTPATIENT)
Dept: GENERAL RADIOLOGY | Age: 84
DRG: 447 | End: 2024-12-01
Attending: INTERNAL MEDICINE
Payer: MEDICARE

## 2024-12-01 LAB
ALBUMIN SERPL-MCNC: 2.6 G/DL (ref 3.4–5)
ALBUMIN/GLOB SERPL: 0.7 {RATIO} (ref 1.1–2.2)
ALP SERPL-CCNC: 199 U/L (ref 40–129)
ALT SERPL-CCNC: 17 U/L (ref 10–40)
AMORPH SED URNS QL MICRO: ABNORMAL /HPF
ANION GAP SERPL CALCULATED.3IONS-SCNC: 11 MMOL/L (ref 3–16)
AST SERPL-CCNC: 38 U/L (ref 15–37)
BACTERIA URNS QL MICRO: ABNORMAL /HPF
BASOPHILS # BLD: 0 K/UL (ref 0–0.2)
BASOPHILS NFR BLD: 0.2 %
BILIRUB SERPL-MCNC: 0.8 MG/DL (ref 0–1)
BILIRUB UR QL STRIP.AUTO: NEGATIVE
BUN SERPL-MCNC: 26 MG/DL (ref 7–20)
CALCIUM SERPL-MCNC: 8.1 MG/DL (ref 8.3–10.6)
CHLORIDE SERPL-SCNC: 97 MMOL/L (ref 99–110)
CLARITY UR: CLEAR
CO2 SERPL-SCNC: 28 MMOL/L (ref 21–32)
COARSE GRAN CASTS #/AREA URNS LPF: ABNORMAL /LPF (ref 0–2)
COLOR UR: YELLOW
CREAT SERPL-MCNC: 1 MG/DL (ref 0.8–1.3)
DEPRECATED RDW RBC AUTO: 19.7 % (ref 12.4–15.4)
EOSINOPHIL # BLD: 0.1 K/UL (ref 0–0.6)
EOSINOPHIL NFR BLD: 1.2 %
GFR SERPLBLD CREATININE-BSD FMLA CKD-EPI: 75 ML/MIN/{1.73_M2}
GLUCOSE BLD-MCNC: 52 MG/DL (ref 70–99)
GLUCOSE BLD-MCNC: 60 MG/DL (ref 70–99)
GLUCOSE BLD-MCNC: 82 MG/DL (ref 70–99)
GLUCOSE BLD-MCNC: 88 MG/DL (ref 70–99)
GLUCOSE BLD-MCNC: 91 MG/DL (ref 70–99)
GLUCOSE SERPL-MCNC: 81 MG/DL (ref 70–99)
GLUCOSE UR STRIP.AUTO-MCNC: NEGATIVE MG/DL
HCT VFR BLD AUTO: 36.6 % (ref 40.5–52.5)
HGB BLD-MCNC: 11.6 G/DL (ref 13.5–17.5)
HGB UR QL STRIP.AUTO: ABNORMAL
KETONES UR STRIP.AUTO-MCNC: NEGATIVE MG/DL
LEUKOCYTE ESTERASE UR QL STRIP.AUTO: NEGATIVE
LYMPHOCYTES # BLD: 0.6 K/UL (ref 1–5.1)
LYMPHOCYTES NFR BLD: 6.4 %
MAGNESIUM SERPL-MCNC: 2.12 MG/DL (ref 1.8–2.4)
MCH RBC QN AUTO: 29.4 PG (ref 26–34)
MCHC RBC AUTO-ENTMCNC: 31.8 G/DL (ref 31–36)
MCV RBC AUTO: 92.5 FL (ref 80–100)
MONOCYTES # BLD: 0.6 K/UL (ref 0–1.3)
MONOCYTES NFR BLD: 5.8 %
MUCOUS THREADS #/AREA URNS LPF: ABNORMAL /LPF
NEUTROPHILS # BLD: 8.6 K/UL (ref 1.7–7.7)
NEUTROPHILS NFR BLD: 86.4 %
NITRITE UR QL STRIP.AUTO: NEGATIVE
PERFORMED ON: ABNORMAL
PERFORMED ON: ABNORMAL
PERFORMED ON: NORMAL
PH UR STRIP.AUTO: 6 [PH] (ref 5–8)
PLATELET # BLD AUTO: 282 K/UL (ref 135–450)
PMV BLD AUTO: 8.1 FL (ref 5–10.5)
POTASSIUM SERPL-SCNC: 3.2 MMOL/L (ref 3.5–5.1)
PROT SERPL-MCNC: 6.4 G/DL (ref 6.4–8.2)
PROT UR STRIP.AUTO-MCNC: ABNORMAL MG/DL
RBC # BLD AUTO: 3.96 M/UL (ref 4.2–5.9)
RBC #/AREA URNS HPF: ABNORMAL /HPF (ref 0–4)
SODIUM SERPL-SCNC: 136 MMOL/L (ref 136–145)
SP GR UR STRIP.AUTO: 1.02 (ref 1–1.03)
UA DIPSTICK W REFLEX MICRO PNL UR: YES
URN SPEC COLLECT METH UR: ABNORMAL
UROBILINOGEN UR STRIP-ACNC: 0.2 E.U./DL
WBC # BLD AUTO: 10 K/UL (ref 4–11)
WBC #/AREA URNS HPF: ABNORMAL /HPF (ref 0–5)

## 2024-12-01 PROCEDURE — 2700000000 HC OXYGEN THERAPY PER DAY

## 2024-12-01 PROCEDURE — 6370000000 HC RX 637 (ALT 250 FOR IP): Performed by: STUDENT IN AN ORGANIZED HEALTH CARE EDUCATION/TRAINING PROGRAM

## 2024-12-01 PROCEDURE — 2500000003 HC RX 250 WO HCPCS

## 2024-12-01 PROCEDURE — 2580000003 HC RX 258

## 2024-12-01 PROCEDURE — 94761 N-INVAS EAR/PLS OXIMETRY MLT: CPT

## 2024-12-01 PROCEDURE — 6370000000 HC RX 637 (ALT 250 FOR IP): Performed by: INTERNAL MEDICINE

## 2024-12-01 PROCEDURE — 99232 SBSQ HOSP IP/OBS MODERATE 35: CPT | Performed by: SURGERY

## 2024-12-01 PROCEDURE — 74018 RADEX ABDOMEN 1 VIEW: CPT

## 2024-12-01 PROCEDURE — 2060000000 HC ICU INTERMEDIATE R&B

## 2024-12-01 PROCEDURE — 71045 X-RAY EXAM CHEST 1 VIEW: CPT

## 2024-12-01 PROCEDURE — 51701 INSERT BLADDER CATHETER: CPT

## 2024-12-01 PROCEDURE — 36415 COLL VENOUS BLD VENIPUNCTURE: CPT

## 2024-12-01 PROCEDURE — 6370000000 HC RX 637 (ALT 250 FOR IP)

## 2024-12-01 PROCEDURE — 80053 COMPREHEN METABOLIC PANEL: CPT

## 2024-12-01 PROCEDURE — 2580000003 HC RX 258: Performed by: STUDENT IN AN ORGANIZED HEALTH CARE EDUCATION/TRAINING PROGRAM

## 2024-12-01 PROCEDURE — 6360000002 HC RX W HCPCS: Performed by: STUDENT IN AN ORGANIZED HEALTH CARE EDUCATION/TRAINING PROGRAM

## 2024-12-01 PROCEDURE — 81001 URINALYSIS AUTO W/SCOPE: CPT

## 2024-12-01 PROCEDURE — 83735 ASSAY OF MAGNESIUM: CPT

## 2024-12-01 PROCEDURE — 6360000002 HC RX W HCPCS

## 2024-12-01 PROCEDURE — 85025 COMPLETE CBC W/AUTO DIFF WBC: CPT

## 2024-12-01 RX ADMIN — PIPERACILLIN AND TAZOBACTAM 3375 MG: 3; .375 INJECTION, POWDER, LYOPHILIZED, FOR SOLUTION INTRAVENOUS at 21:34

## 2024-12-01 RX ADMIN — DEXTROSE MONOHYDRATE 25 G: 25 INJECTION, SOLUTION INTRAVENOUS at 17:07

## 2024-12-01 RX ADMIN — LINACLOTIDE 145 MCG: 145 CAPSULE, GELATIN COATED ORAL at 09:27

## 2024-12-01 RX ADMIN — THERA TABS 1 TABLET: TAB at 09:21

## 2024-12-01 RX ADMIN — ATORVASTATIN CALCIUM 10 MG: 10 TABLET, FILM COATED ORAL at 21:33

## 2024-12-01 RX ADMIN — Medication 5 MG: at 21:32

## 2024-12-01 RX ADMIN — BUPROPION HYDROCHLORIDE 150 MG: 100 TABLET, FILM COATED ORAL at 21:32

## 2024-12-01 RX ADMIN — GABAPENTIN 600 MG: 300 CAPSULE ORAL at 13:43

## 2024-12-01 RX ADMIN — HEPARIN SODIUM 5000 UNITS: 5000 INJECTION INTRAVENOUS; SUBCUTANEOUS at 13:43

## 2024-12-01 RX ADMIN — PANTOPRAZOLE SODIUM 40 MG: 40 INJECTION, POWDER, FOR SOLUTION INTRAVENOUS at 09:21

## 2024-12-01 RX ADMIN — PIPERACILLIN AND TAZOBACTAM 3375 MG: 3; .375 INJECTION, POWDER, LYOPHILIZED, FOR SOLUTION INTRAVENOUS at 13:43

## 2024-12-01 RX ADMIN — MIDODRINE HYDROCHLORIDE 25 MG: 5 TABLET ORAL at 17:20

## 2024-12-01 RX ADMIN — HEPARIN SODIUM 5000 UNITS: 5000 INJECTION INTRAVENOUS; SUBCUTANEOUS at 04:35

## 2024-12-01 RX ADMIN — MIDODRINE HYDROCHLORIDE 25 MG: 5 TABLET ORAL at 09:20

## 2024-12-01 RX ADMIN — NALOXEGOL OXALATE 12.5 MG: 25 TABLET, FILM COATED ORAL at 06:13

## 2024-12-01 RX ADMIN — GABAPENTIN 600 MG: 300 CAPSULE ORAL at 09:21

## 2024-12-01 RX ADMIN — HEPARIN SODIUM 5000 UNITS: 5000 INJECTION INTRAVENOUS; SUBCUTANEOUS at 21:33

## 2024-12-01 RX ADMIN — LINACLOTIDE 145 MCG: 145 CAPSULE, GELATIN COATED ORAL at 17:07

## 2024-12-01 RX ADMIN — Medication: at 21:33

## 2024-12-01 RX ADMIN — THIAMINE HCL TAB 100 MG 100 MG: 100 TAB at 09:22

## 2024-12-01 RX ADMIN — ZOLPIDEM TARTRATE 5 MG: 5 TABLET, COATED ORAL at 22:58

## 2024-12-01 RX ADMIN — MIDODRINE HYDROCHLORIDE 25 MG: 5 TABLET ORAL at 04:34

## 2024-12-01 RX ADMIN — MINERAL OIL 330 ML: 999 LIQUID ORAL at 09:49

## 2024-12-01 RX ADMIN — PIPERACILLIN AND TAZOBACTAM 3375 MG: 3; .375 INJECTION, POWDER, LYOPHILIZED, FOR SOLUTION INTRAVENOUS at 06:15

## 2024-12-01 RX ADMIN — GABAPENTIN 600 MG: 300 CAPSULE ORAL at 21:32

## 2024-12-01 RX ADMIN — Medication: at 09:49

## 2024-12-01 RX ADMIN — FUROSEMIDE 20 MG: 10 INJECTION, SOLUTION INTRAMUSCULAR; INTRAVENOUS at 09:21

## 2024-12-01 RX ADMIN — SODIUM CHLORIDE, PRESERVATIVE FREE 10 ML: 5 INJECTION INTRAVENOUS at 09:22

## 2024-12-01 RX ADMIN — BUPROPION HYDROCHLORIDE 150 MG: 100 TABLET, FILM COATED ORAL at 09:20

## 2024-12-01 RX ADMIN — FUROSEMIDE 20 MG: 10 INJECTION, SOLUTION INTRAMUSCULAR; INTRAVENOUS at 17:06

## 2024-12-01 RX ADMIN — AMIODARONE HYDROCHLORIDE 200 MG: 200 TABLET ORAL at 09:21

## 2024-12-01 RX ADMIN — DEXTROSE MONOHYDRATE 25 G: 25 INJECTION, SOLUTION INTRAVENOUS at 23:11

## 2024-12-01 ASSESSMENT — PAIN SCALES - WONG BAKER
WONGBAKER_NUMERICALRESPONSE: NO HURT

## 2024-12-01 NOTE — PROGRESS NOTES
[facility]  GI Progress Note          Juan Mohamud is a 84 y.o. male patient.  1. SVT (supraventricular tachycardia) (HCC)    2. Hypotension, unspecified hypotension type        Admit Date: 11/7/2024    Subjective:       Complaining of increased abdominal distention.  Has history of colonic ileus, previously decompressed few days ago.  He has since had a PEG tube placement and high residuals are noted at higher rates of tube feeds.  He also became confused overnight and was given sedation.  Today he is somewhat incoherent.  His wife was present in the room.      ROS:  Unable to obtain due to the patient factors    Scheduled Meds:   linaclotide  145 mcg Oral BID    furosemide  20 mg IntraVENous BID    piperacillin-tazobactam  4,500 mg IntraVENous Once    Followed by    piperacillin-tazobactam  3,375 mg IntraVENous Q8H    furosemide  20 mg Oral BID    naloxegol  12.5 mg Oral QAM AC    midodrine  25 mg Oral q8h    multivitamin  1 tablet Oral Daily    thiamine  100 mg Oral Daily    zolpidem  5 mg Oral Nightly    buPROPion  150 mg Per NG tube BID    [Held by provider] metoprolol  2.5 mg IntraVENous Q6H    melatonin  5 mg Oral Nightly    sodium chloride flush  5-40 mL IntraVENous 2 times per day    heparin (porcine)  5,000 Units SubCUTAneous Q8H    pantoprazole  40 mg IntraVENous Daily    balsum peru-castor oil   Topical BID    gabapentin  600 mg Oral TID    [Held by provider] buPROPion  300 mg Oral Daily    atorvastatin  10 mg Oral Nightly    amiodarone  200 mg Oral Daily    [Held by provider] tamsulosin  0.4 mg Oral Daily    [Held by provider] DESMOpressin  200 mcg Oral Nightly       Continuous Infusions:   sodium chloride      sodium chloride      dextrose         PRN Meds:  morphine **OR** morphine, acetaminophen **OR** acetaminophen, dextrose, benzonatate, SMOG Enema, guaiFENesin, sodium chloride flush, sodium chloride, sodium chloride flush, sodium chloride, ondansetron **OR**

## 2024-12-01 NOTE — PROGRESS NOTES
This RN spoke to one of the surgical residents who asked about the pt's urine sample, I was unable to get a clean catch one this shift, so the MD gave a verbal order to get an urine sample via SC. Using strict sterile technique a SC was completed and urine was collected and sent to lab. Maryjane Skinner RN

## 2024-12-01 NOTE — PROGRESS NOTES
Family left for the day, pt remains in his bilateral wrist restraints. ROM done, no injury noted, pt was offered some full liquids but refused at this time, pt turned and repositioned. Pt refused oral care at this time. TV on with the football game on which is what the pt wants to watch. I will continue to follow. Pt's pure wick still in place working well and no BM noted in his briefs at this time. Pt remains clean and dry from any incontinence. Maryjane Skinner RN     Yes this is not a NSAID. Returned a call to pt. HIPAA verified by two patient identifiers. She will hold off on tyenol PM while on it.

## 2024-12-01 NOTE — PROGRESS NOTES
Attempted to call pt wife to notify about soft wrist restraints. No answer. Will pass along for staff to notify her later this AM.

## 2024-12-01 NOTE — PLAN OF CARE
Problem: Safety - Adult  Goal: Free from fall injury  Outcome: Progressing   Pt free from injury this shift and free of falls. 2/4 rails up on bed and bed is in the lowest position.Wheels locked and bed alarm set. Socks on pt and ID bands on pt. Call light in reach of pt and pt educated to call out to get up. Will continue to monitor for safety.    Problem: Skin/Tissue Integrity  Goal: Absence of new skin breakdown  Description: 1.  Monitor for areas of redness and/or skin breakdown  2.  Assess vascular access sites hourly  3.  Every 4-6 hours minimum:  Change oxygen saturation probe site  4.  Every 4-6 hours:  If on nasal continuous positive airway pressure, respiratory therapy assess nares and determine need for appliance change or resting period.  Outcome: Progressing   Q2h turning on specialty bed, podus boots and heels floated.  Problem: ABCDS Injury Assessment  Goal: Absence of physical injury  Outcome: Progressing

## 2024-12-01 NOTE — PROGRESS NOTES
[facility]  GI Progress Note          Juan Mohamud is a 84 y.o. male patient.  1. SVT (supraventricular tachycardia) (HCC)    2. Hypotension, unspecified hypotension type        Admit Date: 11/7/2024    Subjective:       Continues to be somewhat confused.  Has history of colonic ileus, previously decompressed few days ago.  He has since had a PEG tube placement and high residuals are noted at higher rates of tube feeds.      ROS:  Unable to obtain due to the patient factors    Scheduled Meds:   SMOG Enema  330 mL Rectal Daily    linaclotide  145 mcg Oral BID    furosemide  20 mg IntraVENous BID    piperacillin-tazobactam  4,500 mg IntraVENous Once    Followed by    piperacillin-tazobactam  3,375 mg IntraVENous Q8H    furosemide  20 mg Oral BID    naloxegol  12.5 mg Oral QAM AC    midodrine  25 mg Oral q8h    multivitamin  1 tablet Oral Daily    thiamine  100 mg Oral Daily    zolpidem  5 mg Oral Nightly    buPROPion  150 mg Per NG tube BID    [Held by provider] metoprolol  2.5 mg IntraVENous Q6H    melatonin  5 mg Oral Nightly    sodium chloride flush  5-40 mL IntraVENous 2 times per day    heparin (porcine)  5,000 Units SubCUTAneous Q8H    pantoprazole  40 mg IntraVENous Daily    balsum peru-castor oil   Topical BID    gabapentin  600 mg Oral TID    [Held by provider] buPROPion  300 mg Oral Daily    atorvastatin  10 mg Oral Nightly    amiodarone  200 mg Oral Daily    [Held by provider] tamsulosin  0.4 mg Oral Daily    [Held by provider] DESMOpressin  200 mcg Oral Nightly       Continuous Infusions:   sodium chloride      sodium chloride      dextrose         PRN Meds:  diatrizoate meglumine-sodium, morphine **OR** morphine, acetaminophen **OR** acetaminophen, dextrose, benzonatate, guaiFENesin, sodium chloride flush, sodium chloride, sodium chloride flush, sodium chloride, ondansetron **OR** ondansetron, lidocaine, oxyCODONE, glucose, dextrose bolus **OR** dextrose bolus, glucagon (rDNA),

## 2024-12-01 NOTE — PROGRESS NOTES
General Surgery   Daily Progress Note  Patient: Juan Mohamud      CC: Distension, Tex's syndrome      SUBJECTIVE:   Denies any pain at this time.     ROS:   A 14 point review of systems was conducted, significant findings as noted above. All other systems negative.    OBJECTIVE:    PHYSICAL EXAM:    Vitals:    11/30/24 1600 11/30/24 2011 12/01/24 0020 12/01/24 0432   BP: 98/61 93/63 96/68 (!) 90/58   Pulse: (!) 101 (!) 111 (!) 110 (!) 111   Resp:  16 16 16   Temp: 98.1 °F (36.7 °C) 97.6 °F (36.4 °C) 97.4 °F (36.3 °C) 97.7 °F (36.5 °C)   TempSrc: Oral  Oral Temporal   SpO2: 99% 92% 99% 98%   Weight:       Height:           General appearance: alert, no acute distress  Respiratory: Normal effort with no accessory muscle use on RA  Cardiovascular: RRR  Abdomen: soft, non-tender, moderately distended, tympanic to percussion, no guarding, no rigidity. PEG tube in place, flushes without issue. 8 cm at skin.     LABS:   Recent Labs     11/29/24  0810 11/30/24  0549   WBC 10.5 12.6*   HGB 11.2* 11.8*   HCT 35.0* 36.5*   MCV 92.0 92.4    293        Recent Labs     11/29/24  0810 11/30/24  0549    132*   K 4.3 4.2   CL 99 95*   CO2 27 28   PHOS  --  3.4   BUN 23* 24*   CREATININE 1.1 1.1        No results for input(s): \"AST\", \"ALT\", \"BILIDIR\", \"BILITOT\", \"ALKPHOS\" in the last 72 hours.    Invalid input(s): \"ALB\"     No results for input(s): \"LIPASE\", \"AMYLASE\" in the last 72 hours.     No results for input(s): \"INR\", \"APTT\" in the last 72 hours.    Invalid input(s): \"PROT\"     No results for input(s): \"CKTOTAL\", \"CKMB\", \"CKMBINDEX\", \"TROPONINI\" in the last 72 hours.      ASSESSMENT & PLAN:   This is a 84 y.o. male with Hx of Hx of type II DM, degenerative lumbar disease, arrhythmia s/p AICD, diastolic CHF. He presented with a T11 burst fracture and epidural hematoma requiring neurosurgical fixation. Since fall he has had increased abdominal distention and underwent decompressive colonoscopy on 11/8.  S/p T8-L1 posterior decompression, fixation and fusion. 11/12 with NSGY. Surgical services consulted for ongoing ileus/colonic pseudo obstruction    Patient failed MBS and got PEG tube on 11/26.     - More distended on abdominal exam, on TF @40 mL/hr  - Morning labs reveals elevated WBC to 12.6 from 10.5  - Hold TF for now  - f/u UA and morning KUB  - Continue daily SMOG and aggressive bowel regimen   - Patient must remain on linzess and movantik  - Serial exams   - Rest of care per primary team.     Rafael Miller DO  PGY2, General Surgery  12/01/24   7:41 AM   [employer]  [phone]

## 2024-12-01 NOTE — PROGRESS NOTES
The pt isn't taking much of anything orally so glucose protocol is being followed see CARTER Skinner RN

## 2024-12-01 NOTE — PROGRESS NOTES
Pt's blood sugar was 60, the charge nurse is back with the pt trying to get him to drink some full liquids since he is awake and alert, if the pt remains low will follow the protocol for low blood sugars. Maryjane Skinner RN

## 2024-12-01 NOTE — PLAN OF CARE
Problem: Safety - Medical Restraint  Goal: Remains free of injury from restraints (Restraint for Interference with Medical Device)  Description: INTERVENTIONS:  1. Determine that other, less restrictive measures have been tried or would not be effective before applying the restraint  2. Evaluate the patient's condition at the time of restraint application  3. Inform patient/family regarding the reason for restraint  4. Q2H: Monitor safety, psychosocial status, comfort, nutrition and hydration  12/1/2024 0411 by Grace Rodriguez, RN  Outcome: Progressing  12/1/2024 0411 by Grace Rodriguez, RN  Outcome: Progressing   Q2h ROM performed, water offered. Pt continues to meet requirements as there have been three attempts to Pull   PEG tube out even with restraints on. Reorientation given; no evidence of learning returned.

## 2024-12-01 NOTE — PROGRESS NOTES
This RN was at the bedside along with the MD, Dr. Phillip who spoke to the family regarding the pt's confusion which has gotten worse since the MD has been following him. Per report this RN was told he had been confused, which the MD knew about but apparently he is getting worse. The MD spoke to the family about hospital delirium and the need to put into practice delirium precautions such as having the lights on during the day and re orienting the pt frequently throughout the day (which we have been doing) along with less distractions at night. The bilateral soft wrist restraints are still needed at this time since the pt does try to pull at his peg tube, family is aware of this and understands, however the charge nurse was made aware that if we can find a sitter to stay at the bedside then we can discontinue the restraints, unfortunately at this time there are no sitters available in the hospital, but they will try to get one in the future if at all possible. This RN will continue to follow the pt throughout the shift, the family currently has no other questions or needs at this time. Maryjane Skinner, RN

## 2024-12-01 NOTE — PROGRESS NOTES
V2.0    [facility] Progress Note      Name:  Juan Mohamud /Age/Sex: 1940  (84 y.o. male)   MRN & CSN:  [id number] & [id number] Encounter Date/Time: 2024 2:26 PM EST   Location:  5528/5528-01 PCP: Janet Garcia MD     Attending:Ayaan Elias*       Hospital Day: 25    Assessment and Recommendations     84 y.o. male with a medical hx significant for type II DM, degenerative lumbar disease, arrhythmia s/p AICD, diastolic CHF, and macular degeneration, who presented from home to the [facility] ED on 11/3/24 after a fall.     Reportedly, patient fell after feeling like his \"knees were giving out.\" No reported symptoms prior to this fall, patient landed on his left side and hit the back of his head but did not lose consciousness at this time.      Upon arrival to the [facility] ED, patient was alert, oriented and reported with GCS of 15 and NIH of 0. While in the ED patient had increasing generalized weakness and difficulty getting up from a sitting position. Decision was made to admit patient as they did not feel safe discharging him home given his inability to stand unassisted.      Fall was thought to be mechanical and secondary to generalized weakness due to poor nutrition. CT abd pelv performed due to complaints of abdominal pain, distension, and back pain upon arrival to the floor. Imaging revealed acute T11 vertebral fracture extending into the T10-T11 disc space as well as diffuse distention of small and large bowel compatible with ileus and bilateral pleural effusions.     GI consulted for ileus, and patient underwent colonic decompression     MRI thoracic spine then demonstrated large epidural spinal hematoma with cord compression  and additional T6 subacute compression fracture along with bilateral pleural effusions.  4    Has been having issues with tolerating PEG tube feeds. General surgery reconsulted and currently treating supportively in the setting of [place]'s  11/21/2024 05:10 PM     Blood Cultures:   Lab Results   Component Value Date/Time    BC No Growth after 4 days of incubation. 03/30/2024 08:38 AM     Lab Results   Component Value Date/Time    BLOODCULT2 No Growth after 4 days of incubation. 03/30/2024 08:38 AM     Organism:   Lab Results   Component Value Date/Time    ORG Klebsiella pneumoniae 03/27/2024 12:46 PM         Electronically signed by Ayaan Eddy MD on 12/1/2024 at 4:37 PM

## 2024-12-01 NOTE — PROGRESS NOTES
ROM completed, new bandages placed over the pt's arms around his arm skin tears, no new skin tears noted throughout this shift. The pt has multiple scattered skin tears on his bilateral arms, none currently bleeding, and all currently intact. Pt has been turned and repositioned throughout this shift (see flowsheets). The pt appears calm at this time, alert and awake. I will continue to follow throughout the shift. Blood sugar has improved after D 50.  Maryjane Skinner RN

## 2024-12-01 NOTE — PROGRESS NOTES
VSS, 4 lpm. BM overnight. Soft wrist restraints applied due to pulling at PEG tube. Will update family.Pt pulled tube one ich out and this writer advanced at bedside and applied new split gauze. KUB obtained with out contrast after pt pulled at he PEG but was hard to visualize there fore second KUB obtained with contrast and was found to be in the right place. NP on call for hospitalist saw pt bedside ans stated it was ok to use. Meds administered per PEG this shift. Pt voids WNL to male wick. Turing pt q2h on specialty mattress. Bed alarm set. Call light in reach. Will continue to monitor.

## 2024-12-01 NOTE — PLAN OF CARE
Advanced Care Planning Note.    Purpose of Encounter: Advanced care planning in light of acute and chronic deteriorating medical conditions.    Parties In Attendance: Patient (Juan Mohamud),  Wife/son (POA), Ayaan Eddy MD  (myself)    Decisional Capacity: No    Subjective: Patient/family understand in this voluntary conversation that Juan Mohamud continues to deteriorate and discuss what inteventions and plans patient would want implemented in light of the following diagnoses:  AMS, Colonic pseudo obstruction, possible intra abdominal infection, volume overload.     Objective: Pt has been having chronic decline in functional status with increased dependence on others for ADLs  Increased frequency of Hospitalizations.  Likelihood of further hospitalizations with likelihood of escalation of medical interventions with the expectation of returning to a diminishing baseline level of functionality.      Discussion highlights: I discussed with patient the ramifications of their acute and chronic medical problems- and the likely outcomes expected, both in terms of statistics, and as part of my experience seeing patients with similar conditions.      Goals of Care Determination:  Patient wishes to remain Full code for now, but has interest in continuing this conversation, and discussing with family before making any changes to code status and goals of care parameters.      Plan: Continue to educate patient on medical conditions and the choices available regarding possible outcomes with regard to goals of care and code status.         Time spent on Advanced care Plannin minutes    Ayaan Eddy MD  2024 4:47 PM

## 2024-12-01 NOTE — PLAN OF CARE
Problem: Chronic Conditions and Co-morbidities  Goal: Patient's chronic conditions and co-morbidity symptoms are monitored and maintained or improved  Outcome: Progressing     Problem: Discharge Planning  Goal: Discharge to home or other facility with appropriate resources  Outcome: Progressing     Problem: Safety - Adult  Goal: Free from fall injury  12/1/2024 1146 by Maryjane Skinner RN  Outcome: Progressing  12/1/2024 0408 by Grace Rodriguez RN  Outcome: Progressing     Problem: Skin/Tissue Integrity  Goal: Absence of new skin breakdown  Description: 1.  Monitor for areas of redness and/or skin breakdown  2.  Assess vascular access sites hourly  3.  Every 4-6 hours minimum:  Change oxygen saturation probe site  4.  Every 4-6 hours:  If on nasal continuous positive airway pressure, respiratory therapy assess nares and determine need for appliance change or resting period.  12/1/2024 1146 by Maryjane Skinner RN  Outcome: Progressing  12/1/2024 0408 by Grace Rodriguez RN  Outcome: Progressing     Problem: ABCDS Injury Assessment  Goal: Absence of physical injury  12/1/2024 1146 by Maryjane Skinner RN  Outcome: Progressing  12/1/2024 0408 by Grace Rodriguez RN  Outcome: Progressing     Problem: Pain  Goal: Verbalizes/displays adequate comfort level or baseline comfort level  Outcome: Progressing     Problem: Respiratory - Adult  Goal: Achieves optimal ventilation and oxygenation  Outcome: Progressing     Problem: Cardiovascular - Adult  Goal: Maintains optimal cardiac output and hemodynamic stability  Outcome: Progressing  Goal: Absence of cardiac dysrhythmias or at baseline  Outcome: Progressing     Problem: Metabolic/Fluid and Electrolytes - Adult  Goal: Electrolytes maintained within normal limits  Outcome: Progressing  Goal: Hemodynamic stability and optimal renal function maintained  Outcome: Progressing     Problem: Neurosensory - Adult  Goal: Achieves stable or improved neurological status  Outcome:  Progressing  Goal: Absence of seizures  Outcome: Progressing  Goal: Achieves maximal functionality and self care  Outcome: Progressing     Problem: Nutrition Deficit:  Goal: Optimize nutritional status  Outcome: Progressing     Problem: Gastrointestinal - Adult  Goal: Minimal or absence of nausea and vomiting  Outcome: Progressing  Goal: Maintains or returns to baseline bowel function  Outcome: Progressing  Goal: Maintains adequate nutritional intake  Outcome: Progressing     Problem: Safety - Medical Restraint  Goal: Remains free of injury from restraints (Restraint for Interference with Medical Device)  Description: INTERVENTIONS:  1. Determine that other, less restrictive measures have been tried or would not be effective before applying the restraint  2. Evaluate the patient's condition at the time of restraint application  3. Inform patient/family regarding the reason for restraint  4. Q2H: Monitor safety, psychosocial status, comfort, nutrition and hydration  12/1/2024 1146 by Maryjane Skinner RN  Outcome: Progressing  12/1/2024 0411 by Grace Rodriguez RN  Outcome: Progressing  12/1/2024 0411 by Grace Rodriguez RN  Outcome: Progressing

## 2024-12-02 ENCOUNTER — APPOINTMENT (OUTPATIENT)
Dept: GENERAL RADIOLOGY | Age: 84
DRG: 447 | End: 2024-12-02
Attending: INTERNAL MEDICINE
Payer: MEDICARE

## 2024-12-02 LAB
BASE EXCESS BLDA CALC-SCNC: 4.1 MMOL/L (ref -3–3)
CO2 BLDA-SCNC: 32 MMOL/L
COHGB MFR BLDA: 1.7 % (ref 0–1.5)
GLUCOSE BLD-MCNC: 108 MG/DL (ref 70–99)
GLUCOSE BLD-MCNC: 61 MG/DL (ref 70–99)
GLUCOSE BLD-MCNC: 63 MG/DL (ref 70–99)
GLUCOSE BLD-MCNC: 72 MG/DL (ref 70–99)
GLUCOSE BLD-MCNC: 75 MG/DL (ref 70–99)
GLUCOSE BLD-MCNC: 89 MG/DL (ref 70–99)
HCO3 BLDA-SCNC: 31 MMOL/L (ref 21–29)
HGB BLDA-MCNC: 10.4 G/DL
METHGB MFR BLDA: 0.4 % (ref 0–1.4)
PCO2 BLDA: 54.6 MMHG (ref 35–45)
PERFORMED ON: ABNORMAL
PERFORMED ON: NORMAL
PH BLDA: 7.36 [PH] (ref 7.35–7.45)
PO2 BLDA: 154 MMHG (ref 75–108)
REASON FOR REJECTION: NORMAL
REJECTED TEST: NORMAL
SAO2 % BLDA: >100 % (ref 93–100)

## 2024-12-02 PROCEDURE — 2060000000 HC ICU INTERMEDIATE R&B

## 2024-12-02 PROCEDURE — C1751 CATH, INF, PER/CENT/MIDLINE: HCPCS

## 2024-12-02 PROCEDURE — 92526 ORAL FUNCTION THERAPY: CPT

## 2024-12-02 PROCEDURE — 2580000003 HC RX 258: Performed by: STUDENT IN AN ORGANIZED HEALTH CARE EDUCATION/TRAINING PROGRAM

## 2024-12-02 PROCEDURE — 71045 X-RAY EXAM CHEST 1 VIEW: CPT

## 2024-12-02 PROCEDURE — 99231 SBSQ HOSP IP/OBS SF/LOW 25: CPT | Performed by: SURGERY

## 2024-12-02 PROCEDURE — 6360000002 HC RX W HCPCS: Performed by: STUDENT IN AN ORGANIZED HEALTH CARE EDUCATION/TRAINING PROGRAM

## 2024-12-02 PROCEDURE — 2580000003 HC RX 258

## 2024-12-02 PROCEDURE — 6370000000 HC RX 637 (ALT 250 FOR IP)

## 2024-12-02 PROCEDURE — 82803 BLOOD GASES ANY COMBINATION: CPT

## 2024-12-02 PROCEDURE — 94761 N-INVAS EAR/PLS OXIMETRY MLT: CPT

## 2024-12-02 PROCEDURE — 6370000000 HC RX 637 (ALT 250 FOR IP): Performed by: STUDENT IN AN ORGANIZED HEALTH CARE EDUCATION/TRAINING PROGRAM

## 2024-12-02 PROCEDURE — 6360000002 HC RX W HCPCS

## 2024-12-02 PROCEDURE — 6370000000 HC RX 637 (ALT 250 FOR IP): Performed by: INTERNAL MEDICINE

## 2024-12-02 PROCEDURE — 36410 VNPNXR 3YR/> PHY/QHP DX/THER: CPT

## 2024-12-02 PROCEDURE — 74018 RADEX ABDOMEN 1 VIEW: CPT

## 2024-12-02 PROCEDURE — 36600 WITHDRAWAL OF ARTERIAL BLOOD: CPT

## 2024-12-02 PROCEDURE — 05H933Z INSERTION OF INFUSION DEVICE INTO RIGHT BRACHIAL VEIN, PERCUTANEOUS APPROACH: ICD-10-PCS | Performed by: INTERNAL MEDICINE

## 2024-12-02 PROCEDURE — 2700000000 HC OXYGEN THERAPY PER DAY

## 2024-12-02 RX ORDER — LIDOCAINE HYDROCHLORIDE 10 MG/ML
50 INJECTION, SOLUTION EPIDURAL; INFILTRATION; INTRACAUDAL; PERINEURAL ONCE
Status: DISCONTINUED | OUTPATIENT
Start: 2024-12-02 | End: 2024-12-11 | Stop reason: HOSPADM

## 2024-12-02 RX ORDER — SODIUM CHLORIDE 9 MG/ML
INJECTION, SOLUTION INTRAVENOUS PRN
Status: DISCONTINUED | OUTPATIENT
Start: 2024-12-02 | End: 2024-12-11 | Stop reason: HOSPADM

## 2024-12-02 RX ORDER — DEXTROSE MONOHYDRATE 100 MG/ML
INJECTION, SOLUTION INTRAVENOUS CONTINUOUS
Status: ACTIVE | OUTPATIENT
Start: 2024-12-02 | End: 2024-12-03

## 2024-12-02 RX ORDER — SODIUM CHLORIDE 0.9 % (FLUSH) 0.9 %
5-40 SYRINGE (ML) INJECTION PRN
Status: DISCONTINUED | OUTPATIENT
Start: 2024-12-02 | End: 2024-12-04

## 2024-12-02 RX ORDER — SODIUM CHLORIDE 0.9 % (FLUSH) 0.9 %
5-40 SYRINGE (ML) INJECTION EVERY 12 HOURS SCHEDULED
Status: DISCONTINUED | OUTPATIENT
Start: 2024-12-02 | End: 2024-12-04

## 2024-12-02 RX ORDER — DEXTROSE MONOHYDRATE 100 MG/ML
INJECTION, SOLUTION INTRAVENOUS CONTINUOUS
Status: ACTIVE | OUTPATIENT
Start: 2024-12-02 | End: 2024-12-02

## 2024-12-02 RX ADMIN — SODIUM CHLORIDE, PRESERVATIVE FREE 10 ML: 5 INJECTION INTRAVENOUS at 08:31

## 2024-12-02 RX ADMIN — MIDODRINE HYDROCHLORIDE 25 MG: 5 TABLET ORAL at 03:06

## 2024-12-02 RX ADMIN — LINACLOTIDE 145 MCG: 145 CAPSULE, GELATIN COATED ORAL at 08:32

## 2024-12-02 RX ADMIN — Medication 5 MG: at 22:27

## 2024-12-02 RX ADMIN — MIDODRINE HYDROCHLORIDE 25 MG: 5 TABLET ORAL at 14:31

## 2024-12-02 RX ADMIN — NALOXEGOL OXALATE 12.5 MG: 25 TABLET, FILM COATED ORAL at 06:27

## 2024-12-02 RX ADMIN — MINERAL OIL 330 ML: 999 LIQUID ORAL at 17:36

## 2024-12-02 RX ADMIN — HEPARIN SODIUM 5000 UNITS: 5000 INJECTION INTRAVENOUS; SUBCUTANEOUS at 06:27

## 2024-12-02 RX ADMIN — BUPROPION HYDROCHLORIDE 150 MG: 100 TABLET, FILM COATED ORAL at 08:29

## 2024-12-02 RX ADMIN — GABAPENTIN 600 MG: 300 CAPSULE ORAL at 12:26

## 2024-12-02 RX ADMIN — HEPARIN SODIUM 5000 UNITS: 5000 INJECTION INTRAVENOUS; SUBCUTANEOUS at 12:25

## 2024-12-02 RX ADMIN — Medication: at 08:55

## 2024-12-02 RX ADMIN — PIPERACILLIN AND TAZOBACTAM 3375 MG: 3; .375 INJECTION, POWDER, LYOPHILIZED, FOR SOLUTION INTRAVENOUS at 22:36

## 2024-12-02 RX ADMIN — ZOLPIDEM TARTRATE 5 MG: 5 TABLET, COATED ORAL at 22:27

## 2024-12-02 RX ADMIN — ATORVASTATIN CALCIUM 10 MG: 10 TABLET, FILM COATED ORAL at 22:26

## 2024-12-02 RX ADMIN — FUROSEMIDE 20 MG: 10 INJECTION, SOLUTION INTRAMUSCULAR; INTRAVENOUS at 08:29

## 2024-12-02 RX ADMIN — PANTOPRAZOLE SODIUM 40 MG: 40 INJECTION, POWDER, FOR SOLUTION INTRAVENOUS at 08:30

## 2024-12-02 RX ADMIN — PIPERACILLIN AND TAZOBACTAM 3375 MG: 3; .375 INJECTION, POWDER, LYOPHILIZED, FOR SOLUTION INTRAVENOUS at 06:29

## 2024-12-02 RX ADMIN — THIAMINE HCL TAB 100 MG 100 MG: 100 TAB at 08:28

## 2024-12-02 RX ADMIN — BUPROPION HYDROCHLORIDE 150 MG: 100 TABLET, FILM COATED ORAL at 22:27

## 2024-12-02 RX ADMIN — Medication: at 22:38

## 2024-12-02 RX ADMIN — PIPERACILLIN AND TAZOBACTAM 3375 MG: 3; .375 INJECTION, POWDER, LYOPHILIZED, FOR SOLUTION INTRAVENOUS at 14:45

## 2024-12-02 RX ADMIN — FUROSEMIDE 20 MG: 10 INJECTION, SOLUTION INTRAMUSCULAR; INTRAVENOUS at 18:50

## 2024-12-02 RX ADMIN — MIDODRINE HYDROCHLORIDE 25 MG: 5 TABLET ORAL at 18:50

## 2024-12-02 RX ADMIN — THERA TABS 1 TABLET: TAB at 08:28

## 2024-12-02 RX ADMIN — HEPARIN SODIUM 5000 UNITS: 5000 INJECTION INTRAVENOUS; SUBCUTANEOUS at 22:27

## 2024-12-02 RX ADMIN — SODIUM CHLORIDE, PRESERVATIVE FREE 10 ML: 5 INJECTION INTRAVENOUS at 22:38

## 2024-12-02 RX ADMIN — GABAPENTIN 600 MG: 300 CAPSULE ORAL at 08:28

## 2024-12-02 RX ADMIN — LINACLOTIDE 145 MCG: 145 CAPSULE, GELATIN COATED ORAL at 19:44

## 2024-12-02 RX ADMIN — GABAPENTIN 600 MG: 300 CAPSULE ORAL at 22:26

## 2024-12-02 RX ADMIN — AMIODARONE HYDROCHLORIDE 200 MG: 200 TABLET ORAL at 08:28

## 2024-12-02 NOTE — PROGRESS NOTES
General Surgery   Daily Progress Note  Patient: Juan Mohamud      CC: Distension, Tex's syndrome      SUBJECTIVE:   Not arousable during rounds, does not appear to be any acute distress. Vitals stable. . ABG ordered. RN states pt had bm overnight as well as issues with hypoglycemia.     ROS:   A 14 point review of systems was conducted, significant findings as noted above. All other systems negative.    OBJECTIVE:    PHYSICAL EXAM:    Vitals:    12/01/24 1234 12/01/24 1611 12/01/24 2354 12/02/24 0440   BP: 96/62 95/66 95/64 96/67   Pulse: (!) 109 (!) 108 (!) 101 (!) 107   Resp: 18 14 16 16   Temp: 97.7 °F (36.5 °C) 97.4 °F (36.3 °C) 97.6 °F (36.4 °C) 97.6 °F (36.4 °C)   TempSrc: Axillary Axillary Axillary Axillary   SpO2: 97% 98% 98% 99%   Weight:       Height:           General appearance:  no acute distress  Respiratory: Normal effort with no accessory muscle use on RA  Cardiovascular: perfusing well   Abdomen: soft, non-tender, moderately distended, tympanic to percussion, no guarding, no rigidity. PEG tube in place    LABS:   Recent Labs     11/30/24  0549 12/01/24  1324   WBC 12.6* 10.0   HGB 11.8* 11.6*   HCT 36.5* 36.6*   MCV 92.4 92.5    282        Recent Labs     11/30/24  0549 12/01/24  1324   * 136   K 4.2 3.2*   CL 95* 97*   CO2 28 28   PHOS 3.4  --    BUN 24* 26*   CREATININE 1.1 1.0        Recent Labs     12/01/24  1324   AST 38*   ALT 17   BILITOT 0.8   ALKPHOS 199*        No results for input(s): \"LIPASE\", \"AMYLASE\" in the last 72 hours.     No results for input(s): \"INR\", \"APTT\" in the last 72 hours.    Invalid input(s): \"PROT\"     No results for input(s): \"CKTOTAL\", \"CKMB\", \"CKMBINDEX\", \"TROPONINI\" in the last 72 hours.      ASSESSMENT & PLAN:   This is a 84 y.o. male with Hx of Hx of type II DM, degenerative lumbar disease, arrhythmia s/p AICD, diastolic CHF. He presented with a T11 burst fracture and epidural hematoma requiring neurosurgical fixation. Since fall he has had

## 2024-12-02 NOTE — PROGRESS NOTES
VSS with soft BM. Will administer midodrine per MAR. ABX administered per orders. ABD distention improved from previous PM. Pt has had two BM overnight thus far. PEG remains in place. Pt has blood glucose of 52 mg/dl before bed which came up to 108 mg/dl after d5 given per MAR. Turning q2h and q2h ROM performed while pt awake. Heels floated and pt remains on specialty bed. Await KUB this AM. Bed alarm set. Call light in reach. Will continue to monitor.

## 2024-12-02 NOTE — CARE COORDINATION
CM following: CM attempted to reach Adele [phone] in admissions at [facility] to update that pt's tube feeds currently on hold. CM unable to leave a  for Adele as mailbox was full. Precert will need to be resubmitted when pt medically stable for discharge. CM will continue to follow for discharge planning.  Electronically signed by SHARLA Blum on 12/2/2024 at 11:30 AM

## 2024-12-02 NOTE — PROGRESS NOTES
V2.0    [facility] Progress Note      Name:  Juan Mohamud /Age/Sex: 1940  (84 y.o. 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  Will restart trickle feeds today.  Consider LTAC referral if unable to discharge medically.    Assessment and Recommendations     84 y.o. male with a medical hx significant for type II DM, degenerative lumbar disease, arrhythmia s/p AICD, diastolic CHF, and macular degeneration, who presented from home to the [facility] ED on 11/3/24 after a fall.     Reportedly, patient fell after feeling like his \"knees were giving out.\" No reported symptoms prior to this fall, patient landed on his left side and hit the back of his head but did not lose consciousness at this time.      Upon arrival to the [facility] ED, patient was alert, oriented and reported with GCS of 15 and NIH of 0. While in the ED patient had increasing generalized weakness and difficulty getting up from a sitting position. Decision was made to admit patient as they did not feel safe discharging him home given his inability to stand unassisted.      Fall was thought to be mechanical and secondary to generalized weakness due to poor nutrition. CT abd pelv performed due to complaints of abdominal pain, distension, and back pain upon arrival to the floor. Imaging revealed acute T11 vertebral fracture extending into the T10-T11 disc space as well as diffuse distention of small and large bowel compatible with ileus and bilateral pleural effusions.     GI consulted for ileus, and patient underwent colonic decompression     MRI thoracic spine then demonstrated large epidural spinal hematoma with cord compression  and additional T6 subacute compression fracture along with

## 2024-12-02 NOTE — PROGRESS NOTES
Patient is A&O to self and situation. Disoriented at times. VSS this shift with exception of elevated heart rate and low blood pressure. Patient has endorsed pain to \"all over\" managed well per non-pharm measures. Patient is tolerating full liquid diet and tube feed rate at 10mL/hr. Patient has not ambulated this shift. Turning patient every 2 hours. Voiding via external catheter and incontinent pads. Patient updated on plan of care. Fall and safety precautions in place, call light within reach.   Vitals:    12/02/24 1535   BP: 91/61   Pulse: (!) 110   Resp: 18   Temp: 97.5 °F (36.4 °C)   SpO2: 91%

## 2024-12-02 NOTE — PROGRESS NOTES
Comprehensive Nutrition Assessment    RECOMMENDATIONS:  PO Diet: Full Liquid  Nutrition Education: Education/Counseling not indicated   Nutrition Support: trickle feeds only  Start: Osmolite 1.5 (Standard Formula without Fiber ) @ 10 mL/hr  RD to monitor for ability to advance TF 12/03  Water flushes 30 ml q 4 hrs           NUTRITION ASSESSMENT:   Nutritional summary & status: Consult for Tube Feedings Order and Managment. 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 TF has been held past 2 days due to abdominal distension. Noted BMx 6 yesterday. On a Full Liquid diet with minimal intake. Received consult to resume trickle feeds. Order for Osmolite @ 10 ml/hr placed. Will monitor tolerance and ability to adv TF.   Admission // PMH: Vertebral fracture//colonic psuedoobstruction, T2DM, HLD, CHF    MALNUTRITION ASSESSMENT  Context of Malnutrition: Acute Illness   Malnutrition Status: Mild malnutrition  Findings of the 6 clinical characteristics of malnutrition (Minimum of 2 out of 6 clinical characteristics is required to make the diagnosis of moderate or severe Protein Calorie Malnutrition based on AND/ASPEN Guidelines):  Energy Intake:  50% or less of estimated energy requirements for 5 or more days  Weight Loss:  Mild weight loss (5% in 3 month period)     Body Fat Loss:  No body fat loss     Muscle Mass Loss:  No muscle mass loss    Fluid Accumulation:  No fluid accumulation     Strength:  Not Performed    NUTRITION DIAGNOSIS   Inadequate oral intake related to swallowing difficulty as evidenced by nutrition support - enteral nutrition    Nutrition Monitoring and Evaluation:   Food/Nutrient Intake Outcomes:  Enteral Nutrition Intake/Tolerance, Diet Advancement/Tolerance  Physical Signs/Symptoms Outcomes:  Biochemical Data, Nutrition Focused Physical Findings, Weight, Skin, GI Status     OBJECTIVE DATA: Significant to nutrition assessment  Nutrition Related Findings: LBM 12/01(loose).

## 2024-12-02 NOTE — PROCEDURES
PROCEDURE NOTE  Date: 2024   Name: Juan Mohamud  YOB: 1940    Procedures        PowerGlide Midline/Extended Dwell Catheter Procedure Note:  Chart reviewed for allergies, diagnosis, labs, known contraindications, reason for line placement and planned length of treatment.  Insertion procedure discussed with patient/family/staff member.  Informed consent not required for PowerGlide Midline/Extended Dwell placement.  Three patient identifiers - Patient name,   and MRN -  completed &  confirmed verbally.   Hat, mask and eye shield donned.  Site scrubbed with Chloraprep  One-Step applicator for 30 seconds x 1.  Hand Hygiene performed prior to sterile gloves.  Patient draped. Vein located by [employer].  Midline/Extended Dwell Catheter inserted.  Positive brisk blood return obtained.  Valve applied to lumen and flushed with 10 mls  0.9% Sterile Sodium Chloride. Catheter flushes easily with no resistance.  Skin prep applied to site.  Catheter secured with non-sutured locking device per hospital protocol. Bio-patch/CHG impregnated sterile tegaderm dressing applied.  Alcohol Swab Cap applied to valve.  Sterile field maintained during procedure.   Guide wire accounted for post procedure. Pt. Response to procedure, tolerated well. Appearance of site: Clean dry and intact without bleeding or edema. All edges of Tegaderm occlusive.   Site marked with date and initials of RN placing line.Top 2 side rails in up position, call button in reach, RN notified of all of the above.

## 2024-12-02 NOTE — PLAN OF CARE
instruct to report shortness of breath or any respiratory difficulty     Problem: Cardiovascular - Adult  Goal: Maintains optimal cardiac output and hemodynamic stability  Outcome: Progressing  Flowsheets (Taken 12/2/2024 1809)  Maintains optimal cardiac output and hemodynamic stability: Monitor blood pressure and heart rate     Problem: Cardiovascular - Adult  Goal: Absence of cardiac dysrhythmias or at baseline  Outcome: Progressing  Flowsheets (Taken 12/2/2024 1809)  Absence of cardiac dysrhythmias or at baseline:   Monitor cardiac rate and rhythm   Administer antiarrhythmia medication and electrolyte replacement as ordered   Assess for signs of decreased cardiac output     Problem: Metabolic/Fluid and Electrolytes - Adult  Goal: Electrolytes maintained within normal limits  Outcome: Progressing  Flowsheets (Taken 12/2/2024 1809)  Electrolytes maintained within normal limits:   Monitor labs and assess patient for signs and symptoms of electrolyte imbalances   Instruct patient on fluid and nutrition restrictions as appropriate     Problem: Metabolic/Fluid and Electrolytes - Adult  Goal: Hemodynamic stability and optimal renal function maintained  Outcome: Progressing  Flowsheets (Taken 12/2/2024 1809)  Hemodynamic stability and optimal renal function maintained:   Monitor labs and assess for signs and symptoms of volume excess or deficit   Monitor intake, output and patient weight   Encourage oral intake as appropriate   Instruct patient on fluid and nutrition restrictions as appropriate     Problem: Neurosensory - Adult  Goal: Achieves stable or improved neurological status  Outcome: Progressing  Flowsheets (Taken 12/2/2024 1809)  Achieves stable or improved neurological status: Assess for and report changes in neurological status     Problem: Neurosensory - Adult  Goal: Absence of seizures  Outcome: Progressing  Flowsheets (Taken 12/2/2024 1809)  Absence of seizures: Monitor for seizure activity.  If seizure  thought disturbance, including medications, impaired vision or hearing, underlying metabolic abnormalities, dehydration, psychiatric diagnoses, and notify attending LIP  2. [place] high risk fall precautions, as indicated  3. Provide frequent short contacts to provide reality reorientation, refocusing and direction  4. Decrease environmental stimuli, including noise as appropriate  5. Monitor and intervene to maintain adequate nutrition, hydration, elimination, sleep and activity  6. If unable to ensure safety without constant attention obtain sitter and review sitter guidelines with assigned personnel  7. Initiate Psychosocial CNS and Spiritual Care consult, as indicated  12/2/2024 1809 by Belkis Gibson, RN  Outcome: Progressing  Flowsheets (Taken 12/2/2024 1809)  Effect of thought disturbance (confusion, delirium, dementia, or psychosis) are managed with adequate functional status:   Assess for contributors to thought disturbance, including medications, impaired vision or hearing, underlying metabolic abnormalities, dehydration, psychiatric diagnoses, notify LIP   [place] high risk fall precautions, as indicated   Provide frequent short contacts to provide reality reorientation, refocusing and direction   Decrease environmental stimuli, including noise as appropriate   Monitor and intervene to maintain adequate nutrition, hydration, elimination, sleep and activity   If unable to ensure safety without constant attention obtain sitter and review sitter guidelines with assigned personnel   Initiate Psychosocial Clinical Nurse Specialist and Spiritual Care consult, as indicated

## 2024-12-02 NOTE — PROGRESS NOTES
Speech Language Pathology  Facility/Department:[address]  Dysphagia Therapy Note                                                       Name: Juan Mohamud  : 1940  MRN: [id number]    Patient Diagnosis(es):   Patient Active Problem List    Diagnosis Date Noted    Paralytic ileus of small intestine and colon ([facility]) 2024    VT (ventricular tachycardia) ([facility]) 2024    Dysphagia 2024    Encounter for palliative care 2024    Hypotension 2024    SVT (supraventricular tachycardia) ([facility]) 2024    Fx dorsal vertebra-closed ([facility]) 2024    Spinal cord injury at T1-T6 level ([facility]) 2024    Preoperative cardiovascular examination 2024    Chronic heart failure with preserved ejection fraction (HFpEF) ([facility]) 2024    Abdominal distension 2024    Epidural hematoma 2024    Fall at home, initial encounter 2024    Acute congestive heart failure ([facility]) 2024    PVC's (premature ventricular contractions) 2024    Sepsis ([facility]) 2024    ICD (implantable cardioverter-defibrillator) in place 2024    Acute hypoxemic respiratory failure 2024    BOSTON (acute kidney injury) ([facility]) 2024    Arrhythmia 2024    Primary hypertension 2024    Hyperlipidemia 2024    Colonic obstruction ([facility]) 2024    Trauma 2024    History of fall 2024    Type 2 diabetes mellitus ([facility]) 2024    Anemia 2024    Pneumonia 2024    Hyponatremia 2024    [place] syndrome 2024    Wide-complex tachycardia 2024    Constipation 2024    Cataract extraction status of right eye 2018    DDD (degenerative disc disease), lumbar 2013       Past Medical History:   Diagnosis Date    (HFpEF) heart failure with preserved ejection fraction ([facility]) 2024    EF 50-55%, mod/severe MR    Diabetes mellitus ([facility])     diet controlled    DVT (deep venous thrombosis) ([facility]) 2024

## 2024-12-02 NOTE — PLAN OF CARE
Problem: Safety - Medical Restraint  Goal: Remains free of injury from restraints (Restraint for Interference with Medical Device)  Description: INTERVENTIONS:  1. Determine that other, less restrictive measures have been tried or would not be effective before applying the restraint  2. Evaluate the patient's condition at the time of restraint application  3. Inform patient/family regarding the reason for restraint  4. Q2H: Monitor safety, psychosocial status, comfort, nutrition and hydration  Outcome: Progressing     Problem: Confusion  Goal: Confusion, delirium, dementia, or psychosis is improved or at baseline  Description: INTERVENTIONS:  1. Assess for possible contributors to thought disturbance, including medications, impaired vision or hearing, underlying metabolic abnormalities, dehydration, psychiatric diagnoses, and notify attending LIP  2. [place] high risk fall precautions, as indicated  3. Provide frequent short contacts to provide reality reorientation, refocusing and direction  4. Decrease environmental stimuli, including noise as appropriate  5. Monitor and intervene to maintain adequate nutrition, hydration, elimination, sleep and activity  6. If unable to ensure safety without constant attention obtain sitter and review sitter guidelines with assigned personnel  7. Initiate Psychosocial CNS and Spiritual Care consult, as indicated  Outcome: Progressing

## 2024-12-02 NOTE — PROGRESS NOTES
12/02/24 1546   Encounter Summary   Encounter Overview/Reason Spiritual/Emotional Needs   Service Provided For Patient   Last Encounter  12/02/24  (dje)   Complexity of Encounter Moderate   Begin Time 1520   End Time  1547   Total Time Calculated 27 min   Assessment/Intervention/Outcome   Assessment Despair   Intervention Active listening;Explored/Affirmed feelings, thoughts, concerns;Nurtured Hope   Outcome Comfort;Expressed Gratitude     Spiritual Health History and Assessment/Progress Note  [facility]    (P) Spiritual/Emotional Needs,  ,  ,      Name: Juan Mohamud MRN: [id number]    Age: 84 y.o.     Sex: male   Language: English   Episcopalian: Sikh   Fx dorsal vertebra-closed ([facility])     Date: 12/2/2024            Total Time Calculated: (P) 27 min              Spiritual Assessment began in [facility] 5T ORTHO/NEURO        Referral/Consult From: Other    Encounter Overview/Reason: (P) Spiritual/Emotional Needs  Service Provided For: (P) Patient    [place], Belief, Meaning:   Patient Other: NA  Family/Friends No family/friends present      Importance and Influence:  Patient has no beliefs influential to healthcare decision-making identified during this visit  Family/Friends No family/friends present    Community:  Patient feels well-supported. Support system includes: Extended family  Family/Friends No family/friends present    Assessment and Plan of Care:     Patient Interventions include: Explored spiritual coping/struggle/distress, Engaged in theological reflection, and Affirmed coping skills/support systems  Family/Friends Interventions include: No family/friends present    Patient Plan of Care: Spiritual Care available upon further referral  Family/Friends Plan of Care: Spiritual Care available upon further referral    Electronically signed by PAULY Pickens on 12/2/2024 at 3:48 PM

## 2024-12-03 ENCOUNTER — APPOINTMENT (OUTPATIENT)
Dept: GENERAL RADIOLOGY | Age: 84
DRG: 447 | End: 2024-12-03
Attending: INTERNAL MEDICINE
Payer: MEDICARE

## 2024-12-03 LAB
ANION GAP SERPL CALCULATED.3IONS-SCNC: 11 MMOL/L (ref 3–16)
BASOPHILS # BLD: 0 K/UL (ref 0–0.2)
BASOPHILS NFR BLD: 0.4 %
BUN SERPL-MCNC: 28 MG/DL (ref 7–20)
CALCIUM SERPL-MCNC: 7.8 MG/DL (ref 8.3–10.6)
CHLORIDE SERPL-SCNC: 96 MMOL/L (ref 99–110)
CO2 SERPL-SCNC: 28 MMOL/L (ref 21–32)
CREAT SERPL-MCNC: 1.2 MG/DL (ref 0.8–1.3)
DEPRECATED RDW RBC AUTO: 19.5 % (ref 12.4–15.4)
EOSINOPHIL # BLD: 0.1 K/UL (ref 0–0.6)
EOSINOPHIL NFR BLD: 1.2 %
GFR SERPLBLD CREATININE-BSD FMLA CKD-EPI: 60 ML/MIN/{1.73_M2}
GLUCOSE BLD-MCNC: 101 MG/DL (ref 70–99)
GLUCOSE BLD-MCNC: 108 MG/DL (ref 70–99)
GLUCOSE BLD-MCNC: 93 MG/DL (ref 70–99)
GLUCOSE SERPL-MCNC: 98 MG/DL (ref 70–99)
HCT VFR BLD AUTO: 33.7 % (ref 40.5–52.5)
HGB BLD-MCNC: 10.9 G/DL (ref 13.5–17.5)
LYMPHOCYTES # BLD: 0.8 K/UL (ref 1–5.1)
LYMPHOCYTES NFR BLD: 7.4 %
MAGNESIUM SERPL-MCNC: 2.02 MG/DL (ref 1.8–2.4)
MCH RBC QN AUTO: 29.6 PG (ref 26–34)
MCHC RBC AUTO-ENTMCNC: 32.4 G/DL (ref 31–36)
MCV RBC AUTO: 91.3 FL (ref 80–100)
MONOCYTES # BLD: 1 K/UL (ref 0–1.3)
MONOCYTES NFR BLD: 9.4 %
NEUTROPHILS # BLD: 8.7 K/UL (ref 1.7–7.7)
NEUTROPHILS NFR BLD: 81.6 %
PERFORMED ON: ABNORMAL
PERFORMED ON: ABNORMAL
PERFORMED ON: NORMAL
PHOSPHATE SERPL-MCNC: 3.7 MG/DL (ref 2.5–4.9)
PLATELET # BLD AUTO: 298 K/UL (ref 135–450)
PMV BLD AUTO: 7.4 FL (ref 5–10.5)
POTASSIUM SERPL-SCNC: 2.9 MMOL/L (ref 3.5–5.1)
POTASSIUM SERPL-SCNC: NORMAL MMOL/L (ref 3.5–5.1)
RBC # BLD AUTO: 3.69 M/UL (ref 4.2–5.9)
SODIUM SERPL-SCNC: 135 MMOL/L (ref 136–145)
WBC # BLD AUTO: 10.7 K/UL (ref 4–11)

## 2024-12-03 PROCEDURE — 99231 SBSQ HOSP IP/OBS SF/LOW 25: CPT | Performed by: SURGERY

## 2024-12-03 PROCEDURE — 97530 THERAPEUTIC ACTIVITIES: CPT

## 2024-12-03 PROCEDURE — 6370000000 HC RX 637 (ALT 250 FOR IP): Performed by: STUDENT IN AN ORGANIZED HEALTH CARE EDUCATION/TRAINING PROGRAM

## 2024-12-03 PROCEDURE — 85025 COMPLETE CBC W/AUTO DIFF WBC: CPT

## 2024-12-03 PROCEDURE — 6370000000 HC RX 637 (ALT 250 FOR IP)

## 2024-12-03 PROCEDURE — 84100 ASSAY OF PHOSPHORUS: CPT

## 2024-12-03 PROCEDURE — 6360000002 HC RX W HCPCS

## 2024-12-03 PROCEDURE — 80069 RENAL FUNCTION PANEL: CPT

## 2024-12-03 PROCEDURE — 74018 RADEX ABDOMEN 1 VIEW: CPT

## 2024-12-03 PROCEDURE — 36415 COLL VENOUS BLD VENIPUNCTURE: CPT

## 2024-12-03 PROCEDURE — 2060000000 HC ICU INTERMEDIATE R&B

## 2024-12-03 PROCEDURE — 6370000000 HC RX 637 (ALT 250 FOR IP): Performed by: NURSE PRACTITIONER

## 2024-12-03 PROCEDURE — 2580000003 HC RX 258

## 2024-12-03 PROCEDURE — 83735 ASSAY OF MAGNESIUM: CPT

## 2024-12-03 PROCEDURE — 6360000002 HC RX W HCPCS: Performed by: STUDENT IN AN ORGANIZED HEALTH CARE EDUCATION/TRAINING PROGRAM

## 2024-12-03 PROCEDURE — 6370000000 HC RX 637 (ALT 250 FOR IP): Performed by: INTERNAL MEDICINE

## 2024-12-03 PROCEDURE — 2580000003 HC RX 258: Performed by: STUDENT IN AN ORGANIZED HEALTH CARE EDUCATION/TRAINING PROGRAM

## 2024-12-03 PROCEDURE — 84132 ASSAY OF SERUM POTASSIUM: CPT

## 2024-12-03 RX ORDER — POTASSIUM CHLORIDE 7.45 MG/ML
10 INJECTION INTRAVENOUS ONCE
Status: COMPLETED | OUTPATIENT
Start: 2024-12-03 | End: 2024-12-03

## 2024-12-03 RX ORDER — CALCIUM GLUCONATE 94 MG/ML
1000 INJECTION, SOLUTION INTRAVENOUS ONCE
Status: COMPLETED | OUTPATIENT
Start: 2024-12-03 | End: 2024-12-03

## 2024-12-03 RX ORDER — POTASSIUM CHLORIDE 7.45 MG/ML
10 INJECTION INTRAVENOUS
Status: DISPENSED | OUTPATIENT
Start: 2024-12-03 | End: 2024-12-03

## 2024-12-03 RX ADMIN — LINACLOTIDE 145 MCG: 145 CAPSULE, GELATIN COATED ORAL at 15:46

## 2024-12-03 RX ADMIN — BUPROPION HYDROCHLORIDE 150 MG: 100 TABLET, FILM COATED ORAL at 20:07

## 2024-12-03 RX ADMIN — MIDODRINE HYDROCHLORIDE 25 MG: 5 TABLET ORAL at 18:25

## 2024-12-03 RX ADMIN — MIDODRINE HYDROCHLORIDE 25 MG: 5 TABLET ORAL at 09:34

## 2024-12-03 RX ADMIN — PANTOPRAZOLE SODIUM 40 MG: 40 INJECTION, POWDER, FOR SOLUTION INTRAVENOUS at 09:33

## 2024-12-03 RX ADMIN — POTASSIUM CHLORIDE 10 MEQ: 10 INJECTION, SOLUTION INTRAVENOUS at 18:00

## 2024-12-03 RX ADMIN — POTASSIUM BICARBONATE 20 MEQ: 782 TABLET, EFFERVESCENT ORAL at 17:34

## 2024-12-03 RX ADMIN — SODIUM CHLORIDE, PRESERVATIVE FREE 10 ML: 5 INJECTION INTRAVENOUS at 20:14

## 2024-12-03 RX ADMIN — POTASSIUM BICARBONATE 20 MEQ: 782 TABLET, EFFERVESCENT ORAL at 15:12

## 2024-12-03 RX ADMIN — MIDODRINE HYDROCHLORIDE 25 MG: 5 TABLET ORAL at 03:31

## 2024-12-03 RX ADMIN — MINERAL OIL 330 ML: 999 LIQUID ORAL at 09:37

## 2024-12-03 RX ADMIN — ATORVASTATIN CALCIUM 10 MG: 10 TABLET, FILM COATED ORAL at 20:07

## 2024-12-03 RX ADMIN — POTASSIUM CHLORIDE 10 MEQ: 10 INJECTION, SOLUTION INTRAVENOUS at 20:14

## 2024-12-03 RX ADMIN — Medication: at 09:33

## 2024-12-03 RX ADMIN — GABAPENTIN 600 MG: 300 CAPSULE ORAL at 09:30

## 2024-12-03 RX ADMIN — PIPERACILLIN AND TAZOBACTAM 3375 MG: 3; .375 INJECTION, POWDER, LYOPHILIZED, FOR SOLUTION INTRAVENOUS at 22:42

## 2024-12-03 RX ADMIN — SODIUM CHLORIDE, PRESERVATIVE FREE 10 ML: 5 INJECTION INTRAVENOUS at 09:45

## 2024-12-03 RX ADMIN — Medication 5 MG: at 20:07

## 2024-12-03 RX ADMIN — HEPARIN SODIUM 5000 UNITS: 5000 INJECTION INTRAVENOUS; SUBCUTANEOUS at 15:39

## 2024-12-03 RX ADMIN — FUROSEMIDE 20 MG: 10 INJECTION, SOLUTION INTRAMUSCULAR; INTRAVENOUS at 09:33

## 2024-12-03 RX ADMIN — NALOXEGOL OXALATE 12.5 MG: 25 TABLET, FILM COATED ORAL at 07:22

## 2024-12-03 RX ADMIN — SODIUM CHLORIDE, PRESERVATIVE FREE 10 ML: 5 INJECTION INTRAVENOUS at 09:34

## 2024-12-03 RX ADMIN — PIPERACILLIN AND TAZOBACTAM 3375 MG: 3; .375 INJECTION, POWDER, LYOPHILIZED, FOR SOLUTION INTRAVENOUS at 15:17

## 2024-12-03 RX ADMIN — Medication: at 20:08

## 2024-12-03 RX ADMIN — AMIODARONE HYDROCHLORIDE 200 MG: 200 TABLET ORAL at 09:34

## 2024-12-03 RX ADMIN — THIAMINE HCL TAB 100 MG 100 MG: 100 TAB at 09:34

## 2024-12-03 RX ADMIN — GABAPENTIN 600 MG: 300 CAPSULE ORAL at 20:07

## 2024-12-03 RX ADMIN — LINACLOTIDE 145 MCG: 145 CAPSULE, GELATIN COATED ORAL at 07:23

## 2024-12-03 RX ADMIN — THERA TABS 1 TABLET: TAB at 09:33

## 2024-12-03 RX ADMIN — ZOLPIDEM TARTRATE 5 MG: 5 TABLET, COATED ORAL at 20:08

## 2024-12-03 RX ADMIN — GABAPENTIN 600 MG: 300 CAPSULE ORAL at 15:12

## 2024-12-03 RX ADMIN — POTASSIUM CHLORIDE 10 MEQ: 10 INJECTION, SOLUTION INTRAVENOUS at 18:54

## 2024-12-03 RX ADMIN — PIPERACILLIN AND TAZOBACTAM 3375 MG: 3; .375 INJECTION, POWDER, LYOPHILIZED, FOR SOLUTION INTRAVENOUS at 06:10

## 2024-12-03 RX ADMIN — POTASSIUM CHLORIDE 10 MEQ: 10 INJECTION, SOLUTION INTRAVENOUS at 16:01

## 2024-12-03 RX ADMIN — CALCIUM GLUCONATE 1000 MG: 98 INJECTION, SOLUTION INTRAVENOUS at 15:39

## 2024-12-03 RX ADMIN — HEPARIN SODIUM 5000 UNITS: 5000 INJECTION INTRAVENOUS; SUBCUTANEOUS at 06:05

## 2024-12-03 RX ADMIN — FUROSEMIDE 20 MG: 20 TABLET ORAL at 09:33

## 2024-12-03 RX ADMIN — BUPROPION HYDROCHLORIDE 150 MG: 100 TABLET, FILM COATED ORAL at 09:44

## 2024-12-03 RX ADMIN — HEPARIN SODIUM 5000 UNITS: 5000 INJECTION INTRAVENOUS; SUBCUTANEOUS at 20:07

## 2024-12-03 NOTE — PROGRESS NOTES
to stimuli  Following Commands: Follows one step commands with increased time;Follows one step commands consistently  Attention Span: Attends with cues to redirect  Memory: Decreased recall of recent events;Decreased short term memory  Safety Judgement: Impaired  Problem Solving: Impaired  Insights: Impaired  Initiation: Impaired  Sequencing: Impaired  Cognition Comment: Pt demo increased alertness and ability to follow commands.  increased response time. increased verbalizations  Orientation  Overall Orientation Status: Impaired  Orientation Level: Oriented to person       Exercise Treatment: pt performed 5 reps BUE shoulder pumps with mod v.cues   Pt performed 3 sets of 10 reciprocal rowing at EOB with Mod a and cues  Education Given To: Patient  Education Provided: Home Exercise Program;ADL Adaptive Strategies  Education Provided Comments: activity promotion  Education Method: Demonstration;Verbal  Barriers to Learning: Cognition;Hearing  Education Outcome: Continued education needed     Hand Dominance  Hand Dominance: Right     AM-PAC - ADL  AM-PAC Daily Activity - Inpatient   How much help is needed for putting on and taking off regular lower body clothing?: Total  How much help is needed for bathing (which includes washing, rinsing, drying)?: Total  How much help is needed for toileting (which includes using toilet, bedpan, or urinal)?: Total  How much help is needed for putting on and taking off regular upper body clothing?: A Lot  How much help is needed for taking care of personal grooming?: A Lot  How much help for eating meals?: A Lot  AM-[facility] Inpatient Daily Activity Raw Score: 9  AM-PAC Inpatient ADL T-Scale Score : 25.33  ADL Inpatient CMS 0-100% Score: 79.59  ADL Inpatient CMS G-Code Modifier : CL    Goals  Short Term Goals  Time Frame for Short Term Goals: By D/C- not met  Short Term Goal 1: Pt will complete sit to stand transfer w/ max A-ongoing  Short Term Goal 2: Pt will complete supine to sit w/  max A-ongoing  Short Term Goal 3: Pt will maintain seated balance at EOB w/ SBA for 10 mins to complete grooming task-ongoing  Patient Goals   Patient goals : get home    Therapy Time   Individual Concurrent Group Co-treatment   Time In 1321         Time Out 1345         Minutes 24             Timed Code Treatment Minutes:  24 mins     Total Treatment Minutes:  24 mins       Meena Hairston, OT

## 2024-12-03 NOTE — PROGRESS NOTES
Surgery Daily Progress Note      CC: Distension, Jackson's syndrome    SUBJECTIVE:  Difficult to arouse during rounds, does not appear to be any acute distress. Vitals stable. ABG ordered. RN states pt had BM overnight.  Continues to have episodes of hypoglycemia.    ROS:   A 14 point review of systems was conducted, significant findings as noted above. All other systems negative.      OBJECTIVE:    PHYSICAL EXAM:  Vitals:    12/02/24 2230 12/02/24 2352 12/03/24 0326 12/03/24 0515   BP: 95/60 (!) 93/54 (!) 88/59 93/61   Pulse: (!) 109 (!) 108 71    Resp: 19 20 16    Temp: 97.7 °F (36.5 °C) 98.5 °F (36.9 °C) 98 °F (36.7 °C)    TempSrc: Temporal Oral Oral    SpO2: 93% 92% 97%    Weight:       Height:           General appearance: Alert, no acute distress, well-developed, well-nourished  HEENT: Normocephalic, extraocular movements grossly intact, moist mucous membranes  Chest/Lungs: Normal inspiratory effort, symmetric chest rise, no accessory muscle use  Cardiovascular: Regular rate and rhythm  Abdomen: soft, non-tender, moderately distended, tympanic to percussion, no guarding, no rigidity. PEG tube in place  Neuro: A&Ox3, no gross motor or sensory neuro deficits  Extremities: no edema, no cyanosis      ASSESSMENT & PLAN:   Juan Mohamud is a 84 y.o. male with Hx DM, lumber DDD, arrhythmia s/p AICD, and diastolic CHF. He initially presented to [facility] on [facility] following a fall with a T11 burst fracture and associated epidural hematoma with cord compression.  Transferred to [facility] on 11/7/2024, where he underwent T8-L1 posterior decompression, fixation, and fusion on 11/11.  Progressively worsening abdominal distention since original fall requiring endoscopic decompression on 11/8.  Failed MBS, underwent PEG placement 11/26.    - Continue daily SMOG in addition to scheduled Linzess, Movantik  - Daily KUB  - Serial exams  - Aggressive electrolyte replacement  - Obtain residuals today; if <250cc, may advance TF

## 2024-12-03 NOTE — CARE COORDINATION
CM following: CM reached out to Sofie with Palliative Care who will request that Karen with Palliative Care follow up with the pt and pt's wife tomorrow to further discuss goals of care.   Per care team CM also reached out to Marina with [facility]. Marina reports that the pt meets minimum requirements for [facility], but reports that if pursued the insurance would like require a P2P.   Pt's wife no in to visit today. CM will f/u with pt's spouse tomorrow concerning discharge plans. Adele Alonzo at [facility] continues to follow for SNF level of care and would be willing to restart precert when pt more medically stable. CM will continue to follow for discharge planning.  Electronically signed by SHARLA Blum on 12/3/2024 at 4:40 PM  [phone]

## 2024-12-03 NOTE — PROGRESS NOTES
Comprehensive Nutrition Assessment    RECOMMENDATIONS:  PO Diet: Full Liquid  Nutrition Education: Education/Counseling not indicated   3.   Nutrition Support: Modify TF formula   Vital HP 1.0  at 10 ml/hr  Water 30 ml q 4 hrs  Monitor tolerance for ability to adv TF.    NUTRITION ASSESSMENT:   Nutritional summary & status: Follow-up. TF restarted at Osmolite 10 ml/hr yesterday. Pt continues with abdominal distension per RN. No BM yesterday. Continues on linzess, movantik and daily SMOG enemas. Will change formula to Vital HP (peptide- based, fiber free formula) in hopes of improved tolerance. Recommend continue at current rate of 10 ml/hr. Will continue to monitor tolerance and ability to adv TF. RN and MD notified of change in formula.   Admission // PMH: Vertebral fracture//colonic psuedoobstruction, T2DM, HLD, CHF    MALNUTRITION ASSESSMENT  Context of Malnutrition: Acute Illness   Malnutrition Status: Mild malnutrition  Findings of the 6 clinical characteristics of malnutrition (Minimum of 2 out of 6 clinical characteristics is required to make the diagnosis of moderate or severe Protein Calorie Malnutrition based on AND/ASPEN Guidelines):  Energy Intake:  50% or less of estimated energy requirements for 5 or more days  Weight Loss:  Mild weight loss (5% in 3 month period)     Body Fat Loss:  No body fat loss     Muscle Mass Loss:  No muscle mass loss    Fluid Accumulation:  No fluid accumulation     Strength:  Not Performed    NUTRITION DIAGNOSIS   Inadequate oral intake related to swallowing difficulty as evidenced by nutrition support - enteral nutrition    Nutrition Monitoring and Evaluation:   Food/Nutrient Intake Outcomes:  Enteral Nutrition Intake/Tolerance, Diet Advancement/Tolerance  Physical Signs/Symptoms Outcomes:  Biochemical Data, Nutrition Focused Physical Findings, Weight, Skin, GI Status     OBJECTIVE DATA: Significant to nutrition assessment  Nutrition Related Findings: BM x 6(12/01).Glu

## 2024-12-03 NOTE — PROGRESS NOTES
Physical Therapy    Daily Treatment Note      Discharge Recommendations:  Subacute/Skilled Nursing Facility  Equipment Needs:  Defer to next level of care    Assessment:  Pt with increased alertness, engagement and command following for therapy today.  Pt tolerated increased EOB activity well, requiring decreased assist for sitting balance.  Pt continues to be limited by gross deconditioning and decreased strength from surgery and lengthy hospitalization.  Pt from home with family and ambulatory at baseline.  Pt is well below functional baseline and would benefit from ongoing skilled PT to increase strength and maximize functional independence.    Chart Reviewed: Yes   Other position/activity restrictions: spinal precautions   Additional Pertinent Hx: Pt initally presented to [facility] ED 11/3 with a fall.  Imaging found T11 fx and transferred to [facility] for neurosurgery management.  Developed ileus and NG tube placed for decompression.  Pt to OR 11/12 for T8-L1 posterior decompression fixation and fusion.  Transferred to ICU post-op for hypotension and signficant fluid requirement.  LE doppler 11/13 (+) for acute DVT left LE.  IVC filter placed on 11/15.  PMH:  DM, degenerative lumbar disease, arrhythmia s/p AICD, diastolic CHF, and macular degeneration      Diagnosis: Epidural hematoma   Treatment Diagnosis: dec'd strength and impaired mobility      Subjective:  Pt found supine in bed.  Pt sleeping, but easily arouses.  Pt agreeable for PT.  \"I am tired.\"     Pain:  Denies pain.      Objective:    Bed mobility  Rolling left:  Max Assist (verbal cues for reaching)  Supine to sit:  Max Assist, 2 person assist (HOB raised)  Sit to Supine:  Max Assist, 2 person assist (bed flat)    Exercises  Ankle Pumps x10 B LE  LAQ x5 BLE  Comment:  seated EOB, minimal range    Neuro/balance  Pt sat x8 min for EOB activity.  Pt needing max A initially, progressing to Min A.  Pt given verbal and tactile cues for anterior weight

## 2024-12-03 NOTE — PLAN OF CARE
Problem: Chronic Conditions and Co-morbidities  Goal: Patient's chronic conditions and co-morbidity symptoms are monitored and maintained or improved  Outcome: Progressing   Reinforcement of disease process and treatment plan recommended for chronic conditions and co-morbidities.      Problem: Discharge Planning  Goal: Discharge to home or other facility with appropriate resources  Outcome: Progressing  Patient actively participates in ADL's and decision making regarding plan of care.     Problem: Safety - Adult  Goal: Free from fall injury  Outcome: Progressing  No falls/injuries this shift, bed in lowest position, brakes on, bed alarm on, call light in reach, side rails up x2.     Problem: Skin/Tissue Integrity  Goal: Absence of new skin breakdown  Description: 1.  Monitor for areas of redness and/or skin breakdown  2.  Assess vascular access sites hourly  3.  Every 4-6 hours minimum:  Change oxygen saturation probe site  4.  Every 4-6 hours:  If on nasal continuous positive airway pressure, respiratory therapy assess nares and determine need for appliance change or resting period.  Outcome: Progressing  No new skin breakdown noted, no new signs/symptoms of infection, continue to monitor labwork including WBC, medications administered per physician orders.     Problem: ABCDS Injury Assessment  Goal: Absence of physical injury  Outcome: Progressing     Problem: Pain  Goal: Verbalizes/displays adequate comfort level or baseline comfort level  Outcome: Progressing  No new signs/symptoms of pain noted, pain rating < 3 on scale 0-10, pain controlled with medication/repositioning.     Problem: Respiratory - Adult  Goal: Achieves optimal ventilation and oxygenation  Outcome: Progressing     Problem: Cardiovascular - Adult  Goal: Maintains optimal cardiac output and hemodynamic stability  Outcome: Progressing     Problem: Metabolic/Fluid and Electrolytes - Adult  Goal: Electrolytes maintained within normal

## 2024-12-03 NOTE — PROGRESS NOTES
Peripheral IV (22G 1.75\") successfully placed with ultrasound guidance to left lower arm after 1 attempt. Flushed with normal saline. Blood return noted. Line capped. Patient tolerated well.

## 2024-12-03 NOTE — PROGRESS NOTES
V2.0    [facility] [place]      Name:  Juan Mohamud /Age/Sex: 1940  (84 y.o. male)   MRN & CSN:  [id number] & [id number] Encounter Date/Time: 12/3/2024 5:03 PM EST   Location:  [facility]- PCP: Janet Garcia MD     Attending:Fred Akers MD       Hospital Day: 27    Assessment and Recommendations       Assessment/Plan:      [place] syndrome:  Reviewed note from gen surg  Dialy KUB  Monitor lytes  Monitor residuals < 250    Aspiration PNA::  Completed course of IV Unasyn therapy    Hypoxia:  Continue to wean    PE/DVT:  S/p IVC filter  Not a good candidate for anticoagulation due to epidural hematoma    T10 vertebral body unstable fracture  T11 nondisplaced spinous process fracture  T6 vertebral body subacute compression fracture  Epidural spinal hematoma   Spinal cord compression at T11:  Reviewed last note from NS  No brace needed    Dispo: Discussed with CM see if LTAC is an option      MDM       MDM  [x] High (any 2 of A, B, or C)    A. Problems (any 1)  [x] Acute/Chronic Illness/injury posing threat to life or bodily function:    [] Severe exacerbation of chronic illness:    ---------------------------------------------------------------------  B. Risk of Treatment (any 1)   [] IV ABX requiring serial renal monitoring for nephrotoxicity:     [] IV Narcotic analgesia for adverse drug reaction  [] IV diuresis requiring serial monitoring for renal impairment and electrolyte derangements  [] Critical electrolyte abnormalities requiring IV replacement and close serial monitoring  [] Insulin - monitoring serial FSBS for Hypoglycemic adverse drug reaction  [] Anticoagulation requiring serial monitoring of coagulation factors  [] IV/IM Controlled Substances order (any 1)   [] One-time Order including Drug Name/Route, Reason Ordered:   [] Scheduled Order including Drug Name/Route, Reason Ordered or Continued:   [] PRN Order including Drug Name/Route, Reason Ordered or  is pulmonary edema and an increased moderate right pleural effusion. Left subclavian approach cardiac device and thoracolumbar fusion are noted. There is no pneumothorax. There is stable mild cardiomegaly.     Pulmonary edema and increased moderate right pleural effusion. Electronically signed by Jeramy Munroe    XR ABDOMEN (KUB) (SINGLE AP VIEW)    Result Date: 11/27/2024  EXAM: XR ABDOMEN (KUB) (SINGLE AP VIEW) CLINICAL  INDICATION: Abdominal pain. COMPARISON: None. TECHNIQUE: XR ABDOMEN (KUB) (SINGLE AP VIEW) FINDINGS/    Gaseous distended loops loops of nonspecific bowel are seen within the abdomen and pelvis. There is an IVC filter. Hardware in the spine. Findings may represent ileus, constipation or developing obstruction. Electronically signed by Obed Quispe      CBC:   Recent Labs     12/01/24  1324 12/03/24  1306   WBC 10.0 10.7   HGB 11.6* 10.9*    298     BMP:    Recent Labs     12/01/24  1324 12/03/24  1306    135*   K 3.2* 2.9*   CL 97* 96*   CO2 28 28   BUN 26* 28*   CREATININE 1.0 1.2   GLUCOSE 81 98     Hepatic:   Recent Labs     12/01/24  1324   AST 38*   ALT 17   BILITOT 0.8   ALKPHOS 199*     Lipids:   Lab Results   Component Value Date/Time    CHOL 114 07/24/2024 05:04 AM    HDL 57 07/24/2024 05:04 AM    TRIG 48 07/24/2024 05:04 AM     Hemoglobin A1C:   Lab Results   Component Value Date/Time    LABA1C 5.1 11/04/2024 03:50 AM     TSH:   Lab Results   Component Value Date/Time    TSH 6.59 11/04/2024 03:50 AM    TSH 2.96 02/28/2024 10:44 PM     Troponin: No results found for: \"TROPONINT\"  Lactic Acid: No results for input(s): \"LACTA\" in the last 72 hours.  BNP:   Recent Labs     11/30/24  2204   PROBNP 18,992*     UA:  Lab Results   Component Value Date/Time    NITRU Negative 12/01/2024 04:43 PM    COLORU Yellow 12/01/2024 04:43 PM    PHUR 6.0 12/01/2024 04:43 PM    PHUR 5.0 03/27/2024 12:46 PM    WBCUA None seen 12/01/2024 04:43 PM    RBCUA 3-4 12/01/2024 04:43 PM    MUCUS 1+ 12/01/2024

## 2024-12-03 NOTE — PROGRESS NOTES
Pt. Alert to place/self, disoriented to time. VSS, with exception to low BP, 1 liter nasal canula. Pt. Lethargic. Pt. Agitated, pulling at lines. Tolerating 10mL.hr TF. Voiding adequately. All safety precautions in place, call light/items in reach.

## 2024-12-04 ENCOUNTER — APPOINTMENT (OUTPATIENT)
Dept: GENERAL RADIOLOGY | Age: 84
DRG: 447 | End: 2024-12-04
Attending: INTERNAL MEDICINE
Payer: MEDICARE

## 2024-12-04 ENCOUNTER — ANESTHESIA EVENT (OUTPATIENT)
Dept: OPERATING ROOM | Age: 84
End: 2024-12-04
Payer: MEDICARE

## 2024-12-04 ENCOUNTER — ANESTHESIA (OUTPATIENT)
Dept: OPERATING ROOM | Age: 84
End: 2024-12-04
Payer: MEDICARE

## 2024-12-04 ENCOUNTER — APPOINTMENT (OUTPATIENT)
Dept: CT IMAGING | Age: 84
DRG: 447 | End: 2024-12-04
Attending: INTERNAL MEDICINE
Payer: MEDICARE

## 2024-12-04 PROBLEM — K94.23 GASTROSTOMY SITE LEAK (HCC): Status: ACTIVE | Noted: 2024-12-04

## 2024-12-04 PROBLEM — K65.1 INTRAPERITONEAL ABSCESS (HCC): Status: ACTIVE | Noted: 2024-12-04

## 2024-12-04 LAB
ABO + RH BLD: NORMAL
ALBUMIN SERPL-MCNC: 2.3 G/DL (ref 3.4–5)
ALBUMIN SERPL-MCNC: 2.4 G/DL (ref 3.4–5)
ALBUMIN SERPL-MCNC: 2.5 G/DL (ref 3.4–5)
ANION GAP SERPL CALCULATED.3IONS-SCNC: 13 MMOL/L (ref 3–16)
ANION GAP SERPL CALCULATED.3IONS-SCNC: 13 MMOL/L (ref 3–16)
ANION GAP SERPL CALCULATED.3IONS-SCNC: 16 MMOL/L (ref 3–16)
BACTERIA BLD CULT ORG #2: NORMAL
BACTERIA BLD CULT: NORMAL
BASOPHILS # BLD: 0.1 K/UL (ref 0–0.2)
BASOPHILS NFR BLD: 1 %
BLD GP AB SCN SERPL QL: NORMAL
BUN SERPL-MCNC: 29 MG/DL (ref 7–20)
BUN SERPL-MCNC: 31 MG/DL (ref 7–20)
BUN SERPL-MCNC: 31 MG/DL (ref 7–20)
CALCIUM SERPL-MCNC: 8 MG/DL (ref 8.3–10.6)
CALCIUM SERPL-MCNC: 8.1 MG/DL (ref 8.3–10.6)
CALCIUM SERPL-MCNC: 8.1 MG/DL (ref 8.3–10.6)
CHLORIDE SERPL-SCNC: 96 MMOL/L (ref 99–110)
CHLORIDE SERPL-SCNC: 96 MMOL/L (ref 99–110)
CHLORIDE SERPL-SCNC: 97 MMOL/L (ref 99–110)
CO2 SERPL-SCNC: 22 MMOL/L (ref 21–32)
CO2 SERPL-SCNC: 25 MMOL/L (ref 21–32)
CO2 SERPL-SCNC: 25 MMOL/L (ref 21–32)
CREAT SERPL-MCNC: 1.2 MG/DL (ref 0.8–1.3)
DEPRECATED RDW RBC AUTO: 20.3 % (ref 12.4–15.4)
EOSINOPHIL # BLD: 0.2 K/UL (ref 0–0.6)
EOSINOPHIL NFR BLD: 1.5 %
GFR SERPLBLD CREATININE-BSD FMLA CKD-EPI: 60 ML/MIN/{1.73_M2}
GLUCOSE BLD-MCNC: 101 MG/DL (ref 70–99)
GLUCOSE BLD-MCNC: 83 MG/DL (ref 70–99)
GLUCOSE BLD-MCNC: 99 MG/DL (ref 70–99)
GLUCOSE SERPL-MCNC: 87 MG/DL (ref 70–99)
GLUCOSE SERPL-MCNC: 91 MG/DL (ref 70–99)
GLUCOSE SERPL-MCNC: 94 MG/DL (ref 70–99)
HCT VFR BLD AUTO: 36.8 % (ref 40.5–52.5)
HGB BLD-MCNC: 11.7 G/DL (ref 13.5–17.5)
INR PPP: 1.2 (ref 0.85–1.15)
LACTATE BLDV-SCNC: 1.6 MMOL/L (ref 0.4–2)
LYMPHOCYTES # BLD: 0.9 K/UL (ref 1–5.1)
LYMPHOCYTES NFR BLD: 8.6 %
MAGNESIUM SERPL-MCNC: 2.1 MG/DL (ref 1.8–2.4)
MAGNESIUM SERPL-MCNC: 2.1 MG/DL (ref 1.8–2.4)
MAGNESIUM SERPL-MCNC: 2.14 MG/DL (ref 1.8–2.4)
MCH RBC QN AUTO: 29.5 PG (ref 26–34)
MCHC RBC AUTO-ENTMCNC: 31.7 G/DL (ref 31–36)
MCV RBC AUTO: 93.2 FL (ref 80–100)
MONOCYTES # BLD: 1.1 K/UL (ref 0–1.3)
MONOCYTES NFR BLD: 11.1 %
NEUTROPHILS # BLD: 8 K/UL (ref 1.7–7.7)
NEUTROPHILS NFR BLD: 77.8 %
PERFORMED ON: ABNORMAL
PERFORMED ON: NORMAL
PERFORMED ON: NORMAL
PHOSPHATE SERPL-MCNC: 3.7 MG/DL (ref 2.5–4.9)
PHOSPHATE SERPL-MCNC: 3.8 MG/DL (ref 2.5–4.9)
PHOSPHATE SERPL-MCNC: 3.9 MG/DL (ref 2.5–4.9)
PLATELET # BLD AUTO: 284 K/UL (ref 135–450)
PMV BLD AUTO: 8.7 FL (ref 5–10.5)
POTASSIUM SERPL-SCNC: 3.5 MMOL/L (ref 3.5–5.1)
POTASSIUM SERPL-SCNC: 3.6 MMOL/L (ref 3.5–5.1)
POTASSIUM SERPL-SCNC: 3.8 MMOL/L (ref 3.5–5.1)
PROTHROMBIN TIME: 15.4 SEC (ref 11.9–14.9)
RBC # BLD AUTO: 3.95 M/UL (ref 4.2–5.9)
SODIUM SERPL-SCNC: 134 MMOL/L (ref 136–145)
SODIUM SERPL-SCNC: 134 MMOL/L (ref 136–145)
SODIUM SERPL-SCNC: 135 MMOL/L (ref 136–145)
WBC # BLD AUTO: 10.3 K/UL (ref 4–11)

## 2024-12-04 PROCEDURE — 85610 PROTHROMBIN TIME: CPT

## 2024-12-04 PROCEDURE — 2580000003 HC RX 258: Performed by: SURGERY

## 2024-12-04 PROCEDURE — 6370000000 HC RX 637 (ALT 250 FOR IP): Performed by: STUDENT IN AN ORGANIZED HEALTH CARE EDUCATION/TRAINING PROGRAM

## 2024-12-04 PROCEDURE — 74018 RADEX ABDOMEN 1 VIEW: CPT

## 2024-12-04 PROCEDURE — 6370000000 HC RX 637 (ALT 250 FOR IP): Performed by: NURSE PRACTITIONER

## 2024-12-04 PROCEDURE — 6370000000 HC RX 637 (ALT 250 FOR IP)

## 2024-12-04 PROCEDURE — 2500000003 HC RX 250 WO HCPCS: Performed by: ANESTHESIOLOGY

## 2024-12-04 PROCEDURE — 3700000000 HC ANESTHESIA ATTENDED CARE: Performed by: SURGERY

## 2024-12-04 PROCEDURE — 36415 COLL VENOUS BLD VENIPUNCTURE: CPT

## 2024-12-04 PROCEDURE — 6360000002 HC RX W HCPCS: Performed by: ANESTHESIOLOGY

## 2024-12-04 PROCEDURE — 86901 BLOOD TYPING SEROLOGIC RH(D): CPT

## 2024-12-04 PROCEDURE — 3600000004 HC SURGERY LEVEL 4 BASE: Performed by: SURGERY

## 2024-12-04 PROCEDURE — 71045 X-RAY EXAM CHEST 1 VIEW: CPT

## 2024-12-04 PROCEDURE — 03HB33Z INSERTION OF INFUSION DEVICE INTO RIGHT RADIAL ARTERY, PERCUTANEOUS APPROACH: ICD-10-PCS | Performed by: INTERNAL MEDICINE

## 2024-12-04 PROCEDURE — 43840 GSTRRPHY SUTR DUOL/GSTR ULCR: CPT | Performed by: SURGERY

## 2024-12-04 PROCEDURE — 83605 ASSAY OF LACTIC ACID: CPT

## 2024-12-04 PROCEDURE — 2709999900 HC NON-CHARGEABLE SUPPLY: Performed by: SURGERY

## 2024-12-04 PROCEDURE — 6370000000 HC RX 637 (ALT 250 FOR IP): Performed by: INTERNAL MEDICINE

## 2024-12-04 PROCEDURE — 7100000000 HC PACU RECOVERY - FIRST 15 MIN: Performed by: SURGERY

## 2024-12-04 PROCEDURE — 6360000002 HC RX W HCPCS

## 2024-12-04 PROCEDURE — 80069 RENAL FUNCTION PANEL: CPT

## 2024-12-04 PROCEDURE — 0DW60UZ REVISION OF FEEDING DEVICE IN STOMACH, OPEN APPROACH: ICD-10-PCS | Performed by: SURGERY

## 2024-12-04 PROCEDURE — 86850 RBC ANTIBODY SCREEN: CPT

## 2024-12-04 PROCEDURE — 3700000001 HC ADD 15 MINUTES (ANESTHESIA): Performed by: SURGERY

## 2024-12-04 PROCEDURE — 2580000003 HC RX 258: Performed by: STUDENT IN AN ORGANIZED HEALTH CARE EDUCATION/TRAINING PROGRAM

## 2024-12-04 PROCEDURE — 2500000003 HC RX 250 WO HCPCS

## 2024-12-04 PROCEDURE — 6360000002 HC RX W HCPCS: Performed by: STUDENT IN AN ORGANIZED HEALTH CARE EDUCATION/TRAINING PROGRAM

## 2024-12-04 PROCEDURE — 6360000002 HC RX W HCPCS: Performed by: SURGERY

## 2024-12-04 PROCEDURE — 49020 DRAINAGE ABDOM ABSCESS OPEN: CPT | Performed by: SURGERY

## 2024-12-04 PROCEDURE — 7100000001 HC PACU RECOVERY - ADDTL 15 MIN: Performed by: SURGERY

## 2024-12-04 PROCEDURE — P9045 ALBUMIN (HUMAN), 5%, 250 ML: HCPCS

## 2024-12-04 PROCEDURE — 94761 N-INVAS EAR/PLS OXIMETRY MLT: CPT

## 2024-12-04 PROCEDURE — A4217 STERILE WATER/SALINE, 500 ML: HCPCS | Performed by: SURGERY

## 2024-12-04 PROCEDURE — 97606 NEG PRS WND THER DME>50 SQCM: CPT | Performed by: SURGERY

## 2024-12-04 PROCEDURE — 86900 BLOOD TYPING SEROLOGIC ABO: CPT

## 2024-12-04 PROCEDURE — 83735 ASSAY OF MAGNESIUM: CPT

## 2024-12-04 PROCEDURE — 2700000000 HC OXYGEN THERAPY PER DAY

## 2024-12-04 PROCEDURE — 0W9G0ZZ DRAINAGE OF PERITONEAL CAVITY, OPEN APPROACH: ICD-10-PCS | Performed by: SURGERY

## 2024-12-04 PROCEDURE — 85025 COMPLETE CBC W/AUTO DIFF WBC: CPT

## 2024-12-04 PROCEDURE — 2580000003 HC RX 258

## 2024-12-04 PROCEDURE — 2060000000 HC ICU INTERMEDIATE R&B

## 2024-12-04 PROCEDURE — 2580000003 HC RX 258: Performed by: ANESTHESIOLOGY

## 2024-12-04 PROCEDURE — 3600000014 HC SURGERY LEVEL 4 ADDTL 15MIN: Performed by: SURGERY

## 2024-12-04 PROCEDURE — 74176 CT ABD & PELVIS W/O CONTRAST: CPT

## 2024-12-04 RX ORDER — ONDANSETRON 2 MG/ML
INJECTION INTRAMUSCULAR; INTRAVENOUS
Status: DISCONTINUED | OUTPATIENT
Start: 2024-12-04 | End: 2024-12-04 | Stop reason: SDUPTHER

## 2024-12-04 RX ORDER — LIDOCAINE HYDROCHLORIDE 20 MG/ML
INJECTION, SOLUTION EPIDURAL; INFILTRATION; INTRACAUDAL; PERINEURAL
Status: DISCONTINUED | OUTPATIENT
Start: 2024-12-04 | End: 2024-12-04 | Stop reason: SDUPTHER

## 2024-12-04 RX ORDER — BUPIVACAINE HYDROCHLORIDE 5 MG/ML
INJECTION, SOLUTION EPIDURAL; INTRACAUDAL PRN
Status: DISCONTINUED | OUTPATIENT
Start: 2024-12-04 | End: 2024-12-04 | Stop reason: HOSPADM

## 2024-12-04 RX ORDER — MAGNESIUM HYDROXIDE 1200 MG/15ML
LIQUID ORAL CONTINUOUS PRN
Status: DISCONTINUED | OUTPATIENT
Start: 2024-12-04 | End: 2024-12-04 | Stop reason: HOSPADM

## 2024-12-04 RX ORDER — FENTANYL CITRATE 50 UG/ML
INJECTION, SOLUTION INTRAMUSCULAR; INTRAVENOUS
Status: DISCONTINUED | OUTPATIENT
Start: 2024-12-04 | End: 2024-12-04 | Stop reason: SDUPTHER

## 2024-12-04 RX ORDER — CALCIUM CHLORIDE 100 MG/ML
INJECTION INTRAVENOUS; INTRAVENTRICULAR
Status: DISCONTINUED | OUTPATIENT
Start: 2024-12-04 | End: 2024-12-04 | Stop reason: SDUPTHER

## 2024-12-04 RX ORDER — SODIUM CHLORIDE, SODIUM LACTATE, POTASSIUM CHLORIDE, CALCIUM CHLORIDE 600; 310; 30; 20 MG/100ML; MG/100ML; MG/100ML; MG/100ML
INJECTION, SOLUTION INTRAVENOUS
Status: DISCONTINUED | OUTPATIENT
Start: 2024-12-04 | End: 2024-12-04 | Stop reason: SDUPTHER

## 2024-12-04 RX ORDER — ALBUMIN HUMAN 50 G/1000ML
SOLUTION INTRAVENOUS
Status: DISCONTINUED | OUTPATIENT
Start: 2024-12-04 | End: 2024-12-04 | Stop reason: SDUPTHER

## 2024-12-04 RX ORDER — SODIUM CHLORIDE 9 MG/ML
INJECTION, SOLUTION INTRAVENOUS
Status: DISCONTINUED | OUTPATIENT
Start: 2024-12-04 | End: 2024-12-04 | Stop reason: SDUPTHER

## 2024-12-04 RX ORDER — CEFAZOLIN SODIUM 1 G/3ML
INJECTION, POWDER, FOR SOLUTION INTRAMUSCULAR; INTRAVENOUS
Status: DISCONTINUED | OUTPATIENT
Start: 2024-12-04 | End: 2024-12-04 | Stop reason: SDUPTHER

## 2024-12-04 RX ORDER — ETOMIDATE 2 MG/ML
INJECTION INTRAVENOUS
Status: DISCONTINUED | OUTPATIENT
Start: 2024-12-04 | End: 2024-12-04 | Stop reason: SDUPTHER

## 2024-12-04 RX ORDER — ROCURONIUM BROMIDE 10 MG/ML
INJECTION, SOLUTION INTRAVENOUS
Status: DISCONTINUED | OUTPATIENT
Start: 2024-12-04 | End: 2024-12-04 | Stop reason: SDUPTHER

## 2024-12-04 RX ADMIN — SUGAMMADEX 200 MG: 100 INJECTION, SOLUTION INTRAVENOUS at 21:14

## 2024-12-04 RX ADMIN — CALCIUM CHLORIDE 0.5 G: 100 INJECTION, SOLUTION INTRAVENOUS at 19:53

## 2024-12-04 RX ADMIN — Medication: at 08:19

## 2024-12-04 RX ADMIN — CEFAZOLIN SODIUM 2 G: 1 POWDER, FOR SOLUTION INTRAMUSCULAR; INTRAVENOUS at 19:37

## 2024-12-04 RX ADMIN — FUROSEMIDE 20 MG: 20 TABLET ORAL at 08:20

## 2024-12-04 RX ADMIN — PIPERACILLIN AND TAZOBACTAM 3375 MG: 3; .375 INJECTION, POWDER, LYOPHILIZED, FOR SOLUTION INTRAVENOUS at 07:23

## 2024-12-04 RX ADMIN — PHENYLEPHRINE HYDROCHLORIDE 200 MCG: 10 INJECTION, SOLUTION INTRAVENOUS at 19:31

## 2024-12-04 RX ADMIN — GABAPENTIN 600 MG: 300 CAPSULE ORAL at 08:20

## 2024-12-04 RX ADMIN — PANTOPRAZOLE SODIUM 40 MG: 40 INJECTION, POWDER, FOR SOLUTION INTRAVENOUS at 08:19

## 2024-12-04 RX ADMIN — NALOXEGOL OXALATE 12.5 MG: 25 TABLET, FILM COATED ORAL at 06:15

## 2024-12-04 RX ADMIN — PHENYLEPHRINE HYDROCHLORIDE 100 MCG: 10 INJECTION, SOLUTION INTRAVENOUS at 19:24

## 2024-12-04 RX ADMIN — HEPARIN SODIUM 5000 UNITS: 5000 INJECTION INTRAVENOUS; SUBCUTANEOUS at 15:10

## 2024-12-04 RX ADMIN — BUPROPION HYDROCHLORIDE 150 MG: 100 TABLET, FILM COATED ORAL at 08:20

## 2024-12-04 RX ADMIN — ONDANSETRON 4 MG: 2 INJECTION INTRAMUSCULAR; INTRAVENOUS at 20:11

## 2024-12-04 RX ADMIN — AMIODARONE HYDROCHLORIDE 200 MG: 200 TABLET ORAL at 08:20

## 2024-12-04 RX ADMIN — PHENYLEPHRINE HYDROCHLORIDE 40 MCG/MIN: 10 INJECTION, SOLUTION INTRAVENOUS at 19:41

## 2024-12-04 RX ADMIN — LIDOCAINE HYDROCHLORIDE 80 MG: 20 INJECTION, SOLUTION EPIDURAL; INFILTRATION; INTRACAUDAL; PERINEURAL at 19:20

## 2024-12-04 RX ADMIN — CALCIUM CHLORIDE 0.5 G: 100 INJECTION, SOLUTION INTRAVENOUS at 19:46

## 2024-12-04 RX ADMIN — PIPERACILLIN AND TAZOBACTAM 3375 MG: 3; .375 INJECTION, POWDER, LYOPHILIZED, FOR SOLUTION INTRAVENOUS at 14:54

## 2024-12-04 RX ADMIN — SODIUM CHLORIDE: 9 INJECTION, SOLUTION INTRAVENOUS at 19:12

## 2024-12-04 RX ADMIN — ROCURONIUM BROMIDE 100 MG: 10 INJECTION, SOLUTION INTRAVENOUS at 19:20

## 2024-12-04 RX ADMIN — ETOMIDATE 8 MG: 2 INJECTION, SOLUTION INTRAVENOUS at 19:20

## 2024-12-04 RX ADMIN — LINACLOTIDE 145 MCG: 145 CAPSULE, GELATIN COATED ORAL at 06:18

## 2024-12-04 RX ADMIN — ALBUMIN (HUMAN) 12.5 G: 12.5 SOLUTION INTRAVENOUS at 19:20

## 2024-12-04 RX ADMIN — SODIUM CHLORIDE, SODIUM LACTATE, POTASSIUM CHLORIDE, AND CALCIUM CHLORIDE: .6; .31; .03; .02 INJECTION, SOLUTION INTRAVENOUS at 19:12

## 2024-12-04 RX ADMIN — THERA TABS 1 TABLET: TAB at 08:20

## 2024-12-04 RX ADMIN — HEPARIN SODIUM 5000 UNITS: 5000 INJECTION INTRAVENOUS; SUBCUTANEOUS at 06:16

## 2024-12-04 RX ADMIN — FENTANYL CITRATE 50 MCG: 50 INJECTION, SOLUTION INTRAMUSCULAR; INTRAVENOUS at 19:53

## 2024-12-04 RX ADMIN — FUROSEMIDE 20 MG: 10 INJECTION, SOLUTION INTRAMUSCULAR; INTRAVENOUS at 08:19

## 2024-12-04 RX ADMIN — SUGAMMADEX 200 MG: 100 INJECTION, SOLUTION INTRAVENOUS at 20:38

## 2024-12-04 RX ADMIN — MINERAL OIL 330 ML: 999 LIQUID ORAL at 08:22

## 2024-12-04 RX ADMIN — FENTANYL CITRATE 50 MCG: 50 INJECTION, SOLUTION INTRAMUSCULAR; INTRAVENOUS at 19:48

## 2024-12-04 RX ADMIN — MIDODRINE HYDROCHLORIDE 25 MG: 5 TABLET ORAL at 03:16

## 2024-12-04 RX ADMIN — THIAMINE HCL TAB 100 MG 100 MG: 100 TAB at 08:20

## 2024-12-04 RX ADMIN — PHENYLEPHRINE HYDROCHLORIDE 100 MCG: 10 INJECTION, SOLUTION INTRAVENOUS at 19:20

## 2024-12-04 RX ADMIN — MIDODRINE HYDROCHLORIDE 25 MG: 5 TABLET ORAL at 10:03

## 2024-12-04 ASSESSMENT — PAIN SCALES - GENERAL
PAINLEVEL_OUTOF10: 0
PAINLEVEL_OUTOF10: 0

## 2024-12-04 NOTE — PROGRESS NOTES
Pt. Alert to self/place, low BP, 1 liter NC. Pt. Lethargic/weak. Tolerates sips of water PO and 10mL TF, prior to NPO order. Plan to go to surgery tonight. Loose BM x4. Turn/reposition q 2 hr. Pt. Bathed, all wounds cleansed/ dressing changed PRN. All safety precautions in place, call light/items in reach.

## 2024-12-04 NOTE — PROGRESS NOTES
V2.0    [facility] [place] Progress Note      Name:  Juan Mohamud /Age/Sex: 1940  (84 y.o. male)   MRN & CSN:  [id number] & [id number] Encounter Date/Time: 2024 5:03 PM EST   Location:  [address] [place] [address] PCP: Janet Garcia MD     Attending:Fred Akers MD       Hospital Day: 28    Assessment and Recommendations       Assessment/Plan:      [place] syndrome:  Reviewed note from gen surg  Dialy KUB  Monitor lytes  Monitor residuals < 250  Discussed with GI  Reviewed note from GI  Adjusting current medication  Increase rate of tube feed to 25    Hypoxia:  Continue to wean  Stable at this time    PE/DVT:  S/p IVC filter  Not a ggod candidate for anticoagulation due to epidural hematoma    T10 vertebral body unstable fracture  T11 nondisplaced spinous process fracture  T6 vertebral body subacute compression fracture  Epidural spinal hematoma   Spinal cord compression at T11:  Reviewed last note from NS  No brace needed    Aspiration PNA::  Completed course of IV Unasyn therapy    Dispo: Discussed with CM patient to be considered for LTAC      MDM       MDM  [x] High (any 2 of A, B, or C)    A. Problems (any 1)  [x] Acute/Chronic Illness/injury posing threat to life or bodily function:    [] Severe exacerbation of chronic illness:    ---------------------------------------------------------------------  B. Risk of Treatment (any 1)   [] IV ABX requiring serial renal monitoring for nephrotoxicity:     [] IV Narcotic analgesia for adverse drug reaction  [] IV diuresis requiring serial monitoring for renal impairment and electrolyte derangements  [] Critical electrolyte abnormalities requiring IV replacement and close serial monitoring  [] Insulin - monitoring serial FSBS for Hypoglycemic adverse drug reaction  [] Anticoagulation requiring serial monitoring of coagulation factors  [] IV/IM Controlled Substances order (any 1)   [] One-time Order including Drug Name/Route, Reason Ordered:   []

## 2024-12-04 NOTE — PROGRESS NOTES
Palliative Care Chart Review  and Check in Note:     NAME:  Juan Mohamud  Admit Date: 11/7/2024  Hospital Day:  Hospital Day: 28   Current Code status: Full Code    Palliative care is continuing to following Mr. Mohamud for symptom management,  and goals of care discussion as needed. Patient's chart reviewed today 12/4/24.      Spoke with pt's wife along with RYAN Umaña, pt's wife Lizzie is understandably distressed by pt's prolonged illness and seeming lack of significant improvement. She does remain hopeful that he can recover, however she worries about him. She would be open to LTACH at discharge if he is accepted.       The following are the currently established goals/code status, and Symptom management.     Goals of care: Pt was clear earlier in his admission that his goals are life prolonging and rehabilitative, and that he would want his wife to make decisions if he could not make decisions for himself. D/w his wife today.     Code status: Full Code    Discharge plan: RYAN looking at LTACH      TIP Parra CNP  12/04/24  12:40 PM

## 2024-12-04 NOTE — CARE COORDINATION
UPDATE: CM received a VM from Nile with [employer] requesting and update on discharge planning and offering discharge planning assistance. CM returned Nile's call at [phone] and left a VM updating on discharge plan - requesting prior authorization for LTACH level of care. CM will continue to follow for discharge planning.  Electronically signed by SHARLA Blum on 12/4/2024 at 4:27 PM  [phone]    CM following: CM met with pt's spouse, Lizzie, with attending physician, Dr. Akers, and Palliative Care NP, Karen. CM discussed discharge options including [facility] for LTACH level of care. Spouse open to a referral to [facility] and CM reach out to [facility] with [facility] who met with pt and spouse at bedside. Spouse agreeable to [facility] and Marina has submitted precert today. CM will continue to follow for discharge planning.  Electronically signed by SHARLA Blum on 12/4/2024 at 1:33 PM  [phone]

## 2024-12-04 NOTE — PROGRESS NOTES
Surgery Daily Progress Note      CC: Distension, [place]'s syndrome    SUBJECTIVE:  NAEON. Patient was easier to arouse on rounds this morning. RN reported two bowel movements overnight where one was significantly larger. He is tolerating TF at 10.     ROS:   A 14 point review of systems was conducted, significant findings as noted above. All other systems negative.      OBJECTIVE:    PHYSICAL EXAM:  Vitals:    12/03/24 1542 12/03/24 2000 12/04/24 0008 12/04/24 0354   BP: (!) 86/55 92/64 97/61 103/73   Pulse: (!) 108 (!) 107 (!) 105 (!) 103   Resp: 18 17 18 16   Temp: 97.9 °F (36.6 °C) 97.5 °F (36.4 °C) 97.6 °F (36.4 °C) 97.5 °F (36.4 °C)   TempSrc: Oral Oral Oral Temporal   SpO2: 94% 93% 96% 97%   Weight:       Height:           General appearance: Alert, no acute distress, well-nourished  HEENT: Normocephalic, extraocular movements grossly intact, moist mucous membranes  Chest/Lungs: Normal inspiratory effort, symmetric chest rise, no accessory muscle use  Cardiovascular: Regular rate and rhythm  Abdomen: soft, non-tender, distention improved however still moderate distention, no guarding, no rigidity. PEG tube in place  Neuro: A&Ox3, no gross motor or sensory neuro deficits  Extremities: no edema, no cyanosis      ASSESSMENT & PLAN:   Juan Mohamud is a 84 y.o. male with Hx DM, lumber DDD, arrhythmia s/p AICD, and diastolic CHF. He initially presented to [facility] on 11/3/24 following a fall with a T11 burst fracture and associated epidural hematoma with cord compression.  Transferred to [facility] on 11/7/2024, where he underwent T8-L1 posterior decompression, fixation, and fusion on 11/11.  Progressively worsening abdominal distention since original fall requiring endoscopic decompression on 11/8. Failed MBS, underwent PEG placement 11/26.    - Continue daily SMOG in addition to scheduled Linzess, Movantik  - Daily KUB  - Serial abdominal exams  - Aggressive electrolyte replacement  - TF at 10, residuals <250.

## 2024-12-04 NOTE — PROGRESS NOTES
Nutrition Assessment for Enteral Feeding      Checking gastric residuals in the absence of signs of intolerance is no longer recommended, holding EN for GRVs <500 mL in the absence of signs of intolerance should be avoided. Gastric residuals do not correlate with incidences of pneumonia, regurgitation, or aspiration. (ASPEN, 2016)      RECOMMENDATION:  PO diet: Full liquid  Nutrition Support: Advance from 10 ml/hr to 25 ml/hr  Vital HP 1.0 @ 10 ml/hr  Flushes: 30 q4    NUTRITION ASSESSMENT:   Nutritional summary & status: TF formula modified to peptide based yesterday in hopes of improved tolerance. Currently on a peptide based fiber free formula (previous formula was also fiber free). Residuals being checked which is not indicated. Per EMR, 10ml residuals yesterday (1/3 ounce), but pt only received 43 ml of TF (if started yesterday at 900 when order was placed, should have received 270 ml total). Loose BM x2 so far today. KUB today with no new results, shows diffuse colonic distention, consistent with known [place]'s syndrome. Continues w/ daily SMOG, Movantik, and Linzess, GI to increase doses per recent note. Messaged surgery, okay to advance to 25 ml/hr today. Primary in agreement. Remains a full code, looking to d/c to LTACH.     Admission/PMH: Vertebral fracture//colonic psuedoobstruction, T2DM, HLD, CHF    MALNUTRITION ASSESSMENT  Acute Illness  Malnutrition Status: Mild malnutrition  Findings of the 6 clinical characteristics of malnutrition:  Energy Intake:  50% or less of estimated energy requirements for 5 or more days  Weight Loss:  Mild weight loss (5% in 3 month period)     Body Fat Loss:  No body fat loss     Muscle Mass Loss:  No muscle mass loss    Fluid Accumulation:  No fluid accumulation     Strength:  Not Performed    CURRENT NUTRITION THERAPIES  ADULT DIET; Full Liquid  ADULT TUBE FEEDING; PEG; Peptide Based High Protein; Continuous; 10; No; 30; Q 4 hours  Current Tube Feeding (TF)

## 2024-12-04 NOTE — PROGRESS NOTES
GI Progress Note      Juan Mohamud is a 84 y.o. male patient.  1. SVT (supraventricular tachycardia) (HCC)    2. Hypotension, unspecified hypotension type        Admit Date: 11/7/2024    Subjective:       Asked to evaluated patient for abd distention. I saw him several weeks ago. Abdomen the same. No pain. Having BMs. He tells me this is how his abdomen has been \"for years\"      ROS:  As per above    Scheduled Meds:   [START ON 12/5/2024] naloxegol  25 mg Oral QAM AC    [START ON 12/5/2024] linaclotide  290 mcg Oral QAM AC    sodium chloride flush  5-40 mL IntraVENous 2 times per day    lidocaine 1 % injection  50 mg IntraDERmal Once    SMOG Enema  330 mL Rectal Daily    furosemide  20 mg IntraVENous BID    piperacillin-tazobactam  4,500 mg IntraVENous Once    Followed by    piperacillin-tazobactam  3,375 mg IntraVENous Q8H    furosemide  20 mg Oral BID    midodrine  25 mg Oral q8h    multivitamin  1 tablet Oral Daily    thiamine  100 mg Oral Daily    zolpidem  5 mg Oral Nightly    buPROPion  150 mg Per NG tube BID    [Held by provider] metoprolol  2.5 mg IntraVENous Q6H    melatonin  5 mg Oral Nightly    sodium chloride flush  5-40 mL IntraVENous 2 times per day    heparin (porcine)  5,000 Units SubCUTAneous Q8H    pantoprazole  40 mg IntraVENous Daily    balsum peru-castor oil   Topical BID    gabapentin  600 mg Oral TID    [Held by provider] buPROPion  300 mg Oral Daily    atorvastatin  10 mg Oral Nightly    amiodarone  200 mg Oral Daily    [Held by provider] tamsulosin  0.4 mg Oral Daily    [Held by provider] DESMOpressin  200 mcg Oral Nightly       Continuous Infusions:   sodium chloride      sodium chloride      sodium chloride      dextrose         PRN Meds:  sodium chloride flush, sodium chloride, diatrizoate meglumine-sodium, morphine **OR** morphine, acetaminophen **OR** acetaminophen, dextrose, benzonatate, guaiFENesin, sodium chloride flush, sodium chloride, sodium chloride flush, sodium  input(s): \"PROT\"  Invalid input(s): \"PTT\"  No results for input(s): \"OCCULTBLD\" in the last 72 hours.    Radiology review: KUB    Assessment:       Dilated colon    Recommendations:       Having BMs with current regimen; increase movantik and linzess to 290 daily  Avoid high fiber  Move in bed as much as possible  Keep electrolytes supplemented  Would stop enemas  Looking back at old CT has some dilation even then in February; also he states this is his baseline for years  No ulceration on previous scopes and no white count or pain; likely will be chronically dilated; do not need to give neostigmine or decompress with lack of concerning features or as not causing pain or leading to issues with respiration  Call back with questions or concerns  Ruiz Patel MD  12/4/2024  12:25 PM

## 2024-12-04 NOTE — PROGRESS NOTES
General Surgery  Interval Note:    Notified of pneumoperitoneum appreciated on upright CXR from this afternoon.  Follow-up abdomen/pelvis CT with confirmation of free air.  Patient seen at bedside, abdomen remains distended but non-tender to palpation throughout.  Of note, PEG bumper at 10cm, tube 7cm at skin.    Plan for surgical intervention tonight, diagnostic lap, possible resection, possible ostomy, possible open.  Discussed with patient, spouse, and nursing staff.    Shimon Cuellar IV, MD  General Surgery, PGY-4  12/04/24  5:44 PM  [phone]

## 2024-12-04 NOTE — PLAN OF CARE
Problem: Chronic Conditions and Co-morbidities  Goal: Patient's chronic conditions and co-morbidity symptoms are monitored and maintained or improved  Outcome: Progressing  Reinforcement of disease process and treatment plan recommended for chronic conditions and co-morbidities.      Problem: Discharge Planning  Goal: Discharge to home or other facility with appropriate resources  Outcome: Progressing  Patient actively participates in ADL's and decision making regarding plan of care.     Problem: Safety - Adult  Goal: Free from fall injury  Outcome: Progressing  No falls/injuries this shift, bed in lowest position, brakes on, bed alarm on, call light in reach, side rails up x2.     Problem: Skin/Tissue Integrity  Goal: Absence of new skin breakdown  Description: 1.  Monitor for areas of redness and/or skin breakdown  2.  Assess vascular access sites hourly  3.  Every 4-6 hours minimum:  Change oxygen saturation probe site  4.  Every 4-6 hours:  If on nasal continuous positive airway pressure, respiratory therapy assess nares and determine need for appliance change or resting period.  Outcome: Progressing  No new skin breakdown noted, no new signs/symptoms of infection, continue to monitor labwork including WBC, medications administered per physician orders.     Problem: ABCDS Injury Assessment  Goal: Absence of physical injury  Outcome: Progressing  Flowsheets (Taken 12/4/2024 0354 by Stacy Madden, RN)  Absence of Physical Injury: Implement safety measures based on patient assessment     Problem: Pain  Goal: Verbalizes/displays adequate comfort level or baseline comfort level  Outcome: Progressing  No new signs/symptoms of pain noted, pain rating < 3 on scale 0-10, pain controlled with medication/repositioning.     Problem: Respiratory - Adult  Goal: Achieves optimal ventilation and oxygenation  Outcome: Progressing     Problem: Cardiovascular - Adult  Goal: Maintains optimal cardiac output and hemodynamic  stability  Outcome: Progressing     Problem: Cardiovascular - Adult  Goal: Absence of cardiac dysrhythmias or at baseline  Outcome: Progressing     Problem: Metabolic/Fluid and Electrolytes - Adult  Goal: Electrolytes maintained within normal limits  Outcome: Progressing     Problem: Metabolic/Fluid and Electrolytes - Adult  Goal: Hemodynamic stability and optimal renal function maintained  Outcome: Progressing     Problem: Neurosensory - Adult  Goal: Achieves stable or improved neurological status  Outcome: Progressing     Problem: Neurosensory - Adult  Goal: Absence of seizures  Outcome: Progressing     Problem: Neurosensory - Adult  Goal: Achieves maximal functionality and self care  Outcome: Progressing     Problem: Gastrointestinal - Adult  Goal: Minimal or absence of nausea and vomiting  Outcome: Progressing     Problem: Gastrointestinal - Adult  Goal: Maintains or returns to baseline bowel function  Outcome: Progressing     Problem: Gastrointestinal - Adult  Goal: Maintains adequate nutritional intake  Outcome: Progressing     Problem: Nutrition Deficit:  Goal: Optimize nutritional status  Outcome: Progressing     Problem: Confusion  Goal: Confusion, delirium, dementia, or psychosis is improved or at baseline  Description: INTERVENTIONS:  1. Assess for possible contributors to thought disturbance, including medications, impaired vision or hearing, underlying metabolic abnormalities, dehydration, psychiatric diagnoses, and notify attending LIP  2. Drain high risk fall precautions, as indicated  3. Provide frequent short contacts to provide reality reorientation, refocusing and direction  4. Decrease environmental stimuli, including noise as appropriate  5. Monitor and intervene to maintain adequate nutrition, hydration, elimination, sleep and activity  6. If unable to ensure safety without constant attention obtain sitter and review sitter guidelines with assigned personnel  7. Initiate Psychosocial CNS

## 2024-12-05 ENCOUNTER — APPOINTMENT (OUTPATIENT)
Dept: GENERAL RADIOLOGY | Age: 84
DRG: 447 | End: 2024-12-05
Attending: INTERNAL MEDICINE
Payer: MEDICARE

## 2024-12-05 LAB
ALBUMIN SERPL-MCNC: 2.1 G/DL (ref 3.4–5)
ALBUMIN SERPL-MCNC: 2.1 G/DL (ref 3.4–5)
AMORPH SED URNS QL MICRO: ABNORMAL /HPF
ANION GAP SERPL CALCULATED.3IONS-SCNC: 12 MMOL/L (ref 3–16)
ANION GAP SERPL CALCULATED.3IONS-SCNC: 14 MMOL/L (ref 3–16)
BACTERIA URNS QL MICRO: ABNORMAL /HPF
BASOPHILS # BLD: 0 K/UL (ref 0–0.2)
BASOPHILS NFR BLD: 0.4 %
BILIRUB UR QL STRIP.AUTO: ABNORMAL
BUN SERPL-MCNC: 32 MG/DL (ref 7–20)
BUN SERPL-MCNC: 36 MG/DL (ref 7–20)
CALCIUM SERPL-MCNC: 7.6 MG/DL (ref 8.3–10.6)
CALCIUM SERPL-MCNC: 7.9 MG/DL (ref 8.3–10.6)
CHLORIDE SERPL-SCNC: 100 MMOL/L (ref 99–110)
CHLORIDE SERPL-SCNC: 99 MMOL/L (ref 99–110)
CLARITY UR: CLEAR
CO2 SERPL-SCNC: 21 MMOL/L (ref 21–32)
CO2 SERPL-SCNC: 22 MMOL/L (ref 21–32)
COARSE GRAN CASTS #/AREA URNS LPF: ABNORMAL /LPF (ref 0–2)
COLOR UR: YELLOW
CREAT SERPL-MCNC: 1.3 MG/DL (ref 0.8–1.3)
CREAT SERPL-MCNC: 1.7 MG/DL (ref 0.8–1.3)
CRYSTALS URNS MICRO: ABNORMAL /HPF
DEPRECATED RDW RBC AUTO: 19.6 % (ref 12.4–15.4)
DEPRECATED RDW RBC AUTO: 19.9 % (ref 12.4–15.4)
EOSINOPHIL # BLD: 0.1 K/UL (ref 0–0.6)
EOSINOPHIL NFR BLD: 0.9 %
GFR SERPLBLD CREATININE-BSD FMLA CKD-EPI: 39 ML/MIN/{1.73_M2}
GFR SERPLBLD CREATININE-BSD FMLA CKD-EPI: 54 ML/MIN/{1.73_M2}
GLUCOSE BLD-MCNC: 100 MG/DL (ref 70–99)
GLUCOSE BLD-MCNC: 109 MG/DL (ref 70–99)
GLUCOSE BLD-MCNC: 70 MG/DL (ref 70–99)
GLUCOSE SERPL-MCNC: 114 MG/DL (ref 70–99)
GLUCOSE SERPL-MCNC: 75 MG/DL (ref 70–99)
GLUCOSE UR STRIP.AUTO-MCNC: NEGATIVE MG/DL
HCT VFR BLD AUTO: 35.4 % (ref 40.5–52.5)
HCT VFR BLD AUTO: 38.6 % (ref 40.5–52.5)
HGB BLD-MCNC: 11.2 G/DL (ref 13.5–17.5)
HGB BLD-MCNC: 12.1 G/DL (ref 13.5–17.5)
HGB UR QL STRIP.AUTO: ABNORMAL
HYALINE CASTS #/AREA URNS LPF: ABNORMAL /LPF (ref 0–2)
KETONES UR STRIP.AUTO-MCNC: NEGATIVE MG/DL
LACTATE BLDV-SCNC: 1.9 MMOL/L (ref 0.4–2)
LEUKOCYTE ESTERASE UR QL STRIP.AUTO: NEGATIVE
LYMPHOCYTES # BLD: 0.8 K/UL (ref 1–5.1)
LYMPHOCYTES NFR BLD: 6.3 %
MAGNESIUM SERPL-MCNC: 2.16 MG/DL (ref 1.8–2.4)
MCH RBC QN AUTO: 29 PG (ref 26–34)
MCH RBC QN AUTO: 29.2 PG (ref 26–34)
MCHC RBC AUTO-ENTMCNC: 31.3 G/DL (ref 31–36)
MCHC RBC AUTO-ENTMCNC: 31.5 G/DL (ref 31–36)
MCV RBC AUTO: 92.2 FL (ref 80–100)
MCV RBC AUTO: 93.2 FL (ref 80–100)
MONOCYTES # BLD: 1.2 K/UL (ref 0–1.3)
MONOCYTES NFR BLD: 8.9 %
MUCOUS THREADS #/AREA URNS LPF: ABNORMAL /LPF
NEUTROPHILS # BLD: 11 K/UL (ref 1.7–7.7)
NEUTROPHILS NFR BLD: 83.5 %
NITRITE UR QL STRIP.AUTO: NEGATIVE
PERFORMED ON: ABNORMAL
PERFORMED ON: ABNORMAL
PERFORMED ON: NORMAL
PH UR STRIP.AUTO: 6 [PH] (ref 5–8)
PHOSPHATE SERPL-MCNC: 4.5 MG/DL (ref 2.5–4.9)
PHOSPHATE SERPL-MCNC: 5.3 MG/DL (ref 2.5–4.9)
PLATELET # BLD AUTO: 201 K/UL (ref 135–450)
PLATELET # BLD AUTO: 292 K/UL (ref 135–450)
PMV BLD AUTO: 8 FL (ref 5–10.5)
PMV BLD AUTO: 8.8 FL (ref 5–10.5)
POTASSIUM SERPL-SCNC: 3.7 MMOL/L (ref 3.5–5.1)
POTASSIUM SERPL-SCNC: 3.7 MMOL/L (ref 3.5–5.1)
POTASSIUM SERPL-SCNC: ABNORMAL MMOL/L (ref 3.5–5.1)
PROT UR STRIP.AUTO-MCNC: 30 MG/DL
RBC # BLD AUTO: 3.84 M/UL (ref 4.2–5.9)
RBC # BLD AUTO: 4.14 M/UL (ref 4.2–5.9)
RBC #/AREA URNS HPF: ABNORMAL /HPF (ref 0–4)
SODIUM SERPL-SCNC: 133 MMOL/L (ref 136–145)
SODIUM SERPL-SCNC: 135 MMOL/L (ref 136–145)
SP GR UR STRIP.AUTO: >=1.03 (ref 1–1.03)
UA COMPLETE W REFLEX CULTURE PNL UR: ABNORMAL
UA DIPSTICK W REFLEX MICRO PNL UR: YES
URN SPEC COLLECT METH UR: ABNORMAL
UROBILINOGEN UR STRIP-ACNC: 0.2 E.U./DL
WBC # BLD AUTO: 13.1 K/UL (ref 4–11)
WBC # BLD AUTO: 18.7 K/UL (ref 4–11)
WBC #/AREA URNS HPF: ABNORMAL /HPF (ref 0–5)

## 2024-12-05 PROCEDURE — 6360000002 HC RX W HCPCS

## 2024-12-05 PROCEDURE — 2060000000 HC ICU INTERMEDIATE R&B

## 2024-12-05 PROCEDURE — 2580000003 HC RX 258: Performed by: STUDENT IN AN ORGANIZED HEALTH CARE EDUCATION/TRAINING PROGRAM

## 2024-12-05 PROCEDURE — 83605 ASSAY OF LACTIC ACID: CPT

## 2024-12-05 PROCEDURE — 6360000002 HC RX W HCPCS: Performed by: STUDENT IN AN ORGANIZED HEALTH CARE EDUCATION/TRAINING PROGRAM

## 2024-12-05 PROCEDURE — 99221 1ST HOSP IP/OBS SF/LOW 40: CPT | Performed by: INTERNAL MEDICINE

## 2024-12-05 PROCEDURE — 85025 COMPLETE CBC W/AUTO DIFF WBC: CPT

## 2024-12-05 PROCEDURE — 6370000000 HC RX 637 (ALT 250 FOR IP): Performed by: STUDENT IN AN ORGANIZED HEALTH CARE EDUCATION/TRAINING PROGRAM

## 2024-12-05 PROCEDURE — 2580000003 HC RX 258: Performed by: INTERNAL MEDICINE

## 2024-12-05 PROCEDURE — 71045 X-RAY EXAM CHEST 1 VIEW: CPT

## 2024-12-05 PROCEDURE — 6360000002 HC RX W HCPCS: Performed by: NURSE PRACTITIONER

## 2024-12-05 PROCEDURE — 83735 ASSAY OF MAGNESIUM: CPT

## 2024-12-05 PROCEDURE — 6360000002 HC RX W HCPCS: Performed by: INTERNAL MEDICINE

## 2024-12-05 PROCEDURE — 81001 URINALYSIS AUTO W/SCOPE: CPT

## 2024-12-05 PROCEDURE — 36415 COLL VENOUS BLD VENIPUNCTURE: CPT

## 2024-12-05 PROCEDURE — 85027 COMPLETE CBC AUTOMATED: CPT

## 2024-12-05 PROCEDURE — 80069 RENAL FUNCTION PANEL: CPT

## 2024-12-05 RX ORDER — DEXTROSE MONOHYDRATE 100 MG/ML
INJECTION, SOLUTION INTRAVENOUS CONTINUOUS
Status: DISCONTINUED | OUTPATIENT
Start: 2024-12-05 | End: 2024-12-06

## 2024-12-05 RX ORDER — VANCOMYCIN 1.5 G/300ML
1500 INJECTION, SOLUTION INTRAVENOUS EVERY 24 HOURS
Status: DISCONTINUED | OUTPATIENT
Start: 2024-12-06 | End: 2024-12-05

## 2024-12-05 RX ORDER — GINSENG 100 MG
CAPSULE ORAL 3 TIMES DAILY
Status: DISCONTINUED | OUTPATIENT
Start: 2024-12-05 | End: 2024-12-11 | Stop reason: HOSPADM

## 2024-12-05 RX ORDER — MORPHINE SULFATE 2 MG/ML
2 INJECTION, SOLUTION INTRAMUSCULAR; INTRAVENOUS
Status: DISCONTINUED | OUTPATIENT
Start: 2024-12-05 | End: 2024-12-06

## 2024-12-05 RX ORDER — 0.9 % SODIUM CHLORIDE 0.9 %
1000 INTRAVENOUS SOLUTION INTRAVENOUS ONCE
Status: COMPLETED | OUTPATIENT
Start: 2024-12-05 | End: 2024-12-05

## 2024-12-05 RX ORDER — VANCOMYCIN 1.5 G/300ML
1500 INJECTION, SOLUTION INTRAVENOUS EVERY 24 HOURS
Status: DISCONTINUED | OUTPATIENT
Start: 2024-12-06 | End: 2024-12-06 | Stop reason: DRUGHIGH

## 2024-12-05 RX ORDER — SODIUM CHLORIDE 9 MG/ML
INJECTION, SOLUTION INTRAVENOUS CONTINUOUS
Status: DISCONTINUED | OUTPATIENT
Start: 2024-12-05 | End: 2024-12-06

## 2024-12-05 RX ORDER — MORPHINE SULFATE 2 MG/ML
4 INJECTION, SOLUTION INTRAMUSCULAR; INTRAVENOUS
Status: DISCONTINUED | OUTPATIENT
Start: 2024-12-05 | End: 2024-12-06

## 2024-12-05 RX ORDER — POTASSIUM CHLORIDE 7.45 MG/ML
10 INJECTION INTRAVENOUS
Status: COMPLETED | OUTPATIENT
Start: 2024-12-05 | End: 2024-12-05

## 2024-12-05 RX ORDER — VANCOMYCIN 2 G/400ML
2000 INJECTION, SOLUTION INTRAVENOUS ONCE
Status: COMPLETED | OUTPATIENT
Start: 2024-12-05 | End: 2024-12-05

## 2024-12-05 RX ADMIN — BACITRACIN: 500 OINTMENT TOPICAL at 14:15

## 2024-12-05 RX ADMIN — PIPERACILLIN AND TAZOBACTAM 3375 MG: 3; .375 INJECTION, POWDER, LYOPHILIZED, FOR SOLUTION INTRAVENOUS at 18:32

## 2024-12-05 RX ADMIN — MORPHINE SULFATE 2 MG: 2 INJECTION, SOLUTION INTRAMUSCULAR; INTRAVENOUS at 00:56

## 2024-12-05 RX ADMIN — DEXTROSE MONOHYDRATE: 100 INJECTION, SOLUTION INTRAVENOUS at 21:22

## 2024-12-05 RX ADMIN — VANCOMYCIN 2000 MG: 2 INJECTION, SOLUTION INTRAVENOUS at 18:44

## 2024-12-05 RX ADMIN — Medication: at 23:01

## 2024-12-05 RX ADMIN — PIPERACILLIN AND TAZOBACTAM 3375 MG: 3; .375 INJECTION, POWDER, LYOPHILIZED, FOR SOLUTION INTRAVENOUS at 09:28

## 2024-12-05 RX ADMIN — POTASSIUM CHLORIDE 10 MEQ: 7.46 INJECTION, SOLUTION INTRAVENOUS at 12:00

## 2024-12-05 RX ADMIN — HEPARIN SODIUM 5000 UNITS: 5000 INJECTION INTRAVENOUS; SUBCUTANEOUS at 12:40

## 2024-12-05 RX ADMIN — SODIUM CHLORIDE 1000 ML: 9 INJECTION, SOLUTION INTRAVENOUS at 10:49

## 2024-12-05 RX ADMIN — MINERAL OIL 330 ML: 999 LIQUID ORAL at 09:48

## 2024-12-05 RX ADMIN — Medication: at 09:48

## 2024-12-05 RX ADMIN — POTASSIUM CHLORIDE 10 MEQ: 7.46 INJECTION, SOLUTION INTRAVENOUS at 13:01

## 2024-12-05 RX ADMIN — PANTOPRAZOLE SODIUM 40 MG: 40 INJECTION, POWDER, FOR SOLUTION INTRAVENOUS at 09:48

## 2024-12-05 RX ADMIN — POTASSIUM CHLORIDE 10 MEQ: 7.46 INJECTION, SOLUTION INTRAVENOUS at 10:52

## 2024-12-05 RX ADMIN — SODIUM CHLORIDE: 9 INJECTION, SOLUTION INTRAVENOUS at 20:36

## 2024-12-05 RX ADMIN — BACITRACIN: 500 OINTMENT TOPICAL at 09:47

## 2024-12-05 RX ADMIN — POTASSIUM CHLORIDE 10 MEQ: 7.46 INJECTION, SOLUTION INTRAVENOUS at 14:14

## 2024-12-05 RX ADMIN — HEPARIN SODIUM 5000 UNITS: 5000 INJECTION INTRAVENOUS; SUBCUTANEOUS at 04:53

## 2024-12-05 RX ADMIN — BACITRACIN: 500 OINTMENT TOPICAL at 23:01

## 2024-12-05 RX ADMIN — HEPARIN SODIUM 5000 UNITS: 5000 INJECTION INTRAVENOUS; SUBCUTANEOUS at 20:29

## 2024-12-05 RX ADMIN — Medication: at 00:16

## 2024-12-05 RX ADMIN — DEXTROSE MONOHYDRATE: 100 INJECTION, SOLUTION INTRAVENOUS at 12:42

## 2024-12-05 RX ADMIN — PIPERACILLIN AND TAZOBACTAM 3375 MG: 3; .375 INJECTION, POWDER, LYOPHILIZED, FOR SOLUTION INTRAVENOUS at 01:15

## 2024-12-05 RX ADMIN — SODIUM CHLORIDE: 9 INJECTION, SOLUTION INTRAVENOUS at 04:55

## 2024-12-05 RX ADMIN — SODIUM CHLORIDE 1000 ML: 9 INJECTION, SOLUTION INTRAVENOUS at 18:25

## 2024-12-05 ASSESSMENT — PAIN DESCRIPTION - ORIENTATION: ORIENTATION: MID

## 2024-12-05 ASSESSMENT — PAIN SCALES - GENERAL
PAINLEVEL_OUTOF10: 0
PAINLEVEL_OUTOF10: 7
PAINLEVEL_OUTOF10: 0
PAINLEVEL_OUTOF10: 4

## 2024-12-05 ASSESSMENT — PAIN DESCRIPTION - ONSET: ONSET: ON-GOING

## 2024-12-05 ASSESSMENT — PAIN - FUNCTIONAL ASSESSMENT: PAIN_FUNCTIONAL_ASSESSMENT: ACTIVITIES ARE NOT PREVENTED

## 2024-12-05 ASSESSMENT — PAIN DESCRIPTION - DESCRIPTORS: DESCRIPTORS: ACHING;DISCOMFORT

## 2024-12-05 ASSESSMENT — PAIN DESCRIPTION - LOCATION: LOCATION: ABDOMEN

## 2024-12-05 ASSESSMENT — PAIN DESCRIPTION - PAIN TYPE: TYPE: SURGICAL PAIN

## 2024-12-05 ASSESSMENT — PAIN DESCRIPTION - FREQUENCY: FREQUENCY: CONTINUOUS

## 2024-12-05 NOTE — PLAN OF CARE
Called by Dr Akers to evaluate patient for low blood pressure for possible transfer to the ICU.     Patient is an 83 yo M with medical history of [place] syndrome, PE/DVT s/p IVC filter, T2DM, chronic diastolic CHF who initially presented to [facility] ED after a fall leading to a T11 burst fracture, epidural hematoma and cord compression. He has had a long hospital course and most recently noted to have pneumoperitoneum and underwent exploratory laparotomy with drainage of intraperitoneal abscess and washout on 12/04.    On my evaluation, he is alert and oriented x 4. He reports his blood pressure is usually in the low 90s/50s.His only complaint is left side abdominal pain who recently had     Physical Exam  Constitutional:       General: He is not in acute distress.     Appearance: He is obese. He is ill-appearing. He is not toxic-appearing.   HENT:      Head: Normocephalic.      Mouth/Throat:      Mouth: Mucous membranes are dry.   Eyes:      General: No scleral icterus.     Conjunctiva/sclera: Conjunctivae normal.   Cardiovascular:      Rate and Rhythm: Tachycardia present.      Pulses: Normal pulses.      Heart sounds: Normal heart sounds.   Pulmonary:      Effort: Pulmonary effort is normal. No respiratory distress.      Breath sounds: Normal breath sounds. No wheezing or rales.   Abdominal:      Tenderness: There is abdominal tenderness (Left upper quadrant).      Comments: Abdominal binder in place    Musculoskeletal:      Right lower leg: Edema present.      Left lower leg: Edema present.      Comments: 2-3+ pitting edema in bilateral lower extremities    Skin:     General: Skin is warm.      Capillary Refill: Capillary refill takes less than 2 seconds.      Findings: Bruising present.   Neurological:      Mental Status: He is alert and oriented to person, place, and time.   Psychiatric:         Mood and Affect: Mood normal.          Assessment/Plan   -Hypotension  -Appears chronic for the patient.MAPS are

## 2024-12-05 NOTE — PROGRESS NOTES
Physical /Occupational Therapy    Attempted therapy session, spoke with RN who reports pt is currently unavailable and advises to follow up in the next hour or so. Will follow up per POC as treatment schedule allows.     Jacqueline Daniel, PT, DPT  Aylin RINCON

## 2024-12-05 NOTE — PROGRESS NOTES
Bariatric Surgery  Post-operative Note      Procedure(s) Performed: exploratory laparotomy with closure of gastrotomy, reji gastrostomy     Subjective:   Patient reports pain. He denies nausea or vomiting. Denies flatus or BM at this time.     Objective:  Anesthesia type: General    Vitals:   Vitals:    12/04/24 2202 12/04/24 2203 12/04/24 2224 12/04/24 2330   BP:   97/68 (!) 90/59   Pulse: (!) 104 (!) 102 (!) 103 (!) 104   Resp: 14 15 16 16   Temp:   97.5 °F (36.4 °C) 97.5 °F (36.4 °C)   TempSrc:   Axillary Oral   SpO2: 97% 96% 96% 92%   Weight:       Height:           Physical Exam:  Post-op vital signs:  Stable   General appearance: alert, no acute distress  Eyes: No scleral icterus, EOM grossly intact  Neck: trachea midline, no JVD, no lymphadenopathy, neck supple  Chest/Lungs: normal effort with no accessory muscle use on 4L NC  Cardiovascular: RRR,   Abdomen: Soft, appropriately-tender, black foam wound vac in place midline, G tube in place to gravity with no output.  Skin: warm and dry, no rashes  Extremities: no edema, no cyanosis. Right arm with skin tear after removal of a line, covered with mepilex dressing.   Genitourinary: Cuellar in place with clear yellow urine  Neuro: A&Ox3, intermittently aware of place, sensation intact    Assessment and Plan  This is a 84 y.o. year old male status post exploratory laparotomy with closure of gastrotomy, reji gastrostomy secondary to Intra-abdominal free air. (12/4) POD0.    Pain management: Morphine PRN, tylenol suppository Q4  Cardiovasc: hemodynamically stable, will continue to monitor  Respiratory:  IS ordered to bedside, encourage hourly IS and deep breathing, wean oxygen as tolerated  Fluids:  NS 50, Diet: NPO  : Cuellar in place  Ambulation: OOB to chair, encourage ambulation  Prophylaxis: SCDs, SQH  Antibiotics: Zosyn  Wound: Local wound care. Wound vac change MWF per surgery     Naty Deshpande MD  PGY1, General Surgery  12/05/24  12:45 AM  [phone]

## 2024-12-05 NOTE — FLOWSHEET NOTE
PACU Transfer Note    Vitals:    12/04/24 2131   BP: 88/92   Pulse: (!) 102   Resp: 17   Temp: 97.8   SpO2: 98%       In: 1250 [I.V.:1000]  Out: 325 [Urine:300]    Pain assessment:  none    pt transported to 5528 in bed per [facility] transporter       Pain Level: 0    Report given to Receiving unit RN.    12/4/2024 10:00 PM

## 2024-12-05 NOTE — BRIEF OP NOTE
Brief Postoperative Note      Patient: Juan Mohamud  YOB: 1940  MRN: [id number]    Date of Procedure: 12/4/2024    Pre-Op Diagnosis Codes:      * Intra-abdominal free air of unknown etiology [K66.8]    Post-Op Diagnosis: Post-Op Diagnosis Codes:     * Intra-abdominal free air of unknown etiology [K66.8]       Procedure(s):  EXPLORATORY LAPAROTOMY WITH CLOSURE OF GASTROTOMY, SAM GASTROSTOMY    Surgeon(s):  Marko Spencer DO    Assistant:  Resident: Dandre Dotson DO    Anesthesia: General    Estimated Blood Loss (mL): Minimal    Complications: None    Specimens:   * No specimens in log *    Implants:  * No implants in log *      Drains:   Negative Pressure Wound Therapy Abdomen (Active)       Gastrostomy/Enterostomy/Jejunostomy Tube Percutaneous Endoscopic Gastrostomy (PEG) LUQ 1 20 fr (Active)   $ Gastrostomy insertion $ Yes 11/29/24 1616   Drainage Appearance Yellow 12/02/24 1800   External Catheter Length (cm) 9 cm 12/04/24 0800   Site Description Clean, dry & intact 12/04/24 1600   J Port Status Clamped 12/03/24 1736   G Port Status Infusing 12/04/24 1600   Surrounding Skin Clean, dry & intact 12/04/24 1600   Dressing Status Clean, dry & intact 12/04/24 1600   Dressing Type Split gauze 12/04/24 1600   G-Tube Care Completed Yes 12/04/24 1128   Tube Feeding Standard without Fiber 12/04/24 1600   Tube feeding/verify rate (mL/hr) 10 mL/hr 12/04/24 0800   Tube Feeding Supplement (Product) Other (Comment) 12/04/24 1600   Tube Feeding Intake (mL) 43 ml 12/03/24 1736   Free Water/Flush (mL) 30 mL 12/04/24 0800   Action Taken Feed set changed;Tubing changed 12/02/24 2236   Residual Volume (ml) 10 ml 12/04/24 0800       Rectal Tube (Active)   Site Assessment Clean, dry & intact 11/24/24 1200   Stool Appearance Watery 11/24/24 1200   Stool Color Anoop 11/24/24 1200   Rectal Tube Output (mL) 200 ml 11/24/24 0607       Urinary Catheter 12/04/24 Cuellar (Active)       [REMOVED] Closed/Suction Drain

## 2024-12-05 NOTE — CONSULTS
[facility] -  Clinical Pharmacy Note    TPN - Management by Pharmacy    Consult Date(s): 12/5/24  Consulting Provider(s): TIP Butler    Assessment / Plan  Parenteral Nutrition  Day of Therapy: #1  Formulation:  Clinimix PLAIN 5/20  Clinimix Rate:  41.7 mL/hr (Total volume = 1000mL/day)  RD consulted re: formulation and goal rate - appreciate input.   Lipids:  20% 250mL over 12 hours on Mon & Thurs only (due to national shortage)  Appreciate Clinical Dietitian's recommendations for formulation and goal rate.  Electrolytes:  Clinimix plain contains Acetate and Chloride (cannot be removed from bag).  Current rate provides Acetate 42 mEq/day and Chloride 20 mEq/day.  Other electrolytes added to today's bag:     Desired Amount per Day (mEq, mmoL) Amount To Add to Bag (mEq, mmoL) Change from Previous Day in Amount per Day (Increase/Decrease)   Sodium Acetate 50 50     Sodium Chloride 50 50     Sodium Phosphate 0 0     Potassium Acetate 10 10     Potassium Phosphate 10 10     Potassium Chloride 46 46     Calcium Gluconate 9 9     Magnesium Sulfate 12 12     MVI (mL) 0 0.0 MWF only   Trace (mL) 1 1.0     Insulin (units) 0 0     Famotidine (mg) 0 0     Other 0 0       Maintenance IVF:  NS @ 50 mL/hr  Glucose control:  BG 75 on labs today. Will monitor with initiation/advancement of TPN.  Add MVI 10 mL/day in PN on Mon-Wed-Fri only (due to national shortage) & Trace Elements 1 mL/day in PN daily.  Daily renal panel, daily magnesium, and weekly triglycerides ordered per protocol.  PN will be re-ordered daily.    Please call with questions--  Victoria Cota, PharmD, BCPS  Wireless: [phone]   12/5/2024 10:45 AM        Interval update:   S/p ex-lap with closure of gastrotomy, Genevieve gastrostomy 12/4. Plan to start TPN today - ordered PICC line to be placed today.    Subjective/Objective:   Juan Mohamud is a 84 y.o. male with a PMHx significant for T2DM, degenerative lumbar disease, arrythmia s/p AICD

## 2024-12-05 NOTE — PROGRESS NOTES
Patient is A&Ox4 w/ intermittent confusion . VSS this shift with exception of soft BP. Patient has endorsed pain to abd managed per MAR and non-pharm measures. Patient is NPO w/ the exception of ice chips(1 cup per shift). Pt is on bedrest. Voiding via galaviz, galaviz care performed with soap and water. Pt denies any needs at this time. Patient updated on plan of care. Fall and safety precautions in place, call light within reach. Continuing care.

## 2024-12-05 NOTE — PROGRESS NOTES
Pt. Alert to place/self, BP low, 4 liters NC. Pt. Weak/lethargic/edematous. Cuellar care, bath, gown /linens changed. Q 2 turn/reposition. Low urine output. Pt. Tolerating sm. Amount of ice chips. BM x 2. Pain managed via MAR and non-pharm measures.    actual

## 2024-12-05 NOTE — ANESTHESIA POSTPROCEDURE EVALUATION
Department of Anesthesiology  Postprocedure Note    Patient: Juan Mohamud  MRN: [id number]  YOB: 1940  Date of evaluation: 12/4/2024    Procedure Summary       Date: 12/04/24 Room / Location: [place] [address] / [facility]    Anesthesia Start: 1912 Anesthesia Stop:     Procedure: EXPLORATORY LAPAROTOMY WITH CLOSURE OF GASTROTOMY, SAM GASTROSTOMY Diagnosis:       Intra-abdominal free air of unknown etiology      (Intra-abdominal free air of unknown etiology [K66.8])    Surgeons: Marko Spencer DO Responsible Provider: Eitan Mandujano MD    Anesthesia Type: general ASA Status: 4            Anesthesia Type: No value filed.    Caitlyn Phase I: Caitlyn Score: 9    Caitlyn Phase II: Caitlyn Score: 8    Anesthesia Post Evaluation    Patient location during evaluation: PACU  Patient participation: complete - patient participated  Level of consciousness: awake  Pain score: 0  Nausea & Vomiting: no nausea and no vomiting  Cardiovascular status: blood pressure returned to baseline  Respiratory status: acceptable  Hydration status: euvolemic  Pain management: adequate    No notable events documented.   60.5

## 2024-12-05 NOTE — PROGRESS NOTES
V2.0    [facility] Progress Note      Name:  Juan Mohamud /Age/Sex: 1940  (84 y.o. male)   MRN & CSN:  [id number] & [id number] Encounter Date/Time: 2024 5:03 PM EST   Location:  5528/5528-01 PCP: Janet Garcia MD     Attending:Fred Akers MD       Hospital Day: 29    Assessment and Recommendations       Assessment/Plan:     Status post exploratory lap with closure of gastrotomy due to intra-abdominal free air:  Reviewed note from surgery  Patient currently on Zosyn    Recurrent aspiration pneumonia  Patient started on Zosyn  Will add vancomycin    Hypotension:  Persistent hypotension today  I have discussed the case with the ICU attending and at this time they feel patient can be managed on the floor.  That he has a history of low blood pressures consistent with what he is running right now.  Will continue with IV fluids and monitor overnight.     Tyrone syndrome:  Reviewed note from gen surg  Reviewed note from GI    Hypoxia:   Increase in O2 demand from 1 L to 4 L over last 24 hours  Most likely secondary to pneumonia    Leukocytosis:  Most likely secondary pneumonia   Trend    PE/DVT:  S/p IVC filter  Not a ggod candidate for anticoagulation due to epidural hematoma    T10 vertebral body unstable fracture  T11 nondisplaced spinous process fracture  T6 vertebral body subacute compression fracture  Epidural spinal hematoma   Spinal cord compression at T11:  Reviewed last note from NS  No brace needed    Previous aspiration pneumonia aspiration PNA::  Completed course of IV Unasyn therapy    Dispo: Discussed with ICU attending evaluating for possible transfer to ICU    Patient has a high probability of imminent or life-threatening deterioration requiring close monitoring, and highly complex decision-making and/or interventions of high intensity to assess, manipulate, and support his critical organ systems to prevent a likely inevitable decline which could occur if left  spelling, as well as words and phrases that may be inappropriate. If there are any questions or concerns please feel free to contact the dictating provider for clarification.

## 2024-12-05 NOTE — ANESTHESIA PRE PROCEDURE
Department of Anesthesiology  Preprocedure Note       Name:  Juan Mohamud   Age:  84 y.o.  :  1940                                          MRN:  [id number]         Date:  2024      Surgeon: Surgeon(s):  Marko Spencer DO    Procedure: Procedure(s):  DIAGNOSTIC LAPAROSCOPY POSSIBLE EXPLORATORY LAPAROTOMY, POSSIBLE BOWEL RESECTION, POSSIBLE OSTOMY    Medications prior to admission:   Prior to Admission medications    Medication Sig Start Date End Date Taking? Authorizing Provider   furosemide (LASIX) 20 MG tablet Take 1 tablet by mouth in the morning and 1 tablet in the evening. 24  Yes Ayaan Elias MD   buPROPion (WELLBUTRIN XL) 300 MG extended release tablet Take 1 tablet by mouth daily 10/17/24  Yes ProviderRylan MD   Multiple Vitamins-Minerals (PRESERVISION AREDS PO) Take 1 tablet by mouth in the morning and at bedtime   Yes ProviderRylan MD   gabapentin (NEURONTIN) 300 MG capsule Take 2 capsules by mouth 3 times daily.   Yes ProviderRylan MD   torsemide (DEMADEX) 20 MG tablet Take 1 tablet by mouth See Admin Instructions Two days a week 24  Yes Bong Ricci MD   amiodarone (CORDARONE) 200 MG tablet Take 1 tablet by mouth daily 24  Yes Esperanza Posey APRN - CNP   simethicone (MYLICON) 80 MG chewable tablet Take 1 tablet by mouth every 6 hours as needed for Flatulence 24  Yes Esperanza Posey APRN - CNP   spironolactone (ALDACTONE) 25 MG tablet Take 1 tablet by mouth daily 24  Yes Esperanza Posey APRN - CNP   losartan (COZAAR) 50 MG tablet Take 1 tablet by mouth daily 24  Yes Esperanza Posey APRN - CNP   atorvastatin (LIPITOR) 10 MG tablet Take 1 tablet by mouth nightly 24  Yes Esperanza Posey APRN - CNP   metoprolol succinate (TOPROL XL) 25 MG extended release tablet Take 1 tablet by mouth daily   Yes Rylan Grider MD   omeprazole (PRILOSEC) 40 MG delayed release capsule Take 1 capsule by mouth

## 2024-12-05 NOTE — ANESTHESIA PROCEDURE NOTES
Arterial Line:    An arterial line was placed using ultrasound guidance, in the OR for the following indication(s): continuous blood pressure monitoring.    A 22 gauge (size), 1 and 3/8 inch (length), Arrow (type) catheter was placed, Seldinger technique not used, into the right radial artery, secured by Tegaderm.  Anesthesia type: General    Events:  patient tolerated procedure well with no complications and EBL < 5mL.    Additional notes:  Easy arterial line placement.12/4/2024 7:14 PM12/4/2024 7:16 PM  Anesthesiologist: Eitan Mandujano MD  Performed: Anesthesiologist   Preanesthetic Checklist  Completed: patient identified, IV checked, site marked, risks and benefits discussed, surgical/procedural consents, equipment checked, pre-op evaluation, timeout performed, anesthesia consent given, oxygen available, monitors applied/VS acknowledged, fire risk safety assessment completed and verbalized and blood product R/B/A discussed and consented

## 2024-12-05 NOTE — PROGRESS NOTES
Bariatric Surgery   Daily Progress Note  Patient: Juan Mohamud      Procedure(s) Performed: exploratory laparotomy with closure of gastrotomy, reji gastrostomy     SUBJECTIVE:   Patient rested well overnight. Patient reports no nausea this morning, no  passing of gas as yet.     ROS:   A 14 point review of systems was conducted, significant findings as noted above. All other systems negative.    OBJECTIVE:    PHYSICAL EXAM:    Vitals:    12/04/24 2203 12/04/24 2224 12/04/24 2330 12/05/24 0339   BP:  97/68 (!) 90/59 (!) 88/54   Pulse: (!) 102 (!) 103 (!) 104 78   Resp: 15 16 16 16   Temp:  97.5 °F (36.4 °C) 97.5 °F (36.4 °C) 96.9 °F (36.1 °C)   TempSrc:  Axillary Oral Axillary   SpO2: 96% 96% 92% 91%   Weight:       Height:           General appearance: alert, no acute distress  Eyes: No scleral icterus, EOM grossly intact  Neck: trachea midline, no JVD, no lymphadenopathy, neck supple  Chest/Lungs: normal effort with no accessory muscle use on RA  Cardiovascular: RRR,   Abdomen: Soft, appropriately-tender, black foam wound vac in place midline, PEG tube in place to gravity with no output.  Skin: warm and dry, no rashes  Extremities: no edema, no cyanosis. Right arm with skin tear after removal of a line, covered with mepilex dressing. L arm wrapped in ACE bandage  Genitourinary: Cuellar in place with clear yellow urine  Neuro: A&Ox3, intermittently aware of place, sensation intact    ASSESSMENT & PLAN:   Juan Mohamud is a 84 y.o. male with Hx DM, lumber DDD, arrhythmia s/p AICD, and diastolic CHF. He initially presented to [facility] on 11/3/24 following a fall with a T11 burst fracture and associated epidural hematoma with cord compression.  Transferred to [facility] on 11/7/2024, where he underwent T8-L1 posterior decompression, fixation, and fusion on 11/11. Progressively worsening abdominal distention since original fall requiring endoscopic decompression on 11/8. Failed MBS, underwent PEG placement 11/26.  CXR 12/4 demonstrated free air and patient was taken for exploratory laparotomy with closure of gastrotomy, reji gastrostomy (12/4)     - Daily SMOG encouraged from a general surgery stand point  - Bacitracin and mepilex dressing to superficial skin tears daily per nursing  - Continue PEG tube to gravity. Will study in 5 days. Remain NPO with once cup of ice chips per shift.  - Recommend PICC and TPN  - [place] change MWF   - Aggressive electrolyte replacement  - Rest of care per primary     Naty Deshpande MD  PGY1, General Surgery  12/05/24  7:33 AM  [phone]

## 2024-12-05 NOTE — CARE COORDINATION
NITO following:   NITO received a VM from Crystal the pt's [employer]  [phone] this AM reporting that the precert for [facility] was denied. NITO has NO documented proof to support this statement.    NITO spoke with Marina at [facility] who reports that they have not been informed of any decision by the insurance company and Marina and her team will f/u with [employer] concerning the precert.    NITO also exchanged VM's with Adele [phone] with [facility]. NITO updated Adele that we are exploring LTACH level of care currently and encouraged Adele to reach out with any questions.    NITO will continue to follow for discharge planning.  Electronically signed by SHARLA Blum on 12/5/2024 at 3:46 PM  [phone]

## 2024-12-05 NOTE — PROGRESS NOTES
4 Eyes Skin Assessment     NAME:  Juan Mohamud  YOB: 1940  MEDICAL RECORD NUMBER:  [id number]    The patient is being assessed for  Admission    I agree that at least one RN has performed a thorough Head to Toe Skin Assessment on the patient. ALL assessment sites listed below have been assessed.      Areas assessed by both nurses:    Head, Face, Ears, Shoulders, Back, Chest, Arms, Elbows, Hands, Sacrum. Buttock, Coccyx, Ischium, Legs. Feet and Heels, and Under Medical Devices         Does the Patient have a Wound? Yes wound(s) were present on assessment. LDA wound assessment was Initiated and completed by RN       Piero Prevention initiated by RN: Yes  Wound Care Orders initiated by RN: Yes    Pressure Injury (Stage 3,4, Unstageable, DTI, NWPT, and Complex wounds) if present, place Wound referral order by RN under : Yes    New Ostomies, if present place, Ostomy referral order under : No     Nurse 1 eSignature: Electronically signed by Nannette Vasquez RN on 12/5/24 at 5:37 PM EST    **SHARE this note so that the co-signing nurse can place an eSignature**    Nurse 2 eSignature: Electronically signed by Osiel Gutierrez RN on 12/5/24 at 5:54 PM EST

## 2024-12-05 NOTE — OP NOTE
[address] [address]                            OPERATIVE REPORT      PATIENT NAME: VINNY JOYCE          : 1940  MED REC NO: [id number]                      ROOM: [facility]  ACCOUNT NO: [id number]                       ADMIT DATE: 2024  PROVIDER: Marko Spencer DO      DATE OF PROCEDURE:  2024    SURGEON:  Marko Spencer DO    PREOPERATIVE DIAGNOSIS:  Pneumoperitoneum.    POSTOPERATIVE DIAGNOSIS:  Gastrotomy with intraperitoneal abscess.    PROCEDURES PERFORMED:    1. Exploratory laparotomy with drainage of intraperitoneal abscess and washout.    2. Closure of gastrotomy.  3. Placement of wound VAC.    ASSISTANT:  Dandre Dotson DO    ANESTHESIA:  General endotracheal.    COMPLICATIONS:  None.    CONDITION:  Stable.    ESTIMATED BLOOD LOSS:  25 mL.    INDICATION FOR PROCEDURE:  The patient is an 84-year-old gentleman with a longstanding colonic pseudo obstruction and PEG tube placement.  Today, he became hypotensive and a chest x-ray showed pneumoperitoneum.  He had a CT scan which confirmed a large amount of pneumoperitoneum and he was taken for exploration after the patient's family understood all risks, benefits, and alternatives of the procedure.    DESCRIPTION OF PROCEDURE:  The patient was brought to the operative suite, laid in supine position with arms outstretched.  After induction of general endotracheal anesthesia, tube was confirmed to be in place with end-tidal CO2 and secured.  The patient was then prepped and draped in usual sterile manner.    A midline incision was made using a 10 blade.  Dissection was carried down through subcutaneous tissue through the fascia.  Peritoneum was elevated and incised, and abdomen was entered.      Upon entry Infection, Abscess, and Purulence was noted in Superficial Deep, and Organ Space cavities. This infection was present at time of surgery.  A large pocket of

## 2024-12-05 NOTE — FLOWSHEET NOTE
PACU Transfer Note    Vitals:    12/04/24 2203   BP: 82/55   Pulse: (!) 102   Resp: 15   Temp: 98.2   SpO2: 96%       In: 1250 [I.V.:1000]  Out: 325 [Urine:300]    Pain assessment:  none   pt [facility]   Pain Level: 0    Report given to Receiving unit RN.    12/4/2024 10:21 PM

## 2024-12-05 NOTE — CONSULTS
[facility] -  Clinical Pharmacy Note    Vancomycin - Management by Pharmacy    Consult Date(s): 12/5/24   Consulting Provider(s): Dr. Akers     Assessment / Plan  Nosocomial pneumonia - Vancomycin  Concurrent Antimicrobials: Zosyn  Day of Vanc Therapy / Ordered Duration: Day 1 of 7  Current Dosing Method: Bayesian-Guided AUC Dosing  Therapeutic Goal: -600 mg/L*hr  Current Dose / Plan:   Scr is 1.3 mg/dL today. Baseline Scr during this admission ~0.9 mg/dL to 1.1 mg/dL. UOP 0.4 mL/kg/hr in the past 24 hours. Monitor renal function closely.  Will load with vancomycin 2000 mg IV x 1 dose (~20.41 mg/kg) followed by vancomycin 1500 mg IV q24h (~15.3 mg/kg).  Regimen predicts AUC = 491 mg/L*hr with trough of 13.3 mg/L  Will order a random level for 12/7 AM to assess current vancomycin regimen.  Will continue to monitor clinical condition and make adjustments to regimen as appropriate.    Thank you for consulting pharmacy,  Greg Mckee, PharmD  PGY1 Pharmacy Resident   Wireless: [phone]  12/05/24       Interval update:  therapy initiation     Subjective/Objective:   Juan Mohamud is a 84 y.o. male with a PMHx significant for T2DM, degenerative lumbar disease, arrythmia s/p AICD placement, HFpEF who presented to ED 11/7 after a fall, s/p T8-L1 posterior decompression, fixation, fusion (11/11).    Pharmacy is consulted to dose vancomycin.    Ht Readings from Last 1 Encounters:   11/07/24 1.956 m (6' 5\")     Wt Readings from Last 1 Encounters:   11/30/24 98 kg (216 lb 0.8 oz)     Current & Prior Antimicrobial Regimen(s):  Zosyn 3375mg q8h (11/30-current)  Vancomycin    2000mg IV x 1 dose (12/5/24)  1500mg IV q24h (starts 12/6 evening)    Vancomycin Level(s) / Doses:    Date Time Dose Type of Level / Level Interpretation                 Note: Serum levels collected for AUC-based dosing may be high if collected in close proximity to the dose administered. This is not necessarily indicative of  toxicity.    Cultures & Sensitivities:    Date Site Micro Susceptibility / Result   11/30 Blood cultures x 2 NGTD    11/21 Urine  NGTD      Recent Labs     12/03/24  1306 12/03/24  2307 12/04/24  0729 12/04/24  1732 12/05/24  0601   CREATININE 1.2   < > 1.2 1.2 1.3   BUN 28*   < > 31* 31* 32*   WBC 10.7  --  10.3  --  18.7*    < > = values in this interval not displayed.       Estimated Creatinine Clearance: 54 mL/min (based on SCr of 1.3 mg/dL).    Additional Lab Values / Findings of Note:    No results for input(s): \"PROCAL\" in the last 72 hours.

## 2024-12-06 ENCOUNTER — APPOINTMENT (OUTPATIENT)
Dept: GENERAL RADIOLOGY | Age: 84
DRG: 447 | End: 2024-12-06
Attending: INTERNAL MEDICINE
Payer: MEDICARE

## 2024-12-06 ENCOUNTER — APPOINTMENT (OUTPATIENT)
Age: 84
DRG: 447 | End: 2024-12-06
Attending: INTERNAL MEDICINE
Payer: MEDICARE

## 2024-12-06 PROBLEM — R57.0 CARDIOGENIC SHOCK: Status: ACTIVE | Noted: 2024-12-06

## 2024-12-06 PROBLEM — J96.22 ACUTE ON CHRONIC RESPIRATORY FAILURE WITH HYPOXIA AND HYPERCAPNIA: Status: ACTIVE | Noted: 2024-12-06

## 2024-12-06 PROBLEM — R65.21 SEPTIC SHOCK (HCC): Status: ACTIVE | Noted: 2024-03-27

## 2024-12-06 PROBLEM — J96.21 ACUTE ON CHRONIC RESPIRATORY FAILURE WITH HYPOXIA AND HYPERCAPNIA: Status: ACTIVE | Noted: 2024-12-06

## 2024-12-06 LAB
ALBUMIN SERPL-MCNC: 1.9 G/DL (ref 3.4–5)
ANION GAP SERPL CALCULATED.3IONS-SCNC: 12 MMOL/L (ref 3–16)
BASE EXCESS BLDA CALC-SCNC: -2 MMOL/L (ref -3–3)
BUN SERPL-MCNC: 34 MG/DL (ref 7–20)
CALCIUM SERPL-MCNC: 7.2 MG/DL (ref 8.3–10.6)
CHLORIDE SERPL-SCNC: 102 MMOL/L (ref 99–110)
CO2 BLDA-SCNC: 27 MMOL/L
CO2 SERPL-SCNC: 21 MMOL/L (ref 21–32)
CORTIS SERPL-MCNC: 17.6 UG/DL
CREAT SERPL-MCNC: 1.6 MG/DL (ref 0.8–1.3)
DEPRECATED RDW RBC AUTO: 20.2 % (ref 12.4–15.4)
ECHO BSA: 2.46 M2
ECHO LV EDV A2C: 223 ML
ECHO LV EDV A4C: 152 ML
ECHO LV EDV INDEX A4C: 62 ML/M2
ECHO LV EDV NDEX A2C: 91 ML/M2
ECHO LV EJECTION FRACTION A2C: 47 %
ECHO LV EJECTION FRACTION A4C: 18 %
ECHO LV EJECTION FRACTION BIPLANE: 34 % (ref 55–100)
ECHO LV ESV A2C: 119 ML
ECHO LV ESV A4C: 125 ML
ECHO LV ESV INDEX A2C: 49 ML/M2
ECHO LV ESV INDEX A4C: 51 ML/M2
ECHO LV FRACTIONAL SHORTENING: 13 % (ref 28–44)
ECHO LV INTERNAL DIMENSION DIASTOLE INDEX: 1.93 CM/M2
ECHO LV INTERNAL DIMENSION DIASTOLIC: 4.7 CM (ref 4.2–5.9)
ECHO LV INTERNAL DIMENSION SYSTOLIC INDEX: 1.68 CM/M2
ECHO LV INTERNAL DIMENSION SYSTOLIC: 4.1 CM
ECHO LV IVSD: 1.3 CM (ref 0.6–1)
ECHO LV MASS 2D: 224.8 G (ref 88–224)
ECHO LV MASS INDEX 2D: 92.1 G/M2 (ref 49–115)
ECHO LV POSTERIOR WALL DIASTOLIC: 1.2 CM (ref 0.6–1)
ECHO LV RELATIVE WALL THICKNESS RATIO: 0.51
ECHO RV BASAL DIMENSION: 5 CM
ECHO RV FREE WALL PEAK S': 4.7 CM/S
ECHO RV LONGITUDINAL DIMENSION: 8.9 CM
ECHO RV MID DIMENSION: 2.7 CM
ECHO RV TAPSE: 0.7 CM (ref 1.7–?)
GFR SERPLBLD CREATININE-BSD FMLA CKD-EPI: 42 ML/MIN/{1.73_M2}
GLUCOSE BLD-MCNC: 151 MG/DL (ref 70–99)
GLUCOSE BLD-MCNC: 155 MG/DL (ref 70–99)
GLUCOSE BLD-MCNC: 163 MG/DL (ref 70–99)
GLUCOSE BLD-MCNC: 173 MG/DL (ref 70–99)
GLUCOSE SERPL-MCNC: 158 MG/DL (ref 70–99)
HCO3 BLDA-SCNC: 24.9 MMOL/L (ref 21–29)
HCT VFR BLD AUTO: 31.8 % (ref 40.5–52.5)
HGB BLD-MCNC: 9.8 G/DL (ref 13.5–17.5)
MAGNESIUM SERPL-MCNC: 1.87 MG/DL (ref 1.8–2.4)
MCH RBC QN AUTO: 29.3 PG (ref 26–34)
MCHC RBC AUTO-ENTMCNC: 30.8 G/DL (ref 31–36)
MCV RBC AUTO: 95 FL (ref 80–100)
PCO2 BLDA: 54.1 MM HG (ref 35–45)
PERFORMED ON: ABNORMAL
PH BLDA: 7.27 [PH] (ref 7.35–7.45)
PHOSPHATE SERPL-MCNC: 4.9 MG/DL (ref 2.5–4.9)
PLATELET # BLD AUTO: 233 K/UL (ref 135–450)
PMV BLD AUTO: 7.8 FL (ref 5–10.5)
PO2 BLDA: 328.2 MM HG (ref 75–108)
POC SAMPLE TYPE: ABNORMAL
POTASSIUM SERPL-SCNC: ABNORMAL MMOL/L (ref 3.5–5.1)
RBC # BLD AUTO: 3.35 M/UL (ref 4.2–5.9)
SAO2 % BLDA: 100 % (ref 93–100)
SODIUM SERPL-SCNC: 135 MMOL/L (ref 136–145)
TRIGL SERPL-MCNC: 79 MG/DL (ref 0–150)
VANCOMYCIN SERPL-MCNC: 13.8 UG/ML
WBC # BLD AUTO: 11.5 K/UL (ref 4–11)

## 2024-12-06 PROCEDURE — 74018 RADEX ABDOMEN 1 VIEW: CPT

## 2024-12-06 PROCEDURE — 36620 INSERTION CATHETER ARTERY: CPT | Performed by: INTERNAL MEDICINE

## 2024-12-06 PROCEDURE — 80069 RENAL FUNCTION PANEL: CPT

## 2024-12-06 PROCEDURE — 2500000003 HC RX 250 WO HCPCS

## 2024-12-06 PROCEDURE — 03HB33Z INSERTION OF INFUSION DEVICE INTO RIGHT RADIAL ARTERY, PERCUTANEOUS APPROACH: ICD-10-PCS | Performed by: INTERNAL MEDICINE

## 2024-12-06 PROCEDURE — 93325 DOPPLER ECHO COLOR FLOW MAPG: CPT | Performed by: INTERNAL MEDICINE

## 2024-12-06 PROCEDURE — 97607 NEG PRS WND THR NDME<=50SQCM: CPT

## 2024-12-06 PROCEDURE — 6360000002 HC RX W HCPCS

## 2024-12-06 PROCEDURE — 31500 INSERT EMERGENCY AIRWAY: CPT

## 2024-12-06 PROCEDURE — 2580000003 HC RX 258: Performed by: INTERNAL MEDICINE

## 2024-12-06 PROCEDURE — 84478 ASSAY OF TRIGLYCERIDES: CPT

## 2024-12-06 PROCEDURE — 82533 TOTAL CORTISOL: CPT

## 2024-12-06 PROCEDURE — 2000000000 HC ICU R&B

## 2024-12-06 PROCEDURE — 2580000003 HC RX 258

## 2024-12-06 PROCEDURE — 6360000002 HC RX W HCPCS: Performed by: STUDENT IN AN ORGANIZED HEALTH CARE EDUCATION/TRAINING PROGRAM

## 2024-12-06 PROCEDURE — 94761 N-INVAS EAR/PLS OXIMETRY MLT: CPT

## 2024-12-06 PROCEDURE — 6360000004 HC RX CONTRAST MEDICATION: Performed by: INTERNAL MEDICINE

## 2024-12-06 PROCEDURE — 2580000003 HC RX 258: Performed by: STUDENT IN AN ORGANIZED HEALTH CARE EDUCATION/TRAINING PROGRAM

## 2024-12-06 PROCEDURE — 36556 INSERT NON-TUNNEL CV CATH: CPT | Performed by: INTERNAL MEDICINE

## 2024-12-06 PROCEDURE — 05HM33Z INSERTION OF INFUSION DEVICE INTO RIGHT INTERNAL JUGULAR VEIN, PERCUTANEOUS APPROACH: ICD-10-PCS | Performed by: INTERNAL MEDICINE

## 2024-12-06 PROCEDURE — 6370000000 HC RX 637 (ALT 250 FOR IP): Performed by: INTERNAL MEDICINE

## 2024-12-06 PROCEDURE — 82947 ASSAY GLUCOSE BLOOD QUANT: CPT

## 2024-12-06 PROCEDURE — 36415 COLL VENOUS BLD VENIPUNCTURE: CPT

## 2024-12-06 PROCEDURE — 6370000000 HC RX 637 (ALT 250 FOR IP): Performed by: SURGERY

## 2024-12-06 PROCEDURE — 99232 SBSQ HOSP IP/OBS MODERATE 35: CPT | Performed by: INTERNAL MEDICINE

## 2024-12-06 PROCEDURE — C8924 2D TTE W OR W/O FOL W/CON,FU: HCPCS

## 2024-12-06 PROCEDURE — 83735 ASSAY OF MAGNESIUM: CPT

## 2024-12-06 PROCEDURE — 6360000002 HC RX W HCPCS: Performed by: INTERNAL MEDICINE

## 2024-12-06 PROCEDURE — 99223 1ST HOSP IP/OBS HIGH 75: CPT | Performed by: INTERNAL MEDICINE

## 2024-12-06 PROCEDURE — 82803 BLOOD GASES ANY COMBINATION: CPT

## 2024-12-06 PROCEDURE — 2700000000 HC OXYGEN THERAPY PER DAY

## 2024-12-06 PROCEDURE — 93308 TTE F-UP OR LMTD: CPT | Performed by: INTERNAL MEDICINE

## 2024-12-06 PROCEDURE — 94002 VENT MGMT INPAT INIT DAY: CPT

## 2024-12-06 PROCEDURE — 87641 MR-STAPH DNA AMP PROBE: CPT

## 2024-12-06 PROCEDURE — 85027 COMPLETE CBC AUTOMATED: CPT

## 2024-12-06 PROCEDURE — 71045 X-RAY EXAM CHEST 1 VIEW: CPT

## 2024-12-06 PROCEDURE — 99291 CRITICAL CARE FIRST HOUR: CPT | Performed by: INTERNAL MEDICINE

## 2024-12-06 PROCEDURE — 5A1945Z RESPIRATORY VENTILATION, 24-96 CONSECUTIVE HOURS: ICD-10-PCS

## 2024-12-06 PROCEDURE — P9047 ALBUMIN (HUMAN), 25%, 50ML: HCPCS

## 2024-12-06 PROCEDURE — 80202 ASSAY OF VANCOMYCIN: CPT

## 2024-12-06 PROCEDURE — 36620 INSERTION CATHETER ARTERY: CPT

## 2024-12-06 RX ORDER — FUROSEMIDE 10 MG/ML
20 INJECTION INTRAMUSCULAR; INTRAVENOUS ONCE
Status: COMPLETED | OUTPATIENT
Start: 2024-12-06 | End: 2024-12-06

## 2024-12-06 RX ORDER — NOREPINEPHRINE BITARTRATE 0.06 MG/ML
1-100 INJECTION, SOLUTION INTRAVENOUS CONTINUOUS
Status: DISCONTINUED | OUTPATIENT
Start: 2024-12-06 | End: 2024-12-11 | Stop reason: HOSPADM

## 2024-12-06 RX ORDER — ALBUMIN (HUMAN) 12.5 G/50ML
50 SOLUTION INTRAVENOUS EVERY 6 HOURS
Status: COMPLETED | OUTPATIENT
Start: 2024-12-06 | End: 2024-12-06

## 2024-12-06 RX ORDER — ROCURONIUM BROMIDE 10 MG/ML
0.6 INJECTION, SOLUTION INTRAVENOUS ONCE
Status: COMPLETED | OUTPATIENT
Start: 2024-12-06 | End: 2024-12-06

## 2024-12-06 RX ORDER — DOBUTAMINE HYDROCHLORIDE 200 MG/100ML
2.5-1 INJECTION INTRAVENOUS CONTINUOUS
Status: DISCONTINUED | OUTPATIENT
Start: 2024-12-06 | End: 2024-12-06

## 2024-12-06 RX ORDER — ETOMIDATE 2 MG/ML
0.3 INJECTION INTRAVENOUS ONCE
Status: COMPLETED | OUTPATIENT
Start: 2024-12-06 | End: 2024-12-06

## 2024-12-06 RX ORDER — ROCURONIUM BROMIDE 10 MG/ML
INJECTION, SOLUTION INTRAVENOUS
Status: COMPLETED
Start: 2024-12-06 | End: 2024-12-06

## 2024-12-06 RX ORDER — DOPAMINE HYDROCHLORIDE 160 MG/100ML
1-20 INJECTION, SOLUTION INTRAVENOUS CONTINUOUS
Status: DISCONTINUED | OUTPATIENT
Start: 2024-12-06 | End: 2024-12-06

## 2024-12-06 RX ORDER — DOBUTAMINE HYDROCHLORIDE 200 MG/100ML
2.5-1 INJECTION INTRAVENOUS CONTINUOUS
Status: DISCONTINUED | OUTPATIENT
Start: 2024-12-06 | End: 2024-12-10

## 2024-12-06 RX ORDER — VANCOMYCIN 1.5 G/300ML
1500 INJECTION, SOLUTION INTRAVENOUS ONCE
Status: COMPLETED | OUTPATIENT
Start: 2024-12-06 | End: 2024-12-06

## 2024-12-06 RX ORDER — PROPOFOL 10 MG/ML
5-50 INJECTION, EMULSION INTRAVENOUS CONTINUOUS
Status: DISCONTINUED | OUTPATIENT
Start: 2024-12-06 | End: 2024-12-07

## 2024-12-06 RX ADMIN — DEXTROSE MONOHYDRATE: 100 INJECTION, SOLUTION INTRAVENOUS at 04:32

## 2024-12-06 RX ADMIN — NOREPINEPHRINE BITARTRATE 5 MCG/MIN: 0.06 INJECTION, SOLUTION INTRAVENOUS at 13:23

## 2024-12-06 RX ADMIN — NOREPINEPHRINE BITARTRATE 50 MCG/MIN: 0.06 INJECTION, SOLUTION INTRAVENOUS at 19:22

## 2024-12-06 RX ADMIN — PROPOFOL 15 MCG/KG/MIN: 10 INJECTION, EMULSION INTRAVENOUS at 21:43

## 2024-12-06 RX ADMIN — MINERAL OIL 330 ML: 999 LIQUID ORAL at 18:01

## 2024-12-06 RX ADMIN — VASOPRESSIN 0.03 UNITS/MIN: 20 INJECTION INTRAVENOUS at 13:45

## 2024-12-06 RX ADMIN — DOBUTAMINE HYDROCHLORIDE 5 MCG/KG/MIN: 200 INJECTION INTRAVENOUS at 16:39

## 2024-12-06 RX ADMIN — Medication: at 18:04

## 2024-12-06 RX ADMIN — ALBUMIN (HUMAN) 50 G: 0.25 INJECTION, SOLUTION INTRAVENOUS at 22:17

## 2024-12-06 RX ADMIN — DOPAMINE HYDROCHLORIDE 5 MCG/KG/MIN: 160 INJECTION, SOLUTION INTRAVENOUS at 10:10

## 2024-12-06 RX ADMIN — VASOPRESSIN 0.03 UNITS/MIN: 20 INJECTION INTRAVENOUS at 23:15

## 2024-12-06 RX ADMIN — BACITRACIN: 500 OINTMENT TOPICAL at 20:15

## 2024-12-06 RX ADMIN — HEPARIN SODIUM 5000 UNITS: 5000 INJECTION INTRAVENOUS; SUBCUTANEOUS at 13:13

## 2024-12-06 RX ADMIN — HEPARIN SODIUM 5000 UNITS: 5000 INJECTION INTRAVENOUS; SUBCUTANEOUS at 20:17

## 2024-12-06 RX ADMIN — VANCOMYCIN 1500 MG: 1.5 INJECTION, SOLUTION INTRAVENOUS at 14:52

## 2024-12-06 RX ADMIN — PIPERACILLIN AND TAZOBACTAM 3375 MG: 3; .375 INJECTION, POWDER, LYOPHILIZED, FOR SOLUTION INTRAVENOUS at 20:13

## 2024-12-06 RX ADMIN — ALBUMIN (HUMAN) 50 G: 0.25 INJECTION, SOLUTION INTRAVENOUS at 10:35

## 2024-12-06 RX ADMIN — BACITRACIN: 500 OINTMENT TOPICAL at 13:13

## 2024-12-06 RX ADMIN — ROCURONIUM BROMIDE 70 MG: 10 INJECTION, SOLUTION INTRAVENOUS at 11:31

## 2024-12-06 RX ADMIN — PROPOFOL 20 MCG/KG/MIN: 10 INJECTION, EMULSION INTRAVENOUS at 11:42

## 2024-12-06 RX ADMIN — PHENYLEPHRINE HYDROCHLORIDE 30 MCG/MIN: 10 INJECTION INTRAVENOUS at 12:33

## 2024-12-06 RX ADMIN — Medication: at 21:12

## 2024-12-06 RX ADMIN — SULFUR HEXAFLUORIDE 2 ML: 60.7; .19; .19 INJECTION, POWDER, LYOPHILIZED, FOR SUSPENSION INTRAVENOUS; INTRAVESICAL at 10:37

## 2024-12-06 RX ADMIN — HEPARIN SODIUM 5000 UNITS: 5000 INJECTION INTRAVENOUS; SUBCUTANEOUS at 04:30

## 2024-12-06 RX ADMIN — VASOPRESSIN 0.03 UNITS/MIN: 20 INJECTION INTRAVENOUS at 13:11

## 2024-12-06 RX ADMIN — PIPERACILLIN AND TAZOBACTAM 3375 MG: 3; .375 INJECTION, POWDER, LYOPHILIZED, FOR SOLUTION INTRAVENOUS at 13:07

## 2024-12-06 RX ADMIN — ETOMIDATE 30 MG: 2 INJECTION, SOLUTION INTRAVENOUS at 11:29

## 2024-12-06 RX ADMIN — DOBUTAMINE HYDROCHLORIDE 2.5 MCG/KG/MIN: 200 INJECTION INTRAVENOUS at 11:36

## 2024-12-06 RX ADMIN — ALBUMIN (HUMAN) 50 G: 0.25 INJECTION, SOLUTION INTRAVENOUS at 16:37

## 2024-12-06 RX ADMIN — FUROSEMIDE 20 MG: 10 INJECTION, SOLUTION INTRAMUSCULAR; INTRAVENOUS at 06:58

## 2024-12-06 RX ADMIN — PIPERACILLIN AND TAZOBACTAM 3375 MG: 3; .375 INJECTION, POWDER, LYOPHILIZED, FOR SOLUTION INTRAVENOUS at 04:30

## 2024-12-06 RX ADMIN — VASOPRESSIN 0.03 UNITS/MIN: 20 INJECTION INTRAVENOUS at 11:34

## 2024-12-06 ASSESSMENT — PULMONARY FUNCTION TESTS
PIF_VALUE: 27
PIF_VALUE: 21
PIF_VALUE: 23
PIF_VALUE: 32
PIF_VALUE: 26
PIF_VALUE: 27
PIF_VALUE: 30
PIF_VALUE: 29
PIF_VALUE: 28
PIF_VALUE: 29
PIF_VALUE: 20
PIF_VALUE: 25
PIF_VALUE: 23
PIF_VALUE: 29
PIF_VALUE: 23
PIF_VALUE: 25
PIF_VALUE: 21
PIF_VALUE: 25
PIF_VALUE: 28
PIF_VALUE: 28
PIF_VALUE: 27
PIF_VALUE: 28
PIF_VALUE: 28
PIF_VALUE: 27
PIF_VALUE: 25
PIF_VALUE: 26
PIF_VALUE: 21
PIF_VALUE: 26
PIF_VALUE: 30
PIF_VALUE: 24
PIF_VALUE: 26
PIF_VALUE: 27
PIF_VALUE: 23
PIF_VALUE: 20
PIF_VALUE: 27
PIF_VALUE: 25
PIF_VALUE: 22
PIF_VALUE: 30
PIF_VALUE: 28
PIF_VALUE: 30
PIF_VALUE: 29
PIF_VALUE: 30
PIF_VALUE: 29
PIF_VALUE: 31
PIF_VALUE: 29
PIF_VALUE: 26
PIF_VALUE: 29
PIF_VALUE: 21
PIF_VALUE: 27
PIF_VALUE: 28
PIF_VALUE: 27
PIF_VALUE: 26

## 2024-12-06 ASSESSMENT — PAIN SCALES - GENERAL
PAINLEVEL_OUTOF10: 0

## 2024-12-06 NOTE — PROGRESS NOTES
Speech-Language Pathology  Dc    Chart reviewed, noted that pt now intubated. Please re-refer s/p extubation as pt appropriate      CRISTA DE LOS SANTOS M.S./CCC-SLP #[id number]  Pg. # [phone]

## 2024-12-06 NOTE — PROGRESS NOTES
Pt admitted to  room 4505 at this time. Wife, Lizzie updated and aware. Standard safety measures in place. ICU team bedside.

## 2024-12-06 NOTE — PROGRESS NOTES
Pt's saturations in mid 80s at this time. High flow nasal cannula at 15 L. ICU residents at bedside. RT called to place pt on Vapotherm. Son at bedside.   Electronically signed by Isela Smith RN on 12/6/2024 at 10:52 AM

## 2024-12-06 NOTE — PROGRESS NOTES
ICU team went to bedside to see patient. Patient was sleeping comfortably, denied any complaints. He was on 4 L NC saturating around 95%. Repeat BP showed 89/55 and HR 95. Spoke with the RN, patient appears fluid overload, will stop NS IVF. Will continue to monitor patient overnight. Keep MAP above 65.       Lolly Alejandre MD   Internal Medicine PGY-2

## 2024-12-06 NOTE — PROGRESS NOTES
Consulted for coccyx wound and right toes wound. Pt transferred to ICU and being intubated. Will attempt to evaluate pt this afternoon. Updated orders for Venelex to be applied to sacrum and right toes. Wound Care to continue to follow while inpatient.

## 2024-12-06 NOTE — PROGRESS NOTES
4 Eyes Skin Assessment     NAME:  Juan Mohamud  YOB: 1940  MEDICAL RECORD NUMBER:  [id number]    The patient is being assessed for  Admission    I agree that at least one RN has performed a thorough Head to Toe Skin Assessment on the patient. ALL assessment sites listed below have been assessed.      Areas assessed by both nurses:    Head, Face, Ears, Shoulders, Back, Chest, Arms, Elbows, Hands, Sacrum. Buttock, Coccyx, Ischium, Legs. Feet and Heels, and Under Medical Devices         Does the Patient have a Wound? Yes wound(s) were present on assessment. LDA wound assessment was Initiated and completed by RN  -Stage 3 wound on coccyx  -Non-blanchable redness on coccyx  -R 3rd toe abrasion/DTI  -L 1-4 toes abrasion/DTI  -Blanchable redness bilateral heels  -L post ankle non-blanchable redness  -Scattered tears and bruising on bilateral upper extremities       Piero Prevention initiated by RN: Yes  Wound Care Orders initiated by RN: Yes    Pressure Injury (Stage 3,4, Unstageable, DTI, NWPT, and Complex wounds) if present, place Wound referral order by RN under : Yes    New Ostomies, if present place, Ostomy referral order under : No     Nurse 1 eSignature: Electronically signed by Isela Smith RN on 12/6/24 at 6:56 PM EST    **SHARE this note so that the co-signing nurse can place an eSignature**    Nurse 2 eSignature: Electronically signed by Sangita Velarde RN on 12/6/24 at 7:03 PM EST

## 2024-12-06 NOTE — PROGRESS NOTES
Provided update to wife via phone.     Electronically signed by Debra Cortez RN on 12/6/2024 at 9:59 AM

## 2024-12-06 NOTE — CONSULTS
ICU CONSULT       PCP:  Janet Garcia MD          Admit Date:  11/7/2024                            Hospital Day: 29  ICU Day: 1      CC: Hypotension   Reason for consult: cardiogenic shock  History obtained from:  chart review    SUBJECTIVE   HPI:    Mr. Juan Mohamud is a 84 y.o. male with a medical hx significant for Ogilive syndrome, PE/DVT s/p IVC filter , type II DM, degenerative lumbar disease, arrhythmia s/p AICD, diastolic CHF, and macular degeneration, otherwise as listed in the MHx table below, who presented from home to [facility] on 11/3/24 following a fall with a T11 burst fracture and associated epidural hematoma with cord compression. Transferred to [facility]/ICU on 11/7/2024, where he underwent T8-L1 posterior decompression, fixation, and fusion on 11/11.  He developed DVT/PE and IVC filter was placed by IR on 11/15.     The patient presents with hypotension (90/50s) over the past 2 days, which has been refractory to fluid resuscitation and midodrine 25 mg every 8 hours. Cardiology were consulted, an echocardiogram revealed a reduced EF of 30-35%, global hypokinesis, biventricular dysfunction, and moderate to severe mitral valve regurgitation, all of which are suggestive of biventricular heart failure. So the patient was transferred to the ICU for management of cardiogenic shock. While in the ICU, the patient's oxygen saturation decreased into the 70s despite attempts at vapotherm therapy. As there was no improvement in oxygenation, the patient was intubated.         Past Medical History:   Diagnosis Date    (HFpEF) heart failure with preserved ejection fraction (HCC) 07/2024    EF 50-55%, mod/severe MR    Diabetes mellitus (HCC)     diet controlled    DVT (deep venous thrombosis) ([facility]) 11/13/2024      Acute deep venous thrombosis in the left gastrocnemius and soleal veins without proximal large vein extension..    Hyperlipidemia     ICD (implantable cardioverter-defibrillator) in    severe foraminal stenosis.      Electronically signed by Hiram Lechuga      CT THORACIC SPINE WO CONTRAST   Final Result      THORACIC SPINE   1.  T11 vertebral fracture involving the T10-T11 disc base and with distraction.   2.  Nondisplaced fracture in the T11 lamina and spinous process.   3.  Nondisplaced fracture in the T12 spinous process.   4.  No other fracture seen, but the possibility of nondisplaced fracture in the   T6 vertebral body is not refuted by this CT.   5.  Known epidural hematoma not well visualized by CT.   6.  Severe multilevel degenerative changes resulting in multiple levels of   severe foraminal stenosis.      ABDOMEN AND PELVIS   1.  No CT evidence of an acute fracture in the lumbar spine.   2.  Prominent endplate degenerative Schmorl's nodes and/or chronic mild endplate   compression fractures in the L1, L2 and L3 vertebrae.   3.  Subacute nondisplaced bilateral sacral ala insufficiency fractures. Subacute   nondisplaced fractures in the anterior column of the left hip. Subacute   nondisplaced fracture in the right parasymphyseal pubic bone.   4.  Prior L4-S1 posterior fusion. No intervertebral bony bridging. Lucency   suggesting loosening of the left S1 pedicle screw.   5.  Severe multilevel degenerative changes resulting in at least moderate if not   severe stenosis at L2-L3 and at least mild stenosis at L3-L4. Multiple levels of   severe foraminal stenosis.      Electronically signed by Hiram Lechuga      XR ABDOMEN (KUB) (SINGLE AP VIEW)   Final Result   Impression:      1. Dilated small bowel which could relate to small bowel ileus or small bowel obstruction.      Electronically signed by Jeremie Montilla MD      XR THORACOLUMBAR SPINE (MIN 2 VIEWS)    (Results Pending)       ASSESSMENT & PLAN   84 y.o. male who presented to the ED on 11/7/2024 with with a medical hx significant for Ogilive syndrome, PE/DVT s/p IVC filter , type II DM, degenerative lumbar disease, arrhythmia s/p AICD,

## 2024-12-06 NOTE — PROGRESS NOTES
demonstrated free air and patient was taken for exploratory laparotomy with closure of gastrotomy, reji gastrostomy (12/4)     - Daily SMOG encouraged from a general surgery stand point  - Bacitracin and mepilex dressing to superficial skin tears daily per nursing  - Continue gastrostomy tube to gravity. Will study in 5 days. Remain NPO with once cup of ice chips per shift.  - Recommend PICC and TPN  - W[place] change MWF   - Aggressive electrolyte replacement  - Rest of care per primary     Shimon Cuellar IV, MD  General Surgery, PGY-4  12/06/24  9:27 AM  [phone]

## 2024-12-06 NOTE — PROGRESS NOTES
Palliative Medicine Progress Note    Admit Date: 11/7/2024  Hospital Day:  Hospital Day: 30     CC: mechanical fall  HPI: Juan Mohamud is a 84 y.o. male with PMH of type II DM, degenerative lumbar disease, arrhythmia s/p AICD, diastolic CHF, and macular degeneration  who presented with a fall to [facility] 11/3.     Fall was thought to be mechanical and secondary to generalized weakness due to poor nutrition. CT abd pelv performed due to complaints of abdominal pain, distension, and back pain upon arrival to the floor. Imaging revealed acute T11 vertebral fracture extending into the T10-T11 disc space as well as diffuse distention of small and large bowel compatible with ileus and bilateral pleural effusions.     GI consulted for ileus, and patient underwent colonic decompression     MRI thoracic spine then demonstrated large epidural spinal hematoma with cord compression  and additional T6 subacute compression fracture along with bilateral pleural effusions.     Patient was transferred to [facility] on 11/7 for neurosurgical care,   where he underwent T8-L1 posterior decompression, fixation, and fusion on 11/11. Progressively worsening abdominal distention since original fall requiring endoscopic decompression on 11/8. Failed MBS, underwent PEG placement 11/26. CXR 12/4 demonstrated free air and patient was taken for exploratory laparotomy with closure of gastrotomy, reji gastrostomy (12/4)     D/w Dr. Akers. Pt moved to ICU today 12/6 for persistent hypotension as well as fluid overload.     Went to see pt x2 this morning, however he was having a bedside procedure both times. Called pt's wife Lizzie, no answer and no ability to leave voicemail.     Returned to see pt at the bedside this afternoon, he is now intubated, on multiple pressors and desaturating despite maximal ventilator support. Pt's wife Lizzie and son Lito were at the bedside. We discussed pt's code status. Dr. Salgado arrived to bedside and

## 2024-12-06 NOTE — PLAN OF CARE
Problem: Chronic Conditions and Co-morbidities  Goal: Patient's chronic conditions and co-morbidity symptoms are monitored and maintained or improved  Outcome: Progressing  Flowsheets (Taken 12/5/2024 1905)  Care Plan - Patient's Chronic Conditions and Co-Morbidity Symptoms are Monitored and Maintained or Improved:   Monitor and assess patient's chronic conditions and comorbid symptoms for stability, deterioration, or improvement   Collaborate with multidisciplinary team to address chronic and comorbid conditions and prevent exacerbation or deterioration   Update acute care plan with appropriate goals if chronic or comorbid symptoms are exacerbated and prevent overall improvement and discharge     Problem: Discharge Planning  Goal: Discharge to home or other facility with appropriate resources  Outcome: Progressing  Flowsheets (Taken 12/5/2024 1905)  Discharge to home or other facility with appropriate resources:   Identify barriers to discharge with patient and caregiver   Arrange for needed discharge resources and transportation as appropriate   Identify discharge learning needs (meds, wound care, etc)   Arrange for interpreters to assist at discharge as needed   Refer to discharge planning if patient needs post-hospital services based on physician order or complex needs related to functional status, cognitive ability or social support system     Problem: Safety - Adult  Goal: Free from fall injury  Outcome: Progressing  Flowsheets (Taken 12/5/2024 1905)  Free From Fall Injury:   Based on caregiver fall risk screen, instruct family/caregiver to ask for assistance with transferring infant if caregiver noted to have fall risk factors   Instruct family/caregiver on patient safety     Problem: ABCDS Injury Assessment  Goal: Absence of physical injury  Outcome: Progressing  Flowsheets (Taken 12/5/2024 1905)  Absence of Physical Injury: Implement safety measures based on patient assessment     Problem: Pain  Goal:

## 2024-12-06 NOTE — CONSULTS
bowel with transition point in the right lateral pelvis. Findings could represent perforated bowel or potentially a leak from the gastrostomy tube.   Cannot exclude developing bowel obstruction in the right lower pelvis.   2. Anasarca, ascites, pleural effusions with fluid overload     12/5 CXR 'Stable diffuse bilateral airspace disease and bilateral pleural effusions '      IMPRESSION:      Patient Active Problem List   Diagnosis    DDD (degenerative disc disease), lumbar    Cataract extraction status of right eye    Constipation    Arrhythmia    Primary hypertension    Hyperlipidemia    Colonic obstruction (HCC)    Trauma    History of fall    Type 2 diabetes mellitus (HCC)    Anemia    Pneumonia    Hyponatremia    Colonic pseudoobstruction    Wide-complex tachycardia    Acute hypoxemic respiratory failure    BOSTON (acute kidney injury) ([facility])    ICD (implantable cardioverter-defibrillator) in place    Sepsis ([facility])    PVC's (premature ventricular contractions)    Acute congestive heart failure ([facility])    Fall at home, initial encounter    Abdominal distension    Epidural hematoma    Fx dorsal vertebra-closed ([facility])    Spinal cord injury at T1-T6 level ([facility])    Preoperative cardiovascular examination    Chronic heart failure with preserved ejection fraction (HFpEF) ([facility])    SVT (supraventricular tachycardia) ([facility])    Hypotension    VT (ventricular tachycardia) ([facility])    Dysphagia    Encounter for palliative care    Paralytic ileus of small intestine and colon ([facility])    Intraperitoneal abscess ([facility])    Gastrostomy site leak ([facility])       PMH - DM, CHF, OA  SurgHx - ICD    Admit [facility] 11/3-7  Fall - T11 burst fx    Transfer  11/7   Dx thoracic T11 vert fx (SC compression, epidural hematoma,   GI ileus - 'Olgilvie syndrome', colonoscopic decompressions   DVT/PE - IVC filter 11/15  PEG 11/27    Dx Aspiration pneumonia  Treated with Unasyn    11/30 Started on Zosyn    12/4 x-ray - pneumoperitoneum   OR - Exp lap, G-tube leak  with deep abscess    IMP/  Resp failure  Hypotension  POD#2 laparotomy    RECOMMENDATIONS:    Cont empiric Vanco / Zosyn  Supportive care    Guarded prognosis      Medical Decision Making:  The following items were considered in medical decision making:  Discussion of patient care with other providers  Reviewed clinical lab tests  Reviewed radiology tests  Reviewed other diagnostic tests/interventions  Independent review of radiologic images  Microbiology cultures and other micro tests reviewed      Risk of Complications/Morbidity: High   Illness(es)/ Infection present that pose threat to bodily function.   There is potential for severe exacerbation of infection/side effects of treatment.  Therapy requires intensive monitoring for antimicrobial agent toxicity    Discussed with pt, family  Discussed with Attending - Dr Akers earlier today, Pul / CC - Dr Naomi Lugo MD

## 2024-12-06 NOTE — CONSULTS
Cardiology Consultation                                                                    Pt Name: Juan Mohamud  Age: 84 y.o.  Sex: male  : 1940  Location: [address] [place], [place] [id number]-    Referring Physician: Fred Akers MD  Primary cardiologist: Isela Hand CNP      Reason for Consult:     Reason for Consultation/Chief Complaint: CHF, fluid overload    Interval history:   Pt has no complaints but is minimally arousable and has been transferred to ICU for blood pressure monitoring and central line placement.       HPI:      Juan Mohamud is a 84 y.o. male with a past medical history of HFpEF, ICD s/p Vtach (3/24), IVC filter () s/p DVT/PE     Echo () Normal left ventricular systolic function with a visually estimated EF of 50 - 55%, RV mildly dilated, Moderate to severe regurgitation with an anterior directed jet.     Mr. Juan Mohamud is a 84 y.o. male with a medical hx significant for Ogilive syndrome, PE/DVT s/p IVC filter , type II DM, degenerative lumbar disease, arrhythmia s/p AICD, diastolic CHF, and macular degeneration, otherwise as listed in the MHx table below, who presented from home to [facility] on 11/3/24 following a fall with a T11 burst fracture and associated epidural hematoma with cord compression. Transferred to [facility]/ICU on 2024, where he underwent T8-L1 posterior decompression, fixation, and fusion on .  He developed DVT/PE and IVC filter was placed by IR on 11/15.     Rapid response called overnight  due to  low blood pressure. Transferred to ICU today on .    Histories     Past Medical History:   has a past medical history of (HFpEF) heart failure with preserved ejection fraction (HCC), Diabetes mellitus (HCC), DVT (deep venous thrombosis) (HCC), Hyperlipidemia, ICD (implantable cardioverter-defibrillator) in place, Indigestion, Insomnia, Macular degeneration, MI, old, Neuropathy, Pulmonary emboli (HCC), and Wide-complex tachycardia.    Surgical

## 2024-12-06 NOTE — PROGRESS NOTES
furosemide gtt for terrible anasarca.  Given critical nature of current status, Dr Salgado is holding TPN for today.  ID following.    Subjective/Objective:   Juan Mohamud is a 84 y.o. male with a PMHx significant for T2DM, degenerative lumbar disease, arrythmia s/p AICD placement, HFpEF who presented to ED 11/7 after a fall, s/p T8-L1 posterior decompression, fixation, fusion (11/11). Pt had progressively worsening abdominal distention requiring endoscopic decompression 11/8, failed MBS and s/p PEG placement 11/26. CXR 12/4 showed free air -- taken to OR for ex lap with closure of gastrotomy.  Pt also found to have PE/DVT s/p IVC filter (due to epidural hematoma).  Plan to start TPN on 12/5.  Vancomycin added to Zosyn tx on 12/5 for PNA concern.      Pharmacy is consulted to dose TPN and vancomycin.    Ht Readings from Last 1 Encounters:   12/06/24 1.956 m (6' 5\")     Wt Readings from Last 1 Encounters:   12/06/24 111.1 kg (245 lb)     Recent Labs     12/05/24 2000 12/06/24  0425   * 135*   K see below see below    102   CO2 21 21   PHOS 5.3* 4.9   GLUCOSE 114* 158*   BUN 36* 34*   CREATININE 1.7* 1.6*   CALCIUM 7.6* 7.2*   MG 2.16 1.87     Albumin   Date Value Ref Range Status   12/06/2024 1.9 (L) 3.4 - 5.0 g/dL Final     Corrected Calcium: 8.9 mg/dL    Recent Labs     12/05/24  1220 12/05/24  1737 12/05/24  1935 12/06/24  0835   POCGLU 70 100* 109* 155*     Sliding scale insulin used since PN bag hung yesterday: n/a    Recent Labs     12/05/24 2000 12/06/24  0425   WBC 13.1* 11.5*   HGB 11.2* 9.8*    233     Triglycerides   Date Value Ref Range Status   12/06/2024 79 0 - 150 mg/dL Final   07/24/2024 48 0 - 150 mg/dL Final   07/23/2024 54 0 - 150 mg/dL Final     Current & Prior Antimicrobial Regimen(s):  Cefazolin x1 pre-op (11/16)  Zosyn (11/30-current)  Vancomycin - pharmacy to dose   2000mg IV x1 (12/5)  1500mg IV q24h   Intermittent via levels (12/6-current)    Date Vanc Level Vanc

## 2024-12-06 NOTE — CARE COORDINATION
Discharge Planning:    Pt transferred to ICU- on pressors, lasix gtt. Consults pending for cardiology and ID.     Precert was pending for [employer] [facility]- insurance with intent to deny, plan was for P2P today. Per Marina at [facility], due to current change in status/instability- P2P does not need to be completed, [employer] will submit LTACH appeal once more stable. MD made aware.    Thank you  Cat Rj GANDARA, BSN, CM  ICU   [phone]

## 2024-12-06 NOTE — PROGRESS NOTES
V2.0    [facility] Progress Note      Name:  Juan Mohamud /Age/Sex: 1940  (84 y.o. male)   MRN & CSN:  [id number] & [id number] Encounter Date/Time: 2024 5:03 PM EST   Location:  [address] [place] [address] PCP: Janet Garcia MD     Attending:Fred Akers MD       Hospital Day: 30    Assessment and Recommendations       Assessment/Plan:       Acute CHF:  Patient blood pressure is running in the upper 70s  And patient is fluid overloaded at this time  Because of his low blood pressure unable to use diuretics  I have discussed case with ICU attending  Will transfer patient to ICU start patient on dopamine to improve blood pressure and initiate a Lasix drip  Reviewed note from cardiology  Patient started on vasopressin and dobutamine    Acute hypoxic respiratory failure:  Discussed case with critical care team  Reviewed note from critical care team  Patient currently requiring intubation  Reviewed note from palliative care  Greatly appreciate their input  Family has moved to DNR CCA at this time  And will consider transitioning to comfort care    Status post exploratory lap with closure of gastrotomy due to intra-abdominal free air:  Reviewed note from surgery   Patient currently on Zosyn    Recurrent aspiration pneumonia  Patient started on Zosyn  Will add vancomycin  Consult infectious disease reviewed their note   Case discussed with infectious disease    Hypotension:  Persistent hypotension today  I have discussed the case with the ICU attending and at this time they feel patient can be managed on the floor.  That he has a history of low blood pressures consistent with what he is running right now.  Will continue with IV fluids and monitor overnight.     Tex syndrome:  Reviewed note from gen surg  Reviewed note from GI    Hypoxia:   Increase in O2 demand from 1 L to 4 L over last 24 hours  Most likely secondary to pneumonia    Leukocytosis:  Most likely secondary pneumonia

## 2024-12-06 NOTE — PROGRESS NOTES
Pt has had an acute decompensation w/ BP, now in the 60s/40s. His SpO2 is also dropping into the low 80s. Will attempt Vapotherm.     Two ICU residents had discussion with wife Lizzie (next of kin decision maker) who was agreeable to CVC and arterial line placement.  Juan has previously mentioned being full code, but with this acute decompensation, I attempted to reach the family to discuss current goals of care.    Called Lizzie x2, no answer. Called Lito x2, no answer. LVM.

## 2024-12-06 NOTE — PROCEDURES
PROCEDURE NOTE  Date: 12/6/2024   Name: Juan Mohamud  YOB: 1940    Diagnosis: Cardiogenic shock    Insert Arterial Line    Date/Time: 12/6/2024 12:52 PM    Performed by: Nir Gonzalez DO  Authorized by: Earl Salgado MD  Consent: Verbal consent obtained.  Risks and benefits: risks, benefits and alternatives were discussed  Consent given by: power of   Patient identity confirmed: arm band, provided demographic data and hospital-assigned identification number  Time out: Immediately prior to procedure a \"time out\" was called to verify the correct patient, procedure, equipment, support staff and site/side marked as required.  Preparation: Patient was prepped and draped in the usual sterile fashion.  Indications: multiple ABGs and hemodynamic monitoring  Location: right radial    Sedation:  Patient sedated: yes  Sedatives: see MAR for details  Analgesia: see MAR for details  Vitals: Vital signs were monitored during sedation.    Ted's test normal: yes  Needle gauge: 20  Seldinger technique: Seldinger technique used  Number of attempts: 1  Post-procedure: line sutured  Post-procedure CMS: unchanged  Patient tolerance: patient tolerated the procedure well with no immediate complications  Comments: EBL <5mL      Nir Gonzalez DO  PGY-3, Internal Medicine  12/06/24  12:54 PM

## 2024-12-06 NOTE — PROCEDURES
PROCEDURE NOTE  Date: 12/6/2024   Name: Juan Mohamud  YOB: 1940    Diagnosis: Cardiogenic shock    CENTRAL LINE    Date/Time: 12/6/2024 1:12 PM    Performed by: Lorena Topete MD  Authorized by: Earl Salgado MD  Consent: Verbal consent obtained. Written consent obtained.  Risks and benefits: risks, benefits and alternatives were discussed  Consent given by: spouse  Patient understanding: patient states understanding of the procedure being performed  Patient consent: the patient's understanding of the procedure matches consent given  Procedure consent: procedure consent matches procedure scheduled  Relevant documents: relevant documents present and verified  Test results: test results available and properly labeled  Site marked: the operative site was marked  Imaging studies: imaging studies available  Required items: required blood products, implants, devices, and special equipment available  Patient identity confirmed: arm band and hospital-assigned identification number  Time out: Immediately prior to procedure a \"time out\" was called to verify the correct patient, procedure, equipment, support staff and site/side marked as required.  Indications: vascular access    Sedation:  Patient sedated: yes  Sedatives: propofol and see MAR for details  Vitals: Vital signs were monitored during sedation.    Preparation: skin prepped with 2% chlorhexidine  Skin prep agent dried: skin prep agent completely dried prior to procedure  Hand hygiene: hand hygiene performed prior to central venous catheter insertion  Location details: right internal jugular  Patient position: Trendelenburg  Catheter size: 7 Fr  Pre-procedure: landmarks identified  Ultrasound guidance: yes  Sterile ultrasound techniques: sterile gel and sterile probe covers were used  Number of attempts: 2  Post-procedure: dressing applied and line sutured  Assessment: no pneumothorax on x-ray, placement verified by x-ray and blood return

## 2024-12-07 ENCOUNTER — APPOINTMENT (OUTPATIENT)
Dept: GENERAL RADIOLOGY | Age: 84
DRG: 447 | End: 2024-12-07
Attending: INTERNAL MEDICINE
Payer: MEDICARE

## 2024-12-07 PROBLEM — Z01.810 PREOPERATIVE CARDIOVASCULAR EXAMINATION: Status: RESOLVED | Noted: 2024-11-07 | Resolved: 2024-12-07

## 2024-12-07 LAB
ALBUMIN SERPL-MCNC: 3.5 G/DL (ref 3.4–5)
ALP SERPL-CCNC: 117 U/L (ref 40–129)
ALT SERPL-CCNC: 11 U/L (ref 10–40)
ANION GAP SERPL CALCULATED.3IONS-SCNC: 16 MMOL/L (ref 3–16)
AST SERPL-CCNC: 34 U/L (ref 15–37)
BASE EXCESS BLDA CALC-SCNC: -3.6 MMOL/L (ref -3–3)
BASE EXCESS BLDA CALC-SCNC: -3.8 MMOL/L (ref -3–3)
BILIRUB DIRECT SERPL-MCNC: 0.5 MG/DL (ref 0–0.3)
BILIRUB INDIRECT SERPL-MCNC: 0.3 MG/DL (ref 0–1)
BILIRUB SERPL-MCNC: 0.8 MG/DL (ref 0–1)
BUN SERPL-MCNC: 38 MG/DL (ref 7–20)
CALCIUM SERPL-MCNC: 8.1 MG/DL (ref 8.3–10.6)
CHLORIDE SERPL-SCNC: 101 MMOL/L (ref 99–110)
CO2 BLDA-SCNC: 25 MMOL/L
CO2 BLDA-SCNC: 25 MMOL/L
CO2 SERPL-SCNC: 21 MMOL/L (ref 21–32)
COHGB MFR BLDA: 1.2 % (ref 0–1.5)
COHGB MFR BLDA: 1.2 % (ref 0–1.5)
CREAT SERPL-MCNC: 2.1 MG/DL (ref 0.8–1.3)
DEPRECATED RDW RBC AUTO: 18.9 % (ref 12.4–15.4)
GFR SERPLBLD CREATININE-BSD FMLA CKD-EPI: 31 ML/MIN/{1.73_M2}
GLUCOSE BLD-MCNC: 135 MG/DL (ref 70–99)
GLUCOSE BLD-MCNC: 136 MG/DL (ref 70–99)
GLUCOSE BLD-MCNC: 155 MG/DL (ref 70–99)
GLUCOSE BLD-MCNC: 158 MG/DL (ref 70–99)
GLUCOSE SERPL-MCNC: 168 MG/DL (ref 70–99)
HCO3 BLDA-SCNC: 23 MMOL/L (ref 21–29)
HCO3 BLDA-SCNC: 23 MMOL/L (ref 21–29)
HCT VFR BLD AUTO: 28.6 % (ref 40.5–52.5)
HGB BLD-MCNC: 9 G/DL (ref 13.5–17.5)
HGB BLDA-MCNC: 9.3 G/DL
HGB BLDA-MCNC: 9.7 G/DL
MAGNESIUM SERPL-MCNC: 2.15 MG/DL (ref 1.8–2.4)
MCH RBC QN AUTO: 28.9 PG (ref 26–34)
MCHC RBC AUTO-ENTMCNC: 31.6 G/DL (ref 31–36)
MCV RBC AUTO: 91.5 FL (ref 80–100)
METHGB MFR BLDA: 0.4 % (ref 0–1.4)
METHGB MFR BLDA: 0.5 % (ref 0–1.4)
PCO2 BLDA: 49.2 MMHG (ref 35–45)
PCO2 BLDA: 49.5 MMHG (ref 35–45)
PERFORMED ON: ABNORMAL
PH BLDA: 7.28 [PH] (ref 7.35–7.45)
PH BLDA: 7.28 [PH] (ref 7.35–7.45)
PHOSPHATE SERPL-MCNC: 5.1 MG/DL (ref 2.5–4.9)
PLATELET # BLD AUTO: 207 K/UL (ref 135–450)
PMV BLD AUTO: 7.4 FL (ref 5–10.5)
PO2 BLDA: 119 MMHG (ref 75–108)
PO2 BLDA: 187 MMHG (ref 75–108)
POTASSIUM SERPL-SCNC: 3.3 MMOL/L (ref 3.5–5.1)
PROT SERPL-MCNC: 6.1 G/DL (ref 6.4–8.2)
RBC # BLD AUTO: 3.12 M/UL (ref 4.2–5.9)
SAO2 % BLDA: 99 % (ref 93–100)
SAO2 % BLDA: >100 % (ref 93–100)
SODIUM SERPL-SCNC: 138 MMOL/L (ref 136–145)
VANCOMYCIN SERPL-MCNC: 20.7 UG/ML
WBC # BLD AUTO: 11.1 K/UL (ref 4–11)

## 2024-12-07 PROCEDURE — 6360000002 HC RX W HCPCS

## 2024-12-07 PROCEDURE — 71045 X-RAY EXAM CHEST 1 VIEW: CPT

## 2024-12-07 PROCEDURE — 80069 RENAL FUNCTION PANEL: CPT

## 2024-12-07 PROCEDURE — 6360000002 HC RX W HCPCS: Performed by: STUDENT IN AN ORGANIZED HEALTH CARE EDUCATION/TRAINING PROGRAM

## 2024-12-07 PROCEDURE — 74018 RADEX ABDOMEN 1 VIEW: CPT

## 2024-12-07 PROCEDURE — 94761 N-INVAS EAR/PLS OXIMETRY MLT: CPT

## 2024-12-07 PROCEDURE — 36415 COLL VENOUS BLD VENIPUNCTURE: CPT

## 2024-12-07 PROCEDURE — 2500000003 HC RX 250 WO HCPCS

## 2024-12-07 PROCEDURE — 2580000003 HC RX 258: Performed by: STUDENT IN AN ORGANIZED HEALTH CARE EDUCATION/TRAINING PROGRAM

## 2024-12-07 PROCEDURE — 99024 POSTOP FOLLOW-UP VISIT: CPT | Performed by: SURGERY

## 2024-12-07 PROCEDURE — 2580000003 HC RX 258

## 2024-12-07 PROCEDURE — 94003 VENT MGMT INPAT SUBQ DAY: CPT

## 2024-12-07 PROCEDURE — 99291 CRITICAL CARE FIRST HOUR: CPT | Performed by: INTERNAL MEDICINE

## 2024-12-07 PROCEDURE — 2000000000 HC ICU R&B

## 2024-12-07 PROCEDURE — 6370000000 HC RX 637 (ALT 250 FOR IP): Performed by: STUDENT IN AN ORGANIZED HEALTH CARE EDUCATION/TRAINING PROGRAM

## 2024-12-07 PROCEDURE — 2700000000 HC OXYGEN THERAPY PER DAY

## 2024-12-07 PROCEDURE — 80076 HEPATIC FUNCTION PANEL: CPT

## 2024-12-07 PROCEDURE — 80202 ASSAY OF VANCOMYCIN: CPT

## 2024-12-07 PROCEDURE — 6370000000 HC RX 637 (ALT 250 FOR IP): Performed by: NURSE PRACTITIONER

## 2024-12-07 PROCEDURE — 82803 BLOOD GASES ANY COMBINATION: CPT

## 2024-12-07 PROCEDURE — 85027 COMPLETE CBC AUTOMATED: CPT

## 2024-12-07 PROCEDURE — 83735 ASSAY OF MAGNESIUM: CPT

## 2024-12-07 PROCEDURE — 99233 SBSQ HOSP IP/OBS HIGH 50: CPT | Performed by: INTERNAL MEDICINE

## 2024-12-07 RX ORDER — POTASSIUM CHLORIDE 29.8 MG/ML
20 INJECTION INTRAVENOUS ONCE
Status: COMPLETED | OUTPATIENT
Start: 2024-12-07 | End: 2024-12-07

## 2024-12-07 RX ORDER — DEXMEDETOMIDINE HYDROCHLORIDE 4 UG/ML
.1-1.5 INJECTION, SOLUTION INTRAVENOUS CONTINUOUS
Status: DISCONTINUED | OUTPATIENT
Start: 2024-12-07 | End: 2024-12-11

## 2024-12-07 RX ORDER — POTASSIUM CHLORIDE 7.45 MG/ML
10 INJECTION INTRAVENOUS
Status: DISCONTINUED | OUTPATIENT
Start: 2024-12-07 | End: 2024-12-07

## 2024-12-07 RX ADMIN — DEXMEDETOMIDINE HYDROCHLORIDE 0.2 MCG/KG/HR: 400 INJECTION, SOLUTION INTRAVENOUS at 17:17

## 2024-12-07 RX ADMIN — BACITRACIN: 500 OINTMENT TOPICAL at 13:19

## 2024-12-07 RX ADMIN — BACITRACIN: 500 OINTMENT TOPICAL at 20:09

## 2024-12-07 RX ADMIN — PIPERACILLIN AND TAZOBACTAM 3375 MG: 3; .375 INJECTION, POWDER, LYOPHILIZED, FOR SOLUTION INTRAVENOUS at 04:22

## 2024-12-07 RX ADMIN — HEPARIN SODIUM 5000 UNITS: 5000 INJECTION INTRAVENOUS; SUBCUTANEOUS at 21:35

## 2024-12-07 RX ADMIN — DOBUTAMINE HYDROCHLORIDE 5 MCG/KG/MIN: 200 INJECTION INTRAVENOUS at 04:15

## 2024-12-07 RX ADMIN — VASOPRESSIN 0.03 UNITS/MIN: 20 INJECTION INTRAVENOUS at 10:15

## 2024-12-07 RX ADMIN — PROPOFOL 15 MCG/KG/MIN: 10 INJECTION, EMULSION INTRAVENOUS at 04:58

## 2024-12-07 RX ADMIN — HEPARIN SODIUM 5000 UNITS: 5000 INJECTION INTRAVENOUS; SUBCUTANEOUS at 05:01

## 2024-12-07 RX ADMIN — POTASSIUM CHLORIDE 20 MEQ: 29.8 INJECTION, SOLUTION INTRAVENOUS at 07:18

## 2024-12-07 RX ADMIN — SODIUM CHLORIDE, PRESERVATIVE FREE 10 ML: 5 INJECTION INTRAVENOUS at 08:16

## 2024-12-07 RX ADMIN — NOREPINEPHRINE BITARTRATE 24 MCG/MIN: 0.06 INJECTION, SOLUTION INTRAVENOUS at 20:54

## 2024-12-07 RX ADMIN — ACETAMINOPHEN 650 MG: 650 SUPPOSITORY RECTAL at 21:12

## 2024-12-07 RX ADMIN — PIPERACILLIN AND TAZOBACTAM 3375 MG: 3; .375 INJECTION, POWDER, LYOPHILIZED, FOR SOLUTION INTRAVENOUS at 20:35

## 2024-12-07 RX ADMIN — Medication: at 08:17

## 2024-12-07 RX ADMIN — PANTOPRAZOLE SODIUM 40 MG: 40 INJECTION, POWDER, FOR SOLUTION INTRAVENOUS at 08:16

## 2024-12-07 RX ADMIN — BACITRACIN: 500 OINTMENT TOPICAL at 08:18

## 2024-12-07 RX ADMIN — PIPERACILLIN AND TAZOBACTAM 3375 MG: 3; .375 INJECTION, POWDER, LYOPHILIZED, FOR SOLUTION INTRAVENOUS at 12:42

## 2024-12-07 RX ADMIN — MINERAL OIL 330 ML: 999 LIQUID ORAL at 08:18

## 2024-12-07 RX ADMIN — NOREPINEPHRINE BITARTRATE 50 MCG/MIN: 0.06 INJECTION, SOLUTION INTRAVENOUS at 06:13

## 2024-12-07 RX ADMIN — NOREPINEPHRINE BITARTRATE 45 MCG/MIN: 0.06 INJECTION, SOLUTION INTRAVENOUS at 00:58

## 2024-12-07 RX ADMIN — NOREPINEPHRINE BITARTRATE 32 MCG/MIN: 0.06 INJECTION, SOLUTION INTRAVENOUS at 11:05

## 2024-12-07 RX ADMIN — POTASSIUM CHLORIDE 20 MEQ: 29.8 INJECTION, SOLUTION INTRAVENOUS at 12:44

## 2024-12-07 RX ADMIN — HEPARIN SODIUM 5000 UNITS: 5000 INJECTION INTRAVENOUS; SUBCUTANEOUS at 12:32

## 2024-12-07 RX ADMIN — Medication: at 20:09

## 2024-12-07 ASSESSMENT — PULMONARY FUNCTION TESTS
PIF_VALUE: 20
PIF_VALUE: 19
PIF_VALUE: 19
PIF_VALUE: 32
PIF_VALUE: 24
PIF_VALUE: 24
PIF_VALUE: 18
PIF_VALUE: 24
PIF_VALUE: 24
PIF_VALUE: 27
PIF_VALUE: 19
PIF_VALUE: 27
PIF_VALUE: 19
PIF_VALUE: 24
PIF_VALUE: 19
PIF_VALUE: 25
PIF_VALUE: 25
PIF_VALUE: 29
PIF_VALUE: 19
PIF_VALUE: 24
PIF_VALUE: 22
PIF_VALUE: 17
PIF_VALUE: 28
PIF_VALUE: 19
PIF_VALUE: 19
PIF_VALUE: 27
PIF_VALUE: 25
PIF_VALUE: 22
PIF_VALUE: 23
PIF_VALUE: 25
PIF_VALUE: 28
PIF_VALUE: 19
PIF_VALUE: 29
PIF_VALUE: 24
PIF_VALUE: 19
PIF_VALUE: 19
PIF_VALUE: 29
PIF_VALUE: 29
PIF_VALUE: 24
PIF_VALUE: 20
PIF_VALUE: 27
PIF_VALUE: 19
PIF_VALUE: 21
PIF_VALUE: 26
PIF_VALUE: 19
PIF_VALUE: 27
PIF_VALUE: 19
PIF_VALUE: 26
PIF_VALUE: 23
PIF_VALUE: 20
PIF_VALUE: 23
PIF_VALUE: 26
PIF_VALUE: 26
PIF_VALUE: 29
PIF_VALUE: 19
PIF_VALUE: 29
PIF_VALUE: 26
PIF_VALUE: 23
PIF_VALUE: 19
PIF_VALUE: 27
PIF_VALUE: 19
PIF_VALUE: 25
PIF_VALUE: 20
PIF_VALUE: 19
PIF_VALUE: 24
PIF_VALUE: 20
PIF_VALUE: 28
PIF_VALUE: 29
PIF_VALUE: 19

## 2024-12-07 ASSESSMENT — PAIN SCALES - GENERAL
PAINLEVEL_OUTOF10: 0

## 2024-12-07 NOTE — PLAN OF CARE
Problem: Safety - Medical Restraint  Goal: Remains free of injury from restraints (Restraint for Interference with Medical Device)  Description: INTERVENTIONS:  1. Determine that other, less restrictive measures have been tried or would not be effective before applying the restraint  2. Evaluate the patient's condition at the time of restraint application  3. Inform patient/family regarding the reason for restraint  4. Q2H: Monitor safety, psychosocial status, comfort, nutrition and hydration  Outcome: Progressing  Flowsheets  Taken 12/7/2024 0600 by Rigoberto Rievra RN  Remains free of injury from restraints (restraint for interference with medical device):   Determine that other, less restrictive measures have been tried or would not be effective before applying the restraint   Every 2 hours: Monitor safety, psychosocial status, comfort, nutrition and hydration

## 2024-12-07 NOTE — CONSULTS
Pt transitioning to comfort care. Will close TPN consult for now. If family decides to continue care tomorrow 12/8 TPN may be started and new consult will need to be placed.    Thank you for consulting pharmacy  Please call with any questions,  Nika Pond, PharmD  PGY1 Pharmacy Resident  Wireless: [phone]  12/7/2024 10:58 AM

## 2024-12-07 NOTE — PLAN OF CARE
Problem: Skin/Tissue Integrity  Goal: Absence of new skin breakdown  Description: 1.  Monitor for areas of redness and/or skin breakdown  2.  Assess vascular access sites hourly  3.  Every 4-6 hours minimum:  Change oxygen saturation probe site  4.  Every 4-6 hours:  If on nasal continuous positive airway pressure, respiratory therapy assess nares and determine need for appliance change or resting period.  12/7/2024 1023 by Izabella Palmer RN  Outcome: Progressing  12/7/2024 0030 by Rigoberto Rivera RN  Outcome: Progressing  12/6/2024 2030 by Isela Smith RN  Outcome: Progressing     Problem: ABCDS Injury Assessment  Goal: Absence of physical injury  12/7/2024 1023 by Izabella Palmer RN  Outcome: Progressing  12/7/2024 0030 by Rigoberto Rivera RN  Outcome: Progressing  12/6/2024 2030 by Isela Smith RN  Outcome: Progressing  Flowsheets (Taken 12/6/2024 2028)  Absence of Physical Injury: Implement safety measures based on patient assessment     Problem: Pain  Goal: Verbalizes/displays adequate comfort level or baseline comfort level  12/7/2024 1023 by Izabella Palmer RN  Outcome: Progressing  12/7/2024 0030 by Rigoberto Rivera RN  Outcome: Progressing  12/6/2024 2030 by Isela Smith RN  Outcome: Progressing  Flowsheets (Taken 12/6/2024 0945)  Verbalizes/displays adequate comfort level or baseline comfort level:   Encourage patient to monitor pain and request assistance   Assess pain using appropriate pain scale   Administer analgesics based on type and severity of pain and evaluate response     Problem: Respiratory - Adult  Goal: Achieves optimal ventilation and oxygenation  12/7/2024 1023 by Izabella Palmer RN  Outcome: Progressing  12/7/2024 0030 by Rigoberto Rivera RN  Outcome: Progressing  12/6/2024 2030 by Isela Smith RN  Outcome: Progressing  Flowsheets  Taken 12/6/2024 1200  Achieves optimal ventilation and oxygenation:   Assess for changes in respiratory status   Assess for changes in mentation and  RN  Outcome: Progressing  Flowsheets (Taken 12/6/2024 0934)  Hemodynamic stability and optimal renal function maintained:   Monitor labs and assess for signs and symptoms of volume excess or deficit   Monitor intake, output and patient weight   Monitor urine specific gravity, serum osmolarity and serum sodium as indicated or ordered     Problem: Neurosensory - Adult  Goal: Achieves stable or improved neurological status  12/7/2024 1023 by Izabella Palmer RN  Outcome: Progressing  12/7/2024 0030 by Rigoberto Rivera RN  Outcome: Progressing  12/6/2024 2030 by Isela Smith RN  Outcome: Progressing  Flowsheets (Taken 12/6/2024 0934)  Achieves stable or improved neurological status:   Assess for and report changes in neurological status   Initiate measures to prevent increased intracranial pressure   Maintain blood pressure and fluid volume within ordered parameters to optimize cerebral perfusion and minimize risk of hemorrhage  Goal: Absence of seizures  12/7/2024 1023 by Izabella Palmer RN  Outcome: Progressing  12/7/2024 0030 by Rigoberto Rivera RN  Outcome: Progressing  12/6/2024 2030 by Isela Smith RN  Outcome: Progressing  Flowsheets (Taken 12/6/2024 0934)  Absence of seizures:   Monitor for seizure activity.  If seizure occurs, document type and location of movements and any associated apnea   If seizure occurs, turn head to side and suction secretions as needed   Administer anticonvulsants as ordered  Goal: Achieves maximal functionality and self care  12/7/2024 1023 by Izabella Palmer RN  Outcome: Progressing  12/7/2024 0030 by Rigoberto Rivera RN  Outcome: Progressing  12/6/2024 2030 by Isela Smith RN  Outcome: Progressing  Flowsheets (Taken 12/6/2024 0934)  Achieves maximal functionality and self care: Monitor swallowing and airway patency with patient fatigue and changes in neurological status     Problem: Gastrointestinal - Adult  Goal: Minimal or absence of nausea and vomiting  12/7/2024 1023 by

## 2024-12-07 NOTE — PLAN OF CARE
Problem: Chronic Conditions and Co-morbidities  Goal: Patient's chronic conditions and co-morbidity symptoms are monitored and maintained or improved  Outcome: Progressing  Flowsheets (Taken 12/6/2024 0934)  Care Plan - Patient's Chronic Conditions and Co-Morbidity Symptoms are Monitored and Maintained or Improved:   Monitor and assess patient's chronic conditions and comorbid symptoms for stability, deterioration, or improvement   Collaborate with multidisciplinary team to address chronic and comorbid conditions and prevent exacerbation or deterioration   Update acute care plan with appropriate goals if chronic or comorbid symptoms are exacerbated and prevent overall improvement and discharge     Problem: Discharge Planning  Goal: Discharge to home or other facility with appropriate resources  Outcome: Progressing  Flowsheets (Taken 12/6/2024 0934)  Discharge to home or other facility with appropriate resources:   Identify barriers to discharge with patient and caregiver   Arrange for needed discharge resources and transportation as appropriate   Identify discharge learning needs (meds, wound care, etc)     Problem: Safety - Adult  Goal: Free from fall injury  Outcome: Progressing  Flowsheets (Taken 12/6/2024 2028)  Free From Fall Injury:   Instruct family/caregiver on patient safety   Based on caregiver fall risk screen, instruct family/caregiver to ask for assistance with transferring infant if caregiver noted to have fall risk factors     Problem: Skin/Tissue Integrity  Goal: Absence of new skin breakdown  Description: 1.  Monitor for areas of redness and/or skin breakdown  2.  Assess vascular access sites hourly  3.  Every 4-6 hours minimum:  Change oxygen saturation probe site  4.  Every 4-6 hours:  If on nasal continuous positive airway pressure, respiratory therapy assess nares and determine need for appliance change or resting period.  Outcome: Progressing     Problem: ABCDS Injury Assessment  Goal:

## 2024-12-07 NOTE — PROGRESS NOTES
General Surgery  Interval Note:    Juan Mohamud currently requires the use of a negative pressure wound therapy device for aid in healing of his midline abdominal  wound.  The patient was seen at the bedside for his wound vac exchange.  The vacuum device was switched off, the wound dressing was taken down, and the black sponge was removed.  Wound pictured below.  The sponge dressing was reconstructed and placed into the wound space, and the supplied adhesive dressing was placed on top.  Negative pressure at -125 was applied via the vacuum device, and it was ensured that no air leaks existed at this time.  We will tentatively plan to change his dressing again on Monday, 12/9.        Shimon Cuellar IV, MD  General Surgery, PGY-4  12/06/24  11:57 PM  [phone]

## 2024-12-07 NOTE — PROGRESS NOTES
97%   BMI 25.93 kg/m²     Intake/Output Summary (Last 24 hours) at 12/7/2024 1352  Last data filed at 12/7/2024 0945  Gross per 24 hour   Intake 1216.98 ml   Output 460 ml   Net 756.98 ml       Ventilator settings  - Vent Mode: AC/PRVC  - Ventilator Day(s): 1  - Vt (Set, mL): 500 mL  - PEEP/CPAP (cmH2O): 8  - FiO2 : 70 %  - Resp Rate (Set): 20 bpm  - Peak Inspiratory Pressure (cmH2O): 19 cmH2O  - Plateau Pressure (cm H2O): 23 cm H2O  - ETCO2 (mmHg): 42 mmHg                Lab Results   Component Value Date/Time     FII3GOM 31 12/02/2024 07:21 AM     BEART 4.1 12/02/2024 07:21 AM     X3KCISHS >100 12/02/2024 07:21 AM     PHART 7.356 12/02/2024 07:21 AM     HMF3HQP 54.6 12/02/2024 07:21 AM     PO2ART 154.0 12/02/2024 07:21 AM     BEN7HAL 32 12/02/2024 07:21 AM      CVC  12/06/24 Right Internal jugular (Active)   Number of days: 0       Peripheral IV 12/05/24 Right Antecubital (Active)   Number of days: 0       Peripheral IV Right;Anterior Forearm (Active)   Number of days:        Arterial Line 12/06/24 Right Radial (Active)   Number of days: 0       Urinary Catheter 12/04/24 Cuellar (Active)   Number of days: 1         ASSESSMENT:     Biventricular cardiogenic shock  Acute hypoxemic respiratory failure  Acute kidney injury  Acute encephalopathy  Recurrent aspiration pneumonia  #Epidural spinal hematoma   #Spinal cord compression at T11   #T10 vertebral body unstable fracture  #T11 nondisplaced spinous process fracture  #T6 vertebral body subacute compression fracture   - S/p T8-L1 decompression, fusion and fixation   PE/DVT -status post filter  S/p exploratory lap with closure of gastrotomy     PLAN:     Continue Levophed and vasopressin with goal MAP of 65  Ventilator: Drop PEEP to 10.  Increased respiratory rate to 24.  Continue tidal volume of 500 and FiO2 at 70%  Continue vancomycin and Zosyn  Family present and we talked about goals of care including comfort care.  They would like to discuss other options.  Shayan  have stated that Juan has deteriorated over the last year with more frequent hospitalizations and a harder time recovering from them  He currently is DNR CCA  Prognosis is extremely guarded for meaningful recovery     ICU Glycemic goal 140-180 mg/dL     Pt has a high probability of imminent or life-threatening deterioration requiring close monitoring, and highly complex decision-making and/or interventions of high intensity to assess, manipulate, and support his critical organ systems to prevent a likely inevitable decline which could occur if left untreated.      A total critical care time 33 minutes was used. This includes but not limited to examining patient, collaborating with other physicians, monitoring vital signs, telemetry, continuous pulse oximetry, and clinical response to IV medications, documentation time, review and interpretation of laboratory and radiological data, review of nursing notes and old record review. This time excludes any time that may have been spent performing procedures for life threatening organ failure.     The note was completed using EMR and Dragon dictation system. Every effort was made to ensure accuracy; however, inadvertent computerized transcription errors may be present.     Earl Salgado MD  Pulmonary and Critical Care Medicine

## 2024-12-07 NOTE — PROGRESS NOTES
[facility]  Cardiology Inpatient Consult Service  Daily Progress Note        Admit Date:  11/7/2024    Referring Physician: Fred Akers MD      Assessment & Plan:     Vasodilatory and cardiogenic shock-on vasopressin, Levophed and dobutamine.  Gradually wean as possible.  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  Wean pressors as tolerated.  Critically ill with guarded prognosis    Subjective:   Interval history:  No new cardiac events.  Remains on multiple pressors.    Medications:   vancomycin (VANCOCIN) intermittent dosing (placeholder)   Other RX Placeholder    SMOG Enema  330 mL Rectal Daily before dinner    bacitracin   Topical TID    [Held by provider] naloxegol  25 mg Oral QAM AC    linaclotide  290 mcg Oral QAM AC    lidocaine 1 % injection  50 mg IntraDERmal Once    SMOG Enema  330 mL Rectal Daily    piperacillin-tazobactam  4,500 mg IntraVENous Once    Followed by    piperacillin-tazobactam  3,375 mg IntraVENous Q8H    [Held by provider] midodrine  25 mg Oral q8h    [Held by provider] multivitamin  1 tablet Oral Daily    [Held by provider] thiamine  100 mg Oral Daily    [Held by provider] buPROPion  150 mg Per NG tube BID    [Held by provider] metoprolol  2.5 mg IntraVENous Q6H    [Held by provider] melatonin  5 mg Oral Nightly    heparin (porcine)  5,000 Units SubCUTAneous Q8H    pantoprazole  40 mg IntraVENous Daily    balsum peru-castor oil   Topical BID    gabapentin  600 mg Oral TID    atorvastatin  10 mg Oral Nightly    [Held by provider] amiodarone  200 mg Oral Daily    [Held by provider] tamsulosin  0.4 mg Oral Daily    [Held by provider] DESMOpressin  200 mcg Oral Nightly       IV drips:   [Held by provider] furosemide (LASIX) 100 mg in sodium chloride 0.9 % 100 mL  1.7* 1.6* 2.1*    102 101   CO2 21 21 21   GLUCOSE 114* 158* 168*   CALCIUM 7.6* 7.2* 8.1*   MG 2.16 1.87 2.15     Recent Labs     12/05/24 2000 12/06/24  0425 12/07/24  0520   WBC 13.1* 11.5* 11.1*   HGB 11.2* 9.8* 9.0*   HCT 35.4* 31.8* 28.6*    233 207   MCV 92.2 95.0 91.5     Recent Labs     12/06/24  0425   TRIG 79     Recent Labs     12/04/24  1732   INR 1.20*       Lab Results   Component Value Date/Time    TROPHS 56 11/30/2024 10:04 PM       Imaging:         All questions and concerns were addressed to the patient/family. Alternatives to my treatment were discussed.  I have personally reviewed the reports and images of labs, radiological studies, cardiac studies including ECG's and telemetry, current and old medical records.     I would like to thank you for providing me the opportunity to participate in the care of your patient. If you have any questions, please do not hesitate to contact me.     Rivera Salsa MD,  [facility] [place] [address]  Ph: [phone]  Fax: [phone]

## 2024-12-07 NOTE — PROGRESS NOTES
Pt intubated by Dr. Salgado at this time. See MAR for intubation medications given. Wife, Lizzie, updated on pt status. Standard safety measures in place. VSS.    Consent Type: Consent (Ear Lesion)

## 2024-12-07 NOTE — PROGRESS NOTES
V2.0    [facility] [place]      Name:  Juan Mohamud /Age/Sex: 1940  (84 y.o. male)   MRN & CSN:  [id number] & [id number] Encounter Date/Time: 2024 5:03 PM EST   Location:  [address] [place] [address] PCP: Janet Garcia MD     Attending:Fred Akers MD       Hospital Day: 31    Assessment and Recommendations       Assessment/Plan:       Acute CHF: Biventricular failure  Discussed at length with ICU attending  Reviewed note from cardiology  Reviewed note from ICU attending  Patient currently on pressor support port  Lasix drip has been placed on hold  Patient was given albumin    Acute hypoxic respiratory failure:  Discussed case with critical care team  Reviewed note from critical care team  Patient currently intubated  Reviewed note from palliative care    Status post exploratory lap with closure of gastrotomy due to intra-abdominal free air:  Reviewed note from surgery   Patient currently on Zosyn    Recurrent aspiration pneumonia  Continue Zosyn and vancomycin    Hypotension:  Patient on pressors at this time     Tex syndrome:  Reviewed note from gen surg    PE/DVT:  S/p IVC filter  Not a ggod candidate for anticoagulation due to epidural hematoma    T10 vertebral body unstable fracture  T11 nondisplaced spinous process fracture  T6 vertebral body subacute compression fracture  Epidural spinal hematoma   Spinal cord compression at T11:  Reviewed last note from NS  No brace needed    Previous aspiration pneumonia aspiration PNA::  Completed course of IV Unasyn therapy      [facility]       MDM  [x] High (any 2 of A, B, or C)    A. Problems (any 1)  [x] Acute/Chronic Illness/injury posing threat to life or bodily function:    [] Severe exacerbation of chronic illness:    ---------------------------------------------------------------------  B. Risk of Treatment (any 1)   [] IV ABX requiring serial renal monitoring for nephrotoxicity:     [] IV Narcotic analgesia for adverse drug  related to that system including errors in grammar, punctuation, and spelling, as well as words and phrases that may be inappropriate. If there are any questions or concerns please feel free to contact the dictating provider for clarification.

## 2024-12-07 NOTE — PROGRESS NOTES
0620 Fio2 decreased to 60% by RT, episode of desaturation to 83% noted, RT informed, Fio2 to 70%.   Labs resulted; awaiting orders for potassium replacement.     Latest Reference Range & Units Most Recent   Sodium 136 - 145 mmol/L 138  12/7/24 05:20   Potassium 3.5 - 5.1 mmol/L 3.3 (L)  12/7/24 05:20   Chloride 99 - 110 mmol/L 101  12/7/24 05:20   CARBON DIOXIDE 21 - 32 mmol/L 21  12/7/24 05:20   BUN,BUNPL 7 - 20 mg/dL 38 (H)  12/7/24 05:20   Creatinine 0.8 - 1.3 mg/dL 2.1 (H)  12/7/24 05:20   Anion Gap 3 - 16  16  12/7/24 05:20   (L): Data is abnormally low  (H): Data is abnormally high

## 2024-12-07 NOTE — PROGRESS NOTES
Bariatric Surgery   Daily Progress Note  Patient: Juan Mohamud      Procedure(s) Performed: exploratory laparotomy with closure of gastrotomy, reji gastrostomy     SUBJECTIVE:   Patient is intubated and now on multiple pressors for cardiogenic shock. No bowel movements. Rectal tube placed    ROS:   A 14 point review of systems was conducted, significant findings as noted above. All other systems negative.    OBJECTIVE:    PHYSICAL EXAM:    Vitals:    12/07/24 0415 12/07/24 0430 12/07/24 0445 12/07/24 0500   BP:       Pulse: (!) 104 (!) 104 (!) 104 (!) 105   Resp: 18 16 17 18   Temp:       TempSrc:       SpO2: 99% 99% 99% 99%   Weight:       Height:           General appearance: intubated and sedated  HEENT: OG in place and clamped  Eyes: No scleral icterus, EOM grossly intact  Neck: trachea midline, neck supple  Chest/Lungs: normal effort with no accessory muscle use on RA  Cardiovascular: RRR,   Abdomen: Soft, appropriately-tender, black foam wound vac in place midline, gastrostomy tube in place to gravity with some gastric output.  Skin: warm and dry, no rashes  Extremities: no edema, no cyanosis. Right arm with skin tear after removal of a line, covered with mepilex dressing. L arm wrapped in ACE bandage  Genitourinary: Cuellar in place with clear yellow urine  Neuro: intubated and sedated    ASSESSMENT & PLAN:   Juan Mohamud is a 84 y.o. male with Hx DM, lumber DDD, arrhythmia s/p AICD, and diastolic CHF. He initially presented to [facility] on 11/3/24 following a fall with a T11 burst fracture and associated epidural hematoma with cord compression.  Transferred to [facility] on 11/7/2024, where he underwent T8-L1 posterior decompression, fixation, and fusion on 11/11. Progressively worsening abdominal distention since original fall requiring endoscopic decompression on 11/8. Failed MBS, underwent PEG placement 11/26. CXR 12/4 demonstrated free air and patient was taken for exploratory laparotomy with

## 2024-12-07 NOTE — CONSULTS
APPROVE    6 MM DUE 01/2021 Consult for TPN. Family is currently determining goal of care and possible transition to comfort care. Discussed w/ critical care team and surgery, if family wishes to continue aggressive care plan to initiate TPN tomorrow.     Yazmin Caceres, ALFRED, LD    [place]: [phone]

## 2024-12-07 NOTE — PROGRESS NOTES
hematoma).  Plan to start TPN on 12/5.  Vancomycin added to Zosyn tx on 12/5 for PNA concern.      Pharmacy is consulted to dose TPN and vancomycin.    Ht Readings from Last 1 Encounters:   12/06/24 1.956 m (6' 5\")     Wt Readings from Last 1 Encounters:   12/07/24 99.2 kg (218 lb 11.1 oz)     Current & Prior Antimicrobial Regimen(s):  Cefazolin x1 pre-op (11/16)  Zosyn (11/30-current)  Vancomycin - pharmacy to dose   2000mg IV x1 (12/5)  1500mg IV q24h   Intermittent via levels (12/6-current)  12/6 - 1500 mg x1    Date Vanc Level Vanc Dose   12/5  2000mg   12/6 13.8 mg/L Ordered 1500mg   12/7 20.7 mg/L Hold   12/8 Ordered          Vancomycin Level(s) / Doses:    Date Time Dose Type of Level / Level Interpretation                 Note: Serum levels collected for AUC-based dosing may be high if collected in close proximity to the dose administered. This is not necessarily indicative of toxicity.    Cultures & Sensitivities:    Date Site Micro Susceptibility / Result   11/21 Urine NG    11/30 Blood x2 NG    12/6 MRSA nares Ordered      Recent Labs     12/05/24 2000 12/06/24  0425 12/07/24  0520   CREATININE 1.7* 1.6* 2.1*   BUN 36* 34* 38*   WBC 13.1* 11.5* 11.1*       Estimated Creatinine Clearance: 34 mL/min (A) (based on SCr of 2.1 mg/dL (H)).    Additional Lab Values / Findings of Note:    No results for input(s): \"PROCAL\" in the last 72 hours.

## 2024-12-07 NOTE — PLAN OF CARE
Problem: Chronic Conditions and Co-morbidities  Goal: Patient's chronic conditions and co-morbidity symptoms are monitored and maintained or improved  12/7/2024 0030 by Rigoberto Rivera RN  Outcome: Progressing  12/6/2024 2030 by Isela Smith RN  Outcome: Progressing  Flowsheets (Taken 12/6/2024 0934)  Care Plan - Patient's Chronic Conditions and Co-Morbidity Symptoms are Monitored and Maintained or Improved:   Monitor and assess patient's chronic conditions and comorbid symptoms for stability, deterioration, or improvement   Collaborate with multidisciplinary team to address chronic and comorbid conditions and prevent exacerbation or deterioration   Update acute care plan with appropriate goals if chronic or comorbid symptoms are exacerbated and prevent overall improvement and discharge     Problem: Discharge Planning  Goal: Discharge to home or other facility with appropriate resources  12/7/2024 0030 by Rigoberto Rivera RN  Outcome: Progressing  12/6/2024 2030 by Isela Smith RN  Outcome: Progressing  Flowsheets (Taken 12/6/2024 0934)  Discharge to home or other facility with appropriate resources:   Identify barriers to discharge with patient and caregiver   Arrange for needed discharge resources and transportation as appropriate   Identify discharge learning needs (meds, wound care, etc)     Problem: Safety - Adult  Goal: Free from fall injury  12/7/2024 0030 by Rigoberto Rivera RN  Outcome: Progressing  12/6/2024 2030 by Isela Smith RN  Outcome: Progressing  Flowsheets (Taken 12/6/2024 2028)  Free From Fall Injury:   Instruct family/caregiver on patient safety   Based on caregiver fall risk screen, instruct family/caregiver to ask for assistance with transferring infant if caregiver noted to have fall risk factors     Problem: Skin/Tissue Integrity  Goal: Absence of new skin breakdown  Description: 1.  Monitor for areas of redness and/or skin breakdown  2.  Assess vascular access sites  stability  12/7/2024 0030 by Rigoberto Rivera RN  Outcome: Progressing  12/6/2024 2030 by Isela Smith RN  Outcome: Progressing  Flowsheets (Taken 12/6/2024 0934)  Maintains optimal cardiac output and hemodynamic stability:   Monitor blood pressure and heart rate   Monitor urine output and notify Licensed Independent Practitioner for values outside of normal range   Assess for signs of decreased cardiac output  Goal: Absence of cardiac dysrhythmias or at baseline  12/7/2024 0030 by Rigoberto Rivera RN  Outcome: Progressing  12/6/2024 2030 by Isela Smith RN  Outcome: Progressing  Flowsheets (Taken 12/6/2024 0934)  Absence of cardiac dysrhythmias or at baseline: Monitor cardiac rate and rhythm     Problem: Metabolic/Fluid and Electrolytes - Adult  Goal: Electrolytes maintained within normal limits  12/7/2024 0030 by Rigoberto Rivera RN  Outcome: Progressing  12/6/2024 2030 by Isela Smith RN  Outcome: Progressing  Flowsheets (Taken 12/6/2024 0934)  Electrolytes maintained within normal limits:   Monitor labs and assess patient for signs and symptoms of electrolyte imbalances   Administer electrolyte replacement as ordered  Goal: Hemodynamic stability and optimal renal function maintained  12/7/2024 0030 by Rigoberto Rivera RN  Outcome: Progressing  12/6/2024 2030 by Isela Smith RN  Outcome: Progressing  Flowsheets (Taken 12/6/2024 0934)  Hemodynamic stability and optimal renal function maintained:   Monitor labs and assess for signs and symptoms of volume excess or deficit   Monitor intake, output and patient weight   Monitor urine specific gravity, serum osmolarity and serum sodium as indicated or ordered     Problem: Neurosensory - Adult  Goal: Achieves stable or improved neurological status  12/7/2024 0030 by Rigoberto Rivera RN  Outcome: Progressing  12/6/2024 2030 by Isela Smith RN  Outcome: Progressing  Flowsheets (Taken 12/6/2024 0934)  Achieves stable or improved neurological status:

## 2024-12-08 LAB
ALBUMIN SERPL-MCNC: 2.9 G/DL (ref 3.4–5)
ANION GAP SERPL CALCULATED.3IONS-SCNC: 16 MMOL/L (ref 3–16)
BASE EXCESS BLDA CALC-SCNC: -3 MMOL/L (ref -3–3)
BASE EXCESS BLDA CALC-SCNC: -3 MMOL/L (ref -3–3)
BUN SERPL-MCNC: 42 MG/DL (ref 7–20)
CA-I BLD-SCNC: 1.16 MMOL/L (ref 1.12–1.32)
CA-I BLD-SCNC: 1.18 MMOL/L (ref 1.12–1.32)
CALCIUM SERPL-MCNC: 7.9 MG/DL (ref 8.3–10.6)
CHLORIDE SERPL-SCNC: 106 MMOL/L (ref 99–110)
CO2 BLDA-SCNC: 24 MMOL/L
CO2 BLDA-SCNC: 25 MMOL/L
CO2 SERPL-SCNC: 20 MMOL/L (ref 21–32)
CREAT SERPL-MCNC: 2.3 MG/DL (ref 0.8–1.3)
DEPRECATED RDW RBC AUTO: 18.8 % (ref 12.4–15.4)
GFR SERPLBLD CREATININE-BSD FMLA CKD-EPI: 27 ML/MIN/{1.73_M2}
GLUCOSE BLD-MCNC: 79 MG/DL (ref 70–99)
GLUCOSE BLD-MCNC: 91 MG/DL (ref 70–99)
GLUCOSE BLD-MCNC: 92 MG/DL (ref 70–99)
GLUCOSE SERPL-MCNC: 120 MG/DL (ref 70–99)
HCO3 BLDA-SCNC: 22.6 MMOL/L (ref 21–29)
HCO3 BLDA-SCNC: 23.7 MMOL/L (ref 21–29)
HCT VFR BLD AUTO: 29.4 % (ref 40.5–52.5)
HGB BLD-MCNC: 9.3 G/DL (ref 13.5–17.5)
LACTATE BLD-SCNC: 0.93 MMOL/L (ref 0.4–2)
LACTATE BLD-SCNC: 0.96 MMOL/L (ref 0.4–2)
MAGNESIUM SERPL-MCNC: 2.16 MG/DL (ref 1.8–2.4)
MCH RBC QN AUTO: 28.8 PG (ref 26–34)
MCHC RBC AUTO-ENTMCNC: 31.7 G/DL (ref 31–36)
MCV RBC AUTO: 91 FL (ref 80–100)
MRSA DNA SPEC QL NAA+PROBE: NORMAL
PCO2 BLDA: 40.8 MM HG (ref 35–45)
PCO2 BLDA: 46.8 MM HG (ref 35–45)
PERFORMED ON: ABNORMAL
PERFORMED ON: ABNORMAL
PERFORMED ON: NORMAL
PH BLDA: 7.31 [PH] (ref 7.35–7.45)
PH BLDA: 7.35 [PH] (ref 7.35–7.45)
PHOSPHATE SERPL-MCNC: 4.7 MG/DL (ref 2.5–4.9)
PLATELET # BLD AUTO: 181 K/UL (ref 135–450)
PMV BLD AUTO: 7.6 FL (ref 5–10.5)
PO2 BLDA: 140.3 MM HG (ref 75–108)
PO2 BLDA: 196.7 MM HG (ref 75–108)
POC SAMPLE TYPE: ABNORMAL
POC SAMPLE TYPE: ABNORMAL
POC SAMPLE TYPE: NORMAL
POTASSIUM BLD-SCNC: 2.8 MMOL/L (ref 3.5–5.1)
POTASSIUM BLD-SCNC: 3.3 MMOL/L (ref 3.5–5.1)
POTASSIUM BLD-SCNC: 3.7 MMOL/L (ref 3.5–5.1)
POTASSIUM SERPL-SCNC: 2.5 MMOL/L (ref 3.5–5.1)
POTASSIUM SERPL-SCNC: 4.2 MMOL/L (ref 3.5–5.1)
RBC # BLD AUTO: 3.23 M/UL (ref 4.2–5.9)
SAO2 % BLDA: 100 % (ref 93–100)
SAO2 % BLDA: 99 % (ref 93–100)
SODIUM BLD-SCNC: 143 MMOL/L (ref 136–145)
SODIUM BLD-SCNC: 144 MMOL/L (ref 136–145)
SODIUM SERPL-SCNC: 142 MMOL/L (ref 136–145)
VANCOMYCIN SERPL-MCNC: 16.4 UG/ML
WBC # BLD AUTO: 10.3 K/UL (ref 4–11)

## 2024-12-08 PROCEDURE — 6360000002 HC RX W HCPCS: Performed by: STUDENT IN AN ORGANIZED HEALTH CARE EDUCATION/TRAINING PROGRAM

## 2024-12-08 PROCEDURE — 82330 ASSAY OF CALCIUM: CPT

## 2024-12-08 PROCEDURE — 2700000000 HC OXYGEN THERAPY PER DAY

## 2024-12-08 PROCEDURE — 99232 SBSQ HOSP IP/OBS MODERATE 35: CPT | Performed by: INTERNAL MEDICINE

## 2024-12-08 PROCEDURE — 99024 POSTOP FOLLOW-UP VISIT: CPT | Performed by: SURGERY

## 2024-12-08 PROCEDURE — 94761 N-INVAS EAR/PLS OXIMETRY MLT: CPT

## 2024-12-08 PROCEDURE — 2580000003 HC RX 258

## 2024-12-08 PROCEDURE — 2500000003 HC RX 250 WO HCPCS

## 2024-12-08 PROCEDURE — 80202 ASSAY OF VANCOMYCIN: CPT

## 2024-12-08 PROCEDURE — 80069 RENAL FUNCTION PANEL: CPT

## 2024-12-08 PROCEDURE — 2000000000 HC ICU R&B

## 2024-12-08 PROCEDURE — 82947 ASSAY GLUCOSE BLOOD QUANT: CPT

## 2024-12-08 PROCEDURE — 84132 ASSAY OF SERUM POTASSIUM: CPT

## 2024-12-08 PROCEDURE — 36592 COLLECT BLOOD FROM PICC: CPT

## 2024-12-08 PROCEDURE — 6360000002 HC RX W HCPCS

## 2024-12-08 PROCEDURE — 85027 COMPLETE CBC AUTOMATED: CPT

## 2024-12-08 PROCEDURE — 2580000003 HC RX 258: Performed by: STUDENT IN AN ORGANIZED HEALTH CARE EDUCATION/TRAINING PROGRAM

## 2024-12-08 PROCEDURE — 83735 ASSAY OF MAGNESIUM: CPT

## 2024-12-08 PROCEDURE — 82803 BLOOD GASES ANY COMBINATION: CPT

## 2024-12-08 PROCEDURE — 6370000000 HC RX 637 (ALT 250 FOR IP): Performed by: SURGERY

## 2024-12-08 PROCEDURE — 83605 ASSAY OF LACTIC ACID: CPT

## 2024-12-08 PROCEDURE — 99291 CRITICAL CARE FIRST HOUR: CPT | Performed by: INTERNAL MEDICINE

## 2024-12-08 PROCEDURE — 84295 ASSAY OF SERUM SODIUM: CPT

## 2024-12-08 PROCEDURE — 94003 VENT MGMT INPAT SUBQ DAY: CPT

## 2024-12-08 RX ORDER — POTASSIUM CHLORIDE 29.8 MG/ML
20 INJECTION INTRAVENOUS
Status: DISCONTINUED | OUTPATIENT
Start: 2024-12-08 | End: 2024-12-08

## 2024-12-08 RX ORDER — POTASSIUM CHLORIDE 29.8 MG/ML
20 INJECTION INTRAVENOUS
Status: COMPLETED | OUTPATIENT
Start: 2024-12-08 | End: 2024-12-08

## 2024-12-08 RX ORDER — PROPOFOL 10 MG/ML
5-50 INJECTION, EMULSION INTRAVENOUS CONTINUOUS
Status: DISCONTINUED | OUTPATIENT
Start: 2024-12-08 | End: 2024-12-10

## 2024-12-08 RX ORDER — POTASSIUM CHLORIDE 29.8 MG/ML
20 INJECTION INTRAVENOUS ONCE
Status: COMPLETED | OUTPATIENT
Start: 2024-12-08 | End: 2024-12-08

## 2024-12-08 RX ADMIN — POTASSIUM CHLORIDE 20 MEQ: 29.8 INJECTION INTRAVENOUS at 19:58

## 2024-12-08 RX ADMIN — BACITRACIN: 500 OINTMENT TOPICAL at 07:53

## 2024-12-08 RX ADMIN — MINERAL OIL 330 ML: 999 LIQUID ORAL at 22:13

## 2024-12-08 RX ADMIN — HEPARIN SODIUM 5000 UNITS: 5000 INJECTION INTRAVENOUS; SUBCUTANEOUS at 05:09

## 2024-12-08 RX ADMIN — POTASSIUM CHLORIDE 20 MEQ: 400 INJECTION, SOLUTION INTRAVENOUS at 17:03

## 2024-12-08 RX ADMIN — MINERAL OIL 330 ML: 999 LIQUID ORAL at 09:50

## 2024-12-08 RX ADMIN — NOREPINEPHRINE BITARTRATE 10 MCG/MIN: 0.06 INJECTION, SOLUTION INTRAVENOUS at 17:04

## 2024-12-08 RX ADMIN — POTASSIUM CHLORIDE 20 MEQ: 29.8 INJECTION, SOLUTION INTRAVENOUS at 08:02

## 2024-12-08 RX ADMIN — DEXMEDETOMIDINE HYDROCHLORIDE 0.4 MCG/KG/HR: 400 INJECTION, SOLUTION INTRAVENOUS at 21:00

## 2024-12-08 RX ADMIN — PIPERACILLIN AND TAZOBACTAM 3375 MG: 3; .375 INJECTION, POWDER, FOR SOLUTION INTRAVENOUS at 11:37

## 2024-12-08 RX ADMIN — PIPERACILLIN AND TAZOBACTAM 3375 MG: 3; .375 INJECTION, POWDER, FOR SOLUTION INTRAVENOUS at 20:05

## 2024-12-08 RX ADMIN — PANTOPRAZOLE SODIUM 40 MG: 40 INJECTION, POWDER, FOR SOLUTION INTRAVENOUS at 07:52

## 2024-12-08 RX ADMIN — POTASSIUM CHLORIDE 20 MEQ: 29.8 INJECTION, SOLUTION INTRAVENOUS at 11:33

## 2024-12-08 RX ADMIN — POTASSIUM CHLORIDE 20 MEQ: 29.8 INJECTION, SOLUTION INTRAVENOUS at 12:37

## 2024-12-08 RX ADMIN — POTASSIUM CHLORIDE 20 MEQ: 400 INJECTION, SOLUTION INTRAVENOUS at 13:32

## 2024-12-08 RX ADMIN — HEPARIN SODIUM 5000 UNITS: 5000 INJECTION INTRAVENOUS; SUBCUTANEOUS at 12:37

## 2024-12-08 RX ADMIN — PIPERACILLIN AND TAZOBACTAM 3375 MG: 3; .375 INJECTION, POWDER, LYOPHILIZED, FOR SOLUTION INTRAVENOUS at 04:43

## 2024-12-08 RX ADMIN — HEPARIN SODIUM 5000 UNITS: 5000 INJECTION INTRAVENOUS; SUBCUTANEOUS at 21:14

## 2024-12-08 RX ADMIN — PROPOFOL 10 MCG/KG/MIN: 10 INJECTION, EMULSION INTRAVENOUS at 14:03

## 2024-12-08 RX ADMIN — DEXMEDETOMIDINE HYDROCHLORIDE 0.4 MCG/KG/HR: 400 INJECTION, SOLUTION INTRAVENOUS at 10:58

## 2024-12-08 RX ADMIN — BACITRACIN: 500 OINTMENT TOPICAL at 13:29

## 2024-12-08 RX ADMIN — Medication: at 07:53

## 2024-12-08 RX ADMIN — BACITRACIN: 500 OINTMENT TOPICAL at 21:19

## 2024-12-08 RX ADMIN — SODIUM CHLORIDE, PRESERVATIVE FREE 10 ML: 5 INJECTION INTRAVENOUS at 07:52

## 2024-12-08 RX ADMIN — Medication: at 21:19

## 2024-12-08 RX ADMIN — POTASSIUM CHLORIDE 20 MEQ: 29.8 INJECTION, SOLUTION INTRAVENOUS at 09:12

## 2024-12-08 ASSESSMENT — PULMONARY FUNCTION TESTS
PIF_VALUE: 18
PIF_VALUE: 22
PIF_VALUE: 13
PIF_VALUE: 21
PIF_VALUE: 24
PIF_VALUE: 18
PIF_VALUE: 21
PIF_VALUE: 18
PIF_VALUE: 19
PIF_VALUE: 20
PIF_VALUE: 21
PIF_VALUE: 22
PIF_VALUE: 21
PIF_VALUE: 19
PIF_VALUE: 24
PIF_VALUE: 21
PIF_VALUE: 24
PIF_VALUE: 13
PIF_VALUE: 23
PIF_VALUE: 19
PIF_VALUE: 21
PIF_VALUE: 27
PIF_VALUE: 22
PIF_VALUE: 21
PIF_VALUE: 13
PIF_VALUE: 22
PIF_VALUE: 20
PIF_VALUE: 17
PIF_VALUE: 22
PIF_VALUE: 21
PIF_VALUE: 18
PIF_VALUE: 13
PIF_VALUE: 19
PIF_VALUE: 13
PIF_VALUE: 20
PIF_VALUE: 18
PIF_VALUE: 18
PIF_VALUE: 21
PIF_VALUE: 13
PIF_VALUE: 18
PIF_VALUE: 20
PIF_VALUE: 22
PIF_VALUE: 13
PIF_VALUE: 25
PIF_VALUE: 21
PIF_VALUE: 21
PIF_VALUE: 22
PIF_VALUE: 23
PIF_VALUE: 21
PIF_VALUE: 28
PIF_VALUE: 21
PIF_VALUE: 28
PIF_VALUE: 21
PIF_VALUE: 22
PIF_VALUE: 21
PIF_VALUE: 14
PIF_VALUE: 13
PIF_VALUE: 21
PIF_VALUE: 24
PIF_VALUE: 22
PIF_VALUE: 20
PIF_VALUE: 21
PIF_VALUE: 21
PIF_VALUE: 22

## 2024-12-08 ASSESSMENT — PAIN SCALES - GENERAL
PAINLEVEL_OUTOF10: 0

## 2024-12-08 NOTE — PLAN OF CARE
Problem: Chronic Conditions and Co-morbidities  Goal: Patient's chronic conditions and co-morbidity symptoms are monitored and maintained or improved  Outcome: Not Progressing     Problem: Discharge Planning  Goal: Discharge to home or other facility with appropriate resources  Outcome: Not Progressing     Problem: Safety - Adult  Goal: Free from fall injury  Outcome: Not Progressing  Flowsheets (Taken 12/8/2024 0434)  Free From Fall Injury: Instruct family/caregiver on patient safety     Problem: Skin/Tissue Integrity  Goal: Absence of new skin breakdown  Description: 1.  Monitor for areas of redness and/or skin breakdown  2.  Assess vascular access sites hourly  3.  Every 4-6 hours minimum:  Change oxygen saturation probe site  4.  Every 4-6 hours:  If on nasal continuous positive airway pressure, respiratory therapy assess nares and determine need for appliance change or resting period.  Outcome: Not Progressing     Problem: Safety - Medical Restraint  Goal: Remains free of injury from restraints (Restraint for Interference with Medical Device)  Description: INTERVENTIONS:  1. Determine that other, less restrictive measures have been tried or would not be effective before applying the restraint  2. Evaluate the patient's condition at the time of restraint application  3. Inform patient/family regarding the reason for restraint  4. Q2H: Monitor safety, psychosocial status, comfort, nutrition and hydration  Recent Flowsheet Documentation  Taken 12/8/2024 0400 by Lidia Aguilar RN  Remains free of injury from restraints (restraint for interference with medical device): Determine that other, less restrictive measures have been tried or would not be effective before applying the restraint

## 2024-12-08 NOTE — PROGRESS NOTES
[facility]  Cardiology Inpatient Consult Service  Daily Progress Note        Admit Date:  11/7/2024    Referring Physician: Fred Akers MD      Assessment & Plan:     Vasodilatory and cardiogenic shock-pressor requirement has come down.  Continue supportive measures.  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  Gradually wean as tolerated.    Subjective:   Interval history:  No new cardiac events.  Remains on Levophed alone.  Family at bedside    Medications:   potassium chloride  20 mEq IntraVENous Q2H    SMOG Enema  330 mL Rectal BID    piperacillin-tazobactam  4,500 mg IntraVENous Once    Followed by    piperacillin-tazobactam  3,375 mg IntraVENous Q8H    potassium chloride  20 mEq IntraVENous Q1H    bacitracin   Topical TID    [Held by provider] naloxegol  25 mg Oral QAM AC    linaclotide  290 mcg Oral QAM AC    lidocaine 1 % injection  50 mg IntraDERmal Once    [Held by provider] midodrine  25 mg Oral q8h    [Held by provider] multivitamin  1 tablet Oral Daily    [Held by provider] thiamine  100 mg Oral Daily    [Held by provider] buPROPion  150 mg Per NG tube BID    [Held by provider] metoprolol  2.5 mg IntraVENous Q6H    [Held by provider] melatonin  5 mg Oral Nightly    heparin (porcine)  5,000 Units SubCUTAneous Q8H    pantoprazole  40 mg IntraVENous Daily    balsum peru-castor oil   Topical BID    gabapentin  600 mg Oral TID    atorvastatin  10 mg Oral Nightly    [Held by provider] amiodarone  200 mg Oral Daily    [Held by provider] tamsulosin  0.4 mg Oral Daily    [Held by provider] DESMOpressin  200 mcg Oral Nightly       IV drips:   dexmedeTOMIDine 0.4 mcg/kg/hr (12/08/24 4393)    [Held by provider] furosemide (LASIX) 100 mg in sodium  chloride 0.9 % 100 mL infusion      phenylephrine (CARI-SYNEPHRINE) 50 mg in sodium chloride 0.9 % 250 mL infusion Stopped (12/06/24 1355)    norepinephrine-sodium chloride 10 mcg/min (12/08/24 0841)    VASOpressin 20 Units in sodium chloride 0.9 % 100 mL infusion Stopped (12/08/24 0803)    DOBUTamine Stopped (12/07/24 0945)    sodium chloride      dextrose         PRN:  [Held by provider] HYDROmorphone **OR** [Held by provider] HYDROmorphone, sodium chloride, diatrizoate meglumine-sodium, acetaminophen **OR** acetaminophen, dextrose, benzonatate, [Held by provider] guaiFENesin, sodium chloride flush, ondansetron **OR** ondansetron, lidocaine, [Held by provider] oxyCODONE, glucose, dextrose bolus **OR** dextrose bolus, glucagon (rDNA), dextrose      Objective:     Vitals:    12/08/24 0915 12/08/24 0930 12/08/24 0945 12/08/24 1000   BP:       Pulse: 88 85 86 92   Resp: 22 21 20 20   Temp:       TempSrc:       SpO2: 99% 99% 98% 98%   Weight:       Height:           Intake/Output Summary (Last 24 hours) at 12/8/2024 1132  Last data filed at 12/8/2024 0600  Gross per 24 hour   Intake 1422 ml   Output 975 ml   Net 447 ml     I/O last 3 completed shifts:  In: 2639 [I.V.:2362.8; IV Piggyback:276.2]  Out: 1290 [Urine:925; Emesis/NG output:150; Drains:15; Stool:200]  Wt Readings from Last 3 Encounters:   12/07/24 99.2 kg (218 lb 11.1 oz)   11/06/24 98.8 kg (217 lb 13 oz)   08/05/24 91.2 kg (201 lb)       Admit Wt: Weight - Scale: 89.5 kg (197 lb 5 oz)   Todays Wt: Weight - Scale: 99.2 kg (218 lb 11.1 oz)    TELEMETRY: Personally interpreted Sinus     Physical Exam:     General: Intubated  Neck: Unable to assess JVD  Chest: Bilateral equal air entry  Cardiovascular:  RRR S1S2 normal  Abdomen: Distended  Extremities: Mild generalized edema        Labs: Reviewed.  Has severe hypokalemia.  Creatinine is 2.3  Recent Labs     12/06/24  0425 12/07/24  0520 12/08/24  0505   * 138 142   K see below 3.3* 2.5*   BUN 34* 38* 42*

## 2024-12-08 NOTE — CONSULTS
Clinical Pharmacy Consult Note    Vancomycin has been discontinued by Dr. Topete. Pharmacy will sign off on dosing at this time.   If resumed, please re-consult pharmacy.    Thanks for consulting pharmacy,    Natacha Zelaya, PharmD, [facility]  Clinical Pharmacy Specialist - Emergency Dept  Wireless: [phone]  12/8/2024 11:51 AM

## 2024-12-08 NOTE — PROGRESS NOTES
NEUROSURGERY PROGRESS NOTE    12/8/2024 11:24 AM                               Bogotakelly Mohamud                      LOS: 31 days     S/p THORACIC 8-LUMBAR 1 POSTERIOR DECOMPRESSION FIXATION AND FUSION (N/A) on 11/12/2024    Interval history:  Back in ICU  Had complicated medical course over past couple weeks  Surgery for spinal fusion was on 11/12, was delayed due to ileus  Developed dvt, PE - received IVC filter on 11/15  Ileus, underwent endoscopic decompression, later PEG placement 11/26, pneumoperitoneum identified 12/4 and underwent laparotomy, closure of the PEG, separate gastrostomy; 12/6 hypotensive secondary to apparent heart failure and respiratory failure - moved to ICU, intubated, further supportive care  .    Today he is mildly sedated, on vasopressor. Calm.     Physical Exam:  Patient seen and examined    Vitals:    12/08/24 1000   BP:    Pulse: 92   Resp: 20   Temp:    SpO2: 98%     Intubated, mildly sedated  GCS 3T6  Opens eyes readily to voice  Follows basic commands consistently -  hands, raise thumbs, move toes and feet bilaterally    Lumbar Incision: - midline lumbar incision palpated; abd binder in place. Wound is closed, dry, non fluctuant    Labs:  Recent Labs     12/08/24  0505   WBC 10.3   HGB 9.3*   HCT 29.4*          Recent Labs     12/08/24  0505      K 2.5*      CO2 20*   BUN 42*   CREATININE 2.3*   GLUCOSE 120*   CALCIUM 7.9*   PHOS 4.7   MG 2.16       No results for input(s): \"PROTIME\", \"INR\", \"APTT\" in the last 72 hours.          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

## 2024-12-08 NOTE — PROGRESS NOTES
laparotomy with closure of gastrotomy, reji gastrostomy (12/4)    - Daily SMOG encouraged from a general surgery stand point  - TPN halted as family is making the decision for pt to become comfort care  - Bacitracin and mepilex dressing to superficial skin tears daily per nursing  - Continue gastrostomy tube to gravity. Will study in 5 days. Remain NPO  - OG to stay in place at this time and clamped  - No medications per G tube or OG  - W[place] change MWF   - Aggressive electrolyte replacement  - Rest of care per primary     Naty Deshpande MD  PGY1, General Surgery  12/08/24  8:33 AM  [phone]

## 2024-12-08 NOTE — PROGRESS NOTES
Electronically signed by Hiram Lechuga      CT THORACIC SPINE WO CONTRAST   Final Result      THORACIC SPINE   1.  T11 vertebral fracture involving the T10-T11 disc base and with distraction.   2.  Nondisplaced fracture in the T11 lamina and spinous process.   3.  Nondisplaced fracture in the T12 spinous process.   4.  No other fracture seen, but the possibility of nondisplaced fracture in the   T6 vertebral body is not refuted by this CT.   5.  Known epidural hematoma not well visualized by CT.   6.  Severe multilevel degenerative changes resulting in multiple levels of   severe foraminal stenosis.      ABDOMEN AND PELVIS   1.  No CT evidence of an acute fracture in the lumbar spine.   2.  Prominent endplate degenerative Schmorl's nodes and/or chronic mild endplate   compression fractures in the L1, L2 and L3 vertebrae.   3.  Subacute nondisplaced bilateral sacral ala insufficiency fractures. Subacute   nondisplaced fractures in the anterior column of the left hip. Subacute   nondisplaced fracture in the right parasymphyseal pubic bone.   4.  Prior L4-S1 posterior fusion. No intervertebral bony bridging. Lucency   suggesting loosening of the left S1 pedicle screw.   5.  Severe multilevel degenerative changes resulting in at least moderate if not   severe stenosis at L2-L3 and at least mild stenosis at L3-L4. Multiple levels of   severe foraminal stenosis.      Electronically signed by Hiram Lechuga      XR ABDOMEN (KUB) (SINGLE AP VIEW)   Final Result   Impression:      1. Dilated small bowel which could relate to small bowel ileus or small bowel obstruction.      Electronically signed by Jeremie Montilla MD      XR THORACOLUMBAR SPINE (MIN 2 VIEWS)    (Results Pending)         Assessment/Plan:   84 y.o. male who presented to the ED on 11/7/2024 with with a medical hx significant for Ogilive syndrome, PE/DVT s/p IVC filter , type II DM, degenerative lumbar disease, arrhythmia s/p AICD, diastolic CHF, and macular  left untreated.      A total critical care time 37 minutes was used. This includes but not limited to examining patient, collaborating with other physicians, monitoring vital signs, telemetry, continuous pulse oximetry, and clinical response to IV medications, documentation time, review and interpretation of laboratory and radiological data, review of nursing notes and old record review. This time excludes any time that may have been spent performing procedures for life threatening organ failure.     The note was completed using EMR and Dragon dictation system. Every effort was made to ensure accuracy; however, inadvertent computerized transcription errors may be present.     Earl Salgado MD  Pulmonary and Critical Care Medicine

## 2024-12-08 NOTE — PROGRESS NOTES
[facility] -  Clinical Pharmacy Note    TPN - Management by Pharmacy    Consult Date(s): 12/5/24  Consulting Provider(s): TIP Butler    Assessment / Plan  PNA - Vancomycin   Concurrent Antimicrobials: Zosyn 3.375 g IV q8h EI (Day #8 of 7)  Day of Vanc Therapy / Ordered Duration: Day #4 of 7  Current Dosing Method: AUC Dosing --> Intermittent via levels  Therapeutic Goal: AUC --> Levels ~15 mg/L  Current Dose / Plan:   SCr continues to rise today, up to 2.3 mg/dL (previously ~1.1 mg/dL) -> BOSTON present; UOP documented over prior 24 hours of ~0.3 mL/kg/hr (below goal for critically ill pt)  Vancomycin is being dosed intermittently based upon levels obtained  Pt last received vancomycin 1500 mg IV x1 on 12/6/24  Random level obtained this AM of 16.4 mg/L  Will plan to hold today's dose of vancomycin to allow pt time to clear medication given severity of BOSTON and negative MRSA nares obtained  Will plan to order a random level for tomorrow AM (12/9, ordered) prior to re-dosing vancomycin to ensure pt does not further accumulate drug  Ordered MRSA nares yesterday given addition of vancomycin for PNA --> now resulted as negative -> please consider discontinuation of vancomycin at this time if clinically indicated (sent PS message to Dr. Lorena Topete at 10:15 AM)  Will continue to monitor clinical condition and make adjustments to regimen as appropriate      Thank you for consulting pharmacy,  Natacha Zelaya, DonnellD, [facility]  Clinical Pharmacy Specialist - Emergency Dept  Wireless: [phone]  12/8/2024 10:24 AM      Interval update:  BOSTON worsening. MRSA nares negative. Please consider discontinuation of vancomycin at this time.      Subjective/Objective: Juan Mohamud is a 84 y.o. male with a PMHx significant for T2DM, degenerative lumbar disease, arrythmia s/p AICD placement, HFpEF who presented to ED 11/7 after a fall, s/p T8-L1 posterior decompression, fixation, fusion (11/11). Pt had progressively worsening

## 2024-12-08 NOTE — ACP (ADVANCE CARE PLANNING)
Advanced Care Planning Note.    Purpose of Encounter: Advanced care planning in light of acute and chronic deteriorating medical conditions.    Parties In Attendance: Mrs. Cade PAZ, her son and, myself      Subjective: Patient/family understand in this voluntary conversation that Juan Mohamud continues to deteriorate and discuss what inteventions and plans patient would want implemented in light of the following diagnoses:  Biventricular failure    Discussion highlights:      Discussion centered around patient's wishes.  Patient has a living will but clearly states he never wanted any means of extending his life such as a ventilator.  In discussion with the family I explained that as his doctor I am an advocate for his treatment needs and that being said if it was left to me I would be removing the ET tube.  I discussed with them that a course I will follow their wishes in this regard.  We discussed working towards weaning him that there was good and be the start of weaning trials today.  We can see how that goes.  I also discussed with them if he he was standing next to you looking down at the current situation what what he was spurring your ear?  And that your answer as to what should be done.  We have in the end concluded that tomorrow regardless of his breathing trial that it would be appropriate to remove his ET tube and moved to comfort care.  But we will revisit this tomorrow as well.    Time spent on Advanced care Plannin minutes    Himanshu Akers MD  2024 1:59 PM

## 2024-12-08 NOTE — PLAN OF CARE
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 without prior episode restrictive measures have been tried or would not be effective before applying the restraint   Evaluate the patient's condition at the time of restraint application   Inform patient/family regarding the reason for restraint   Every 2 hours: Monitor safety, psychosocial status, comfort, nutrition and hydration  Taken 12/8/2024 0600 by Lidia Aguilar RN  Remains free of injury from restraints (restraint for interference with medical device): Determine that other, less restrictive measures have been tried or would not be effective before applying the restraint  12/8/2024 0435 by Lidia Aguilar RN  Outcome: Not Progressing  Flowsheets  Taken 12/8/2024 0400 by Lidia Aguilar RN  Remains free of injury from restraints (restraint for interference with medical device): Determine that other, less restrictive measures have been tried or would not be effective before applying the restraint  Taken 12/8/2024 0250 by Lidia Aguilar RN  Remains free of injury from restraints (restraint for interference with medical device): Determine that other, less restrictive measures have been tried or would not be effective before applying the restraint  Taken 12/8/2024 0200 by Lidia Aguilar RN  Remains free of injury from restraints (restraint for interference with medical device): Determine that other, less restrictive measures have been tried or would not be effective before applying the restraint  Taken 12/8/2024 0000 by Lidia Aguilar RN  Remains free of injury from restraints (restraint for interference with medical device): Determine that other, less restrictive measures have been tried or would not be effective before applying the restraint  Taken 12/7/2024 2200 by Lidia Aguilar RN  Remains free of injury from restraints (restraint for interference with medical device): Determine that other, less restrictive measures have been tried or would not be effective before applying the restraint  Taken  injury from restraints (restraint for interference with medical device): Determine that other, less restrictive measures have been tried or would not be effective before applying the restraint  Taken 12/7/2024 2200 by Lidia Aguilar RN  Remains free of injury from restraints (restraint for interference with medical device): Determine that other, less restrictive measures have been tried or would not be effective before applying the restraint  Taken 12/7/2024 2000 by Lidia Aguilar RN  Remains free of injury from restraints (restraint for interference with medical device): Determine that other, less restrictive measures have been tried or would not be effective before applying the restraint  Taken 12/7/2024 1800 by Izabella Palmer RN  Remains free of injury from restraints (restraint for interference with medical device):   Determine that other, less restrictive measures have been tried or would not be effective before applying the restraint   Evaluate the patient's condition at the time of restraint application   Inform patient/family regarding the reason for restraint   Every 2 hours: Monitor safety, psychosocial status, comfort, nutrition and hydration  Taken 12/7/2024 1600 by Izabella Palmer RN  Remains free of injury from restraints (restraint for interference with medical device):   Determine that other, less restrictive measures have been tried or would not be effective before applying the restraint   Evaluate the patient's condition at the time of restraint application   Inform patient/family regarding the reason for restraint   Every 2 hours: Monitor safety, psychosocial status, comfort, nutrition and hydration

## 2024-12-08 NOTE — PROGRESS NOTES
SpO2 dropped into the mid 80's on FiO2 40% and PEEP 10. Pt lavaged and suctioned, getting thick yellow secretions. Dr Espinoza ordered PEEP increased to 10. SpO2 is now 100% on FiO2 40% and PEEP 10.

## 2024-12-08 NOTE — PROGRESS NOTES
V2.0    [facility] [place]      Name:  Juan Mohamud /Age/Sex: 1940  (84 y.o. male)   MRN & CSN:  [id number] & [id number] Encounter Date/Time: 2024 5:03 PM EST   Location:  [address] [place] [address]- PCP: Janet Garcia MD     Attending:Fred Akers MD       Hospital Day: 32    Assessment and Recommendations       Assessment/Plan:       Acute CHF: Biventricular failure  Discussed at length with ICU team  Reviewed note from cardiology  Reviewed note from ICU attending  Unfortunately renal function is declining  Patient is requiring pressors for blood pressure support    Acute hypoxic respiratory failure:  Discussed case with critical care team  Reviewed note from critical care team  Patient currently intubated  Doing weaning trials today    Status post exploratory lap with closure of gastrotomy due to intra-abdominal free air:  Tex syndrome  Reviewed note from surgery   Patient currently on Zosyn    Recurrent aspiration pneumonia  MRSA DNA probe negative  Discontinue vancomycin continue Zosyn at this time    Hypotension:  Patient on pressors at this time  Although they have been decreased a little    Patient's mental status:  When sedation is lightened patient does follow simple commands and acknowledges are presents   Tex syndrome:  Reviewed note from gen surg  Continue n.p.o.  Although recommend TPN currently given goals of care discussion holding off on TPN  Continue gastrostomy tube to gravity    PE/DVT:  S/p IVC filter  Not a ggod candidate for anticoagulation due to epidural hematoma    T10 vertebral body unstable fracture  T11 nondisplaced spinous process fracture  T6 vertebral body subacute compression fracture  Epidural spinal hematoma   Spinal cord compression at T11:  Reviewed last note from NS  No brace needed    Previous aspiration pneumonia aspiration PNA::  Completed course of IV Unasyn therapy      MDM       MDM  [x] High (any 2 of A, B, or C)    A. Problems (any  9.8* 9.0* 9.3*    207 181     BMP:    Recent Labs     12/06/24  0425 12/07/24  0520 12/08/24  0505   * 138 142   K see below 3.3* 2.5*    101 106   CO2 21 21 20*   BUN 34* 38* 42*   CREATININE 1.6* 2.1* 2.3*   GLUCOSE 158* 168* 120*     Hepatic:   Recent Labs     12/07/24  0520   AST 34   ALT 11   BILITOT 0.8   ALKPHOS 117       Lipids:   Lab Results   Component Value Date/Time    CHOL 114 07/24/2024 05:04 AM    HDL 57 07/24/2024 05:04 AM    TRIG 79 12/06/2024 04:25 AM     Hemoglobin A1C:   Lab Results   Component Value Date/Time    LABA1C 5.1 11/04/2024 03:50 AM     TSH:   Lab Results   Component Value Date/Time    TSH 6.59 11/04/2024 03:50 AM    TSH 2.96 02/28/2024 10:44 PM     Troponin: No results found for: \"TROPONINT\"  Lactic Acid:   Recent Labs     12/05/24  1736   LACTA 1.9     BNP:   No results for input(s): \"PROBNP\" in the last 72 hours.    UA:  Lab Results   Component Value Date/Time    NITRU Negative 12/05/2024 10:40 AM    COLORU Yellow 12/05/2024 10:40 AM    PHUR 6.0 12/05/2024 10:40 AM    PHUR 5.0 03/27/2024 12:46 PM    WBCUA 0-2 12/05/2024 10:40 AM    RBCUA 21-50 12/05/2024 10:40 AM    MUCUS 1+ 12/05/2024 10:40 AM    BACTERIA 3+ 12/05/2024 10:40 AM    CLARITYU Clear 12/05/2024 10:40 AM    LEUKOCYTESUR Negative 12/05/2024 10:40 AM    UROBILINOGEN 0.2 12/05/2024 10:40 AM    BILIRUBINUR SMALL 12/05/2024 10:40 AM    BLOODU LARGE 12/05/2024 10:40 AM    GLUCOSEU Negative 12/05/2024 10:40 AM    KETUA Negative 12/05/2024 10:40 AM    AMORPHOUS 1+ 12/05/2024 10:40 AM     Urine Cultures:   Lab Results   Component Value Date/Time    LABURIN No growth at 18 to 36 hours 11/21/2024 05:10 PM     Blood Cultures:   Lab Results   Component Value Date/Time    BC No Growth after 4 days of incubation. 11/30/2024 10:04 PM     Lab Results   Component Value Date/Time    BLOODCULT2 No Growth after 4 days of incubation. 11/30/2024 10:04 PM     Organism:   Lab Results   Component Value Date/Time    ORG

## 2024-12-09 LAB
ALBUMIN SERPL-MCNC: 2.6 G/DL (ref 3.4–5)
ALBUMIN SERPL-MCNC: 2.8 G/DL (ref 3.4–5)
ANION GAP SERPL CALCULATED.3IONS-SCNC: 15 MMOL/L (ref 3–16)
ANION GAP SERPL CALCULATED.3IONS-SCNC: 18 MMOL/L (ref 3–16)
BUN SERPL-MCNC: 47 MG/DL (ref 7–20)
BUN SERPL-MCNC: 50 MG/DL (ref 7–20)
CALCIUM SERPL-MCNC: 7.7 MG/DL (ref 8.3–10.6)
CALCIUM SERPL-MCNC: 8 MG/DL (ref 8.3–10.6)
CHLORIDE SERPL-SCNC: 109 MMOL/L (ref 99–110)
CHLORIDE SERPL-SCNC: 110 MMOL/L (ref 99–110)
CO2 SERPL-SCNC: 17 MMOL/L (ref 21–32)
CO2 SERPL-SCNC: 19 MMOL/L (ref 21–32)
CREAT SERPL-MCNC: 2.4 MG/DL (ref 0.8–1.3)
CREAT SERPL-MCNC: 2.5 MG/DL (ref 0.8–1.3)
DEPRECATED RDW RBC AUTO: 19.3 % (ref 12.4–15.4)
GFR SERPLBLD CREATININE-BSD FMLA CKD-EPI: 25 ML/MIN/{1.73_M2}
GFR SERPLBLD CREATININE-BSD FMLA CKD-EPI: 26 ML/MIN/{1.73_M2}
GLUCOSE BLD-MCNC: 69 MG/DL (ref 70–99)
GLUCOSE BLD-MCNC: 71 MG/DL (ref 70–99)
GLUCOSE BLD-MCNC: 74 MG/DL (ref 70–99)
GLUCOSE BLD-MCNC: 85 MG/DL (ref 70–99)
GLUCOSE BLD-MCNC: 87 MG/DL (ref 70–99)
GLUCOSE SERPL-MCNC: 91 MG/DL (ref 70–99)
GLUCOSE SERPL-MCNC: 96 MG/DL (ref 70–99)
HCT VFR BLD AUTO: 35.1 % (ref 40.5–52.5)
HGB BLD-MCNC: 11 G/DL (ref 13.5–17.5)
MAGNESIUM SERPL-MCNC: 2.21 MG/DL (ref 1.8–2.4)
MAGNESIUM SERPL-MCNC: 2.3 MG/DL (ref 1.8–2.4)
MCH RBC QN AUTO: 28.4 PG (ref 26–34)
MCHC RBC AUTO-ENTMCNC: 31.3 G/DL (ref 31–36)
MCV RBC AUTO: 90.6 FL (ref 80–100)
PERFORMED ON: ABNORMAL
PERFORMED ON: NORMAL
PHOSPHATE SERPL-MCNC: 4.5 MG/DL (ref 2.5–4.9)
PHOSPHATE SERPL-MCNC: 4.8 MG/DL (ref 2.5–4.9)
PLATELET # BLD AUTO: 266 K/UL (ref 135–450)
PMV BLD AUTO: 7.7 FL (ref 5–10.5)
POTASSIUM SERPL-SCNC: 3.3 MMOL/L (ref 3.5–5.1)
POTASSIUM SERPL-SCNC: 3.9 MMOL/L (ref 3.5–5.1)
RBC # BLD AUTO: 3.87 M/UL (ref 4.2–5.9)
SODIUM SERPL-SCNC: 144 MMOL/L (ref 136–145)
SODIUM SERPL-SCNC: 144 MMOL/L (ref 136–145)
WBC # BLD AUTO: 14.9 K/UL (ref 4–11)

## 2024-12-09 PROCEDURE — 2580000003 HC RX 258

## 2024-12-09 PROCEDURE — 6360000002 HC RX W HCPCS

## 2024-12-09 PROCEDURE — 6370000000 HC RX 637 (ALT 250 FOR IP): Performed by: SURGERY

## 2024-12-09 PROCEDURE — 2580000003 HC RX 258: Performed by: STUDENT IN AN ORGANIZED HEALTH CARE EDUCATION/TRAINING PROGRAM

## 2024-12-09 PROCEDURE — 2700000000 HC OXYGEN THERAPY PER DAY

## 2024-12-09 PROCEDURE — 87040 BLOOD CULTURE FOR BACTERIA: CPT

## 2024-12-09 PROCEDURE — 2000000000 HC ICU R&B

## 2024-12-09 PROCEDURE — 94761 N-INVAS EAR/PLS OXIMETRY MLT: CPT

## 2024-12-09 PROCEDURE — 97607 NEG PRS WND THR NDME<=50SQCM: CPT

## 2024-12-09 PROCEDURE — 36415 COLL VENOUS BLD VENIPUNCTURE: CPT

## 2024-12-09 PROCEDURE — 83735 ASSAY OF MAGNESIUM: CPT

## 2024-12-09 PROCEDURE — 2500000003 HC RX 250 WO HCPCS

## 2024-12-09 PROCEDURE — 99233 SBSQ HOSP IP/OBS HIGH 50: CPT | Performed by: INTERNAL MEDICINE

## 2024-12-09 PROCEDURE — 85027 COMPLETE CBC AUTOMATED: CPT

## 2024-12-09 PROCEDURE — 6360000002 HC RX W HCPCS: Performed by: STUDENT IN AN ORGANIZED HEALTH CARE EDUCATION/TRAINING PROGRAM

## 2024-12-09 PROCEDURE — 94003 VENT MGMT INPAT SUBQ DAY: CPT

## 2024-12-09 PROCEDURE — 99291 CRITICAL CARE FIRST HOUR: CPT | Performed by: INTERNAL MEDICINE

## 2024-12-09 PROCEDURE — 80069 RENAL FUNCTION PANEL: CPT

## 2024-12-09 RX ORDER — MORPHINE SULFATE 2 MG/ML
1 INJECTION, SOLUTION INTRAMUSCULAR; INTRAVENOUS ONCE
Status: COMPLETED | OUTPATIENT
Start: 2024-12-09 | End: 2024-12-09

## 2024-12-09 RX ORDER — POTASSIUM CHLORIDE 29.8 MG/ML
20 INJECTION INTRAVENOUS ONCE
Status: COMPLETED | OUTPATIENT
Start: 2024-12-09 | End: 2024-12-09

## 2024-12-09 RX ADMIN — HEPARIN SODIUM 5000 UNITS: 5000 INJECTION INTRAVENOUS; SUBCUTANEOUS at 21:09

## 2024-12-09 RX ADMIN — PIPERACILLIN AND TAZOBACTAM 3375 MG: 3; .375 INJECTION, POWDER, FOR SOLUTION INTRAVENOUS at 11:56

## 2024-12-09 RX ADMIN — MORPHINE SULFATE 1 MG: 2 INJECTION, SOLUTION INTRAMUSCULAR; INTRAVENOUS at 17:10

## 2024-12-09 RX ADMIN — POTASSIUM CHLORIDE 20 MEQ: 400 INJECTION, SOLUTION INTRAVENOUS at 09:07

## 2024-12-09 RX ADMIN — HEPARIN SODIUM 5000 UNITS: 5000 INJECTION INTRAVENOUS; SUBCUTANEOUS at 14:02

## 2024-12-09 RX ADMIN — Medication: at 20:26

## 2024-12-09 RX ADMIN — PANTOPRAZOLE SODIUM 40 MG: 40 INJECTION, POWDER, FOR SOLUTION INTRAVENOUS at 09:04

## 2024-12-09 RX ADMIN — BACITRACIN: 500 OINTMENT TOPICAL at 09:09

## 2024-12-09 RX ADMIN — MINERAL OIL 330 ML: 999 LIQUID ORAL at 18:48

## 2024-12-09 RX ADMIN — PROPOFOL 10 MCG/KG/MIN: 10 INJECTION, EMULSION INTRAVENOUS at 01:32

## 2024-12-09 RX ADMIN — BACITRACIN: 500 OINTMENT TOPICAL at 14:44

## 2024-12-09 RX ADMIN — NOREPINEPHRINE BITARTRATE 13 MCG/MIN: 0.06 INJECTION, SOLUTION INTRAVENOUS at 10:06

## 2024-12-09 RX ADMIN — HEPARIN SODIUM 5000 UNITS: 5000 INJECTION INTRAVENOUS; SUBCUTANEOUS at 04:58

## 2024-12-09 RX ADMIN — PIPERACILLIN AND TAZOBACTAM 3375 MG: 3; .375 INJECTION, POWDER, FOR SOLUTION INTRAVENOUS at 18:48

## 2024-12-09 RX ADMIN — DEXTROSE 125 ML: 10 SOLUTION INTRAVENOUS at 22:16

## 2024-12-09 RX ADMIN — Medication: at 09:09

## 2024-12-09 RX ADMIN — MINERAL OIL 330 ML: 999 LIQUID ORAL at 09:09

## 2024-12-09 RX ADMIN — SODIUM CHLORIDE, PRESERVATIVE FREE 10 ML: 5 INJECTION INTRAVENOUS at 09:04

## 2024-12-09 RX ADMIN — PIPERACILLIN AND TAZOBACTAM 3375 MG: 3; .375 INJECTION, POWDER, FOR SOLUTION INTRAVENOUS at 03:10

## 2024-12-09 RX ADMIN — BACITRACIN: 500 OINTMENT TOPICAL at 20:25

## 2024-12-09 RX ADMIN — DEXMEDETOMIDINE HYDROCHLORIDE 0.4 MCG/KG/HR: 400 INJECTION, SOLUTION INTRAVENOUS at 05:34

## 2024-12-09 ASSESSMENT — PULMONARY FUNCTION TESTS
PIF_VALUE: 22
PIF_VALUE: 18
PIF_VALUE: 18
PIF_VALUE: 21
PIF_VALUE: 18
PIF_VALUE: 21
PIF_VALUE: 22
PIF_VALUE: 21
PIF_VALUE: 20
PIF_VALUE: 21
PIF_VALUE: 19
PIF_VALUE: 21
PIF_VALUE: 18
PIF_VALUE: 18
PIF_VALUE: 21
PIF_VALUE: 21
PIF_VALUE: 18
PIF_VALUE: 18
PIF_VALUE: 20
PIF_VALUE: 21
PIF_VALUE: 25
PIF_VALUE: 22
PIF_VALUE: 18
PIF_VALUE: 21
PIF_VALUE: 22
PIF_VALUE: 18
PIF_VALUE: 22
PIF_VALUE: 21
PIF_VALUE: 21
PIF_VALUE: 18
PIF_VALUE: 21
PIF_VALUE: 21
PIF_VALUE: 18
PIF_VALUE: 21
PIF_VALUE: 20
PIF_VALUE: 18
PIF_VALUE: 21
PIF_VALUE: 20
PIF_VALUE: 21
PIF_VALUE: 20
PIF_VALUE: 18
PIF_VALUE: 18
PIF_VALUE: 21
PIF_VALUE: 20
PIF_VALUE: 21
PIF_VALUE: 18
PIF_VALUE: 21
PIF_VALUE: 21
PIF_VALUE: 22
PIF_VALUE: 20
PIF_VALUE: 18
PIF_VALUE: 22
PIF_VALUE: 23
PIF_VALUE: 21
PIF_VALUE: 21
PIF_VALUE: 18
PIF_VALUE: 18

## 2024-12-09 ASSESSMENT — PAIN SCALES - GENERAL
PAINLEVEL_OUTOF10: 0
PAINLEVEL_OUTOF10: 5
PAINLEVEL_OUTOF10: 0

## 2024-12-09 ASSESSMENT — PAIN DESCRIPTION - DESCRIPTORS: DESCRIPTORS: ACHING

## 2024-12-09 ASSESSMENT — PAIN DESCRIPTION - LOCATION: LOCATION: GENERALIZED

## 2024-12-09 NOTE — PLAN OF CARE
Problem: Chronic Conditions and Co-morbidities  Goal: Patient's chronic conditions and co-morbidity symptoms are monitored and maintained or improved  Outcome: Progressing     Problem: Discharge Planning  Goal: Discharge to home or other facility with appropriate resources  Outcome: Progressing     Problem: Safety - Adult  Goal: Free from fall injury  Outcome: Progressing     Problem: Skin/Tissue Integrity  Goal: Absence of new skin breakdown  Description: 1.  Monitor for areas of redness and/or skin breakdown  2.  Assess vascular access sites hourly  3.  Every 4-6 hours minimum:  Change oxygen saturation probe site  4.  Every 4-6 hours:  If on nasal continuous positive airway pressure, respiratory therapy assess nares and determine need for appliance change or resting period.  Outcome: Progressing     Problem: ABCDS Injury Assessment  Goal: Absence of physical injury  Outcome: Progressing     Problem: Pain  Goal: Verbalizes/displays adequate comfort level or baseline comfort level  Outcome: Progressing     Problem: Respiratory - Adult  Goal: Achieves optimal ventilation and oxygenation  Outcome: Progressing     Problem: Cardiovascular - Adult  Goal: Maintains optimal cardiac output and hemodynamic stability  Outcome: Progressing  Goal: Absence of cardiac dysrhythmias or at baseline  Outcome: Progressing     Problem: Metabolic/Fluid and Electrolytes - Adult  Goal: Electrolytes maintained within normal limits  Outcome: Progressing  Goal: Hemodynamic stability and optimal renal function maintained  Outcome: Progressing     Problem: Neurosensory - Adult  Goal: Achieves stable or improved neurological status  Outcome: Progressing  Goal: Absence of seizures  Outcome: Progressing  Goal: Achieves maximal functionality and self care  Outcome: Progressing       Problem: Gastrointestinal - Adult  Goal: Minimal or absence of nausea and vomiting  Outcome: Progressing  Goal: Maintains or returns to baseline bowel

## 2024-12-09 NOTE — PROGRESS NOTES
Successfully extubated with RT. Placed on 4L NC.   Precedex turned off. See eMAR.    Family at bedside.   Will monitor.

## 2024-12-09 NOTE — PROGRESS NOTES
Comprehensive Nutrition Assessment  Vasopressor dosing equivalent score = 12. For VDE >12 trophic TF only (10 mL/hr).    RECOMMENDATIONS:  Continue NPO   Nutrition Education: Education/Counseling not appropriate     NUTRITION ASSESSMENT:   Nutritional summary & status: Pt remains intubated, requiring pressor support VDE=12. Sedated on propofol providing 79 kcal/day. Family is continuing to have goals of care discussion with possible compassionate extubation/comfort care. Given uncertainty of care plan to continue to hold off on TPN at this time.   Admission // PMH: Vertebral fracture//colonic psuedoobstruction, T2DM, HLD, CHF    MALNUTRITION ASSESSMENT  Context of Malnutrition: Acute Illness   Malnutrition Status: Mild malnutrition  Findings of the 6 clinical characteristics of malnutrition (Minimum of 2 out of 6 clinical characteristics is required to make the diagnosis of moderate or severe Protein Calorie Malnutrition based on AND/ASPEN Guidelines):  Energy Intake:  50% or less of estimated energy requirements for 5 or more days  Weight Loss:  Mild weight loss (5% in 3 month period)     Body Fat Loss:  No body fat loss     Muscle Mass Loss:  No muscle mass loss    Fluid Accumulation:  No fluid accumulation      NUTRITION DIAGNOSIS   Inadequate oral intake related to altered GI function as evidenced by NPO or clear liquid status due to medical condition    Nutrition Monitoring and Evaluation:   Food/Nutrient Intake Outcomes:  Progression of Nutrition  Physical Signs/Symptoms Outcomes:  Biochemical Data, Nutrition Focused Physical Findings, Weight, Hemodynamic Status, GI Status     OBJECTIVE DATA: Significant to nutrition assessment  Nutrition Related Findings: 640 mL RT output.  Wounds: Stage I  Nutrition Goals: Initiate nutrition support (Pending family goals of care)     CURRENT NUTRITION THERAPIES  Diet NPO  PO Intake: NPO   PO Supplement Intake:NPO  Additional Sources of Calories/IVF:79 kcal via propofol

## 2024-12-09 NOTE — PROGRESS NOTES
ID Follow-up NOTE    CC:   Aspiration pneumonia, Hypotension  Antibiotics: Zosyn    Admit Date: 11/7/2024  Hospital Day: 33    Subjective:     Extubated - will not reintubate in recurrent resp failure    Patient feels weak, abd pain, 'lousy'      Objective:     Patient Vitals for the past 8 hrs:   Temp Temp src Pulse Resp SpO2   12/09/24 1415 -- -- (!) 101 26 91 %   12/09/24 1400 -- -- 100 17 94 %   12/09/24 1345 -- -- (!) 103 18 95 %   12/09/24 1330 -- -- (!) 101 27 (!) 85 %   12/09/24 1326 -- -- (!) 108 24 100 %   12/09/24 1323 -- -- (!) 102 29 94 %   12/09/24 1320 -- -- (!) 103 (!) 37 96 %   12/09/24 1315 -- -- 97 23 96 %   12/09/24 1300 -- -- 98 23 94 %   12/09/24 1245 -- -- (!) 101 24 94 %   12/09/24 1230 -- -- 100 18 92 %   12/09/24 1215 -- -- (!) 106 23 93 %   12/09/24 1202 -- -- 100 28 100 %   12/09/24 1200 -- Bladder (!) 101 20 91 %   12/09/24 1145 -- -- 100 16 --   12/09/24 1130 -- -- (!) 101 25 --   12/09/24 1117 -- -- 97 18 91 %   12/09/24 1115 -- -- 96 17 --   12/09/24 1100 -- -- 100 17 --   12/09/24 1045 -- -- 100 16 --   12/09/24 1039 -- -- 97 21 --   12/09/24 1030 -- -- 99 16 --   12/09/24 1015 -- -- (!) 106 15 --   12/09/24 1002 -- -- 93 16 99 %   12/09/24 1000 -- -- 98 15 99 %   12/09/24 0945 -- -- (!) 103 22 99 %   12/09/24 0930 -- -- 96 20 --   12/09/24 0915 -- -- 100 20 --   12/09/24 0903 -- -- 96 20 --   12/09/24 0900 -- -- 100 20 --   12/09/24 0845 -- -- (!) 102 20 --   12/09/24 0830 -- -- (!) 103 20 --   12/09/24 0815 -- -- (!) 102 20 --   12/09/24 0800 98.2 °F (36.8 °C) Bladder 100 20 --   12/09/24 0745 -- -- 100 20 --   12/09/24 0742 -- -- 94 20 99 %   12/09/24 0730 -- -- 95 20 --   12/09/24 0715 -- -- 99 20 --   12/09/24 0700 -- -- 96 20 98 %   12/09/24 0645 -- -- 96 15 99 %   12/09/24 0630 -- -- 100 20 98 %     I/O last 3 completed shifts:  In: 1963 [I.V.:1963]  Out: 2515 [Urine:1405; Emesis/NG output:270; Stool:840]  I/O this shift:  In: -   Out: 200 [Urine:200]    EXAM:  GENERAL: No

## 2024-12-09 NOTE — PROGRESS NOTES
Physical Therapy/Occupational Therapy  Discharge note    Pt intubated in ICU. Will sign off for acute PT and OT at this time.       Bouchra Helm, PT   Meena Hairston  MS, OTR/L #[id number]

## 2024-12-09 NOTE — PROGRESS NOTES
sounds  Abdominal:      General: There is distension.      Comments: Abdominal binder in place  Wound vac in place   Musculoskeletal:      Right lower leg: Edema (pitting) present.      Left lower leg: Edema (pitting) present.   Skin:     Capillary Refill: Capillary refill takes 2 to 3 seconds.      Coloration: Skin is pale.   Neurological:      Comments: Sedated on precedex  Eyes are open and was able to follow commands and answering question by nodding his head            LABS:    CBC:   Recent Labs     12/07/24  0520 12/08/24  0505 12/09/24  0510   WBC 11.1* 10.3 14.9*   HGB 9.0* 9.3* 11.0*   HCT 28.6* 29.4* 35.1*    181 266   MCV 91.5 91.0 90.6     Renal:    Recent Labs     12/07/24  0520 12/08/24  0505 12/08/24  2155 12/09/24  0510    142  --  144   K 3.3* 2.5* 4.2 3.9    106  --  110   CO2 21 20*  --  19*   BUN 38* 42*  --  47*   CREATININE 2.1* 2.3*  --  2.4*   GLUCOSE 168* 120*  --  96   CALCIUM 8.1* 7.9*  --  8.0*   MG 2.15 2.16  --  2.30   PHOS 5.1* 4.7  --  4.8   ANIONGAP 16 16  --  15     Hepatic:   Recent Labs     12/07/24  0520   AST 34   ALT 11   BILITOT 0.8   BILIDIR 0.5*   ALKPHOS 117     Troponin: No results for input(s): \"TROPONINI\" in the last 72 hours.  BNP: No results for input(s): \"BNP\" in the last 72 hours.  Lipids: No results for input(s): \"CHOL\", \"HDL\" in the last 72 hours.    Invalid input(s): \"LDLCALCU\", \"TRIGLYCERIDE\"  ABGs:    Recent Labs     12/07/24  0716 12/08/24  1128 12/08/24  1244   PHART 7.276* 7.351 7.312*   ESJ2CZP 49.5* 40.8 46.8*   PO2ART 187.0* 196.7* 140.3*   BGC7HBU 23 22.6 23.7   BEART -3.8* -3 -3   Z1LUIBUL >100 100 99   PMO6ELX 25 24 25       INR:   No results for input(s): \"INR\" in the last 72 hours.    Lactate:   Recent Labs     12/08/24  1128 12/08/24  1244   LACTATE 0.93 0.96     Cultures:  -----------------------------------------------------------------  RAD:   XR CHEST PORTABLE   Final Result   Cardiomegaly with central vascular congestion and  Sonny Beauchamp      XR ABDOMEN (KUB) (SINGLE AP VIEW)   Final Result   1.  Multiple loops of dilated small bowel and colon throughout the abdomen similar to recent CT.      Electronically signed by Brendon Prather MD      XR CHEST PORTABLE   Final Result      Prominent perihilar interstitial markings likely reflecting a combination of   pulmonary edema and central bronchovascular crowding. Low lung volumes.      Stable cardiomediastinal silhouette noting left subclavian ICD.      Nasoenteric catheter tip in the gastric fundus.            Electronically signed by Sonny Beauchamp      CT ABDOMEN PELVIS W IV CONTRAST Additional Contrast? None   Final Result      1. Interval development of groundglass and nodular opacities of the right middle   lobe most likely infectious or inflammatory. Correlate for aspiration. Small   bilateral pleural effusions with bibasilar atelectasis.      2. Fluid and gaseous distention of the colon predominantly. No small bowel   obstruction.      Electronically signed by Jamie Smith      XR ABDOMEN (KUB) (SINGLE AP VIEW)   Final Result      No significant interval change.      Electronically signed by Obed Garcia MD      XR ABDOMEN (KUB) (SINGLE AP VIEW)   Final Result      Persistent gaseous distention of large and small bowel favoring an adynamic ileus.      Electronically signed by Obed Garcia MD      XR ABDOMEN (KUB) (SINGLE AP VIEW)   Final Result   Persistently dilated loops of small bowel.      Electronically signed by Jamie Smith      CT LUMBAR SPINE WO CONTRAST   Final Result      THORACIC SPINE   1.  T11 vertebral fracture involving the T10-T11 disc base and with distraction.   2.  Nondisplaced fracture in the T11 lamina and spinous process.   3.  Nondisplaced fracture in the T12 spinous process.   4.  No other fracture seen, but the possibility of nondisplaced fracture in the   T6 vertebral body is not refuted by this CT.   5.  Known epidural hematoma not well

## 2024-12-09 NOTE — PROGRESS NOTES
Palliative Care Chart Review  and Check in Note:     NAME:  Juan Mohamud  Admit Date: 11/7/2024  Hospital Day:  Hospital Day: 33   Current Code status: DNR-CCA    Palliative care is continuing to following Mr. Mohamud for symptom management,  and goals of care discussion as needed. Patient's chart reviewed today 12/9/24.      Met with pt's family at the bedside along with Dr. Womack and JULIOCESAR Srivastava. Pt is passing breathing trials. At this time pt's family would like to attempt extubation and otherwise continue with pressors and other management. If pt declines he would not want to be reintubated, they would transition to comfort care at that point.       The following are the currently established goals/code status, and Symptom management.     Goals of care: Pt's wife Lizzie is his legal NOK and surrogate decision mkaer. Discussed today as above    Code status: Limited x4 - DNR/DNI    Discharge plan: Pt's family understands that if pt does survive this admission he will need significant rehabilitation. We have discussed the alternative of hospice in depth if he declines.       TIP Parra - CNP  12/09/24  11:16 AM

## 2024-12-09 NOTE — PROGRESS NOTES
timing/barriers to discharge, need for support services and/or placement decision   [x] Discussed management of the case with: ICU    Diet Diet NPO   DVT Prophylaxis [] Lovenox, []  Heparin, [] SCDs, [] Ambulation,  [] Eliquis, [] Xarelto  [] Coumadin   Code Status Limited       Surrogate Decision Maker/ POA      Personally reviewed Lab Studies and Imaging             Subjective:     Chief Complaint: fall    Mr. Juan Mohamud is a 84 y.o. male with a medical hx significant for type II DM, degenerative lumbar disease, arrhythmia s/p AICD, diastolic CHF, and macular degeneration otherwise as listed in the MHx table below, who presented from home to the [facility] ED on 11/3/24 after a fall.     Per chart review, patient fell after feeling like his \"knees were giving out.\" No reported symptoms prior to this fall, patient landed on his left side and hit the back of his head but did not lose consciousness at this time.      Upon arrival to the [facility] ED, patient was alert, oriented and reported with GCS of 15 and NIH of 0. While in the ED patient had increasing generalized weakness and difficulty getting up from a sitting position. Decision was made to admit patient as they did not feel safe discharging him home given his inability to stand unassisted.      Fall was thought to be mechanical and secondary to generalized weakness due to poor nutrition. CT abd pelv performed due to complaints of abdominal pain, distension, and back pain upon arrival to the floor. Imaging revealed acute T11 vertebral fracture extending into the T10-T11 disc space as well as diffuse distention of small and large bowel compatible with ileus and bilateral pleural effusions.         Subjective: Wife and son at bedside.  Discussed goals of care briefly.  Understanding that if his respiratory status declines he will not be reintubated at that time they will move towards comfort care measures.      Pertinent positives and negatives  4:05 PM      Comment: Please note this report has been produced using speech recognition software and may contain errors related to that system including errors in grammar, punctuation, and spelling, as well as words and phrases that may be inappropriate. If there are any questions or concerns please feel free to contact the dictating provider for clarification.

## 2024-12-09 NOTE — CARE COORDINATION
Discharge Planning:    CM continues to follow for DCP- received call from insurance [employer] ([phone]), making sure we had info to complete expedited appeal for LTACH: P- [phone] ; F- [phone]. CM informed her of plan to submit appeal once pt is more clinically stable and appropriate for LTACH.     Per MD and provider notes, possible plan to terminally withdrawal care today. PC following. Please contact CM directly if family decides to opt for hospice services, in order for services to be at bedside promptly.    Thank you  Cat Rj GANDARA, BSN,   ICU   [phone]

## 2024-12-09 NOTE — PROGRESS NOTES
MECHANICAL VENTILATION WEANING PROTOCOL    PRE-TRIAL PATIENT ASSESSMENT - COMPLETED     Ventilatory Assessment:    PARAMETER CRITERIA FOR WEANING   Spontaneous Cough:  Yes    Sputum Characteristics:          SPONTANEOUS COUGH With small to moderate  Amount of secretions   FiO2 : 40 % FIO2 less than or equal to 50%     PEEP less than or equal to 8   Progressive Mobility Protocol  No     ABG:  Lab Results   Component Value Date/Time    PHART 7.312 12/08/2024 12:44 PM    KOH3GKU 46.8 12/08/2024 12:44 PM    PO2ART 140.3 12/08/2024 12:44 PM    N0PIOESX 99 12/08/2024 12:44 PM    JYD0WLX 23.7 12/08/2024 12:44 PM    BEART -3 12/08/2024 12:44 PM     HGB/WBC:  Lab Results   Component Value Date/Time    HGB 11.0 12/09/2024 05:10 AM    WBC 14.9 12/09/2024 05:10 AM        Vital Signs:    PARAMETER CRITERIA FOR WEANING Meets Criteria   Pulse: (!) 103 Within patient's normal limits / stable No   Respirations: 22 Less than or equal to 30 Yes   BP: (!) 107/54 Within patient's normal limits / minimal pressors (Hemodynamically Stable) No   SpO2: 99 % Greater than or equal to 90% Yes     Within patient's normal limits Yes   Temp: 98.2 °F (36.8 °C) Less than 38.5oC / 101.3oF Yes    wants pt on wean  [x]    Based on this assessment and the  Ventilator Weaning Protocol, this patient  IS being placed on a Spontaneous Breathing Trial (SBT) at this time.  []    Based on this assessment and the  Ventilator Weaning Protocol, this patient  IS NOT being placed on a Spontaneous Breathing Trial (SBT) at this time.  []    Patient  IS NOT being placed on a Spontaneous Breathing Trial (SBT) at this time because of factors not previously addressed.  Those factors include      Vital Capacity (VC) = 291    Maximum Inspiratory Force (MIF) = too low to read    SBT - Initiated at  1000    Ventilator Settings:  CPAP - 10 cmH2O, PS - 8 cmH2O(if using settings other than CPAP 5/PS 8, please explain reasons for settings here):       1 Minute SBT

## 2024-12-09 NOTE — PROGRESS NOTES
Bariatric Surgery   Daily Progress Note  Patient: Juan Mohamud      Procedure(s) Performed: exploratory laparotomy with closure of gastrotomy, reji gastrostomy     SUBJECTIVE:   NAEO. Discussion about terminal extubation today.     ROS:   A 14 point review of systems was conducted, significant findings as noted above. All other systems negative.    OBJECTIVE:    PHYSICAL EXAM:    Vitals:    12/09/24 0230 12/09/24 0245 12/09/24 0300 12/09/24 0401   BP:       Pulse: 100 (!) 102 100 99   Resp: 17 18 18 20   Temp:       TempSrc:       SpO2: 96% 96% 95%    Weight:       Height:           General appearance: intubated and sedated  HEENT: OG in place and clamped  Eyes: No scleral icterus, EOM grossly intact  Neck: trachea midline, neck supple  Chest/Lungs: normal effort with no accessory muscle use on RA  Cardiovascular: RRR,   Abdomen: Soft, appropriately-tender, black foam wound vac in place midline, gastrostomy tube in place to gravity with some gastric output. Mild skin breakdown underneath bumper  Skin: warm and dry, no rashes  Extremities: no edema, no cyanosis. Right arm with skin tear after removal of a line, covered with mepilex dressing. L arm wrapped in ACE bandage  Genitourinary: Cuellar in place with clear yellow urine  Anorectal: Rectal tube in place  Neuro: intubated and sedated    ASSESSMENT & PLAN:   Juan Mohamud is a 84 y.o. male with Hx DM, lumber DDD, arrhythmia s/p AICD, and diastolic CHF. He initially presented to [facility] on 11/[facility]/24 following a fall with a T11 burst fracture and associated epidural hematoma with cord compression.  Transferred to [facility] on 11/7/2024, where he underwent T8-L1 posterior decompression, fixation, and fusion on 11/11. Progressively worsening abdominal distention since original fall requiring endoscopic decompression on 11/8. Failed MBS, underwent PEG placement 11/26. CXR 12/4 demonstrated free air and patient was taken for exploratory laparotomy with closure of

## 2024-12-09 NOTE — PROGRESS NOTES
WOUND VAC PROGRESS NOTE    Wound vac changed. Black Foam utilized. Continous suction set to -125 mmHg. Dressing with good seal and no leaks noted. Anticipate changes on M/W/F. Please reach out to surgery team if vac alarms. PLEASE KEEP VAC PLUGGED IN AT ALL TIMES.

## 2024-12-09 NOTE — PROGRESS NOTES
[facility] - [facility]  Cardiology Inpatient Consult Service  Daily Progress Note        Admit Date:  11/7/2024    Referring Physician: Fred Akers MD    Reason for Consultation/Chief Complaint: CHF and fluid overloaded    Subjective:   Interval history:  Pt is now only on levophed, slowly weaning. Still intubated on propofol and precedex. Failed SBT yesterday.     Medications:   SMOG Enema  330 mL Rectal BID    piperacillin-tazobactam  4,500 mg IntraVENous Once    Followed by    piperacillin-tazobactam  3,375 mg IntraVENous Q8H    bacitracin   Topical TID    [Held by provider] naloxegol  25 mg Oral QAM AC    linaclotide  290 mcg Oral QAM AC    lidocaine 1 % injection  50 mg IntraDERmal Once    [Held by provider] midodrine  25 mg Oral q8h    [Held by provider] multivitamin  1 tablet Oral Daily    [Held by provider] thiamine  100 mg Oral Daily    [Held by provider] buPROPion  150 mg Per NG tube BID    [Held by provider] metoprolol  2.5 mg IntraVENous Q6H    [Held by provider] melatonin  5 mg Oral Nightly    heparin (porcine)  5,000 Units SubCUTAneous Q8H    pantoprazole  40 mg IntraVENous Daily    balsum peru-castor oil   Topical BID    gabapentin  600 mg Oral TID    atorvastatin  10 mg Oral Nightly    [Held by provider] amiodarone  200 mg Oral Daily    [Held by provider] tamsulosin  0.4 mg Oral Daily    [Held by provider] DESMOpressin  200 mcg Oral Nightly       IV drips:   propofol Stopped (12/09/24 1117)    dexmedeTOMIDine Stopped (12/09/24 1326)    [Held by provider] furosemide (LASIX) 100 mg in sodium chloride 0.9 % 100 mL infusion      phenylephrine (CARI-SYNEPHRINE) 50 mg in sodium chloride 0.9 % 250 mL infusion Stopped (12/06/24 1355)    norepinephrine-sodium chloride 11 mcg/min (12/09/24 1118)    VASOpressin 20 Units in sodium chloride 0.9 % 100 mL infusion Stopped (12/08/24 0803)    DOBUTamine Stopped (12/07/24 0945)    sodium chloride      dextrose         PRN:  sodium      Recent Labs     12/07/24  0520 12/08/24  0505 12/09/24  0510   WBC 11.1* 10.3 14.9*   HGB 9.0* 9.3* 11.0*   HCT 28.6* 29.4* 35.1*    181 266   MCV 91.5 91.0 90.6     No results for input(s): \"CHOLTOT\", \"TRIG\", \"HDL\", \"CHOLHDL\", \"LDL\" in the last 72 hours.    Invalid input(s): \"LIPIDCOMM\", \"VLDCHOL\"  No results for input(s): \"INR\" in the last 72 hours.    Invalid input(s): \"PT\", \"PTT\"  No results for input(s): \"CKTOTAL\", \"CKMB\", \"CKMBINDEX\", \"TROPONINI\" in the last 72 hours.  No results for input(s): \"BNP\" in the last 72 hours.  No results for input(s): \"NTPROBNP\" in the last 72 hours.  No results for input(s): \"TSH\" in the last 72 hours.    Lab Results   Component Value Date/Time    TROPHS 56 11/30/2024 10:04 PM       Imaging:   I personally reviewed imaging studies including CXR, Stress test, TTE/ANNA.      Assessment & Plan:     Cardiogenic Shock   Biventricular failure   Patient was once on 4 pressors, now only on levophed. Slowly trying to wean pt off. HR 90s-100s. BP 110s/60-70s.   - Wean off levophed  - Once weaned, can be placed on GDMT for heart failure    Thank you for allowing to us to participate in the care of Juan Mohamud. Please call our service with questions.    All questions and concerns were addressed to the patient/family. Alternatives to my treatment were discussed. The note was completed using EMR. Every effort was made to ensure accuracy; however, inadvertent computerized transcription errors may be present. I have personally reviewed the reports and images of labs, radiological studies, cardiac studies including ECG's and telemetry, current and old medical records.     Enrique Mendez MD, FACC, [facility]  Advanced Cardiac Imaging  [facility]    12/9/2024 1:44 PM

## 2024-12-09 NOTE — PROGRESS NOTES
Patient has been successfully weaned from Mechanical Ventilation.  RSBI before extubation was 70 with EtCO2 of 30 and SpO2 of 94 on 40% FiO2.  Patient extubated and placed on 4 liters/min via nasal cannula.  Post extubation SpO2 is 100% with HR  101 bpm and RR 23 breaths/min.  Patient had strong cough that was productive of yellow sputum.  Extubation Well tolerated by patient..

## 2024-12-10 LAB
ALBUMIN SERPL-MCNC: 2.5 G/DL (ref 3.4–5)
ALP SERPL-CCNC: 120 U/L (ref 40–129)
ALT SERPL-CCNC: 10 U/L (ref 10–40)
ANION GAP SERPL CALCULATED.3IONS-SCNC: 19 MMOL/L (ref 3–16)
AST SERPL-CCNC: 41 U/L (ref 15–37)
BILIRUB DIRECT SERPL-MCNC: 0.5 MG/DL (ref 0–0.3)
BILIRUB INDIRECT SERPL-MCNC: 0.3 MG/DL (ref 0–1)
BILIRUB SERPL-MCNC: 0.8 MG/DL (ref 0–1)
BUN SERPL-MCNC: 51 MG/DL (ref 7–20)
CALCIUM SERPL-MCNC: 7.8 MG/DL (ref 8.3–10.6)
CHLORIDE SERPL-SCNC: 112 MMOL/L (ref 99–110)
CO2 SERPL-SCNC: 15 MMOL/L (ref 21–32)
CREAT SERPL-MCNC: 2.5 MG/DL (ref 0.8–1.3)
DEPRECATED RDW RBC AUTO: 18.9 % (ref 12.4–15.4)
GFR SERPLBLD CREATININE-BSD FMLA CKD-EPI: 25 ML/MIN/{1.73_M2}
GLUCOSE BLD-MCNC: 104 MG/DL (ref 70–99)
GLUCOSE BLD-MCNC: 63 MG/DL (ref 70–99)
GLUCOSE BLD-MCNC: 82 MG/DL (ref 70–99)
GLUCOSE BLD-MCNC: 85 MG/DL (ref 70–99)
GLUCOSE BLD-MCNC: 89 MG/DL (ref 70–99)
GLUCOSE BLD-MCNC: 93 MG/DL (ref 70–99)
GLUCOSE SERPL-MCNC: 85 MG/DL (ref 70–99)
HCT VFR BLD AUTO: 33.2 % (ref 40.5–52.5)
HGB BLD-MCNC: 10.5 G/DL (ref 13.5–17.5)
MAGNESIUM SERPL-MCNC: 2.19 MG/DL (ref 1.8–2.4)
MCH RBC QN AUTO: 28.5 PG (ref 26–34)
MCHC RBC AUTO-ENTMCNC: 31.7 G/DL (ref 31–36)
MCV RBC AUTO: 90.2 FL (ref 80–100)
PERFORMED ON: ABNORMAL
PERFORMED ON: ABNORMAL
PERFORMED ON: NORMAL
PHOSPHATE SERPL-MCNC: 4 MG/DL (ref 2.5–4.9)
PLATELET # BLD AUTO: 195 K/UL (ref 135–450)
PMV BLD AUTO: 7.8 FL (ref 5–10.5)
POTASSIUM SERPL-SCNC: 3.3 MMOL/L (ref 3.5–5.1)
PROT SERPL-MCNC: 5.5 G/DL (ref 6.4–8.2)
RBC # BLD AUTO: 3.68 M/UL (ref 4.2–5.9)
SODIUM SERPL-SCNC: 146 MMOL/L (ref 136–145)
WBC # BLD AUTO: 12.8 K/UL (ref 4–11)

## 2024-12-10 PROCEDURE — 6360000002 HC RX W HCPCS: Performed by: STUDENT IN AN ORGANIZED HEALTH CARE EDUCATION/TRAINING PROGRAM

## 2024-12-10 PROCEDURE — 85027 COMPLETE CBC AUTOMATED: CPT

## 2024-12-10 PROCEDURE — 2580000003 HC RX 258: Performed by: STUDENT IN AN ORGANIZED HEALTH CARE EDUCATION/TRAINING PROGRAM

## 2024-12-10 PROCEDURE — 94761 N-INVAS EAR/PLS OXIMETRY MLT: CPT

## 2024-12-10 PROCEDURE — 2500000003 HC RX 250 WO HCPCS

## 2024-12-10 PROCEDURE — 6360000002 HC RX W HCPCS

## 2024-12-10 PROCEDURE — 2000000000 HC ICU R&B

## 2024-12-10 PROCEDURE — 99291 CRITICAL CARE FIRST HOUR: CPT | Performed by: INTERNAL MEDICINE

## 2024-12-10 PROCEDURE — 83735 ASSAY OF MAGNESIUM: CPT

## 2024-12-10 PROCEDURE — 36620 INSERTION CATHETER ARTERY: CPT

## 2024-12-10 PROCEDURE — 80069 RENAL FUNCTION PANEL: CPT

## 2024-12-10 PROCEDURE — 6370000000 HC RX 637 (ALT 250 FOR IP): Performed by: SURGERY

## 2024-12-10 PROCEDURE — 80076 HEPATIC FUNCTION PANEL: CPT

## 2024-12-10 PROCEDURE — 2580000003 HC RX 258

## 2024-12-10 PROCEDURE — 2700000000 HC OXYGEN THERAPY PER DAY

## 2024-12-10 RX ORDER — POTASSIUM CHLORIDE 29.8 MG/ML
20 INJECTION INTRAVENOUS
Status: COMPLETED | OUTPATIENT
Start: 2024-12-10 | End: 2024-12-10

## 2024-12-10 RX ORDER — POTASSIUM CHLORIDE 29.8 MG/ML
20 INJECTION INTRAVENOUS ONCE
Status: COMPLETED | OUTPATIENT
Start: 2024-12-10 | End: 2024-12-10

## 2024-12-10 RX ADMIN — MINERAL OIL 330 ML: 999 LIQUID ORAL at 08:44

## 2024-12-10 RX ADMIN — SODIUM CHLORIDE, PRESERVATIVE FREE 10 ML: 5 INJECTION INTRAVENOUS at 08:44

## 2024-12-10 RX ADMIN — BACITRACIN: 500 OINTMENT TOPICAL at 14:22

## 2024-12-10 RX ADMIN — NOREPINEPHRINE BITARTRATE 13 MCG/MIN: 0.06 INJECTION, SOLUTION INTRAVENOUS at 06:05

## 2024-12-10 RX ADMIN — POTASSIUM CHLORIDE 20 MEQ: 400 INJECTION, SOLUTION INTRAVENOUS at 05:57

## 2024-12-10 RX ADMIN — PIPERACILLIN AND TAZOBACTAM 3375 MG: 3; .375 INJECTION, POWDER, FOR SOLUTION INTRAVENOUS at 19:34

## 2024-12-10 RX ADMIN — Medication: at 21:21

## 2024-12-10 RX ADMIN — HEPARIN SODIUM 5000 UNITS: 5000 INJECTION INTRAVENOUS; SUBCUTANEOUS at 21:21

## 2024-12-10 RX ADMIN — HEPARIN SODIUM 5000 UNITS: 5000 INJECTION INTRAVENOUS; SUBCUTANEOUS at 05:11

## 2024-12-10 RX ADMIN — BACITRACIN: 500 OINTMENT TOPICAL at 08:45

## 2024-12-10 RX ADMIN — Medication: at 08:45

## 2024-12-10 RX ADMIN — PIPERACILLIN AND TAZOBACTAM 3375 MG: 3; .375 INJECTION, POWDER, FOR SOLUTION INTRAVENOUS at 03:14

## 2024-12-10 RX ADMIN — PANTOPRAZOLE SODIUM 40 MG: 40 INJECTION, POWDER, FOR SOLUTION INTRAVENOUS at 08:43

## 2024-12-10 RX ADMIN — POTASSIUM CHLORIDE 20 MEQ: 400 INJECTION, SOLUTION INTRAVENOUS at 00:45

## 2024-12-10 RX ADMIN — DEXTROSE 125 ML: 10 SOLUTION INTRAVENOUS at 08:14

## 2024-12-10 RX ADMIN — POTASSIUM CHLORIDE 20 MEQ: 400 INJECTION, SOLUTION INTRAVENOUS at 06:53

## 2024-12-10 RX ADMIN — BACITRACIN: 500 OINTMENT TOPICAL at 21:21

## 2024-12-10 RX ADMIN — HEPARIN SODIUM 5000 UNITS: 5000 INJECTION INTRAVENOUS; SUBCUTANEOUS at 14:23

## 2024-12-10 RX ADMIN — PIPERACILLIN AND TAZOBACTAM 3375 MG: 3; .375 INJECTION, POWDER, FOR SOLUTION INTRAVENOUS at 11:23

## 2024-12-10 NOTE — PROGRESS NOTES
furosemide (LASIX) 100 mg in sodium chloride 0.9 % 100 mL infusion      phenylephrine (CARI-SYNEPHRINE) 50 mg in sodium chloride 0.9 % 250 mL infusion Stopped (12/06/24 1355)    norepinephrine-sodium chloride 13 mcg/min (12/10/24 0605)    VASOpressin 20 Units in sodium chloride 0.9 % 100 mL infusion Stopped (12/08/24 0803)    DOBUTamine Stopped (12/07/24 0945)    sodium chloride      dextrose       PRN Meds: sodium chloride, , PRN  diatrizoate meglumine-sodium, 30 mL, ONCE PRN  acetaminophen, 650 mg, Q4H PRN   Or  acetaminophen, 650 mg, Q4H PRN  dextrose, 25 g, PRN  benzonatate, 100 mg, TID PRN  sodium chloride flush, 5-40 mL, PRN  ondansetron, 4 mg, Q8H PRN   Or  ondansetron, 4 mg, Q6H PRN  lidocaine, 1 patch, Daily PRN  glucose, 4 tablet, PRN  dextrose bolus, 125 mL, PRN   Or  dextrose bolus, 250 mL, PRN  glucagon (rDNA), 1 mg, PRN  dextrose, , Continuous PRN        Labs and Imaging   XR ABDOMEN (KUB) (SINGLE AP VIEW)    Result Date: 12/3/2024  HISTORY: Colonic distention. 4 supine views the abdomen. COMPARISON: 12/2/2024. FINDINGS: Numerous dilated gas-filled loops of large bowel compatible with colonic ileus. No free air identified. Pleural effusions and lower lung airspace disease similar to the prior exam.     1. Diffuse colonic distention suggesting colonic ileus. No free air identified. Electronically signed by Ryder Howard    XR ABDOMEN (KUB) (SINGLE AP VIEW)    Result Date: 12/2/2024  EXAM: XR ABDOMEN (KUB) (SINGLE AP VIEW). DATE: 12/2/2024 7:52 EST. INDICATION: f/u colonic distention COMPARISON: One day prior TECHNIQUE: Standard per department protocol. VIEWS OBTAINED: 2 FINDINGS: Diffuse gaseous distention of colon throughout the abdomen, unchanged. No definite free air. IVC filter and thoracolumbar fusion hardware again noted. Moderate right pleural effusion with right lung consolidation.     Unchanged diffuse gaseous distention of colon. Electronically signed by Hamlet Mcpherson MD    XR CHEST  gaseous distention of the colon. No discrete pneumoperitoneum on these supine views. IVC filter is unchanged.     Impression: Stable gaseous distention of the colon. Electronically signed by Jamie Smith MD    XR ABDOMEN (KUB) (SINGLE AP VIEW)    Result Date: 11/28/2024  Exam: XR ABDOMEN (KUB) (SINGLE AP VIEW) History: Abdominal pain Comparison: 11/27/2024 Findings: There is unchanged gaseous distention of the colon. IVC filter and spinal fusion are noted. Gastrostomy tube overlies the left upper quadrant. No pathological calcifications are seen. The osseous structures are intact.     Unchanged mild gaseous distention of the colon may represent ileus. Electronically signed by Jeramy Munroe    XR CHEST PORTABLE    Result Date: 11/28/2024  EXAM: PORTABLE AP CHEST X-RAY INDICATION: SOB, possible aspiration, COMPARISON: 11/19/2024 FINDINGS: There is pulmonary edema and an increased moderate right pleural effusion. Left subclavian approach cardiac device and thoracolumbar fusion are noted. There is no pneumothorax. There is stable mild cardiomegaly.     Pulmonary edema and increased moderate right pleural effusion. Electronically signed by Jeramy Munroe    XR ABDOMEN (KUB) (SINGLE AP VIEW)    Result Date: 11/27/2024  EXAM: XR ABDOMEN (KUB) (SINGLE AP VIEW) CLINICAL  INDICATION: Abdominal pain. COMPARISON: None. TECHNIQUE: XR ABDOMEN (KUB) (SINGLE AP VIEW) FINDINGS/    Gaseous distended loops loops of nonspecific bowel are seen within the abdomen and pelvis. There is an IVC filter. Hardware in the spine. Findings may represent ileus, constipation or developing obstruction. Electronically signed by Obed Quispe      CBC:   Recent Labs     12/08/24  0505 12/09/24  0510 12/10/24  0503   WBC 10.3 14.9* 12.8*   HGB 9.3* 11.0* 10.5*    266 195     BMP:    Recent Labs     12/09/24  0510 12/09/24  2249 12/10/24  0503    144 146*   K 3.9 3.3* 3.3*    109 112*   CO2 19* 17* 15*   BUN 47* 50* 51*   CREATININE 2.4* 2.5*

## 2024-12-10 NOTE — PROGRESS NOTES
[facility]  Cardiology Inpatient Consult Service  Daily Progress Note        Admit Date:  11/7/2024    Referring Physician: Fred Akers MD    Reason for Consultation/Chief Complaint: CHF and fluid overloaded    Subjective:   Interval history:  Pt extubated, intermittently delirious. Pt is now only on levophed, slowly weaning. HR in 120s, regular rhythm.    Medications:   SMOG Enema  330 mL Rectal BID    piperacillin-tazobactam  4,500 mg IntraVENous Once    Followed by    piperacillin-tazobactam  3,375 mg IntraVENous Q8H    bacitracin   Topical TID    [Held by provider] naloxegol  25 mg Oral QAM AC    [Held by provider] linaclotide  290 mcg Oral QAM AC    lidocaine 1 % injection  50 mg IntraDERmal Once    [Held by provider] midodrine  25 mg Oral q8h    [Held by provider] multivitamin  1 tablet Oral Daily    [Held by provider] thiamine  100 mg Oral Daily    [Held by provider] buPROPion  150 mg Per NG tube BID    [Held by provider] metoprolol  2.5 mg IntraVENous Q6H    [Held by provider] melatonin  5 mg Oral Nightly    heparin (porcine)  5,000 Units SubCUTAneous Q8H    pantoprazole  40 mg IntraVENous Daily    balsum peru-castor oil   Topical BID    [Held by provider] gabapentin  600 mg Oral TID    [Held by provider] atorvastatin  10 mg Oral Nightly    [Held by provider] amiodarone  200 mg Oral Daily    [Held by provider] tamsulosin  0.4 mg Oral Daily    [Held by provider] DESMOpressin  200 mcg Oral Nightly       IV drips:   dexmedeTOMIDine Stopped (12/09/24 1321)    norepinephrine-sodium chloride 13 mcg/min (12/10/24 1205)    sodium chloride      dextrose         PRN:  sodium chloride, diatrizoate meglumine-sodium, acetaminophen **OR** acetaminophen, dextrose, benzonatate, sodium chloride flush, ondansetron **OR** ondansetron, lidocaine, glucose, dextrose bolus **OR** dextrose bolus, glucagon (rDNA), dextrose      Objective:     Vitals:    12/10/24 1035 12/10/24 1102 12/10/24

## 2024-12-10 NOTE — PROGRESS NOTES
Bariatric Surgery   Daily Progress Note  Patient: Juan Mohamud      Procedure(s) Performed: exploratory laparotomy with closure of gastrotomy, reji gastrostomy     SUBJECTIVE:   No acute events overnight. Remains on levo and HR remains in 120s. Patient is having stool output. Ongoing discussion about going comfort care.     ROS:   A 14 point review of systems was conducted, significant findings as noted above. All other systems negative.    OBJECTIVE:    PHYSICAL EXAM:    Vitals:    12/10/24 0400 12/10/24 0415 12/10/24 0430 12/10/24 0445   BP: 95/70      Pulse: (!) 119 (!) 120 (!) 122 (!) 124   Resp: 15 21 18 (!) 31   Temp: 97.6 °F (36.4 °C)      TempSrc: Temporal      SpO2: 97% 98% 97% 97%   Weight:       Height:           General appearance: alert, no acute distress  HEENT: OG in place and clamped  Eyes: No scleral icterus, EOM grossly intact  Neck: trachea midline, neck supple  Chest/Lungs: normal effort with no accessory muscle use on 4L via NC  Cardiovascular: RRR,   Abdomen: Soft, appropriately-tender, black foam wound vac in place midline, gastrostomy tube in place to gravity with some gastric output. Mild skin breakdown underneath bumper  Skin: warm and dry, no rashes  Extremities: no edema, no cyanosis. Right arm with skin tear after removal of a line, covered with mepilex dressing. L arm wrapped in ACE bandage  Genitourinary: Cuellar in place with clear yellow urine  Anorectal: Rectal tube in place  Neuro: alert, no focal deficits    ASSESSMENT & PLAN:   Juan Mohamud is a 84 y.o. male with Hx DM, lumber DDD, arrhythmia s/p AICD, and diastolic CHF. He initially presented to [facility] on 11/[facility] following a fall with a T11 burst fracture and associated epidural hematoma with cord compression.  Transferred to [facility] on 11/7/2024, where he underwent T8-L1 posterior decompression, fixation, and fusion on 11/11. Progressively worsening abdominal distention since original fall requiring endoscopic  decompression on 11/8. Failed MBS, underwent PEG placement 11/26. CXR 12/4 demonstrated free air and patient was taken for exploratory laparotomy with closure of gastrotomy, reji gastrostomy (12/4)    - Daily SMOG encouraged from a general surgery stand point  - TPN halted as family is making the decision for pt to become comfort care  - Bacitracin and mepilex dressing to superficial skin tears daily per nursing  - Continue gastrostomy tube to gravity. No plan to study pt's G-tube at this time. Palliative care following for DNR-CC plans.   - OG to stay in place at this time and clamped  - No medications per G tube or OG  - [place] change MWF  - Aggressive electrolyte replacement  - Rest of care per primary     Quentin Ward DO  PGY-1, General Surgery Resident  12/10/24 6:53 AM  [phone]

## 2024-12-10 NOTE — PROGRESS NOTES
RN spoke with ICU team about Pts HR being in the 110s-120's looks like Afib on the monitor. Pt also having small 2-4 beats of Vtach/ PVCS. RN drawing renal and Mg per team.

## 2024-12-10 NOTE — PROGRESS NOTES
ICU Progress Note    Admit Date: 11/7/2024  ICU day 5  Vent Day: Extubated (12/8)  IV Access: Peripheral x3, R IJ  IV Fluids: None  Vasopressors: Levo 12           Antibiotics: Zosyn  Diet: Diet NPO    CC: transferred to ICU for hypotension 12/6    Interval history:   Overnight, per night team, HR went to 120-130. Electrolytes were checked and replaced. BG went to 69 and dextrose given    Patient was seen and examined at the bedside.  He was extubated yesterday successfully.  Today he is alert and following commands.  He was able to answer orientation question except for date and location.  Pressor requirements are at 12 today to maintain MAP of more than 65  Vitally /75  RR 31 temp 97.9   Labs are significant for K 3.3 was replaced, Cr stable at 2. 5 and  ml  Intake is 1.8 L.       HPI   Briefly, Mr. Juan Mohamud is a 84 y.o. male with a complex medical hx significant for Ogilive syndrome, PE/DVT s/p IVC filter , type II DM, degenerative lumbar disease, arrhythmia s/p AICD, diastolic CHF, who has had a prolonged hospital stay following a fall. Since admission, he underwent T8-L1 posterior decompression, fixation, and fusion on 11/11.  He developed DVT/PE and IVC filter was placed by IR on 11/15. 12/6 developed hypotension that was refractory to fluids and midodrine and was transferred to ICU for further management. Cardio consulted, and echo revealed biventricular cardiogenic shock with EF 30-35%. While in the ICU, the patient's oxygen saturation decreased into the 70s despite attempts at vapotherm therapy. As there was no improvement in oxygenation, the patient was intubated.       Medications:     Scheduled Meds:   SMOG Enema  330 mL Rectal BID    piperacillin-tazobactam  4,500 mg IntraVENous Once    Followed by    piperacillin-tazobactam  3,375 mg IntraVENous Q8H    bacitracin   Topical TID    [Held by provider] naloxegol  25 mg Oral QAM AC    [Held by provider] linaclotide  290  TPN; will readdress in light of goals of care discussion   - W[place] change MWF     #Epidural spinal hematoma   #Spinal cord compression at T11   #T10 vertebral body unstable fracture  #T11 nondisplaced spinous process fracture  #T6 vertebral body subacute compression fracture   - S/p T8-L1 decompression, fusion and fixation   PT/OT per neurocritical care.   - PT recommended SNF     PE/DVT left lower extremity - s/p IVC filter  - No anticoagulation due to epidural hematoma       Chronic Conditions:     # Type II DM: last A1C 5.1 (11/2024)   # Peripheral neuropathy: held gabapentin npo  # Dysthymic disorder: held  Wellbutrin npo  # BPH, nocturnal polyuria: home tamsulosin and desmopressin-held.        Glycemic goal:  140-180 mg/dL  LDAs: R IJ, 3 peripherals   Infusions:  Dobutamine, Levophed, propofol, vasopressin   Abx:  vancomycin and zosyn    Diet:  NPO  GI PPx:   N/A  Bowel Regimen: BM present - unchanged  DVT PPx: SCDs      Code Status: Limited   DISPO: ICU      This patient has been staffed and discussed with CIPRIANO ROBBINS MD.     Lorena Topete MD, PGY-1  Internal Medicine Resident  Contact via [employer]  12/10/24  8:44 AM    Patient seen, examined and discussed with the resident and I agree with the assessment and plan.    Vent Mode: CPAP/PS Resp Rate (Set): 20 bpm/Vt (Set, mL): 500 mL/ /FiO2 : 40 %  PEEP/CPAP (cmH2O): 10   Peak Inspiratory Pressure (cmH2O): 18 cmH2O  ETCO2 (mmHg): 27 mmHg   Recent Labs     12/08/24  1128 12/08/24  1244   PHART 7.351 7.312*   [id number] 40.8 46.8*   PO2ART 196.7* 140.3*     Successfully extubated yesterday, but his pressor requirements have increased.  Hypoglycemic this morning and needed a couple pushes of glucose.  Patient oriented to self, but not situation or time.  He kept saying he wants to move forward and to do what needs to be done to get him out the door, but doesn't understand that there really isn't more we can do at this time and he's life support dependent and

## 2024-12-10 NOTE — CARE COORDINATION
CM informed by Sofie with [facility]- family opting for hospice at this time with preference of [facility]. CM placed referral and informed them of need for [facility] by tonight- they are checking to see if they will have a nurse available to see pt tonight and will call me back.    [facility] does not have RN available anytime soon.     NITO spoke with wife at bedside regarding hospice preference- states she really doesn't have a preference, she had just heard of [facility] before. Agreeable to \[facility]\"" hospice.     CM placed referral to Jennifer Kessler RN on her way to hospital now to get pt signed on with hospice and status changed to GIP. MD made aware.    Thank you  Cat Rj RN, BSN,   ICU   [phone]

## 2024-12-10 NOTE — PLAN OF CARE
Problem: Chronic Conditions and Co-morbidities  Goal: Patient's chronic conditions and co-morbidity symptoms are monitored and maintained or improved  Outcome: Progressing     Problem: Discharge Planning  Goal: Discharge to home or other facility with appropriate resources  Outcome: Progressing     Problem: Safety - Adult  Goal: Free from fall injury  Outcome: Progressing     Problem: Skin/Tissue Integrity  Goal: Absence of new skin breakdown  Description: 1.  Monitor for areas of redness and/or skin breakdown  2.  Assess vascular access sites hourly  3.  Every 4-6 hours minimum:  Change oxygen saturation probe site  4.  Every 4-6 hours:  If on nasal continuous positive airway pressure, respiratory therapy assess nares and determine need for appliance change or resting period.  Outcome: Progressing     Problem: Safety - Medical Restraint  Goal: Remains free of injury from restraints (Restraint for Interference with Medical Device)  Description: INTERVENTIONS:  1. Determine that other, less restrictive measures have been tried or would not be effective before applying the restraint  2. Evaluate the patient's condition at the time of restraint application  3. Inform patient/family regarding the reason for restraint  4. Q2H: Monitor safety, psychosocial status, comfort, nutrition and hydration  Outcome: Progressing  Flowsheets  Taken 12/10/2024 0200 by Yamile Golden RN  Remains free of injury from restraints (restraint for interference with medical device):   Determine that other, less restrictive measures have been tried or would not be effective before applying the restraint   Evaluate the patient's condition at the time of restraint application   Inform patient/family regarding the reason for restraint   Every 2 hours: Monitor safety, psychosocial status, comfort, nutrition and hydration  Taken 12/10/2024 0000 by Yamile Golden RN  Remains free of injury from restraints (restraint for interference with medical

## 2024-12-10 NOTE — PROGRESS NOTES
Palliative Care Chart Review  and Check in Note:     NAME:  Juan Mohamud  Admit Date: 11/7/2024  Hospital Day:  Hospital Day: 34   Current Code status: Limited    Palliative care is continuing to following Mr. Mohamud for symptom management,  and goals of care discussion as needed. Patient's chart reviewed today 12/10/24.      Met with pt's wife and two other family members in the family lounge. Offered emotional support to the wife as she was very tearful. She said she's making the decision to transition to comfort care and was open to meeting with hospice. Explained the hospice philosophy and services, and offered choice of hospice. One family member was familiar with [facility]. LVM for CM.    The following are the currently established goals/code status, and Symptom management.     Goals of care: Family would like to move towards comfort care.    Code status: DNR/DNI (Limited - no to all interventions)    Discharge plan: remain inpatient with hospice YOUNG Yanez NP  Palliative Care  [phone]

## 2024-12-11 ENCOUNTER — HOSPITAL ENCOUNTER (INPATIENT)
Age: 84
LOS: 1 days | Discharge: HOSPICE | DRG: 951 | End: 2024-12-12
Attending: INTERNAL MEDICINE | Admitting: INTERNAL MEDICINE
Payer: OTHER MISCELLANEOUS

## 2024-12-11 VITALS
HEIGHT: 77 IN | DIASTOLIC BLOOD PRESSURE: 63 MMHG | TEMPERATURE: 97.6 F | BODY MASS INDEX: 25.09 KG/M2 | SYSTOLIC BLOOD PRESSURE: 104 MMHG | WEIGHT: 212.52 LBS | RESPIRATION RATE: 20 BRPM | HEART RATE: 126 BPM | OXYGEN SATURATION: 98 %

## 2024-12-11 DIAGNOSIS — I50.32 CHRONIC HEART FAILURE WITH PRESERVED EJECTION FRACTION (HFPEF) (HCC): ICD-10-CM

## 2024-12-11 DIAGNOSIS — R00.0 WIDE-COMPLEX TACHYCARDIA: ICD-10-CM

## 2024-12-11 DIAGNOSIS — I49.3 PVC'S (PREMATURE VENTRICULAR CONTRACTIONS): ICD-10-CM

## 2024-12-11 DIAGNOSIS — I47.20 VT (VENTRICULAR TACHYCARDIA) (HCC): ICD-10-CM

## 2024-12-11 DIAGNOSIS — Z95.810 ICD (IMPLANTABLE CARDIOVERTER-DEFIBRILLATOR) IN PLACE: Primary | ICD-10-CM

## 2024-12-11 PROBLEM — A41.9 SEPSIS (HCC): Status: ACTIVE | Noted: 2024-12-11

## 2024-12-11 LAB
ALBUMIN SERPL-MCNC: 2.5 G/DL (ref 3.4–5)
ALP SERPL-CCNC: 126 U/L (ref 40–129)
ALT SERPL-CCNC: 9 U/L (ref 10–40)
ANION GAP SERPL CALCULATED.3IONS-SCNC: 18 MMOL/L (ref 3–16)
AST SERPL-CCNC: 36 U/L (ref 15–37)
BILIRUB DIRECT SERPL-MCNC: 0.5 MG/DL (ref 0–0.3)
BILIRUB INDIRECT SERPL-MCNC: 0.2 MG/DL (ref 0–1)
BILIRUB SERPL-MCNC: 0.7 MG/DL (ref 0–1)
BUN SERPL-MCNC: 51 MG/DL (ref 7–20)
CALCIUM SERPL-MCNC: 7.9 MG/DL (ref 8.3–10.6)
CHLORIDE SERPL-SCNC: 113 MMOL/L (ref 99–110)
CO2 SERPL-SCNC: 16 MMOL/L (ref 21–32)
CREAT SERPL-MCNC: 2.6 MG/DL (ref 0.8–1.3)
DEPRECATED RDW RBC AUTO: 19.6 % (ref 12.4–15.4)
GFR SERPLBLD CREATININE-BSD FMLA CKD-EPI: 24 ML/MIN/{1.73_M2}
GLUCOSE SERPL-MCNC: 96 MG/DL (ref 70–99)
HCT VFR BLD AUTO: 35 % (ref 40.5–52.5)
HGB BLD-MCNC: 10.7 G/DL (ref 13.5–17.5)
MAGNESIUM SERPL-MCNC: 2.27 MG/DL (ref 1.8–2.4)
MCH RBC QN AUTO: 28.3 PG (ref 26–34)
MCHC RBC AUTO-ENTMCNC: 30.7 G/DL (ref 31–36)
MCV RBC AUTO: 92 FL (ref 80–100)
PHOSPHATE SERPL-MCNC: 4.3 MG/DL (ref 2.5–4.9)
PLATELET # BLD AUTO: 203 K/UL (ref 135–450)
PMV BLD AUTO: 7.8 FL (ref 5–10.5)
POTASSIUM SERPL-SCNC: 3.4 MMOL/L (ref 3.5–5.1)
PROT SERPL-MCNC: 5.7 G/DL (ref 6.4–8.2)
RBC # BLD AUTO: 3.8 M/UL (ref 4.2–5.9)
SODIUM SERPL-SCNC: 147 MMOL/L (ref 136–145)
WBC # BLD AUTO: 11.5 K/UL (ref 4–11)

## 2024-12-11 PROCEDURE — 80076 HEPATIC FUNCTION PANEL: CPT

## 2024-12-11 PROCEDURE — 93282 PRGRMG EVAL IMPLANTABLE DFB: CPT | Performed by: INTERNAL MEDICINE

## 2024-12-11 PROCEDURE — 2580000003 HC RX 258

## 2024-12-11 PROCEDURE — 6360000002 HC RX W HCPCS

## 2024-12-11 PROCEDURE — 80069 RENAL FUNCTION PANEL: CPT

## 2024-12-11 PROCEDURE — 2500000003 HC RX 250 WO HCPCS

## 2024-12-11 PROCEDURE — 6360000002 HC RX W HCPCS: Performed by: STUDENT IN AN ORGANIZED HEALTH CARE EDUCATION/TRAINING PROGRAM

## 2024-12-11 PROCEDURE — 85027 COMPLETE CBC AUTOMATED: CPT

## 2024-12-11 PROCEDURE — 99233 SBSQ HOSP IP/OBS HIGH 50: CPT | Performed by: INTERNAL MEDICINE

## 2024-12-11 PROCEDURE — 1250000000 HC SEMI PRIVATE HOSPICE R&B

## 2024-12-11 PROCEDURE — 83735 ASSAY OF MAGNESIUM: CPT

## 2024-12-11 RX ORDER — MORPHINE SULFATE 2 MG/ML
2 INJECTION, SOLUTION INTRAMUSCULAR; INTRAVENOUS
Status: DISCONTINUED | OUTPATIENT
Start: 2024-12-11 | End: 2024-12-12 | Stop reason: HOSPADM

## 2024-12-11 RX ORDER — SODIUM CHLORIDE 0.9 % (FLUSH) 0.9 %
5-40 SYRINGE (ML) INJECTION PRN
Status: DISCONTINUED | OUTPATIENT
Start: 2024-12-11 | End: 2024-12-12 | Stop reason: HOSPADM

## 2024-12-11 RX ORDER — HYDROMORPHONE HYDROCHLORIDE 1 MG/ML
0.25 INJECTION, SOLUTION INTRAMUSCULAR; INTRAVENOUS; SUBCUTANEOUS EVERY 4 HOURS PRN
Status: DISCONTINUED | OUTPATIENT
Start: 2024-12-11 | End: 2024-12-11 | Stop reason: HOSPADM

## 2024-12-11 RX ORDER — ACETAMINOPHEN 325 MG/1
650 TABLET ORAL EVERY 4 HOURS PRN
Status: DISCONTINUED | OUTPATIENT
Start: 2024-12-11 | End: 2024-12-12 | Stop reason: HOSPADM

## 2024-12-11 RX ORDER — LORAZEPAM 2 MG/ML
1 INJECTION INTRAMUSCULAR EVERY 6 HOURS PRN
Status: DISCONTINUED | OUTPATIENT
Start: 2024-12-11 | End: 2024-12-11 | Stop reason: HOSPADM

## 2024-12-11 RX ORDER — DIPHENHYDRAMINE HYDROCHLORIDE 50 MG/ML
25 INJECTION INTRAMUSCULAR; INTRAVENOUS EVERY 4 HOURS PRN
Status: DISCONTINUED | OUTPATIENT
Start: 2024-12-11 | End: 2024-12-12 | Stop reason: HOSPADM

## 2024-12-11 RX ORDER — GLYCOPYRROLATE 0.2 MG/ML
0.2 INJECTION INTRAMUSCULAR; INTRAVENOUS EVERY 4 HOURS PRN
Status: DISCONTINUED | OUTPATIENT
Start: 2024-12-11 | End: 2024-12-11 | Stop reason: HOSPADM

## 2024-12-11 RX ORDER — SODIUM CHLORIDE 0.9 % (FLUSH) 0.9 %
5-40 SYRINGE (ML) INJECTION EVERY 12 HOURS SCHEDULED
Status: DISCONTINUED | OUTPATIENT
Start: 2024-12-11 | End: 2024-12-12 | Stop reason: HOSPADM

## 2024-12-11 RX ORDER — POTASSIUM CHLORIDE 29.8 MG/ML
20 INJECTION INTRAVENOUS ONCE
Status: COMPLETED | OUTPATIENT
Start: 2024-12-11 | End: 2024-12-11

## 2024-12-11 RX ORDER — LORAZEPAM 2 MG/ML
1 INJECTION INTRAMUSCULAR EVERY 4 HOURS PRN
Status: DISCONTINUED | OUTPATIENT
Start: 2024-12-11 | End: 2024-12-12 | Stop reason: HOSPADM

## 2024-12-11 RX ORDER — MORPHINE SULFATE 4 MG/ML
4 INJECTION INTRAVENOUS
Status: DISCONTINUED | OUTPATIENT
Start: 2024-12-11 | End: 2024-12-11 | Stop reason: HOSPADM

## 2024-12-11 RX ORDER — ONDANSETRON 2 MG/ML
4 INJECTION INTRAMUSCULAR; INTRAVENOUS EVERY 6 HOURS PRN
Status: DISCONTINUED | OUTPATIENT
Start: 2024-12-11 | End: 2024-12-12 | Stop reason: HOSPADM

## 2024-12-11 RX ORDER — SODIUM CHLORIDE 9 MG/ML
INJECTION, SOLUTION INTRAVENOUS PRN
Status: DISCONTINUED | OUTPATIENT
Start: 2024-12-11 | End: 2024-12-12 | Stop reason: HOSPADM

## 2024-12-11 RX ORDER — MORPHINE SULFATE 2 MG/ML
2 INJECTION, SOLUTION INTRAMUSCULAR; INTRAVENOUS
Status: DISCONTINUED | OUTPATIENT
Start: 2024-12-11 | End: 2024-12-11 | Stop reason: HOSPADM

## 2024-12-11 RX ORDER — LORAZEPAM 2 MG/ML
2 INJECTION INTRAMUSCULAR EVERY 4 HOURS PRN
Status: DISCONTINUED | OUTPATIENT
Start: 2024-12-11 | End: 2024-12-12 | Stop reason: HOSPADM

## 2024-12-11 RX ORDER — MORPHINE SULFATE 4 MG/ML
4 INJECTION INTRAVENOUS
Status: DISCONTINUED | OUTPATIENT
Start: 2024-12-11 | End: 2024-12-12 | Stop reason: HOSPADM

## 2024-12-11 RX ORDER — GLYCOPYRROLATE 0.2 MG/ML
0.2 INJECTION INTRAMUSCULAR; INTRAVENOUS EVERY 4 HOURS PRN
Status: DISCONTINUED | OUTPATIENT
Start: 2024-12-11 | End: 2024-12-12 | Stop reason: HOSPADM

## 2024-12-11 RX ADMIN — HEPARIN SODIUM 5000 UNITS: 5000 INJECTION INTRAVENOUS; SUBCUTANEOUS at 05:02

## 2024-12-11 RX ADMIN — LORAZEPAM 1 MG: 2 INJECTION INTRAMUSCULAR; INTRAVENOUS at 18:17

## 2024-12-11 RX ADMIN — PIPERACILLIN AND TAZOBACTAM 3375 MG: 3; .375 INJECTION, POWDER, FOR SOLUTION INTRAVENOUS at 02:50

## 2024-12-11 RX ADMIN — MORPHINE SULFATE 2 MG: 2 INJECTION, SOLUTION INTRAMUSCULAR; INTRAVENOUS at 11:48

## 2024-12-11 RX ADMIN — MORPHINE SULFATE 2 MG: 2 INJECTION, SOLUTION INTRAMUSCULAR; INTRAVENOUS at 18:16

## 2024-12-11 RX ADMIN — PANTOPRAZOLE SODIUM 40 MG: 40 INJECTION, POWDER, FOR SOLUTION INTRAVENOUS at 08:32

## 2024-12-11 RX ADMIN — LORAZEPAM 1 MG: 2 INJECTION INTRAMUSCULAR; INTRAVENOUS at 11:50

## 2024-12-11 RX ADMIN — NOREPINEPHRINE BITARTRATE 16 MCG/MIN: 0.06 INJECTION, SOLUTION INTRAVENOUS at 00:19

## 2024-12-11 RX ADMIN — Medication: at 08:33

## 2024-12-11 RX ADMIN — POTASSIUM CHLORIDE 20 MEQ: 400 INJECTION, SOLUTION INTRAVENOUS at 04:09

## 2024-12-11 RX ADMIN — MORPHINE SULFATE 2 MG: 2 INJECTION, SOLUTION INTRAMUSCULAR; INTRAVENOUS at 14:19

## 2024-12-11 RX ADMIN — BACITRACIN: 500 OINTMENT TOPICAL at 08:33

## 2024-12-11 RX ADMIN — SODIUM CHLORIDE, PRESERVATIVE FREE 10 ML: 5 INJECTION INTRAVENOUS at 12:00

## 2024-12-11 NOTE — PLAN OF CARE
Problem: Chronic Conditions and Co-morbidities  Goal: Patient's chronic conditions and co-morbidity symptoms are monitored and maintained or improved  Outcome: Progressing     Problem: Discharge Planning  Goal: Discharge to home or other facility with appropriate resources  Outcome: Progressing     Problem: Safety - Adult  Goal: Free from fall injury  Outcome: Progressing     Problem: Skin/Tissue Integrity  Goal: Absence of new skin breakdown  Description: 1.  Monitor for areas of redness and/or skin breakdown  2.  Assess vascular access sites hourly  3.  Every 4-6 hours minimum:  Change oxygen saturation probe site  4.  Every 4-6 hours:  If on nasal continuous positive airway pressure, respiratory therapy assess nares and determine need for appliance change or resting period.  Outcome: Progressing     Problem: Neurosensory - Adult  Goal: Achieves stable or improved neurological status  Outcome: Progressing

## 2024-12-11 NOTE — PROGRESS NOTES
Pt bathed, arterial line removed, comfort meds administered PRN. Patient resting comfortably. Family left for the day. Wife requests to be notified if patient begins to decompensate.

## 2024-12-11 NOTE — DISCHARGE SUMMARY
V2.0  Discharge Summary    Name:  Juan Mohamud /Age/Sex: 1940 (84 y.o. male)   Admit Date: 2024  Discharge Date: 24    MRN & CSN:  [id number] & [id number] Encounter Date and Time 24 1:18 PM EST    Attending:  Ayaan Elias* Discharging Provider: Ayaan Eddy MD       Hospital Course:       84 y.o. male with a medical hx significant for type II DM, degenerative lumbar disease, arrhythmia s/p AICD, diastolic CHF, and macular degeneration, who presented from home to the [facility] ED on 11/3/24 after a fall.     Reportedly, patient fell after feeling like his \"knees were giving out.\" No reported symptoms prior to this fall, patient landed on his left side and hit the back of his head but did not lose consciousness at this time.      Upon arrival to the [facility] ED, patient was alert, oriented and reported with GCS of 15 and NIH of 0. While in the ED patient had increasing generalized weakness and difficulty getting up from a sitting position. Decision was made to admit patient as they did not feel safe discharging him home given his inability to stand unassisted.      Fall was thought to be mechanical and secondary to generalized weakness due to poor nutrition. CT abd pelv performed due to complaints of abdominal pain, distension, and back pain upon arrival to the floor. Imaging revealed acute T11 vertebral fracture extending into the T10-T11 disc space as well as diffuse distention of small and large bowel compatible with ileus and bilateral pleural effusions.     GI consulted for ileus, and patient underwent colonic decompression     MRI thoracic spine then demonstrated large epidural spinal hematoma with cord compression  and additional T6 subacute compression fracture along with bilateral pleural effusions.      Patient did receive PEG tube and on 2024 patient was deemed stable for discharge. Currently awaiting adequate toleration of tube feeds.    
tablet  Commonly known as: ALDACTONE  Take 1 tablet by mouth daily     torsemide 20 MG tablet  Commonly known as: DEMADEX  Take 1 tablet by mouth See Admin Instructions Two days a week     zolpidem 12.5 MG extended release tablet  Commonly known as: AMBIEN CR               Where to Get Your Medications        These medications were sent to [facility] [id number] [facility] [place] [address] - P [phone] - F [phone] [address] [facility] [address]      Phone: [phone]   furosemide 20 MG tablet       Activity: activity as tolerated  Diet:  NPO  Wound Care: keep wound clean and dry    Time Spent on discharge is more than 30 minutes    Signed:  Lorena Topete MD,  PGY-1  12/11/2024

## 2024-12-11 NOTE — PROGRESS NOTES
ICU Progress Note    Admit Date: 11/7/2024  ICU day 6  Vent Day: Extubated (12/8)  IV Access: Peripheral x3, R IJ  IV Fluids: None  Vasopressors: Levo 12   Antibiotics: Zosyn  Diet: Diet NPO    CC: transferred to ICU for hypotension 12/6    Interval history:   No acute events overnight    Patient was seen and examined at the bedside. He is alert and following commands.  He was able to answer orientation question except for date and situation. Pressor requirements are stable at 12 to maintain MAP of more than 65  Vitally BP 90/60  RR 13 SpO2 97 on 4 L O2   Labs are significant for K 3.4 was replaced, Cr stable at 2. 6 and  ml  Intake is 1.6 L    - Plans to be transferred to hospice care today.     HPI:  Briefly, Mr. Juan Mohamud is a 84 y.o. male with a complex medical hx significant for Ogilive syndrome, PE/DVT s/p IVC filter , type II DM, degenerative lumbar disease, arrhythmia s/p AICD, diastolic CHF, who has had a prolonged hospital stay following a fall. Since admission, he underwent T8-L1 posterior decompression, fixation, and fusion on 11/11.  He developed DVT/PE and IVC filter was placed by IR on 11/15. 12/6 developed hypotension that was refractory to fluids and midodrine and was transferred to ICU for further management. Cardio consulted, and echo revealed biventricular cardiogenic shock with EF 30-35%. While in the ICU, the patient's oxygen saturation decreased into the 70s despite attempts at vapotherm therapy. As there was no improvement in oxygenation, the patient was intubated.       Medications:     Scheduled Meds:   SMOG Enema  330 mL Rectal BID    piperacillin-tazobactam  4,500 mg IntraVENous Once    Followed by    piperacillin-tazobactam  3,375 mg IntraVENous Q8H    bacitracin   Topical TID    [Held by provider] naloxegol  25 mg Oral QAM AC    [Held by provider] linaclotide  290 mcg Oral QAM AC    lidocaine 1 % injection  50 mg IntraDERmal Once    [Held by provider]  vertebral body status post orthopedic hardware transfixing T7-T12. Orthopedic hardware is intact without evidence of loosening.   2.  Small to moderate bilateral pleural effusions       Electronically signed by Brendon Prather MD      Vascular duplex lower extremity venous bilateral   Final Result      XR CHEST PORTABLE   Final Result   1. Evidence of some radiographic improvement with decreasing but persistent   congestive changes      Electronically signed by Sonido Kunz      XR ABDOMEN (KUB) (SINGLE AP VIEW)   Final Result   1.  Multiple loops of minimally dilated small bowel, which are improved from prior examination.   2.  Gas is present throughout the colon.      Electronically signed by Brendon Prather MD      XR LUMBAR SPINE (2-3 VIEWS)   Final Result   IMPRESSION :      Intraoperative images were obtained. There was no radiologist in attendance.      Cumulative air kerma 22 mGy      Electronically signed by Sonny Beauchamp      FLUORO FOR SURGICAL PROCEDURES   Final Result   IMPRESSION :      Intraoperative images were obtained. There was no radiologist in attendance.      Cumulative air kerma 22 mGy      Electronically signed by Sonny Beauchamp      XR ABDOMEN (KUB) (SINGLE AP VIEW)   Final Result   1.  Multiple loops of dilated small bowel and colon throughout the abdomen similar to recent CT.      Electronically signed by Brendon Prather MD      XR CHEST PORTABLE   Final Result      Prominent perihilar interstitial markings likely reflecting a combination of   pulmonary edema and central bronchovascular crowding. Low lung volumes.      Stable cardiomediastinal silhouette noting left subclavian ICD.      Nasoenteric catheter tip in the gastric fundus.            Electronically signed by Sonny Beauchamp      CT ABDOMEN PELVIS W IV CONTRAST Additional Contrast? None   Final Result      1. Interval development of groundglass and nodular opacities of the right middle   lobe most likely infectious or

## 2024-12-11 NOTE — PROGRESS NOTES
[facility]  Cardiology Inpatient Consult Service  Daily Progress Note        Admit Date:  12/11/2024    Referring Physician: Johanna Castillo MD    Reason for Consultation/Chief Complaint: CHF and fluid overloaded    Subjective:   Interval history:  Pt extubated, intermittently delirious. Pt is now only on 12 of levophed, slowly weaning. HR in 120s, regular rhythm.     Medications:   sodium chloride flush  5-40 mL IntraVENous 2 times per day       IV drips:   sodium chloride         PRN:  sodium chloride flush, sodium chloride, acetaminophen, morphine **OR** morphine, LORazepam, LORazepam, diphenhydrAMINE, ondansetron, glycopyrrolate      Objective:     There were no vitals filed for this visit.    No intake or output data in the 24 hours ending 12/11/24 1134    No intake/output data recorded.  Wt Readings from Last 3 Encounters:   12/11/24 96.4 kg (212 lb 8.4 oz)   11/06/24 98.8 kg (217 lb 13 oz)   08/05/24 91.2 kg (201 lb)       Admit Wt:     Todays Wt:      TELEMETRY: Personally interpreted Sinus     Physical Exam:     General:  Ill appearing, intubated   Skin:  Cooland dry  Neck:  negativeJVP  Chest:  Clear to auscultation, respiration normal  Cardiovascular:  RRR S1S2    Extremities:  Anasarca present      Objective:  General Appearance:  Ill-appearing.    Vital signs: (most recent): There were no vitals taken for this visit.  Vital signs are normal.    Lungs:  Normal effort.    Heart: Tachycardia.  Regular rhythm.    Skin:  Cool and pale.        Labs:   Recent Labs     12/09/24  2249 12/10/24  0503 12/11/24  0255    146* 147*   K 3.3* 3.3* 3.4*   BUN 50* 51* 51*   CREATININE 2.5* 2.5* 2.6*    112* 113*   CO2 17* 15* 16*   GLUCOSE 91 85 96   CALCIUM 7.7* 7.8* 7.9*   MG 2.21 2.19 2.27     Recent Labs     12/09/24  0510 12/10/24  0503 12/11/24  0255   WBC 14.9* 12.8* 11.5*   HGB 11.0* 10.5* 10.7*   HCT 35.1* 33.2* 35.0*    195 203   MCV 90.6 90.2 92.0     No

## 2024-12-11 NOTE — CARE COORDINATION
CM received VM last night from [place] with [facility] ([phone]) regarding meeting with pt and family- consents have been signed by wife, but she does not want to begin hospice services until this morning at 10am when her other son will be here. Plan for transition to [facility] at 1000 today.    Thank you  Cat Rj GANDARA, BSN,   ICU   [phone]

## 2024-12-11 NOTE — PROGRESS NOTES
Pt transitioned to Comfort care. PRN morphine and ativan given. All gtts stopped at this time. Standard safety measures in place.     Electronically signed by Camacho Gunderson RN on 12/11/2024 at 12:02 PM

## 2024-12-11 NOTE — H&P
BILITOT 0.8 0.7   ALKPHOS 120 126     No results for input(s): \"URICACID\" in the last 72 hours.  No results for input(s): \"TROPHS\" in the last 72 hours.  No results for input(s): \"PROBNP\" in the last 72 hours.  No results for input(s): \"INR\" in the last 72 hours.  No results for input(s): \"SEDRATE\" in the last 72 hours.  No results for input(s): \"CRP\" in the last 72 hours.  Recent Labs     12/08/24  1244   LACTATE 0.96         UA/micro: No results for input(s): \"NITRITE\", \"COLORU\", \"PHUR\", \"LABCAST\", \"WBCUA\", \"RBCUA\", \"MUCUS\", \"TRICHOMONAS\", \"YEAST\", \"BACTERIA\", \"CLARITYU\", \"SPECGRAV\", \"LEUKOCYTESUR\", \"UROBILINOGEN\", \"BILIRUBINUR\", \"BLOODU\", \"GLUCOSEU\", \"AMORPHOUS\" in the last 72 hours.    Invalid input(s): \"KETONEU\"    Micro:   Results       Procedure Component Value Units Date/Time    Culture, Blood 2 [[id number]] Collected: 12/09/24 0342    Order Status: Completed Specimen: Blood Updated: 12/10/24 0415     Culture, Blood 2 No Growth to date.  Any change in status will be called.    Narrative:      ORDER#: [id number]                          ORDERED BY: LUNA SELBY  SOURCE: Blood lhand                        COLLECTED:  12/09/24 03:42  ANTIBIOTICS AT MANUEL.:                      RECEIVED :  12/09/24 15:44  If child <=2 yrs old please draw pediatric bottle.~Blood Culture #2    Culture, Blood 1 [[id number]] Collected: 12/09/24 0341    Order Status: Completed Specimen: Blood Updated: 12/10/24 0415     Blood Culture, Routine No Growth to date.  Any change in status will be called.    Narrative:      ORDER#: [id number]                          ORDERED BY: LUNA SELBY  SOURCE: Blood rac                          COLLECTED:  12/09/24 03:41  ANTIBIOTICS AT MANUEL.:                      RECEIVED :  12/09/24 15:44  If child <=2 yrs old please draw pediatric bottle.~Blood Culture 1    Culture, Blood 2 [[id number]]     Order Status: Canceled Specimen: Blood     Culture, Blood 1 [[id number]]     Order Status: Canceled Specimen:

## 2024-12-12 VITALS
SYSTOLIC BLOOD PRESSURE: 86 MMHG | DIASTOLIC BLOOD PRESSURE: 47 MMHG | HEART RATE: 121 BPM | RESPIRATION RATE: 12 BRPM | OXYGEN SATURATION: 98 %

## 2024-12-12 PROCEDURE — 6360000002 HC RX W HCPCS

## 2024-12-12 RX ADMIN — MORPHINE SULFATE 2 MG: 2 INJECTION, SOLUTION INTRAMUSCULAR; INTRAVENOUS at 11:13

## 2024-12-12 NOTE — DISCHARGE INSTR - COC
adherent 12/11/24 0400   Wound Assessment Dry 12/11/24 0400   Drainage Amount None (dry) 12/11/24 0400   Drainage Description Serosanguinous 12/11/24 0400   Odor None 12/11/24 0400   Yolie-wound Assessment Intact 12/11/24 0400   Margins Defined edges 12/11/24 0400   Number of days: 31       Wound 11/24/24 Tibial Posterior;Proximal;Right x2 blisters (Active)   Dressing Status Reinforced dressing 12/11/24 0400   Wound Cleansed Cleansed with saline 12/10/24 0400   Dressing/Treatment Non adherent 12/11/24 0400   Wound Assessment Fluid filled blister 12/11/24 0400   Drainage Amount None (dry) 12/11/24 0400   Odor None 12/11/24 0400   Yolie-wound Assessment Fragile 12/11/24 0400   Margins Attached edges 12/11/24 0400   Number of days: 17       Wound 12/05/24 Wrist Right;Anterior tear (Active)   Wound Image   12/05/24 1607   Wound Etiology Skin Tear 12/11/24 0400   Dressing Status New dressing applied 12/11/24 0400   Wound Cleansed Cleansed with saline 12/10/24 0800   Dressing/Treatment Foam 12/09/24 0400   Wound Assessment Pink/red 12/11/24 0400   Drainage Amount Small (< 25%) 12/11/24 0400   Drainage Description Serosanguinous 12/11/24 0400   Odor None 12/11/24 0400   Yolie-wound Assessment Ecchymosis 12/11/24 0400   Margins Unattached edges 12/11/24 0400   Number of days: 6       Wound 12/06/24 Coccyx Mid (Active)   Wound Etiology Pressure Stage 3 12/10/24 0800   Dressing Status Clean;Dry;Intact 12/11/24 0400   Wound Cleansed Cleansed with saline 12/10/24 0800   Dressing/Treatment Foam 12/11/24 0400   Wound Assessment Bleeding 12/11/24 0400   Drainage Amount Small (< 25%) 12/11/24 0400   Odor None 12/11/24 0400   Yolie-wound Assessment Fragile 12/11/24 0400   Number of days: 5       Incision 11/12/24 Back Medial (Active)   Dressing Status Clean;Dry;Intact 12/11/24 0400   Incision Cleansed Not Cleansed 12/11/24 0400   Dressing/Treatment Open to air 12/11/24 0400   Closure Open to air 12/11/24 0400   Margins Approximated

## 2024-12-12 NOTE — PROGRESS NOTES
Pt transferred to 3310 from ICU 4505. Pt belongings brought with. Pt is resting comfortably at this time no complications in transport. RN at bedside to answer any questions.

## 2024-12-12 NOTE — DISCHARGE SUMMARY
INTERNAL MEDICINE DEPARTMENT  DISCHARGE SUMMARY    Patient ID: Juan Mohamud                                             Discharge Date: 12/11/2024   Patient's PCP: Janet Garcia MD                                          Discharge Physician: Lorena Topete MD MD  Admit Date: 12/11/2024   Admitting Physician: Johanna Castillo MD    PROBLEMS DURING HOSPITALIZATION:  Present on Admission:   Sepsis (HCC)      DISCHARGE DIAGNOSES:  Biventricular Cardiogenic Shock (Improving)  Acute Hypoxic/Hypercapnic Respiratory Failure  Pleural Effusions  Recurrent Aspiration Pneumonia  Acute Kidney Injury (ATN suspected)  Hypokalemia  Tex Syndrome with Recent Gastrotomy  Epidural Spinal Hematoma, Spinal Cord Compression, Vertebral Fractures  PE/DVT, s/p IVC filter placement    HPI:  Mr. Juan Mohamud is a 84 y.o. male with a medical hx significant for type II DM, degenerative lumbar disease, arrhythmia s/p AICD, diastolic CHF, and macular degeneration otherwise as listed in the MHx table below, who presented from home to the [facility] ED on 11/3/24 after a fall.     Per chart review, patient fell after feeling like his \"knees were giving out.\" No reported symptoms prior to this fall, patient landed on his left side and hit the back of his head but did not lose consciousness at this time.      Upon arrival to the [facility] ED, patient was alert, oriented and reported with GCS of 15 and NIH of 0. While in the ED patient had increasing generalized weakness and difficulty getting up from a sitting position. Decision was made to admit patient as they did not feel safe discharging him home given his inability to stand unassisted.      Fall was thought to be mechanical and secondary to generalized weakness due to poor nutrition. CT abd pelv performed due to complaints of abdominal pain, distension, and back pain upon arrival to the floor. Imaging revealed acute T11 vertebral fracture extending into the T10-T11 
12/7/2024  EXAM: AP ABDOMEN INDICATION: Evaluate colonic distension, OG tube placement COMPARISON: 12/6/2024 FINDINGS: Tip of enteric tube in the stomach. Severe diffuse gaseous distention of ascending and transverse loops of colon is similar to the prior study. There is less distention within distal colonic loops, previously mildly distended. Rectal tube in place.     Severe diffuse gaseous distention of ascending and transverse loops of colon is similar to the prior study. There is less distention within distal colonic loops, previously mildly distended. Electronically signed by Hiram Lechuga MD    XR ABDOMEN (KUB) (SINGLE AP VIEW)    Result Date: 12/6/2024  Reason: Orogastric tube placement X-ray abdomen single AP view FINDINGS: Orogastric tube identified, tip in the mid body the stomach. Diffuse gaseous distention of colon is identified as well as small bowel.     Orogastric tube identified within the mid body the stomach Electronically signed by MD Hamlet Spangler    XR CHEST PORTABLE    Result Date: 12/6/2024  XR CHEST PORTABLE TECHNIQUE:  XR CHEST PORTABLE CLINICAL  INDICATION: intubation COMPARISON: 12/5/2024 FINDINGS: Cardiomegaly with fluid overload. Right basal consolidation, left midlung consolidation with interval improved aeration. Right IJ central line with tip in the cavoatrial junction. Endotracheal tube is appropriate. Left-sided pacemaker.     Endotracheal tube is appropriate. Slight interval improved aeration. Electronically signed by Obed Quispe    Echo (TTE) limited (PRN contrast/bubble/strain/3D)    Result Date: 12/6/2024    Left Ventricle: Reduced left ventricular systolic function with a visually estimated EF of 30 - 35%. Left ventricle is dilated. Increased wall thickness. Global hypokinesis present.   Right Ventricle: Severely reduced systolic function.   Aortic Valve: No regurgitation.   Mitral Valve: Moderate to severe regurgitation.   Tricuspid Valve: Mild regurgitation.   Extracardiac: Left

## 2024-12-12 NOTE — PROGRESS NOTES
Patient transferred to room 3310 from ICU. He is Hospice care. Patient is resting comfortably in bed at this time on 2L NC. Vital signs completed for transfer. Cuellar care provided. Cuellar, rectal tube and peg tube drainage bag all intact and draining. Wound vac intact to abdomen with abdominal binder in place. Will continue to assess patients comfort level closely. 4 eye full skin assessment deffered at this time for patients comfort.

## 2024-12-12 NOTE — PROGRESS NOTES
This RN called and spoke to patients wife, Lizzie at this time to give an update as patient rested well overnight and to give her the updated room number that patient transferred to.

## 2024-12-13 LAB
BACTERIA BLD CULT ORG #2: NORMAL
BACTERIA BLD CULT: NORMAL

## 2024-12-16 NOTE — PROGRESS NOTES
Physician Progress Note      PATIENT:               VINNY JOYCE  CSN #:                  [id number]  :                       1940  ADMIT DATE:       2024 12:52 AM  DISCH DATE:        2024 10:44 AM  RESPONDING  PROVIDER #:        Lorena Topete          QUERY TEXT:    Pt admitted with T9-10 vertebrae fractures and has encephalopathy documented.   If possible, please document in progress notes and discharge summary further   specificity regarding the type of encephalopathy:    The medical record reflects the following:  Risk Factors: 84 years old , postop, shock, PNA, PE, Acute respiratory failure  Clinical Indicators: 24  Critical care consult \" he developed acute   hypoxemic respiratory failure.  Vinny was confused and lacked capacity make   informed consent.  Acute encephalopathy\"  Treatment: ICU monitoring, IV Antibiotics, Intubated  Options provided:  -- Metabolic encephalopathy multifactorial 2/2 PNA, Acute respiratory failure   and or shock  -- Other - I will add my own diagnosis  -- Disagree - Not applicable / Not valid  -- Disagree - Clinically unable to determine / Unknown  -- Refer to Clinical Documentation Reviewer    PROVIDER RESPONSE TEXT:    This patient has Metabolic encephalopathy multifactorial 2/2 PNA, Acute   respiratory failure and or shock    Query created by: Lindy Palmer on 2024 6:41 AM      Electronically signed by:  Lorena Topete 2024 3:16 PM

## 2025-04-24 NOTE — H&P
V2.0  History and Physical      Name:  Juan Mohamud /Age/Sex: 1940  (84 y.o. male)   MRN & CSN:  [id number] & [id number] Encounter Date/Time: 11/3/2024 7:36 PM EST   Location:   PCP: Janet Garcia MD       Hospital Day: 1    Assessment and Plan:   Juan Mohamud is a 84 y.o. male non-smoker with a pmh of diabetes type 2, degenerative lumbar disease, neuropathy, dyslipidemia, arrhythmia status post AICD, systolic congestive heart failure, macular degeneration who presents with nonsyncopal Fall at home, initial encounter secondary to generalized weakness.    Hospital Problems             Last Modified POA    * (Principal) Fall at home, initial encounter 11/3/2024 Yes    Type 2 diabetes mellitus (HCC) 11/3/2024 Yes    Abdominal distention 11/3/2024 Yes    Hyponatremia 11/3/2024 Yes       Plan:  Monitor on telemetry check TSH, troponin and BNP, EKG, fall precautions, PT OT eval and treat for discharge planning  CT abdomen pelvis  Low insulin sliding scale before every meal and at bedtime, check glycosylated hemoglobin  IV fluids and hold diuretics in the ACE inhibitors given marginal blood pressure  Resume home regimen for chronic stable conditions with the above-mentioned modifications    Disposition:   Current Living situation: Home  Expected Disposition: Home versus SNF  Estimated D/C: 2 days    Diet ADULT DIET; Regular; 4 carb choices (60 gm/meal); Low Fat/Low Chol/High Fiber/MIKAELA   DVT Prophylaxis [x] Lovenox, []  Heparin, [] SCDs, [] Ambulation,  [] Eliquis, [] Xarelto, [] Coumadin   Code Status Full Code   Surrogate Decision Maker/ POA Wife     Personally reviewed Lab Studies and Imaging     Discussed management of the case with no one who recommended n/a.    EKG interpreted personally and results none done yet.    Imaging that was interpreted personally includes none and results n/a.    Drugs that require monitoring for toxicity include none and the method of monitoring      Lack of Transportation (Non-Medical): No   Physical Activity: Insufficiently Active (10/3/2019)    [facility]    Exercise Vital Sign     Days of Exercise per Week: 5 days     Minutes of Exercise per Session: 20 min   Stress: No Stress Concern Present (10/3/2019)    Received from [facility], [facility] and [facility]    Citizen of [place] [employer] - Occupational Stress Questionnaire     Feeling of Stress : Only a little    Received from [facility], [facility]    Interpersonal Safety   Housing Stability: Low Risk  (7/23/2024)    Housing Stability Vital Sign     Unable to Pay for Housing in the Last Year: No     Number of Places Lived in the Last Year: 2     Unstable Housing in the Last Year: No       Medications:   Medications:    sodium chloride  500 mL IntraVENous Once    sodium chloride flush  5-40 mL IntraVENous 2 times per day    enoxaparin  40 mg SubCUTAneous Daily    insulin lispro  0-4 Units SubCUTAneous 4x Daily AC & HS    tamsulosin  0.4 mg Oral Daily    amiodarone  200 mg Oral Daily    atorvastatin  10 mg Oral Nightly    DESMOpressin  100 mcg Oral Nightly    empagliflozin  10 mg Oral Daily    Lifitegrast  1 drop Both Eyes Nightly    [START ON 11/4/2024] linaclotide  290 mcg Oral QAM AC    metoprolol succinate  25 mg Oral Daily    [START ON 11/4/2024] pantoprazole  40 mg Oral QAM AC      Infusions:    sodium chloride      sodium chloride      dextrose       PRN Meds: sodium chloride flush, 5-40 mL, PRN  sodium chloride, , PRN  potassium chloride, 40 mEq, PRN   Or  potassium alternative oral replacement, 40 mEq, PRN   Or  potassium chloride, 10 mEq, PRN  magnesium sulfate, 2,000 mg, PRN  ondansetron, 4 mg, Q8H PRN   Or  ondansetron, 4 mg, Q6H PRN  polyethylene glycol, 17 g, Daily PRN  acetaminophen, 650  Initiate Treatment: hydrocortisone 2.5 % topical cream Bid\\n\\nketoconazole 2 % shampoo BIW\\n\\nclobetasol 0.05 % scalp solution Bid\\n\\nsulfacetamide sodium-sulfur 10 %-5 % (w/w) topical cleanser Bid Detail Level: Zone Render In Strict Bullet Format?: No

## (undated) DEVICE — 40583 XL ADVANCED TRENDELENBURG POSITIONING KIT: Brand: 40583 XL ADVANCED TRENDELENBURG POSITIONING KIT

## (undated) DEVICE — ENDOSCOPY KIT: Brand: MEDLINE INDUSTRIES, INC.

## (undated) DEVICE — TROCAR: Brand: KII FIOS FIRST ENTRY

## (undated) DEVICE — NEEDLE,22GX1.5",REG,BEVEL: Brand: MEDLINE

## (undated) DEVICE — CANNULA SAMP CO2 AD GRN 7FT CO2 AND 7FT O2 TBNG UNIV CONN

## (undated) DEVICE — GLOVE SURG SZ 8 L12IN FNGR THK87MIL WHT LTX FREE

## (undated) DEVICE — LIQUIBAND RAPID ADHESIVE 36/CS 0.8ML: Brand: MEDLINE

## (undated) DEVICE — SURGICAL PROCEDURE PACK PICK

## (undated) DEVICE — SUTURE ABSORBABLE MONOFILAMENT 0 CTX 60 IN VIO PDS + PDP990G

## (undated) DEVICE — Device

## (undated) DEVICE — Device: Brand: MEDEX

## (undated) DEVICE — APPLICATOR MEDICATED 26 CC SOLUTION HI LT ORNG CHLORAPREP

## (undated) DEVICE — SSC BONE WAX: Brand: SSC BONE WAX

## (undated) DEVICE — MEDIUM BLADE: Brand: MILL PLUS

## (undated) DEVICE — SYRINGE MED 3ML CLR PLAS STD N CTRL LUERLOCK TIP DISP

## (undated) DEVICE — PEG KIT STD 20 FR PUL W/ ENFIT ENDOVIVE

## (undated) DEVICE — 14FR COLON DECOMPRESSION SET: Brand: COOK

## (undated) DEVICE — 1LYRTR 16FR10ML100%SIL UMS SNP: Brand: MEDLINE INDUSTRIES, INC.

## (undated) DEVICE — SUTURE VICRYL SZ 2-0 L18IN ABSRB UD CT-1 L36MM 1/2 CIR J839D

## (undated) DEVICE — KIT,ANTI FOG,W/SPONGE & FLUID,SOFT PACK: Brand: MEDLINE

## (undated) DEVICE — STAPLER SKIN H3.9MM WIRE DIA0.58MM CRWN 6.9MM 35 STPL ROT

## (undated) DEVICE — GLOVE SURG SZ 8 L11.6IN THK9.8MIL STRW LTX POLYMER BEAD CUF

## (undated) DEVICE — COUNTER NDL 40 COUNT HLD 70 NUM FOAM BLK SGL MAG W BLDE REMV

## (undated) DEVICE — GLOVE ORTHO 8   MSG9480

## (undated) DEVICE — SUTURE PDS II SZ 1 L96IN ABSRB VLT TP-1 L65MM 1/2 CIR Z880G

## (undated) DEVICE — SUTURE VICRYL SZ 0 L18IN ABSRB UD POLYGLACTIN 910 BRAID TIE J912G

## (undated) DEVICE — SUTURE STRATAFIX SYMMETRIC PDS + SZ 1 L18IN ABSRB VLT L48MM SXPP1A400

## (undated) DEVICE — TOWEL,STOP FLAG GOLD N-W: Brand: MEDLINE

## (undated) DEVICE — SUTURE MONOCRYL + SZ 4-0 L27IN ABSRB UD L19MM PS-2 3/8 CIR MCP426H

## (undated) DEVICE — INSUFFLATION NEEDLE TO ESTABLISH PNEUMOPERITONEUM.: Brand: INSUFFLATION NEEDLE

## (undated) DEVICE — SOLUTION INJ LR VISIV 1000ML BG

## (undated) DEVICE — 3M™ TEGADERM™ TRANSPARENT FILM DRESSING FRAME STYLE, 1626W, 4 IN X 4-3/4 IN (10 CM X 12 CM), 50/CT 4CT/CASE: Brand: 3M™ TEGADERM™

## (undated) DEVICE — AGENT HEMOSTATIC SURGIFLOW MATRIX KIT W/THROMBIN

## (undated) DEVICE — ORTHO PRE OP PACK: Brand: MEDLINE INDUSTRIES, INC.

## (undated) DEVICE — GLOVE ORANGE PI 7   MSG9070

## (undated) DEVICE — MICROSURGICAL INSTRUMENT IRR. CYSTITOME 25GA FORMED-REVERSE CUTTING: Brand: ALCON

## (undated) DEVICE — GLOVE SURG SZ 75 L12IN THK75MIL DK GRN LTX FREE

## (undated) DEVICE — SYRINGE MED 30ML STD CLR PLAS LUERLOCK TIP N CTRL DISP

## (undated) DEVICE — Z DISCONTINUED NO SUB IDED CANNULA OPHTH ANTR CHMBR 35 DEG 27 GAX7/8 IN 0.4X22 MM ANGLE

## (undated) DEVICE — SPHERES FOR BRAINLAB

## (undated) DEVICE — SUTURE VICRYL + SZ 0 L18IN ABSRB UD L36MM CT-1 1/2 CIR VCP840D

## (undated) DEVICE — TOOL MR8-14MH30 MR8 14CM MATCH 3MM: Brand: MIDAS REX MR8

## (undated) DEVICE — NEPTUNE E-SEP SMOKE EVACUATION PENCIL, COATED, 70MM BLADE, PUSH BUTTON SWITCH: Brand: NEPTUNE E-SEP

## (undated) DEVICE — MARKER SURG PASS SPHR NDI

## (undated) DEVICE — SURGICAL PROC PACK SHT WEST V4

## (undated) DEVICE — TRAP FLUID

## (undated) DEVICE — SUTURE PERMAHAND SZ 3-0 L30IN NONABSORBABLE BLK SH L26MM K832H

## (undated) DEVICE — SYRINGE TB 1ML NDL 25GA L0.625IN PLAS SLIP TIP CONVENTIONAL

## (undated) DEVICE — APPLICATOR  COTTON-TIPPED 6 IN WOOD STRL

## (undated) DEVICE — 3M™ TEGADERM™ CHG CHLORHEXIDINE GLUCONATE GEL PAD 1664, 25 EACH/CARTON, 4 CARTONS/CASE: Brand: 3M™ TEGADERM™

## (undated) DEVICE — SUTURE VICRYL SZ 4-0 L18IN ABSRB UD L19MM PS-2 3/8 CIR PRIM J496H

## (undated) DEVICE — SUTURE VICRYL + SZ 2-0 L18IN ABSRB UD CT1 L36MM 1/2 CIR VCP839D

## (undated) DEVICE — PROBE 8225101 5PK STD PRASS FL TIP ROHS

## (undated) DEVICE — GENERAL LAPAROSCOPIC: Brand: MEDLINE INDUSTRIES, INC.

## (undated) DEVICE — SOLUTION IRRIG 250ML STRL H2O PLAS POUR BTL USP

## (undated) DEVICE — SUTURE VICRYL CTD ANTBCTRL 54 IN TI PLU VLT

## (undated) DEVICE — GARMENT,MEDLINE,DVT,INT,CALF,MED, GEN2: Brand: MEDLINE

## (undated) DEVICE — BLANKET WRM W29.9XL79.1IN UP BODY FORC AIR MISTRAL-AIR

## (undated) DEVICE — COVER LT HNDL CAM BLU DISP W/ SURG CTRL

## (undated) DEVICE — KIT EVAC 0.13IN RECT TB DIA10FR 400CC PVC 3 SPR Y CONN DRN

## (undated) DEVICE — SOLUTION IV 1000ML 0.9% SOD CHL

## (undated) DEVICE — LAMINECTOMY: Brand: MEDLINE INDUSTRIES, INC.